# Patient Record
Sex: MALE | Race: BLACK OR AFRICAN AMERICAN | NOT HISPANIC OR LATINO | URBAN - METROPOLITAN AREA
[De-identification: names, ages, dates, MRNs, and addresses within clinical notes are randomized per-mention and may not be internally consistent; named-entity substitution may affect disease eponyms.]

---

## 2019-06-20 ENCOUNTER — INPATIENT (INPATIENT)
Facility: HOSPITAL | Age: 69
LOS: 5 days | Discharge: HOME | DRG: 687 | End: 2019-06-26
Attending: FAMILY MEDICINE | Admitting: HOSPITALIST
Payer: MEDICARE

## 2019-06-20 VITALS
TEMPERATURE: 98 F | DIASTOLIC BLOOD PRESSURE: 76 MMHG | HEART RATE: 92 BPM | OXYGEN SATURATION: 100 % | RESPIRATION RATE: 17 BRPM | SYSTOLIC BLOOD PRESSURE: 116 MMHG

## 2019-06-20 DIAGNOSIS — N12 TUBULO-INTERSTITIAL NEPHRITIS, NOT SPECIFIED AS ACUTE OR CHRONIC: ICD-10-CM

## 2019-06-20 DIAGNOSIS — N39.0 URINARY TRACT INFECTION, SITE NOT SPECIFIED: ICD-10-CM

## 2019-06-20 DIAGNOSIS — S86.019A STRAIN OF UNSPECIFIED ACHILLES TENDON, INITIAL ENCOUNTER: Chronic | ICD-10-CM

## 2019-06-20 DIAGNOSIS — E88.09 OTHER DISORDERS OF PLASMA-PROTEIN METABOLISM, NOT ELSEWHERE CLASSIFIED: ICD-10-CM

## 2019-06-20 DIAGNOSIS — Z29.9 ENCOUNTER FOR PROPHYLACTIC MEASURES, UNSPECIFIED: ICD-10-CM

## 2019-06-20 DIAGNOSIS — I10 ESSENTIAL (PRIMARY) HYPERTENSION: ICD-10-CM

## 2019-06-20 DIAGNOSIS — Z98.890 OTHER SPECIFIED POSTPROCEDURAL STATES: Chronic | ICD-10-CM

## 2019-06-20 DIAGNOSIS — I82.90 ACUTE EMBOLISM AND THROMBOSIS OF UNSPECIFIED VEIN: ICD-10-CM

## 2019-06-20 DIAGNOSIS — N28.89 OTHER SPECIFIED DISORDERS OF KIDNEY AND URETER: ICD-10-CM

## 2019-06-20 DIAGNOSIS — E87.1 HYPO-OSMOLALITY AND HYPONATREMIA: ICD-10-CM

## 2019-06-20 LAB
ALBUMIN SERPL ELPH-MCNC: 3 G/DL — LOW (ref 3.3–5)
ALP SERPL-CCNC: 84 U/L — SIGNIFICANT CHANGE UP (ref 40–120)
ALT FLD-CCNC: 16 U/L — SIGNIFICANT CHANGE UP (ref 12–78)
ANION GAP SERPL CALC-SCNC: 8 MMOL/L — SIGNIFICANT CHANGE UP (ref 5–17)
ANISOCYTOSIS BLD QL: SLIGHT — SIGNIFICANT CHANGE UP
APPEARANCE UR: ABNORMAL
APTT BLD: 32.6 SEC — SIGNIFICANT CHANGE UP (ref 28.5–37)
AST SERPL-CCNC: 13 U/L — LOW (ref 15–37)
BACTERIA # UR AUTO: ABNORMAL
BASOPHILS # BLD AUTO: 0.36 K/UL — HIGH (ref 0–0.2)
BASOPHILS NFR BLD AUTO: 1 % — SIGNIFICANT CHANGE UP (ref 0–2)
BILIRUB SERPL-MCNC: 0.7 MG/DL — SIGNIFICANT CHANGE UP (ref 0.2–1.2)
BILIRUB UR-MCNC: NEGATIVE — SIGNIFICANT CHANGE UP
BUN SERPL-MCNC: 12 MG/DL — SIGNIFICANT CHANGE UP (ref 7–23)
CALCIUM SERPL-MCNC: 9 MG/DL — SIGNIFICANT CHANGE UP (ref 8.5–10.1)
CHLORIDE SERPL-SCNC: 99 MMOL/L — SIGNIFICANT CHANGE UP (ref 96–108)
CO2 SERPL-SCNC: 26 MMOL/L — SIGNIFICANT CHANGE UP (ref 22–31)
COLOR SPEC: YELLOW — SIGNIFICANT CHANGE UP
COMMENT - URINE: SIGNIFICANT CHANGE UP
CREAT SERPL-MCNC: 0.84 MG/DL — SIGNIFICANT CHANGE UP (ref 0.5–1.3)
DIFF PNL FLD: ABNORMAL
EOSINOPHIL # BLD AUTO: 0 K/UL — SIGNIFICANT CHANGE UP (ref 0–0.5)
EOSINOPHIL NFR BLD AUTO: 0 % — SIGNIFICANT CHANGE UP (ref 0–6)
EPI CELLS # UR: SIGNIFICANT CHANGE UP
GLUCOSE SERPL-MCNC: 123 MG/DL — HIGH (ref 70–99)
GLUCOSE UR QL: NEGATIVE — SIGNIFICANT CHANGE UP
HCT VFR BLD CALC: 36.5 % — LOW (ref 39–50)
HGB BLD-MCNC: 11.8 G/DL — LOW (ref 13–17)
INR BLD: 1.31 RATIO — HIGH (ref 0.88–1.16)
KETONES UR-MCNC: ABNORMAL
LACTATE SERPL-SCNC: 1.6 MMOL/L — SIGNIFICANT CHANGE UP (ref 0.7–2)
LEUKOCYTE ESTERASE UR-ACNC: ABNORMAL
LIDOCAIN IGE QN: 43 U/L — LOW (ref 73–393)
LYMPHOCYTES # BLD AUTO: 1.09 K/UL — SIGNIFICANT CHANGE UP (ref 1–3.3)
LYMPHOCYTES # BLD AUTO: 3 % — LOW (ref 13–44)
MANUAL SMEAR VERIFICATION: SIGNIFICANT CHANGE UP
MCHC RBC-ENTMCNC: 23.6 PG — LOW (ref 27–34)
MCHC RBC-ENTMCNC: 32.3 GM/DL — SIGNIFICANT CHANGE UP (ref 32–36)
MCV RBC AUTO: 73.1 FL — LOW (ref 80–100)
MONOCYTES # BLD AUTO: 1.45 K/UL — HIGH (ref 0–0.9)
MONOCYTES NFR BLD AUTO: 4 % — SIGNIFICANT CHANGE UP (ref 2–14)
NEUTROPHILS # BLD AUTO: 32.98 K/UL — HIGH (ref 1.8–7.4)
NEUTROPHILS NFR BLD AUTO: 85 % — HIGH (ref 43–77)
NEUTS BAND # BLD: 6 % — SIGNIFICANT CHANGE UP (ref 0–8)
NITRITE UR-MCNC: NEGATIVE — SIGNIFICANT CHANGE UP
NRBC # BLD: 0 — SIGNIFICANT CHANGE UP
NRBC # BLD: SIGNIFICANT CHANGE UP /100 WBCS (ref 0–0)
PH UR: 5 — SIGNIFICANT CHANGE UP (ref 5–8)
PLAT MORPH BLD: NORMAL — SIGNIFICANT CHANGE UP
PLATELET # BLD AUTO: 423 K/UL — HIGH (ref 150–400)
POIKILOCYTOSIS BLD QL AUTO: SLIGHT — SIGNIFICANT CHANGE UP
POTASSIUM SERPL-MCNC: 3.6 MMOL/L — SIGNIFICANT CHANGE UP (ref 3.5–5.3)
POTASSIUM SERPL-SCNC: 3.6 MMOL/L — SIGNIFICANT CHANGE UP (ref 3.5–5.3)
PROT SERPL-MCNC: 6.8 G/DL — SIGNIFICANT CHANGE UP (ref 6–8.3)
PROT UR-MCNC: 75 MG/DL
PROTHROM AB SERPL-ACNC: 15 SEC — HIGH (ref 10–12.9)
RBC # BLD: 4.99 M/UL — SIGNIFICANT CHANGE UP (ref 4.2–5.8)
RBC # FLD: 17.9 % — HIGH (ref 10.3–14.5)
RBC BLD AUTO: SIGNIFICANT CHANGE UP
RBC CASTS # UR COMP ASSIST: ABNORMAL /HPF (ref 0–4)
SODIUM SERPL-SCNC: 133 MMOL/L — LOW (ref 135–145)
SP GR SPEC: 1.02 — SIGNIFICANT CHANGE UP (ref 1.01–1.02)
UROBILINOGEN FLD QL: NEGATIVE — SIGNIFICANT CHANGE UP
VARIANT LYMPHS # BLD: 1 % — SIGNIFICANT CHANGE UP (ref 0–6)
WBC # BLD: 36.24 K/UL — HIGH (ref 3.8–10.5)
WBC # FLD AUTO: 36.24 K/UL — HIGH (ref 3.8–10.5)
WBC UR QL: ABNORMAL

## 2019-06-20 PROCEDURE — 93010 ELECTROCARDIOGRAM REPORT: CPT

## 2019-06-20 PROCEDURE — 71260 CT THORAX DX C+: CPT | Mod: 26

## 2019-06-20 PROCEDURE — 74177 CT ABD & PELVIS W/CONTRAST: CPT | Mod: 26

## 2019-06-20 PROCEDURE — 99285 EMERGENCY DEPT VISIT HI MDM: CPT

## 2019-06-20 PROCEDURE — 99223 1ST HOSP IP/OBS HIGH 75: CPT | Mod: GC,AI

## 2019-06-20 RX ORDER — MORPHINE SULFATE 50 MG/1
2 CAPSULE, EXTENDED RELEASE ORAL ONCE
Refills: 0 | Status: DISCONTINUED | OUTPATIENT
Start: 2019-06-20 | End: 2019-06-20

## 2019-06-20 RX ORDER — HEPARIN SODIUM 5000 [USP'U]/ML
5500 INJECTION INTRAVENOUS; SUBCUTANEOUS EVERY 6 HOURS
Refills: 0 | Status: DISCONTINUED | OUTPATIENT
Start: 2019-06-20 | End: 2019-06-24

## 2019-06-20 RX ORDER — HEPARIN SODIUM 5000 [USP'U]/ML
5500 INJECTION INTRAVENOUS; SUBCUTANEOUS ONCE
Refills: 0 | Status: COMPLETED | OUTPATIENT
Start: 2019-06-20 | End: 2019-06-20

## 2019-06-20 RX ORDER — ASCORBIC ACID 60 MG
0 TABLET,CHEWABLE ORAL
Qty: 0 | Refills: 0 | DISCHARGE

## 2019-06-20 RX ORDER — CEFPODOXIME PROXETIL 100 MG
0 TABLET ORAL
Qty: 0 | Refills: 0 | DISCHARGE

## 2019-06-20 RX ORDER — ONDANSETRON 8 MG/1
4 TABLET, FILM COATED ORAL ONCE
Refills: 0 | Status: DISCONTINUED | OUTPATIENT
Start: 2019-06-20 | End: 2019-06-26

## 2019-06-20 RX ORDER — HEPARIN SODIUM 5000 [USP'U]/ML
2500 INJECTION INTRAVENOUS; SUBCUTANEOUS EVERY 6 HOURS
Refills: 0 | Status: DISCONTINUED | OUTPATIENT
Start: 2019-06-20 | End: 2019-06-24

## 2019-06-20 RX ORDER — PIPERACILLIN AND TAZOBACTAM 4; .5 G/20ML; G/20ML
3.38 INJECTION, POWDER, LYOPHILIZED, FOR SOLUTION INTRAVENOUS ONCE
Refills: 0 | Status: COMPLETED | OUTPATIENT
Start: 2019-06-20 | End: 2019-06-20

## 2019-06-20 RX ORDER — LISINOPRIL 2.5 MG/1
10 TABLET ORAL DAILY
Refills: 0 | Status: DISCONTINUED | OUTPATIENT
Start: 2019-06-20 | End: 2019-06-26

## 2019-06-20 RX ORDER — SODIUM CHLORIDE 9 MG/ML
1000 INJECTION INTRAMUSCULAR; INTRAVENOUS; SUBCUTANEOUS ONCE
Refills: 0 | Status: COMPLETED | OUTPATIENT
Start: 2019-06-20 | End: 2019-06-20

## 2019-06-20 RX ORDER — AMLODIPINE BESYLATE 2.5 MG/1
5 TABLET ORAL DAILY
Refills: 0 | Status: DISCONTINUED | OUTPATIENT
Start: 2019-06-20 | End: 2019-06-20

## 2019-06-20 RX ORDER — HEPARIN SODIUM 5000 [USP'U]/ML
INJECTION INTRAVENOUS; SUBCUTANEOUS
Qty: 25000 | Refills: 0 | Status: DISCONTINUED | OUTPATIENT
Start: 2019-06-20 | End: 2019-06-24

## 2019-06-20 RX ORDER — LISINOPRIL 2.5 MG/1
0 TABLET ORAL
Qty: 0 | Refills: 0 | DISCHARGE

## 2019-06-20 RX ADMIN — HEPARIN SODIUM 1200 UNIT(S)/HR: 5000 INJECTION INTRAVENOUS; SUBCUTANEOUS at 20:40

## 2019-06-20 RX ADMIN — PIPERACILLIN AND TAZOBACTAM 3.38 GRAM(S): 4; .5 INJECTION, POWDER, LYOPHILIZED, FOR SOLUTION INTRAVENOUS at 18:50

## 2019-06-20 RX ADMIN — SODIUM CHLORIDE 1000 MILLILITER(S): 9 INJECTION INTRAMUSCULAR; INTRAVENOUS; SUBCUTANEOUS at 19:20

## 2019-06-20 RX ADMIN — SODIUM CHLORIDE 1000 MILLILITER(S): 9 INJECTION INTRAMUSCULAR; INTRAVENOUS; SUBCUTANEOUS at 18:21

## 2019-06-20 RX ADMIN — PIPERACILLIN AND TAZOBACTAM 200 GRAM(S): 4; .5 INJECTION, POWDER, LYOPHILIZED, FOR SOLUTION INTRAVENOUS at 18:20

## 2019-06-20 RX ADMIN — SODIUM CHLORIDE 1000 MILLILITER(S): 9 INJECTION INTRAMUSCULAR; INTRAVENOUS; SUBCUTANEOUS at 16:59

## 2019-06-20 RX ADMIN — HEPARIN SODIUM 5500 UNIT(S): 5000 INJECTION INTRAVENOUS; SUBCUTANEOUS at 20:40

## 2019-06-20 NOTE — ED PROVIDER NOTE - OBJECTIVE STATEMENT
pt c/o approx 1 week of upper abd pain. pt seen in [facility], dx with uti, also found to have liver mass, had biopsy, d/c 6/16. + diarrhea. no fevers, chills, d/n/v, cp, sob, cough, dysuria.  pmd - fishbein

## 2019-06-20 NOTE — H&P ADULT - PROBLEM SELECTOR PLAN 3
Continue BP med -L renal vein with partial thrombosis  -Heparin Gtt per Lele Par Thomas, Vascular -L renal vein with partial thrombosis  -Heparin Gtt per Cony Gottlieb,  Vascular -L renal vein with partial thrombosis  -Heparin Gtt per Dr Estrada,  Vascular surg.

## 2019-06-20 NOTE — H&P ADULT - PROBLEM SELECTOR PLAN 4
-Patient on Lisinopril, will give CCB as patient  -Patient on Lisinopril, will give amlodipine as patient African American Cont Lisinopril   consider add/transition to CCB for better response in AA population

## 2019-06-20 NOTE — H&P ADULT - PROBLEM SELECTOR PLAN 1
-Patient presenting with abdominal pain possibly related to   -UA positive, s/p Zosyn  -Patient bladder scanned in ED with full bladder, barber administered with yellow fluid  -Due to associated episodes of abdominal pain with food, will ultrasound GB -Patient presenting with abdominal pain possibly related to   -UA positive, s/p Zosyn. Continue antibiotics  -F/U Urine Cx, F/U Blood Cx  -Patient bladder scanned in ED with full bladder, barber administered with yellow fluid  -trial of void tomorrow  -Consider ID and Urology, must obtain culture results from [facility] -Patient presenting with abdominal pain possibly related to   -UA positive, s/p Zosyn. Continue antibiotics  -F/U Urine Cx, F/U Blood Cx  -Patient bladder scanned in ED with full bladder, barber administered with yellow fluid  -trial of void tomorrow  -100 cc/hr NS for 12 hours  -Consider ID and Urology, must obtain culture results from [facility]

## 2019-06-20 NOTE — H&P ADULT - NSHPSOCIALHISTORY_GEN_ALL_CORE
but lives with current ex-wife in relationship. No tobacco use, no sig etoh use. Rare marijuana use.

## 2019-06-20 NOTE — H&P ADULT - NSHPPHYSICALEXAM_GEN_ALL_CORE
Physical Exam:  General: Well developed, well nourished, NAD  HEENT: NCAT, PERRLA, EOMI bl, moist mucous membranes, poor oral hygiene  Neck: Supple, nontender, no mass  Neurology: A&Ox3, nonfocal,  sensation intact, moving all extremities  Respiratory: CTA B/L, No W/R/R  CV: RRR, +S1/S2, Grade III/VI holosystolic murmur heard best in R 2nd ICS  Abdominal: Soft, NT, negative murphys, negative CVA tenderness b/l, , 2x2 cm circular erythematous rash on abdomen  Extremities: No peripheral edema in LE b/l, 2+ dorsalis pedal pulses b/l  Skin: warm, dry post procedure first degree heart block

## 2019-06-20 NOTE — ED PROVIDER NOTE - CARE PLAN
Principal Discharge DX:	UTI (urinary tract infection)  Secondary Diagnosis:	Leukocytosis  Secondary Diagnosis:	Venous thrombosis

## 2019-06-20 NOTE — H&P ADULT - PROBLEM SELECTOR PLAN 5
IMPROVE VTE Individual Risk Assessment    RISK                                                                Points    [  ] Previous VTE                                                  3    [  ] Thrombophilia                                               2    [  ] Lower limb paralysis                                      2        (unable to hold up >15 seconds)    [  ] Current Cancer                                              2         (within 6 months)  [  ] Immobilization > 24 hrs                                1    [  ] ICU/CCU stay > 24 hours                              1  [  ] Age > 60                                                      1    IMPROVE VTE Score _________    On heparin due to L renal thrombosis mild level with mild to no symptoms  trend after IV FLuids

## 2019-06-20 NOTE — ED ADULT NURSE NOTE - CHPI ED NUR SYMPTOMS NEG
no fever/no abdominal distension/no vomiting/no blood in stool/no burning urination/no diarrhea/no chills/no dysuria/no hematuria/no nausea

## 2019-06-20 NOTE — ED ADULT NURSE NOTE - OBJECTIVE STATEMENT
received pt in bed #10b Pt A&O c/o abd pain & no appetite x 1 week since Thursday was seen @ [facility] had liver bx done states still has pain & also had diarrhea x 2 days that has now subsided. pt currently on antibxs Pt denies n/v. Pt seen by Dr Wooten Will monitor

## 2019-06-20 NOTE — H&P ADULT - HISTORY OF PRESENT ILLNESS
69 M with a PMH of HTN presents to ED c/o upper abdominal pain for 1 week.     In the ED;  VS stable  WBC 36.2 h/h: 11.8/36.5  Platelet 423, INR 1.31 BMP wnl except Na 133, AST/ALT wnl, lipase 43, lactate 1.6  UA Positive  CT abdomen/Pelvis: Possible L sided kidney neoplasm vs pyelonephritis.  A left renal artery is surrounded by lymphadenopathy and appears narrowed. There is suggestion   of partially occlusive thrombus in the left renal vein  Hypodense liver lesion in the anterior aspect of the lateral segment of the left lobe measuring 3.2 x 2.2 cm  CT chest: Negative PE or PNA. s. There are several tiny left   bilateral upper lobe pulmonary nodules 69 M with a PMH of HTN presents to ED c/o upper abdominal pain. Patient states pain began last Thursday in his lower abdomen with radiation to his L flank with associated decrease in appetite. He went to [facility] at that time where he was admitted for 2 days and diagnosed with a UTI, had a liver biopsy based on CT findings and discharged on PO antibiotics. Patient now returning for similar abdominal pain, but this time he notes it's higher in the midepigastric region and is non radiating. He says it was at its worst (an 8/10) after eating a turkey sandwich yesterday and after having some broth earlier today. He admits to an ongoing decrease in appetite and a 7 lb weight loss since last week. He denies fevers, chills, night sweats, N/V but admits to diarrhea for the past 2 days but his last stool last night was formed. Denies F/U/D, but admits to urinary retention while here in ED requiring catheterization, denying prior episodes of urinary retention.     In the ED;  VS stable  WBC 36.2 h/h: 11.8/36.5  Platelet 423, INR 1.31 BMP wnl except Na 133, AST/ALT wnl, lipase 43, lactate 1.6  UA Positive  CT abdomen/Pelvis: Possible L sided kidney neoplasm vs pyelonephritis.  A left renal artery is surrounded by lymphadenopathy and appears narrowed. There is suggestion   of partially occlusive thrombus in the left renal vein  Hypodense liver lesion in the anterior aspect of the lateral segment of the left lobe measuring 3.2 x 2.2 cm  CT chest: Negative PE or PNA. s. There are several tiny left   bilateral upper lobe pulmonary nodules

## 2019-06-20 NOTE — H&P ADULT - NSHPREVIEWOFSYSTEMS_GEN_ALL_CORE
Constitutional: denies fever, chills, diaphoresis admits decreased appetite  HEENT: denies blurry vision, double vision, eye pain, difficulty hearing  Respiratory: denies SOB, cough, sputum production  Cardiovascular: denies CP, palpitations, edema  Gastrointestinal: admits abdominal pain, admits diarrhea, constipation denies N/V  Genitourinary: denies dysuria, frequency, urgency, hematuria admits urinary retention  Skin/Breast: denies rash, itching  Neurologic: denies headache, weakness, dizziness, paresthesias, numbness/tingling  Psychiatric: denies anxiety, depression, suicidal, homicidal thoughts  ROS negative except as noted above

## 2019-06-20 NOTE — H&P ADULT - PROBLEM SELECTOR PLAN 7
IMPROVE VTE Individual Risk Assessment    RISK                                                                Points    [  ] Previous VTE                                                  3    [  ] Thrombophilia                                               2    [  ] Lower limb paralysis                                      2        (unable to hold up >15 seconds)    [  ] Current Cancer                                              2         (within 6 months)  [  ] Immobilization > 24 hrs                                1    [  ] ICU/CCU stay > 24 hours                              1  [  ] Age > 60                                                      1    IMPROVE VTE Score _________    On heparin due to L renal thrombosis

## 2019-06-20 NOTE — H&P ADULT - PROBLEM SELECTOR PLAN 2
-L renal vein with partial thrombosis  -Heparin Gtt per Lele Par Thomas, Vascular -CT abdomen and pelvis demonstrates L renal mass of possible neoplastic origin with necrotic lymph-nodes para-aortically with small pulmonary nodules, ? mets  -F/U Heme Onc consult -CT abdomen and pelvis demonstrates L renal mass of possible neoplastic origin with necrotic lymph-nodes para-aortically with small pulmonary nodules, ? mets  -F/U Heme Onc consult (Dr Sun)

## 2019-06-20 NOTE — ED ADULT NURSE REASSESSMENT NOTE - NS ED NURSE REASSESS COMMENT FT1
pt verbalized trouble urinating States he feels like he has the urge but only can urinate 20cc Bladder scan done shows >395 Dr Wooten made aware Stated to placed Locke

## 2019-06-21 DIAGNOSIS — I82.3 EMBOLISM AND THROMBOSIS OF RENAL VEIN: ICD-10-CM

## 2019-06-21 DIAGNOSIS — D63.0 ANEMIA IN NEOPLASTIC DISEASE: ICD-10-CM

## 2019-06-21 DIAGNOSIS — D63.8 ANEMIA IN OTHER CHRONIC DISEASES CLASSIFIED ELSEWHERE: ICD-10-CM

## 2019-06-21 DIAGNOSIS — C77.2 SECONDARY AND UNSPECIFIED MALIGNANT NEOPLASM OF INTRA-ABDOMINAL LYMPH NODES: ICD-10-CM

## 2019-06-21 DIAGNOSIS — D50.0 IRON DEFICIENCY ANEMIA SECONDARY TO BLOOD LOSS (CHRONIC): ICD-10-CM

## 2019-06-21 DIAGNOSIS — C78.00 SECONDARY MALIGNANT NEOPLASM OF UNSPECIFIED LUNG: ICD-10-CM

## 2019-06-21 DIAGNOSIS — K92.2 GASTROINTESTINAL HEMORRHAGE, UNSPECIFIED: ICD-10-CM

## 2019-06-21 LAB
APTT BLD: 66.2 SEC — HIGH (ref 28.5–37)
APTT BLD: 70.1 SEC — HIGH (ref 27.5–36.3)
CULTURE RESULTS: NO GROWTH — SIGNIFICANT CHANGE UP
ERYTHROCYTE [SEDIMENTATION RATE] IN BLOOD: 27 MM/HR — HIGH (ref 0–20)
HCT VFR BLD CALC: 31.2 % — LOW (ref 39–50)
HCT VFR BLD CALC: 32.3 % — LOW (ref 39–50)
HCV AB S/CO SERPL IA: 0.1 S/CO — SIGNIFICANT CHANGE UP (ref 0–0.99)
HCV AB SERPL-IMP: SIGNIFICANT CHANGE UP
HGB BLD-MCNC: 10.3 G/DL — LOW (ref 13–17)
HGB BLD-MCNC: 10.6 G/DL — LOW (ref 13–17)
MCHC RBC-ENTMCNC: 23.9 PG — LOW (ref 27–34)
MCHC RBC-ENTMCNC: 23.9 PG — LOW (ref 27–34)
MCHC RBC-ENTMCNC: 32.8 GM/DL — SIGNIFICANT CHANGE UP (ref 32–36)
MCHC RBC-ENTMCNC: 33 GM/DL — SIGNIFICANT CHANGE UP (ref 32–36)
MCV RBC AUTO: 72.4 FL — LOW (ref 80–100)
MCV RBC AUTO: 72.7 FL — LOW (ref 80–100)
NRBC # BLD: 0 /100 WBCS — SIGNIFICANT CHANGE UP (ref 0–0)
NRBC # BLD: 0 /100 WBCS — SIGNIFICANT CHANGE UP (ref 0–0)
PLATELET # BLD AUTO: 340 K/UL — SIGNIFICANT CHANGE UP (ref 150–400)
PLATELET # BLD AUTO: 387 K/UL — SIGNIFICANT CHANGE UP (ref 150–400)
RBC # BLD: 4.31 M/UL — SIGNIFICANT CHANGE UP (ref 4.2–5.8)
RBC # BLD: 4.44 M/UL — SIGNIFICANT CHANGE UP (ref 4.2–5.8)
RBC # BLD: 4.44 M/UL — SIGNIFICANT CHANGE UP (ref 4.2–5.8)
RBC # FLD: 17.7 % — HIGH (ref 10.3–14.5)
RBC # FLD: 17.9 % — HIGH (ref 10.3–14.5)
RETICS #: 43.5 K/UL — SIGNIFICANT CHANGE UP (ref 25–125)
RETICS/RBC NFR: 1 % — SIGNIFICANT CHANGE UP (ref 0.5–2.5)
SPECIMEN SOURCE: SIGNIFICANT CHANGE UP
WBC # BLD: 28.17 K/UL — HIGH (ref 3.8–10.5)
WBC # BLD: 31.92 K/UL — HIGH (ref 3.8–10.5)
WBC # FLD AUTO: 28.17 K/UL — HIGH (ref 3.8–10.5)
WBC # FLD AUTO: 31.92 K/UL — HIGH (ref 3.8–10.5)

## 2019-06-21 PROCEDURE — 99233 SBSQ HOSP IP/OBS HIGH 50: CPT

## 2019-06-21 RX ORDER — PIPERACILLIN AND TAZOBACTAM 4; .5 G/20ML; G/20ML
3.38 INJECTION, POWDER, LYOPHILIZED, FOR SOLUTION INTRAVENOUS EVERY 8 HOURS
Refills: 0 | Status: DISCONTINUED | OUTPATIENT
Start: 2019-06-21 | End: 2019-06-22

## 2019-06-21 RX ORDER — PHENAZOPYRIDINE HCL 100 MG
100 TABLET ORAL EVERY 8 HOURS
Refills: 0 | Status: COMPLETED | OUTPATIENT
Start: 2019-06-21 | End: 2019-06-22

## 2019-06-21 RX ORDER — TAMSULOSIN HYDROCHLORIDE 0.4 MG/1
0.4 CAPSULE ORAL AT BEDTIME
Refills: 0 | Status: DISCONTINUED | OUTPATIENT
Start: 2019-06-21 | End: 2019-06-26

## 2019-06-21 RX ORDER — SODIUM CHLORIDE 9 MG/ML
1000 INJECTION INTRAMUSCULAR; INTRAVENOUS; SUBCUTANEOUS
Refills: 0 | Status: DISCONTINUED | OUTPATIENT
Start: 2019-06-21 | End: 2019-06-21

## 2019-06-21 RX ORDER — PIPERACILLIN AND TAZOBACTAM 4; .5 G/20ML; G/20ML
3.38 INJECTION, POWDER, LYOPHILIZED, FOR SOLUTION INTRAVENOUS ONCE
Refills: 0 | Status: COMPLETED | OUTPATIENT
Start: 2019-06-21 | End: 2019-06-21

## 2019-06-21 RX ADMIN — TAMSULOSIN HYDROCHLORIDE 0.4 MILLIGRAM(S): 0.4 CAPSULE ORAL at 22:32

## 2019-06-21 RX ADMIN — PIPERACILLIN AND TAZOBACTAM 25 GRAM(S): 4; .5 INJECTION, POWDER, LYOPHILIZED, FOR SOLUTION INTRAVENOUS at 22:32

## 2019-06-21 RX ADMIN — HEPARIN SODIUM 1200 UNIT(S)/HR: 5000 INJECTION INTRAVENOUS; SUBCUTANEOUS at 04:13

## 2019-06-21 RX ADMIN — LISINOPRIL 10 MILLIGRAM(S): 2.5 TABLET ORAL at 05:48

## 2019-06-21 RX ADMIN — HEPARIN SODIUM 1200 UNIT(S)/HR: 5000 INJECTION INTRAVENOUS; SUBCUTANEOUS at 10:33

## 2019-06-21 RX ADMIN — PIPERACILLIN AND TAZOBACTAM 200 GRAM(S): 4; .5 INJECTION, POWDER, LYOPHILIZED, FOR SOLUTION INTRAVENOUS at 14:16

## 2019-06-21 RX ADMIN — SODIUM CHLORIDE 100 MILLILITER(S): 9 INJECTION INTRAMUSCULAR; INTRAVENOUS; SUBCUTANEOUS at 05:21

## 2019-06-21 NOTE — CONSULT NOTE ADULT - SUBJECTIVE AND OBJECTIVE BOX
CHIEF COMPLAINT: Abdominal pain    HISTORY OF PRESENT ILLNESS:    69 M with a PMH of HTN presents to ED c/o upper abdominal pain. Patient states pain began last Thursday in his lower abdomen with radiation to his L flank with associated decrease in appetite. He went to [facility] at that time where he was admitted for 2 days and diagnosed with a UTI, had a liver biopsy based on CT findings and discharged on PO antibiotics. Patient now returning for similar abdominal pain, but this time he notes it's higher in the midepigastric region and is non radiating. He says it was at its worst (an 8/10) after eating a turkey sandwich yesterday and after having some broth earlier today. He admits to an ongoing decrease in appetite and a 7 lb weight loss since last week. He denies fevers, chills, night sweats, N/V but admits to diarrhea for the past 2 days but his last stool last night was formed. Denies F/U/D, but admits to urinary retention while here in ED requiring catheterization, denying prior episodes of urinary retention.     CTT - hepatic mass, left renal mass, necrotic  para - aortic,  lymph nodes, left renal vein thrombus, Grossly enlarged prostate projecting into  bladder lumen, suggestive of possible neoplastic process due to size and irregularity, RIH with bowel      PAST MEDICAL & SURGICAL HISTORY:  Hypertension  Achilles rupture  History of liver biopsy      REVIEW OF SYSTEMS:    CONSTITUTIONAL: No weakness, fevers or chills  EYES/ENT: No visual changes;  No vertigo or throat pain   NECK: No pain or stiffness  RESPIRATORY: No cough, wheezing, hemoptysis; No shortness of breath  CARDIOVASCULAR: No chest pain or palpitations  GASTROINTESTINAL: abdominal pain, noN/V/F/C  GENITOURINARY: urinary retention no hematuria/dysuria  NEUROLOGICAL: No numbness or weakness  SKIN: No itching, burning, rashes, or lesions   All other review of systems is negative unless indicated above.    MEDICATIONS  (STANDING):  heparin  Infusion.  Unit(s)/Hr (12 mL/Hr) IV Continuous <Continuous>  lisinopril 10 milliGRAM(s) Oral daily  ondansetron Injectable 4 milliGRAM(s) IV Push once  piperacillin/tazobactam IVPB.. 3.375 Gram(s) IV Intermittent every 8 hours  sodium chloride 0.9%. 1000 milliLiter(s) (100 mL/Hr) IV Continuous <Continuous>  tamsulosin 0.4 milliGRAM(s) Oral at bedtime    MEDICATIONS  (PRN):  heparin  Injectable 5500 Unit(s) IV Push every 6 hours PRN For aPTT less than 40  heparin  Injectable 2500 Unit(s) IV Push every 6 hours PRN For aPTT between 40 - 57      Allergies    No Known Allergies    Intolerances        SOCIAL HISTORY:    FAMILY HISTORY:  FHx: cancer      Vital Signs Last 24 Hrs  T(C): 36.7 (2019 16:23), Max: 37.6 (2019 05:50)  T(F): 98 (2019 16:23), Max: 99.7 (2019 05:50)  HR: 86 (2019 16:23) (74 - 88)  BP: 126/88 (2019 16:23) (126/88 - 145/82)  BP(mean): --  RR: 15 (2019 16:23) (15 - 16)  SpO2: 100% (2019 16:23) (97% - 100%)    PHYSICAL EXAM:    Constitutional: NAD, well-developed  HEENT: RISHI, EOMI, Normal Hearing, MMM  Neck: No LAD, No JVD  Back: Normal spine flexure, No CVA tenderness  Respiratory: CTAB   Cardiovascular: S1 and S2, RRR, gr 2/6 SM  Abd: BS+, soft, NT/ND, No CVAT  : Normal circumcised  phallus, patent  meatus, bilateral descended testes, no masses, cath indwelling  AL: Normal sphincter tone, did not fully digitalize prostate, patient too uncomfortable  Extremities: No peripheral edema  Vascular: 2+ peripheral pulses  Neurological: A/O x 3, no focal deficits  Psychiatric: Normal mood, normal affect      LABS:                        10.3   28.17 )-----------( 340      ( 2019 03:20 )             31.2     06-20    133<L>  |  99  |  12  ----------------------------<  123<H>  3.6   |  26  |  0.84    Ca    9.0      2019 18:28    TPro  6.8  /  Alb  3.0<L>  /  TBili  0.7  /  DBili  x   /  AST  13<L>  /  ALT  16  /  AlkPhos  84  06-20    PT/INR - ( 2019 18:28 )   PT: 15.0 sec;   INR: 1.31 ratio         PTT - ( 2019 09:49 )  PTT:70.1 sec  Urinalysis Basic - ( 2019 17:06 )    Color: Yellow / Appearance: Slightly Turbid / S.020 / pH: x  Gluc: x / Ketone: Large  / Bili: Negative / Urobili: Negative   Blood: x / Protein: 75 mg/dL / Nitrite: Negative   Leuk Esterase: Moderate / RBC: 6-10 /HPF / WBC 11-25   Sq Epi: x / Non Sq Epi: Occasional / Bacteria: Occasional      Urine Culture:  @ 21:12  Urine Culure Resuls   No growth  Organism --    Hemoglobin: 10.3 g/dL ( @ 03:20)  Hematocrit: 31.2 % ( @ 03:20)  Hemoglobin: 11.8 g/dL ( @ 17:06)  Hematocrit: 36.5 % ( @ 17:06)      RADIOLOGY & ADDITIONAL STUDIES:  < from: CT Abdomen and Pelvis w/ IV Cont (19 @ 19:29) >  EXAM:  CT ABDOMEN AND PELVIS IC                          EXAM:  CT CHEST IC                            PROCEDURE DATE:  2019          INTERPRETATION:  CT CHEST/ABDOMEN/PELVIS:     CLINICAL INFORMATION: Upper abdominal pain and recent liver biopsy    COMPARISON: None available.    PROCEDURE:  Using multislice helical CT, 2.5 mm sections were obtained   from the thoracic inlet through the ischial tuberosities with the   administration of intravenous contrast. Oral contrast was not   administered. 95 cc of Omnipaque 350 were administered.    Coronal and sagittal reformations were performed by  CT technologist and   made available for review.    FINDINGS:  The visualized structures of the lower neck are unremarkable.   The visualized aorta is of normal course and caliber. The heart is not   enlarged. There is no evidence of pericardial effusion.    There is no evidence of axillary, hilar, or mediastinal lymphadenopathy.    No focal lung consolidations identified. No evidence of pleural effusion   or pneumothorax. No pulmonary nodules identified. There are areas of   subpleural scarring in the upper lobes. There are several tiny left   bilateral upper lobe pulmonary nodules as well. Additionally there is are   larger subpleural nodules in the lower lobes with the largest seen in the   lateral anterior left lower lobe measuring 2 mm.      The liver is of normal contour. there is a well-circumscribed hypodense   lesion in the anterior aspect of the lateral segment of the left lobe   measuring 3.2 x 2.2 cm. Please correlate clinically as to whether this   was the area of biopsy. There is an additional tiny hypodensities in the   liver at junction of the right and left lobes on axial image 88 of series   2. The gallbladder is present. No evidence of intra or extrahepatic   biliary dilatation.     The pancreas, spleen, adrenal glands, and right kidney are grossly   unremarkable. The left kidney demonstrates abnormal appearance in the   upper to mid pole with heterogeneous areas of low attenuation. It is   suspicious for renal neoplasm. There is surrounding perinephric   stranding. Differential diagnosis includes pyelonephritis. No evidence of    nephrolithiasis.The bladder is unremarkable.    There are enlarged necrotic appearing upper abdominal lymph nodes at the   level of the left kidney which may be related to metastatic disease if   this kidney process is neoplastic in nature. A left renal artery is   surrounded by lymphadenopathy and appears narrowed. There is suggestion   of partially occlusive thrombus in the left renal vein The aorta is not   aneurysmal.    There is no evidence of bowel obstruction. There is no evidence of   pneumoperitoneum . There is a right inguinal hernia obtaining loops of   colon without obstruction The prostate gland is markedly enlarged   indenting the bladder base. There is a small amount of free pelvic fluid    The visualized osseous structures demonstrate degenerative changes.   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    IMPRESSION: Significant abnormal appearance of the left kidney with   multiple heterogeneous areas of low attenuation involving the upper and   midpole which may represent malignancy. There are necrotic lymph nodes in   the left para-aortic location at the level of the kidney and below the   level of the kidney. Adenopathy surrounds the left renal artery. The left   renal vein demonstrates question of partially occlusive thrombus.   Nonspecific scattered tiny pulmonary nodules. Lower lobe subpleural   nodules measure up to 6 mm  Nonobstructing right inguinal hernia containing ascending colon loop  Markedly enlarged prostate gland  Results were discussed with Dr. Wooten at 7:50 PM on 2019.                              JONNIE CONNOLLY M.D.,ATTENDING RADIOLOGIST  This document has been electronically signed. 2019  7:56PM    < end of copied text >

## 2019-06-21 NOTE — CONSULT NOTE ADULT - ASSESSMENT
Urinary Retention with grossly abnormal prostate, cath indwelling, on Flomax, TOV later    Left renal mass suggestive Renal Tumor with thrombus, an findings c/w mets to lymph nodes and possibly liver (3.2 cm mass), Bone scan  ordered. I contacted Dr. Jenkins,, Chief of Urology [facility], who is expert in surgical management of renal tumors and would coordinate urologic an ancillary service evaluation and care of this patient, patient is agreeable Dr. Martinez will make arrangements to see patient next week or soon thereafter pending patient  condition would be able to surgically care fro patient and coordinate ancillary services    Anemia    U/A suggestive UTI - C&S negative    right inguinal hernia with bowel - asx

## 2019-06-21 NOTE — CONSULT NOTE ADULT - ASSESSMENT
[ASSESSMENT and  PLAN]  Suspect metastatic renal carcinoma, with L kidney lesions.  L paraaortic ERIC.   Scattered subcentimeter pulm nodules, suspicous for metastasisi.     L renal vein thrombosis.   CT negative for PE.     Elevated WBC likely, reactive due to renal vein thrombosis and necrotic tumor.   No fever, but low grade temp.     Anemia, with microcytosis.   Recent dark stools in last week, suspicious for intermittent GIB  Suspect Fe def.           RECOMMENDATIONS  Transfuse PRBC as clinically indicated.   Transfuse PRBC if Hgb <7.0 or if symptomatic.   Follow CBC    Check Anemia studies.     DVT Prophyalxis    Thank you for consulting us.     I have discussed the above plan of care with patient/family in detail. They expressed understanding of the treatment plan . Risks, benefits and alternatives discussed in detail. I have asked if they have any questions or concerns and appropriately addressed them; all questions answered to their satisfactions and in lay terms.     Discussed with  xxxxxxx.    > xxxxxx minutes spent in direct patient care, examining and counseling patient,  reviewing  the notes, lab data/ imaging , discussion with multidisciplinary team. [ASSESSMENT and  PLAN]  Suspect metastatic renal carcinoma, with L kidney lesions.  L paraaortic ERIC.   Scattered subcentimeter pulm nodules, suspicous for metastasisi.     L renal vein thrombosis. Renal function preserved.   CT negative for PE.   Started on IV heparin.     Elevated WBC likely, reactive due to renal vein thrombosis and necrotic tumor.   No fever, but low grade temp.     Anemia, with microcytosis. Multifactorial.   Recent dark stools in last week, suspicious for intermittent GIB  Suspect Fe def.   Anemia in neoplastic disease.   Anemia in chronic disease.         RECOMMENDATIONS:  RENAL MASS and ERIC, LIVER MASS.   Needs tissue diagnosis.   Does not appear to be oligometastatic disease; likely un-resectable.   Recommend repeat liver bx, or if again non-diagnostic, then renal bx, in consultation with urology and interventional radiology.     ANEMIA  Check anemia studies  Check FOBT.   Follow CBC, monitor for decline  Transfuse PRBC as clinically indicated.   Transfuse PRBC if Hgb <7.0 or if symptomatic.     L RVT  Continue anticoagulation.   Given suspect recent bleeding, anemia, and need for bx, recommend reversible anticoagulant such as heparin.   Long term would transition to other anticoagulant.   DVT Prophyalxis    Thank you for consulting us.     I have discussed the above plan of care with patient in detail. They expressed understanding of the treatment plan . Risks, benefits and alternatives discussed in detail. I have asked if they have any questions or concerns and appropriately addressed them; all questions answered to their satisfactions and in lay terms.     Discussed with Dr Napoles    > 50minutes spent in direct patient care, examining and counseling patient,  reviewing  the notes, lab data/ imaging , discussion with multidisciplinary team. [ASSESSMENT and  PLAN]  Suspect metastatic renal carcinoma, with L kidney lesions.  L paraaortic ERIC. L hepatic liver lesion. AFP not impressive for hepatocellular carcinoma.   Scattered subcentimeter pulm nodules, suspicious for metastasis.   Ddx NHL, and unrelated lung nodules.     MGUS with faint IgG and K band in serum.     L renal vein thrombosis. Renal function preserved.   CT negative for PE.   Started on IV heparin.     Elevated WBC likely, reactive due to renal vein thrombosis and necrotic tumor.   No fever, but low grade temp.     Anemia, with microcytosis. Multifactorial.   Recent dark stools in last week, suspicious for intermittent GIB  Suspect Fe def.   Anemia in neoplastic disease.   Anemia in chronic disease.     BPH, urinary retention. Mild elevated PSA, not impressive for prostate cancer.       RECOMMENDATIONS:  RENAL MASS and ERIC, LIVER MASS.   Needs tissue diagnosis.   Does not appear to be oligometastasic disease; likely un-resectable.   Recommend repeat liver bx,      If again non-diagnostic, then renal bx, in consultation with urology and interventional radiology.     ANEMIA  Check anemia studies  Check FOBT.   Follow CBC, monitor for decline  Transfuse PRBC as clinically indicated.   Transfuse PRBC if Hgb <7.0 or if symptomatic.     L RVT  Continue anticoagulation.   Given suspect recent bleeding, anemia, and need for bx, recommend reversible anticoagulant such as heparin.   Long term would transition to other anticoagulant.   DVT Prophyalxis    Abx per infectious diseases and or medicine.   Locke to gravity, mgmt as per urology.     Thank you for consulting us.     I have discussed the above plan of care with patient in detail. They expressed understanding of the treatment plan . Risks, benefits and alternatives discussed in detail. I have asked if they have any questions or concerns and appropriately addressed them; all questions answered to their satisfactions and in lay terms.     Discussed with Dr Napoles    > 50minutes spent in direct patient care, examining and counseling patient,  reviewing  the notes, lab data/ imaging , discussion with multidisciplinary team.

## 2019-06-21 NOTE — CONSULT NOTE ADULT - SUBJECTIVE AND OBJECTIVE BOX
INCOMPLETE NOTE.  Documentation in Progress  PT SEEN AND EVALUATED. FULL NOTE TO FOLLOW   ***************************************************************  *************************************************************** Patient is a 69y old  Male who presents with a chief complaint of Abdominal pain (2019 17:09)      HPI:  69 M with a PMH of HTN presents to ED c/o upper abdominal pain. Patient states pain began last Thursday in his lower abdomen with radiation to his L flank with associated decrease in appetite. He went to [facility] at that time where he was admitted for 2 days and diagnosed with a UTI, had a liver biopsy based on CT findings and discharged on PO antibiotics. Patient now returning for similar abdominal pain, but this time he notes it's higher in the midepigastric region and is non radiating. He says it was at its worst (an 8/10) after eating a turkey sandwich yesterday and after having some broth earlier today. He admits to an ongoing decrease in appetite and a 7 lb weight loss since last week. He denies fevers, chills, night sweats, N/V but admits to diarrhea for the past 2 days but his last stool last night was formed. Denies F/U/D, but admits to urinary retention while here in ED requiring catheterization, denying prior episodes of urinary retention.     In the ED;  VS stable  WBC 36.2 h/h: 11.8/36.5  Platelet 423, INR 1.31 BMP wnl except Na 133, AST/ALT wnl, lipase 43, lactate 1.6  UA Positive  CT abdomen/Pelvis: Possible L sided kidney neoplasm vs pyelonephritis.  A left renal artery is surrounded by lymphadenopathy and appears narrowed. There is suggestion   of partially occlusive thrombus in the left renal vein  Hypodense liver lesion in the anterior aspect of the lateral segment of the left lobe measuring 3.2 x 2.2 cm  CT chest: Negative PE or PNA. s. There are several tiny left   bilateral upper lobe pulmonary nodules (2019 20:19)      Heme-Onc asked to see pt for renal mass, ERIC, anemia and leukocytosis.   Recently admitted to [facility].   Seen by urology and oncology there, after found to have abd / renal mass. Enlarged prostate.   Had liver bx done, last week 19.     Pt now admitted with abd pain.   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   Started on IV heparin.   Post CT scan with urinary retention, difficulty urinating.   Urology to consult for retention and CT scan abn.       PAST MEDICAL & SURGICAL HISTORY:  Hypertension  Achilles rupture  History of liver biopsy      HEALTH ISSUES - PROBLEM Dx:  Hypoalbuminemia: Hypoalbuminemia  Hyponatremia: Hyponatremia  Need for prophylactic measure: Need for prophylactic measure  Renal mass, left: Renal mass, left  Pyelonephritis: Pyelonephritis  Hypertension: Hypertension  Venous thrombosis: Venous thrombosis  UTI (urinary tract infection): UTI (urinary tract infection)          Urinary tract infection (N39.0)  Hypertension (I10)  No pertinent past medical history (027793775)  Achilles rupture (S86.019A)  History of liver biopsy (Z98.890)  Venous thrombosis (I82.90)  Leukocytosis (D72.829)      FAMILY HISTORY:  FHx: cancer        [SOCIAL HISTORY: ]     smoking:  ex-smoker     EtOH:  denies     illicit drugs:  denies     occupation:  retired      marital status:  , no children     Other:       [ALLERGIES/INTOLERANCES:]  Allergies     No Known Allergies  Intolerances          [MEDICATIONS]  MEDICATIONS  (STANDING):  heparin  Infusion.  Unit(s)/Hr (12 mL/Hr) IV Continuous <Continuous>  lisinopril 10 milliGRAM(s) Oral daily  ondansetron Injectable 4 milliGRAM(s) IV Push once  piperacillin/tazobactam IVPB.. 3.375 Gram(s) IV Intermittent every 8 hours  tamsulosin 0.4 milliGRAM(s) Oral at bedtime    MEDICATIONS  (PRN):  heparin  Injectable 5500 Unit(s) IV Push every 6 hours PRN For aPTT less than 40  heparin  Injectable 2500 Unit(s) IV Push every 6 hours PRN For aPTT between 40 - 57        [REVIEW OF SYSTEMS: ]  CONSTITUTIONAL: normal, no fever, no shakes, no chills   EYES: No eye pain, no visual disturbances, no discharge  ENMT:  no discharge  NECK: No pain, no stiffness  BREASTS: No pain, no masses, no nipple discharge  RESPIRATORY: No cough, no wheezing, no chills, no hemoptysis; No shortness of breath  CARDIOVASCULAR: No chest pain, no palpitations, no dizziness, no leg swelling  GASTROINTESTINAL: +abdominal pain, no epigastric pain. No nausea, no vomiting, no hematemesis; No diarrhea , no constipation. ?melena +dark stools, no hematochezia.  GENITOURINARY: No dysuria, no frequency, no hematuria, no incontinence  NEUROLOGICAL: No headaches, no memory loss, no loss of strength, no numbness, no tremors  SKIN: No itching, no burning, no rashes, no lesions   LYMPH NODES: No enlarged glands  ENDOCRINE: No heat or cold intolerance; No hair loss  MUSCULOSKELETAL: No joint pain or swelling; No muscle, no back, no extremity pain  PSYCHIATRIC: No depression, no anxiety, no mood swings, no difficulty sleeping  HEME/LYMPH: No easy bruising, no bleeding gums      [VITALS SIGNS 24hrs]  Vital Signs Last 24 Hrs  T(C): 36.7 (2019 16:23), Max: 37.6 (2019 05:50)  T(F): 98 (2019 16:23), Max: 99.7 (2019 05:50)  HR: 86 (2019 16:23) (74 - 88)  BP: 126/88 (2019 16:23) (126/88 - 145/82)  BP(mean): --  RR: 15 (2019 16:23) (15 - 16)  SpO2: 100% (2019 16:23) (97% - 100%)    [PHYSICAL EXAM]  General: adult in NAD,  WN,  WD.  HEENT: clear oropharynx, anicteric sclera, pink conjunctivae.  Neck: supple, no masses.  CV: normal S1S2, no murmur, no rubs, no gallops.  Lungs: clear to auscultation, no wheezes, no rales, no rhonchi.  Abdomen: soft, non-tender, non-distended, no hepatosplenomegaly, normal BS, no guarding.  Ext: no clubbing, no cyanosis, no edema.  Skin: no rashes,  no petechiae, no venous stasis changes.  Neuro: alert and oriented X3, no focal motor deficits.  LN: no SC ERIC.      [LABS:]                        10.3   28.17 )-----------( 340      ( 2019 03:20 )             31.2     CBC Full  -  ( 2019 03:20 )  WBC Count : 28.17 K/uL  RBC Count : 4.31 M/uL  Hemoglobin : 10.3 g/dL  Hematocrit : 31.2 %  Platelet Count - Automated : 340 K/uL  Mean Cell Volume : 72.4 fl  Mean Cell Hemoglobin : 23.9 pg  Mean Cell Hemoglobin Concentration : 33.0 gm/dL  Auto Neutrophil # : x  Auto Lymphocyte # : x  Auto Monocyte # : x  Auto Eosinophil # : x  Auto Basophil # : x  Auto Neutrophil % : x  Auto Lymphocyte % : x  Auto Monocyte % : x  Auto Eosinophil % : x  Auto Basophil % : x        133<L>  |  99  |  12  ----------------------------<  123<H>  3.6   |  26  |  0.84    Ca    9.0      2019 18:28    TPro  6.8  /  Alb  3.0<L>  /  TBili  0.7  /  DBili  x   /  AST  13<L>  /  ALT  16  /  AlkPhos  84  -20    PT/INR - ( 2019 18:28 )   PT: 15.0 sec;   INR: 1.31 ratio         PTT - ( 2019 09:49 )  PTT:70.1 sec  LIVER FUNCTIONS - ( 2019 18:28 )  Alb: 3.0 g/dL / Pro: 6.8 g/dL / ALK PHOS: 84 U/L / ALT: 16 U/L / AST: 13 U/L / GGT: x               Urinalysis Basic - ( 2019 17:06 )    Color: Yellow / Appearance: Slightly Turbid / S.020 / pH: x  Gluc: x / Ketone: Large  / Bili: Negative / Urobili: Negative   Blood: x / Protein: 75 mg/dL / Nitrite: Negative   Leuk Esterase: Moderate / RBC: 6-10 /HPF / WBC 11-25   Sq Epi: x / Non Sq Epi: Occasional / Bacteria: Occasional        WBC  TREND (5 Days)  WBC Count: 28.17 K/uL ( @ 03:20)  WBC Count: 36.24 K/uL ( @ 17:06)    HGB  TREND (5 Days)  Hemoglobin: 10.3 g/dL ( @ 03:20)  Hemoglobin: 11.8 g/dL ( @ 17:06)    HCT  TREND (5 Days)  Hematocrit: 31.2 % ( @ 03:20)  Hematocrit: 36.5 % ( @ 17:06)    PLT  TREND (5 Days)  Platelet Count - Automated: 340 K/uL ( @ 03:20)  Platelet Count - Automated: 423 K/uL (06-20 @ 17:06)      Anemia Studies                Microbiology  Culture - Urine (collected 19 @ 21:12)  Source: .Urine Clean Catch (Midstream)  Final Report (19 @ 16:32):    No growth      [PATHOLOGY]               [RADIOLOGY & ADDITIONAL STUDIES:]    < from: CT Chest w/ IV Cont (19 @ 19:29) >  EXAM:  CT ABDOMEN AND PELVIS IC                        EXAM:  CT CHEST IC                        PROCEDURE DATE:  2019    INTERPRETATION:  CT CHEST/ABDOMEN/PELVIS:     CLINICAL INFORMATION: Upper abdominal pain and recent liver biopsy    COMPARISON: None available.    PROCEDURE:  Using multislice helical CT, 2.5 mm sections were obtained   from the thoracic inlet through the ischial tuberosities with the   administration of intravenous contrast. Oral contrast was not   administered. 95 cc of Omnipaque 350 were administered.    Coronal and sagittal reformations were performed by  CT technologist and   made available for review.    FINDINGS:  The visualized structures of the lower neck are unremarkable.   The visualized aorta is of normal course and caliber. The heart is not   enlarged. There is no evidence of pericardial effusion.    There is no evidence of axillary, hilar, or mediastinal lymphadenopathy.    No focal lung consolidations identified. No evidence of pleural effusion   or pneumothorax. No pulmonary nodules identified. There are areas of   subpleural scarring in the upper lobes. There are several tiny left   bilateral upper lobe pulmonary nodules as well. Additionally there is are   larger subpleural nodules in the lower lobes with the largest seen in the   lateral anterior left lower lobe measuring 2 mm.      The liver is of normal contour. there is a well-circumscribed hypodense   lesion in the anterior aspect of the lateral segment of the left lobe   measuring 3.2 x 2.2 cm. Please correlate clinically as to whether this   was the area of biopsy. There is an additional tiny hypodensities in the   liver at junction of the right and left lobes on axial image 88 of series   2. The gallbladder is present. No evidence of intra or extrahepatic   biliary dilatation.     The pancreas, spleen, adrenal glands, and right kidney are grossly   unremarkable. The left kidney demonstrates abnormal appearance in the   upper to mid pole with heterogeneous areas of low attenuation. It is   suspicious for renal neoplasm. There is surrounding perinephric   stranding. Differential diagnosis includes pyelonephritis. No evidence of    nephrolithiasis.The bladder is unremarkable.    There are enlarged necrotic appearing upper abdominal lymph nodes at the   level of the left kidney which may be related to metastatic disease if   this kidney process is neoplastic in nature. A left renal artery is   surrounded by lymphadenopathy and appears narrowed. There is suggestion   of partially occlusive thrombus in the left renal vein The aorta is not   aneurysmal.    There is no evidence of bowel obstruction. There is no evidence of   pneumoperitoneum . There is a right inguinal hernia obtaining loops of   colon without obstruction The prostate gland is markedly enlarged   indenting the bladder base. There is a small amount of free pelvic fluid    The visualized osseous structures demonstrate degenerative changes.   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    IMPRESSION: Significant abnormal appearance of the left kidney with   multiple heterogeneous areas of low attenuation involving the upper and   midpole which may represent malignancy. There are necrotic lymph nodes in   the left para-aortic location at the level of the kidney and below the   level of the kidney. Adenopathy surrounds the left renal artery. The left   renal vein demonstrates question of partially occlusive thrombus.    Nonspecific scattered tiny pulmonary nodules. Lower lobe subpleural nodules measure up to 6 mm   Nonobstructing right inguinal hernia containing ascending colon loop   Markedly enlarged prostate gland  Results were discussed with Dr. Wooten at 7:50 PM on 2019.    < end of copied text > Patient is a 69y old  Male who presents with a chief complaint of Abdominal pain (2019 17:09)      HPI:  69 M with a PMH of HTN presents to ED c/o upper abdominal pain. Patient states pain began last Thursday in his lower abdomen with radiation to his L flank with associated decrease in appetite. He went to [facility] at that time where he was admitted for 2 days and diagnosed with a UTI, had a liver biopsy based on CT findings and discharged on PO antibiotics. Patient now returning for similar abdominal pain, but this time he notes it's higher in the midepigastric region and is non radiating. He says it was at its worst (an 8/10) after eating a turkey sandwich yesterday and after having some broth earlier today. He admits to an ongoing decrease in appetite and a 7 lb weight loss since last week. He denies fevers, chills, night sweats, N/V but admits to diarrhea for the past 2 days but his last stool last night was formed. Denies F/U/D, but admits to urinary retention while here in ED requiring catheterization, denying prior episodes of urinary retention.     In the ED;  VS stable  WBC 36.2 h/h: 11.8/36.5  Platelet 423, INR 1.31 BMP wnl except Na 133, AST/ALT wnl, lipase 43, lactate 1.6  UA Positive  CT abdomen/Pelvis: Possible L sided kidney neoplasm vs pyelonephritis.  A left renal artery is surrounded by lymphadenopathy and appears narrowed. There is suggestion   of partially occlusive thrombus in the left renal vein  Hypodense liver lesion in the anterior aspect of the lateral segment of the left lobe measuring 3.2 x 2.2 cm  CT chest: Negative PE or PNA. s. There are several tiny left   bilateral upper lobe pulmonary nodules (2019 20:19)      Heme-Onc asked to see pt for renal mass, ERIC, anemia and leukocytosis.   Recently admitted to [facility].   Seen by urology and oncology there, after found to have abd / renal mass. Enlarged prostate.   Had liver bx done, last week 19.   19 Serum immunofixation: faint band in IgG and K  19 Ferritin 87  19 ACE 23  19 CT brain non-contrast: chronic ischemic and involutional changes, no masses, no hemorrhage.   19 CT Chest: non-sp[ecific 15mm irreg density in R apex. Hypodense lesion L kidney, L hepatic lobe.   19 AFP 4.3, PSA 6.4      Pt now admitted with abd pain.   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   Started on IV heparin.   Post CT scan with urinary retention, difficulty urinating.   Urology to consult for retention and CT scan abn.       PAST MEDICAL & SURGICAL HISTORY:  Hypertension  Achilles rupture  History of liver biopsy      HEALTH ISSUES - PROBLEM Dx:  Hypoalbuminemia: Hypoalbuminemia  Hyponatremia: Hyponatremia  Need for prophylactic measure: Need for prophylactic measure  Renal mass, left: Renal mass, left  Pyelonephritis: Pyelonephritis  Hypertension: Hypertension  Venous thrombosis: Venous thrombosis  UTI (urinary tract infection): UTI (urinary tract infection)          Urinary tract infection (N39.0)  Hypertension (I10)  No pertinent past medical history ([id number])  Achilles rupture (S86.019A)  History of liver biopsy (Z98.890)  Venous thrombosis (I82.90)  Leukocytosis (D72.829)      FAMILY HISTORY:  FHx: cancer        [SOCIAL HISTORY: ]     smoking:  ex-smoker     EtOH:  denies     illicit drugs:  denies     occupation:  retired      marital status:  , no children     Other:       [ALLERGIES/INTOLERANCES:]  Allergies     No Known Allergies  Intolerances          [MEDICATIONS]  MEDICATIONS  (STANDING):  heparin  Infusion.  Unit(s)/Hr (12 mL/Hr) IV Continuous <Continuous>  lisinopril 10 milliGRAM(s) Oral daily  ondansetron Injectable 4 milliGRAM(s) IV Push once  piperacillin/tazobactam IVPB.. 3.375 Gram(s) IV Intermittent every 8 hours  tamsulosin 0.4 milliGRAM(s) Oral at bedtime    MEDICATIONS  (PRN):  heparin  Injectable 5500 Unit(s) IV Push every 6 hours PRN For aPTT less than 40  heparin  Injectable 2500 Unit(s) IV Push every 6 hours PRN For aPTT between 40 - 57        [REVIEW OF SYSTEMS: ]  CONSTITUTIONAL: normal, no fever, no shakes, no chills   EYES: No eye pain, no visual disturbances, no discharge  ENMT:  no discharge  NECK: No pain, no stiffness  BREASTS: No pain, no masses, no nipple discharge  RESPIRATORY: No cough, no wheezing, no chills, no hemoptysis; No shortness of breath  CARDIOVASCULAR: No chest pain, no palpitations, no dizziness, no leg swelling  GASTROINTESTINAL: +abdominal pain, no epigastric pain. No nausea, no vomiting, no hematemesis; No diarrhea , no constipation. ?melena +dark stools, no hematochezia.  GENITOURINARY: No dysuria, no frequency, no hematuria, no incontinence  NEUROLOGICAL: No headaches, no memory loss, no loss of strength, no numbness, no tremors  SKIN: No itching, no burning, no rashes, no lesions   LYMPH NODES: No enlarged glands  ENDOCRINE: No heat or cold intolerance; No hair loss  MUSCULOSKELETAL: No joint pain or swelling; No muscle, no back, no extremity pain  PSYCHIATRIC: No depression, no anxiety, no mood swings, no difficulty sleeping  HEME/LYMPH: No easy bruising, no bleeding gums      [VITALS SIGNS 24hrs]  Vital Signs Last 24 Hrs  T(C): 36.7 (2019 16:23), Max: 37.6 (2019 05:50)  T(F): 98 (2019 16:23), Max: 99.7 (2019 05:50)  HR: 86 (2019 16:23) (74 - 88)  BP: 126/88 (2019 16:23) (126/88 - 145/82)  BP(mean): --  RR: 15 (2019 16:23) (15 - 16)  SpO2: 100% (2019 16:23) (97% - 100%)    [PHYSICAL EXAM]  General: adult in NAD,  WN,  WD.  HEENT: clear oropharynx, anicteric sclera, pink conjunctivae.  Neck: supple, no masses.  CV: normal S1S2, no murmur, no rubs, no gallops.  Lungs: clear to auscultation, no wheezes, no rales, no rhonchi.  Abdomen: soft, non-tender, non-distended, no hepatosplenomegaly, normal BS, no guarding.  Ext: no clubbing, no cyanosis, no edema.  Skin: no rashes,  no petechiae, no venous stasis changes.  Neuro: alert and oriented X3, no focal motor deficits.  LN: no SC ERIC.      [LABS:]                        10.3   28.17 )-----------( 340      ( 2019 03:20 )             31.2     CBC Full  -  ( 2019 03:20 )  WBC Count : 28.17 K/uL  RBC Count : 4.31 M/uL  Hemoglobin : 10.3 g/dL  Hematocrit : 31.2 %  Platelet Count - Automated : 340 K/uL  Mean Cell Volume : 72.4 fl  Mean Cell Hemoglobin : 23.9 pg  Mean Cell Hemoglobin Concentration : 33.0 gm/dL  Auto Neutrophil # : x  Auto Lymphocyte # : x  Auto Monocyte # : x  Auto Eosinophil # : x  Auto Basophil # : x  Auto Neutrophil % : x  Auto Lymphocyte % : x  Auto Monocyte % : x  Auto Eosinophil % : x  Auto Basophil % : x        133<L>  |  99  |  12  ----------------------------<  123<H>  3.6   |  26  |  0.84    Ca    9.0      2019 18:28    TPro  6.8  /  Alb  3.0<L>  /  TBili  0.7  /  DBili  x   /  AST  13<L>  /  ALT  16  /  AlkPhos  84  -20    PT/INR - ( 2019 18:28 )   PT: 15.0 sec;   INR: 1.31 ratio         PTT - ( 2019 09:49 )  PTT:70.1 sec  LIVER FUNCTIONS - ( 2019 18:28 )  Alb: 3.0 g/dL / Pro: 6.8 g/dL / ALK PHOS: 84 U/L / ALT: 16 U/L / AST: 13 U/L / GGT: x               Urinalysis Basic - ( 2019 17:06 )    Color: Yellow / Appearance: Slightly Turbid / S.020 / pH: x  Gluc: x / Ketone: Large  / Bili: Negative / Urobili: Negative   Blood: x / Protein: 75 mg/dL / Nitrite: Negative   Leuk Esterase: Moderate / RBC: 6-10 /HPF / WBC 11-25   Sq Epi: x / Non Sq Epi: Occasional / Bacteria: Occasional        WBC  TREND (5 Days)  WBC Count: 28.17 K/uL ( @ 03:20)  WBC Count: 36.24 K/uL ( @ 17:06)    HGB  TREND (5 Days)  Hemoglobin: 10.3 g/dL ( 03:20)  Hemoglobin: 11.8 g/dL ( @ 17:06)    HCT  TREND (5 Days)  Hematocrit: 31.2 % ( 03:20)  Hematocrit: 36.5 % ( @ 17:06)    PLT  TREND (5 Days)  Platelet Count - Automated: 340 K/uL ( @ 03:20)  Platelet Count - Automated: 423 K/uL ( @ 17:06)      Anemia Studies         Hepatitis C Antibody Test (19 @ 10:39)    Hepatitis C Virus S/CO Ratio: 0.10 [negative]         Microbiology  Culture - Urine (collected 19 @ 21:12)  Source: .Urine Clean Catch (Midstream)  Final Report (19 @ 16:32):    No growth      [PATHOLOGY]               [RADIOLOGY & ADDITIONAL STUDIES:]    < from: CT Chest w/ IV Cont (19 @ 19:29) >  EXAM:  CT ABDOMEN AND PELVIS IC                        EXAM:  CT CHEST IC                        PROCEDURE DATE:  2019    INTERPRETATION:  CT CHEST/ABDOMEN/PELVIS:     CLINICAL INFORMATION: Upper abdominal pain and recent liver biopsy    COMPARISON: None available.    PROCEDURE:  Using multislice helical CT, 2.5 mm sections were obtained   from the thoracic inlet through the ischial tuberosities with the   administration of intravenous contrast. Oral contrast was not   administered. 95 cc of Omnipaque 350 were administered.    Coronal and sagittal reformations were performed by  CT technologist and   made available for review.    FINDINGS:  The visualized structures of the lower neck are unremarkable.   The visualized aorta is of normal course and caliber. The heart is not   enlarged. There is no evidence of pericardial effusion.    There is no evidence of axillary, hilar, or mediastinal lymphadenopathy.    No focal lung consolidations identified. No evidence of pleural effusion   or pneumothorax. No pulmonary nodules identified. There are areas of   subpleural scarring in the upper lobes. There are several tiny left   bilateral upper lobe pulmonary nodules as well. Additionally there is are   larger subpleural nodules in the lower lobes with the largest seen in the   lateral anterior left lower lobe measuring 2 mm.      The liver is of normal contour. there is a well-circumscribed hypodense   lesion in the anterior aspect of the lateral segment of the left lobe   measuring 3.2 x 2.2 cm. Please correlate clinically as to whether this   was the area of biopsy. There is an additional tiny hypodensities in the   liver at junction of the right and left lobes on axial image 88 of series   2. The gallbladder is present. No evidence of intra or extrahepatic   biliary dilatation.     The pancreas, spleen, adrenal glands, and right kidney are grossly   unremarkable. The left kidney demonstrates abnormal appearance in the   upper to mid pole with heterogeneous areas of low attenuation. It is   suspicious for renal neoplasm. There is surrounding perinephric   stranding. Differential diagnosis includes pyelonephritis. No evidence of    nephrolithiasis.The bladder is unremarkable.    There are enlarged necrotic appearing upper abdominal lymph nodes at the   level of the left kidney which may be related to metastatic disease if   this kidney process is neoplastic in nature. A left renal artery is   surrounded by lymphadenopathy and appears narrowed. There is suggestion   of partially occlusive thrombus in the left renal vein The aorta is not   aneurysmal.    There is no evidence of bowel obstruction. There is no evidence of   pneumoperitoneum . There is a right inguinal hernia obtaining loops of   colon without obstruction The prostate gland is markedly enlarged   indenting the bladder base. There is a small amount of free pelvic fluid    The visualized osseous structures demonstrate degenerative changes.   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    IMPRESSION: Significant abnormal appearance of the left kidney with   multiple heterogeneous areas of low attenuation involving the upper and   midpole which may represent malignancy. There are necrotic lymph nodes in   the left para-aortic location at the level of the kidney and below the   level of the kidney. Adenopathy surrounds the left renal artery. The left   renal vein demonstrates question of partially occlusive thrombus.    Nonspecific scattered tiny pulmonary nodules. Lower lobe subpleural nodules measure up to 6 mm   Nonobstructing right inguinal hernia containing ascending colon loop   Markedly enlarged prostate gland  Results were discussed with Dr. Wooten at 7:50 PM on 2019.    < end of copied text > Patient is a 69y old  Male who presents with a chief complaint of Abdominal pain (2019 17:09)      HPI:  69 M with a PMH of HTN presents to ED c/o upper abdominal pain. Patient states pain began last Thursday in his lower abdomen with radiation to his L flank with associated decrease in appetite. He went to [facility] at that time where he was admitted for 2 days and diagnosed with a UTI, had a liver biopsy based on CT findings and discharged on PO antibiotics. Patient now returning for similar abdominal pain, but this time he notes it's higher in the midepigastric region and is non radiating. He says it was at its worst (an 8/10) after eating a turkey sandwich yesterday and after having some broth earlier today. He admits to an ongoing decrease in appetite and a 7 lb weight loss since last week. He denies fevers, chills, night sweats, N/V but admits to diarrhea for the past 2 days but his last stool last night was formed. Denies F/U/D, but admits to urinary retention while here in ED requiring catheterization, denying prior episodes of urinary retention.     In the ED;  VS stable  WBC 36.2 h/h: 11.8/36.5  Platelet 423, INR 1.31 BMP wnl except Na 133, AST/ALT wnl, lipase 43, lactate 1.6  UA Positive  CT abdomen/Pelvis: Possible L sided kidney neoplasm vs pyelonephritis.  A left renal artery is surrounded by lymphadenopathy and appears narrowed. There is suggestion   of partially occlusive thrombus in the left renal vein  Hypodense liver lesion in the anterior aspect of the lateral segment of the left lobe measuring 3.2 x 2.2 cm  CT chest: Negative PE or PNA. s. There are several tiny left   bilateral upper lobe pulmonary nodules (2019 20:19)      [facility]-Onc asked to see pt for renal mass, ERIC, anemia and leukocytosis.   Recently admitted to [facility]. PCP Dr Wong.  Seen by urology and oncology there, after found to have abd / renal mass. Enlarged prostate.   Had liver bx done, last week 19.   19 Serum immunofixation: faint band in IgG and K  19 Ferritin 87  19 ACE 23  19 CT brain non-contrast: chronic ischemic and involutional changes, no masses, no hemorrhage.   19 CT Chest: non-sp[ecific 15mm irreg density in R apex. Hypodense lesion L kidney, L hepatic lobe.   19 AFP 4.3, PSA 6.4      Pt now admitted with abd pain.   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   Started on IV heparin.   Post CT scan with urinary retention, difficulty urinating.   Urology to consult for retention and CT scan abn.     3-4d ago c/o diarrhea with dark stools. Never had colonoscopy, EGD.       PAST MEDICAL & SURGICAL HISTORY:  Hypertension  Achilles rupture  History of liver biopsy      HEALTH ISSUES - PROBLEM Dx:  Hypoalbuminemia: Hypoalbuminemia  Hyponatremia: Hyponatremia  Need for prophylactic measure: Need for prophylactic measure  Renal mass, left: Renal mass, left  Pyelonephritis: Pyelonephritis  Hypertension: Hypertension  Venous thrombosis: Venous thrombosis  UTI (urinary tract infection): UTI (urinary tract infection)          Urinary tract infection (N39.0)  Hypertension (I10)  No pertinent past medical history (351234608)  Achilles rupture (S86.019A)  History of liver biopsy (Z98.890)  Venous thrombosis (I82.90)  Leukocytosis (D72.829)      FAMILY HISTORY:  FHx: cancer        [SOCIAL HISTORY: ]     smoking:  ex-smoker, 1/4 PPD x7yr till ~ 33yo.      EtOH:  denies     illicit drugs:  denies     occupation:  retired      marital status:  , no children     Other:       [ALLERGIES/INTOLERANCES:]  Allergies     No Known Allergies  Intolerances          [MEDICATIONS]  MEDICATIONS  (STANDING):  heparin  Infusion.  Unit(s)/Hr (12 mL/Hr) IV Continuous <Continuous>  lisinopril 10 milliGRAM(s) Oral daily  ondansetron Injectable 4 milliGRAM(s) IV Push once  piperacillin/tazobactam IVPB.. 3.375 Gram(s) IV Intermittent every 8 hours  tamsulosin 0.4 milliGRAM(s) Oral at bedtime    MEDICATIONS  (PRN):  heparin  Injectable 5500 Unit(s) IV Push every 6 hours PRN For aPTT less than 40  heparin  Injectable 2500 Unit(s) IV Push every 6 hours PRN For aPTT between 40 - 57        [REVIEW OF SYSTEMS: ]  CONSTITUTIONAL: normal, no fever, no shakes, no chills   EYES: No eye pain, no visual disturbances, no discharge  ENMT:  no discharge  NECK: No pain, no stiffness  BREASTS: No pain, no masses, no nipple discharge  RESPIRATORY: No cough, no wheezing, no chills, no hemoptysis; No shortness of breath  CARDIOVASCULAR: No chest pain, no palpitations, no dizziness, no leg swelling  GASTROINTESTINAL: +abdominal pain, no epigastric pain. No nausea, no vomiting, no hematemesis; No diarrhea , no constipation. ?melena +dark stools, no hematochezia.  GENITOURINARY: No dysuria, no frequency, no hematuria, no incontinence  NEUROLOGICAL: No headaches, no memory loss, no loss of strength, no numbness, no tremors  SKIN: No itching, no burning, no rashes, no lesions   LYMPH NODES: No enlarged glands  ENDOCRINE: No heat or cold intolerance; No hair loss  MUSCULOSKELETAL: No joint pain or swelling; No muscle, no back, no extremity pain  PSYCHIATRIC: No depression, no anxiety, no mood swings, no difficulty sleeping  HEME/LYMPH: No easy bruising, no bleeding gums      [VITALS SIGNS 24hrs]  Vital Signs Last 24 Hrs  T(C): 36.7 (2019 16:23), Max: 37.6 (2019 05:50)  T(F): 98 (2019 16:23), Max: 99.7 (2019 05:50)  HR: 86 (2019 16:23) (74 - 88)  BP: 126/88 (2019 16:23) (126/88 - 145/82)  BP(mean): --  RR: 15 (2019 16:23) (15 - 16)  SpO2: 100% (2019 16:23) (97% - 100%)    [PHYSICAL EXAM]  General: adult in NAD,  WN,  WD.  HEENT: clear oropharynx, anicteric sclera, pink conjunctivae.  Neck: supple, no masses.  CV: normal S1S2, no murmur, no rubs, no gallops.  Lungs: clear to auscultation, no wheezes, no rales, no rhonchi.  Abdomen: soft, non-tender, non-distended, no hepatosplenomegaly, normal BS, no guarding.  Ext: no clubbing, no cyanosis, no edema.  Skin: no rashes,  no petechiae, no venous stasis changes.  Neuro: alert and oriented X3, no focal motor deficits.  LN: no SC ERIC.      [LABS:]                        10.3   28.17 )-----------( 340      ( 2019 03:20 )             31.2     CBC Full  -  ( 2019 03:20 )  WBC Count : 28.17 K/uL  RBC Count : 4.31 M/uL  Hemoglobin : 10.3 g/dL  Hematocrit : 31.2 %  Platelet Count - Automated : 340 K/uL  Mean Cell Volume : 72.4 fl  Mean Cell Hemoglobin : 23.9 pg  Mean Cell Hemoglobin Concentration : 33.0 gm/dL  Auto Neutrophil # : x  Auto Lymphocyte # : x  Auto Monocyte # : x  Auto Eosinophil # : x  Auto Basophil # : x  Auto Neutrophil % : x  Auto Lymphocyte % : x  Auto Monocyte % : x  Auto Eosinophil % : x  Auto Basophil % : x        133<L>  |  99  |  12  ----------------------------<  123<H>  3.6   |  26  |  0.84    Ca    9.0      2019 18:28    TPro  6.8  /  Alb  3.0<L>  /  TBili  0.7  /  DBili  x   /  AST  13<L>  /  ALT  16  /  AlkPhos  84  -20    PT/INR - ( 2019 18:28 )   PT: 15.0 sec;   INR: 1.31 ratio         PTT - ( 2019 09:49 )  PTT:70.1 sec  LIVER FUNCTIONS - ( 2019 18:28 )  Alb: 3.0 g/dL / Pro: 6.8 g/dL / ALK PHOS: 84 U/L / ALT: 16 U/L / AST: 13 U/L / GGT: x               Urinalysis Basic - ( 2019 17:06 )    Color: Yellow / Appearance: Slightly Turbid / S.020 / pH: x  Gluc: x / Ketone: Large  / Bili: Negative / Urobili: Negative   Blood: x / Protein: 75 mg/dL / Nitrite: Negative   Leuk Esterase: Moderate / RBC: 6-10 /HPF / WBC 11-25   Sq Epi: x / Non Sq Epi: Occasional / Bacteria: Occasional        WBC  TREND (5 Days)  WBC Count: 28.17 K/uL ( @ 03:20)  WBC Count: 36.24 K/uL ( @ 17:06)    HGB  TREND (5 Days)  Hemoglobin: 10.3 g/dL ( 03:20)  Hemoglobin: 11.8 g/dL ( @ 17:06)    HCT  TREND (5 Days)  Hematocrit: 31.2 % ( @ 03:20)  Hematocrit: 36.5 % ( @ 17:06)    PLT  TREND (5 Days)  Platelet Count - Automated: 340 K/uL ( @ 03:20)  Platelet Count - Automated: 423 K/uL ( @ 17:06)      Anemia Studies         Hepatitis C Antibody Test (19 @ 10:39)    Hepatitis C Virus S/CO Ratio: 0.10 [negative]         Microbiology  Culture - Urine (collected 19 @ 21:12)  Source: .Urine Clean Catch (Midstream)  Final Report (19 @ 16:32):    No growth      [PATHOLOGY]               [RADIOLOGY & ADDITIONAL STUDIES:]    < from: CT Chest w/ IV Cont (19 @ 19:29) >  EXAM:  CT ABDOMEN AND PELVIS IC                        EXAM:  CT CHEST IC                        PROCEDURE DATE:  2019    INTERPRETATION:  CT CHEST/ABDOMEN/PELVIS:     CLINICAL INFORMATION: Upper abdominal pain and recent liver biopsy    COMPARISON: None available.    PROCEDURE:  Using multislice helical CT, 2.5 mm sections were obtained   from the thoracic inlet through the ischial tuberosities with the   administration of intravenous contrast. Oral contrast was not   administered. 95 cc of Omnipaque 350 were administered.    Coronal and sagittal reformations were performed by  CT technologist and   made available for review.    FINDINGS:  The visualized structures of the lower neck are unremarkable.   The visualized aorta is of normal course and caliber. The heart is not   enlarged. There is no evidence of pericardial effusion.    There is no evidence of axillary, hilar, or mediastinal lymphadenopathy.    No focal lung consolidations identified. No evidence of pleural effusion   or pneumothorax. No pulmonary nodules identified. There are areas of   subpleural scarring in the upper lobes. There are several tiny left   bilateral upper lobe pulmonary nodules as well. Additionally there is are   larger subpleural nodules in the lower lobes with the largest seen in the   lateral anterior left lower lobe measuring 2 mm.      The liver is of normal contour. there is a well-circumscribed hypodense   lesion in the anterior aspect of the lateral segment of the left lobe   measuring 3.2 x 2.2 cm. Please correlate clinically as to whether this   was the area of biopsy. There is an additional tiny hypodensities in the   liver at junction of the right and left lobes on axial image 88 of series   2. The gallbladder is present. No evidence of intra or extrahepatic   biliary dilatation.     The pancreas, spleen, adrenal glands, and right kidney are grossly   unremarkable. The left kidney demonstrates abnormal appearance in the   upper to mid pole with heterogeneous areas of low attenuation. It is   suspicious for renal neoplasm. There is surrounding perinephric   stranding. Differential diagnosis includes pyelonephritis. No evidence of    nephrolithiasis.The bladder is unremarkable.    There are enlarged necrotic appearing upper abdominal lymph nodes at the   level of the left kidney which may be related to metastatic disease if   this kidney process is neoplastic in nature. A left renal artery is   surrounded by lymphadenopathy and appears narrowed. There is suggestion   of partially occlusive thrombus in the left renal vein The aorta is not   aneurysmal.    There is no evidence of bowel obstruction. There is no evidence of   pneumoperitoneum . There is a right inguinal hernia obtaining loops of   colon without obstruction The prostate gland is markedly enlarged   indenting the bladder base. There is a small amount of free pelvic fluid    The visualized osseous structures demonstrate degenerative changes.   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    IMPRESSION: Significant abnormal appearance of the left kidney with   multiple heterogeneous areas of low attenuation involving the upper and   midpole which may represent malignancy. There are necrotic lymph nodes in   the left para-aortic location at the level of the kidney and below the   level of the kidney. Adenopathy surrounds the left renal artery. The left   renal vein demonstrates question of partially occlusive thrombus.    Nonspecific scattered tiny pulmonary nodules. Lower lobe subpleural nodules measure up to 6 mm   Nonobstructing right inguinal hernia containing ascending colon loop   Markedly enlarged prostate gland  Results were discussed with Dr. Wooten at 7:50 PM on 2019.    < end of copied text >

## 2019-06-21 NOTE — CHART NOTE - NSCHARTNOTEFT_GEN_A_CORE
Called by RN for pt c/o urethral burning at tip of penis, and leakage of fluid outside of Locke line. Pt seen and examined at bedside. Pt denies suprapubic pressure, n/v, fevers. Urine visualized to be leaking outside of Locke tube. Scrotal hernia present at R testes. Will replace Locke at this time, and give 1x dose pyridium. RN to call with any changes, will continue to monitor.    Vital Signs Last 24 Hrs  T(C): 36.7 (21 Jun 2019 16:23), Max: 37.6 (21 Jun 2019 05:50)  T(F): 98 (21 Jun 2019 16:23), Max: 99.7 (21 Jun 2019 05:50)  HR: 86 (21 Jun 2019 16:23) (74 - 88)  BP: 126/88 (21 Jun 2019 16:23) (126/88 - 145/82)  BP(mean): --  RR: 15 (21 Jun 2019 16:23) (15 - 16)  SpO2: 100% (21 Jun 2019 16:23) (97% - 100%) Called by RN for pt c/o urethral burning at tip of penis, and leakage of fluid outside of Locke line. Pt seen and examined at bedside. Pt denies suprapubic pressure, n/v, fevers. Urine visualized to be leaking outside of Locke tube. Scrotal hernia present at R testes. Will replace Locke at this time, and give 1x dose pyridium. RN to call with any changes, will continue to monitor.    Vital Signs Last 24 Hrs  T(C): 36.7 (21 Jun 2019 16:23), Max: 37.6 (21 Jun 2019 05:50)  T(F): 98 (21 Jun 2019 16:23), Max: 99.7 (21 Jun 2019 05:50)  HR: 86 (21 Jun 2019 16:23) (74 - 88)  BP: 126/88 (21 Jun 2019 16:23) (126/88 - 145/82)  BP(mean): --  RR: 15 (21 Jun 2019 16:23) (15 - 16)  SpO2: 100% (21 Jun 2019 16:23) (97% - 100%)    *Addendum: Called by RN at 0038 for pt with continued burning at urethral meatus, with refusal to accept Locke. Will give 1x dose toradol 15mg IVP and apply lidocaine jelly to Locke tube.

## 2019-06-21 NOTE — PROGRESS NOTE ADULT - SUBJECTIVE AND OBJECTIVE BOX
CHIEF COMPLAINT/INTERVAL HISTORY:  Pt. seen and evaluated for pyelonephritis and possible L. renal mass.  Pt. is in no distress.  Denies having any pain at this time.  No gross bleeding noted by patient.     REVIEW OF SYSTEMS:  No fever, CP, SOB, or abdominal pain.     Vital Signs Last 24 Hrs  T(C): 36.9 (2019 08:11), Max: 37.6 (2019 05:50)  T(F): 98.4 (2019 08:11), Max: 99.7 (2019 05:50)  HR: 75 (2019 08:11) (74 - 92)  BP: 130/75 (2019 08:11) (116/76 - 145/82)  BP(mean): --  RR: 16 (2019 08:11) (16 - 17)  SpO2: 100% (2019 08:11) (97% - 100%)    PHYSICAL EXAM:  GENERAL: NAD  HEENT: EOMI, hearing normal, conjunctiva and sclera clear  Chest: CTA bilaterally, no wheezing  CV: S1S2, RRR,   GI: soft, +BS, NT/ND  Musculoskeletal: no edema  Psychiatric: affect nL, mood nL  Skin: warm and dry    LABS:                        10.3   28.17 )-----------( 340      ( 2019 03:20 )             31.2     06-20    133<L>  |  99  |  12  ----------------------------<  123<H>  3.6   |  26  |  0.84    Ca    9.0      2019 18:28    TPro  6.8  /  Alb  3.0<L>  /  TBili  0.7  /  DBili  x   /  AST  13<L>  /  ALT  16  /  AlkPhos  84  06-20    PT/INR - ( 2019 18:28 )   PT: 15.0 sec;   INR: 1.31 ratio         PTT - ( 2019 09:49 )  PTT:70.1 sec  Urinalysis Basic - ( 2019 17:06 )    Color: Yellow / Appearance: Slightly Turbid / S.020 / pH: x  Gluc: x / Ketone: Large  / Bili: Negative / Urobili: Negative   Blood: x / Protein: 75 mg/dL / Nitrite: Negative   Leuk Esterase: Moderate / RBC: 6-10 /HPF / WBC 11-25   Sq Epi: x / Non Sq Epi: Occasional / Bacteria: Occasional        Assessment and Plan:  -Pyelonephritis:  continue Zosyn.  Check blood and urine culture.    -Possible L. kidney malignancy and urinary retention:  Maintain barber catheter.  Urology and Heme/Onc consult.   -Partial L. renal vein thrombosis:  Continue heparin gtt.  Vascular surgery consult  -Liver lesion:  s/p biopsy at [facility] 1 week ago.  Will attempt to obtain pathology results.    -HTN:  continue Lisinopril 10mg PO daily  -Hyponatremia:  Will continue to monitor  -VTE ppx:  On heparin gtt

## 2019-06-22 ENCOUNTER — TRANSCRIPTION ENCOUNTER (OUTPATIENT)
Age: 69
End: 2019-06-22

## 2019-06-22 DIAGNOSIS — N40.1 BENIGN PROSTATIC HYPERPLASIA WITH LOWER URINARY TRACT SYMPTOMS: ICD-10-CM

## 2019-06-22 LAB
ANION GAP SERPL CALC-SCNC: 9 MMOL/L — SIGNIFICANT CHANGE UP (ref 5–17)
APTT BLD: 57.6 SEC — HIGH (ref 28.5–37)
BUN SERPL-MCNC: 14 MG/DL — SIGNIFICANT CHANGE UP (ref 7–23)
CALCIUM SERPL-MCNC: 9 MG/DL — SIGNIFICANT CHANGE UP (ref 8.5–10.1)
CHLORIDE SERPL-SCNC: 98 MMOL/L — SIGNIFICANT CHANGE UP (ref 96–108)
CO2 SERPL-SCNC: 27 MMOL/L — SIGNIFICANT CHANGE UP (ref 22–31)
CREAT SERPL-MCNC: 1 MG/DL — SIGNIFICANT CHANGE UP (ref 0.5–1.3)
CRP SERPL-MCNC: 8.47 MG/DL — HIGH (ref 0–0.4)
FERRITIN SERPL-MCNC: 257 NG/ML — SIGNIFICANT CHANGE UP (ref 30–400)
FOLATE SERPL-MCNC: 8.6 NG/ML — SIGNIFICANT CHANGE UP
GLUCOSE SERPL-MCNC: 114 MG/DL — HIGH (ref 70–99)
HCT VFR BLD CALC: 30.1 % — LOW (ref 39–50)
HGB BLD-MCNC: 9.9 G/DL — LOW (ref 13–17)
IRON SATN MFR SERPL: 20 UG/DL — LOW (ref 45–165)
IRON SATN MFR SERPL: 8 % — LOW (ref 16–55)
MAGNESIUM SERPL-MCNC: 1.9 MG/DL — SIGNIFICANT CHANGE UP (ref 1.6–2.6)
MCHC RBC-ENTMCNC: 23.9 PG — LOW (ref 27–34)
MCHC RBC-ENTMCNC: 32.9 GM/DL — SIGNIFICANT CHANGE UP (ref 32–36)
MCV RBC AUTO: 72.7 FL — LOW (ref 80–100)
NRBC # BLD: 0 /100 WBCS — SIGNIFICANT CHANGE UP (ref 0–0)
PLATELET # BLD AUTO: 340 K/UL — SIGNIFICANT CHANGE UP (ref 150–400)
POTASSIUM SERPL-MCNC: 4.1 MMOL/L — SIGNIFICANT CHANGE UP (ref 3.5–5.3)
POTASSIUM SERPL-SCNC: 4.1 MMOL/L — SIGNIFICANT CHANGE UP (ref 3.5–5.3)
RBC # BLD: 4.14 M/UL — LOW (ref 4.2–5.8)
RBC # FLD: 17.8 % — HIGH (ref 10.3–14.5)
SODIUM SERPL-SCNC: 134 MMOL/L — LOW (ref 135–145)
TIBC SERPL-MCNC: 247 UG/DL — SIGNIFICANT CHANGE UP (ref 220–430)
UIBC SERPL-MCNC: 227 UG/DL — SIGNIFICANT CHANGE UP (ref 110–370)
VIT B12 SERPL-MCNC: 856 PG/ML — SIGNIFICANT CHANGE UP (ref 232–1245)
WBC # BLD: 23.3 K/UL — HIGH (ref 3.8–10.5)
WBC # FLD AUTO: 23.3 K/UL — HIGH (ref 3.8–10.5)

## 2019-06-22 PROCEDURE — 99232 SBSQ HOSP IP/OBS MODERATE 35: CPT

## 2019-06-22 RX ORDER — KETOROLAC TROMETHAMINE 30 MG/ML
15 SYRINGE (ML) INJECTION ONCE
Refills: 0 | Status: DISCONTINUED | OUTPATIENT
Start: 2019-06-22 | End: 2019-06-22

## 2019-06-22 RX ORDER — ACETAMINOPHEN 500 MG
1000 TABLET ORAL ONCE
Refills: 0 | Status: COMPLETED | OUTPATIENT
Start: 2019-06-22 | End: 2019-06-22

## 2019-06-22 RX ORDER — LIDOCAINE HCL 20 MG/ML
5 VIAL (ML) INJECTION ONCE
Refills: 0 | Status: COMPLETED | OUTPATIENT
Start: 2019-06-22 | End: 2019-06-22

## 2019-06-22 RX ADMIN — LISINOPRIL 10 MILLIGRAM(S): 2.5 TABLET ORAL at 05:52

## 2019-06-22 RX ADMIN — Medication 100 MILLIGRAM(S): at 00:33

## 2019-06-22 RX ADMIN — PIPERACILLIN AND TAZOBACTAM 25 GRAM(S): 4; .5 INJECTION, POWDER, LYOPHILIZED, FOR SOLUTION INTRAVENOUS at 05:52

## 2019-06-22 RX ADMIN — Medication 15 MILLIGRAM(S): at 02:00

## 2019-06-22 RX ADMIN — Medication 1000 MILLIGRAM(S): at 23:30

## 2019-06-22 RX ADMIN — Medication 5 MILLILITER(S): at 01:55

## 2019-06-22 RX ADMIN — Medication 400 MILLIGRAM(S): at 22:49

## 2019-06-22 RX ADMIN — HEPARIN SODIUM 1200 UNIT(S)/HR: 5000 INJECTION INTRAVENOUS; SUBCUTANEOUS at 07:20

## 2019-06-22 RX ADMIN — Medication 15 MILLIGRAM(S): at 02:30

## 2019-06-22 RX ADMIN — Medication 100 MILLIGRAM(S): at 05:52

## 2019-06-22 RX ADMIN — TAMSULOSIN HYDROCHLORIDE 0.4 MILLIGRAM(S): 0.4 CAPSULE ORAL at 21:45

## 2019-06-22 NOTE — DISCHARGE NOTE PROVIDER - NSDCFUADDAPPT_GEN_ALL_CORE_FT
Urology to see Dr. Martinez Chief of Urology/ [facility] . dr colon hematologist for final liver biopsy result. Urology to see Dr. Martinez Chief of Urology/ [facility] . dr colon hematologist for final liver biopsy result  in 1wk . repeat cbc and electrolyte with in 1wk with your pmd office.

## 2019-06-22 NOTE — DISCHARGE NOTE PROVIDER - HOSPITAL COURSE
69 M with a PMH of HTN presents to ED c/o upper abdominal pain. Patient states pain began last Thursday in his lower abdomen with radiation to his L flank with associated decrease in appetite. He went to [facility] at that time where he was admitted for 2 days and diagnosed with a UTI, had a liver biopsy based on CT findings and discharged on PO antibiotics. Patient now returning for similar abdominal pain, but this time he notes it's higher in the midepigastric region and is non radiating. He says it was at its worst (an 8/10) after eating a turkey sandwich yesterday and after having some broth earlier today. He admits to an ongoing decrease in appetite and a 7 lb weight loss since last week. He denies fevers, chills, night sweats, N/V but admits to diarrhea for the past 2 days but his last stool last night was formed. Denies F/U/D, but admits to urinary retention while here in ED requiring catheterization, denying prior episodes of urinary retention.         In the ED;    VS stable    WBC 36.2 h/h: 11.8/36.5    Platelet 423, INR 1.31 BMP wnl except Na 133, AST/ALT wnl, lipase 43, lactate 1.6    UA Positive    CT abdomen/Pelvis: Possible L sided kidney neoplasm vs pyelonephritis.  A left renal artery is surrounded by lymphadenopathy and appears narrowed. There is suggestion     of partially occlusive thrombus in the left renal vein    Hypodense liver lesion in the anterior aspect of the lateral segment of the left lobe measuring 3.2 x 2.2 cm    CT chest: Negative PE or PNA. s. There are several tiny left     bilateral upper lobe pulmonary nodules        Pt. was admitted with urology and oncology consultation.  He was give IV antibiotics for possible pyelonephritis.  Urine culture and blood cultures showed no growth and IV antibiotics were then discontinued.  He was also initiated on heparin drip for partial thrombus of the left renal vein.  Pathology results from his liver biopsy were nondiagnostic.  Locke catheter has since been removed and patient able to void on his own. 69 M with a PMH of HTN presents to ED c/o upper abdominal pain. Patient states pain began last Thursday in his lower abdomen with radiation to his L flank with associated decrease in appetite. He went to [facility] at that time where he was admitted for 2 days and diagnosed with a UTI, had a liver biopsy based on CT findings and discharged on PO antibiotics. Patient now returning for similar abdominal pain, but this time he notes it's higher in the midepigastric region and is non radiating. He says it was at its worst (an 8/10) after eating a turkey sandwich yesterday and after having some broth earlier today. He admits to an ongoing decrease in appetite and a 7 lb weight loss since last week. He denies fevers, chills, night sweats, N/V but admits to diarrhea for the past 2 days but his last stool last night was formed. Denies F/U/D, but admits to urinary retention while here in ED requiring catheterization, denying prior episodes of urinary retention.         In the ED;    VS stable    WBC 36.2 h/h: 11.8/36.5    Platelet 423, INR 1.31 BMP wnl except Na 133, AST/ALT wnl, lipase 43, lactate 1.6    UA Positive    CT abdomen/Pelvis: Possible L sided kidney neoplasm vs pyelonephritis.  A left renal artery is surrounded by lymphadenopathy and appears narrowed. There is suggestion     of partially occlusive thrombus in the left renal vein    Hypodense liver lesion in the anterior aspect of the lateral segment of the left lobe measuring 3.2 x 2.2 cm    CT chest: Negative PE or PNA. s. There are several tiny left     bilateral upper lobe pulmonary nodules        Pt. was admitted with urology and oncology consultation.  He was give IV antibiotics for possible pyelonephritis.  Urine culture and blood cultures showed no growth and IV antibiotics were then discontinued.  He was also initiated on heparin drip for partial thrombus of the left renal vein.  Pathology results from his liver biopsy at [facility] were nondiagnostic.  Locke catheter has since been removed and patient able to void on his own.  Pt. had repeat CT guided biopsy on 6/24.  He has been referred by Urology to see Dr. Martinez Chief of Urology. 69 M with a PMH of HTN presents to ED c/o upper abdominal pain. Patient states pain began last Thursday in his lower abdomen with radiation to his L flank with associated decrease in appetite. He went to [facility] at that time where he was admitted for 2 days and diagnosed with a UTI, had a liver biopsy based on CT findings and discharged on PO antibiotics. Patient now returning for similar abdominal pain, but this time he notes it's higher in the midepigastric region and is non radiating. He says it was at its worst (an 8/10) after eating a turkey sandwich yesterday and after having some broth earlier today. He admits to an ongoing decrease in appetite and a 7 lb weight loss since last week. He denies fevers, chills, night sweats, N/V but admits to diarrhea for the past 2 days but his last stool last night was formed. Denies F/U/D, but admits to urinary retention while here in ED requiring catheterization, denying prior episodes of urinary retention.         In the ED;    VS stable    WBC 36.2 h/h: 11.8/36.5    Platelet 423, INR 1.31 BMP wnl except Na 133, AST/ALT wnl, lipase 43, lactate 1.6    UA Positive    CT abdomen/Pelvis: Possible L sided kidney neoplasm vs pyelonephritis.  A left renal artery is surrounded by lymphadenopathy and appears narrowed. There is suggestion     of partially occlusive thrombus in the left renal vein    Hypodense liver lesion in the anterior aspect of the lateral segment of the left lobe measuring 3.2 x 2.2 cm    CT chest: Negative PE or PNA. s. There are several tiny left     bilateral upper lobe pulmonary nodules        Pt. was admitted with urology and oncology consultation.  He was give IV antibiotics for possible pyelonephritis.  Urine culture and blood cultures showed no growth and IV antibiotics were then discontinued.  He was also initiated on heparin drip for partial thrombus of the left renal vein.  Pathology results from his liver biopsy at [facility] were nondiagnostic.  Locke catheter has since been removed and patient able to void on his own.  Pt. had repeat CT guided biopsy on 6/24.  He has been referred by Urology to see Dr. Martinez Chief of Urology.  Pt. went for nuclear bone scan... 69 M with a PMH of HTN presents to ED c/o upper abdominal pain. Patient states pain began last Thursday in his lower abdomen with radiation to his L flank with associated decrease in appetite. He went to [facility] at that time where he was admitted for 2 days and diagnosed with a UTI, had a liver biopsy based on CT findings and discharged on PO antibiotics. Patient now returning for similar abdominal pain, but this time he notes it's higher in the midepigastric region and is non radiating. He says it was at its worst (an 8/10) after eating a turkey sandwich yesterday and after having some broth earlier today. He admits to an ongoing decrease in appetite and a 7 lb weight loss since last week. He denies fevers, chills, night sweats, N/V but admits to diarrhea for the past 2 days but his last stool last night was formed. Denies F/U/D, but admits to urinary retention while here in ED requiring catheterization, denying prior episodes of urinary retention.         In the ED;    VS stable    WBC 36.2 h/h: 11.8/36.5    Platelet 423, INR 1.31 BMP wnl except Na 133, AST/ALT wnl, lipase 43, lactate 1.6    UA Positive    CT abdomen/Pelvis: Possible L sided kidney neoplasm vs pyelonephritis.  A left renal artery is surrounded by lymphadenopathy and appears narrowed. There is suggestion     of partially occlusive thrombus in the left renal vein    Hypodense liver lesion in the anterior aspect of the lateral segment of the left lobe measuring 3.2 x 2.2 cm    CT chest: Negative PE or PNA. s. There are several tiny left     bilateral upper lobe pulmonary nodules        Pt. was admitted with urology and oncology consultation.      He was give IV antibiotics for possible pyelonephritis.      Urine culture and blood cultures showed no growth and IV antibiotics were then discontinued.  He was also initiated on heparin drip for partial thrombus of the left renal vein.  Pathology results from his liver biopsy at [facility] were nondiagnostic.  Locke catheter has since been removed and patient able to void on his own.  Pt. had repeat CT guided biopsy on 6/24.  He has been referred by Urology to see Dr. Martinez Chief of Urology.  Pt. went for nuclear bone scan.    Pyelonephritis:  Blood and urine culture negative.  Observe off antibiotic.     -Possible L. kidney malignancy and urinary retention:  Pt. voiding on his own.  Continue Flomax.  Check bone scan.  Urology and Heme/Onc f/u     -Partial L. renal vein thrombosis:  Continue heparin gtt.  Will switch to Eliquis upon discharge.    -Liver lesion:  s/p biopsy at [facility] 1 week ago.  Pathology results were nondiagnostic.  s/p CT guided biopsy by IR.  Check pathology.      -HTN:  continue Lisinopril 10mg PO daily    -Hyponatremia:  Stable.  Will continue to monitor    -VTE ppx:  heparin gtt. 69 M with a PMH of HTN presents to ED c/o upper abdominal pain. Patient states pain began last Thursday in his lower abdomen with radiation to his L flank with associated decrease in appetite. He went to [facility] at that time where he was admitted for 2 days and diagnosed with a UTI, had a liver biopsy based on CT findings and discharged on PO antibiotics. Patient now returning for similar abdominal pain, but this time he notes it's higher in the midepigastric region and is non radiating. He says it was at its worst (an 8/10) after eating a turkey sandwich yesterday and after having some broth earlier today. He admits to an ongoing decrease in appetite and a 7 lb weight loss since last week. He denies fevers, chills, night sweats, N/V but admits to diarrhea for the past 2 days but his last stool last night was formed. Denies F/U/D, but admits to urinary retention while here in ED requiring catheterization, denying prior episodes of urinary retention.         In the ED;    VS stable    WBC 36.2 h/h: 11.8/36.5    Platelet 423, INR 1.31 BMP wnl except Na 133, AST/ALT wnl, lipase 43, lactate 1.6    UA Positive    CT abdomen/Pelvis: Possible L sided kidney neoplasm  r/o  pyelonephritis.  A left renal artery is surrounded by lymphadenopathy and appears narrowed. There is suggestion     of partially occlusive thrombus in the left renal vein    Hypodense liver lesion in the anterior aspect of the lateral segment of the left lobe measuring 3.2 x 2.2 cm    CT chest: Negative PE or PNA. s. There are several tiny left     bilateral upper lobe pulmonary nodules        Pt. was admitted with urology and oncology consultation  for     possible pyelonephritis  .      Urine culture and blood cultures showed no growth and IV antibiotics were then discontinued   ruled out  pyelonephritis this time .      He was also initiated on heparin drip for partial thrombus of the left renal vein.     Pt. had repeat CT guided biopsy on 6/24. pt had liver biopsy done  Pathology results from his liver biopsy at [facility] were nondiagnostic  but   this time showing "Liver mass," biopsy:-    Poorly differentiated carcinoma   possible sec to  lt renal  ca but need to wait for final biopsy report for further determination  . Pt. went for nuclear bone scan.    Focus of mildly increased uptake in the right superior pubic ramus,     nonspecific, but worrisome for osseous metastasis in the proper clinical     setting.  He has been referred by Urology to see Dr. Martinez Chief of Urology. .      and Possible L. kidney malignancy and urinary retention,  Pt. voiding on his own,  Continue Flomax.     Locke catheter has since been removed and patient able to void on his own.      -Partial L. renal vein thrombosis:  Continue heparin gtt.  Will switch to Eliquis upon discharge / po bid  as per hem/onc dr colon gp     hx HTN:  continue Lisinopril 10mg PO daily    - mild Hyponatremia   asymptomatic   sec to [facility]AD[facility]  possible malignancy related . pt medically stable to be dc clear by uro dr Salguero and hem/onc dr colon  gp fu final biopsy report out pt.     physical exam  6-26-19 day of dc    Vital Signs Last 24 Hrs    T(C): 36.6 (26 Jun 2019 08:03), Max: 37.2 (25 Jun 2019 16:20)    T(F): 97.8 (26 Jun 2019 08:03), Max: 99 (25 Jun 2019 16:20)    HR: 90 (26 Jun 2019 08:03) (85 - 97)    BP: 126/79 (26 Jun 2019 08:03) (119/68 - 126/79)    BP(mean): --    RR: 17 (26 Jun 2019 08:03) (16 - 17)    SpO2: 100% (26 Jun 2019 08:03) (97% - 100%) GEN no distress , HEENT nt/nc , perrla , CVS s1s2 no tachy , CHEST bl air entery  present  , no wheezing   , CNS aao/3 , GI soft - bs present   biopsy site clean dry  , EXT no edema, pedal pulse present , SKIN no rash.

## 2019-06-22 NOTE — DISCHARGE NOTE PROVIDER - CARE PROVIDERS DIRECT ADDRESSES
,DirectAddress_Unknown,[email or web address]

## 2019-06-22 NOTE — DISCHARGE NOTE PROVIDER - CARE PROVIDER_API CALL
Hon MAGDALENO Crabtree (MD)  Hematology; Internal Medicine; Medical Oncology  [address], [address]  [place] [address]  Phone: [phone]  Fax: [phone]  Follow Up Time:     Kemar Salguero)  Urology  [address], [address]  [place] [address]  Phone: [phone]  Fax: [phone]  Follow Up Time:

## 2019-06-22 NOTE — PROGRESS NOTE ADULT - SUBJECTIVE AND OBJECTIVE BOX
All interim records and events noted.    feeling much improved, now able to void, appetite better      MEDICATIONS  (STANDING):  heparin  Infusion.  Unit(s)/Hr (12 mL/Hr) IV Continuous <Continuous>  lisinopril 10 milliGRAM(s) Oral daily  ondansetron Injectable 4 milliGRAM(s) IV Push once  tamsulosin 0.4 milliGRAM(s) Oral at bedtime    MEDICATIONS  (PRN):  heparin  Injectable 5500 Unit(s) IV Push every 6 hours PRN For aPTT less than 40  heparin  Injectable 2500 Unit(s) IV Push every 6 hours PRN For aPTT between 40 - 57      Vital Signs Last 24 Hrs  T(C): 36.9 (22 Jun 2019 08:06), Max: 36.9 (22 Jun 2019 08:06)  T(F): 98.4 (22 Jun 2019 08:06), Max: 98.4 (22 Jun 2019 08:06)  HR: 78 (22 Jun 2019 08:06) (78 - 87)  BP: 113/69 (22 Jun 2019 08:06) (113/69 - 126/88)  BP(mean): --  RR: 16 (22 Jun 2019 08:06) (15 - 16)  SpO2: 100% (22 Jun 2019 08:06) (100% - 100%)    PHYSICAL EXAM  General: well developed  well nourished,thin,  in no acute distress  Head: atraumatic, normocephalic  ENT: sclera anicteric, buccal mucosa moist  Neck: supple, trachea midline, no palpable masses  CV: S1 S2, regular rate and rhythm  Lungs: clear to auscultation, no wheezes/rhonchi  Abdomen: soft, nontender, bowel sounds present, no palpable masses  Extrem: no clubbing/cyanosis/edema  Skin: no significant increased ecchymosis/petechiae  Neuro: alert and oriented X3,  no focal deficits      LABS:             9.9    23.30 )-----------( 340      ( 06-22 @ 06:34 )             30.1                10.6   31.92 )-----------( 387      ( 06-21 @ 22:25 )             32.3                10.3   28.17 )-----------( 340      ( 06-21 @ 03:20 )             31.2                11.8   36.24 )-----------( 423      ( 06-20 @ 17:06 )             36.5       06-22    134<L>  |  98  |  14  ----------------------------<  114<H>  4.1   |  27  |  1.00    Ca    9.0      22 Jun 2019 06:34  Mg     1.9     06-22    TPro  6.8  /  Alb  3.0<L>  /  TBili  0.7  /  DBili  x   /  AST  13<L>  /  ALT  16  /  AlkPhos  84  06-20 06-22 @ 06:34  PT-- INR--  PTT57.6  06-21 @ 09:49  PT-- INR--  PTT70.1  06-21 @ 03:20  PT-- INR--  PTT66.2  06-20 @ 18:28  PT15.0 INR1.31  PTT32.6    Iron with Total Binding Capacity in AM (06.22.19 @ 11:09)    Iron - Total Binding Capacity.: 247 ug/dL    % Saturation, Iron: 8 %    Iron Total, Serum: 20 ug/dL    Unsaturated Iron Binding Capacity: 227 ug/dL    Ferritin, Serum: 257 ng/mL (06.22.19 @ 11:12)  Vitamin B12, Serum: 856 pg/mL (06.22.19 @ 11:12)    Folate, Serum: 8.6 ng/mL (06.22.19 @ 11:12)          RADIOLOGY & ADDITIONAL STUDIES:    IMPRESSION/RECOMMENDATIONS:

## 2019-06-22 NOTE — CHART NOTE - NSCHARTNOTEFT_GEN_A_CORE
Called by RN for lower back pain. Pain started spontaneously tonight with no precipitating events or triggers. Pain is on right, non-radiating, 8/10 and is described as sharp, stabbing. Had similar episode of pain with improvement yesterday following Toradol. Pt denies any other sx. Denies fever, chills, headaches, dizziness, blurred vision, difficulty swallowing, chest pain, palpitations, numbness/tingling.          T(C): 36.7 (06-22-19 @ 15:35), Max: 36.9 (06-22-19 @ 08:06)  HR: 86 (06-22-19 @ 15:35) (78 - 87)  BP: 127/77 (06-22-19 @ 15:35) (113/69 - 127/77)  RR: 18 (06-22-19 @ 15:35) (16 - 18)  SpO2: 100% (06-22-19 @ 15:35) (100% - 100%)  Wt(kg): --    PHYSICAL EXAM:  GENERAL: NAD  HEENT: EOMI, conjunctiva and sclera clear  Chest: CTA bilaterally, no wheezing  CV: S1S2, RRR,   GI: soft, +BS, mildly tender to deep palpation of upper quadrants, no rebound or guarding  Musculoskeletal: lower back ttp on right level of L2-L3, no erythema, masses or lesions   Skin: warm and dry      LABS:                        9.9    23.30 )-----------( 340      ( 22 Jun 2019 06:34 )             30.1     06-22    134<L>  |  98  |  14  ----------------------------<  114<H>  4.1   |  27  |  1.00    Ca    9.0      22 Jun 2019 06:34  Mg     1.9     06-22      PTT - ( 22 Jun 2019 06:34 )  PTT:57.6 sec            Assessment/Plan  69 M with a PMH of HTN presents to ED c/o upper abdominal pain. Admitted for pyelonephritis    Will give Ofirmev x1 for pain  Continue to monitor vitals  RN to call with any changes Called by RN for lower back pain. Pain started spontaneously tonight with no precipitating events or triggers. Pain is on right, non-radiating, 8/10 and is described as sharp, stabbing. Had similar episode of pain with improvement yesterday following Toradol. Pt denies any other sx. Denies fever, chills, headaches, dizziness, blurred vision, difficulty swallowing, chest pain, palpitations, numbness/tingling.          T(C): 36.7 (06-22-19 @ 15:35), Max: 36.9 (06-22-19 @ 08:06)  HR: 86 (06-22-19 @ 15:35) (78 - 87)  BP: 127/77 (06-22-19 @ 15:35) (113/69 - 127/77)  RR: 18 (06-22-19 @ 15:35) (16 - 18)  SpO2: 100% (06-22-19 @ 15:35) (100% - 100%)  Wt(kg): --    PHYSICAL EXAM:  GENERAL: NAD  HEENT: EOMI, conjunctiva and sclera clear  Chest: CTA bilaterally, no wheezing  CV: S1S2, RRR,   GI: soft, +BS, mildly tender to deep palpation of upper quadrants, no rebound or guarding  Musculoskeletal: lower back mildly ttp on right level of L2-L3, no erythema, masses or lesions   Skin: warm and dry      LABS:                        9.9    23.30 )-----------( 340      ( 22 Jun 2019 06:34 )             30.1     06-22    134<L>  |  98  |  14  ----------------------------<  114<H>  4.1   |  27  |  1.00    Ca    9.0      22 Jun 2019 06:34  Mg     1.9     06-22      PTT - ( 22 Jun 2019 06:34 )  PTT:57.6 sec            Assessment/Plan  69 M with a PMH of HTN presents to ED c/o upper abdominal pain.     Pt admitted for pyelonephritis  Will give Ofirmev x1 for pain  Continue to monitor vitals  RN to call with any changes

## 2019-06-22 NOTE — DISCHARGE NOTE PROVIDER - NSDCCPCAREPLAN_GEN_ALL_CORE_FT
PRINCIPAL DISCHARGE DIAGNOSIS  Diagnosis: UTI (urinary tract infection)  Assessment and Plan of Treatment: ruled out      SECONDARY DISCHARGE DIAGNOSES  Diagnosis: Venous thrombosis  Assessment and Plan of Treatment: new full  anticoagulation - eliquis 10 mg  po  bid 7 days  than 5 bid  continue  and see your hematologist/oncologist dr colon in 1wk  .    Diagnosis: Leukocytosis  Assessment and Plan of Treatment: reactive fu repeat  cbc  with in 1 wk with your pmd office.

## 2019-06-22 NOTE — PROGRESS NOTE ADULT - SUBJECTIVE AND OBJECTIVE BOX
INTERVAL HPI/OVERNIGHT EVENTS: feels well, barber was not functioning well last night and was removed    MEDICATIONS  (STANDING):  heparin  Infusion.  Unit(s)/Hr (12 mL/Hr) IV Continuous <Continuous>  lisinopril 10 milliGRAM(s) Oral daily  ondansetron Injectable 4 milliGRAM(s) IV Push once  piperacillin/tazobactam IVPB.. 3.375 Gram(s) IV Intermittent every 8 hours  tamsulosin 0.4 milliGRAM(s) Oral at bedtime    MEDICATIONS  (PRN):  heparin  Injectable 5500 Unit(s) IV Push every 6 hours PRN For aPTT less than 40  heparin  Injectable 2500 Unit(s) IV Push every 6 hours PRN For aPTT between 40 - 57        Vital Signs Last 24 Hrs  T(C): 36.9 (2019 08:06), Max: 36.9 (2019 08:06)  T(F): 98.4 (2019 08:06), Max: 98.4 (2019 08:06)  HR: 78 (2019 08:06) (78 - 87)  BP: 113/69 (2019 08:06) (113/69 - 126/88)  BP(mean): --  RR: 16 (2019 08:06) (15 - 16)  SpO2: 100% (2019 08:06) (100% - 100%)    PHYSICAL EXAM:    ABDOMEN:  GENITALIA:    LABS:                        9.9    23.30 )-----------( 340      ( 2019 06:34 )             30.1     06-22    134<L>  |  98  |  14  ----------------------------<  114<H>  4.1   |  27  |  1.00    Ca    9.0      2019 06:34  Mg     1.9     06-22    TPro  6.8  /  Alb  3.0<L>  /  TBili  0.7  /  DBili  x   /  AST  13<L>  /  ALT  16  /  AlkPhos  84  06-20    PT/INR - ( 2019 18:28 )   PT: 15.0 sec;   INR: 1.31 ratio         PTT - ( 2019 06:34 )  PTT:57.6 sec  Urinalysis Basic - ( 2019 17:06 )    Color: Yellow / Appearance: Slightly Turbid / S.020 / pH: x  Gluc: x / Ketone: Large  / Bili: Negative / Urobili: Negative   Blood: x / Protein: 75 mg/dL / Nitrite: Negative   Leuk Esterase: Moderate / RBC: 6-10 /HPF / WBC 11-25   Sq Epi: x / Non Sq Epi: Occasional / Bacteria: Occasional      Urine culture:   @ 23:50 --   No growth to date.  Urine culture:   @ 21:12 --   No growth      RADIOLOGY & ADDITIONAL TESTS:

## 2019-06-22 NOTE — PROGRESS NOTE ADULT - SUBJECTIVE AND OBJECTIVE BOX
CHIEF COMPLAINT/INTERVAL HISTORY:  Pt. seen and evaluated for L. renal lesion.  Pt. reports that this is the best he has felt in the last 2 weeks.  Events from over the night noted.  Pt. currently voiding on his own.     REVIEW OF SYSTEMS:  No fever, CP, SOB, or abdominal pain.     Vital Signs Last 24 Hrs  T(C): 36.9 (2019 08:06), Max: 36.9 (2019 08:06)  T(F): 98.4 (2019 08:06), Max: 98.4 (2019 08:06)  HR: 78 (2019 08:06) (78 - 87)  BP: 113/69 (2019 08:06) (113/69 - 126/88)  BP(mean): --  RR: 16 (2019 08:06) (15 - 16)  SpO2: 100% (2019 08:06) (100% - 100%)    PHYSICAL EXAM:  GENERAL: NAD  HEENT: EOMI, hearing normal, conjunctiva and sclera clear  Chest: CTA bilaterally, no wheezing  CV: S1S2, RRR,   GI: soft, +BS, NT/ND  Musculoskeletal: no edema  Psychiatric: affect nL, mood nL  Skin: warm and dry    LABS:                        9.9    23.30 )-----------( 340      ( 2019 06:34 )             30.1     06-22    134<L>  |  98  |  14  ----------------------------<  114<H>  4.1   |  27  |  1.00    Ca    9.0      2019 06:34  Mg     1.9     06-22    TPro  6.8  /  Alb  3.0<L>  /  TBili  0.7  /  DBili  x   /  AST  13<L>  /  ALT  16  /  AlkPhos  84  06-20    PT/INR - ( 2019 18:28 )   PT: 15.0 sec;   INR: 1.31 ratio         PTT - ( 2019 06:34 )  PTT:57.6 sec  Urinalysis Basic - ( 2019 17:06 )    Color: Yellow / Appearance: Slightly Turbid / S.020 / pH: x  Gluc: x / Ketone: Large  / Bili: Negative / Urobili: Negative   Blood: x / Protein: 75 mg/dL / Nitrite: Negative   Leuk Esterase: Moderate / RBC: 6-10 /HPF / WBC 11-25   Sq Epi: x / Non Sq Epi: Occasional / Bacteria: Occasional        Assessment and Plan:  -Pyelonephritis:  Blood and urine culture negative.  Will discontinue antibiotics and observe.    -Possible L. kidney malignancy and urinary retention:  Pt. voiding on his own. Urology and Heme/Onc f/u   -Partial L. renal vein thrombosis:  Continue heparin gtt.    -Liver lesion:  s/p biopsy at [facility] 1 week ago.  Pathology results were nondiagnostic.   -HTN:  continue Lisinopril 10mg PO daily  -Hyponatremia:  stable  -VTE ppx:  On heparin gtt

## 2019-06-23 LAB
ALBUMIN SERPL ELPH-MCNC: 2.9 G/DL — LOW (ref 3.3–5)
ALP SERPL-CCNC: 86 U/L — SIGNIFICANT CHANGE UP (ref 40–120)
ALT FLD-CCNC: 16 U/L — SIGNIFICANT CHANGE UP (ref 12–78)
ANION GAP SERPL CALC-SCNC: 9 MMOL/L — SIGNIFICANT CHANGE UP (ref 5–17)
APTT BLD: 42.4 SEC — HIGH (ref 27.5–36.3)
APTT BLD: 54.5 SEC — HIGH (ref 27.5–36.3)
APTT BLD: 74.2 SEC — HIGH (ref 27.5–36.3)
AST SERPL-CCNC: 13 U/L — LOW (ref 15–37)
BILIRUB SERPL-MCNC: 0.5 MG/DL — SIGNIFICANT CHANGE UP (ref 0.2–1.2)
BUN SERPL-MCNC: 11 MG/DL — SIGNIFICANT CHANGE UP (ref 7–23)
CALCIUM SERPL-MCNC: 9.4 MG/DL — SIGNIFICANT CHANGE UP (ref 8.5–10.1)
CHLORIDE SERPL-SCNC: 98 MMOL/L — SIGNIFICANT CHANGE UP (ref 96–108)
CO2 SERPL-SCNC: 27 MMOL/L — SIGNIFICANT CHANGE UP (ref 22–31)
CREAT SERPL-MCNC: 0.98 MG/DL — SIGNIFICANT CHANGE UP (ref 0.5–1.3)
GLUCOSE SERPL-MCNC: 115 MG/DL — HIGH (ref 70–99)
HCT VFR BLD CALC: 30.7 % — LOW (ref 39–50)
HGB BLD-MCNC: 10 G/DL — LOW (ref 13–17)
MAGNESIUM SERPL-MCNC: 2 MG/DL — SIGNIFICANT CHANGE UP (ref 1.6–2.6)
MCHC RBC-ENTMCNC: 23.8 PG — LOW (ref 27–34)
MCHC RBC-ENTMCNC: 32.6 GM/DL — SIGNIFICANT CHANGE UP (ref 32–36)
MCV RBC AUTO: 72.9 FL — LOW (ref 80–100)
NRBC # BLD: 0 /100 WBCS — SIGNIFICANT CHANGE UP (ref 0–0)
PLATELET # BLD AUTO: 384 K/UL — SIGNIFICANT CHANGE UP (ref 150–400)
POTASSIUM SERPL-MCNC: 3.7 MMOL/L — SIGNIFICANT CHANGE UP (ref 3.5–5.3)
POTASSIUM SERPL-SCNC: 3.7 MMOL/L — SIGNIFICANT CHANGE UP (ref 3.5–5.3)
PROT SERPL-MCNC: 6.8 G/DL — SIGNIFICANT CHANGE UP (ref 6–8.3)
RBC # BLD: 4.21 M/UL — SIGNIFICANT CHANGE UP (ref 4.2–5.8)
RBC # FLD: 17.7 % — HIGH (ref 10.3–14.5)
SODIUM SERPL-SCNC: 134 MMOL/L — LOW (ref 135–145)
WBC # BLD: 22.32 K/UL — HIGH (ref 3.8–10.5)
WBC # FLD AUTO: 22.32 K/UL — HIGH (ref 3.8–10.5)

## 2019-06-23 PROCEDURE — 99232 SBSQ HOSP IP/OBS MODERATE 35: CPT

## 2019-06-23 RX ORDER — OXYCODONE AND ACETAMINOPHEN 5; 325 MG/1; MG/1
2 TABLET ORAL EVERY 6 HOURS
Refills: 0 | Status: DISCONTINUED | OUTPATIENT
Start: 2019-06-23 | End: 2019-06-26

## 2019-06-23 RX ORDER — ACETAMINOPHEN 500 MG
650 TABLET ORAL EVERY 6 HOURS
Refills: 0 | Status: DISCONTINUED | OUTPATIENT
Start: 2019-06-23 | End: 2019-06-26

## 2019-06-23 RX ORDER — OXYCODONE AND ACETAMINOPHEN 5; 325 MG/1; MG/1
1 TABLET ORAL EVERY 6 HOURS
Refills: 0 | Status: DISCONTINUED | OUTPATIENT
Start: 2019-06-23 | End: 2019-06-26

## 2019-06-23 RX ADMIN — Medication 650 MILLIGRAM(S): at 09:50

## 2019-06-23 RX ADMIN — OXYCODONE AND ACETAMINOPHEN 2 TABLET(S): 5; 325 TABLET ORAL at 22:15

## 2019-06-23 RX ADMIN — LISINOPRIL 10 MILLIGRAM(S): 2.5 TABLET ORAL at 05:52

## 2019-06-23 RX ADMIN — Medication 650 MILLIGRAM(S): at 10:30

## 2019-06-23 RX ADMIN — HEPARIN SODIUM 1300 UNIT(S)/HR: 5000 INJECTION INTRAVENOUS; SUBCUTANEOUS at 14:11

## 2019-06-23 RX ADMIN — TAMSULOSIN HYDROCHLORIDE 0.4 MILLIGRAM(S): 0.4 CAPSULE ORAL at 21:11

## 2019-06-23 RX ADMIN — OXYCODONE AND ACETAMINOPHEN 2 TABLET(S): 5; 325 TABLET ORAL at 21:11

## 2019-06-23 RX ADMIN — OXYCODONE AND ACETAMINOPHEN 1 TABLET(S): 5; 325 TABLET ORAL at 15:30

## 2019-06-23 RX ADMIN — OXYCODONE AND ACETAMINOPHEN 1 TABLET(S): 5; 325 TABLET ORAL at 15:03

## 2019-06-23 RX ADMIN — HEPARIN SODIUM 1300 UNIT(S)/HR: 5000 INJECTION INTRAVENOUS; SUBCUTANEOUS at 06:22

## 2019-06-23 RX ADMIN — HEPARIN SODIUM 2500 UNIT(S): 5000 INJECTION INTRAVENOUS; SUBCUTANEOUS at 06:33

## 2019-06-23 NOTE — PROVIDER CONTACT NOTE (OTHER) - ASSESSMENT
Afebrile  VSS
patient has sudden onset severe pain, noted  uncomfortable,lower back pain not radiating,no numbness,no tingling

## 2019-06-23 NOTE — PROGRESS NOTE ADULT - SUBJECTIVE AND OBJECTIVE BOX
All interim records and events noted.    sl pain right upper post back, Tylenol w benefit, voiding without problems      MEDICATIONS  (STANDING):  heparin  Infusion.  Unit(s)/Hr (12 mL/Hr) IV Continuous <Continuous>  lisinopril 10 milliGRAM(s) Oral daily  ondansetron Injectable 4 milliGRAM(s) IV Push once  tamsulosin 0.4 milliGRAM(s) Oral at bedtime    MEDICATIONS  (PRN):  acetaminophen   Tablet .. 650 milliGRAM(s) Oral every 6 hours PRN Temp greater or equal to 38C (100.4F), Mild Pain (1 - 3)  heparin  Injectable 5500 Unit(s) IV Push every 6 hours PRN For aPTT less than 40  heparin  Injectable 2500 Unit(s) IV Push every 6 hours PRN For aPTT between 40 - 57      Vital Signs Last 24 Hrs  T(C): 36.8 (23 Jun 2019 07:45), Max: 36.8 (23 Jun 2019 07:45)  T(F): 98.3 (23 Jun 2019 07:45), Max: 98.3 (23 Jun 2019 07:45)  HR: 77 (23 Jun 2019 07:45) (77 - 86)  BP: 104/70 (23 Jun 2019 07:45) (104/70 - 127/77)  BP(mean): --  RR: 16 (23 Jun 2019 07:45) (16 - 18)  SpO2: 99% (23 Jun 2019 07:45) (99% - 100%)    PHYSICAL EXAM  General: well developed  well nourished, thin, in no acute distress  Head: atraumatic, normocephalic  ENT: sclera anicteric, buccal mucosa moist  Neck: supple, trachea midline, no palpable masses  CV: S1 S2, regular rate and rhythm  Lungs: clear to auscultation, no wheezes/rhonchi  Abdomen: soft, nontender, bowel sounds present, no palpable hepatosplenomegaly  Extrem: no clubbing/cyanosis/edema  Skin: no significant increased ecchymosis/petechiae  Neuro: alert and oriented X3,  no focal deficits      LABS:             10.0   22.32 )-----------( 384      ( 06-23 @ 05:44 )             30.7                9.9    23.30 )-----------( 340      ( 06-22 @ 06:34 )             30.1                10.6   31.92 )-----------( 387      ( 06-21 @ 22:25 )             32.3                10.3   28.17 )-----------( 340      ( 06-21 @ 03:20 )             31.2                11.8   36.24 )-----------( 423      ( 06-20 @ 17:06 )             36.5       06-23    134<L>  |  98  |  11  ----------------------------<  115<H>  3.7   |  27  |  0.98    Ca    9.4      23 Jun 2019 05:44  Mg     2.0     06-23    TPro  6.8  /  Alb  2.9<L>  /  TBili  0.5  /  DBili  x   /  AST  13<L>  /  ALT  16  /  AlkPhos  86  06-23 06-23 @ 05:44  PT-- INR--  PTT54.5  06-22 @ 06:34  PT-- INR--  PTT57.6  06-21 @ 09:49  PT-- INR--  PTT70.1  06-21 @ 03:20  PT-- INR--  PTT66.2  06-20 @ 18:28  PT15.0 INR1.31  PTT32.6      RADIOLOGY & ADDITIONAL STUDIES:    IMPRESSION/RECOMMENDATIONS:

## 2019-06-23 NOTE — PROGRESS NOTE ADULT - ASSESSMENT
70 y/o man w CT c/a/p findings of  left renal upper/mid pole abnormality suspicious for neoplasm, 3+cm left liver mass w additional small lesions, necrotic left paraaortic LN, ale lungs small lesions, post recent Good Teddy liver bx but nondiagnostic.  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 include malignancy/chronic ds/renal insuff/blood loss.  B12/folate adequate    -CBC stable  -will discuss w Interventional Radiology tomorrow wrt bx, liver vs renal.  -pt agreeable to plan

## 2019-06-23 NOTE — PROGRESS NOTE ADULT - SUBJECTIVE AND OBJECTIVE BOX
INTERVAL HPI/OVERNIGHT EVENTS: voiding to completion    MEDICATIONS  (STANDING):  heparin  Infusion.  Unit(s)/Hr (12 mL/Hr) IV Continuous <Continuous>  lisinopril 10 milliGRAM(s) Oral daily  ondansetron Injectable 4 milliGRAM(s) IV Push once  tamsulosin 0.4 milliGRAM(s) Oral at bedtime    MEDICATIONS  (PRN):  acetaminophen   Tablet .. 650 milliGRAM(s) Oral every 6 hours PRN Temp greater or equal to 38C (100.4F), Mild Pain (1 - 3)  heparin  Injectable 5500 Unit(s) IV Push every 6 hours PRN For aPTT less than 40  heparin  Injectable 2500 Unit(s) IV Push every 6 hours PRN For aPTT between 40 - 57        Vital Signs Last 24 Hrs  T(C): 36.8 (23 Jun 2019 07:45), Max: 36.8 (23 Jun 2019 07:45)  T(F): 98.3 (23 Jun 2019 07:45), Max: 98.3 (23 Jun 2019 07:45)  HR: 77 (23 Jun 2019 07:45) (77 - 86)  BP: 104/70 (23 Jun 2019 07:45) (104/70 - 127/77)  BP(mean): --  RR: 16 (23 Jun 2019 07:45) (16 - 18)  SpO2: 99% (23 Jun 2019 07:45) (99% - 100%)      LABS:                        10.0   22.32 )-----------( 384      ( 23 Jun 2019 05:44 )             30.7     06-23    134<L>  |  98  |  11  ----------------------------<  115<H>  3.7   |  27  |  0.98    Ca    9.4      23 Jun 2019 05:44  Mg     2.0     06-23    TPro  6.8  /  Alb  2.9<L>  /  TBili  0.5  /  DBili  x   /  AST  13<L>  /  ALT  16  /  AlkPhos  86  06-23    PTT - ( 23 Jun 2019 05:44 )  PTT:54.5 sec    Urine culture:  06-20 @ 23:50 --   No growth to date.  Urine culture:  06-20 @ 21:12 --   No growth      RADIOLOGY & ADDITIONAL TESTS:

## 2019-06-23 NOTE — PROVIDER CONTACT NOTE (OTHER) - SITUATION
Pt complaining that barber is leaking. Urine visualized leaking down pt's leg outside of barber cathter. Pt admits to penile pain as well as urethral burning + pressure.
Patient c/o UTI,

## 2019-06-23 NOTE — PROGRESS NOTE ADULT - SUBJECTIVE AND OBJECTIVE BOX
CHIEF COMPLAINT/INTERVAL HISTORY:  Pt. seen and evaluated for L. renal lesion and L. renal vein thrombosis.  Pt. is in no distress.  Does report some R. lower back pain.  Tolerating heparin gtt with no gross bleeding and stable hbg.     REVIEW OF SYSTEMS:  No fever, CP, SOB, or abdominal pain.     Vital Signs Last 24 Hrs  T(C): 36.8 (23 Jun 2019 07:45), Max: 36.8 (23 Jun 2019 07:45)  T(F): 98.3 (23 Jun 2019 07:45), Max: 98.3 (23 Jun 2019 07:45)  HR: 77 (23 Jun 2019 07:45) (77 - 86)  BP: 104/70 (23 Jun 2019 07:45) (104/70 - 127/77)  BP(mean): --  RR: 16 (23 Jun 2019 07:45) (16 - 18)  SpO2: 99% (23 Jun 2019 07:45) (99% - 100%)    PHYSICAL EXAM:  GENERAL: NAD  HEENT: EOMI, hearing normal, conjunctiva and sclera clear  Chest: CTA bilaterally, no wheezing  CV: S1S2, RRR,   GI: soft, +BS, NT/ND  Musculoskeletal: no edema  Psychiatric: affect nL, mood nL  Skin: warm and dry    LABS:                        10.0   22.32 )-----------( 384      ( 23 Jun 2019 05:44 )             30.7     06-23    134<L>  |  98  |  11  ----------------------------<  115<H>  3.7   |  27  |  0.98    Ca    9.4      23 Jun 2019 05:44  Mg     2.0     06-23    TPro  6.8  /  Alb  2.9<L>  /  TBili  0.5  /  DBili  x   /  AST  13<L>  /  ALT  16  /  AlkPhos  86  06-23    PTT - ( 23 Jun 2019 05:44 )  PTT:54.5 sec      Assessment and Plan:  -Pyelonephritis:  Blood and urine culture negative.  Observe off antibiotic.   -Possible L. kidney malignancy and urinary retention:  Pt. voiding on his own. Urology and Heme/Onc f/u   -Partial L. renal vein thrombosis:  Continue heparin gtt.    -Liver lesion:  s/p biopsy at [facility] 1 week ago.  Pathology results were nondiagnostic.   -HTN:  continue Lisinopril 10mg PO daily  -Hyponatremia:  stable  -VTE ppx:  On heparin gtt CHIEF COMPLAINT/INTERVAL HISTORY:  Pt. seen and evaluated for L. renal lesion and L. renal vein thrombosis.  Pt. is in no distress.  Does report some R. lower back pain.  Tolerating heparin gtt with no gross bleeding and stable hbg.     REVIEW OF SYSTEMS:  No fever, CP, SOB, or abdominal pain.     Vital Signs Last 24 Hrs  T(C): 36.8 (23 Jun 2019 07:45), Max: 36.8 (23 Jun 2019 07:45)  T(F): 98.3 (23 Jun 2019 07:45), Max: 98.3 (23 Jun 2019 07:45)  HR: 77 (23 Jun 2019 07:45) (77 - 86)  BP: 104/70 (23 Jun 2019 07:45) (104/70 - 127/77)  BP(mean): --  RR: 16 (23 Jun 2019 07:45) (16 - 18)  SpO2: 99% (23 Jun 2019 07:45) (99% - 100%)    PHYSICAL EXAM:  GENERAL: NAD  HEENT: EOMI, hearing normal, conjunctiva and sclera clear  Chest: CTA bilaterally, no wheezing  CV: S1S2, RRR,   GI: soft, +BS, NT/ND  Musculoskeletal: no edema  Psychiatric: affect nL, mood nL  Skin: warm and dry    LABS:                        10.0   22.32 )-----------( 384      ( 23 Jun 2019 05:44 )             30.7     06-23    134<L>  |  98  |  11  ----------------------------<  115<H>  3.7   |  27  |  0.98    Ca    9.4      23 Jun 2019 05:44  Mg     2.0     06-23    TPro  6.8  /  Alb  2.9<L>  /  TBili  0.5  /  DBili  x   /  AST  13<L>  /  ALT  16  /  AlkPhos  86  06-23    PTT - ( 23 Jun 2019 05:44 )  PTT:54.5 sec      Assessment and Plan:  -Pyelonephritis:  Blood and urine culture negative.  Observe off antibiotic.   -Possible L. kidney malignancy and urinary retention:  Pt. voiding on his own.  Continue Flomax.  Urology and Heme/Onc f/u   -Partial L. renal vein thrombosis:  Continue heparin gtt.    -Liver lesion:  s/p biopsy at [facility] 1 week ago.  Pathology results were nondiagnostic.   -HTN:  continue Lisinopril 10mg PO daily  -Hyponatremia:  stable  -VTE ppx:  On heparin gtt CHIEF COMPLAINT/INTERVAL HISTORY:  Pt. seen and evaluated for L. renal lesion and L. renal vein thrombosis.  Pt. is in no distress.  Does report some R. lower back pain.  Tolerating heparin gtt with no gross bleeding and stable hbg.     REVIEW OF SYSTEMS:  No fever, CP, SOB, or abdominal pain.     Vital Signs Last 24 Hrs  T(C): 36.8 (23 Jun 2019 07:45), Max: 36.8 (23 Jun 2019 07:45)  T(F): 98.3 (23 Jun 2019 07:45), Max: 98.3 (23 Jun 2019 07:45)  HR: 77 (23 Jun 2019 07:45) (77 - 86)  BP: 104/70 (23 Jun 2019 07:45) (104/70 - 127/77)  BP(mean): --  RR: 16 (23 Jun 2019 07:45) (16 - 18)  SpO2: 99% (23 Jun 2019 07:45) (99% - 100%)    PHYSICAL EXAM:  GENERAL: NAD  HEENT: EOMI, hearing normal, conjunctiva and sclera clear  Chest: CTA bilaterally, no wheezing  CV: S1S2, RRR,   GI: soft, +BS, NT/ND  Musculoskeletal: no edema  Psychiatric: affect nL, mood nL  Skin: warm and dry    LABS:                        10.0   22.32 )-----------( 384      ( 23 Jun 2019 05:44 )             30.7     06-23    134<L>  |  98  |  11  ----------------------------<  115<H>  3.7   |  27  |  0.98    Ca    9.4      23 Jun 2019 05:44  Mg     2.0     06-23    TPro  6.8  /  Alb  2.9<L>  /  TBili  0.5  /  DBili  x   /  AST  13<L>  /  ALT  16  /  AlkPhos  86  06-23    PTT - ( 23 Jun 2019 05:44 )  PTT:54.5 sec      Assessment and Plan:  -Pyelonephritis:  Blood and urine culture negative.  Observe off antibiotic.   -Possible L. kidney malignancy and urinary retention:  Pt. voiding on his own.  Continue Flomax.  Check bone scan.  Urology and Heme/Onc f/u   -Partial L. renal vein thrombosis:  Continue heparin gtt.    -Liver lesion:  s/p biopsy at [facility] 1 week ago.  Pathology results were nondiagnostic.  IR for possible biopsy tomorrow.  -HTN:  continue Lisinopril 10mg PO daily  -Hyponatremia:  stable  -VTE ppx:  On heparin gtt

## 2019-06-24 ENCOUNTER — RESULT REVIEW (OUTPATIENT)
Age: 69
End: 2019-06-24

## 2019-06-24 LAB
ANION GAP SERPL CALC-SCNC: 7 MMOL/L — SIGNIFICANT CHANGE UP (ref 5–17)
APTT BLD: 38.2 SEC — HIGH (ref 28.5–37)
APTT BLD: 60.5 SEC — HIGH (ref 28.5–37)
BUN SERPL-MCNC: 8 MG/DL — SIGNIFICANT CHANGE UP (ref 7–23)
CALCIUM SERPL-MCNC: 9.8 MG/DL — SIGNIFICANT CHANGE UP (ref 8.5–10.1)
CHLORIDE SERPL-SCNC: 97 MMOL/L — SIGNIFICANT CHANGE UP (ref 96–108)
CO2 SERPL-SCNC: 28 MMOL/L — SIGNIFICANT CHANGE UP (ref 22–31)
CREAT SERPL-MCNC: 0.86 MG/DL — SIGNIFICANT CHANGE UP (ref 0.5–1.3)
GLUCOSE SERPL-MCNC: 111 MG/DL — HIGH (ref 70–99)
HCT VFR BLD CALC: 32.6 % — LOW (ref 39–50)
HGB BLD-MCNC: 10.5 G/DL — LOW (ref 13–17)
IGA FLD-MCNC: 175 MG/DL — SIGNIFICANT CHANGE UP (ref 84–499)
IGG FLD-MCNC: 1212 MG/DL — SIGNIFICANT CHANGE UP (ref 610–1660)
IGM SERPL-MCNC: 115 MG/DL — SIGNIFICANT CHANGE UP (ref 35–242)
KAPPA LC SER QL IFE: 4.25 MG/DL — HIGH (ref 0.33–1.94)
KAPPA/LAMBDA FREE LIGHT CHAIN RATIO, SERUM: 1.79 RATIO — HIGH (ref 0.26–1.65)
LAMBDA LC SER QL IFE: 2.38 MG/DL — SIGNIFICANT CHANGE UP (ref 0.57–2.63)
MAGNESIUM SERPL-MCNC: 2.2 MG/DL — SIGNIFICANT CHANGE UP (ref 1.6–2.6)
MCHC RBC-ENTMCNC: 23.6 PG — LOW (ref 27–34)
MCHC RBC-ENTMCNC: 32.2 GM/DL — SIGNIFICANT CHANGE UP (ref 32–36)
MCV RBC AUTO: 73.4 FL — LOW (ref 80–100)
NRBC # BLD: 0 /100 WBCS — SIGNIFICANT CHANGE UP (ref 0–0)
PLATELET # BLD AUTO: 414 K/UL — HIGH (ref 150–400)
POTASSIUM SERPL-MCNC: 4 MMOL/L — SIGNIFICANT CHANGE UP (ref 3.5–5.3)
POTASSIUM SERPL-SCNC: 4 MMOL/L — SIGNIFICANT CHANGE UP (ref 3.5–5.3)
PROT SERPL-MCNC: 7.2 G/DL — SIGNIFICANT CHANGE UP (ref 6–8.3)
PROT SERPL-MCNC: 7.2 G/DL — SIGNIFICANT CHANGE UP (ref 6–8.3)
RBC # BLD: 4.44 M/UL — SIGNIFICANT CHANGE UP (ref 4.2–5.8)
RBC # FLD: 17.7 % — HIGH (ref 10.3–14.5)
SODIUM SERPL-SCNC: 132 MMOL/L — LOW (ref 135–145)
WBC # BLD: 25.03 K/UL — HIGH (ref 3.8–10.5)
WBC # FLD AUTO: 25.03 K/UL — HIGH (ref 3.8–10.5)

## 2019-06-24 PROCEDURE — 88342 IMHCHEM/IMCYTCHM 1ST ANTB: CPT | Mod: 26

## 2019-06-24 PROCEDURE — 77012 CT SCAN FOR NEEDLE BIOPSY: CPT | Mod: 26

## 2019-06-24 PROCEDURE — 47000 NEEDLE BIOPSY OF LIVER PERQ: CPT

## 2019-06-24 PROCEDURE — 88341 IMHCHEM/IMCYTCHM EA ADD ANTB: CPT | Mod: 26

## 2019-06-24 PROCEDURE — 99232 SBSQ HOSP IP/OBS MODERATE 35: CPT

## 2019-06-24 PROCEDURE — 88307 TISSUE EXAM BY PATHOLOGIST: CPT | Mod: 26

## 2019-06-24 RX ORDER — HEPARIN SODIUM 5000 [USP'U]/ML
5500 INJECTION INTRAVENOUS; SUBCUTANEOUS EVERY 6 HOURS
Refills: 0 | Status: DISCONTINUED | OUTPATIENT
Start: 2019-06-24 | End: 2019-06-26

## 2019-06-24 RX ORDER — ALPRAZOLAM 0.25 MG
0.25 TABLET ORAL ONCE
Refills: 0 | Status: DISCONTINUED | OUTPATIENT
Start: 2019-06-24 | End: 2019-06-24

## 2019-06-24 RX ORDER — HEPARIN SODIUM 5000 [USP'U]/ML
2500 INJECTION INTRAVENOUS; SUBCUTANEOUS EVERY 6 HOURS
Refills: 0 | Status: DISCONTINUED | OUTPATIENT
Start: 2019-06-24 | End: 2019-06-26

## 2019-06-24 RX ORDER — HEPARIN SODIUM 5000 [USP'U]/ML
INJECTION INTRAVENOUS; SUBCUTANEOUS
Qty: 25000 | Refills: 0 | Status: DISCONTINUED | OUTPATIENT
Start: 2019-06-24 | End: 2019-06-26

## 2019-06-24 RX ADMIN — Medication 0.25 MILLIGRAM(S): at 12:36

## 2019-06-24 RX ADMIN — HEPARIN SODIUM 2500 UNIT(S): 5000 INJECTION INTRAVENOUS; SUBCUTANEOUS at 01:55

## 2019-06-24 RX ADMIN — OXYCODONE AND ACETAMINOPHEN 2 TABLET(S): 5; 325 TABLET ORAL at 07:50

## 2019-06-24 RX ADMIN — OXYCODONE AND ACETAMINOPHEN 2 TABLET(S): 5; 325 TABLET ORAL at 08:20

## 2019-06-24 RX ADMIN — HEPARIN SODIUM 1400 UNIT(S)/HR: 5000 INJECTION INTRAVENOUS; SUBCUTANEOUS at 01:47

## 2019-06-24 RX ADMIN — HEPARIN SODIUM 1400 UNIT(S)/HR: 5000 INJECTION INTRAVENOUS; SUBCUTANEOUS at 07:30

## 2019-06-24 RX ADMIN — TAMSULOSIN HYDROCHLORIDE 0.4 MILLIGRAM(S): 0.4 CAPSULE ORAL at 23:24

## 2019-06-24 RX ADMIN — OXYCODONE AND ACETAMINOPHEN 2 TABLET(S): 5; 325 TABLET ORAL at 17:41

## 2019-06-24 RX ADMIN — LISINOPRIL 10 MILLIGRAM(S): 2.5 TABLET ORAL at 05:49

## 2019-06-24 RX ADMIN — HEPARIN SODIUM 1200 UNIT(S)/HR: 5000 INJECTION INTRAVENOUS; SUBCUTANEOUS at 16:21

## 2019-06-24 NOTE — PROGRESS NOTE ADULT - SUBJECTIVE AND OBJECTIVE BOX
CHIEF COMPLAINT/INTERVAL HISTORY:  Pt. seen and evaluated for L. renal lesion and liver mass.  Pt. is in no distress.  Reports abdominal pain is adequately controlled with Percocet. Awaiting IR biopsy of liver mass.     REVIEW OF SYSTEMS:  No fever, CP, or SOB.     Vital Signs Last 24 Hrs  T(C): 36.9 (24 Jun 2019 07:37), Max: 36.9 (23 Jun 2019 16:03)  T(F): 98.4 (24 Jun 2019 07:37), Max: 98.5 (23 Jun 2019 16:03)  HR: 81 (24 Jun 2019 07:37) (79 - 81)  BP: 157/79 (24 Jun 2019 07:37) (110/63 - 157/79)  BP(mean): --  RR: 16 (24 Jun 2019 07:37) (16 - 16)  SpO2: 100% (24 Jun 2019 07:37) (99% - 100%)    PHYSICAL EXAM:  GENERAL: NAD  HEENT: EOMI, hearing normal, conjunctiva and sclera clear  Chest: CTA bilaterally, no wheezing  CV: S1S2, RRR,   GI: soft, +BS, NT/ND  Musculoskeletal: no edema  Psychiatric: affect nL, mood nL  Skin: warm and dry    LABS:                        10.5   25.03 )-----------( 414      ( 24 Jun 2019 06:15 )             32.6     06-24    132<L>  |  97  |  8   ----------------------------<  111<H>  4.0   |  28  |  0.86    Ca    9.8      24 Jun 2019 06:15  Mg     2.2     06-24    TPro  6.8  /  Alb  2.9<L>  /  TBili  0.5  /  DBili  x   /  AST  13<L>  /  ALT  16  /  AlkPhos  86  06-23    PTT - ( 24 Jun 2019 06:15 )  PTT:60.5 sec      Assessment and Plan:  -Pyelonephritis:  Blood and urine culture negative.  Observe off antibiotic.   -Possible L. kidney malignancy and urinary retention:  Pt. voiding on his own.  Continue Flomax.  Check bone scan.  Urology and Heme/Onc f/u   -Partial L. renal vein thrombosis:  heparin gtt discontinued for liver biospy.    -Liver lesion:  s/p biopsy at [facility] 1 week ago.  Pathology results were nondiagnostic.  IR biopsy today.  -HTN:  continue Lisinopril 10mg PO daily  -Hyponatremia:  Will continue to monitor  -VTE ppx:  SCD

## 2019-06-24 NOTE — PROGRESS NOTE ADULT - ASSESSMENT
[ASSESSMENT and  PLAN]  Suspect metastatic renal carcinoma, with L kidney upper-mid pole lesions.  L paraaortic ERIC with necrotic appearance.   >3cm L hepatic liver lesion, with additional smaller lesions. AFP not impressive for hepatocellular carcinoma.   Scattered subcentimeter pulm nodules, suspicious for metastasis.   Ddx NHL, and unrelated lung nodules. However no B-sx.     Status post recent non-diagnostic liver biopsy at [facility] 06/14/19.     MGUS/monoclonal  gammapathy  Faint IgG and K monoclonal band in serum.   Hgb >10  Cr 0.86  Calcium normal.   No bone sx.     Partial L renal vein thrombosis   L renal vein thrombosis. CT negative for PE.   Renal function preserved.   Started on IV heparin.   on IV Heparin, due to recent dark stools concerning for possible bleed, anemia and need for rebiopsy.     Leukoctyosis  Elevated WBC most likely reactive due to renal vein thrombosis, necrotic tumor, liver mets      Anemia, with microcytosis. Multifactorial.   Microcytic anemia   Recent dark stools in last week, suspicious for intermittent GIB. Possible Fe def.   Non-diagnostic iron studies.   Ferritin 257 ug/L would generally  suggest adequat stores.   However with elevated ESR and CRP 8 [normal <0.4], and in setting of necrotic LN, likely malignancy. Interpretation of ferritin and iron stores difficult.   DDx: thal trait. Folate 8.6. Will start supplementation   Anemia likely multifactorial:      anemia due to cancer     anemia due to chronic disease     anemia due to renal insufficiency     anemia due to blood loss.     Leukocytosis  Elevated WBC likely, reactive due to renal vein thrombosis and necrotic tumor.   No fever, but low grade temp.     BPH, urinary retention.   Mild elevated PSA, not impressive for prostate cancer.       RECOMMENDATIONS:  RENAL MASS and ERIC, LIVER MASS.   Needs tissue diagnosis.   Does not appear to be oligometastasic disease; likely un-resectable.   Recommend repeat liver bx, planned today.      If again non-diagnostic, then renal bx, in consultation with urology and interventional radiology.      Bone scan planned tomorrow.  -pt agreeable to plan    ANEMIA  Check FOBT.   Follow CBC, monitor for decline  Transfuse PRBC as clinically indicated.   Transfuse PRBC if Hgb <7.0 or if symptomatic.     L RVT  Continue anticoagulation, with IV heparin.   Given suspect recent bleeding, anemia, and need for bx, recommend reversible anticoagulant such as heparin.   Long term would transition to other anticoagulant, DOAC if no bleeding. .     Abx per infectious diseases and or medicine.   Locke to gravity, mgmt as per urology.     Thank you for consulting us.

## 2019-06-24 NOTE — PROGRESS NOTE ADULT - PROBLEM SELECTOR PROBLEM 2
Malignant neoplasm metastatic to lung, unspecified laterality
Renal mass, left
BPH with urinary obstruction

## 2019-06-24 NOTE — PROGRESS NOTE ADULT - SUBJECTIVE AND OBJECTIVE BOX
Patient is a 69y Male with a known history of :  Urinary tract infection (N39.0) [Active]  Hypertension (I10) [Active]  No pertinent past medical history (468326549) [Inactive]  Achilles rupture (S86.019A) [Active]  History of liver biopsy (Z98.890) [Active]    HPI:  69 M with a PMH of HTN presents to ED c/o upper abdominal pain. Patient states pain began last Thursday in his lower abdomen with radiation to his L flank with associated decrease in appetite. He went to [facility] at that time where he was admitted for 2 days and diagnosed with a UTI, had a liver biopsy based on CT findings and discharged on PO antibiotics. Patient now returning for similar abdominal pain, but this time he notes it's higher in the midepigastric region and is non radiating. He says it was at its worst (an 8/10) after eating a turkey sandwich yesterday and after having some broth earlier today. He admits to an ongoing decrease in appetite and a 7 lb weight loss since last week. He denies fevers, chills, night sweats, N/V but admits to diarrhea for the past 2 days but his last stool last night was formed. Denies F/U/D, but admits to urinary retention while here in ED requiring catheterization, denying prior episodes of urinary retention.     In the ED;  VS stable  WBC 36.2 h/h: 11.8/36.5  Platelet 423, INR 1.31 BMP wnl except Na 133, AST/ALT wnl, lipase 43, lactate 1.6  UA Positive  CT abdomen/Pelvis: Possible L sided kidney neoplasm vs pyelonephritis.  A left renal artery is surrounded by lymphadenopathy and appears narrowed. There is suggestion   of partially occlusive thrombus in the left renal vein  Hypodense liver lesion in the anterior aspect of the lateral segment of the left lobe measuring 3.2 x 2.2 cm  CT chest: Negative PE or PNA. s. There are several tiny left   bilateral upper lobe pulmonary nodules (20 Jun 2019 20:19)      [INTERVAL HX: ]  Patient seen and examined;  Chart reviewed and events noted;   Abd pain better. Off heparin drip this AM for procedure 1PM.   Was to get injection for bone scan, but rescheduled for tomorrow.   Discussed in detail CT scan findings, non-diagnostic bx prior.     MEDICATIONS  (STANDING):  lisinopril 10 milliGRAM(s) Oral daily  ondansetron Injectable 4 milliGRAM(s) IV Push once  tamsulosin 0.4 milliGRAM(s) Oral at bedtime    MEDICATIONS  (PRN):  acetaminophen   Tablet .. 650 milliGRAM(s) Oral every 6 hours PRN Temp greater or equal to 38C (100.4F), Mild Pain (1 - 3)  oxyCODONE    5 mG/acetaminophen 325 mG 1 Tablet(s) Oral every 6 hours PRN Moderate Pain (4 - 6)  oxyCODONE    5 mG/acetaminophen 325 mG 2 Tablet(s) Oral every 6 hours PRN Severe Pain (7 - 10)      Vital Signs Last 24 Hrs  T(C): 36.9 (24 Jun 2019 07:37), Max: 36.9 (23 Jun 2019 16:03)  T(F): 98.4 (24 Jun 2019 07:37), Max: 98.5 (23 Jun 2019 16:03)  HR: 81 (24 Jun 2019 07:37) (79 - 81)  BP: 157/79 (24 Jun 2019 07:37) (110/63 - 157/79)  BP(mean): --  RR: 16 (24 Jun 2019 07:37) (16 - 16)  SpO2: 100% (24 Jun 2019 07:37) (99% - 100%)      [PHYSICAL EXAM]  General: adult in NAD,  WN,  WD.  HEENT: clear oropharynx, anicteric sclera, pink conjunctivae.  Neck: supple, no masses.  CV: normal S1S2, no murmur, no rubs, no gallops.  Lungs: clear to auscultation, no wheezes, no rales, no rhonchi.  Abdomen: soft, non-tender, non-distended, no hepatosplenomegaly, normal BS, no guarding.  Ext: no clubbing, no cyanosis, no edema.  Skin: no rashes,  no petechiae, no venous stasis changes.  Neuro: alert and oriented X3, no focal motor deficits.  LN: no ERIC.      [LABS:]                        10.5   25.03 )-----------( 414      ( 24 Jun 2019 06:15 )             32.6     06-24    132<L>  |  97  |  8   ----------------------------<  111<H>  4.0   |  28  |  0.86    Ca    9.8      24 Jun 2019 06:15  Mg     2.2     06-24    TPro  6.8  /  Alb  2.9<L>  /  TBili  0.5  /  DBili  x   /  AST  13<L>  /  ALT  16  /  AlkPhos  86  06-23    PTT - ( 24 Jun 2019 06:15 )  PTT:60.5 sec      Ferritin, Serum in AM (06.22.19 @ 11:12)    Ferritin, Serum: 257 ng/mL    Iron with Total Binding Capacity in AM (06.22.19 @ 11:09)    Iron - Total Binding Capacity.: 247 ug/dL    % Saturation, Iron: 8 %    Iron Total, Serum: 20 ug/dL    Unsaturated Iron Binding Capacity: 227 ug/dL    Sedimentation Rate, Erythrocyte (06.21.19 @ 22:25)    Sedimentation Rate, Erythrocyte: 27 mm/hr    C-Reactive Protein, Serum (06.22.19 @ 11:09)    C-Reactive Protein, Serum: 8.47 mg/dL [normal <0.4]    Vitamin B12, Serum (06.22.19 @ 11:12)    Vitamin B12, Serum: 856 pg/mL    Folate, Serum (06.22.19 @ 11:12)    Folate, Serum: 8.6 ng/mL        [RADIOLOGY STUDIES:]

## 2019-06-25 LAB
% ALBUMIN: 47.8 % — SIGNIFICANT CHANGE UP
% ALPHA 1: 9.6 % — SIGNIFICANT CHANGE UP
% ALPHA 2: 12.6 % — SIGNIFICANT CHANGE UP
% BETA: 13 % — SIGNIFICANT CHANGE UP
% GAMMA: 17 % — SIGNIFICANT CHANGE UP
ALBUMIN SERPL ELPH-MCNC: 3.4 G/DL — LOW (ref 3.6–5.5)
ALBUMIN/GLOB SERPL ELPH: 0.9 RATIO — SIGNIFICANT CHANGE UP
ALPHA1 GLOB SERPL ELPH-MCNC: 0.7 G/DL — HIGH (ref 0.1–0.4)
ALPHA2 GLOB SERPL ELPH-MCNC: 0.9 G/DL — SIGNIFICANT CHANGE UP (ref 0.5–1)
ANION GAP SERPL CALC-SCNC: 10 MMOL/L — SIGNIFICANT CHANGE UP (ref 5–17)
APTT BLD: 64.1 SEC — HIGH (ref 27.5–36.3)
APTT BLD: 69.1 SEC — HIGH (ref 27.5–36.3)
B-GLOBULIN SERPL ELPH-MCNC: 0.9 G/DL — SIGNIFICANT CHANGE UP (ref 0.5–1)
BUN SERPL-MCNC: 10 MG/DL — SIGNIFICANT CHANGE UP (ref 7–23)
CALCIUM SERPL-MCNC: 9.9 MG/DL — SIGNIFICANT CHANGE UP (ref 8.5–10.1)
CHLORIDE SERPL-SCNC: 95 MMOL/L — LOW (ref 96–108)
CO2 SERPL-SCNC: 27 MMOL/L — SIGNIFICANT CHANGE UP (ref 22–31)
CREAT SERPL-MCNC: 0.96 MG/DL — SIGNIFICANT CHANGE UP (ref 0.5–1.3)
FOLATE SERPL-MCNC: 5.9 NG/ML — SIGNIFICANT CHANGE UP
GAMMA GLOBULIN: 1.2 G/DL — SIGNIFICANT CHANGE UP (ref 0.6–1.6)
GLUCOSE SERPL-MCNC: 100 MG/DL — HIGH (ref 70–99)
HCT VFR BLD CALC: 32 % — LOW (ref 39–50)
HGB BLD-MCNC: 10.3 G/DL — LOW (ref 13–17)
INR BLD: 1.25 RATIO — HIGH (ref 0.88–1.16)
INTERPRETATION SERPL IFE-IMP: SIGNIFICANT CHANGE UP
MAGNESIUM SERPL-MCNC: 1.9 MG/DL — SIGNIFICANT CHANGE UP (ref 1.6–2.6)
MCHC RBC-ENTMCNC: 23.6 PG — LOW (ref 27–34)
MCHC RBC-ENTMCNC: 32.2 GM/DL — SIGNIFICANT CHANGE UP (ref 32–36)
MCV RBC AUTO: 73.2 FL — LOW (ref 80–100)
NRBC # BLD: 0 /100 WBCS — SIGNIFICANT CHANGE UP (ref 0–0)
PLATELET # BLD AUTO: 406 K/UL — HIGH (ref 150–400)
POTASSIUM SERPL-MCNC: 4 MMOL/L — SIGNIFICANT CHANGE UP (ref 3.5–5.3)
POTASSIUM SERPL-SCNC: 4 MMOL/L — SIGNIFICANT CHANGE UP (ref 3.5–5.3)
PROT PATTERN SERPL ELPH-IMP: SIGNIFICANT CHANGE UP
PROTHROM AB SERPL-ACNC: 14.2 SEC — HIGH (ref 10–12.9)
RBC # BLD: 4.37 M/UL — SIGNIFICANT CHANGE UP (ref 4.2–5.8)
RBC # FLD: 17.8 % — HIGH (ref 10.3–14.5)
SODIUM SERPL-SCNC: 132 MMOL/L — LOW (ref 135–145)
SURGICAL PATHOLOGY STUDY: SIGNIFICANT CHANGE UP
WBC # BLD: 29.91 K/UL — HIGH (ref 3.8–10.5)
WBC # FLD AUTO: 29.91 K/UL — HIGH (ref 3.8–10.5)

## 2019-06-25 PROCEDURE — 99232 SBSQ HOSP IP/OBS MODERATE 35: CPT

## 2019-06-25 PROCEDURE — 78306 BONE IMAGING WHOLE BODY: CPT | Mod: 26

## 2019-06-25 PROCEDURE — 78320: CPT | Mod: 26

## 2019-06-25 RX ORDER — POLYETHYLENE GLYCOL 3350 17 G/17G
17 POWDER, FOR SOLUTION ORAL DAILY
Refills: 0 | Status: DISCONTINUED | OUTPATIENT
Start: 2019-06-25 | End: 2019-06-26

## 2019-06-25 RX ORDER — DOCUSATE SODIUM 100 MG
100 CAPSULE ORAL
Refills: 0 | Status: DISCONTINUED | OUTPATIENT
Start: 2019-06-25 | End: 2019-06-26

## 2019-06-25 RX ORDER — SENNA PLUS 8.6 MG/1
2 TABLET ORAL AT BEDTIME
Refills: 0 | Status: DISCONTINUED | OUTPATIENT
Start: 2019-06-25 | End: 2019-06-26

## 2019-06-25 RX ADMIN — OXYCODONE AND ACETAMINOPHEN 2 TABLET(S): 5; 325 TABLET ORAL at 02:49

## 2019-06-25 RX ADMIN — OXYCODONE AND ACETAMINOPHEN 2 TABLET(S): 5; 325 TABLET ORAL at 03:40

## 2019-06-25 RX ADMIN — Medication 100 MILLIGRAM(S): at 17:57

## 2019-06-25 RX ADMIN — OXYCODONE AND ACETAMINOPHEN 2 TABLET(S): 5; 325 TABLET ORAL at 10:33

## 2019-06-25 RX ADMIN — HEPARIN SODIUM 1200 UNIT(S)/HR: 5000 INJECTION INTRAVENOUS; SUBCUTANEOUS at 07:39

## 2019-06-25 RX ADMIN — SENNA PLUS 2 TABLET(S): 8.6 TABLET ORAL at 21:22

## 2019-06-25 RX ADMIN — OXYCODONE AND ACETAMINOPHEN 2 TABLET(S): 5; 325 TABLET ORAL at 18:56

## 2019-06-25 RX ADMIN — HEPARIN SODIUM 1200 UNIT(S)/HR: 5000 INJECTION INTRAVENOUS; SUBCUTANEOUS at 15:16

## 2019-06-25 RX ADMIN — TAMSULOSIN HYDROCHLORIDE 0.4 MILLIGRAM(S): 0.4 CAPSULE ORAL at 21:22

## 2019-06-25 RX ADMIN — LISINOPRIL 10 MILLIGRAM(S): 2.5 TABLET ORAL at 05:45

## 2019-06-25 RX ADMIN — OXYCODONE AND ACETAMINOPHEN 2 TABLET(S): 5; 325 TABLET ORAL at 23:58

## 2019-06-25 RX ADMIN — OXYCODONE AND ACETAMINOPHEN 2 TABLET(S): 5; 325 TABLET ORAL at 11:33

## 2019-06-25 RX ADMIN — OXYCODONE AND ACETAMINOPHEN 2 TABLET(S): 5; 325 TABLET ORAL at 17:56

## 2019-06-25 NOTE — PROGRESS NOTE ADULT - SUBJECTIVE AND OBJECTIVE BOX
CHIEF COMPLAINT/INTERVAL HISTORY:  Pt. seen and evaluated for L. renal lesion and L. renal vein thrombosis.  Pt. is s/p CT guided liver biopsy.  Tolerating heparin gtt.  No gross bleeding and hbg stable.      REVIEW OF SYSTEMS:  No fever, CP, SOB, or abdominal pain.     Vital Signs Last 24 Hrs  T(C): 36.7 (25 Jun 2019 08:01), Max: 36.7 (25 Jun 2019 05:38)  T(F): 98.1 (25 Jun 2019 08:01), Max: 98.1 (25 Jun 2019 05:38)  HR: 86 (25 Jun 2019 08:01) (79 - 102)  BP: 127/76 (25 Jun 2019 08:01) (117/77 - 143/78)  BP(mean): --  RR: 18 (25 Jun 2019 08:01) (14 - 18)  SpO2: 100% (25 Jun 2019 08:01) (98% - 100%)    PHYSICAL EXAM:  GENERAL: NAD  HEENT: EOMI, hearing normal, conjunctiva and sclera clear  Chest: CTA bilaterally, no wheezing  CV: S1S2, RRR,   GI: soft, +BS, NT/ND  Musculoskeletal: no edema  Psychiatric: affect nL, mood nL  Skin: warm and dry    LABS:                        10.3   29.91 )-----------( 406      ( 25 Jun 2019 06:28 )             32.0     06-25    132<L>  |  95<L>  |  10  ----------------------------<  100<H>  4.0   |  27  |  0.96    Ca    9.9      25 Jun 2019 06:28  Mg     1.9     06-25      PT/INR - ( 25 Jun 2019 06:28 )   PT: 14.2 sec;   INR: 1.25 ratio         PTT - ( 25 Jun 2019 06:28 )  PTT:64.1 sec      Assessment and Plan:  -Pyelonephritis:  Blood and urine culture negative.  Observe off antibiotic.   -Possible L. kidney malignancy and urinary retention:  Pt. voiding on his own.  Continue Flomax.  Check bone scan.  Urology and Heme/Onc f/u   -Partial L. renal vein thrombosis:  Continue heparin gtt.  Will switch to Eliquis upon discharge.  -Liver lesion:  s/p biopsy at [facility] 1 week ago.  Pathology results were nondiagnostic.  s/p CT guided biopsy by IR.  Check pathology.    -HTN:  continue Lisinopril 10mg PO daily  -Hyponatremia:  Stable.  Will continue to monitor  -VTE ppx:  heparin gtt.

## 2019-06-25 NOTE — PROGRESS NOTE ADULT - SUBJECTIVE AND OBJECTIVE BOX
All interim records and events noted.    did well w liver bx yesterdy  no pain  continue to void well      MEDICATIONS  (STANDING):  docusate sodium 100 milliGRAM(s) Oral two times a day  heparin  Infusion.  Unit(s)/Hr (12 mL/Hr) IV Continuous <Continuous>  lisinopril 10 milliGRAM(s) Oral daily  ondansetron Injectable 4 milliGRAM(s) IV Push once  senna 2 Tablet(s) Oral at bedtime  tamsulosin 0.4 milliGRAM(s) Oral at bedtime    MEDICATIONS  (PRN):  acetaminophen   Tablet .. 650 milliGRAM(s) Oral every 6 hours PRN Temp greater or equal to 38C (100.4F), Mild Pain (1 - 3)  heparin  Injectable 5500 Unit(s) IV Push every 6 hours PRN For aPTT less than 40  heparin  Injectable 2500 Unit(s) IV Push every 6 hours PRN For aPTT between 40 - 57  oxyCODONE    5 mG/acetaminophen 325 mG 1 Tablet(s) Oral every 6 hours PRN Moderate Pain (4 - 6)  oxyCODONE    5 mG/acetaminophen 325 mG 2 Tablet(s) Oral every 6 hours PRN Severe Pain (7 - 10)  polyethylene glycol 3350 17 Gram(s) Oral daily PRN Constipation      Vital Signs Last 24 Hrs  T(C): 36.7 (25 Jun 2019 08:01), Max: 36.7 (25 Jun 2019 05:38)  T(F): 98.1 (25 Jun 2019 08:01), Max: 98.1 (25 Jun 2019 05:38)  HR: 86 (25 Jun 2019 08:01) (80 - 102)  BP: 127/76 (25 Jun 2019 08:01) (117/77 - 143/78)  BP(mean): --  RR: 18 (25 Jun 2019 08:01) (14 - 18)  SpO2: 100% (25 Jun 2019 08:01) (98% - 100%)    PHYSICAL EXAM  General: well developed  well nourished, in no acute distress  Head: atraumatic, normocephalic  ENT: sclera anicteric, buccal mucosa moist  Neck: supple, trachea midline, no palpable masses  CV: S1 S2, regular rate and rhythm  Lungs: clear to auscultation, no wheezes/rhonchi  Abdomen: soft, nontender, bowel sounds present, no palpable hepatosplenomegaly  Extrem: no clubbing/cyanosis/edema  Skin: no significant increased ecchymosis/petechiae  Neuro: alert and oriented X3,  no focal deficits      LABS:             10.3   29.91 )-----------( 406      ( 06-25 @ 06:28 )             32.0                10.5   25.03 )-----------( 414      ( 06-24 @ 06:15 )             32.6                10.0   22.32 )-----------( 384      ( 06-23 @ 05:44 )             30.7       06-25    132<L>  |  95<L>  |  10  ----------------------------<  100<H>  4.0   |  27  |  0.96    Ca    9.9      25 Jun 2019 06:28  Mg     1.9     06-25 06-25 @ 06:28  PT14.2 INR1.25  PTT64.1  06-24 @ 16:16  PT-- INR--  PTT38.2  06-24 @ 06:15  PT-- INR--  PTT60.5  06-23 @ 20:05  PT-- INR--  PTT42.4  06-23 @ 12:55  PT-- INR--  PTT74.2  06-23 @ 05:44  PT-- INR--  PTT54.5  06-22 @ 06:34  PT-- INR--  PTT57.6  06-21 @ 09:49  PT-- INR--  PTT70.1  06-21 @ 03:20  PT-- INR--  PTT66.2  06-20 @ 18:28  PT15.0 INR1.31  PTT32.6      RADIOLOGY & ADDITIONAL STUDIES:    IMPRESSION/RECOMMENDATIONS:

## 2019-06-25 NOTE — PROGRESS NOTE ADULT - ASSESSMENT
68 y/o man w CT c/a/p findings of  left renal upper/mid pole abnormality suspicious for neoplasm, 3+cm left liver mass w additional small lesions, necrotic left paraaortic LN, ale lungs small lesions, post recent [facility] liver bx but nondiagnostic.  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 include malignancy/chronic ds/renal insuff/blood loss.  B12/folate adequate    -post IR liver bx, tolerated well, will f/u Pathology  -CBC stable  -continue Heparin for the partial renal v thrombosis, can change to DOAC when ready for discharge

## 2019-06-26 ENCOUNTER — TRANSCRIPTION ENCOUNTER (OUTPATIENT)
Age: 69
End: 2019-06-26

## 2019-06-26 VITALS
SYSTOLIC BLOOD PRESSURE: 126 MMHG | TEMPERATURE: 98 F | HEART RATE: 90 BPM | RESPIRATION RATE: 17 BRPM | DIASTOLIC BLOOD PRESSURE: 79 MMHG | OXYGEN SATURATION: 100 %

## 2019-06-26 LAB
ANION GAP SERPL CALC-SCNC: 6 MMOL/L — SIGNIFICANT CHANGE UP (ref 5–17)
APTT BLD: 65.7 SEC — HIGH (ref 28.5–37)
BUN SERPL-MCNC: 12 MG/DL — SIGNIFICANT CHANGE UP (ref 7–23)
CALCIUM SERPL-MCNC: 9.9 MG/DL — SIGNIFICANT CHANGE UP (ref 8.5–10.1)
CHLORIDE SERPL-SCNC: 95 MMOL/L — LOW (ref 96–108)
CO2 SERPL-SCNC: 31 MMOL/L — SIGNIFICANT CHANGE UP (ref 22–31)
CREAT SERPL-MCNC: 0.99 MG/DL — SIGNIFICANT CHANGE UP (ref 0.5–1.3)
CULTURE RESULTS: SIGNIFICANT CHANGE UP
CULTURE RESULTS: SIGNIFICANT CHANGE UP
GLUCOSE SERPL-MCNC: 123 MG/DL — HIGH (ref 70–99)
HCT VFR BLD CALC: 31.1 % — LOW (ref 39–50)
HGB BLD-MCNC: 10 G/DL — LOW (ref 13–17)
INR BLD: 1.24 RATIO — HIGH (ref 0.88–1.16)
MAGNESIUM SERPL-MCNC: 1.9 MG/DL — SIGNIFICANT CHANGE UP (ref 1.6–2.6)
MCHC RBC-ENTMCNC: 23.7 PG — LOW (ref 27–34)
MCHC RBC-ENTMCNC: 32.2 GM/DL — SIGNIFICANT CHANGE UP (ref 32–36)
MCV RBC AUTO: 73.7 FL — LOW (ref 80–100)
NRBC # BLD: 0 /100 WBCS — SIGNIFICANT CHANGE UP (ref 0–0)
PLATELET # BLD AUTO: 372 K/UL — SIGNIFICANT CHANGE UP (ref 150–400)
POTASSIUM SERPL-MCNC: 4.2 MMOL/L — SIGNIFICANT CHANGE UP (ref 3.5–5.3)
POTASSIUM SERPL-SCNC: 4.2 MMOL/L — SIGNIFICANT CHANGE UP (ref 3.5–5.3)
PROTHROM AB SERPL-ACNC: 14.2 SEC — HIGH (ref 10–12.9)
RBC # BLD: 4.22 M/UL — SIGNIFICANT CHANGE UP (ref 4.2–5.8)
RBC # FLD: 17.6 % — HIGH (ref 10.3–14.5)
SODIUM SERPL-SCNC: 132 MMOL/L — LOW (ref 135–145)
SPECIMEN SOURCE: SIGNIFICANT CHANGE UP
SPECIMEN SOURCE: SIGNIFICANT CHANGE UP
WBC # BLD: 27.8 K/UL — HIGH (ref 3.8–10.5)
WBC # FLD AUTO: 27.8 K/UL — HIGH (ref 3.8–10.5)

## 2019-06-26 PROCEDURE — 83550 IRON BINDING TEST: CPT

## 2019-06-26 PROCEDURE — 82607 VITAMIN B-12: CPT

## 2019-06-26 PROCEDURE — 86140 C-REACTIVE PROTEIN: CPT

## 2019-06-26 PROCEDURE — 85045 AUTOMATED RETICULOCYTE COUNT: CPT

## 2019-06-26 PROCEDURE — 36415 COLL VENOUS BLD VENIPUNCTURE: CPT

## 2019-06-26 PROCEDURE — 99284 EMERGENCY DEPT VISIT MOD MDM: CPT | Mod: 25

## 2019-06-26 PROCEDURE — 88342 IMHCHEM/IMCYTCHM 1ST ANTB: CPT

## 2019-06-26 PROCEDURE — 99239 HOSP IP/OBS DSCHRG MGMT >30: CPT

## 2019-06-26 PROCEDURE — 83690 ASSAY OF LIPASE: CPT

## 2019-06-26 PROCEDURE — 80053 COMPREHEN METABOLIC PANEL: CPT

## 2019-06-26 PROCEDURE — 82728 ASSAY OF FERRITIN: CPT

## 2019-06-26 PROCEDURE — 96365 THER/PROPH/DIAG IV INF INIT: CPT | Mod: XU

## 2019-06-26 PROCEDURE — 83605 ASSAY OF LACTIC ACID: CPT

## 2019-06-26 PROCEDURE — 74177 CT ABD & PELVIS W/CONTRAST: CPT

## 2019-06-26 PROCEDURE — 82746 ASSAY OF FOLIC ACID SERUM: CPT

## 2019-06-26 PROCEDURE — 85610 PROTHROMBIN TIME: CPT

## 2019-06-26 PROCEDURE — 85027 COMPLETE CBC AUTOMATED: CPT

## 2019-06-26 PROCEDURE — 71260 CT THORAX DX C+: CPT

## 2019-06-26 PROCEDURE — 77012 CT SCAN FOR NEEDLE BIOPSY: CPT

## 2019-06-26 PROCEDURE — 86803 HEPATITIS C AB TEST: CPT

## 2019-06-26 PROCEDURE — 84165 PROTEIN E-PHORESIS SERUM: CPT

## 2019-06-26 PROCEDURE — 80048 BASIC METABOLIC PNL TOTAL CA: CPT

## 2019-06-26 PROCEDURE — 82784 ASSAY IGA/IGD/IGG/IGM EACH: CPT

## 2019-06-26 PROCEDURE — 78803 RP LOCLZJ TUM SPECT 1 AREA: CPT

## 2019-06-26 PROCEDURE — 85652 RBC SED RATE AUTOMATED: CPT

## 2019-06-26 PROCEDURE — 87086 URINE CULTURE/COLONY COUNT: CPT

## 2019-06-26 PROCEDURE — 85730 THROMBOPLASTIN TIME PARTIAL: CPT

## 2019-06-26 PROCEDURE — 84155 ASSAY OF PROTEIN SERUM: CPT

## 2019-06-26 PROCEDURE — 47000 NEEDLE BIOPSY OF LIVER PERQ: CPT

## 2019-06-26 PROCEDURE — 86334 IMMUNOFIX E-PHORESIS SERUM: CPT

## 2019-06-26 PROCEDURE — A9561: CPT

## 2019-06-26 PROCEDURE — 93005 ELECTROCARDIOGRAM TRACING: CPT

## 2019-06-26 PROCEDURE — 78306 BONE IMAGING WHOLE BODY: CPT

## 2019-06-26 PROCEDURE — 83735 ASSAY OF MAGNESIUM: CPT

## 2019-06-26 PROCEDURE — 88341 IMHCHEM/IMCYTCHM EA ADD ANTB: CPT

## 2019-06-26 PROCEDURE — 83540 ASSAY OF IRON: CPT

## 2019-06-26 PROCEDURE — 88307 TISSUE EXAM BY PATHOLOGIST: CPT

## 2019-06-26 PROCEDURE — 81001 URINALYSIS AUTO W/SCOPE: CPT

## 2019-06-26 PROCEDURE — 87040 BLOOD CULTURE FOR BACTERIA: CPT

## 2019-06-26 RX ORDER — ASCORBIC ACID 60 MG
1 TABLET,CHEWABLE ORAL
Qty: 0 | Refills: 0 | DISCHARGE

## 2019-06-26 RX ORDER — FERROUS SULFATE 325(65) MG
1 TABLET ORAL
Qty: 0 | Refills: 0 | DISCHARGE

## 2019-06-26 RX ORDER — DOCUSATE SODIUM 100 MG
1 CAPSULE ORAL
Qty: 0 | Refills: 0 | DISCHARGE
Start: 2019-06-26

## 2019-06-26 RX ORDER — LISINOPRIL 2.5 MG/1
1 TABLET ORAL
Qty: 0 | Refills: 0 | DISCHARGE

## 2019-06-26 RX ORDER — TAMSULOSIN HYDROCHLORIDE 0.4 MG/1
1 CAPSULE ORAL
Qty: 10 | Refills: 0
Start: 2019-06-26 | End: 2019-07-05

## 2019-06-26 RX ORDER — CEFPODOXIME PROXETIL 100 MG
1 TABLET ORAL
Qty: 0 | Refills: 0 | DISCHARGE

## 2019-06-26 RX ORDER — APIXABAN 2.5 MG/1
2 TABLET, FILM COATED ORAL
Qty: 120 | Refills: 0
Start: 2019-06-26 | End: 2019-07-25

## 2019-06-26 RX ORDER — LISINOPRIL 2.5 MG/1
1 TABLET ORAL
Qty: 0 | Refills: 0 | DISCHARGE
Start: 2019-06-26

## 2019-06-26 RX ORDER — APIXABAN 2.5 MG/1
10 TABLET, FILM COATED ORAL EVERY 12 HOURS
Refills: 0 | Status: DISCONTINUED | OUTPATIENT
Start: 2019-06-26 | End: 2019-06-26

## 2019-06-26 RX ADMIN — OXYCODONE AND ACETAMINOPHEN 2 TABLET(S): 5; 325 TABLET ORAL at 08:14

## 2019-06-26 RX ADMIN — HEPARIN SODIUM 1200 UNIT(S)/HR: 5000 INJECTION INTRAVENOUS; SUBCUTANEOUS at 07:39

## 2019-06-26 RX ADMIN — LISINOPRIL 10 MILLIGRAM(S): 2.5 TABLET ORAL at 05:36

## 2019-06-26 RX ADMIN — OXYCODONE AND ACETAMINOPHEN 2 TABLET(S): 5; 325 TABLET ORAL at 08:45

## 2019-06-26 RX ADMIN — OXYCODONE AND ACETAMINOPHEN 2 TABLET(S): 5; 325 TABLET ORAL at 00:58

## 2019-06-26 RX ADMIN — Medication 100 MILLIGRAM(S): at 05:36

## 2019-06-26 NOTE — PROGRESS NOTE ADULT - ASSESSMENT
Met Ca, final path liver biopsy pending, however with renal; lesion left most likely Renal in orign, will need multimodal management, patient aware, Has urologist (Juan)

## 2019-06-26 NOTE — DISCHARGE NOTE NURSING/CASE MANAGEMENT/SOCIAL WORK - NSDCFUADDAPPT_GEN_ALL_CORE_FT
Urology to see Dr. Martinez Chief of Urology/ [facility] . dr colon hematologist for final liver biopsy result  in 1wk . repeat cbc and electrolyte with in 1wk with your pmd office.

## 2019-06-26 NOTE — PROGRESS NOTE ADULT - PROVIDER SPECIALTY LIST ADULT
Heme/Onc
Hospitalist
Urology
Urology
Heme/Onc
Urology
Heme/Onc

## 2019-06-26 NOTE — PROGRESS NOTE ADULT - SUBJECTIVE AND OBJECTIVE BOX
Interval History:    Chart reviewed and events noted;   no pain     MEDICATIONS  (STANDING):  docusate sodium 100 milliGRAM(s) Oral two times a day  heparin  Infusion.  Unit(s)/Hr (12 mL/Hr) IV Continuous <Continuous>  lisinopril 10 milliGRAM(s) Oral daily  ondansetron Injectable 4 milliGRAM(s) IV Push once  senna 2 Tablet(s) Oral at bedtime  tamsulosin 0.4 milliGRAM(s) Oral at bedtime    MEDICATIONS  (PRN):  acetaminophen   Tablet .. 650 milliGRAM(s) Oral every 6 hours PRN Temp greater or equal to 38C (100.4F), Mild Pain (1 - 3)  heparin  Injectable 5500 Unit(s) IV Push every 6 hours PRN For aPTT less than 40  heparin  Injectable 2500 Unit(s) IV Push every 6 hours PRN For aPTT between 40 - 57  oxyCODONE    5 mG/acetaminophen 325 mG 1 Tablet(s) Oral every 6 hours PRN Moderate Pain (4 - 6)  oxyCODONE    5 mG/acetaminophen 325 mG 2 Tablet(s) Oral every 6 hours PRN Severe Pain (7 - 10)  polyethylene glycol 3350 17 Gram(s) Oral daily PRN Constipation      Vital Signs Last 24 Hrs  T(C): 36.6 (26 Jun 2019 08:03), Max: 37.2 (25 Jun 2019 16:20)  T(F): 97.8 (26 Jun 2019 08:03), Max: 99 (25 Jun 2019 16:20)  HR: 90 (26 Jun 2019 08:03) (85 - 97)  BP: 126/79 (26 Jun 2019 08:03) (119/68 - 126/79)  BP(mean): --  RR: 17 (26 Jun 2019 08:03) (16 - 17)  SpO2: 100% (26 Jun 2019 08:03) (97% - 100%)    PHYSICAL EXAM  General: adult in NAD  HEENT: clear oropharynx, anicteric sclera, pink conjunctivae  Neck: supple  CV: normal S1S2 with no murmur rubs or gallops  Lungs: clear to auscultation, no wheezes, no rales  Abdomen: soft non-tender non-distended, no hepato/splenomegaly  Ext: no clubbing cyanosis or edema  Skin: no rashes and no petichiae  Neuro: alert and oriented X3 no focal deficits      LABS:                        10.0   27.80 )-----------( 372      ( 26 Jun 2019 06:12 )             31.1     06-26    132<L>  |  95<L>  |  12  ----------------------------<  123<H>  4.2   |  31  |  0.99    Ca    9.9      26 Jun 2019 06:12  Mg     1.9     06-26      PT/INR - ( 26 Jun 2019 06:12 )   PT: 14.2 sec;   INR: 1.24 ratio         PTT - ( 26 Jun 2019 06:12 )  PTT:65.7 sec      RADIOLOGY & ADDITIONAL STUDIES:< from: NM Bone Imaging Total (06.25.19 @ 14:59) >  EXAM:  NM BONE SPECT                          EXAM:  NM BONE IMG WHOLE BODY                            PROCEDURE DATE:  06/25/2019          INTERPRETATION:  CLINICAL INFORMATION: 69 year-old male with left renal   mass, hepatic lesion and abdominalpain, referred for further evaluation    RADIOPHARMACEUTICAL: 20 mCi Tc-99m HDP, I.V.     TECHNIQUE:  Whole-body planar images were obtained in the anterior and   posterior projections approximately 2-3 hours after tracer injection.    SPECT of the pelvis was also obtained.    COMPARISON: No previous bone scan for comparison.    FINDINGS: There is a focus of mildly increased uptake in the right   superior pubic ramus, best seen on SPECT. There are degenerative changes   in the major joints. Thereis physiologic tracer distribution in the   remainder of the visualized skeleton.    The right kidney is visualized. The left kidney is not well defined.    IMPRESSION: Bone scan demonstrates:    Focus of mildly increased uptake in the right superiorpubic ramus,   nonspecific, but worrisome for osseous metastasis in the proper clinical   setting.    Degenerative disease in the major joints.      < end of copied text >  < from: CT Chest w/ IV Cont (06.20.19 @ 19:29) >  EXAM:  CT ABDOMEN AND PELVIS IC                          EXAM:  CT CHEST IC                            PROCEDURE DATE:  06/20/2019          INTERPRETATION:  CT CHEST/ABDOMEN/PELVIS:     CLINICAL INFORMATION: Upper abdominal pain and recent liver biopsy    COMPARISON: None available.    PROCEDURE:  Using multislice helical CT, 2.5 mm sections were obtained   from the thoracic inlet through the ischial tuberosities with the   administration of intravenous contrast. Oral contrast was not   administered. 95 cc of Omnipaque 350 were administered.    Coronal and sagittal reformations were performed by  CT technologist and   made available for review.    FINDINGS:  The visualized structures of the lower neck are unremarkable.   The visualized aorta is of normal course and caliber. The heart is not   enlarged. There is no evidence of pericardial effusion.    There is no evidence of axillary, hilar, or mediastinal lymphadenopathy.    No focal lung consolidations identified. No evidence of pleural effusion   or pneumothorax. No pulmonary nodules identified. There are areas of   subpleural scarring in the upper lobes. There are several tiny left   bilateral upper lobe pulmonary nodules as well. Additionally there is are   larger subpleural nodules in the lower lobes with the largest seen in the   lateral anterior left lower lobe measuring 2 mm.      The liver is of normal contour. there is a well-circumscribed hypodense   lesion in the anterior aspect of the lateral segment of the left lobe   measuring 3.2 x 2.2 cm. Please correlate clinically as to whether this   was the area of biopsy. There is an additional tiny hypodensities in the   liver at junction of the right and left lobes on axial image 88 of series   2. The gallbladder is present. No evidence of intra or extrahepatic   biliary dilatation.     The pancreas, spleen, adrenal glands, and right kidney are grossly   unremarkable. The left kidney demonstrates abnormal appearance in the   upper to mid pole with heterogeneous areas of low attenuation. It is   suspicious for renal neoplasm. There is surrounding perinephric   stranding. Differential diagnosis includes pyelonephritis. No evidence of    nephrolithiasis.The bladder is unremarkable.    There are enlarged necrotic appearing upper abdominal lymph nodes at the   level of the left kidney which may be related to metastatic disease if   this kidney process is neoplastic in nature. A left renal artery is   surrounded by lymphadenopathy and appears narrowed. There is suggestion   of partially occlusive thrombus in the left renal vein The aorta is not   aneurysmal.    There is no evidence of bowel obstruction. There is no evidence of   pneumoperitoneum . There is a right inguinal hernia obtaining loops of   colon without obstruction The prostate gland is markedly enlarged   indenting the bladder base. There is a small amount of free pelvic fluid    The visualized osseous structures demonstrate degenerative changes.   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    IMPRESSION: Significant abnormal appearance of the left kidney with   multiple heterogeneous areas of low attenuation involving the upper and   midpole which may represent malignancy. There are necrotic lymph nodes in   the left para-aortic location at the level of the kidney and below the   level of the kidney. Adenopathy surrounds the left renal artery. The left   renal vein demonstrates question of partially occlusive thrombus.   Nonspecific scattered tiny pulmonary nodules. Lower lobe subpleural   nodules measure up to 6 mm  Nonobstructing right inguinal hernia containing ascending colon loop  Markedly enlarged prostate gland  Results were discussed with Dr. Wooten at 7:50 PM on 6/20/2019.    < end of copied text >      IMPRESSION/RECOMMENDATIONS: Interval History:    Chart reviewed and events noted;   no pain   tolerating PO    MEDICATIONS  (STANDING):  docusate sodium 100 milliGRAM(s) Oral two times a day  heparin  Infusion.  Unit(s)/Hr (12 mL/Hr) IV Continuous <Continuous>  lisinopril 10 milliGRAM(s) Oral daily  ondansetron Injectable 4 milliGRAM(s) IV Push once  senna 2 Tablet(s) Oral at bedtime  tamsulosin 0.4 milliGRAM(s) Oral at bedtime    MEDICATIONS  (PRN):  acetaminophen   Tablet .. 650 milliGRAM(s) Oral every 6 hours PRN Temp greater or equal to 38C (100.4F), Mild Pain (1 - 3)  heparin  Injectable 5500 Unit(s) IV Push every 6 hours PRN For aPTT less than 40  heparin  Injectable 2500 Unit(s) IV Push every 6 hours PRN For aPTT between 40 - 57  oxyCODONE    5 mG/acetaminophen 325 mG 1 Tablet(s) Oral every 6 hours PRN Moderate Pain (4 - 6)  oxyCODONE    5 mG/acetaminophen 325 mG 2 Tablet(s) Oral every 6 hours PRN Severe Pain (7 - 10)  polyethylene glycol 3350 17 Gram(s) Oral daily PRN Constipation      Vital Signs Last 24 Hrs  T(C): 36.6 (26 Jun 2019 08:03), Max: 37.2 (25 Jun 2019 16:20)  T(F): 97.8 (26 Jun 2019 08:03), Max: 99 (25 Jun 2019 16:20)  HR: 90 (26 Jun 2019 08:03) (85 - 97)  BP: 126/79 (26 Jun 2019 08:03) (119/68 - 126/79)  BP(mean): --  RR: 17 (26 Jun 2019 08:03) (16 - 17)  SpO2: 100% (26 Jun 2019 08:03) (97% - 100%)    PHYSICAL EXAM  General: adult in NAD  HEENT: clear oropharynx, anicteric sclera, pink conjunctivae  Neck: supple  CV: normal S1S2 with no murmur rubs or gallops  Lungs: clear to auscultation, no wheezes, no rales  Abdomen: soft non-tender non-distended, no hepato/splenomegaly  Ext: no clubbing cyanosis or edema  Skin: no rashes and no petichiae  Neuro: alert and oriented X3 no focal deficits      LABS:                        10.0   27.80 )-----------( 372      ( 26 Jun 2019 06:12 )             31.1     06-26    132<L>  |  95<L>  |  12  ----------------------------<  123<H>  4.2   |  31  |  0.99    Ca    9.9      26 Jun 2019 06:12  Mg     1.9     06-26      PT/INR - ( 26 Jun 2019 06:12 )   PT: 14.2 sec;   INR: 1.24 ratio         PTT - ( 26 Jun 2019 06:12 )  PTT:65.7 sec      RADIOLOGY & ADDITIONAL STUDIES:< from: NM Bone Imaging Total (06.25.19 @ 14:59) >  EXAM:  NM BONE SPECT                          EXAM:  NM BONE IMG WHOLE BODY                            PROCEDURE DATE:  06/25/2019          INTERPRETATION:  CLINICAL INFORMATION: 69 year-old male with left renal   mass, hepatic lesion and abdominalpain, referred for further evaluation    RADIOPHARMACEUTICAL: 20 mCi Tc-99m HDP, I.V.     TECHNIQUE:  Whole-body planar images were obtained in the anterior and   posterior projections approximately 2-3 hours after tracer injection.    SPECT of the pelvis was also obtained.    COMPARISON: No previous bone scan for comparison.    FINDINGS: There is a focus of mildly increased uptake in the right   superior pubic ramus, best seen on SPECT. There are degenerative changes   in the major joints. Thereis physiologic tracer distribution in the   remainder of the visualized skeleton.    The right kidney is visualized. The left kidney is not well defined.    IMPRESSION: Bone scan demonstrates:    Focus of mildly increased uptake in the right superiorpubic ramus,   nonspecific, but worrisome for osseous metastasis in the proper clinical   setting.    Degenerative disease in the major joints.      < end of copied text >  < from: CT Chest w/ IV Cont (06.20.19 @ 19:29) >  EXAM:  CT ABDOMEN AND PELVIS IC                          EXAM:  CT CHEST IC                            PROCEDURE DATE:  06/20/2019          INTERPRETATION:  CT CHEST/ABDOMEN/PELVIS:     CLINICAL INFORMATION: Upper abdominal pain and recent liver biopsy    COMPARISON: None available.    PROCEDURE:  Using multislice helical CT, 2.5 mm sections were obtained   from the thoracic inlet through the ischial tuberosities with the   administration of intravenous contrast. Oral contrast was not   administered. 95 cc of Omnipaque 350 were administered.    Coronal and sagittal reformations were performed by  CT technologist and   made available for review.    FINDINGS:  The visualized structures of the lower neck are unremarkable.   The visualized aorta is of normal course and caliber. The heart is not   enlarged. There is no evidence of pericardial effusion.    There is no evidence of axillary, hilar, or mediastinal lymphadenopathy.    No focal lung consolidations identified. No evidence of pleural effusion   or pneumothorax. No pulmonary nodules identified. There are areas of   subpleural scarring in the upper lobes. There are several tiny left   bilateral upper lobe pulmonary nodules as well. Additionally there is are   larger subpleural nodules in the lower lobes with the largest seen in the   lateral anterior left lower lobe measuring 2 mm.      The liver is of normal contour. there is a well-circumscribed hypodense   lesion in the anterior aspect of the lateral segment of the left lobe   measuring 3.2 x 2.2 cm. Please correlate clinically as to whether this   was the area of biopsy. There is an additional tiny hypodensities in the   liver at junction of the right and left lobes on axial image 88 of series   2. The gallbladder is present. No evidence of intra or extrahepatic   biliary dilatation.     The pancreas, spleen, adrenal glands, and right kidney are grossly   unremarkable. The left kidney demonstrates abnormal appearance in the   upper to mid pole with heterogeneous areas of low attenuation. It is   suspicious for renal neoplasm. There is surrounding perinephric   stranding. Differential diagnosis includes pyelonephritis. No evidence of    nephrolithiasis.The bladder is unremarkable.    There are enlarged necrotic appearing upper abdominal lymph nodes at the   level of the left kidney which may be related to metastatic disease if   this kidney process is neoplastic in nature. A left renal artery is   surrounded by lymphadenopathy and appears narrowed. There is suggestion   of partially occlusive thrombus in the left renal vein The aorta is not   aneurysmal.    There is no evidence of bowel obstruction. There is no evidence of   pneumoperitoneum . There is a right inguinal hernia obtaining loops of   colon without obstruction The prostate gland is markedly enlarged   indenting the bladder base. There is a small amount of free pelvic fluid    The visualized osseous structures demonstrate degenerative changes.   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    IMPRESSION: Significant abnormal appearance of the left kidney with   multiple heterogeneous areas of low attenuation involving the upper and   midpole which may represent malignancy. There are necrotic lymph nodes in   the left para-aortic location at the level of the kidney and below the   level of the kidney. Adenopathy surrounds the left renal artery. The left   renal vein demonstrates question of partially occlusive thrombus.   Nonspecific scattered tiny pulmonary nodules. Lower lobe subpleural   nodules measure up to 6 mm  Nonobstructing right inguinal hernia containing ascending colon loop  Markedly enlarged prostate gland  Results were discussed with Dr. Wooten at 7:50 PM on 6/20/2019.    < end of copied text >      IMPRESSION/RECOMMENDATIONS:

## 2019-06-26 NOTE — PROGRESS NOTE ADULT - SUBJECTIVE AND OBJECTIVE BOX
Gu Progress Note:  Met Ca, most likely Renal given abnormality left kidney and renal vein thrombus/necrotic c lymph nodes  PAST MEDICAL & SURGICAL HISTORY:  Hypertension  Achilles rupture  History of liver biopsy        MEDICATIONS  (STANDING):  docusate sodium 100 milliGRAM(s) Oral two times a day  heparin  Infusion.  Unit(s)/Hr (12 mL/Hr) IV Continuous <Continuous>  lisinopril 10 milliGRAM(s) Oral daily  ondansetron Injectable 4 milliGRAM(s) IV Push once  senna 2 Tablet(s) Oral at bedtime  tamsulosin 0.4 milliGRAM(s) Oral at bedtime    MEDICATIONS  (PRN):  acetaminophen   Tablet .. 650 milliGRAM(s) Oral every 6 hours PRN Temp greater or equal to 38C (100.4F), Mild Pain (1 - 3)  heparin  Injectable 5500 Unit(s) IV Push every 6 hours PRN For aPTT less than 40  heparin  Injectable 2500 Unit(s) IV Push every 6 hours PRN For aPTT between 40 - 57  oxyCODONE    5 mG/acetaminophen 325 mG 1 Tablet(s) Oral every 6 hours PRN Moderate Pain (4 - 6)  oxyCODONE    5 mG/acetaminophen 325 mG 2 Tablet(s) Oral every 6 hours PRN Severe Pain (7 - 10)  polyethylene glycol 3350 17 Gram(s) Oral daily PRN Constipation      Allergies    No Known Allergies    Intolerances            FAMILY HISTORY:  FHx: cancer      Vital Signs Last 24 Hrs  T(C): 36.6 (26 Jun 2019 08:03), Max: 37.2 (25 Jun 2019 16:20)  T(F): 97.8 (26 Jun 2019 08:03), Max: 99 (25 Jun 2019 16:20)  HR: 90 (26 Jun 2019 08:03) (85 - 97)  BP: 126/79 (26 Jun 2019 08:03) (119/68 - 126/79)  BP(mean): --  RR: 17 (26 Jun 2019 08:03) (16 - 17)  SpO2: 100% (26 Jun 2019 08:03) (97% - 100%)    PHYSICAL EXAM:    Constitutional: NAD, well-developed    Asymptomatic, AO  Abd: BS+, soft, mild tenderness No CVAT  : Normal  circumcised phallus, patent  meatus, bilateral descended testes,Extremities: No peripheral edema    LABS:                        10.0   27.80 )-----------( 372      ( 26 Jun 2019 06:12 )             31.1     06-26    132<L>  |  95<L>  |  12  ----------------------------<  123<H>  4.2   |  31  |  0.99    Ca    9.9      26 Jun 2019 06:12  Mg     1.9     06-26      PT/INR - ( 26 Jun 2019 06:12 )   PT: 14.2 sec;   INR: 1.24 ratio         PTT - ( 26 Jun 2019 06:12 )  PTT:65.7 sec    Urine Culture:   Hemoglobin: 10.0 g/dL (06-26 @ 06:12)  Hematocrit: 31.1 % (06-26 @ 06:12)  Hemoglobin: 10.3 g/dL (06-25 @ 06:28)  Hematocrit: 32.0 % (06-25 @ 06:28)  Surgical Pathology Report (06.24.19 @ 13:45)    Surgical Pathology Report:   ACCESSION No:  [id number]    ZULEMA VERA                       1        Surgical Final Report          Final Diagnosis    "Liver mass," biopsy:  Poorly differentiated carcinoma.  Immunohistochemical stains for further evaluation as to  primary site of origin of  this tumor are pending; results will be reported separately  in an addendum to  follow.    Verified by: Zhao Ramirez M.D.  (Electronic Signature)  Reported on: 06/25/19 11:54 EDT, [address] [place] [address]  _________________________________________________________________    Comment  Key portions of this case were reviewed in intradepartmental  consultation with Dr. TREVOR Zarate, with concurrence of diagnosis.    Clinical History  Liver mass, left kidney mass  Procedure: CT guided liver biopsy    Specimen(s) Submitted  Liver mass    Gross Description  The specimen is received in formalin and the specimen container  is labeled: Liver mass.  It consists of a 2.5 cm in length, 0.1  cm diameter pink-tan soft tissue core.  Entirely submitted.  One  cassette.    In addition to other data that may appear on the specimen  container, the label has been inspected to confirm the presence  of the patient's name and date of birth.  Sarah Mendoza, Mon  June 24, 2019 02:12 PM    Perioperative Diagnosis  Liver mass    Postoperative Diagnosis  Same        RADIOLOGY & ADDITIONAL STUDIES:  < from: CT Chest w/ IV Cont (06.20.19 @ 19:29) >  EXAM:  CT ABDOMEN AND PELVIS IC                          EXAM:  CT CHEST IC                            PROCEDURE DATE:  06/20/2019          INTERPRETATION:  CT CHEST/ABDOMEN/PELVIS:     CLINICAL INFORMATION: Upper abdominal pain and recent liver biopsy    COMPARISON: None available.    PROCEDURE:  Using multislice helical CT, 2.5 mm sections were obtained   from the thoracic inlet through the ischial tuberosities with the   administration of intravenous contrast. Oral contrast was not   administered. 95 cc of Omnipaque 350 were administered.    Coronal and sagittal reformations were performed by  CT technologist and   made available for review.    FINDINGS:  The visualized structures of the lower neck are unremarkable.   The visualized aorta is of normal course and caliber. The heart is not   enlarged. There is no evidence of pericardial effusion.    There is no evidence of axillary, hilar, or mediastinal lymphadenopathy.    No focal lung consolidations identified. No evidence of pleural effusion   or pneumothorax. No pulmonary nodules identified. There are areas of   subpleural scarring in the upper lobes. There are several tiny left   bilateral upper lobe pulmonary nodules as well. Additionally there is are   larger subpleural nodules in the lower lobes with the largest seen in the   lateral anterior left lower lobe measuring 2 mm.      The liver is of normal contour. there is a well-circumscribed hypodense   lesion in the anterior aspect of the lateral segment of the left lobe   measuring 3.2 x 2.2 cm. Please correlate clinically as to whether this   was the area of biopsy. There is an additional tiny hypodensities in the   liver at junction of the right and left lobes on axial image 88 of series   2. The gallbladder is present. No evidence of intra or extrahepatic   biliary dilatation.     The pancreas, spleen, adrenal glands, and right kidney are grossly   unremarkable. The left kidney demonstrates abnormal appearance in the   upper to mid pole with heterogeneous areas of low attenuation. It is   suspicious for renal neoplasm. There is surrounding perinephric   stranding. Differential diagnosis includes pyelonephritis. No evidence of    nephrolithiasis.The bladder is unremarkable.    There are enlarged necrotic appearing upper abdominal lymph nodes at the   level of the left kidney which may be related to metastatic disease if   this kidney process is neoplastic in nature. A left renal artery is   surrounded by lymphadenopathy and appears narrowed. There is suggestion   of partially occlusive thrombus in the left renal vein The aorta is not   aneurysmal.    There is no evidence of bowel obstruction. There is no evidence of   pneumoperitoneum . There is a right inguinal hernia obtaining loops of   colon without obstruction The prostate gland is markedly enlarged   indenting the bladder base. There is a small amount of free pelvic fluid    The visualized osseous structures demonstrate degenerative changes.   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    IMPRESSION: Significant abnormal appearance of the left kidney with   multiple heterogeneous areas of low attenuation involving the upper and   midpole which may represent malignancy. There are necrotic lymph nodes in   the left para-aortic location at the level of the kidney and below the   level of the kidney. Adenopathy surrounds the left renal artery. The left   renal vein demonstrates question of partially occlusive thrombus.   Nonspecific scattered tiny pulmonary nodules. Lower lobe subpleural   nodules measure up to 6 mm  Nonobstructing right inguinal hernia containing ascending colon loop  Markedly enlarged prostate gland  Results were discussed with Dr. Wooten at 7:50 PM on 6/20/2019.                              JONNIE CONNOLLY M.D.,ATTENDING RADIOLOGIST  This document has been electronically signed. Jun 20 2019  7:56PM              < end of copied text >  < from: CT Chest w/ IV Cont (06.20.19 @ 19:29) >  EXAM:  CT ABDOMEN AND PELVIS IC                          EXAM:  CT CHEST IC                            PROCEDURE DATE:  06/20/2019          INTERPRETATION:  CT CHEST/ABDOMEN/PELVIS:     CLINICAL INFORMATION: Upper abdominal pain and recent liver biopsy    COMPARISON: None available.    PROCEDURE:  Using multislice helical CT, 2.5 mm sections were obtained   from the thoracic inlet through the ischial tuberosities with the   administration of intravenous contrast. Oral contrast was not   administered. 95 cc of Omnipaque 350 were administered.    Coronal and sagittal reformations were performed by  CT technologist and   made available for review.    FINDINGS:  The visualized structures of the lower neck are unremarkable.   The visualized aorta is of normal course and caliber. The heart is not   enlarged. There is no evidence of pericardial effusion.    There is no evidence of axillary, hilar, or mediastinal lymphadenopathy.    No focal lung consolidations identified. No evidence of pleural effusion   or pneumothorax. No pulmonary nodules identified. There are areas of   subpleural scarring in the upper lobes. There are several tiny left   bilateral upper lobe pulmonary nodules as well. Additionally there is are   larger subpleural nodules in the lower lobes with the largest seen in the   lateral anterior left lower lobe measuring 2 mm.      The liver is of normal contour. there is a well-circumscribed hypodense   lesion in the anterior aspect of the lateral segment of the left lobe   measuring 3.2 x 2.2 cm. Please correlate clinically as to whether this   was the area of biopsy. There is an additional tiny hypodensities in the   liver at junction of the right and left lobes on axial image 88 of series   2. The gallbladder is present. No evidence of intra or extrahepatic   biliary dilatation.     The pancreas, spleen, adrenal glands, and right kidney are grossly   unremarkable. The left kidney demonstrates abnormal appearance in the   upper to mid pole with heterogeneous areas of low attenuation. It is   suspicious for renal neoplasm. There is surrounding perinephric   stranding. Differential diagnosis includes pyelonephritis. No evidence of    nephrolithiasis.The bladder is unremarkable.    There are enlarged necrotic appearing upper abdominal lymph nodes at the   level of the left kidney which may be related to metastatic disease if   this kidney process is neoplastic in nature. A left renal artery is   surrounded by lymphadenopathy and appears narrowed. There is suggestion   of partially occlusive thrombus in the left renal vein The aorta is not   aneurysmal.    There is no evidence of bowel obstruction. There is no evidence of   pneumoperitoneum . There is a right inguinal hernia obtaining loops of   colon without obstruction The prostate gland is markedly enlarged   indenting the bladder base. There is a small amount of free pelvic fluid    The visualized osseous structures demonstrate degenerative changes.   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    IMPRESSION: Significant abnormal appearance of the left kidney with   multiple heterogeneous areas of low attenuation involving the upper and   midpole which may represent malignancy. There are necrotic lymph nodes in   the left para-aortic location at the level of the kidney and below the   level of the kidney. Adenopathy surrounds the left renal artery. The left   renal vein demonstrates question of partially occlusive thrombus.   Nonspecific scattered tiny pulmonary nodules. Lower lobe subpleural   nodules measure up to 6 mm  Nonobstructing right inguinal hernia containing ascending colon loop  Markedly enlarged prostate gland  Results were discussed with Dr. Wooten at 7:50 PM on 6/20/2019.                              JONNIE CONNOLLY M.D.,ATTENDING RADIOLOGIST  This document has been electronically signed. Jun 20 2019  7:56PM              < end of copied text >

## 2019-06-26 NOTE — DISCHARGE NOTE NURSING/CASE MANAGEMENT/SOCIAL WORK - NSDCDPATPORTLINK_GEN_ALL_CORE
You can access the [facility] Patient Portal, offered by [facility], by registering with the following website: [email or web address]

## 2019-06-26 NOTE — PROGRESS NOTE ADULT - ASSESSMENT
70 y/o man w CT c/a/p findings of  left renal upper/mid pole abnormality suspicious for neoplasm, 3+cm left liver mass w additional small lesions, necrotic left paraaortic LN, ale lungs small lesions, post recent Good Teddy liver bx but nondiagnostic.  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 include malignancy/chronic ds/renal insuff/blood loss.  B12/folate adequate    -post IR liver bx 6/25/19    Poorly differentiated carcinoma.Immunohistochemical stains for further evaluation as to  primary site of origin ofthis tumor are pending         will f/u final  Pathology  -CBC stable  -continue Heparin/ Lovenox  for the partial renal v thrombosis, can change to DOAC when ready for discharge 70 y/o man w CT c/a/p findings of  left renal upper/mid pole abnormality suspicious for neoplasm, 3+cm left liver mass w additional small lesions, necrotic left paraaortic LN, ale lungs small lesions, post recent Good Teddy liver bx but nondiagnostic.  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 include malignancy/chronic ds/renal insuff/blood loss.  B12/folate adequate    -post IR liver bx 6/25/19    Poorly differentiated carcinoma.Immunohistochemical stains for further evaluation as to  primary site of origin ofthis tumor are pending  diagnosis was d/w pt and his wife via Phone   plan of care discussed in length        will f/u final  Pathology oupt for further plan and Tx( in 1 week)    change to DOAC on  discharge( Xarelto , Eliquis)    stable for d/c from hem standpoint

## 2019-06-27 ENCOUNTER — APPOINTMENT (OUTPATIENT)
Dept: UROLOGY | Facility: CLINIC | Age: 69
End: 2019-06-27
Payer: MEDICARE

## 2019-06-27 ENCOUNTER — LABORATORY RESULT (OUTPATIENT)
Age: 69
End: 2019-06-27

## 2019-06-27 VITALS
SYSTOLIC BLOOD PRESSURE: 120 MMHG | RESPIRATION RATE: 16 BRPM | HEIGHT: 69 IN | DIASTOLIC BLOOD PRESSURE: 79 MMHG | TEMPERATURE: 97.2 F | HEART RATE: 115 BPM | WEIGHT: 140 LBS | BODY MASS INDEX: 20.73 KG/M2

## 2019-06-27 DIAGNOSIS — Z78.9 OTHER SPECIFIED HEALTH STATUS: ICD-10-CM

## 2019-06-27 DIAGNOSIS — Z00.00 ENCOUNTER FOR GENERAL ADULT MEDICAL EXAMINATION W/OUT ABNORMAL FINDINGS: ICD-10-CM

## 2019-06-27 DIAGNOSIS — Z80.0 FAMILY HISTORY OF MALIGNANT NEOPLASM OF DIGESTIVE ORGANS: ICD-10-CM

## 2019-06-27 DIAGNOSIS — Z80.3 FAMILY HISTORY OF MALIGNANT NEOPLASM OF BREAST: ICD-10-CM

## 2019-06-27 PROBLEM — I10 ESSENTIAL (PRIMARY) HYPERTENSION: Chronic | Status: ACTIVE | Noted: 2019-06-20

## 2019-06-27 PROCEDURE — 99204 OFFICE O/P NEW MOD 45 MIN: CPT

## 2019-06-27 NOTE — ADDENDUM
[FreeTextEntry1] : Entered by Cynthia Carmona, acting as scribe for Dr. Joel Crabtree.\par \par The documentation recorded by the scribe accurately reflects the service I personally performed and the decisions made by me.

## 2019-06-27 NOTE — LETTER BODY
[FreeTextEntry1] : Reason for visit: Abnormal imaging. Kidney lesion.\par \par This is a 69 year-old retired gentleman with abnormal abdominal imaging. He was accompanied by his family today. Patient underwent CT scan at [facility], which demonstrated possible liver cancer and kidney cancer. His liver biopsy demonstrated poorly differentiated carcinoma. He previously had prostate MRI demonstrating cyst and enlarged prostate. His bone scan was abnormal. Patient reports of previous abdominal pain radiating to bilateral lumbar. The patient denies any interference of function. The patient is entirely asymptomatic. All other review of systems are negative. He has family history of stomach cancer. He has no previous surgical history. Past medical history, family history and social history were inquired and were noncontributory to current condition. The patient does not use tobacco or drink alcohol. Medications and allergies were reviewed. He has no known allergies to medication. \par \par On examination, the patient is a elderly-appearing gentleman in no acute distress. He is alert and oriented follows commands. He has normal mood and affect. He is normocephalic. Neck is supple. Respirations are unlabored. His abdomen is soft and nontender. Bladder is nonpalpable. No CVA tenderness. Neurologically he is grossly intact. No peripheral edema. Skin without gross abnormality. He has normal male external genitalia. Normal meatus. Bilateral testes are descended intrascrotally and normal to palpation. On rectal examination, there is normal sphincter tone. The prostate is clinically benign without focal induration or nodularity. Patient has large right inguinal hernia.\par \par His CT scan demonstrated left kidney mass and liver mass.\par \par Assessment: Abnormal urinary imaging. Left kidney mass.\par \par I counseled the patient. I recommend he obtained MRI to evaluate for kidney mass. I counseled the patient regarding the procedure. I encourage the patient to consult with medical oncologist regarding further treatment. The risks and benefits were discussed. Alternatives were given. I answered the patient questions. The patient will obtain CMP and blood test in preparations for the procedure. The patient will follow-up as directed and will contact me with any questions or concerns. Thank you for the opportunity to participate in the care of Mr. VERA. I will keep you updated on his progress. \par \par Plan: CMP. blood test. MRI w/o cont. Consult with oncologist. Follow up as directed.

## 2019-06-27 NOTE — PHYSICAL EXAM
[General Appearance - Well Nourished] : well nourished [Normal Appearance] : normal appearance [General Appearance - Well Developed] : well developed [Well Groomed] : well groomed [General Appearance - In No Acute Distress] : no acute distress [Edema] : no peripheral edema [Respiration, Rhythm And Depth] : normal respiratory rhythm and effort [Abdomen Tenderness] : non-tender [Abdomen Soft] : soft [Exaggerated Use Of Accessory Muscles For Inspiration] : no accessory muscle use [Urethral Meatus] : meatus normal [Costovertebral Angle Tenderness] : no ~M costovertebral angle tenderness [Testes Mass (___cm)] : there were no testicular masses [Urinary Bladder Findings] : the bladder was normal on palpation [Scrotum] : the scrotum was normal [No Prostate Nodules] : no prostate nodules [] : no rash [Normal Station and Gait] : the gait and station were normal for the patient's age [No Focal Deficits] : no focal deficits [Affect] : the affect was normal [Oriented To Time, Place, And Person] : oriented to person, place, and time [Mood] : the mood was normal [No Palpable Adenopathy] : no palpable adenopathy [Not Anxious] : not anxious

## 2019-06-28 ENCOUNTER — OUTPATIENT (OUTPATIENT)
Dept: OUTPATIENT SERVICES | Facility: HOSPITAL | Age: 69
LOS: 1 days | Discharge: HOME | End: 2019-06-28

## 2019-06-28 DIAGNOSIS — S86.019A STRAIN OF UNSPECIFIED ACHILLES TENDON, INITIAL ENCOUNTER: Chronic | ICD-10-CM

## 2019-06-28 DIAGNOSIS — C22.4 OTHER SARCOMAS OF LIVER: ICD-10-CM

## 2019-06-28 DIAGNOSIS — Z98.890 OTHER SPECIFIED POSTPROCEDURAL STATES: Chronic | ICD-10-CM

## 2019-06-29 ENCOUNTER — FORM ENCOUNTER (OUTPATIENT)
Age: 69
End: 2019-06-29

## 2019-06-30 ENCOUNTER — APPOINTMENT (OUTPATIENT)
Dept: MRI IMAGING | Facility: CLINIC | Age: 69
End: 2019-06-30
Payer: MEDICARE

## 2019-06-30 ENCOUNTER — OUTPATIENT (OUTPATIENT)
Dept: OUTPATIENT SERVICES | Facility: HOSPITAL | Age: 69
LOS: 1 days | End: 2019-06-30
Payer: MEDICARE

## 2019-06-30 DIAGNOSIS — Z98.890 OTHER SPECIFIED POSTPROCEDURAL STATES: Chronic | ICD-10-CM

## 2019-06-30 DIAGNOSIS — N28.89 OTHER SPECIFIED DISORDERS OF KIDNEY AND URETER: ICD-10-CM

## 2019-06-30 DIAGNOSIS — R16.0 HEPATOMEGALY, NOT ELSEWHERE CLASSIFIED: ICD-10-CM

## 2019-06-30 DIAGNOSIS — S86.019A STRAIN OF UNSPECIFIED ACHILLES TENDON, INITIAL ENCOUNTER: Chronic | ICD-10-CM

## 2019-06-30 LAB
ALBUMIN SERPL ELPH-MCNC: 3.9 G/DL
ALP BLD-CCNC: 103 U/L
ALT SERPL-CCNC: 37 U/L
ANION GAP SERPL CALC-SCNC: 16 MMOL/L
AST SERPL-CCNC: 30 U/L
BASOPHILS # BLD AUTO: 0.1 K/UL
BASOPHILS NFR BLD AUTO: 0.3 %
BILIRUB SERPL-MCNC: 0.7 MG/DL
BUN SERPL-MCNC: 18 MG/DL
CALCIUM SERPL-MCNC: 11.2 MG/DL
CHLORIDE SERPL-SCNC: 89 MMOL/L
CO2 SERPL-SCNC: 26 MMOL/L
CREAT SERPL-MCNC: 1.27 MG/DL
EOSINOPHIL # BLD AUTO: 0.07 K/UL
EOSINOPHIL NFR BLD AUTO: 0.2 %
GLUCOSE SERPL-MCNC: 121 MG/DL
HCT VFR BLD CALC: 34.1 %
HGB BLD-MCNC: 10 G/DL
IMM GRANULOCYTES NFR BLD AUTO: 0.8 %
LYMPHOCYTES # BLD AUTO: 1.11 K/UL
LYMPHOCYTES NFR BLD AUTO: 2.9 %
MAN DIFF?: NORMAL
MCHC RBC-ENTMCNC: 24 PG
MCHC RBC-ENTMCNC: 29.3 GM/DL
MCV RBC AUTO: 82 FL
MONOCYTES # BLD AUTO: 2.39 K/UL
MONOCYTES NFR BLD AUTO: 6.2 %
NEUTROPHILS # BLD AUTO: 34.64 K/UL
NEUTROPHILS NFR BLD AUTO: 89.6 %
PLATELET # BLD AUTO: 480 K/UL
POTASSIUM SERPL-SCNC: 5 MMOL/L
PROT SERPL-MCNC: 7.1 G/DL
RBC # BLD: 4.16 M/UL
RBC # FLD: 19.2 %
SODIUM SERPL-SCNC: 131 MMOL/L
WBC # FLD AUTO: 38.61 K/UL

## 2019-06-30 PROCEDURE — 74183 MRI ABD W/O CNTR FLWD CNTR: CPT | Mod: 26

## 2019-06-30 PROCEDURE — A9585: CPT

## 2019-06-30 PROCEDURE — 74183 MRI ABD W/O CNTR FLWD CNTR: CPT

## 2019-07-01 ENCOUNTER — CLINICAL ADVICE (OUTPATIENT)
Age: 69
End: 2019-07-01

## 2019-07-03 ENCOUNTER — APPOINTMENT (OUTPATIENT)
Dept: HEMATOLOGY ONCOLOGY | Facility: CLINIC | Age: 69
End: 2019-07-03
Payer: MEDICARE

## 2019-07-03 ENCOUNTER — RESULT REVIEW (OUTPATIENT)
Age: 69
End: 2019-07-03

## 2019-07-03 VITALS
WEIGHT: 123.46 LBS | HEIGHT: 68.98 IN | SYSTOLIC BLOOD PRESSURE: 106 MMHG | RESPIRATION RATE: 16 BRPM | BODY MASS INDEX: 18.29 KG/M2 | DIASTOLIC BLOOD PRESSURE: 73 MMHG | HEART RATE: 95 BPM | TEMPERATURE: 98.1 F | OXYGEN SATURATION: 100 %

## 2019-07-03 DIAGNOSIS — Z86.79 PERSONAL HISTORY OF OTHER DISEASES OF THE CIRCULATORY SYSTEM: ICD-10-CM

## 2019-07-03 DIAGNOSIS — Z78.9 OTHER SPECIFIED HEALTH STATUS: ICD-10-CM

## 2019-07-03 DIAGNOSIS — N40.0 BENIGN PROSTATIC HYPERPLASIA WITHOUT LOWER URINARY TRACT SYMPMS: ICD-10-CM

## 2019-07-03 LAB
BASOPHILS # BLD AUTO: 0.1 K/UL — SIGNIFICANT CHANGE UP (ref 0–0.2)
BASOPHILS NFR BLD AUTO: 0.2 % — SIGNIFICANT CHANGE UP (ref 0–2)
EOSINOPHIL # BLD AUTO: 0.2 K/UL — SIGNIFICANT CHANGE UP (ref 0–0.5)
EOSINOPHIL NFR BLD AUTO: 0.6 % — SIGNIFICANT CHANGE UP (ref 0–6)
HCT VFR BLD CALC: 32.6 % — LOW (ref 39–50)
HGB BLD-MCNC: 10.1 G/DL — LOW (ref 13–17)
LYMPHOCYTES # BLD AUTO: 1.4 K/UL — SIGNIFICANT CHANGE UP (ref 1–3.3)
LYMPHOCYTES # BLD AUTO: 4.1 % — LOW (ref 13–44)
MCHC RBC-ENTMCNC: 24.2 PG — LOW (ref 27–34)
MCHC RBC-ENTMCNC: 30.8 G/DL — LOW (ref 32–36)
MCV RBC AUTO: 78.6 FL — LOW (ref 80–100)
MONOCYTES # BLD AUTO: 1.5 K/UL — HIGH (ref 0–0.9)
MONOCYTES NFR BLD AUTO: 4.4 % — SIGNIFICANT CHANGE UP (ref 2–14)
NEUTROPHILS # BLD AUTO: 30.6 K/UL — HIGH (ref 1.8–7.4)
NEUTROPHILS NFR BLD AUTO: 90.7 % — HIGH (ref 43–77)
PLATELET # BLD AUTO: 581 K/UL — HIGH (ref 150–400)
RBC # BLD: 4.15 M/UL — LOW (ref 4.2–5.8)
RBC # FLD: 16.1 % — HIGH (ref 10.3–14.5)
WBC # BLD: 33.7 K/UL — HIGH (ref 3.8–10.5)
WBC # FLD AUTO: 33.7 K/UL — HIGH (ref 3.8–10.5)

## 2019-07-03 PROCEDURE — 99205 OFFICE O/P NEW HI 60 MIN: CPT

## 2019-07-04 NOTE — REASON FOR VISIT
[Initial Consultation] : an initial consultation [Spouse] : spouse [FreeTextEntry2] : metastatic renal cell carcinoma

## 2019-07-04 NOTE — ASSESSMENT
[FreeTextEntry1] : Nikolay Gallagher is a 59 years old male who presented with intermittent midback pain, 10/10 radiating to stomach from both sides. CT c/a/p and MRI abdomen showed multiple lung nodules, one liver lesion, retroperitoneal nodes. Bone scan showed one suspicious met in right superior pubic ramus. Liver lesion biopsy is consistent with renal cell carcinoma. \par \par I had a lengthy discussion with the patient and his wife regarding his RCC status and further management. He has metastatic RCC. It is not certain that the histology of RCC is clear cell vs non-clear cell. IMDC risk of this patient he has 5 out of 6 risks including leukocytosis, anemia, thrombocytosis, time from dx to initiate systemic treatment less than one year and high calcemia. He has poor risk of mRCC. Based on mccRCC the standard of care is combined immunotherapy nivo and ipi vs sutent, checkmate 214 in the intermediate and poor risk of mccRCC patients with significant overall survival benefit, keynote 426 keytruda plus axitinib vs sutent in good, intermediate and poor risk of mccRCC patients showed OS, PFS and MICHEL benefit, [place] in the intermediate and poor risk of mccRCC patients showed cabozantinib demonstrated a significant clinical benefit in PFS and MICHEL over standard-of-care sunitinib as first-line therapy in patients with intermediate- or poor-risk mRCC. Consider the longer duration of response and less side effects I recommended nivo and ipi at this time. I also discussed with him regarding the potential AEs including but not limited to skin rashes, arthritis, colitis, thyroiditis, pneumonitis, hepatitis and nephritis, rarely hypophysitis. His many questions were answered in full to his satisfaction. He agreed to sign the consent.

## 2019-07-04 NOTE — REVIEW OF SYSTEMS
[Fatigue] : fatigue [Recent Change In Weight] : ~T recent weight change [Abdominal Pain] : abdominal pain [Negative] : Heme/Lymph [Chills] : no chills [Fever] : no fever [Diarrhea] : no diarrhea [Night Sweats] : no night sweats [Constipation] : no constipation

## 2019-07-04 NOTE — CONSULT LETTER
[Dear  ___] : Dear  [unfilled], [Please see my note below.] : Please see my note below. [Consult Letter:] : I had the pleasure of evaluating your patient, [unfilled]. [Consult Closing:] : Thank you very much for allowing me to participate in the care of this patient.  If you have any questions, please do not hesitate to contact me. [Sincerely,] : Sincerely, [FreeTextEntry3] : Kip Ravi MD

## 2019-07-04 NOTE — HISTORY OF PRESENT ILLNESS
[T: ___] : T[unfilled] [N: ___] : N[unfilled] [M: ___] : M[unfilled] [AJCC Stage: ____] : AJCC Stage: [unfilled] [de-identified] : Finesse Zuniga is a 69 years old male who initially presented with intermittent midback pain, 10/10 radiating to stomach from both sides on June 1, 2019. He has poor appetite.  On june 12 he was diagnosed UTI and on cipro for 4 days, on June 16, was started cefpodoxime 100mg BID for 7 days.  CT chest/abdomen/pelvis revealed significant abnormal appearance of the left kidney with multiple heterogeneous areas of low attenuation involving the upper and midpole which may represent malignancy. There are necrotic lymph nodes in the left para-aortic location at the level of the kidney and below the level of the kidney. Adenopathy surrounds the left renal artery. The left renal vein demonstrates question of partially occlusive thrombus. Nonspecific scattered tiny pulmonary nodules. Lower lobe subpleural nodules measure up to 6 mm. There is a well-circumscribed hypodense lesion in the anterior aspect of the lateral segment of the left lobe measuring 3.2 x 2.2 cm. He underwent CT guided liver lesion biopsy which showed consistent with primary renal cell carcinoma, IHC CK7/CK19/PAX8 positive. MRI abdomen waw con showed multiple bilateral pulmonary nodules increased in size and number compared with recent CT. The largest is pleural-based in the left lower lobe and measures 1.5 x 1.1 cm. LIVER: Rim-enhancing subcapsular mass in segment 3 and measures 4.4 x 2.6 cm. A 4.2cm mass in mid to lower pole of the lft kidney suspicious for urothelial malignancy arising within the left renal collecting system. Regional lymphadenopathy and hepatic and pulmonary metastases. Nonocclusive thrombus within the left renal vein. He was started on eliquis. Bone scan showed  a focus of mildly increased uptake in the right superior \par pubic ramus suspicious of bone mets.\par \par He has lost 17lbs in one month. He denies nausea, vomiting and constipation and diarrhea.  [de-identified] : metastatic renal cell carcinoma

## 2019-07-10 ENCOUNTER — RESULT REVIEW (OUTPATIENT)
Age: 69
End: 2019-07-10

## 2019-07-10 ENCOUNTER — LABORATORY RESULT (OUTPATIENT)
Age: 69
End: 2019-07-10

## 2019-07-10 ENCOUNTER — APPOINTMENT (OUTPATIENT)
Dept: INFUSION THERAPY | Facility: HOSPITAL | Age: 69
End: 2019-07-10

## 2019-07-10 LAB
BASOPHILS # BLD AUTO: 0 K/UL — SIGNIFICANT CHANGE UP (ref 0–0.2)
EOSINOPHIL # BLD AUTO: 0.3 K/UL — SIGNIFICANT CHANGE UP (ref 0–0.5)
HCT VFR BLD CALC: 28.7 % — LOW (ref 39–50)
HGB BLD-MCNC: 9.2 G/DL — LOW (ref 13–17)
LYMPHOCYTES # BLD AUTO: 1.6 K/UL — SIGNIFICANT CHANGE UP (ref 1–3.3)
LYMPHOCYTES # BLD AUTO: 4 % — LOW (ref 13–44)
MCHC RBC-ENTMCNC: 24.9 PG — LOW (ref 27–34)
MCHC RBC-ENTMCNC: 32 G/DL — SIGNIFICANT CHANGE UP (ref 32–36)
MCV RBC AUTO: 77.7 FL — LOW (ref 80–100)
MONOCYTES # BLD AUTO: 2.1 K/UL — HIGH (ref 0–0.9)
MONOCYTES NFR BLD AUTO: 9 % — SIGNIFICANT CHANGE UP (ref 2–14)
MYELOCYTES NFR BLD: 1 % — HIGH (ref 0–0)
NEUTROPHILS # BLD AUTO: 34.5 K/UL — HIGH (ref 1.8–7.4)
NEUTROPHILS NFR BLD AUTO: 81 % — HIGH (ref 43–77)
NEUTS BAND # BLD: 5 % — SIGNIFICANT CHANGE UP (ref 0–8)
PLAT MORPH BLD: NORMAL — SIGNIFICANT CHANGE UP
PLATELET # BLD AUTO: 545 K/UL — HIGH (ref 150–400)
RBC # BLD: 3.69 M/UL — LOW (ref 4.2–5.8)
RBC # FLD: 15.2 % — HIGH (ref 10.3–14.5)
RBC BLD AUTO: SIGNIFICANT CHANGE UP
WBC # BLD: 38.5 K/UL — HIGH (ref 3.8–10.5)
WBC # FLD AUTO: 38.5 K/UL — HIGH (ref 3.8–10.5)

## 2019-07-11 DIAGNOSIS — R11.2 NAUSEA WITH VOMITING, UNSPECIFIED: ICD-10-CM

## 2019-07-11 DIAGNOSIS — Z51.11 ENCOUNTER FOR ANTINEOPLASTIC CHEMOTHERAPY: ICD-10-CM

## 2019-07-17 DIAGNOSIS — C64.9 MALIGNANT NEOPLASM OF UNSPECIFIED KIDNEY, EXCEPT RENAL PELVIS: ICD-10-CM

## 2019-07-18 NOTE — PHYSICAL EXAM
When Your Child Has Swimmers Ear   If your child spends a lot of time in the water and is having ear pain, he or she may have developed swimmer's ear (otitis externa). It is a skin infection that happens in the ear canal, between the opening of the ear and the eardrum. When the ear canal becomes too moist, bacteria can grow. This causes pain, swelling, and redness in the ear canal.  Who is at risk for swimmers ear?  Children are more likely to get swimmers ear if they:  · Swim or lie down in a bathtub or hot tub  · Clean their ear canals roughly. This causes tiny cuts or scratches that easily get infected.  · Have ear canals that are naturally narrow  · Have excess earwax that traps fluid in the ear canal  What are the symptoms of swimmers ear?   The most common symptoms of swimmers ear are:  · Ear pain, especially when pulling on the earlobe or when chewing  · Redness or swelling in the ear canal or near the ear  · Itching in the ear  · Drainage from the ear  · Feeling like water is in the ear  · Fever  · Problems hearing  How is swimmers ear diagnosed?  The healthcare provider will examine your child. He or she will also ask questions to help rule out other causes of ear pain. The healthcare provider will look for:  · Redness and swelling in the ear canal  · Drainage from the ear canal  · Pain when moving the earlobe  How is swimmers ear treated?  To treat your childs ear, the healthcare provider may recommend:  · Medicines such as antibiotic ear drops or a pain reliever that is put in the ear. Antibiotic medicine taken by mouth (orally) is not recommended.  · Over-the-counter pain relievers such as acetaminophen and ibuprofen. Don't give ibuprofen to infants younger than 6 months of age or to children who are dehydrated or constantly vomiting. Dont give your child aspirin to relieve a fever. Using aspirin to treat a fever in children could cause a serious condition called Reye syndrome.  How can you  prevent swimmers ear?  Ask your child's healthcare provider about using the following to help prevent swimmers ear:  · After your child has been in the water, have your child tilt his or her head to each side to help any water drain out. You can also dry his or her ear canal using a blow dryer. Use a low air and cool setting. Hold the dryer at least 12 inches from your childs head. Wave the dryer slowly back and forth--dont hold it still. You may also gently pull the earlobe down and slightly backward to allow the air to reach the ear canal.  · Use a tissue to gently draw water out of the ear. Your childs healthcare provider can show you how.  · Use over-the-counter ear drops if the healthcare provider suggests this. These help dry out the inside of your childs ear. Smaller children may need to lie down on a couch or bed for a short time to keep the drops inside the ear canal.  · Gently clean your childs ear canal. Don't use cotton swabs.  When to call your childs healthcare provider  Call your child's healthcare provider if your child has any of the following:  · Increased pain redness, or swelling of the outer ear  · Ear pain, redness, or swelling that does not go away with treatment  · Fever (see Fever and children, below)     Fever and children  Always use a digital thermometer to check your childs temperature. Never use a mercury thermometer.  For infants and toddlers, be sure to use a rectal thermometer correctly. A rectal thermometer may accidentally poke a hole in (perforate) the rectum. It may also pass on germs from the stool. Always follow the product makers directions for proper use. If you dont feel comfortable taking a rectal temperature, use another method. When you talk to your childs healthcare provider, tell him or her which method you used to take your childs temperature.  Here are guidelines for fever temperature. Ear temperatures arent accurate before 6 months of age. Dont take an  oral temperature until your child is at least 4 years old.  Infant under 3 months old:  · Ask your childs healthcare provider how you should take the temperature.  · Rectal or forehead (temporal artery) temperature of 100.4°F (38°C) or higher, or as directed by the provider  · Armpit temperature of 99°F (37.2°C) or higher, or as directed by the provider  Child age 3 to 36 months:  · Rectal, forehead (temporal artery), or ear temperature of 102°F (38.9°C) or higher, or as directed by the provider  · Armpit temperature of 101°F (38.3°C) or higher, or as directed by the provider  Child of any age:  · Repeated temperature of 104°F (40°C) or higher, or as directed by the provider  · Fever that lasts more than 24 hours in a child under 2 years old. Or a fever that lasts for 3 days in a child 2 years or older.   Date Last Reviewed: 11/1/2016  © 2213-6145 [employer]. [address] [place] [id number]. All rights reserved. This information is not intended as a substitute for professional medical care. Always follow your healthcare professional's instructions.         [Fully active, able to carry on all pre-disease performance without restriction] : Status 0 - Fully active, able to carry on all pre-disease performance without restriction [Thin] : thin [Normal] : affect appropriate

## 2019-07-19 ENCOUNTER — RESULT REVIEW (OUTPATIENT)
Age: 69
End: 2019-07-19

## 2019-07-19 ENCOUNTER — EMERGENCY (EMERGENCY)
Facility: HOSPITAL | Age: 69
LOS: 1 days | Discharge: HOME | End: 2019-07-19
Attending: EMERGENCY MEDICINE | Admitting: EMERGENCY MEDICINE
Payer: MEDICARE

## 2019-07-19 ENCOUNTER — APPOINTMENT (OUTPATIENT)
Dept: HEMATOLOGY ONCOLOGY | Facility: CLINIC | Age: 69
End: 2019-07-19
Payer: MEDICARE

## 2019-07-19 VITALS
DIASTOLIC BLOOD PRESSURE: 73 MMHG | OXYGEN SATURATION: 99 % | HEART RATE: 87 BPM | RESPIRATION RATE: 15 BRPM | SYSTOLIC BLOOD PRESSURE: 110 MMHG

## 2019-07-19 VITALS
TEMPERATURE: 98 F | OXYGEN SATURATION: 100 % | SYSTOLIC BLOOD PRESSURE: 96 MMHG | HEIGHT: 69 IN | HEART RATE: 101 BPM | DIASTOLIC BLOOD PRESSURE: 62 MMHG | RESPIRATION RATE: 16 BRPM | WEIGHT: 121.03 LBS

## 2019-07-19 VITALS
HEART RATE: 98 BPM | BODY MASS INDEX: 17.92 KG/M2 | SYSTOLIC BLOOD PRESSURE: 108 MMHG | OXYGEN SATURATION: 97 % | RESPIRATION RATE: 14 BRPM | DIASTOLIC BLOOD PRESSURE: 67 MMHG | TEMPERATURE: 98.1 F | WEIGHT: 121.23 LBS

## 2019-07-19 DIAGNOSIS — S86.019A STRAIN OF UNSPECIFIED ACHILLES TENDON, INITIAL ENCOUNTER: Chronic | ICD-10-CM

## 2019-07-19 DIAGNOSIS — Z98.890 OTHER SPECIFIED POSTPROCEDURAL STATES: Chronic | ICD-10-CM

## 2019-07-19 LAB
ALBUMIN SERPL ELPH-MCNC: 2.7 G/DL — LOW (ref 3.3–5)
ALP SERPL-CCNC: 121 U/L — HIGH (ref 40–120)
ALT FLD-CCNC: 30 U/L — SIGNIFICANT CHANGE UP (ref 12–78)
ANION GAP SERPL CALC-SCNC: 11 MMOL/L — SIGNIFICANT CHANGE UP (ref 5–17)
AST SERPL-CCNC: 27 U/L — SIGNIFICANT CHANGE UP (ref 15–37)
BASOPHILS # BLD AUTO: 0 K/UL — SIGNIFICANT CHANGE UP (ref 0–0.2)
BASOPHILS # BLD AUTO: 0 K/UL — SIGNIFICANT CHANGE UP (ref 0–0.2)
BASOPHILS NFR BLD AUTO: 0 % — SIGNIFICANT CHANGE UP (ref 0–2)
BASOPHILS NFR BLD AUTO: 0.2 % — SIGNIFICANT CHANGE UP (ref 0–2)
BILIRUB SERPL-MCNC: 0.4 MG/DL — SIGNIFICANT CHANGE UP (ref 0.2–1.2)
BUN SERPL-MCNC: 29 MG/DL — HIGH (ref 7–23)
BUN SERPL-MCNC: 32 MG/DL — HIGH (ref 7–23)
CA-I BLDA-SCNC: 1.24 MMOL/L — SIGNIFICANT CHANGE UP (ref 1.12–1.3)
CALCIUM SERPL-MCNC: 9.6 MG/DL — SIGNIFICANT CHANGE UP (ref 8.5–10.1)
CHLORIDE SERPL-SCNC: 94 MMOL/L — LOW (ref 96–108)
CHLORIDE SERPL-SCNC: 94 MMOL/L — LOW (ref 96–108)
CO2 SERPL-SCNC: 23 MMOL/L — SIGNIFICANT CHANGE UP (ref 22–31)
CO2 SERPL-SCNC: 24 MMOL/L — SIGNIFICANT CHANGE UP (ref 22–31)
CREAT SERPL-MCNC: 1.3 MG/DL — SIGNIFICANT CHANGE UP (ref 0.5–1.3)
CREAT SERPL-MCNC: 1.3 MG/DL — SIGNIFICANT CHANGE UP (ref 0.5–1.3)
EOSINOPHIL # BLD AUTO: 0.22 K/UL — SIGNIFICANT CHANGE UP (ref 0–0.5)
EOSINOPHIL # BLD AUTO: 0.5 K/UL — SIGNIFICANT CHANGE UP (ref 0–0.5)
EOSINOPHIL NFR BLD AUTO: 1 % — SIGNIFICANT CHANGE UP (ref 0–6)
EOSINOPHIL NFR BLD AUTO: 2.2 % — SIGNIFICANT CHANGE UP (ref 0–6)
GLUCOSE SERPL-MCNC: 124 MG/DL — HIGH (ref 70–99)
GLUCOSE SERPL-MCNC: 138 MG/DL — HIGH (ref 70–99)
HCT VFR BLD CALC: 24.6 % — LOW (ref 39–50)
HCT VFR BLD CALC: 27.9 % — LOW (ref 39–50)
HGB BLD-MCNC: 8.2 G/DL — LOW (ref 13–17)
HGB BLD-MCNC: 8.7 G/DL — LOW (ref 13–17)
LYMPHOCYTES # BLD AUTO: 1.1 K/UL — SIGNIFICANT CHANGE UP (ref 1–3.3)
LYMPHOCYTES # BLD AUTO: 1.57 K/UL — SIGNIFICANT CHANGE UP (ref 1–3.3)
LYMPHOCYTES # BLD AUTO: 5.2 % — LOW (ref 13–44)
LYMPHOCYTES # BLD AUTO: 7 % — LOW (ref 13–44)
MCHC RBC-ENTMCNC: 23.8 PG — LOW (ref 27–34)
MCHC RBC-ENTMCNC: 24.3 PG — LOW (ref 27–34)
MCHC RBC-ENTMCNC: 31.3 G/DL — LOW (ref 32–36)
MCHC RBC-ENTMCNC: 33.3 GM/DL — SIGNIFICANT CHANGE UP (ref 32–36)
MCV RBC AUTO: 72.8 FL — LOW (ref 80–100)
MCV RBC AUTO: 75.9 FL — LOW (ref 80–100)
MONOCYTES # BLD AUTO: 1.3 K/UL — HIGH (ref 0–0.9)
MONOCYTES # BLD AUTO: 1.57 K/UL — HIGH (ref 0–0.9)
MONOCYTES NFR BLD AUTO: 6.1 % — SIGNIFICANT CHANGE UP (ref 2–14)
MONOCYTES NFR BLD AUTO: 7 % — SIGNIFICANT CHANGE UP (ref 2–14)
NEUTROPHILS # BLD AUTO: 17.8 K/UL — HIGH (ref 1.8–7.4)
NEUTROPHILS # BLD AUTO: 19.06 K/UL — HIGH (ref 1.8–7.4)
NEUTROPHILS NFR BLD AUTO: 85 % — HIGH (ref 43–77)
NEUTROPHILS NFR BLD AUTO: 86.3 % — HIGH (ref 43–77)
NRBC # BLD: SIGNIFICANT CHANGE UP /100 WBCS (ref 0–0)
PLATELET # BLD AUTO: 525 K/UL — HIGH (ref 150–400)
PLATELET # BLD AUTO: 601 K/UL — HIGH (ref 150–400)
POTASSIUM SERPL-MCNC: 4.5 MMOL/L — SIGNIFICANT CHANGE UP (ref 3.5–5.3)
POTASSIUM SERPL-MCNC: 4.9 MMOL/L — SIGNIFICANT CHANGE UP (ref 3.5–5.3)
POTASSIUM SERPL-SCNC: 4.5 MMOL/L — SIGNIFICANT CHANGE UP (ref 3.5–5.3)
POTASSIUM SERPL-SCNC: 4.9 MMOL/L — SIGNIFICANT CHANGE UP (ref 3.5–5.3)
PROT SERPL-MCNC: 8 G/DL — SIGNIFICANT CHANGE UP (ref 6–8.3)
RBC # BLD: 3.38 M/UL — LOW (ref 4.2–5.8)
RBC # BLD: 3.68 M/UL — LOW (ref 4.2–5.8)
RBC # FLD: 15 % — HIGH (ref 10.3–14.5)
RBC # FLD: 15.9 % — HIGH (ref 10.3–14.5)
SODIUM SERPL-SCNC: 127 MMOL/L — LOW (ref 135–145)
SODIUM SERPL-SCNC: 128 MMOL/L — LOW (ref 135–145)
WBC # BLD: 20.6 K/UL — HIGH (ref 3.8–10.5)
WBC # BLD: 22.42 K/UL — HIGH (ref 3.8–10.5)
WBC # FLD AUTO: 20.6 K/UL — HIGH (ref 3.8–10.5)
WBC # FLD AUTO: 22.42 K/UL — HIGH (ref 3.8–10.5)

## 2019-07-19 PROCEDURE — 99215 OFFICE O/P EST HI 40 MIN: CPT

## 2019-07-19 PROCEDURE — 36415 COLL VENOUS BLD VENIPUNCTURE: CPT

## 2019-07-19 PROCEDURE — 85027 COMPLETE CBC AUTOMATED: CPT

## 2019-07-19 PROCEDURE — 80053 COMPREHEN METABOLIC PANEL: CPT

## 2019-07-19 PROCEDURE — 99283 EMERGENCY DEPT VISIT LOW MDM: CPT

## 2019-07-19 RX ORDER — SODIUM CHLORIDE 9 MG/ML
1000 INJECTION INTRAMUSCULAR; INTRAVENOUS; SUBCUTANEOUS ONCE
Refills: 0 | Status: COMPLETED | OUTPATIENT
Start: 2019-07-19 | End: 2019-07-19

## 2019-07-19 RX ADMIN — SODIUM CHLORIDE 1000 MILLILITER(S): 9 INJECTION INTRAMUSCULAR; INTRAVENOUS; SUBCUTANEOUS at 16:56

## 2019-07-19 NOTE — REASON FOR VISIT
[Spouse] : spouse [Follow-Up Visit] : a follow-up [FreeTextEntry2] : metastatic renal cell carcinoma

## 2019-07-19 NOTE — REVIEW OF SYSTEMS
[Fatigue] : fatigue [Recent Change In Weight] : ~T recent weight change [Negative] : Allergic/Immunologic [Fever] : no fever [Chills] : no chills [Night Sweats] : no night sweats [Abdominal Pain] : no abdominal pain [Constipation] : no constipation [Diarrhea] : no diarrhea

## 2019-07-19 NOTE — ED PROVIDER NOTE - CARE PROVIDER_API CALL
Kip Ravi (MD; PhD)  Hematology; Internal Medicine; Medical Oncology  [address] [place] [address]  Phone: [phone]  Fax: [phone]  Follow Up Time:

## 2019-07-19 NOTE — ED PROVIDER NOTE - ATTENDING CONTRIBUTION TO CARE
pt referred by his onc for hyponatremia on outpt labs done by his onc at [facility] today during regular f/u. c/o generalized weakness. no fever, ha, cp, sob, abd pain, vomiting, diarrhea.  nad, s1s2 rrr, lungs cta, abd soft, nt, nd, ext nt, full rom

## 2019-07-19 NOTE — ED PROVIDER NOTE - CHPI ED SYMPTOMS NEG
no fever/no nausea/no numbness/no pain/no dizziness/no vomiting/no tingling/no chills/no decreased eating/drinking/no weakness

## 2019-07-19 NOTE — HISTORY OF PRESENT ILLNESS
[T: ___] : T[unfilled] [N: ___] : N[unfilled] [M: ___] : M[unfilled] [AJCC Stage: ____] : AJCC Stage: [unfilled] [Date: ____________] : Patient's last distress assessment performed on [unfilled]. [0 - No Distress] : Distress Level: 0 [de-identified] : Finesse Zuniga is a 69 years old male who initially presented with intermittent midback pain, 10/10 radiating to stomach from both sides on June 1, 2019. He has poor appetite.  On june 12 he was diagnosed UTI and on cipro for 4 days, on June 16, was started cefpodoxime 100mg BID for 7 days.  CT chest/abdomen/pelvis revealed significant abnormal appearance of the left kidney with multiple heterogeneous areas of low attenuation involving the upper and midpole which may represent malignancy. There are necrotic lymph nodes in the left para-aortic location at the level of the kidney and below the level of the kidney. Adenopathy surrounds the left renal artery. The left renal vein demonstrates question of partially occlusive thrombus. Nonspecific scattered tiny pulmonary nodules. Lower lobe subpleural nodules measure up to 6 mm. There is a well-circumscribed hypodense lesion in the anterior aspect of the lateral segment of the left lobe measuring 3.2 x 2.2 cm. He underwent CT guided liver lesion biopsy which showed consistent with primary renal cell carcinoma, IHC CK7/CK19/PAX8 positive. MRI abdomen waw con showed multiple bilateral pulmonary nodules increased in size and number compared with recent CT. The largest is pleural-based in the left lower lobe and measures 1.5 x 1.1 cm. LIVER: Rim-enhancing subcapsular mass in segment 3 and measures 4.4 x 2.6 cm. A 4.2cm mass in mid to lower pole of the lft kidney suspicious for urothelial malignancy arising within the left renal collecting system. Regional lymphadenopathy and hepatic and pulmonary metastases. Nonocclusive thrombus within the left renal vein. He was started on eliquis. Bone scan showed  a focus of mildly increased uptake in the right superior \par pubic ramus suspicious of bone mets.\par \par He has lost 17lbs in one month. He denies nausea, vomiting and constipation and diarrhea.  [de-identified] : metastatic renal cell carcinoma [de-identified] : 7/19/19 s/p cycle 1 nivo and ipi, he reports that his intermittent midback pain, 10/10 radiating to stomach from both sides on June 1, 2019 has been resolved. His appetite is improving. He denies nausea, vomiting and diarrhea, skin rashes, joint pain.

## 2019-07-19 NOTE — ASSESSMENT
[FreeTextEntry1] : Nikolay Gallagher is a 59 years old male who presented with intermittent midback pain, 10/10 radiating to stomach from both sides. CT c/a/p and MRI abdomen showed multiple lung nodules, one liver lesion, retroperitoneal nodes. Bone scan showed one suspicious met in right superior pubic ramus. Liver lesion biopsy is consistent with renal cell carcinoma. It is not certain that the histology of RCC is clear cell vs non-clear cell. IMDC risk of this patient he has 5 out of 6 risks including leukocytosis, anemia, thrombocytosis, time from dx to initiate systemic treatment less than one year and high calcemia. He has poor risk of mRCC. Based on mccRCC the standard of care is combined immunotherapy nivo and ipi vs sutent, checkmate 214 in the intermediate and poor risk of mccRCC patients with significant overall survival benefit, keynote 426 keytruda plus axitinib vs sutent in good, intermediate and poor risk of mccRCC patients showed OS, PFS and MICHEL benefit, [place] in the intermediate and poor risk of mccRCC patients showed cabozantinib demonstrated a significant clinical benefit in PFS and MICHEL over standard-of-care sunitinib as first-line therapy in patients with intermediate- or poor-risk mRCC. Consider the longer duration of response and less side effects I recommended nivo and ipi at this time. I also discussed with him regarding the potential AEs including but not limited to skin rashes, arthritis, colitis, thyroiditis, pneumonitis, hepatitis and nephritis, rarely hypophysitis. His many questions were answered in full to his satisfaction. He agreed to sign the consent. S/P cycle 1 nivo and ipi. His back pain is resolved. His appetite is improving. \par \par Plan \par continue cycle 2 nivo and ipi\par check labs\par RTC in 3 weeks

## 2019-07-19 NOTE — ED PROVIDER NOTE - PROGRESS NOTE DETAILS
pt is currently comfortable in ED, in no distress, call was placed to hematologist/oncologist, awaiting callback. multiple call was placed to Dr. Ravi to DW pt's result with him but no callback, pt was given fluid in ER, is currently in no distress, labs were trend and are at baseline.  Will discharge home, advised to FU with his doctor in 24 hours.  Discussed with patient results of work up, strict return precautions, and need for follow up.  Patient expressed understanding and agrees with plan.  Patient informed to return to the ER immediately for any problems, concerns, or recurrent/worsening symptoms.

## 2019-07-19 NOTE — ED PROVIDER NOTE - OBJECTIVE STATEMENT
70 y/o male, comes in c/o abnormal labs.  Pt has hx of renal anay carcinoma and is on chemo, states he was seen in [place] today for follow up and had labs done, was told that his sodium is low and that he should come to the ER.  pt denies any pain.  denies any n/v/d/dizziness, chest pain, muscle pain 70 y/o male, comes in c/o abnormal labs.  Pt has hx of renal anay carcinoma and is on chemo, states he was seen in [place] today for follow up and had labs done, was told that his sodium is low and that he should come to the ER.  pt denies any pain.  denies any n/v/d/dizziness, chest pain, muscle pain or SOB.  denies any fevers or chills, denies any sick contacts or recent travel.

## 2019-07-19 NOTE — CONSULT LETTER
[Dear  ___] : Dear  [unfilled], [Consult Letter:] : I had the pleasure of evaluating your patient, [unfilled]. [Please see my note below.] : Please see my note below. [Consult Closing:] : Thank you very much for allowing me to participate in the care of this patient.  If you have any questions, please do not hesitate to contact me. [Sincerely,] : Sincerely, [FreeTextEntry3] : Kip Ravi MD

## 2019-07-22 ENCOUNTER — INBOUND DOCUMENT (OUTPATIENT)
Age: 69
End: 2019-07-22

## 2019-07-25 ENCOUNTER — OUTPATIENT (OUTPATIENT)
Dept: OUTPATIENT SERVICES | Facility: HOSPITAL | Age: 69
LOS: 1 days | Discharge: HOME | End: 2019-07-25

## 2019-07-25 DIAGNOSIS — Z98.890 OTHER SPECIFIED POSTPROCEDURAL STATES: Chronic | ICD-10-CM

## 2019-07-25 DIAGNOSIS — S86.019A STRAIN OF UNSPECIFIED ACHILLES TENDON, INITIAL ENCOUNTER: Chronic | ICD-10-CM

## 2019-07-25 DIAGNOSIS — C64.9 MALIGNANT NEOPLASM OF UNSPECIFIED KIDNEY, EXCEPT RENAL PELVIS: ICD-10-CM

## 2019-07-25 PROBLEM — R16.0 HEPATOMEGALY, NOT ELSEWHERE CLASSIFIED: Chronic | Status: ACTIVE | Noted: 2019-07-19

## 2019-07-30 PROBLEM — Z85.118 HISTORY OF MALIGNANT NEOPLASM METASTATIC TO LUNG: Status: RESOLVED | Noted: 2019-07-30 | Resolved: 2019-07-30

## 2019-07-31 ENCOUNTER — APPOINTMENT (OUTPATIENT)
Dept: INFUSION THERAPY | Facility: HOSPITAL | Age: 69
End: 2019-07-31

## 2019-07-31 ENCOUNTER — LABORATORY RESULT (OUTPATIENT)
Age: 69
End: 2019-07-31

## 2019-07-31 ENCOUNTER — RESULT REVIEW (OUTPATIENT)
Age: 69
End: 2019-07-31

## 2019-07-31 ENCOUNTER — APPOINTMENT (OUTPATIENT)
Dept: HEMATOLOGY ONCOLOGY | Facility: CLINIC | Age: 69
End: 2019-07-31
Payer: MEDICARE

## 2019-07-31 VITALS
RESPIRATION RATE: 16 BRPM | SYSTOLIC BLOOD PRESSURE: 111 MMHG | BODY MASS INDEX: 18.57 KG/M2 | TEMPERATURE: 99.5 F | HEART RATE: 78 BPM | OXYGEN SATURATION: 100 % | WEIGHT: 125.64 LBS | DIASTOLIC BLOOD PRESSURE: 72 MMHG

## 2019-07-31 DIAGNOSIS — Z85.118 PERSONAL HISTORY OF OTHER MALIGNANT NEOPLASM OF BRONCHUS AND LUNG: ICD-10-CM

## 2019-07-31 LAB
BASOPHILS # BLD AUTO: 0 K/UL — SIGNIFICANT CHANGE UP (ref 0–0.2)
BASOPHILS NFR BLD AUTO: 0.3 % — SIGNIFICANT CHANGE UP (ref 0–2)
BUN SERPL-MCNC: 15 MG/DL — SIGNIFICANT CHANGE UP (ref 7–23)
CA-I BLDA-SCNC: 1.24 MMOL/L — SIGNIFICANT CHANGE UP (ref 1.12–1.3)
CHLORIDE SERPL-SCNC: 102 MMOL/L — SIGNIFICANT CHANGE UP (ref 96–108)
CO2 SERPL-SCNC: 25 MMOL/L — SIGNIFICANT CHANGE UP (ref 22–31)
CREAT SERPL-MCNC: 1.1 MG/DL — SIGNIFICANT CHANGE UP (ref 0.5–1.3)
EOSINOPHIL # BLD AUTO: 0.4 K/UL — SIGNIFICANT CHANGE UP (ref 0–0.5)
EOSINOPHIL NFR BLD AUTO: 2.7 % — SIGNIFICANT CHANGE UP (ref 0–6)
GLUCOSE SERPL-MCNC: 110 MG/DL — HIGH (ref 70–99)
HCT VFR BLD CALC: 26.6 % — LOW (ref 39–50)
HGB BLD-MCNC: 8.5 G/DL — LOW (ref 13–17)
LYMPHOCYTES # BLD AUTO: 13.6 % — SIGNIFICANT CHANGE UP (ref 13–44)
LYMPHOCYTES # BLD AUTO: 2 K/UL — SIGNIFICANT CHANGE UP (ref 1–3.3)
MCHC RBC-ENTMCNC: 25.4 PG — LOW (ref 27–34)
MCHC RBC-ENTMCNC: 31.8 G/DL — LOW (ref 32–36)
MCV RBC AUTO: 80 FL — SIGNIFICANT CHANGE UP (ref 80–100)
MONOCYTES # BLD AUTO: 1 K/UL — HIGH (ref 0–0.9)
MONOCYTES NFR BLD AUTO: 7 % — SIGNIFICANT CHANGE UP (ref 2–14)
NEUTROPHILS # BLD AUTO: 11 K/UL — HIGH (ref 1.8–7.4)
NEUTROPHILS NFR BLD AUTO: 76.4 % — SIGNIFICANT CHANGE UP (ref 43–77)
PLATELET # BLD AUTO: 546 K/UL — HIGH (ref 150–400)
POTASSIUM SERPL-MCNC: 4.6 MMOL/L — SIGNIFICANT CHANGE UP (ref 3.5–5.3)
POTASSIUM SERPL-SCNC: 4.6 MMOL/L — SIGNIFICANT CHANGE UP (ref 3.5–5.3)
RBC # BLD: 3.33 M/UL — LOW (ref 4.2–5.8)
RBC # FLD: 18.1 % — HIGH (ref 10.3–14.5)
SODIUM SERPL-SCNC: 137 MMOL/L — SIGNIFICANT CHANGE UP (ref 135–145)
WBC # BLD: 14.4 K/UL — HIGH (ref 3.8–10.5)
WBC # FLD AUTO: 14.4 K/UL — HIGH (ref 3.8–10.5)

## 2019-07-31 PROCEDURE — 99214 OFFICE O/P EST MOD 30 MIN: CPT

## 2019-07-31 NOTE — HISTORY OF PRESENT ILLNESS
[T: ___] : T[unfilled] [N: ___] : N[unfilled] [M: ___] : M[unfilled] [AJCC Stage: ____] : AJCC Stage: [unfilled] [Date: ____________] : Patient's last distress assessment performed on [unfilled]. [0 - No Distress] : Distress Level: 0 [de-identified] : metastatic renal cell carcinoma [de-identified] : Finesse Zuniga is a 69 years old male who initially presented with intermittent midback pain, 10/10 radiating to stomach from both sides on June 1, 2019. He has poor appetite.  On june 12 he was diagnosed UTI and on cipro for 4 days, on June 16, was started cefpodoxime 100mg BID for 7 days.  CT chest/abdomen/pelvis revealed significant abnormal appearance of the left kidney with multiple heterogeneous areas of low attenuation involving the upper and midpole which may represent malignancy. There are necrotic lymph nodes in the left para-aortic location at the level of the kidney and below the level of the kidney. Adenopathy surrounds the left renal artery. The left renal vein demonstrates question of partially occlusive thrombus. Nonspecific scattered tiny pulmonary nodules. Lower lobe subpleural nodules measure up to 6 mm. There is a well-circumscribed hypodense lesion in the anterior aspect of the lateral segment of the left lobe measuring 3.2 x 2.2 cm. He underwent CT guided liver lesion biopsy which showed consistent with primary renal cell carcinoma, IHC CK7/CK19/PAX8 positive. MRI abdomen waw con showed multiple bilateral pulmonary nodules increased in size and number compared with recent CT. The largest is pleural-based in the left lower lobe and measures 1.5 x 1.1 cm. LIVER: Rim-enhancing subcapsular mass in segment 3 and measures 4.4 x 2.6 cm. A 4.2cm mass in mid to lower pole of the lft kidney suspicious for urothelial malignancy arising within the left renal collecting system. Regional lymphadenopathy and hepatic and pulmonary metastases. Nonocclusive thrombus within the left renal vein. He was started on eliquis. Bone scan showed  a focus of mildly increased uptake in the right superior \par pubic ramus suspicious of bone mets.\par \par He has lost 17lbs in one month. He denies nausea, vomiting and constipation and diarrhea.  [de-identified] : 7/19/19 s/p cycle 1 nivo and ipi, he reports that his intermittent midback pain, 10/10 radiating to stomach from both sides on June 1, 2019 has been resolved. His appetite is improving. He denies nausea, vomiting and diarrhea, skin rashes, joint pain. \par \par 7/31: Reports feeling better since last treatment, no back pain or stomach pain. Had gone to the ER on 7/19 for hyponatremia, was dc'ed after received fluid.  Sodium today improved. Reports some leg cramping.  Reports improved appetite, energy level has improved.  Denies coughing and SOB.  Reports has also gained weight.

## 2019-07-31 NOTE — REASON FOR VISIT
[Follow-Up Visit] : a follow-up [Spouse] : spouse [FreeTextEntry2] : metastatic renal cell carcinoma

## 2019-07-31 NOTE — DISCUSSION/SUMMARY
[FreeTextEntry1] : ISTAT results reveiwed with Dr. Ravi, Sodium 127, chloride 94. Per Dr. Ravi, patient to go to ER- patient completed 1 cycle of combination ipilumab and nivolumab- need to send for possible adrenal insufficieny. Voicemails were left on both phone numbers from the patients chart.

## 2019-07-31 NOTE — REVIEW OF SYSTEMS
[Fatigue] : fatigue [Recent Change In Weight] : ~T recent weight change [Negative] : Allergic/Immunologic [Fever] : no fever [Chills] : no chills [Night Sweats] : no night sweats [Chest Pain] : no chest pain [Cough] : no cough [Shortness Of Breath] : no shortness of breath [Abdominal Pain] : no abdominal pain [Diarrhea] : no diarrhea [Constipation] : no constipation [Skin Rash] : no skin rash [Easy Bruising] : no tendency for easy bruising [Easy Bleeding] : no tendency for easy bleeding

## 2019-07-31 NOTE — ASSESSMENT
[FreeTextEntry1] : Nikolay Gallagher is a 59 years old male who presented with intermittent midback pain, 10/10 radiating to stomach from both sides. CT c/a/p and MRI abdomen showed multiple lung nodules, one liver lesion, retroperitoneal nodes. Bone scan showed one suspicious met in right superior pubic ramus. Liver lesion biopsy is consistent with renal cell carcinoma. It is not certain that the histology of RCC is clear cell vs non-clear cell. IMDC risk of this patient he has 5 out of 6 risks including leukocytosis, anemia, thrombocytosis, time from dx to initiate systemic treatment less than one year and high calcemia. He has poor risk of mRCC. Based on mccRCC the standard of care is combined immunotherapy nivo and ipi vs sutent, checkmate 214 in the intermediate and poor risk of mccRCC patients with significant overall survival benefit, keynote 426 keytruda plus axitinib vs sutent in good, intermediate and poor risk of mccRCC patients showed OS, PFS and MICHEL benefit, [place] in the intermediate and poor risk of mccRCC patients showed cabozantinib demonstrated a significant clinical benefit in PFS and MICHEL over standard-of-care sunitinib as first-line therapy in patients with intermediate- or poor-risk mRCC. Consider the longer duration of response and less side effects I recommended nivo and ipi at this time. I also discussed with him regarding the potential AEs including but not limited to skin rashes, arthritis, colitis, thyroiditis, pneumonitis, hepatitis and nephritis, rarely hypophysitis. His many questions were answered in full to his satisfaction. He agreed to sign the consent. S/P cycle 2 nivo and ipi. His back pain is resolved. His appetite is improving. \par \par Plan \par continue cycle 3 nivo and ipi\par check labs\par RTC in 3 weeks

## 2019-08-02 DIAGNOSIS — R11.2 NAUSEA WITH VOMITING, UNSPECIFIED: ICD-10-CM

## 2019-08-02 DIAGNOSIS — Z51.11 ENCOUNTER FOR ANTINEOPLASTIC CHEMOTHERAPY: ICD-10-CM

## 2019-08-21 ENCOUNTER — APPOINTMENT (OUTPATIENT)
Dept: INFUSION THERAPY | Facility: HOSPITAL | Age: 69
End: 2019-08-21

## 2019-08-21 ENCOUNTER — RESULT REVIEW (OUTPATIENT)
Age: 69
End: 2019-08-21

## 2019-08-21 ENCOUNTER — LABORATORY RESULT (OUTPATIENT)
Age: 69
End: 2019-08-21

## 2019-08-21 ENCOUNTER — APPOINTMENT (OUTPATIENT)
Dept: HEMATOLOGY ONCOLOGY | Facility: CLINIC | Age: 69
End: 2019-08-21

## 2019-08-21 ENCOUNTER — APPOINTMENT (OUTPATIENT)
Dept: HEMATOLOGY ONCOLOGY | Facility: CLINIC | Age: 69
End: 2019-08-21
Payer: MEDICARE

## 2019-08-21 VITALS
SYSTOLIC BLOOD PRESSURE: 130 MMHG | DIASTOLIC BLOOD PRESSURE: 75 MMHG | OXYGEN SATURATION: 100 % | BODY MASS INDEX: 19.87 KG/M2 | RESPIRATION RATE: 16 BRPM | HEART RATE: 57 BPM | WEIGHT: 134.48 LBS | TEMPERATURE: 98.4 F

## 2019-08-21 LAB
BASOPHILS # BLD AUTO: 0 K/UL — SIGNIFICANT CHANGE UP (ref 0–0.2)
BASOPHILS NFR BLD AUTO: 0.3 % — SIGNIFICANT CHANGE UP (ref 0–2)
EOSINOPHIL # BLD AUTO: 0.4 K/UL — SIGNIFICANT CHANGE UP (ref 0–0.5)
EOSINOPHIL NFR BLD AUTO: 6.4 % — HIGH (ref 0–6)
HCT VFR BLD CALC: 31.8 % — LOW (ref 39–50)
HGB BLD-MCNC: 10 G/DL — LOW (ref 13–17)
LYMPHOCYTES # BLD AUTO: 1.5 K/UL — SIGNIFICANT CHANGE UP (ref 1–3.3)
LYMPHOCYTES # BLD AUTO: 22.2 % — SIGNIFICANT CHANGE UP (ref 13–44)
MCHC RBC-ENTMCNC: 26.5 PG — LOW (ref 27–34)
MCHC RBC-ENTMCNC: 31.6 G/DL — LOW (ref 32–36)
MCV RBC AUTO: 84 FL — SIGNIFICANT CHANGE UP (ref 80–100)
MONOCYTES # BLD AUTO: 0.5 K/UL — SIGNIFICANT CHANGE UP (ref 0–0.9)
MONOCYTES NFR BLD AUTO: 7.4 % — SIGNIFICANT CHANGE UP (ref 2–14)
NEUTROPHILS # BLD AUTO: 4.3 K/UL — SIGNIFICANT CHANGE UP (ref 1.8–7.4)
NEUTROPHILS NFR BLD AUTO: 63.7 % — SIGNIFICANT CHANGE UP (ref 43–77)
PLATELET # BLD AUTO: 261 K/UL — SIGNIFICANT CHANGE UP (ref 150–400)
RBC # BLD: 3.78 M/UL — LOW (ref 4.2–5.8)
RBC # FLD: 17.8 % — HIGH (ref 10.3–14.5)
WBC # BLD: 6.7 K/UL — SIGNIFICANT CHANGE UP (ref 3.8–10.5)
WBC # FLD AUTO: 6.7 K/UL — SIGNIFICANT CHANGE UP (ref 3.8–10.5)

## 2019-08-21 PROCEDURE — 99214 OFFICE O/P EST MOD 30 MIN: CPT

## 2019-08-21 RX ORDER — APIXABAN 5 MG/1
TABLET, FILM COATED ORAL
Refills: 0 | Status: DISCONTINUED | COMMUNITY
End: 2019-08-21

## 2019-08-21 NOTE — PHYSICAL EXAM
[Fully active, able to carry on all pre-disease performance without restriction] : Status 0 - Fully active, able to carry on all pre-disease performance without restriction [Thin] : thin [Normal] : normal appearance, no rash, nodules, vesicles, ulcers, erythema

## 2019-08-22 LAB
ALBUMIN SERPL ELPH-MCNC: 4 G/DL
ALP BLD-CCNC: 73 U/L
ALT SERPL-CCNC: 14 U/L
ANION GAP SERPL CALC-SCNC: 13 MMOL/L
AST SERPL-CCNC: 19 U/L
BILIRUB SERPL-MCNC: 0.7 MG/DL
BUN SERPL-MCNC: 12 MG/DL
CALCIUM SERPL-MCNC: 9.6 MG/DL
CHLORIDE SERPL-SCNC: 106 MMOL/L
CO2 SERPL-SCNC: 24 MMOL/L
CREAT SERPL-MCNC: 1.18 MG/DL
GLUCOSE SERPL-MCNC: 104 MG/DL
POTASSIUM SERPL-SCNC: 5.2 MMOL/L
PROT SERPL-MCNC: 7 G/DL
SODIUM SERPL-SCNC: 143 MMOL/L
T4 FREE SERPL-MCNC: 0.9 NG/DL
TSH SERPL-ACNC: 0.7 UIU/ML

## 2019-08-22 NOTE — CONSULT LETTER
[Consult Letter:] : I had the pleasure of evaluating your patient, [unfilled]. [Dear  ___] : Dear  [unfilled], [Please see my note below.] : Please see my note below. [Consult Closing:] : Thank you very much for allowing me to participate in the care of this patient.  If you have any questions, please do not hesitate to contact me. [Sincerely,] : Sincerely, [FreeTextEntry3] : Kip Ravi MD

## 2019-08-22 NOTE — REVIEW OF SYSTEMS
[Fatigue] : fatigue [Recent Change In Weight] : ~T recent weight change [Negative] : Allergic/Immunologic [Fever] : no fever [Chills] : no chills [Night Sweats] : no night sweats [Chest Pain] : no chest pain [Shortness Of Breath] : no shortness of breath [Cough] : no cough [Abdominal Pain] : no abdominal pain [Constipation] : no constipation [Diarrhea] : no diarrhea [Skin Rash] : no skin rash [Easy Bleeding] : no tendency for easy bleeding [Easy Bruising] : no tendency for easy bruising [FreeTextEntry2] : gained 4 kg

## 2019-08-22 NOTE — HISTORY OF PRESENT ILLNESS
[T: ___] : T[unfilled] [N: ___] : N[unfilled] [M: ___] : M[unfilled] [AJCC Stage: ____] : AJCC Stage: [unfilled] [Therapy: ___] : Therapy: [unfilled] [Date: ____________] : Patient's last distress assessment performed on [unfilled]. [0 - No Distress] : Distress Level: 0 [de-identified] : Finesse Zuniga is a 69 years old male who initially presented with intermittent midback pain, 10/10 radiating to stomach from both sides on June 1, 2019. He has poor appetite.  On june 12 he was diagnosed UTI and on cipro for 4 days, on June 16, was started cefpodoxime 100mg BID for 7 days.  CT chest/abdomen/pelvis revealed significant abnormal appearance of the left kidney with multiple heterogeneous areas of low attenuation involving the upper and midpole which may represent malignancy. There are necrotic lymph nodes in the left para-aortic location at the level of the kidney and below the level of the kidney. Adenopathy surrounds the left renal artery. The left renal vein demonstrates question of partially occlusive thrombus. Nonspecific scattered tiny pulmonary nodules. Lower lobe subpleural nodules measure up to 6 mm. There is a well-circumscribed hypodense lesion in the anterior aspect of the lateral segment of the left lobe measuring 3.2 x 2.2 cm. He underwent CT guided liver lesion biopsy which showed consistent with primary renal cell carcinoma, IHC CK7/CK19/PAX8 positive. MRI abdomen waw con showed multiple bilateral pulmonary nodules increased in size and number compared with recent CT. The largest is pleural-based in the left lower lobe and measures 1.5 x 1.1 cm. LIVER: Rim-enhancing subcapsular mass in segment 3 and measures 4.4 x 2.6 cm. A 4.2cm mass in mid to lower pole of the lft kidney suspicious for urothelial malignancy arising within the left renal collecting system. Regional lymphadenopathy and hepatic and pulmonary metastases. Nonocclusive thrombus within the left renal vein. He was started on eliquis. Bone scan showed  a focus of mildly increased uptake in the right superior \par pubic ramus suspicious of bone mets.\par \par He has lost 17lbs in one month. He denies nausea, vomiting and constipation and diarrhea.  [de-identified] : metastatic renal cell carcinoma [de-identified] : 7/19/19 s/p cycle 1 nivo and ipi, he reports that his intermittent midback pain, 10/10 radiating to stomach from both sides on June 1, 2019 has been resolved. His appetite is improving. He denies nausea, vomiting and diarrhea, skin rashes, joint pain. \par \par 7/31: Reports feeling better since last treatment, no back pain or stomach pain. Had gone to the ER on 7/19 for hyponatremia, was dc'ed after received fluid.  Sodium today improved. Reports some leg cramping.  Reports improved appetite, energy level has improved.  Denies coughing and SOB.  Reports has also gained weight.\par \par 8/21/19 : Week 7 ipi + nivo. Doing well without any AEs. He is able to eat and feels well and

## 2019-08-22 NOTE — ASSESSMENT
[FreeTextEntry1] : Nikolay Gallagher is a 59 years old male who presented with intermittent midback pain, 10/10 radiating to stomach from both sides. CT c/a/p and MRI abdomen showed multiple lung nodules, one liver lesion, retroperitoneal nodes. Bone scan showed one suspicious met in right superior pubic ramus. Liver lesion biopsy is consistent with renal cell carcinoma. It is not certain that the histology of RCC is clear cell vs non-clear cell. IMDC risk of this patient he has 5 out of 6 risks including leukocytosis, anemia, thrombocytosis, time from dx to initiate systemic treatment less than one year and high calcemia. He has poor risk of mRCC. Based on [facility] the standard of care is combined immunotherapy nivo and ipi vs sutent, checkmate 214 in the intermediate and poor risk of [facility] patients with significant overall survival benefit, keynote 426 keytruda plus axitinib vs sutent in good, intermediate and poor risk of [facility] patients showed OS, PFS and MICHEL benefit, [place] in the intermediate and poor risk of [facility] patients showed cabozantinib demonstrated a significant clinical benefit in PFS and MICHEL over standard-of-care sunitinib as first-line therapy in patients with intermediate- or poor-risk mRCC. Consider the longer duration of response and less side effects I recommended nivo and ipi at this time. I also discussed with him regarding the potential AEs including but not limited to skin rashes, arthritis, colitis, thyroiditis, pneumonitis, hepatitis and nephritis, rarely hypophysitis. His many questions were answered in full to his satisfaction. He agreed to sign the consent. S/P cycle 2 nivo and ipi. His back pain is resolved. His appetite is improving. \par \par Plan \par continue cycle 3 nivo and ipi \par CT post week 10\par labs WNL today to treat. \par RTC in 3 weeks for rx and to see Dr. Ravi.\par \par \par Reid Gonzales, ANP-BC

## 2019-08-22 NOTE — REVIEW OF SYSTEMS
[Fatigue] : fatigue [Recent Change In Weight] : ~T recent weight change [Negative] : Heme/Lymph [Fever] : no fever [Night Sweats] : no night sweats [Chills] : no chills [Shortness Of Breath] : no shortness of breath [Chest Pain] : no chest pain [Cough] : no cough [Abdominal Pain] : no abdominal pain [Constipation] : no constipation [Diarrhea] : no diarrhea [Skin Rash] : no skin rash [Easy Bleeding] : no tendency for easy bleeding [Easy Bruising] : no tendency for easy bruising

## 2019-08-22 NOTE — HISTORY OF PRESENT ILLNESS
[T: ___] : T[unfilled] [N: ___] : N[unfilled] [AJCC Stage: ____] : AJCC Stage: [unfilled] [M: ___] : M[unfilled] [Date: ____________] : Patient's last distress assessment performed on [unfilled]. [0 - No Distress] : Distress Level: 0 [de-identified] : Finesse Zuniga is a 69 years old male who initially presented with intermittent midback pain, 10/10 radiating to stomach from both sides on June 1, 2019. He has poor appetite.  On june 12 he was diagnosed UTI and on cipro for 4 days, on June 16, was started cefpodoxime 100mg BID for 7 days.  CT chest/abdomen/pelvis revealed significant abnormal appearance of the left kidney with multiple heterogeneous areas of low attenuation involving the upper and midpole which may represent malignancy. There are necrotic lymph nodes in the left para-aortic location at the level of the kidney and below the level of the kidney. Adenopathy surrounds the left renal artery. The left renal vein demonstrates question of partially occlusive thrombus. Nonspecific scattered tiny pulmonary nodules. Lower lobe subpleural nodules measure up to 6 mm. There is a well-circumscribed hypodense lesion in the anterior aspect of the lateral segment of the left lobe measuring 3.2 x 2.2 cm. He underwent CT guided liver lesion biopsy which showed consistent with primary renal cell carcinoma, IHC CK7/CK19/PAX8 positive. MRI abdomen waw con showed multiple bilateral pulmonary nodules increased in size and number compared with recent CT. The largest is pleural-based in the left lower lobe and measures 1.5 x 1.1 cm. LIVER: Rim-enhancing subcapsular mass in segment 3 and measures 4.4 x 2.6 cm. A 4.2cm mass in mid to lower pole of the lft kidney suspicious for urothelial malignancy arising within the left renal collecting system. Regional lymphadenopathy and hepatic and pulmonary metastases. Nonocclusive thrombus within the left renal vein. He was started on eliquis. Bone scan showed  a focus of mildly increased uptake in the right superior \par pubic ramus suspicious of bone mets.\par \par He has lost 17lbs in one month. He denies nausea, vomiting and constipation and diarrhea.  [de-identified] : metastatic renal cell carcinoma [de-identified] : 7/19/19 s/p cycle 1 nivo and ipi, he reports that his intermittent midback pain, 10/10 radiating to stomach from both sides on June 1, 2019 has been resolved. His appetite is improving. He denies nausea, vomiting and diarrhea, skin rashes, joint pain. \par \par 7/31: Reports feeling better since last treatment, no back pain or stomach pain. Had gone to the ER on 7/19 for hyponatremia, was dc'ed after received fluid.  Sodium today improved. Reports some leg cramping.  Reports improved appetite, energy level has improved.  Denies coughing and SOB.  Reports has also gained weight.\par \par 8/21/19 : Here for week 7 of ipilimiumab + nivolumab. Has been feeling much improved, gained 4 kg since his last visit. His energy is good and is able to perform ADLs and do chores at home now. No ha, dizziness, sob, cough, chest pain, nausea, vomiting, diarrhea. \par There is no change in past medical hx, family hx, and social history since our last visit.\par \par \par

## 2019-09-09 ENCOUNTER — OUTPATIENT (OUTPATIENT)
Dept: OUTPATIENT SERVICES | Facility: HOSPITAL | Age: 69
LOS: 1 days | Discharge: HOME | End: 2019-09-09

## 2019-09-09 DIAGNOSIS — C64.9 MALIGNANT NEOPLASM OF UNSPECIFIED KIDNEY, EXCEPT RENAL PELVIS: ICD-10-CM

## 2019-09-09 DIAGNOSIS — S86.019A STRAIN OF UNSPECIFIED ACHILLES TENDON, INITIAL ENCOUNTER: Chronic | ICD-10-CM

## 2019-09-09 DIAGNOSIS — Z98.890 OTHER SPECIFIED POSTPROCEDURAL STATES: Chronic | ICD-10-CM

## 2019-09-11 ENCOUNTER — RESULT REVIEW (OUTPATIENT)
Age: 69
End: 2019-09-11

## 2019-09-11 ENCOUNTER — LABORATORY RESULT (OUTPATIENT)
Age: 69
End: 2019-09-11

## 2019-09-11 ENCOUNTER — APPOINTMENT (OUTPATIENT)
Dept: HEMATOLOGY ONCOLOGY | Facility: CLINIC | Age: 69
End: 2019-09-11
Payer: MEDICARE

## 2019-09-11 ENCOUNTER — APPOINTMENT (OUTPATIENT)
Dept: INFUSION THERAPY | Facility: HOSPITAL | Age: 69
End: 2019-09-11

## 2019-09-11 VITALS
RESPIRATION RATE: 15 BRPM | OXYGEN SATURATION: 100 % | TEMPERATURE: 99 F | DIASTOLIC BLOOD PRESSURE: 78 MMHG | BODY MASS INDEX: 20.85 KG/M2 | WEIGHT: 141.07 LBS | SYSTOLIC BLOOD PRESSURE: 130 MMHG | HEART RATE: 65 BPM

## 2019-09-11 LAB
BASOPHILS # BLD AUTO: 0 K/UL — SIGNIFICANT CHANGE UP (ref 0–0.2)
BASOPHILS NFR BLD AUTO: 0.2 % — SIGNIFICANT CHANGE UP (ref 0–2)
EOSINOPHIL # BLD AUTO: 0.3 K/UL — SIGNIFICANT CHANGE UP (ref 0–0.5)
EOSINOPHIL NFR BLD AUTO: 2.2 % — SIGNIFICANT CHANGE UP (ref 0–6)
HCT VFR BLD CALC: 34.6 % — LOW (ref 39–50)
HGB BLD-MCNC: 11.1 G/DL — LOW (ref 13–17)
LYMPHOCYTES # BLD AUTO: 1.4 K/UL — SIGNIFICANT CHANGE UP (ref 1–3.3)
LYMPHOCYTES # BLD AUTO: 11.4 % — LOW (ref 13–44)
MCHC RBC-ENTMCNC: 26.9 PG — LOW (ref 27–34)
MCHC RBC-ENTMCNC: 32 G/DL — SIGNIFICANT CHANGE UP (ref 32–36)
MCV RBC AUTO: 84.1 FL — SIGNIFICANT CHANGE UP (ref 80–100)
MONOCYTES # BLD AUTO: 0.6 K/UL — SIGNIFICANT CHANGE UP (ref 0–0.9)
MONOCYTES NFR BLD AUTO: 4.7 % — SIGNIFICANT CHANGE UP (ref 2–14)
NEUTROPHILS # BLD AUTO: 10.3 K/UL — HIGH (ref 1.8–7.4)
NEUTROPHILS NFR BLD AUTO: 81.5 % — HIGH (ref 43–77)
PLATELET # BLD AUTO: 264 K/UL — SIGNIFICANT CHANGE UP (ref 150–400)
RBC # BLD: 4.12 M/UL — LOW (ref 4.2–5.8)
RBC # FLD: 16.5 % — HIGH (ref 10.3–14.5)
WBC # BLD: 12.6 K/UL — HIGH (ref 3.8–10.5)
WBC # FLD AUTO: 12.6 K/UL — HIGH (ref 3.8–10.5)

## 2019-09-11 PROCEDURE — 99214 OFFICE O/P EST MOD 30 MIN: CPT

## 2019-09-11 NOTE — HISTORY OF PRESENT ILLNESS
[T: ___] : T[unfilled] [N: ___] : N[unfilled] [M: ___] : M[unfilled] [AJCC Stage: ____] : AJCC Stage: [unfilled] [Date: ____________] : Patient's last distress assessment performed on [unfilled]. [0 - No Distress] : Distress Level: 0 [de-identified] : Finesse Zuniga is a 69 years old male who initially presented with intermittent midback pain, 10/10 radiating to stomach from both sides on June 1, 2019. He has poor appetite.  On june 12 he was diagnosed UTI and on cipro for 4 days, on June 16, was started cefpodoxime 100mg BID for 7 days.  CT chest/abdomen/pelvis revealed significant abnormal appearance of the left kidney with multiple heterogeneous areas of low attenuation involving the upper and midpole which may represent malignancy. There are necrotic lymph nodes in the left para-aortic location at the level of the kidney and below the level of the kidney. Adenopathy surrounds the left renal artery. The left renal vein demonstrates question of partially occlusive thrombus. Nonspecific scattered tiny pulmonary nodules. Lower lobe subpleural nodules measure up to 6 mm. There is a well-circumscribed hypodense lesion in the anterior aspect of the lateral segment of the left lobe measuring 3.2 x 2.2 cm. He underwent CT guided liver lesion biopsy which showed consistent with primary renal cell carcinoma, IHC CK7/CK19/PAX8 positive. MRI abdomen waw con showed multiple bilateral pulmonary nodules increased in size and number compared with recent CT. The largest is pleural-based in the left lower lobe and measures 1.5 x 1.1 cm. LIVER: Rim-enhancing subcapsular mass in segment 3 and measures 4.4 x 2.6 cm. A 4.2cm mass in mid to lower pole of the lft kidney suspicious for urothelial malignancy arising within the left renal collecting system. Regional lymphadenopathy and hepatic and pulmonary metastases. Nonocclusive thrombus within the left renal vein. He was started on eliquis. Bone scan showed  a focus of mildly increased uptake in the right superior \par pubic ramus suspicious of bone mets.\par \par He has lost 17lbs in one month. He denies nausea, vomiting and constipation and diarrhea.  [de-identified] : metastatic renal cell carcinoma [de-identified] : 7/19/19 s/p cycle 1 nivo and ipi, he reports that his intermittent midback pain, 10/10 radiating to stomach from both sides on June 1, 2019 has been resolved. His appetite is improving. He denies nausea, vomiting and diarrhea, skin rashes, joint pain. \par \par 7/31: Reports feeling better since last treatment, no back pain or stomach pain. Had gone to the ER on 7/19 for hyponatremia, was dc'ed after received fluid.  Sodium today improved. Reports some leg cramping.  Reports improved appetite, energy level has improved.  Denies coughing and SOB.  Reports has also gained weight.\par \par 8/21/19 : Here for week 7 of cycle 2 ipilimiumab + nivolumab. Has been feeling much improved, gained 4 kg since his last visit. His energy is good and is able to perform ADLs and do chores at home now. No ha, dizziness, sob, cough, chest pain, nausea, vomiting, diarrhea. There is no change in past medical hx, family hx, and social history since our last visit.\par \par 9/11/2019: s/p cycle 3, he feels completely fine, his energy level is back to normal. He has done normal ADLs. He denies skin rashes, joint pain, diarrhea.

## 2019-09-11 NOTE — REVIEW OF SYSTEMS
[Recent Change In Weight] : ~T recent weight change [Negative] : Heme/Lymph [Fever] : no fever [Chills] : no chills [Night Sweats] : no night sweats [Fatigue] : no fatigue [Shortness Of Breath] : no shortness of breath [Chest Pain] : no chest pain [Cough] : no cough [Abdominal Pain] : no abdominal pain [Constipation] : no constipation [Diarrhea] : no diarrhea [Skin Rash] : no skin rash [Easy Bleeding] : no tendency for easy bleeding [FreeTextEntry2] : gained 4 kg [Easy Bruising] : no tendency for easy bruising

## 2019-09-11 NOTE — ASSESSMENT
[FreeTextEntry1] : Nikolay Gallagher is a 59 years old male who presented with intermittent midback pain, 10/10 radiating to stomach from both sides. CT c/a/p and MRI abdomen showed multiple lung nodules, one liver lesion, retroperitoneal nodes. Bone scan showed one suspicious met in right superior pubic ramus. Liver lesion biopsy is consistent with renal cell carcinoma. It is not certain that the histology of RCC is clear cell vs non-clear cell. IMDC risk of this patient he has 5 out of 6 risks including leukocytosis, anemia, thrombocytosis, time from dx to initiate systemic treatment less than one year and high calcemia. He has poor risk of mRCC. Based on [facility] the standard of care is combined immunotherapy nivo and ipi vs sutent, checkmate 214 in the intermediate and poor risk of [facility] patients with significant overall survival benefit, keynote 426 keytruda plus axitinib vs sutent in good, intermediate and poor risk of [facility] patients showed OS, PFS and MICHEL benefit, [place] in the intermediate and poor risk of [facility] patients showed cabozantinib demonstrated a significant clinical benefit in PFS and MICHEL over standard-of-care sunitinib as first-line therapy in patients with intermediate- or poor-risk mRCC. Consider the longer duration of response and less side effects I recommended nivo and ipi at this time. I also discussed with him regarding the potential AEs including but not limited to skin rashes, arthritis, colitis, thyroiditis, pneumonitis, hepatitis and nephritis, rarely hypophysitis. His many questions were answered in full to his satisfaction. He agreed to sign the consent. S/P cycle 3 nivo and ipi. His back pain is resolved. His appetite is normal. \par \par Plan \par continue cycle 4 nivo and ipi \par will have interval scans next visit\par check labs \par RTC in 3 weeks

## 2019-09-12 DIAGNOSIS — R11.2 NAUSEA WITH VOMITING, UNSPECIFIED: ICD-10-CM

## 2019-09-12 DIAGNOSIS — Z51.11 ENCOUNTER FOR ANTINEOPLASTIC CHEMOTHERAPY: ICD-10-CM

## 2019-10-02 ENCOUNTER — RESULT REVIEW (OUTPATIENT)
Age: 69
End: 2019-10-02

## 2019-10-02 ENCOUNTER — LABORATORY RESULT (OUTPATIENT)
Age: 69
End: 2019-10-02

## 2019-10-02 ENCOUNTER — APPOINTMENT (OUTPATIENT)
Dept: INFUSION THERAPY | Facility: HOSPITAL | Age: 69
End: 2019-10-02

## 2019-10-02 LAB
BASOPHILS # BLD AUTO: 0 K/UL — SIGNIFICANT CHANGE UP (ref 0–0.2)
BASOPHILS NFR BLD AUTO: 0.4 % — SIGNIFICANT CHANGE UP (ref 0–2)
EOSINOPHIL # BLD AUTO: 0.1 K/UL — SIGNIFICANT CHANGE UP (ref 0–0.5)
EOSINOPHIL NFR BLD AUTO: 2 % — SIGNIFICANT CHANGE UP (ref 0–6)
HCT VFR BLD CALC: 35.2 % — LOW (ref 39–50)
HGB BLD-MCNC: 11.5 G/DL — LOW (ref 13–17)
LYMPHOCYTES # BLD AUTO: 1.8 K/UL — SIGNIFICANT CHANGE UP (ref 1–3.3)
LYMPHOCYTES # BLD AUTO: 24.7 % — SIGNIFICANT CHANGE UP (ref 13–44)
MCHC RBC-ENTMCNC: 27.3 PG — SIGNIFICANT CHANGE UP (ref 27–34)
MCHC RBC-ENTMCNC: 32.7 G/DL — SIGNIFICANT CHANGE UP (ref 32–36)
MCV RBC AUTO: 83.5 FL — SIGNIFICANT CHANGE UP (ref 80–100)
MONOCYTES # BLD AUTO: 0.7 K/UL — SIGNIFICANT CHANGE UP (ref 0–0.9)
MONOCYTES NFR BLD AUTO: 9.8 % — SIGNIFICANT CHANGE UP (ref 2–14)
NEUTROPHILS # BLD AUTO: 4.6 K/UL — SIGNIFICANT CHANGE UP (ref 1.8–7.4)
NEUTROPHILS NFR BLD AUTO: 63.1 % — SIGNIFICANT CHANGE UP (ref 43–77)
PLATELET # BLD AUTO: 237 K/UL — SIGNIFICANT CHANGE UP (ref 150–400)
RBC # BLD: 4.21 M/UL — SIGNIFICANT CHANGE UP (ref 4.2–5.8)
RBC # FLD: 15.5 % — HIGH (ref 10.3–14.5)
WBC # BLD: 7.2 K/UL — SIGNIFICANT CHANGE UP (ref 3.8–10.5)
WBC # FLD AUTO: 7.2 K/UL — SIGNIFICANT CHANGE UP (ref 3.8–10.5)

## 2019-10-14 ENCOUNTER — OUTPATIENT (OUTPATIENT)
Dept: OUTPATIENT SERVICES | Facility: HOSPITAL | Age: 69
LOS: 1 days | Discharge: HOME | End: 2019-10-14

## 2019-10-14 DIAGNOSIS — C64.9 MALIGNANT NEOPLASM OF UNSPECIFIED KIDNEY, EXCEPT RENAL PELVIS: ICD-10-CM

## 2019-10-14 DIAGNOSIS — Z98.890 OTHER SPECIFIED POSTPROCEDURAL STATES: Chronic | ICD-10-CM

## 2019-10-14 DIAGNOSIS — S86.019A STRAIN OF UNSPECIFIED ACHILLES TENDON, INITIAL ENCOUNTER: Chronic | ICD-10-CM

## 2019-10-16 ENCOUNTER — APPOINTMENT (OUTPATIENT)
Dept: HEMATOLOGY ONCOLOGY | Facility: CLINIC | Age: 69
End: 2019-10-16
Payer: MEDICARE

## 2019-10-16 VITALS
BODY MASS INDEX: 21.89 KG/M2 | SYSTOLIC BLOOD PRESSURE: 136 MMHG | OXYGEN SATURATION: 100 % | DIASTOLIC BLOOD PRESSURE: 80 MMHG | RESPIRATION RATE: 16 BRPM | HEART RATE: 65 BPM | WEIGHT: 148.15 LBS | TEMPERATURE: 97.7 F

## 2019-10-16 PROCEDURE — 99214 OFFICE O/P EST MOD 30 MIN: CPT

## 2019-10-16 NOTE — HISTORY OF PRESENT ILLNESS
[T: ___] : T[unfilled] [N: ___] : N[unfilled] [AJCC Stage: ____] : AJCC Stage: [unfilled] [M: ___] : M[unfilled] [Date: ____________] : Patient's last distress assessment performed on [unfilled]. [0 - No Distress] : Distress Level: 0 [de-identified] : Finesse Zuniga is a 69 years old male who initially presented with intermittent midback pain, 10/10 radiating to stomach from both sides on June 1, 2019. He has poor appetite.  On june 12 he was diagnosed UTI and on cipro for 4 days, on June 16, was started cefpodoxime 100mg BID for 7 days.  CT chest/abdomen/pelvis revealed significant abnormal appearance of the left kidney with multiple heterogeneous areas of low attenuation involving the upper and midpole which may represent malignancy. There are necrotic lymph nodes in the left para-aortic location at the level of the kidney and below the level of the kidney. Adenopathy surrounds the left renal artery. The left renal vein demonstrates question of partially occlusive thrombus. Nonspecific scattered tiny pulmonary nodules. Lower lobe subpleural nodules measure up to 6 mm. There is a well-circumscribed hypodense lesion in the anterior aspect of the lateral segment of the left lobe measuring 3.2 x 2.2 cm. He underwent CT guided liver lesion biopsy which showed consistent with primary renal cell carcinoma, IHC CK7/CK19/PAX8 positive. MRI abdomen waw con showed multiple bilateral pulmonary nodules increased in size and number compared with recent CT. The largest is pleural-based in the left lower lobe and measures 1.5 x 1.1 cm. LIVER: Rim-enhancing subcapsular mass in segment 3 and measures 4.4 x 2.6 cm. A 4.2cm mass in mid to lower pole of the lft kidney suspicious for urothelial malignancy arising within the left renal collecting system. Regional lymphadenopathy and hepatic and pulmonary metastases. Nonocclusive thrombus within the left renal vein. He was started on eliquis. Bone scan showed  a focus of mildly increased uptake in the right superior \par pubic ramus suspicious of bone mets.\par \par He has lost 17lbs in one month. He denies nausea, vomiting and constipation and diarrhea.  [de-identified] : metastatic renal cell carcinoma [de-identified] : 7/19/19 s/p cycle 1 nivo and ipi, he reports that his intermittent midback pain, 10/10 radiating to stomach from both sides on June 1, 2019 has been resolved. His appetite is improving. He denies nausea, vomiting and diarrhea, skin rashes, joint pain. \par \par 7/31: Reports feeling better since last treatment, no back pain or stomach pain. Had gone to the ER on 7/19 for hyponatremia, was dc'ed after received fluid.  Sodium today improved. Reports some leg cramping.  Reports improved appetite, energy level has improved.  Denies coughing and SOB.  Reports has also gained weight.\par \par 8/21/19 : Here for week 7 of cycle 2 ipilimiumab + nivolumab. Has been feeling much improved, gained 4 kg since his last visit. His energy is good and is able to perform ADLs and do chores at home now. No ha, dizziness, sob, cough, chest pain, nausea, vomiting, diarrhea. There is no change in past medical hx, family hx, and social history since our last visit.\par \par 9/11/2019: s/p cycle 3, he feels completely fine, his energy level is back to normal. He has done normal ADLs. He denies skin rashes, joint pain, diarrhea. \par \par 10/16/2019 s/p cycle 4, he feels completely fine, his energy level is back to normal. He has done normal ADLs. He denies skin rashes, joint pain, diarrhea.

## 2019-10-16 NOTE — REVIEW OF SYSTEMS
[Recent Change In Weight] : ~T recent weight change [Negative] : Allergic/Immunologic [Fever] : no fever [Chills] : no chills [Night Sweats] : no night sweats [Fatigue] : no fatigue [Chest Pain] : no chest pain [Shortness Of Breath] : no shortness of breath [Cough] : no cough [Abdominal Pain] : no abdominal pain [Constipation] : no constipation [Diarrhea] : no diarrhea [Skin Rash] : no skin rash [Easy Bleeding] : no tendency for easy bleeding [Easy Bruising] : no tendency for easy bruising [FreeTextEntry2] : gained 12 kg

## 2019-10-16 NOTE — ASSESSMENT
[FreeTextEntry1] : Nikolay Gallagher is a 59 years old male who presented with intermittent midback pain, 10/10 radiating to stomach from both sides. CT c/a/p and MRI abdomen showed multiple lung nodules, one liver lesion, retroperitoneal nodes. Bone scan showed one suspicious met in right superior pubic ramus. Liver lesion biopsy is consistent with renal cell carcinoma. It is not certain that the histology of RCC is clear cell vs non-clear cell. IMDC risk of this patient he has 5 out of 6 risks including leukocytosis, anemia, thrombocytosis, time from dx to initiate systemic treatment less than one year and high calcemia. He has poor risk of mRCC. Based on [facility] the standard of care is combined immunotherapy nivo and ipi vs sutent, checkmate 214 in the intermediate and poor risk of [facility] patients with significant overall survival benefit, keynote 426 keytruda plus axitinib vs sutent in good, intermediate and poor risk of [facility] patients showed OS, PFS and MICHEL benefit, [place] in the intermediate and poor risk of [facility] patients showed cabozantinib demonstrated a significant clinical benefit in PFS and MICHEL over standard-of-care sunitinib as first-line therapy in patients with intermediate- or poor-risk mRCC. Consider the longer duration of response and less side effects I recommended nivo and ipi at this time. I also discussed with him regarding the potential AEs including but not limited to skin rashes, arthritis, colitis, thyroiditis, pneumonitis, hepatitis and nephritis, rarely hypophysitis. His many questions were answered in full to his satisfaction. He agreed to sign the consent. S/P cycle 4 nivo and ipi. His back pain was resolved. His appetite is normal. \par \par Plan \par continue maintenance nivolumab 480mg IV monthly\par will have interval scans now\par check labs \par RTC in 4 weeks

## 2019-10-30 ENCOUNTER — LABORATORY RESULT (OUTPATIENT)
Age: 69
End: 2019-10-30

## 2019-10-30 ENCOUNTER — RESULT REVIEW (OUTPATIENT)
Age: 69
End: 2019-10-30

## 2019-10-30 ENCOUNTER — APPOINTMENT (OUTPATIENT)
Dept: INFUSION THERAPY | Facility: HOSPITAL | Age: 69
End: 2019-10-30

## 2019-10-30 LAB
BASOPHILS # BLD AUTO: 0 K/UL — SIGNIFICANT CHANGE UP (ref 0–0.2)
BASOPHILS NFR BLD AUTO: 0.6 % — SIGNIFICANT CHANGE UP (ref 0–2)
EOSINOPHIL # BLD AUTO: 0.2 K/UL — SIGNIFICANT CHANGE UP (ref 0–0.5)
EOSINOPHIL NFR BLD AUTO: 3 % — SIGNIFICANT CHANGE UP (ref 0–6)
HCT VFR BLD CALC: 36 % — LOW (ref 39–50)
HGB BLD-MCNC: 12.3 G/DL — LOW (ref 13–17)
LYMPHOCYTES # BLD AUTO: 1.6 K/UL — SIGNIFICANT CHANGE UP (ref 1–3.3)
LYMPHOCYTES # BLD AUTO: 19.5 % — SIGNIFICANT CHANGE UP (ref 13–44)
MCHC RBC-ENTMCNC: 28.1 PG — SIGNIFICANT CHANGE UP (ref 27–34)
MCHC RBC-ENTMCNC: 34.3 G/DL — SIGNIFICANT CHANGE UP (ref 32–36)
MCV RBC AUTO: 81.9 FL — SIGNIFICANT CHANGE UP (ref 80–100)
MONOCYTES # BLD AUTO: 0.7 K/UL — SIGNIFICANT CHANGE UP (ref 0–0.9)
MONOCYTES NFR BLD AUTO: 8.7 % — SIGNIFICANT CHANGE UP (ref 2–14)
NEUTROPHILS # BLD AUTO: 5.5 K/UL — SIGNIFICANT CHANGE UP (ref 1.8–7.4)
NEUTROPHILS NFR BLD AUTO: 68.3 % — SIGNIFICANT CHANGE UP (ref 43–77)
PLATELET # BLD AUTO: 207 K/UL — SIGNIFICANT CHANGE UP (ref 150–400)
RBC # BLD: 4.39 M/UL — SIGNIFICANT CHANGE UP (ref 4.2–5.8)
RBC # FLD: 14.3 % — SIGNIFICANT CHANGE UP (ref 10.3–14.5)
WBC # BLD: 8.1 K/UL — SIGNIFICANT CHANGE UP (ref 3.8–10.5)
WBC # FLD AUTO: 8.1 K/UL — SIGNIFICANT CHANGE UP (ref 3.8–10.5)

## 2019-10-31 DIAGNOSIS — Z51.11 ENCOUNTER FOR ANTINEOPLASTIC CHEMOTHERAPY: ICD-10-CM

## 2019-11-13 ENCOUNTER — FORM ENCOUNTER (OUTPATIENT)
Age: 69
End: 2019-11-13

## 2019-11-13 ENCOUNTER — APPOINTMENT (OUTPATIENT)
Dept: HEMATOLOGY ONCOLOGY | Facility: CLINIC | Age: 69
End: 2019-11-13
Payer: MEDICARE

## 2019-11-13 VITALS
BODY MASS INDEX: 22.38 KG/M2 | TEMPERATURE: 98.2 F | RESPIRATION RATE: 15 BRPM | OXYGEN SATURATION: 100 % | WEIGHT: 151.46 LBS | HEART RATE: 69 BPM | DIASTOLIC BLOOD PRESSURE: 82 MMHG | SYSTOLIC BLOOD PRESSURE: 136 MMHG

## 2019-11-13 PROCEDURE — 99214 OFFICE O/P EST MOD 30 MIN: CPT

## 2019-11-13 NOTE — ASSESSMENT
[FreeTextEntry1] : Nikolay Gallagher is a 59 years old male who presented with intermittent midback pain, 10/10 radiating to stomach from both sides. CT c/a/p and MRI abdomen showed multiple lung nodules, one liver lesion, retroperitoneal nodes. Bone scan showed one suspicious met in right superior pubic ramus. Liver lesion biopsy is consistent with renal cell carcinoma. It is not certain that the histology of RCC is clear cell vs non-clear cell. IMDC risk of this patient he has 5 out of 6 risks including leukocytosis, anemia, thrombocytosis, time from dx to initiate systemic treatment less than one year and high calcemia. He has poor risk of mRCC. Based on [facility] the standard of care is combined immunotherapy nivo and ipi vs sutent, checkmate 214 in the intermediate and poor risk of [facility] patients with significant overall survival benefit, keynote 426 keytruda plus axitinib vs sutent in good, intermediate and poor risk of [facility] patients showed OS, PFS and MICHEL benefit, [place] in the intermediate and poor risk of [facility] patients showed cabozantinib demonstrated a significant clinical benefit in PFS and MICHEL over standard-of-care sunitinib as first-line therapy in patients with intermediate- or poor-risk mRCC. Consider the longer duration of response and less side effects I recommended nivo and ipi at this time. I also discussed with him regarding the potential AEs including but not limited to skin rashes, arthritis, colitis, thyroiditis, pneumonitis, hepatitis and nephritis, rarely hypophysitis. His many questions were answered in full to his satisfaction. He agreed to sign the consent. S/P cycle 4 nivo and ipi. His back pain was resolved. His appetite is normal. He is on nivolumab maintenance treatment. \par \par Plan \par continue maintenance nivolumab 480mg IV monthly\par will have interval scans now\par check labs \par RTC in 4 weeks

## 2019-11-13 NOTE — HISTORY OF PRESENT ILLNESS
[T: ___] : T[unfilled] [N: ___] : N[unfilled] [M: ___] : M[unfilled] [AJCC Stage: ____] : AJCC Stage: [unfilled] [Date: ____________] : Patient's last distress assessment performed on [unfilled]. [0 - No Distress] : Distress Level: 0 [de-identified] : metastatic renal cell carcinoma [de-identified] : Finesse Zuniga is a 69 years old male who initially presented with intermittent midback pain, 10/10 radiating to stomach from both sides on June 1, 2019. He has poor appetite.  On june 12 he was diagnosed UTI and on cipro for 4 days, on June 16, was started cefpodoxime 100mg BID for 7 days.  CT chest/abdomen/pelvis revealed significant abnormal appearance of the left kidney with multiple heterogeneous areas of low attenuation involving the upper and midpole which may represent malignancy. There are necrotic lymph nodes in the left para-aortic location at the level of the kidney and below the level of the kidney. Adenopathy surrounds the left renal artery. The left renal vein demonstrates question of partially occlusive thrombus. Nonspecific scattered tiny pulmonary nodules. Lower lobe subpleural nodules measure up to 6 mm. There is a well-circumscribed hypodense lesion in the anterior aspect of the lateral segment of the left lobe measuring 3.2 x 2.2 cm. He underwent CT guided liver lesion biopsy which showed consistent with primary renal cell carcinoma, IHC CK7/CK19/PAX8 positive. MRI abdomen waw con showed multiple bilateral pulmonary nodules increased in size and number compared with recent CT. The largest is pleural-based in the left lower lobe and measures 1.5 x 1.1 cm. LIVER: Rim-enhancing subcapsular mass in segment 3 and measures 4.4 x 2.6 cm. A 4.2cm mass in mid to lower pole of the lft kidney suspicious for urothelial malignancy arising within the left renal collecting system. Regional lymphadenopathy and hepatic and pulmonary metastases. Nonocclusive thrombus within the left renal vein. He was started on eliquis. Bone scan showed  a focus of mildly increased uptake in the right superior \par pubic ramus suspicious of bone mets.\par \par He has lost 17lbs in one month. He denies nausea, vomiting and constipation and diarrhea.  [de-identified] : 7/19/19 s/p cycle 1 nivo and ipi, he reports that his intermittent midback pain, 10/10 radiating to stomach from both sides on June 1, 2019 has been resolved. His appetite is improving. He denies nausea, vomiting and diarrhea, skin rashes, joint pain. \par \par 7/31: Reports feeling better since last treatment, no back pain or stomach pain. Had gone to the ER on 7/19 for hyponatremia, was dc'ed after received fluid.  Sodium today improved. Reports some leg cramping.  Reports improved appetite, energy level has improved.  Denies coughing and SOB.  Reports has also gained weight.\par \par 8/21/19 : Here for week 7 of cycle 2 ipilimiumab + nivolumab. Has been feeling much improved, gained 4 kg since his last visit. His energy is good and is able to perform ADLs and do chores at home now. No ha, dizziness, sob, cough, chest pain, nausea, vomiting, diarrhea. There is no change in past medical hx, family hx, and social history since our last visit.\par \par 9/11/2019: s/p cycle 3, he feels completely fine, his energy level is back to normal. He has done normal ADLs. He denies skin rashes, joint pain, diarrhea. \par \par 10/16/2019 s/p cycle 4, he feels completely fine, his energy level is back to normal. He has done normal ADLs. He denies skin rashes, joint pain, diarrhea. \par \par 11/13/2019 now on Nivolumab maintenance treatment. He feels fine, has normal ADLs. He denies skin rashes, joint pain, diarrhea.

## 2019-11-13 NOTE — REVIEW OF SYSTEMS
[Recent Change In Weight] : ~T recent weight change [Negative] : Allergic/Immunologic [Fever] : no fever [Chills] : no chills [Night Sweats] : no night sweats [Chest Pain] : no chest pain [Fatigue] : no fatigue [Shortness Of Breath] : no shortness of breath [Abdominal Pain] : no abdominal pain [Cough] : no cough [Constipation] : no constipation [Skin Rash] : no skin rash [Diarrhea] : no diarrhea [Easy Bleeding] : no tendency for easy bleeding [Easy Bruising] : no tendency for easy bruising [FreeTextEntry2] : gained 12 kg

## 2019-11-14 ENCOUNTER — APPOINTMENT (OUTPATIENT)
Dept: CT IMAGING | Facility: CLINIC | Age: 69
End: 2019-11-14
Payer: MEDICARE

## 2019-11-14 ENCOUNTER — OUTPATIENT (OUTPATIENT)
Dept: OUTPATIENT SERVICES | Facility: HOSPITAL | Age: 69
LOS: 1 days | End: 2019-11-14
Payer: MEDICARE

## 2019-11-14 DIAGNOSIS — Z98.890 OTHER SPECIFIED POSTPROCEDURAL STATES: Chronic | ICD-10-CM

## 2019-11-14 DIAGNOSIS — C64.2 MALIGNANT NEOPLASM OF LEFT KIDNEY, EXCEPT RENAL PELVIS: ICD-10-CM

## 2019-11-14 DIAGNOSIS — Z00.00 ENCOUNTER FOR GENERAL ADULT MEDICAL EXAMINATION WITHOUT ABNORMAL FINDINGS: ICD-10-CM

## 2019-11-14 DIAGNOSIS — S86.019A STRAIN OF UNSPECIFIED ACHILLES TENDON, INITIAL ENCOUNTER: Chronic | ICD-10-CM

## 2019-11-14 PROCEDURE — 71260 CT THORAX DX C+: CPT

## 2019-11-14 PROCEDURE — 71260 CT THORAX DX C+: CPT | Mod: 26

## 2019-11-14 PROCEDURE — 82565 ASSAY OF CREATININE: CPT

## 2019-11-14 PROCEDURE — 74177 CT ABD & PELVIS W/CONTRAST: CPT | Mod: 26

## 2019-11-14 PROCEDURE — 74177 CT ABD & PELVIS W/CONTRAST: CPT

## 2019-11-18 LAB
ALBUMIN SERPL ELPH-MCNC: 3.4 G/DL
ALBUMIN SERPL ELPH-MCNC: 3.8 G/DL
ALP BLD-CCNC: 103 U/L
ALP BLD-CCNC: 89 U/L
ALT SERPL-CCNC: 20 U/L
ALT SERPL-CCNC: 25 U/L
ANION GAP SERPL CALC-SCNC: 11 MMOL/L
ANION GAP SERPL CALC-SCNC: 16 MMOL/L
AST SERPL-CCNC: 14 U/L
AST SERPL-CCNC: 17 U/L
BILIRUB SERPL-MCNC: 0.4 MG/DL
BILIRUB SERPL-MCNC: 0.5 MG/DL
BUN SERPL-MCNC: 15 MG/DL
BUN SERPL-MCNC: 22 MG/DL
CALCIUM SERPL-MCNC: 11.3 MG/DL
CALCIUM SERPL-MCNC: 9.2 MG/DL
CHLORIDE SERPL-SCNC: 103 MMOL/L
CHLORIDE SERPL-SCNC: 91 MMOL/L
CO2 SERPL-SCNC: 25 MMOL/L
CO2 SERPL-SCNC: 26 MMOL/L
CREAT SERPL-MCNC: 1.08 MG/DL
CREAT SERPL-MCNC: 1.24 MG/DL
GLUCOSE SERPL-MCNC: 111 MG/DL
GLUCOSE SERPL-MCNC: 155 MG/DL
HBV CORE IGG+IGM SER QL: NONREACTIVE
HBV CORE IGM SER QL: NONREACTIVE
HBV E AB SER QL: NEGATIVE
HBV E AG SER QL: NEGATIVE
HBV SURFACE AB SER QL: NONREACTIVE
HBV SURFACE AG SER QL: NONREACTIVE
HCV AB SER QL: NONREACTIVE
HCV S/CO RATIO: 0.11 S/CO
LDH SERPL-CCNC: 233 U/L
POTASSIUM SERPL-SCNC: 4.9 MMOL/L
POTASSIUM SERPL-SCNC: 6 MMOL/L
PROT SERPL-MCNC: 6.8 G/DL
PROT SERPL-MCNC: 7.6 G/DL
SODIUM SERPL-SCNC: 133 MMOL/L
SODIUM SERPL-SCNC: 139 MMOL/L

## 2019-11-21 ENCOUNTER — OUTPATIENT (OUTPATIENT)
Dept: OUTPATIENT SERVICES | Facility: HOSPITAL | Age: 69
LOS: 1 days | Discharge: HOME | End: 2019-11-21

## 2019-11-21 DIAGNOSIS — S86.019A STRAIN OF UNSPECIFIED ACHILLES TENDON, INITIAL ENCOUNTER: Chronic | ICD-10-CM

## 2019-11-21 DIAGNOSIS — C64.9 MALIGNANT NEOPLASM OF UNSPECIFIED KIDNEY, EXCEPT RENAL PELVIS: ICD-10-CM

## 2019-11-21 DIAGNOSIS — Z98.890 OTHER SPECIFIED POSTPROCEDURAL STATES: Chronic | ICD-10-CM

## 2019-11-27 ENCOUNTER — LABORATORY RESULT (OUTPATIENT)
Age: 69
End: 2019-11-27

## 2019-11-27 ENCOUNTER — RESULT REVIEW (OUTPATIENT)
Age: 69
End: 2019-11-27

## 2019-11-27 ENCOUNTER — APPOINTMENT (OUTPATIENT)
Dept: INFUSION THERAPY | Facility: HOSPITAL | Age: 69
End: 2019-11-27

## 2019-11-27 LAB
BASOPHILS # BLD AUTO: 0.1 K/UL — SIGNIFICANT CHANGE UP (ref 0–0.2)
BASOPHILS NFR BLD AUTO: 0.7 % — SIGNIFICANT CHANGE UP (ref 0–2)
EOSINOPHIL # BLD AUTO: 0.2 K/UL — SIGNIFICANT CHANGE UP (ref 0–0.5)
EOSINOPHIL NFR BLD AUTO: 2.1 % — SIGNIFICANT CHANGE UP (ref 0–6)
HCT VFR BLD CALC: 37.2 % — LOW (ref 39–50)
HGB BLD-MCNC: 12.4 G/DL — LOW (ref 13–17)
LYMPHOCYTES # BLD AUTO: 2 K/UL — SIGNIFICANT CHANGE UP (ref 1–3.3)
LYMPHOCYTES # BLD AUTO: 20.8 % — SIGNIFICANT CHANGE UP (ref 13–44)
MCHC RBC-ENTMCNC: 27.4 PG — SIGNIFICANT CHANGE UP (ref 27–34)
MCHC RBC-ENTMCNC: 33.4 G/DL — SIGNIFICANT CHANGE UP (ref 32–36)
MCV RBC AUTO: 81.9 FL — SIGNIFICANT CHANGE UP (ref 80–100)
MONOCYTES # BLD AUTO: 0.8 K/UL — SIGNIFICANT CHANGE UP (ref 0–0.9)
MONOCYTES NFR BLD AUTO: 8.8 % — SIGNIFICANT CHANGE UP (ref 2–14)
NEUTROPHILS # BLD AUTO: 6.4 K/UL — SIGNIFICANT CHANGE UP (ref 1.8–7.4)
NEUTROPHILS NFR BLD AUTO: 67.6 % — SIGNIFICANT CHANGE UP (ref 43–77)
PLATELET # BLD AUTO: 208 K/UL — SIGNIFICANT CHANGE UP (ref 150–400)
RBC # BLD: 4.54 M/UL — SIGNIFICANT CHANGE UP (ref 4.2–5.8)
RBC # FLD: 15.3 % — HIGH (ref 10.3–14.5)
WBC # BLD: 9.5 K/UL — SIGNIFICANT CHANGE UP (ref 3.8–10.5)
WBC # FLD AUTO: 9.5 K/UL — SIGNIFICANT CHANGE UP (ref 3.8–10.5)

## 2019-11-29 DIAGNOSIS — Z51.11 ENCOUNTER FOR ANTINEOPLASTIC CHEMOTHERAPY: ICD-10-CM

## 2019-12-11 ENCOUNTER — APPOINTMENT (OUTPATIENT)
Dept: HEMATOLOGY ONCOLOGY | Facility: CLINIC | Age: 69
End: 2019-12-11
Payer: MEDICARE

## 2019-12-11 VITALS
DIASTOLIC BLOOD PRESSURE: 72 MMHG | TEMPERATURE: 98.3 F | RESPIRATION RATE: 14 BRPM | SYSTOLIC BLOOD PRESSURE: 130 MMHG | WEIGHT: 161.38 LBS | HEART RATE: 66 BPM | OXYGEN SATURATION: 99 % | BODY MASS INDEX: 23.84 KG/M2

## 2019-12-11 PROCEDURE — 99214 OFFICE O/P EST MOD 30 MIN: CPT

## 2019-12-11 RX ORDER — OXYCODONE AND ACETAMINOPHEN 5; 325 MG/1; MG/1
5-325 TABLET ORAL
Qty: 120 | Refills: 0 | Status: DISCONTINUED | COMMUNITY
Start: 2019-07-03 | End: 2019-12-11

## 2019-12-11 RX ORDER — LISINOPRIL 30 MG/1
TABLET ORAL
Refills: 0 | Status: DISCONTINUED | COMMUNITY
End: 2019-12-11

## 2019-12-12 NOTE — REVIEW OF SYSTEMS
[Negative] : Gastrointestinal [Fever] : no fever [Night Sweats] : no night sweats [Chills] : no chills [Fatigue] : no fatigue [Chest Pain] : no chest pain [Recent Change In Weight] : ~T no recent weight change [Shortness Of Breath] : no shortness of breath [Constipation] : no constipation [Abdominal Pain] : no abdominal pain [Cough] : no cough [Diarrhea] : no diarrhea [Skin Rash] : no skin rash [Easy Bleeding] : no tendency for easy bleeding [Easy Bruising] : no tendency for easy bruising

## 2019-12-12 NOTE — ASSESSMENT
[FreeTextEntry1] : Finesse Gallagher is a 59 years old male who presented with intermittent midback pain, 10/10 radiating to stomach from both sides. CT c/a/p and MRI abdomen showed multiple lung nodules, one liver lesion, retroperitoneal nodes. Bone scan showed one suspicious met in right superior pubic ramus. Liver lesion biopsy is consistent with renal cell carcinoma.\par It is not certain that the histology of RCC is clear cell vs non-clear cell. IMDC risk of this patient he has 5 out of 6 risks including leukocytosis, anemia, thrombocytosis, time from dx to initiate systemic treatment less than one year and high calcemia. He has poor risk of mRCC. Based on [facility] the standard of care is combined immunotherapy nivo and ipi vs sutent, checkmate 214 in the intermediate and poor risk of [facility] patients with significant overall survival benefit, keynote 426 keytruda plus axitinib vs sutent in good, intermediate and poor risk of [facility] patients showed OS, PFS and MICHEL benefit, [place] in the intermediate and poor risk of [facility] patients showed cabozantinib demonstrated a significant clinical benefit in PFS and MICHEL over standard-of-care sunitinib as first-line therapy in patients with intermediate- or poor-risk mRCC. Consider the longer duration of response and less side effects and recommended nivo and ipi. He agreed to sign the consent. S/P cycle 4 nivo and ipi. His back pain was resolved. His appetite is normal. He is on nivolumab maintenance treatment. \par \par Plan \par continue maintenance nivolumab 480 mg IV monthly (12/27/19) given positive treatment response on the recent CT.\par check labs \par RTC in 4 weeks for follow up. \par \par Luis Angel Gonzales, ANP-BC

## 2019-12-12 NOTE — HISTORY OF PRESENT ILLNESS
[T: ___] : T[unfilled] [N: ___] : N[unfilled] [AJCC Stage: ____] : AJCC Stage: [unfilled] [M: ___] : M[unfilled] [de-identified] : Finesse Zuniga is a 69 years old male who initially presented with intermittent midback pain, 10/10 radiating to stomach from both sides on June 1, 2019. He has poor appetite.  On june 12 he was diagnosed UTI and on cipro for 4 days, on June 16, was started cefpodoxime 100mg BID for 7 days.  CT chest/abdomen/pelvis revealed significant abnormal appearance of the left kidney with multiple heterogeneous areas of low attenuation involving the upper and midpole which may represent malignancy. There are necrotic lymph nodes in the left para-aortic location at the level of the kidney and below the level of the kidney. Adenopathy surrounds the left renal artery. The left renal vein demonstrates question of partially occlusive thrombus. Nonspecific scattered tiny pulmonary nodules. Lower lobe subpleural nodules measure up to 6 mm. There is a well-circumscribed hypodense lesion in the anterior aspect of the lateral segment of the left lobe measuring 3.2 x 2.2 cm. He underwent CT guided liver lesion biopsy which showed consistent with primary renal cell carcinoma, IHC CK7/CK19/PAX8 positive. MRI abdomen waw con showed multiple bilateral pulmonary nodules increased in size and number compared with recent CT. The largest is pleural-based in the left lower lobe and measures 1.5 x 1.1 cm. LIVER: Rim-enhancing subcapsular mass in segment 3 and measures 4.4 x 2.6 cm. A 4.2cm mass in mid to lower pole of the lft kidney suspicious for urothelial malignancy arising within the left renal collecting system. Regional lymphadenopathy and hepatic and pulmonary metastases. Nonocclusive thrombus within the left renal vein. He was started on eliquis. Bone scan showed  a focus of mildly increased uptake in the right superior \par pubic ramus suspicious of bone mets.\par He has lost 17lbs in one month. He denies nausea, vomiting and constipation and diarrhea. \par \par 7/19/19 s/p cycle 1 nivo and ipi, he reports that his intermittent midback pain, 10/10 radiating to stomach from both sides on June 1, 2019 has been resolved. His appetite is improving. He denies nausea, vomiting and diarrhea, skin rashes, joint pain. \par \par 7/31: Reports feeling better since last treatment, no back pain or stomach pain. Had gone to the ER on 7/19 for hyponatremia, was VA'ed after received fluid.  Sodium today improved. Reports some leg cramping.  Reports improved appetite, energy level has improved.  Denies coughing and SOB.  Reports has also gained weight.\par \par 8/21/19 : Here for week 7 of cycle 2 ipilimiumab + nivolumab. Has been feeling much improved, gained 4 kg since his last visit. His energy is good and is able to perform ADLs and do chores at home now. No ha, dizziness, sob, cough, chest pain, nausea, vomiting, diarrhea. There is no change in past medical hx, family hx, and social history since our last visit.\par \par 9/11/2019: s/p cycle 3, he feels completely fine, his energy level is back to normal. He has done normal ADLs. He denies skin rashes, joint pain, diarrhea. \par \par 10/16/2019 s/p cycle 4, he feels completely fine, his energy level is back to normal. He has done normal ADLs. He denies skin rashes, joint pain, diarrhea. \par \par 11/13/2019 now on Nivolumab maintenance treatment. He feels fine, has normal ADLs. He denies skin rashes, joint pain, diarrhea.  [de-identified] : metastatic renal cell carcinoma [de-identified] : 11/14/19 : Restaging scan: liver and RP nodes again with great response. There is a left lung lesion which is 7 mm, increased from 5 mm. \par \par 12/11/19 : met RCC on single agent nivolumab after completing combination treatment. He is tolerating therapy well without any issues thus far. He denies ha, sob,cp,nvd, rash, vision changes, night sweats. His energy is good and maintains normal lifestyle. \par

## 2019-12-12 NOTE — CONSULT LETTER
[Dear  ___] : Dear  [unfilled], [Consult Letter:] : I had the pleasure of evaluating your patient, [unfilled]. [Consult Closing:] : Thank you very much for allowing me to participate in the care of this patient.  If you have any questions, please do not hesitate to contact me. [Please see my note below.] : Please see my note below. [Sincerely,] : Sincerely, [FreeTextEntry3] : Kip Ravi MD

## 2019-12-19 ENCOUNTER — OUTPATIENT (OUTPATIENT)
Dept: OUTPATIENT SERVICES | Facility: HOSPITAL | Age: 69
LOS: 1 days | Discharge: HOME | End: 2019-12-19

## 2019-12-19 DIAGNOSIS — Z98.890 OTHER SPECIFIED POSTPROCEDURAL STATES: Chronic | ICD-10-CM

## 2019-12-19 DIAGNOSIS — C64.9 MALIGNANT NEOPLASM OF UNSPECIFIED KIDNEY, EXCEPT RENAL PELVIS: ICD-10-CM

## 2019-12-19 DIAGNOSIS — S86.019A STRAIN OF UNSPECIFIED ACHILLES TENDON, INITIAL ENCOUNTER: Chronic | ICD-10-CM

## 2019-12-27 ENCOUNTER — LABORATORY RESULT (OUTPATIENT)
Age: 69
End: 2019-12-27

## 2019-12-27 ENCOUNTER — RESULT REVIEW (OUTPATIENT)
Age: 69
End: 2019-12-27

## 2019-12-27 ENCOUNTER — APPOINTMENT (OUTPATIENT)
Dept: INFUSION THERAPY | Facility: HOSPITAL | Age: 69
End: 2019-12-27

## 2019-12-27 LAB
BASOPHILS # BLD AUTO: 0.1 K/UL — SIGNIFICANT CHANGE UP (ref 0–0.2)
BASOPHILS NFR BLD AUTO: 0.8 % — SIGNIFICANT CHANGE UP (ref 0–2)
EOSINOPHIL # BLD AUTO: 0.2 K/UL — SIGNIFICANT CHANGE UP (ref 0–0.5)
EOSINOPHIL NFR BLD AUTO: 1.7 % — SIGNIFICANT CHANGE UP (ref 0–6)
HCT VFR BLD CALC: 39.6 % — SIGNIFICANT CHANGE UP (ref 39–50)
HGB BLD-MCNC: 13.4 G/DL — SIGNIFICANT CHANGE UP (ref 13–17)
LYMPHOCYTES # BLD AUTO: 2.1 K/UL — SIGNIFICANT CHANGE UP (ref 1–3.3)
LYMPHOCYTES # BLD AUTO: 23.7 % — SIGNIFICANT CHANGE UP (ref 13–44)
MCHC RBC-ENTMCNC: 29 PG — SIGNIFICANT CHANGE UP (ref 27–34)
MCHC RBC-ENTMCNC: 33.8 G/DL — SIGNIFICANT CHANGE UP (ref 32–36)
MCV RBC AUTO: 85.6 FL — SIGNIFICANT CHANGE UP (ref 80–100)
MONOCYTES # BLD AUTO: 0.8 K/UL — SIGNIFICANT CHANGE UP (ref 0–0.9)
MONOCYTES NFR BLD AUTO: 9.6 % — SIGNIFICANT CHANGE UP (ref 2–14)
NEUTROPHILS # BLD AUTO: 5.6 K/UL — SIGNIFICANT CHANGE UP (ref 1.8–7.4)
NEUTROPHILS NFR BLD AUTO: 64.3 % — SIGNIFICANT CHANGE UP (ref 43–77)
PLATELET # BLD AUTO: 193 K/UL — SIGNIFICANT CHANGE UP (ref 150–400)
RBC # BLD: 4.63 M/UL — SIGNIFICANT CHANGE UP (ref 4.2–5.8)
RBC # FLD: 16.5 % — HIGH (ref 10.3–14.5)
WBC # BLD: 8.7 K/UL — SIGNIFICANT CHANGE UP (ref 3.8–10.5)
WBC # FLD AUTO: 8.7 K/UL — SIGNIFICANT CHANGE UP (ref 3.8–10.5)

## 2020-01-15 ENCOUNTER — APPOINTMENT (OUTPATIENT)
Dept: HEMATOLOGY ONCOLOGY | Facility: CLINIC | Age: 70
End: 2020-01-15
Payer: MEDICARE

## 2020-01-15 ENCOUNTER — OUTPATIENT (OUTPATIENT)
Dept: OUTPATIENT SERVICES | Facility: HOSPITAL | Age: 70
LOS: 1 days | Discharge: HOME | End: 2020-01-15

## 2020-01-15 VITALS
HEART RATE: 100 BPM | SYSTOLIC BLOOD PRESSURE: 152 MMHG | RESPIRATION RATE: 16 BRPM | TEMPERATURE: 98.2 F | WEIGHT: 166.45 LBS | OXYGEN SATURATION: 98 % | BODY MASS INDEX: 24.59 KG/M2 | DIASTOLIC BLOOD PRESSURE: 89 MMHG

## 2020-01-15 DIAGNOSIS — C64.9 MALIGNANT NEOPLASM OF UNSPECIFIED KIDNEY, EXCEPT RENAL PELVIS: ICD-10-CM

## 2020-01-15 DIAGNOSIS — S86.019A STRAIN OF UNSPECIFIED ACHILLES TENDON, INITIAL ENCOUNTER: Chronic | ICD-10-CM

## 2020-01-15 DIAGNOSIS — Z98.890 OTHER SPECIFIED POSTPROCEDURAL STATES: Chronic | ICD-10-CM

## 2020-01-15 PROCEDURE — 99215 OFFICE O/P EST HI 40 MIN: CPT

## 2020-01-16 NOTE — REVIEW OF SYSTEMS
[Negative] : Allergic/Immunologic [Fever] : no fever [Chills] : no chills [Night Sweats] : no night sweats [Recent Change In Weight] : ~T no recent weight change [Fatigue] : no fatigue [Shortness Of Breath] : no shortness of breath [Chest Pain] : no chest pain [Cough] : no cough [Abdominal Pain] : no abdominal pain [Constipation] : no constipation [Diarrhea] : no diarrhea [Easy Bleeding] : no tendency for easy bleeding [Skin Rash] : no skin rash [Easy Bruising] : no tendency for easy bruising

## 2020-01-16 NOTE — HISTORY OF PRESENT ILLNESS
[N: ___] : N[unfilled] [T: ___] : T[unfilled] [M: ___] : M[unfilled] [AJCC Stage: ____] : AJCC Stage: [unfilled] [de-identified] : Finesse Zuniga is a 70 years old male who initially presented with intermittent midback pain, 10/10 radiating to stomach from both sides on June 1, 2019. He has poor appetite.  On june 12 he was diagnosed UTI and on cipro for 4 days, on June 16, was started cefpodoxime 100mg BID for 7 days.  CT chest/abdomen/pelvis revealed significant abnormal appearance of the left kidney with multiple heterogeneous areas of low attenuation involving the upper and midpole which may represent malignancy. There are necrotic lymph nodes in the left para-aortic location at the level of the kidney and below the level of the kidney. Adenopathy surrounds the left renal artery. The left renal vein demonstrates question of partially occlusive thrombus. Nonspecific scattered tiny pulmonary nodules. Lower lobe subpleural nodules measure up to 6 mm. There is a well-circumscribed hypodense lesion in the anterior aspect of the lateral segment of the left lobe measuring 3.2 x 2.2 cm. He underwent CT guided liver lesion biopsy which showed consistent with primary renal cell carcinoma, IHC CK7/CK19/PAX8 positive. MRI abdomen waw con showed multiple bilateral pulmonary nodules increased in size and number compared with recent CT. The largest is pleural-based in the left lower lobe and measures 1.5 x 1.1 cm. LIVER: Rim-enhancing subcapsular mass in segment 3 and measures 4.4 x 2.6 cm. A 4.2cm mass in mid to lower pole of the lft kidney suspicious for urothelial malignancy arising within the left renal collecting system. Regional lymphadenopathy and hepatic and pulmonary metastases. Nonocclusive thrombus within the left renal vein. He was started on eliquis. Bone scan showed  a focus of mildly increased uptake in the right superior \par pubic ramus suspicious of bone mets.\par He has lost 17lbs in one month. He denies nausea, vomiting and constipation and diarrhea. \par \par 7/19/19 s/p cycle 1 nivo and ipi, he reports that his intermittent midback pain, 10/10 radiating to stomach from both sides on June 1, 2019 has been resolved. His appetite is improving. He denies nausea, vomiting and diarrhea, skin rashes, joint pain. \par \par 7/31: Reports feeling better since last treatment, no back pain or stomach pain. Had gone to the ER on 7/19 for hyponatremia, was UT'ed after received fluid.  Sodium today improved. Reports some leg cramping.  Reports improved appetite, energy level has improved.  Denies coughing and SOB.  Reports has also gained weight.\par \par 8/21/19 : Here for week 7 of cycle 2 ipilimiumab + nivolumab. Has been feeling much improved, gained 4 kg since his last visit. His energy is good and is able to perform ADLs and do chores at home now. No ha, dizziness, sob, cough, chest pain, nausea, vomiting, diarrhea. There is no change in past medical hx, family hx, and social history since our last visit.\par \par 9/11/2019: s/p cycle 3, he feels completely fine, his energy level is back to normal. He has done normal ADLs. He denies skin rashes, joint pain, diarrhea. \par \par 10/16/2019 s/p cycle 4, he feels completely fine, his energy level is back to normal. He has done normal ADLs. He denies skin rashes, joint pain, diarrhea. \par \par 11/13/2019 now on Nivolumab maintenance treatment. He feels fine, has normal ADLs. He denies skin rashes, joint pain, diarrhea. \par \par 11/14/19 : Restaging scan: liver and RP nodes again with great response. There is a left lung lesion which is 7 mm, increased from 5 mm. \par \par 12/11/19 : met RCC on single agent nivolumab after completing combination treatment. He is tolerating therapy well without any issues thus far. He denies ha, sob,cp,nvd, rash, vision changes, night sweats. His energy is good and maintains normal lifestyle.  [de-identified] : metastatic renal cell carcinoma [de-identified] : 1/15: Pt here for f/u. Overall feels well. Still gaining more weight, appetite is good. Denies any loss of energy, n/v/diarrhea, fevers, pain, or new skin rashes.

## 2020-01-16 NOTE — ASSESSMENT
[FreeTextEntry1] : Finesse Gallagher is a 70 years old male who presented with intermittent midback pain, 10/10 radiating to stomach from both sides. CT c/a/p and MRI abdomen showed multiple lung nodules, one liver lesion, retroperitoneal nodes. Bone scan showed one suspicious met in right superior pubic ramus. Liver lesion biopsy is consistent with renal cell carcinoma.\par It is not certain that the histology of RCC is clear cell vs non-clear cell. IMDC risk of this patient he has 5 out of 6 risks including leukocytosis, anemia, thrombocytosis, time from dx to initiate systemic treatment less than one year and high calcemia. He has poor risk of mRCC. Based on [facility] the standard of care is combined immunotherapy nivo and ipi vs sutent, checkmate 214 in the intermediate and poor risk of [facility] patients with significant overall survival benefit, keynote 426 keytruda plus axitinib vs sutent in good, intermediate and poor risk of [facility] patients showed OS, PFS and MICHEL benefit, [place] in the intermediate and poor risk of [facility] patients showed cabozantinib demonstrated a significant clinical benefit in PFS and MICHEL over standard-of-care sunitinib as first-line therapy in patients with intermediate- or poor-risk mRCC. Consider the longer duration of response and less side effects and recommended nivo and ipi. He agreed to sign the consent. S/P cycle 4 nivo and ipi. His back pain was resolved. His appetite is normal. He is on nivolumab maintenance treatment. \par \par Plan \par recent scans from 11/2019 reviewed, repeat scans q3-4 months\par continue maintenance nivolumab 480 mg IV monthly (1/22/20) given overall positive treatment response on the recent CT\par check labs \par RTC in 4 weeks for follow up. \par

## 2020-01-22 ENCOUNTER — RESULT REVIEW (OUTPATIENT)
Age: 70
End: 2020-01-22

## 2020-01-22 ENCOUNTER — LABORATORY RESULT (OUTPATIENT)
Age: 70
End: 2020-01-22

## 2020-01-22 ENCOUNTER — APPOINTMENT (OUTPATIENT)
Dept: INFUSION THERAPY | Facility: HOSPITAL | Age: 70
End: 2020-01-22

## 2020-01-22 LAB
BASOPHILS # BLD AUTO: 0.1 K/UL — SIGNIFICANT CHANGE UP (ref 0–0.2)
BASOPHILS NFR BLD AUTO: 0.7 % — SIGNIFICANT CHANGE UP (ref 0–2)
EOSINOPHIL # BLD AUTO: 0.1 K/UL — SIGNIFICANT CHANGE UP (ref 0–0.5)
EOSINOPHIL NFR BLD AUTO: 1.2 % — SIGNIFICANT CHANGE UP (ref 0–6)
HCT VFR BLD CALC: 40.5 % — SIGNIFICANT CHANGE UP (ref 39–50)
HGB BLD-MCNC: 13.7 G/DL — SIGNIFICANT CHANGE UP (ref 13–17)
LYMPHOCYTES # BLD AUTO: 1.8 K/UL — SIGNIFICANT CHANGE UP (ref 1–3.3)
LYMPHOCYTES # BLD AUTO: 19 % — SIGNIFICANT CHANGE UP (ref 13–44)
MCHC RBC-ENTMCNC: 29.9 PG — SIGNIFICANT CHANGE UP (ref 27–34)
MCHC RBC-ENTMCNC: 34 G/DL — SIGNIFICANT CHANGE UP (ref 32–36)
MCV RBC AUTO: 88 FL — SIGNIFICANT CHANGE UP (ref 80–100)
MONOCYTES # BLD AUTO: 0.7 K/UL — SIGNIFICANT CHANGE UP (ref 0–0.9)
MONOCYTES NFR BLD AUTO: 7.4 % — SIGNIFICANT CHANGE UP (ref 2–14)
NEUTROPHILS # BLD AUTO: 6.9 K/UL — SIGNIFICANT CHANGE UP (ref 1.8–7.4)
NEUTROPHILS NFR BLD AUTO: 71.7 % — SIGNIFICANT CHANGE UP (ref 43–77)
PLATELET # BLD AUTO: 211 K/UL — SIGNIFICANT CHANGE UP (ref 150–400)
RBC # BLD: 4.6 M/UL — SIGNIFICANT CHANGE UP (ref 4.2–5.8)
RBC # FLD: 15.3 % — HIGH (ref 10.3–14.5)
WBC # BLD: 9.6 K/UL — SIGNIFICANT CHANGE UP (ref 3.8–10.5)
WBC # FLD AUTO: 9.6 K/UL — SIGNIFICANT CHANGE UP (ref 3.8–10.5)

## 2020-01-23 DIAGNOSIS — Z51.11 ENCOUNTER FOR ANTINEOPLASTIC CHEMOTHERAPY: ICD-10-CM

## 2020-02-14 ENCOUNTER — OUTPATIENT (OUTPATIENT)
Dept: OUTPATIENT SERVICES | Facility: HOSPITAL | Age: 70
LOS: 1 days | Discharge: HOME | End: 2020-02-14

## 2020-02-14 DIAGNOSIS — C64.9 MALIGNANT NEOPLASM OF UNSPECIFIED KIDNEY, EXCEPT RENAL PELVIS: ICD-10-CM

## 2020-02-14 DIAGNOSIS — Z98.890 OTHER SPECIFIED POSTPROCEDURAL STATES: Chronic | ICD-10-CM

## 2020-02-14 DIAGNOSIS — S86.019A STRAIN OF UNSPECIFIED ACHILLES TENDON, INITIAL ENCOUNTER: Chronic | ICD-10-CM

## 2020-02-18 ENCOUNTER — FORM ENCOUNTER (OUTPATIENT)
Age: 70
End: 2020-02-18

## 2020-02-19 ENCOUNTER — LABORATORY RESULT (OUTPATIENT)
Age: 70
End: 2020-02-19

## 2020-02-19 ENCOUNTER — APPOINTMENT (OUTPATIENT)
Dept: INFUSION THERAPY | Facility: HOSPITAL | Age: 70
End: 2020-02-19

## 2020-02-19 ENCOUNTER — OUTPATIENT (OUTPATIENT)
Dept: OUTPATIENT SERVICES | Facility: HOSPITAL | Age: 70
LOS: 1 days | End: 2020-02-19
Payer: MEDICARE

## 2020-02-19 ENCOUNTER — APPOINTMENT (OUTPATIENT)
Dept: CT IMAGING | Facility: IMAGING CENTER | Age: 70
End: 2020-02-19
Payer: MEDICARE

## 2020-02-19 ENCOUNTER — APPOINTMENT (OUTPATIENT)
Dept: HEMATOLOGY ONCOLOGY | Facility: CLINIC | Age: 70
End: 2020-02-19

## 2020-02-19 ENCOUNTER — RESULT REVIEW (OUTPATIENT)
Age: 70
End: 2020-02-19

## 2020-02-19 ENCOUNTER — APPOINTMENT (OUTPATIENT)
Dept: HEMATOLOGY ONCOLOGY | Facility: CLINIC | Age: 70
End: 2020-02-19
Payer: MEDICARE

## 2020-02-19 VITALS
SYSTOLIC BLOOD PRESSURE: 159 MMHG | OXYGEN SATURATION: 100 % | HEART RATE: 64 BPM | DIASTOLIC BLOOD PRESSURE: 83 MMHG | RESPIRATION RATE: 16 BRPM | TEMPERATURE: 97.9 F | BODY MASS INDEX: 24.46 KG/M2 | WEIGHT: 165.55 LBS

## 2020-02-19 DIAGNOSIS — C78.00 SECONDARY MALIGNANT NEOPLASM OF UNSPECIFIED LUNG: ICD-10-CM

## 2020-02-19 DIAGNOSIS — Z98.890 OTHER SPECIFIED POSTPROCEDURAL STATES: Chronic | ICD-10-CM

## 2020-02-19 DIAGNOSIS — C64.2 MALIGNANT NEOPLASM OF LEFT KIDNEY, EXCEPT RENAL PELVIS: ICD-10-CM

## 2020-02-19 DIAGNOSIS — Z79.899 OTHER LONG TERM (CURRENT) DRUG THERAPY: ICD-10-CM

## 2020-02-19 DIAGNOSIS — S86.019A STRAIN OF UNSPECIFIED ACHILLES TENDON, INITIAL ENCOUNTER: Chronic | ICD-10-CM

## 2020-02-19 LAB
BASOPHILS # BLD AUTO: 0.1 K/UL — SIGNIFICANT CHANGE UP (ref 0–0.2)
BASOPHILS NFR BLD AUTO: 0.7 % — SIGNIFICANT CHANGE UP (ref 0–2)
EOSINOPHIL # BLD AUTO: 0.1 K/UL — SIGNIFICANT CHANGE UP (ref 0–0.5)
EOSINOPHIL NFR BLD AUTO: 1.2 % — SIGNIFICANT CHANGE UP (ref 0–6)
HCT VFR BLD CALC: 40.2 % — SIGNIFICANT CHANGE UP (ref 39–50)
HGB BLD-MCNC: 13.9 G/DL — SIGNIFICANT CHANGE UP (ref 13–17)
LYMPHOCYTES # BLD AUTO: 1.8 K/UL — SIGNIFICANT CHANGE UP (ref 1–3.3)
LYMPHOCYTES # BLD AUTO: 20.4 % — SIGNIFICANT CHANGE UP (ref 13–44)
MCHC RBC-ENTMCNC: 31 PG — SIGNIFICANT CHANGE UP (ref 27–34)
MCHC RBC-ENTMCNC: 34.6 G/DL — SIGNIFICANT CHANGE UP (ref 32–36)
MCV RBC AUTO: 89.4 FL — SIGNIFICANT CHANGE UP (ref 80–100)
MONOCYTES # BLD AUTO: 0.8 K/UL — SIGNIFICANT CHANGE UP (ref 0–0.9)
MONOCYTES NFR BLD AUTO: 9.2 % — SIGNIFICANT CHANGE UP (ref 2–14)
NEUTROPHILS # BLD AUTO: 6 K/UL — SIGNIFICANT CHANGE UP (ref 1.8–7.4)
NEUTROPHILS NFR BLD AUTO: 68.5 % — SIGNIFICANT CHANGE UP (ref 43–77)
PLATELET # BLD AUTO: 199 K/UL — SIGNIFICANT CHANGE UP (ref 150–400)
RBC # BLD: 4.49 M/UL — SIGNIFICANT CHANGE UP (ref 4.2–5.8)
RBC # FLD: 13.8 % — SIGNIFICANT CHANGE UP (ref 10.3–14.5)
WBC # BLD: 8.7 K/UL — SIGNIFICANT CHANGE UP (ref 3.8–10.5)
WBC # FLD AUTO: 8.7 K/UL — SIGNIFICANT CHANGE UP (ref 3.8–10.5)

## 2020-02-19 PROCEDURE — 71250 CT THORAX DX C-: CPT

## 2020-02-19 PROCEDURE — 71250 CT THORAX DX C-: CPT | Mod: 26

## 2020-02-19 PROCEDURE — 74176 CT ABD & PELVIS W/O CONTRAST: CPT

## 2020-02-19 PROCEDURE — 99214 OFFICE O/P EST MOD 30 MIN: CPT

## 2020-02-19 PROCEDURE — 74176 CT ABD & PELVIS W/O CONTRAST: CPT | Mod: 26

## 2020-02-20 NOTE — REVIEW OF SYSTEMS
[Negative] : Allergic/Immunologic [Fever] : no fever [Chills] : no chills [Recent Change In Weight] : ~T no recent weight change [Fatigue] : no fatigue [Night Sweats] : no night sweats [Chest Pain] : no chest pain [Cough] : no cough [Shortness Of Breath] : no shortness of breath [Abdominal Pain] : no abdominal pain [Constipation] : no constipation [Skin Rash] : no skin rash [Diarrhea] : no diarrhea [Easy Bleeding] : no tendency for easy bleeding [Easy Bruising] : no tendency for easy bruising

## 2020-02-20 NOTE — ASSESSMENT
[Palliative] : Goals of care discussed with patient: Palliative [Palliative Care Plan] : not applicable at this time [FreeTextEntry1] : Finesse Gallagher is a 70 years old male who presented with intermittent midback pain, 10/10 radiating to stomach from both sides. CT c/a/p and MRI abdomen showed multiple lung nodules, one liver lesion, retroperitoneal nodes. Bone scan showed one suspicious met in right superior pubic ramus. Liver lesion biopsy is consistent with renal cell carcinoma.\par It is not certain that the histology of RCC is clear cell vs non-clear cell. IMDC risk of this patient he has 5 out of 6 risks including leukocytosis, anemia, thrombocytosis, time from dx to initiate systemic treatment less than one year and high calcemia. He has poor risk of mRCC. Based on [facility] the standard of care is combined immunotherapy nivo and ipi vs sutent, checkmate 214 in the intermediate and poor risk of [facility] patients with significant overall survival benefit, keynote 426 keytruda plus axitinib vs sutent in good, intermediate and poor risk of [facility] patients showed OS, PFS and MICHEL benefit, [place] in the intermediate and poor risk of [facility] patients showed cabozantinib demonstrated a significant clinical benefit in PFS and MICHEL over standard-of-care sunitinib as first-line therapy in patients with intermediate- or poor-risk mRCC. Consider the longer duration of response and less side effects and recommended nivo and ipi. He agreed to sign the consent. S/P cycle 4 nivo and ipi. His back pain was resolved. His appetite is normal. He is on nivolumab maintenance treatment.\par \par \par Plan \par -Continue maintenance #6  nivolumab 480 mg IV monthly (1/22/20) given overall positive treatment response on the recent CT. We reviewed recent scans from 11/2019 on his last visit. \par -check labs \par -We will obtain CT torso prior to next visit. \par RTC in 4 weeks for follow up or sooner PRN. \par \par \par Reid Gonzales, MSN. ANP-BC

## 2020-02-20 NOTE — CONSULT LETTER
[Dear  ___] : Dear  [unfilled], [Please see my note below.] : Please see my note below. [Consult Closing:] : Thank you very much for allowing me to participate in the care of this patient.  If you have any questions, please do not hesitate to contact me. [Sincerely,] : Sincerely, [Courtesy Letter:] : I had the pleasure of seeing your patient, [unfilled], in my office today. [FreeTextEntry3] : Reid Gonzales, ANP-BC

## 2020-02-20 NOTE — HISTORY OF PRESENT ILLNESS
[T: ___] : T[unfilled] [N: ___] : N[unfilled] [M: ___] : M[unfilled] [AJCC Stage: ____] : AJCC Stage: [unfilled] [de-identified] : metastatic renal cell carcinoma [de-identified] : Finesse Zuniga is a 70 years old male who initially presented with intermittent midback pain, 10/10 radiating to stomach from both sides on June 1, 2019. He has poor appetite.  On june 12 he was diagnosed UTI and on cipro for 4 days, on June 16, was started cefpodoxime 100mg BID for 7 days.  CT chest/abdomen/pelvis revealed significant abnormal appearance of the left kidney with multiple heterogeneous areas of low attenuation involving the upper and midpole which may represent malignancy. There are necrotic lymph nodes in the left para-aortic location at the level of the kidney and below the level of the kidney. Adenopathy surrounds the left renal artery. The left renal vein demonstrates question of partially occlusive thrombus. Nonspecific scattered tiny pulmonary nodules. Lower lobe subpleural nodules measure up to 6 mm. There is a well-circumscribed hypodense lesion in the anterior aspect of the lateral segment of the left lobe measuring 3.2 x 2.2 cm. He underwent CT guided liver lesion biopsy which showed consistent with primary renal cell carcinoma, IHC CK7/CK19/PAX8 positive. MRI abdomen waw con showed multiple bilateral pulmonary nodules increased in size and number compared with recent CT. The largest is pleural-based in the left lower lobe and measures 1.5 x 1.1 cm. LIVER: Rim-enhancing subcapsular mass in segment 3 and measures 4.4 x 2.6 cm. A 4.2cm mass in mid to lower pole of the lft kidney suspicious for urothelial malignancy arising within the left renal collecting system. Regional lymphadenopathy and hepatic and pulmonary metastases. Nonocclusive thrombus within the left renal vein. He was started on eliquis. Bone scan showed  a focus of mildly increased uptake in the right superior \par pubic ramus suspicious of bone mets.\par He has lost 17lbs in one month. He denies nausea, vomiting and constipation and diarrhea. \par \par 7/19/19 s/p cycle 1 nivo and ipi, he reports that his intermittent midback pain, 10/10 radiating to stomach from both sides on June 1, 2019 has been resolved. His appetite is improving. He denies nausea, vomiting and diarrhea, skin rashes, joint pain. \par \par 7/31: Reports feeling better since last treatment, no back pain or stomach pain. Had gone to the ER on 7/19 for hyponatremia, was IL'ed after received fluid.  Sodium today improved. Reports some leg cramping.  Reports improved appetite, energy level has improved.  Denies coughing and SOB.  Reports has also gained weight.\par \par 8/21/19 : Here for week 7 of cycle 2 ipilimiumab + nivolumab. Has been feeling much improved, gained 4 kg since his last visit. His energy is good and is able to perform ADLs and do chores at home now. No ha, dizziness, sob, cough, chest pain, nausea, vomiting, diarrhea. There is no change in past medical hx, family hx, and social history since our last visit.\par \par 9/11/2019: s/p cycle 3, he feels completely fine, his energy level is back to normal. He has done normal ADLs. He denies skin rashes, joint pain, diarrhea. \par \par 10/16/2019 s/p cycle 4, he feels completely fine, his energy level is back to normal. He has done normal ADLs. He denies skin rashes, joint pain, diarrhea. \par \par 11/13/2019 now on Nivolumab maintenance treatment. He feels fine, has normal ADLs. He denies skin rashes, joint pain, diarrhea. \par \par 11/14/19 : Restaging scan: liver and RP nodes again with great response. There is a left lung lesion which is 7 mm, increased from 5 mm. \par \par 12/11/19 : met RCC on single agent nivolumab after completing combination treatment. He is tolerating therapy well without any issues thus far. He denies ha, sob,cp,nvd, rash, vision changes, night sweats. His energy is good and maintains normal lifestyle. \par \par 1/15/2020 : Cycle 5 nivolumab 480 mg monthly. Pt here for f/u. Overall feels well. Still gaining more weight, appetite is good. Denies any loss of energy, n/v/diarrhea, fevers, pain, or new skin rashes.  [de-identified] : 2/19/2020: Cycle 6 Nivolumab. Renal function Cr trending up and now 1.43. His Cr has been normal since starting therapy however started at 1.4. \par He received 5 doses of nivolumab and last scan was 11/2019. \par He feels well overall without significant change. Weigh stable and good appetite.

## 2020-02-21 DIAGNOSIS — Z51.11 ENCOUNTER FOR ANTINEOPLASTIC CHEMOTHERAPY: ICD-10-CM

## 2020-03-12 ENCOUNTER — OUTPATIENT (OUTPATIENT)
Dept: OUTPATIENT SERVICES | Facility: HOSPITAL | Age: 70
LOS: 1 days | Discharge: HOME | End: 2020-03-12

## 2020-03-12 DIAGNOSIS — Z98.890 OTHER SPECIFIED POSTPROCEDURAL STATES: Chronic | ICD-10-CM

## 2020-03-12 DIAGNOSIS — S86.019A STRAIN OF UNSPECIFIED ACHILLES TENDON, INITIAL ENCOUNTER: Chronic | ICD-10-CM

## 2020-03-12 DIAGNOSIS — C64.9 MALIGNANT NEOPLASM OF UNSPECIFIED KIDNEY, EXCEPT RENAL PELVIS: ICD-10-CM

## 2020-03-18 ENCOUNTER — LABORATORY RESULT (OUTPATIENT)
Age: 70
End: 2020-03-18

## 2020-03-18 ENCOUNTER — APPOINTMENT (OUTPATIENT)
Dept: HEMATOLOGY ONCOLOGY | Facility: CLINIC | Age: 70
End: 2020-03-18

## 2020-03-18 ENCOUNTER — APPOINTMENT (OUTPATIENT)
Dept: INFUSION THERAPY | Facility: HOSPITAL | Age: 70
End: 2020-03-18

## 2020-04-15 ENCOUNTER — RX RENEWAL (OUTPATIENT)
Age: 70
End: 2020-04-15

## 2020-04-16 ENCOUNTER — OUTPATIENT (OUTPATIENT)
Dept: OUTPATIENT SERVICES | Facility: HOSPITAL | Age: 70
LOS: 1 days | Discharge: HOME | End: 2020-04-16

## 2020-04-16 DIAGNOSIS — Z98.890 OTHER SPECIFIED POSTPROCEDURAL STATES: Chronic | ICD-10-CM

## 2020-04-16 DIAGNOSIS — C64.9 MALIGNANT NEOPLASM OF UNSPECIFIED KIDNEY, EXCEPT RENAL PELVIS: ICD-10-CM

## 2020-04-16 DIAGNOSIS — S86.019A STRAIN OF UNSPECIFIED ACHILLES TENDON, INITIAL ENCOUNTER: Chronic | ICD-10-CM

## 2020-04-22 ENCOUNTER — LABORATORY RESULT (OUTPATIENT)
Age: 70
End: 2020-04-22

## 2020-04-22 ENCOUNTER — RESULT REVIEW (OUTPATIENT)
Age: 70
End: 2020-04-22

## 2020-04-22 ENCOUNTER — APPOINTMENT (OUTPATIENT)
Dept: INFUSION THERAPY | Facility: HOSPITAL | Age: 70
End: 2020-04-22

## 2020-04-22 ENCOUNTER — APPOINTMENT (OUTPATIENT)
Dept: HEMATOLOGY ONCOLOGY | Facility: CLINIC | Age: 70
End: 2020-04-22
Payer: MEDICARE

## 2020-04-22 LAB
BASOPHILS # BLD AUTO: 0.04 K/UL — SIGNIFICANT CHANGE UP (ref 0–0.2)
BASOPHILS NFR BLD AUTO: 0.5 % — SIGNIFICANT CHANGE UP (ref 0–2)
EOSINOPHIL # BLD AUTO: 0.11 K/UL — SIGNIFICANT CHANGE UP (ref 0–0.5)
EOSINOPHIL NFR BLD AUTO: 1.3 % — SIGNIFICANT CHANGE UP (ref 0–6)
HCT VFR BLD CALC: 41.6 % — SIGNIFICANT CHANGE UP (ref 39–50)
HGB BLD-MCNC: 13.9 G/DL — SIGNIFICANT CHANGE UP (ref 13–17)
IMM GRANULOCYTES NFR BLD AUTO: 0.3 % — SIGNIFICANT CHANGE UP (ref 0–1.5)
LYMPHOCYTES # BLD AUTO: 1.67 K/UL — SIGNIFICANT CHANGE UP (ref 1–3.3)
LYMPHOCYTES # BLD AUTO: 19.2 % — SIGNIFICANT CHANGE UP (ref 13–44)
MCHC RBC-ENTMCNC: 30.3 PG — SIGNIFICANT CHANGE UP (ref 27–34)
MCHC RBC-ENTMCNC: 33.4 GM/DL — SIGNIFICANT CHANGE UP (ref 32–36)
MCV RBC AUTO: 90.8 FL — SIGNIFICANT CHANGE UP (ref 80–100)
MONOCYTES # BLD AUTO: 0.68 K/UL — SIGNIFICANT CHANGE UP (ref 0–0.9)
MONOCYTES NFR BLD AUTO: 7.8 % — SIGNIFICANT CHANGE UP (ref 2–14)
NEUTROPHILS # BLD AUTO: 6.17 K/UL — SIGNIFICANT CHANGE UP (ref 1.8–7.4)
NEUTROPHILS NFR BLD AUTO: 70.9 % — SIGNIFICANT CHANGE UP (ref 43–77)
NRBC # BLD: 0 /100 WBCS — SIGNIFICANT CHANGE UP (ref 0–0)
PLATELET # BLD AUTO: 203 K/UL — SIGNIFICANT CHANGE UP (ref 150–400)
RBC # BLD: 4.58 M/UL — SIGNIFICANT CHANGE UP (ref 4.2–5.8)
RBC # FLD: 13.2 % — SIGNIFICANT CHANGE UP (ref 10.3–14.5)
WBC # BLD: 8.7 K/UL — SIGNIFICANT CHANGE UP (ref 3.8–10.5)
WBC # FLD AUTO: 8.7 K/UL — SIGNIFICANT CHANGE UP (ref 3.8–10.5)

## 2020-04-22 PROCEDURE — 99214 OFFICE O/P EST MOD 30 MIN: CPT | Mod: 95

## 2020-04-22 NOTE — HISTORY OF PRESENT ILLNESS
[Home] : at home, [unfilled] , at the time of the visit. [Self] : self [Medical Office: ([facility])___] : at the medical office located in  [Patient] : the patient [T: ___] : T[unfilled] [N: ___] : N[unfilled] [M: ___] : M[unfilled] [AJCC Stage: ____] : AJCC Stage: [unfilled] [de-identified] : Finesse Zuniga is a 70 years old male who initially presented with intermittent midback pain, 10/10 radiating to stomach from both sides on June 1, 2019. He has poor appetite.  On june 12 he was diagnosed UTI and on cipro for 4 days, on June 16, was started cefpodoxime 100mg BID for 7 days.  CT chest/abdomen/pelvis revealed significant abnormal appearance of the left kidney with multiple heterogeneous areas of low attenuation involving the upper and midpole which may represent malignancy. There are necrotic lymph nodes in the left para-aortic location at the level of the kidney and below the level of the kidney. Adenopathy surrounds the left renal artery. The left renal vein demonstrates question of partially occlusive thrombus. Nonspecific scattered tiny pulmonary nodules. Lower lobe subpleural nodules measure up to 6 mm. There is a well-circumscribed hypodense lesion in the anterior aspect of the lateral segment of the left lobe measuring 3.2 x 2.2 cm. He underwent CT guided liver lesion biopsy which showed consistent with primary renal cell carcinoma, IHC CK7/CK19/PAX8 positive. MRI abdomen waw con showed multiple bilateral pulmonary nodules increased in size and number compared with recent CT. The largest is pleural-based in the left lower lobe and measures 1.5 x 1.1 cm. LIVER: Rim-enhancing subcapsular mass in segment 3 and measures 4.4 x 2.6 cm. A 4.2cm mass in mid to lower pole of the lft kidney suspicious for urothelial malignancy arising within the left renal collecting system. Regional lymphadenopathy and hepatic and pulmonary metastases. Nonocclusive thrombus within the left renal vein. He was started on eliquis. Bone scan showed  a focus of mildly increased uptake in the right superior \par pubic ramus suspicious of bone mets.\par He has lost 17lbs in one month. He denies nausea, vomiting and constipation and diarrhea. \par \par 7/19/19 s/p cycle 1 nivo and ipi, he reports that his intermittent midback pain, 10/10 radiating to stomach from both sides on June 1, 2019 has been resolved. His appetite is improving. He denies nausea, vomiting and diarrhea, skin rashes, joint pain. \par \par 7/31: Reports feeling better since last treatment, no back pain or stomach pain. Had gone to the ER on 7/19 for hyponatremia, was NE'ed after received fluid.  Sodium today improved. Reports some leg cramping.  Reports improved appetite, energy level has improved.  Denies coughing and SOB.  Reports has also gained weight.\par \par 8/21/19 : Here for week 7 of cycle 2 ipilimiumab + nivolumab. Has been feeling much improved, gained 4 kg since his last visit. His energy is good and is able to perform ADLs and do chores at home now. No ha, dizziness, sob, cough, chest pain, nausea, vomiting, diarrhea. There is no change in past medical hx, family hx, and social history since our last visit.\par \par 9/11/2019: s/p cycle 3, he feels completely fine, his energy level is back to normal. He has done normal ADLs. He denies skin rashes, joint pain, diarrhea. \par \par 10/16/2019 s/p cycle 4, he feels completely fine, his energy level is back to normal. He has done normal ADLs. He denies skin rashes, joint pain, diarrhea. \par \par 11/13/2019 now on Nivolumab maintenance treatment. He feels fine, has normal ADLs. He denies skin rashes, joint pain, diarrhea. \par \par 11/14/19 : Restaging scan: liver and RP nodes again with great response. There is a left lung lesion which is 7 mm, increased from 5 mm. \par \par 12/11/19 : met RCC on single agent nivolumab after completing combination treatment. He is tolerating therapy well without any issues thus far. He denies ha, sob,cp,nvd, rash, vision changes, night sweats. His energy is good and maintains normal lifestyle. \par \par 1/15/2020 : Cycle 5 nivolumab 480 mg monthly. Pt here for f/u. Overall feels well. Still gaining more weight, appetite is good. Denies any loss of energy, n/v/diarrhea, fevers, pain, or new skin rashes.  [de-identified] : metastatic renal cell carcinoma [de-identified] : 2/19/2020: Cycle 6 Nivolumab. Renal function Cr trending up and now 1.43. His Cr has been normal since starting therapy however started at 1.4. \par He received 5 doses of nivolumab and last scan was 11/2019. \par He feels well overall without significant change. Weigh stable and good appetite. \par \par 4/22/2020: cycle 8 nivolumab, he feels overall fine. weight is stable and no pain.

## 2020-04-22 NOTE — ASSESSMENT
[Palliative Care Plan] : not applicable at this time [Palliative] : Goals of care discussed with patient: Palliative [FreeTextEntry1] : Finesse Gallagher is a 70 years old male who presented with intermittent midback pain, 10/10 radiating to stomach from both sides. CT c/a/p and MRI abdomen showed multiple lung nodules, one liver lesion, retroperitoneal nodes. Bone scan showed one suspicious met in right superior pubic ramus. Liver lesion biopsy is consistent with renal cell carcinoma.\par It is not certain that the histology of RCC is clear cell vs non-clear cell. IMDC risk of this patient he has 5 out of 6 risks including leukocytosis, anemia, thrombocytosis, time from dx to initiate systemic treatment less than one year and high calcemia. He has poor risk of mRCC. Based on mccRCC the standard of care is combined immunotherapy nivo and ipi vs sutent, checkmate 214 in the intermediate and poor risk of mccRCC patients with significant overall survival benefit, keynote 426 keytruda plus axitinib vs sutent in good, intermediate and poor risk of mccRCC patients showed OS, PFS and MICHEL benefit, [place] in the intermediate and poor risk of mccRCC patients showed cabozantinib demonstrated a significant clinical benefit in PFS and MICHEL over standard-of-care sunitinib as first-line therapy in patients with intermediate- or poor-risk mRCC. Consider the longer duration of response and less side effects and recommended nivo and ipi. He agreed to sign the consent. S/P cycle 7 nivo and ipi. His back pain was resolved. His appetite is normal. He is on nivolumab maintenance treatment.\par \par \par Plan \par -Continue maintenance #8  nivolumab 480 mg IV monthly given overall positive treatment response on the recent CT. We reviewed recent scans from 11/2019 on his last visit. \par -check labs \par -We will obtain CT torso due at the end of May \par RTC in 4 weeks for follow up \par \par \par

## 2020-04-22 NOTE — CONSULT LETTER
[Dear  ___] : Dear  [unfilled], [Courtesy Letter:] : I had the pleasure of seeing your patient, [unfilled], in my office today. [Consult Closing:] : Thank you very much for allowing me to participate in the care of this patient.  If you have any questions, please do not hesitate to contact me. [Please see my note below.] : Please see my note below. [Sincerely,] : Sincerely, [FreeTextEntry3] : Reid Gonzales, ANP-BC

## 2020-04-22 NOTE — REVIEW OF SYSTEMS
[Negative] : Allergic/Immunologic [Fever] : no fever [Chills] : no chills [Night Sweats] : no night sweats [Fatigue] : no fatigue [Recent Change In Weight] : ~T no recent weight change [Chest Pain] : no chest pain [Shortness Of Breath] : no shortness of breath [Cough] : no cough [Abdominal Pain] : no abdominal pain [Constipation] : no constipation [Diarrhea] : no diarrhea [Skin Rash] : no skin rash [Easy Bleeding] : no tendency for easy bleeding [Easy Bruising] : no tendency for easy bruising

## 2020-04-23 DIAGNOSIS — Z51.11 ENCOUNTER FOR ANTINEOPLASTIC CHEMOTHERAPY: ICD-10-CM

## 2020-05-18 ENCOUNTER — OUTPATIENT (OUTPATIENT)
Dept: OUTPATIENT SERVICES | Facility: HOSPITAL | Age: 70
LOS: 1 days | Discharge: HOME | End: 2020-05-18

## 2020-05-18 DIAGNOSIS — Z98.890 OTHER SPECIFIED POSTPROCEDURAL STATES: Chronic | ICD-10-CM

## 2020-05-18 DIAGNOSIS — S86.019A STRAIN OF UNSPECIFIED ACHILLES TENDON, INITIAL ENCOUNTER: Chronic | ICD-10-CM

## 2020-05-18 DIAGNOSIS — C64.9 MALIGNANT NEOPLASM OF UNSPECIFIED KIDNEY, EXCEPT RENAL PELVIS: ICD-10-CM

## 2020-05-20 ENCOUNTER — RESULT REVIEW (OUTPATIENT)
Age: 70
End: 2020-05-20

## 2020-05-20 ENCOUNTER — LABORATORY RESULT (OUTPATIENT)
Age: 70
End: 2020-05-20

## 2020-05-20 ENCOUNTER — APPOINTMENT (OUTPATIENT)
Dept: INFUSION THERAPY | Facility: HOSPITAL | Age: 70
End: 2020-05-20

## 2020-05-20 LAB
BASOPHILS # BLD AUTO: 0.04 K/UL — SIGNIFICANT CHANGE UP (ref 0–0.2)
BASOPHILS NFR BLD AUTO: 0.4 % — SIGNIFICANT CHANGE UP (ref 0–2)
EOSINOPHIL # BLD AUTO: 0.15 K/UL — SIGNIFICANT CHANGE UP (ref 0–0.5)
EOSINOPHIL NFR BLD AUTO: 1.5 % — SIGNIFICANT CHANGE UP (ref 0–6)
HCT VFR BLD CALC: 41.6 % — SIGNIFICANT CHANGE UP (ref 39–50)
HGB BLD-MCNC: 14.3 G/DL — SIGNIFICANT CHANGE UP (ref 13–17)
IMM GRANULOCYTES NFR BLD AUTO: 0.4 % — SIGNIFICANT CHANGE UP (ref 0–1.5)
LYMPHOCYTES # BLD AUTO: 1.83 K/UL — SIGNIFICANT CHANGE UP (ref 1–3.3)
LYMPHOCYTES # BLD AUTO: 18.5 % — SIGNIFICANT CHANGE UP (ref 13–44)
MCHC RBC-ENTMCNC: 31.2 PG — SIGNIFICANT CHANGE UP (ref 27–34)
MCHC RBC-ENTMCNC: 34.4 GM/DL — SIGNIFICANT CHANGE UP (ref 32–36)
MCV RBC AUTO: 90.6 FL — SIGNIFICANT CHANGE UP (ref 80–100)
MONOCYTES # BLD AUTO: 0.91 K/UL — HIGH (ref 0–0.9)
MONOCYTES NFR BLD AUTO: 9.2 % — SIGNIFICANT CHANGE UP (ref 2–14)
NEUTROPHILS # BLD AUTO: 6.9 K/UL — SIGNIFICANT CHANGE UP (ref 1.8–7.4)
NEUTROPHILS NFR BLD AUTO: 70 % — SIGNIFICANT CHANGE UP (ref 43–77)
NRBC # BLD: 0 /100 WBCS — SIGNIFICANT CHANGE UP (ref 0–0)
PLATELET # BLD AUTO: 212 K/UL — SIGNIFICANT CHANGE UP (ref 150–400)
RBC # BLD: 4.59 M/UL — SIGNIFICANT CHANGE UP (ref 4.2–5.8)
RBC # FLD: 13.1 % — SIGNIFICANT CHANGE UP (ref 10.3–14.5)
WBC # BLD: 9.87 K/UL — SIGNIFICANT CHANGE UP (ref 3.8–10.5)
WBC # FLD AUTO: 9.87 K/UL — SIGNIFICANT CHANGE UP (ref 3.8–10.5)

## 2020-05-21 DIAGNOSIS — Z51.11 ENCOUNTER FOR ANTINEOPLASTIC CHEMOTHERAPY: ICD-10-CM

## 2020-06-04 ENCOUNTER — RX RENEWAL (OUTPATIENT)
Age: 70
End: 2020-06-04

## 2020-06-12 ENCOUNTER — APPOINTMENT (OUTPATIENT)
Dept: HEMATOLOGY ONCOLOGY | Facility: CLINIC | Age: 70
End: 2020-06-12
Payer: MEDICARE

## 2020-06-12 PROCEDURE — 99214 OFFICE O/P EST MOD 30 MIN: CPT | Mod: 95

## 2020-06-12 NOTE — ASSESSMENT
[Palliative] : Goals of care discussed with patient: Palliative [Palliative Care Plan] : not applicable at this time [FreeTextEntry1] : Finesse Gallagher is a 70 years old male who presented with intermittent midback pain, 10/10 radiating to stomach from both sides. CT c/a/p and MRI abdomen showed multiple lung nodules, one liver lesion, retroperitoneal nodes. Bone scan showed one suspicious met in right superior pubic ramus. Liver lesion biopsy is consistent with renal cell carcinoma.\par It is not certain that the histology of RCC is clear cell vs non-clear cell. IMDC risk of this patient he has 5 out of 6 risks including leukocytosis, anemia, thrombocytosis, time from dx to initiate systemic treatment less than one year and high calcemia. He has poor risk of mRCC. Based on [facility]RCC the standard of care is combined immunotherapy nivo and ipi vs sutent, checkmate 214 in the intermediate and poor risk of mccRCC patients with significant overall survival benefit, keynote 426 keytruda plus axitinib vs sutent in good, intermediate and poor risk of mccRCC patients showed OS, PFS and MICHEL benefit, [place] in the intermediate and poor risk of mccRCC patients showed cabozantinib demonstrated a significant clinical benefit in PFS and MICHEL over standard-of-care sunitinib as first-line therapy in patients with intermediate- or poor-risk mRCC. Consider the longer duration of response and less side effects and recommended nivo and ipi. He agreed to sign the consent. S/P cycle 7 nivo and ipi. His back pain was resolved. His appetite is normal. He is on nivolumab maintenance treatment.\par \par \par Plan \par -Continue maintenance #10  nivolumab 480 mg IV monthly given overall positive treatment response on the recent scans\par -check labs \par -We will obtain CT torso now\par RTC in 4 weeks for follow up \par \par \par

## 2020-06-12 NOTE — HISTORY OF PRESENT ILLNESS
[T: ___] : T[unfilled] [N: ___] : N[unfilled] [M: ___] : M[unfilled] [AJCC Stage: ____] : AJCC Stage: [unfilled] [Medical Office: ([facility])___] : at the medical office located in  [Home] : at home, [unfilled] , at the time of the visit. [Patient] : the patient [Self] : self [de-identified] : Finesse Zuniga is a 70 years old male who initially presented with intermittent midback pain, 10/10 radiating to stomach from both sides on June 1, 2019. He has poor appetite.  On june 12 he was diagnosed UTI and on cipro for 4 days, on June 16, was started cefpodoxime 100mg BID for 7 days.  CT chest/abdomen/pelvis revealed significant abnormal appearance of the left kidney with multiple heterogeneous areas of low attenuation involving the upper and midpole which may represent malignancy. There are necrotic lymph nodes in the left para-aortic location at the level of the kidney and below the level of the kidney. Adenopathy surrounds the left renal artery. The left renal vein demonstrates question of partially occlusive thrombus. Nonspecific scattered tiny pulmonary nodules. Lower lobe subpleural nodules measure up to 6 mm. There is a well-circumscribed hypodense lesion in the anterior aspect of the lateral segment of the left lobe measuring 3.2 x 2.2 cm. He underwent CT guided liver lesion biopsy which showed consistent with primary renal cell carcinoma, IHC CK7/CK19/PAX8 positive. MRI abdomen waw con showed multiple bilateral pulmonary nodules increased in size and number compared with recent CT. The largest is pleural-based in the left lower lobe and measures 1.5 x 1.1 cm. LIVER: Rim-enhancing subcapsular mass in segment 3 and measures 4.4 x 2.6 cm. A 4.2cm mass in mid to lower pole of the lft kidney suspicious for urothelial malignancy arising within the left renal collecting system. Regional lymphadenopathy and hepatic and pulmonary metastases. Nonocclusive thrombus within the left renal vein. He was started on eliquis. Bone scan showed  a focus of mildly increased uptake in the right superior \par pubic ramus suspicious of bone mets.\par He has lost 17lbs in one month. He denies nausea, vomiting and constipation and diarrhea. \par \par 7/19/19 s/p cycle 1 nivo and ipi, he reports that his intermittent midback pain, 10/10 radiating to stomach from both sides on June 1, 2019 has been resolved. His appetite is improving. He denies nausea, vomiting and diarrhea, skin rashes, joint pain. \par \par 7/31: Reports feeling better since last treatment, no back pain or stomach pain. Had gone to the ER on 7/19 for hyponatremia, was HI'ed after received fluid.  Sodium today improved. Reports some leg cramping.  Reports improved appetite, energy level has improved.  Denies coughing and SOB.  Reports has also gained weight.\par \par 8/21/19 : Here for week 7 of cycle 2 ipilimiumab + nivolumab. Has been feeling much improved, gained 4 kg since his last visit. His energy is good and is able to perform ADLs and do chores at home now. No ha, dizziness, sob, cough, chest pain, nausea, vomiting, diarrhea. There is no change in past medical hx, family hx, and social history since our last visit.\par \par 9/11/2019: s/p cycle 3, he feels completely fine, his energy level is back to normal. He has done normal ADLs. He denies skin rashes, joint pain, diarrhea. \par \par 10/16/2019 s/p cycle 4, he feels completely fine, his energy level is back to normal. He has done normal ADLs. He denies skin rashes, joint pain, diarrhea. \par \par 11/13/2019 now on Nivolumab maintenance treatment. He feels fine, has normal ADLs. He denies skin rashes, joint pain, diarrhea. \par \par 11/14/19 : Restaging scan: liver and RP nodes again with great response. There is a left lung lesion which is 7 mm, increased from 5 mm. \par \par 12/11/19 : met RCC on single agent nivolumab after completing combination treatment. He is tolerating therapy well without any issues thus far. He denies ha, sob,cp,nvd, rash, vision changes, night sweats. His energy is good and maintains normal lifestyle. \par \par 1/15/2020 : Cycle 5 nivolumab 480 mg monthly. Pt here for f/u. Overall feels well. Still gaining more weight, appetite is good. Denies any loss of energy, n/v/diarrhea, fevers, pain, or new skin rashes.  [de-identified] : 2/19/2020: Cycle 6 Nivolumab. Renal function Cr trending up and now 1.43. His Cr has been normal since starting therapy however started at 1.4. \par He received 5 doses of nivolumab and last scan was 11/2019. \par He feels well overall without significant change. Weigh stable and good appetite. \par \par 4/22/2020: cycle 8 nivolumab, he feels overall fine. weight is stable and no pain. \par \par 6/12/2020 cycle 10 nivolumab maintenance, feels completely fine except several bumps in the left lower leg skin, scabbed now.  [de-identified] : metastatic renal cell carcinoma

## 2020-06-12 NOTE — REVIEW OF SYSTEMS
[Negative] : Allergic/Immunologic [Skin Rash] : skin rash [Chills] : no chills [Fever] : no fever [Fatigue] : no fatigue [Night Sweats] : no night sweats [Recent Change In Weight] : ~T no recent weight change [Chest Pain] : no chest pain [Cough] : no cough [Shortness Of Breath] : no shortness of breath [Abdominal Pain] : no abdominal pain [Diarrhea] : no diarrhea [Constipation] : no constipation [Easy Bruising] : no tendency for easy bruising [Easy Bleeding] : no tendency for easy bleeding [de-identified] : scattered skin bumps in the lower leg skin

## 2020-06-12 NOTE — CONSULT LETTER
[Dear  ___] : Dear  [unfilled], [Courtesy Letter:] : I had the pleasure of seeing your patient, [unfilled], in my office today. [Please see my note below.] : Please see my note below. [Consult Closing:] : Thank you very much for allowing me to participate in the care of this patient.  If you have any questions, please do not hesitate to contact me. [Sincerely,] : Sincerely, [FreeTextEntry3] : Reid Gonzales, ANP-BC

## 2020-06-17 ENCOUNTER — LABORATORY RESULT (OUTPATIENT)
Age: 70
End: 2020-06-17

## 2020-06-17 ENCOUNTER — RESULT REVIEW (OUTPATIENT)
Age: 70
End: 2020-06-17

## 2020-06-17 ENCOUNTER — APPOINTMENT (OUTPATIENT)
Dept: INFUSION THERAPY | Facility: HOSPITAL | Age: 70
End: 2020-06-17

## 2020-06-17 LAB
BASOPHILS # BLD AUTO: 0.03 K/UL — SIGNIFICANT CHANGE UP (ref 0–0.2)
BASOPHILS NFR BLD AUTO: 0.4 % — SIGNIFICANT CHANGE UP (ref 0–2)
EOSINOPHIL # BLD AUTO: 0.16 K/UL — SIGNIFICANT CHANGE UP (ref 0–0.5)
EOSINOPHIL NFR BLD AUTO: 1.9 % — SIGNIFICANT CHANGE UP (ref 0–6)
HCT VFR BLD CALC: 39.5 % — SIGNIFICANT CHANGE UP (ref 39–50)
HGB BLD-MCNC: 13.8 G/DL — SIGNIFICANT CHANGE UP (ref 13–17)
IMM GRANULOCYTES NFR BLD AUTO: 0.5 % — SIGNIFICANT CHANGE UP (ref 0–1.5)
LYMPHOCYTES # BLD AUTO: 1.73 K/UL — SIGNIFICANT CHANGE UP (ref 1–3.3)
LYMPHOCYTES # BLD AUTO: 20.6 % — SIGNIFICANT CHANGE UP (ref 13–44)
MCHC RBC-ENTMCNC: 31.6 PG — SIGNIFICANT CHANGE UP (ref 27–34)
MCHC RBC-ENTMCNC: 34.9 GM/DL — SIGNIFICANT CHANGE UP (ref 32–36)
MCV RBC AUTO: 90.4 FL — SIGNIFICANT CHANGE UP (ref 80–100)
MONOCYTES # BLD AUTO: 0.74 K/UL — SIGNIFICANT CHANGE UP (ref 0–0.9)
MONOCYTES NFR BLD AUTO: 8.8 % — SIGNIFICANT CHANGE UP (ref 2–14)
NEUTROPHILS # BLD AUTO: 5.7 K/UL — SIGNIFICANT CHANGE UP (ref 1.8–7.4)
NEUTROPHILS NFR BLD AUTO: 67.8 % — SIGNIFICANT CHANGE UP (ref 43–77)
NRBC # BLD: 0 /100 WBCS — SIGNIFICANT CHANGE UP (ref 0–0)
PLATELET # BLD AUTO: 184 K/UL — SIGNIFICANT CHANGE UP (ref 150–400)
RBC # BLD: 4.37 M/UL — SIGNIFICANT CHANGE UP (ref 4.2–5.8)
RBC # FLD: 12.6 % — SIGNIFICANT CHANGE UP (ref 10.3–14.5)
WBC # BLD: 8.4 K/UL — SIGNIFICANT CHANGE UP (ref 3.8–10.5)
WBC # FLD AUTO: 8.4 K/UL — SIGNIFICANT CHANGE UP (ref 3.8–10.5)

## 2020-07-07 ENCOUNTER — OUTPATIENT (OUTPATIENT)
Dept: OUTPATIENT SERVICES | Facility: HOSPITAL | Age: 70
LOS: 1 days | Discharge: HOME | End: 2020-07-07

## 2020-07-07 DIAGNOSIS — C64.9 MALIGNANT NEOPLASM OF UNSPECIFIED KIDNEY, EXCEPT RENAL PELVIS: ICD-10-CM

## 2020-07-07 DIAGNOSIS — S86.019A STRAIN OF UNSPECIFIED ACHILLES TENDON, INITIAL ENCOUNTER: Chronic | ICD-10-CM

## 2020-07-07 DIAGNOSIS — Z98.890 OTHER SPECIFIED POSTPROCEDURAL STATES: Chronic | ICD-10-CM

## 2020-07-08 ENCOUNTER — OUTPATIENT (OUTPATIENT)
Dept: OUTPATIENT SERVICES | Facility: HOSPITAL | Age: 70
LOS: 1 days | End: 2020-07-08
Payer: MEDICARE

## 2020-07-08 ENCOUNTER — APPOINTMENT (OUTPATIENT)
Dept: CT IMAGING | Facility: IMAGING CENTER | Age: 70
End: 2020-07-08
Payer: MEDICARE

## 2020-07-08 DIAGNOSIS — Z98.890 OTHER SPECIFIED POSTPROCEDURAL STATES: Chronic | ICD-10-CM

## 2020-07-08 DIAGNOSIS — S86.019A STRAIN OF UNSPECIFIED ACHILLES TENDON, INITIAL ENCOUNTER: Chronic | ICD-10-CM

## 2020-07-08 DIAGNOSIS — C64.2 MALIGNANT NEOPLASM OF LEFT KIDNEY, EXCEPT RENAL PELVIS: ICD-10-CM

## 2020-07-08 PROCEDURE — 71260 CT THORAX DX C+: CPT

## 2020-07-08 PROCEDURE — 74177 CT ABD & PELVIS W/CONTRAST: CPT | Mod: 26

## 2020-07-08 PROCEDURE — 71260 CT THORAX DX C+: CPT | Mod: 26

## 2020-07-08 PROCEDURE — 74177 CT ABD & PELVIS W/CONTRAST: CPT

## 2020-07-10 ENCOUNTER — APPOINTMENT (OUTPATIENT)
Dept: HEMATOLOGY ONCOLOGY | Facility: CLINIC | Age: 70
End: 2020-07-10
Payer: MEDICARE

## 2020-07-10 PROCEDURE — 99214 OFFICE O/P EST MOD 30 MIN: CPT | Mod: 95

## 2020-07-10 NOTE — HISTORY OF PRESENT ILLNESS
[T: ___] : T[unfilled] [M: ___] : M[unfilled] [AJCC Stage: ____] : AJCC Stage: [unfilled] [N: ___] : N[unfilled] [Patient] : the patient [Medical Office: ([facility])___] : at the medical office located in  [Home] : at home, [unfilled] , at the time of the visit. [Self] : self [de-identified] : 2/19/2020: Cycle 6 Nivolumab. Renal function Cr trending up and now 1.43. His Cr has been normal since starting therapy however started at 1.4. \par He received 5 doses of nivolumab and last scan was 11/2019. \par He feels well overall without significant change. Weigh stable and good appetite. \par \par 4/22/2020: cycle 8 nivolumab, he feels overall fine. weight is stable and no pain. \par \par 6/12/2020 cycle 10 nivolumab maintenance, feels completely fine except several bumps in the left lower leg skin, scabbed now. \par \par 7/8/2020 CT c/a/p showed stable lung nodules, continuous decrease in size in the liver lesion and renal lesion. \par \par 7/10/2020 s/p cycle 11 nivolumab maintenance. He feels completely fine except several bumps in the left lower leg skin, scabbed. He will see Dermatologist.  [de-identified] : metastatic renal cell carcinoma [de-identified] : Finesse Zuniga is a 70 years old male who initially presented with intermittent midback pain, 10/10 radiating to stomach from both sides on June 1, 2019. He has poor appetite.  On june 12 he was diagnosed UTI and on cipro for 4 days, on June 16, was started cefpodoxime 100mg BID for 7 days.  CT chest/abdomen/pelvis revealed significant abnormal appearance of the left kidney with multiple heterogeneous areas of low attenuation involving the upper and midpole which may represent malignancy. There are necrotic lymph nodes in the left para-aortic location at the level of the kidney and below the level of the kidney. Adenopathy surrounds the left renal artery. The left renal vein demonstrates question of partially occlusive thrombus. Nonspecific scattered tiny pulmonary nodules. Lower lobe subpleural nodules measure up to 6 mm. There is a well-circumscribed hypodense lesion in the anterior aspect of the lateral segment of the left lobe measuring 3.2 x 2.2 cm. He underwent CT guided liver lesion biopsy which showed consistent with primary renal cell carcinoma, IHC CK7/CK19/PAX8 positive. MRI abdomen waw con showed multiple bilateral pulmonary nodules increased in size and number compared with recent CT. The largest is pleural-based in the left lower lobe and measures 1.5 x 1.1 cm. LIVER: Rim-enhancing subcapsular mass in segment 3 and measures 4.4 x 2.6 cm. A 4.2cm mass in mid to lower pole of the lft kidney suspicious for urothelial malignancy arising within the left renal collecting system. Regional lymphadenopathy and hepatic and pulmonary metastases. Nonocclusive thrombus within the left renal vein. He was started on eliquis. Bone scan showed  a focus of mildly increased uptake in the right superior \par pubic ramus suspicious of bone mets.\par He has lost 17lbs in one month. He denies nausea, vomiting and constipation and diarrhea. \par \par 7/19/19 s/p cycle 1 nivo and ipi, he reports that his intermittent midback pain, 10/10 radiating to stomach from both sides on June 1, 2019 has been resolved. His appetite is improving. He denies nausea, vomiting and diarrhea, skin rashes, joint pain. \par \par 7/31: Reports feeling better since last treatment, no back pain or stomach pain. Had gone to the ER on 7/19 for hyponatremia, was WI'ed after received fluid.  Sodium today improved. Reports some leg cramping.  Reports improved appetite, energy level has improved.  Denies coughing and SOB.  Reports has also gained weight.\par \par 8/21/19 : Here for week 7 of cycle 2 ipilimiumab + nivolumab. Has been feeling much improved, gained 4 kg since his last visit. His energy is good and is able to perform ADLs and do chores at home now. No ha, dizziness, sob, cough, chest pain, nausea, vomiting, diarrhea. There is no change in past medical hx, family hx, and social history since our last visit.\par \par 9/11/2019: s/p cycle 3, he feels completely fine, his energy level is back to normal. He has done normal ADLs. He denies skin rashes, joint pain, diarrhea. \par \par 10/16/2019 s/p cycle 4, he feels completely fine, his energy level is back to normal. He has done normal ADLs. He denies skin rashes, joint pain, diarrhea. \par \par 11/13/2019 now on Nivolumab maintenance treatment. He feels fine, has normal ADLs. He denies skin rashes, joint pain, diarrhea. \par \par 11/14/19 : Restaging scan: liver and RP nodes again with great response. There is a left lung lesion which is 7 mm, increased from 5 mm. \par \par 12/11/19 : met RCC on single agent nivolumab after completing combination treatment. He is tolerating therapy well without any issues thus far. He denies ha, sob,cp,nvd, rash, vision changes, night sweats. His energy is good and maintains normal lifestyle. \par \par 1/15/2020 : Cycle 5 nivolumab 480 mg monthly. Pt here for f/u. Overall feels well. Still gaining more weight, appetite is good. Denies any loss of energy, n/v/diarrhea, fevers, pain, or new skin rashes.

## 2020-07-10 NOTE — ASSESSMENT
[Palliative] : Goals of care discussed with patient: Palliative [Palliative Care Plan] : not applicable at this time [FreeTextEntry1] : Finesse Gallagher is a 70 years old male who presented with intermittent midback pain, 10/10 radiating to stomach from both sides. CT c/a/p and MRI abdomen showed multiple lung nodules, one liver lesion, retroperitoneal nodes. Bone scan showed one suspicious met in right superior pubic ramus. Liver lesion biopsy is consistent with renal cell carcinoma.\par It is not certain that the histology of RCC is clear cell vs non-clear cell. IMDC risk of this patient he has 5 out of 6 risks including leukocytosis, anemia, thrombocytosis, time from dx to initiate systemic treatment less than one year and high calcemia. He has poor risk of mRCC. Based on [facility]RCC the standard of care is combined immunotherapy nivo and ipi vs sutent, checkmate 214 in the intermediate and poor risk of mccRCC patients with significant overall survival benefit, keynote 426 keytruda plus axitinib vs sutent in good, intermediate and poor risk of mccRCC patients showed OS, PFS and MICHEL benefit, [place] in the intermediate and poor risk of mccRCC patients showed cabozantinib demonstrated a significant clinical benefit in PFS and MICHEL over standard-of-care sunitinib as first-line therapy in patients with intermediate- or poor-risk mRCC. Consider the longer duration of response and less side effects and recommended nivo and ipi. He agreed to sign the consent. S/P cycle 7 nivo and ipi. His back pain was resolved. His appetite is normal. He is on nivolumab maintenance treatment.\par \par \par Plan \par -Continue maintenance #11  nivolumab 480 mg IV monthly given overall positive treatment response on the recent scans\par -check labs \par -We will obtain CT torso now\par RTC in 4 weeks for follow up \par \par \par

## 2020-07-10 NOTE — CONSULT LETTER
[Dear  ___] : Dear  [unfilled], [Courtesy Letter:] : I had the pleasure of seeing your patient, [unfilled], in my office today. [Please see my note below.] : Please see my note below. [Sincerely,] : Sincerely, [Consult Closing:] : Thank you very much for allowing me to participate in the care of this patient.  If you have any questions, please do not hesitate to contact me. [FreeTextEntry3] : Reid Gonzales, ANP-BC

## 2020-07-13 ENCOUNTER — APPOINTMENT (OUTPATIENT)
Dept: INFUSION THERAPY | Facility: HOSPITAL | Age: 70
End: 2020-07-13

## 2020-07-13 LAB — SARS-COV-2 N GENE NPH QL NAA+PROBE: NOT DETECTED

## 2020-07-15 ENCOUNTER — LABORATORY RESULT (OUTPATIENT)
Age: 70
End: 2020-07-15

## 2020-07-15 ENCOUNTER — RESULT REVIEW (OUTPATIENT)
Age: 70
End: 2020-07-15

## 2020-07-15 ENCOUNTER — APPOINTMENT (OUTPATIENT)
Dept: INFUSION THERAPY | Facility: HOSPITAL | Age: 70
End: 2020-07-15

## 2020-07-15 LAB
BASOPHILS # BLD AUTO: 0.04 K/UL — SIGNIFICANT CHANGE UP (ref 0–0.2)
BASOPHILS NFR BLD AUTO: 0.5 % — SIGNIFICANT CHANGE UP (ref 0–2)
EOSINOPHIL # BLD AUTO: 0.08 K/UL — SIGNIFICANT CHANGE UP (ref 0–0.5)
EOSINOPHIL NFR BLD AUTO: 0.9 % — SIGNIFICANT CHANGE UP (ref 0–6)
HCT VFR BLD CALC: 38.9 % — LOW (ref 39–50)
HGB BLD-MCNC: 13.8 G/DL — SIGNIFICANT CHANGE UP (ref 13–17)
IMM GRANULOCYTES NFR BLD AUTO: 0.2 % — SIGNIFICANT CHANGE UP (ref 0–1.5)
LYMPHOCYTES # BLD AUTO: 1.89 K/UL — SIGNIFICANT CHANGE UP (ref 1–3.3)
LYMPHOCYTES # BLD AUTO: 21.3 % — SIGNIFICANT CHANGE UP (ref 13–44)
MCHC RBC-ENTMCNC: 31.8 PG — SIGNIFICANT CHANGE UP (ref 27–34)
MCHC RBC-ENTMCNC: 35.5 GM/DL — SIGNIFICANT CHANGE UP (ref 32–36)
MCV RBC AUTO: 89.6 FL — SIGNIFICANT CHANGE UP (ref 80–100)
MONOCYTES # BLD AUTO: 0.81 K/UL — SIGNIFICANT CHANGE UP (ref 0–0.9)
MONOCYTES NFR BLD AUTO: 9.1 % — SIGNIFICANT CHANGE UP (ref 2–14)
NEUTROPHILS # BLD AUTO: 6.04 K/UL — SIGNIFICANT CHANGE UP (ref 1.8–7.4)
NEUTROPHILS NFR BLD AUTO: 68 % — SIGNIFICANT CHANGE UP (ref 43–77)
NRBC # BLD: 0 /100 WBCS — SIGNIFICANT CHANGE UP (ref 0–0)
PLATELET # BLD AUTO: 210 K/UL — SIGNIFICANT CHANGE UP (ref 150–400)
RBC # BLD: 4.34 M/UL — SIGNIFICANT CHANGE UP (ref 4.2–5.8)
RBC # FLD: 12.4 % — SIGNIFICANT CHANGE UP (ref 10.3–14.5)
WBC # BLD: 8.88 K/UL — SIGNIFICANT CHANGE UP (ref 3.8–10.5)
WBC # FLD AUTO: 8.88 K/UL — SIGNIFICANT CHANGE UP (ref 3.8–10.5)

## 2020-07-16 DIAGNOSIS — Z51.11 ENCOUNTER FOR ANTINEOPLASTIC CHEMOTHERAPY: ICD-10-CM

## 2020-08-02 ENCOUNTER — OUTPATIENT (OUTPATIENT)
Dept: OUTPATIENT SERVICES | Facility: HOSPITAL | Age: 70
LOS: 1 days | Discharge: HOME | End: 2020-08-02

## 2020-08-02 DIAGNOSIS — C64.9 MALIGNANT NEOPLASM OF UNSPECIFIED KIDNEY, EXCEPT RENAL PELVIS: ICD-10-CM

## 2020-08-02 DIAGNOSIS — S86.019A STRAIN OF UNSPECIFIED ACHILLES TENDON, INITIAL ENCOUNTER: Chronic | ICD-10-CM

## 2020-08-02 DIAGNOSIS — Z98.890 OTHER SPECIFIED POSTPROCEDURAL STATES: Chronic | ICD-10-CM

## 2020-08-07 ENCOUNTER — APPOINTMENT (OUTPATIENT)
Dept: INFUSION THERAPY | Facility: HOSPITAL | Age: 70
End: 2020-08-07

## 2020-08-07 ENCOUNTER — LABORATORY RESULT (OUTPATIENT)
Age: 70
End: 2020-08-07

## 2020-08-10 ENCOUNTER — APPOINTMENT (OUTPATIENT)
Dept: HEMATOLOGY ONCOLOGY | Facility: CLINIC | Age: 70
End: 2020-08-10
Payer: MEDICARE

## 2020-08-10 PROCEDURE — 99213 OFFICE O/P EST LOW 20 MIN: CPT | Mod: 95

## 2020-08-10 NOTE — REVIEW OF SYSTEMS
[Skin Rash] : skin rash [Negative] : Allergic/Immunologic [Chills] : no chills [Fever] : no fever [Night Sweats] : no night sweats [Recent Change In Weight] : ~T no recent weight change [Fatigue] : no fatigue [Chest Pain] : no chest pain [Shortness Of Breath] : no shortness of breath [Cough] : no cough [Abdominal Pain] : no abdominal pain [Constipation] : no constipation [Diarrhea] : no diarrhea [Easy Bleeding] : no tendency for easy bleeding [Easy Bruising] : no tendency for easy bruising [de-identified] : scattered skin bumps in the lower leg skin

## 2020-08-10 NOTE — HISTORY OF PRESENT ILLNESS
[T: ___] : T[unfilled] [M: ___] : M[unfilled] [N: ___] : N[unfilled] [AJCC Stage: ____] : AJCC Stage: [unfilled] [Patient] : the patient [Medical Office: ([facility])___] : at the medical office located in  [Home] : at home, [unfilled] , at the time of the visit. [Self] : self [de-identified] : Finesse Zuniga is a 70 years old male who initially presented with intermittent midback pain, 10/10 radiating to stomach from both sides on June 1, 2019. He has poor appetite.  On june 12 he was diagnosed UTI and on cipro for 4 days, on June 16, was started cefpodoxime 100mg BID for 7 days.  CT chest/abdomen/pelvis revealed significant abnormal appearance of the left kidney with multiple heterogeneous areas of low attenuation involving the upper and midpole which may represent malignancy. There are necrotic lymph nodes in the left para-aortic location at the level of the kidney and below the level of the kidney. Adenopathy surrounds the left renal artery. The left renal vein demonstrates question of partially occlusive thrombus. Nonspecific scattered tiny pulmonary nodules. Lower lobe subpleural nodules measure up to 6 mm. There is a well-circumscribed hypodense lesion in the anterior aspect of the lateral segment of the left lobe measuring 3.2 x 2.2 cm. He underwent CT guided liver lesion biopsy which showed consistent with primary renal cell carcinoma, IHC CK7/CK19/PAX8 positive. MRI abdomen waw con showed multiple bilateral pulmonary nodules increased in size and number compared with recent CT. The largest is pleural-based in the left lower lobe and measures 1.5 x 1.1 cm. LIVER: Rim-enhancing subcapsular mass in segment 3 and measures 4.4 x 2.6 cm. A 4.2cm mass in mid to lower pole of the lft kidney suspicious for urothelial malignancy arising within the left renal collecting system. Regional lymphadenopathy and hepatic and pulmonary metastases. Nonocclusive thrombus within the left renal vein. He was started on eliquis. Bone scan showed  a focus of mildly increased uptake in the right superior \par pubic ramus suspicious of bone mets.\par He has lost 17lbs in one month. He denies nausea, vomiting and constipation and diarrhea. \par \par 7/19/19 s/p cycle 1 nivo and ipi, he reports that his intermittent midback pain, 10/10 radiating to stomach from both sides on June 1, 2019 has been resolved. His appetite is improving. He denies nausea, vomiting and diarrhea, skin rashes, joint pain. \par \par 7/31: Reports feeling better since last treatment, no back pain or stomach pain. Had gone to the ER on 7/19 for hyponatremia, was VA'ed after received fluid.  Sodium today improved. Reports some leg cramping.  Reports improved appetite, energy level has improved.  Denies coughing and SOB.  Reports has also gained weight.\par \par 8/21/19 : Here for week 7 of cycle 2 ipilimiumab + nivolumab. Has been feeling much improved, gained 4 kg since his last visit. His energy is good and is able to perform ADLs and do chores at home now. No ha, dizziness, sob, cough, chest pain, nausea, vomiting, diarrhea. There is no change in past medical hx, family hx, and social history since our last visit.\par \par 9/11/2019: s/p cycle 3, he feels completely fine, his energy level is back to normal. He has done normal ADLs. He denies skin rashes, joint pain, diarrhea. \par \par 10/16/2019 s/p cycle 4, he feels completely fine, his energy level is back to normal. He has done normal ADLs. He denies skin rashes, joint pain, diarrhea. \par \par 11/13/2019 now on Nivolumab maintenance treatment. He feels fine, has normal ADLs. He denies skin rashes, joint pain, diarrhea. \par \par 11/14/19 : Restaging scan: liver and RP nodes again with great response. There is a left lung lesion which is 7 mm, increased from 5 mm. \par \par 12/11/19 : met RCC on single agent nivolumab after completing combination treatment. He is tolerating therapy well without any issues thus far. He denies ha, sob,cp,nvd, rash, vision changes, night sweats. His energy is good and maintains normal lifestyle. \par \par 1/15/2020 : Cycle 5 nivolumab 480 mg monthly. Pt here for f/u. Overall feels well. Still gaining more weight, appetite is good. Denies any loss of energy, n/v/diarrhea, fevers, pain, or new skin rashes.  [de-identified] : metastatic renal cell carcinoma [de-identified] : 2/19/2020: Cycle 6 Nivolumab. Renal function Cr trending up and now 1.43. His Cr has been normal since starting therapy however started at 1.4. \par He received 5 doses of nivolumab and last scan was 11/2019. \par He feels well overall without significant change. Weigh stable and good appetite. \par \par 4/22/2020: cycle 8 nivolumab, he feels overall fine. weight is stable and no pain. \par \par 6/12/2020 cycle 10 nivolumab maintenance, feels completely fine except several bumps in the left lower leg skin, scabbed now. \par \par 7/8/2020 CT c/a/p showed stable lung nodules, continuous decrease in size in the liver lesion and renal lesion. \par \par 7/10/2020 s/p cycle 11 nivolumab maintenance. He feels completely fine except several bumps in the left lower leg skin, scabbed. He will see Dermatologist. \par \par 8/10/2020 s/p cycle 12 nivolumab maintenance. His appetite is ok. His skin bumps are getting better.

## 2020-08-10 NOTE — ASSESSMENT
[Palliative] : Goals of care discussed with patient: Palliative [Palliative Care Plan] : not applicable at this time [FreeTextEntry1] : Finesse Gallagher is a 70 years old male who presented with intermittent midback pain, 10/10 radiating to stomach from both sides. CT c/a/p and MRI abdomen showed multiple lung nodules, one liver lesion, retroperitoneal nodes. Bone scan showed one suspicious met in right superior pubic ramus. Liver lesion biopsy is consistent with renal cell carcinoma.\par It is not certain that the histology of RCC is clear cell vs non-clear cell. IMDC risk of this patient he has 5 out of 6 risks including leukocytosis, anemia, thrombocytosis, time from dx to initiate systemic treatment less than one year and high calcemia. He has poor risk of mRCC. Based on mccRCC the standard of care is combined immunotherapy nivo and ipi vs sutent, checkmate 214 in the intermediate and poor risk of mccRCC patients with significant overall survival benefit, keynote 426 keytruda plus axitinib vs sutent in good, intermediate and poor risk of mccRCC patients showed OS, PFS and MICHEL benefit, [place] in the intermediate and poor risk of mccRCC patients showed cabozantinib demonstrated a significant clinical benefit in PFS and MICHEL over standard-of-care sunitinib as first-line therapy in patients with intermediate- or poor-risk mRCC. Consider the longer duration of response and less side effects and recommended nivo and ipi. He agreed to sign the consent. S/P cycle 4 nivo and ipi. His back pain was resolved. His appetite is normal. S/o cycle 12 nivolumab maintenance treatment.\par \par \par Plan \par -Continue maintenance #13  nivolumab 480 mg IV monthly given overall positive treatment response on the recent scans\par -check labs \par RTC  TEB in 4 weeks for follow up \par \par \par

## 2020-08-12 ENCOUNTER — RESULT REVIEW (OUTPATIENT)
Age: 70
End: 2020-08-12

## 2020-08-12 ENCOUNTER — APPOINTMENT (OUTPATIENT)
Dept: INFUSION THERAPY | Facility: HOSPITAL | Age: 70
End: 2020-08-12

## 2020-08-12 ENCOUNTER — LABORATORY RESULT (OUTPATIENT)
Age: 70
End: 2020-08-12

## 2020-08-12 LAB
BASOPHILS # BLD AUTO: 0.03 K/UL — SIGNIFICANT CHANGE UP (ref 0–0.2)
BASOPHILS NFR BLD AUTO: 0.4 % — SIGNIFICANT CHANGE UP (ref 0–2)
EOSINOPHIL # BLD AUTO: 0.14 K/UL — SIGNIFICANT CHANGE UP (ref 0–0.5)
EOSINOPHIL NFR BLD AUTO: 1.9 % — SIGNIFICANT CHANGE UP (ref 0–6)
HCT VFR BLD CALC: 32.8 % — LOW (ref 39–50)
HGB BLD-MCNC: 11.4 G/DL — LOW (ref 13–17)
IMM GRANULOCYTES NFR BLD AUTO: 0.4 % — SIGNIFICANT CHANGE UP (ref 0–1.5)
LYMPHOCYTES # BLD AUTO: 1.38 K/UL — SIGNIFICANT CHANGE UP (ref 1–3.3)
LYMPHOCYTES # BLD AUTO: 19 % — SIGNIFICANT CHANGE UP (ref 13–44)
MCHC RBC-ENTMCNC: 32.1 PG — SIGNIFICANT CHANGE UP (ref 27–34)
MCHC RBC-ENTMCNC: 34.8 GM/DL — SIGNIFICANT CHANGE UP (ref 32–36)
MCV RBC AUTO: 92.4 FL — SIGNIFICANT CHANGE UP (ref 80–100)
MONOCYTES # BLD AUTO: 0.76 K/UL — SIGNIFICANT CHANGE UP (ref 0–0.9)
MONOCYTES NFR BLD AUTO: 10.5 % — SIGNIFICANT CHANGE UP (ref 2–14)
NEUTROPHILS # BLD AUTO: 4.92 K/UL — SIGNIFICANT CHANGE UP (ref 1.8–7.4)
NEUTROPHILS NFR BLD AUTO: 67.8 % — SIGNIFICANT CHANGE UP (ref 43–77)
NRBC # BLD: 0 /100 WBCS — SIGNIFICANT CHANGE UP (ref 0–0)
PLATELET # BLD AUTO: 153 K/UL — SIGNIFICANT CHANGE UP (ref 150–400)
RBC # BLD: 3.55 M/UL — LOW (ref 4.2–5.8)
RBC # FLD: 12.6 % — SIGNIFICANT CHANGE UP (ref 10.3–14.5)
WBC # BLD: 7.26 K/UL — SIGNIFICANT CHANGE UP (ref 3.8–10.5)
WBC # FLD AUTO: 7.26 K/UL — SIGNIFICANT CHANGE UP (ref 3.8–10.5)

## 2020-08-13 DIAGNOSIS — Z51.11 ENCOUNTER FOR ANTINEOPLASTIC CHEMOTHERAPY: ICD-10-CM

## 2020-09-01 ENCOUNTER — OUTPATIENT (OUTPATIENT)
Dept: OUTPATIENT SERVICES | Facility: HOSPITAL | Age: 70
LOS: 1 days | Discharge: HOME | End: 2020-09-01

## 2020-09-01 DIAGNOSIS — S86.019A STRAIN OF UNSPECIFIED ACHILLES TENDON, INITIAL ENCOUNTER: Chronic | ICD-10-CM

## 2020-09-01 DIAGNOSIS — Z98.890 OTHER SPECIFIED POSTPROCEDURAL STATES: Chronic | ICD-10-CM

## 2020-09-09 ENCOUNTER — APPOINTMENT (OUTPATIENT)
Dept: INFUSION THERAPY | Facility: HOSPITAL | Age: 70
End: 2020-09-09

## 2020-09-09 ENCOUNTER — LABORATORY RESULT (OUTPATIENT)
Age: 70
End: 2020-09-09

## 2020-09-09 ENCOUNTER — APPOINTMENT (OUTPATIENT)
Dept: HEMATOLOGY ONCOLOGY | Facility: CLINIC | Age: 70
End: 2020-09-09
Payer: MEDICARE

## 2020-09-09 DIAGNOSIS — C64.9 MALIGNANT NEOPLASM OF UNSPECIFIED KIDNEY, EXCEPT RENAL PELVIS: ICD-10-CM

## 2020-09-09 DIAGNOSIS — Z51.11 ENCOUNTER FOR ANTINEOPLASTIC CHEMOTHERAPY: ICD-10-CM

## 2020-09-09 PROCEDURE — 99442: CPT

## 2020-09-09 NOTE — REVIEW OF SYSTEMS
[Skin Rash] : skin rash [Negative] : Allergic/Immunologic [Fever] : no fever [Chills] : no chills [Night Sweats] : no night sweats [Recent Change In Weight] : ~T no recent weight change [Fatigue] : no fatigue [Chest Pain] : no chest pain [Shortness Of Breath] : no shortness of breath [Cough] : no cough [Abdominal Pain] : no abdominal pain [Constipation] : no constipation [Diarrhea] : no diarrhea [Easy Bruising] : no tendency for easy bruising [Easy Bleeding] : no tendency for easy bleeding [de-identified] : scattered skin bumps in the lower leg skin

## 2020-09-09 NOTE — HISTORY OF PRESENT ILLNESS
[T: ___] : T[unfilled] [N: ___] : N[unfilled] [M: ___] : M[unfilled] [AJCC Stage: ____] : AJCC Stage: [unfilled] [Home] : at home, [unfilled] , at the time of the visit. [Medical Office: ([facility])___] : at the medical office located in  [Patient] : the patient [Self] : self [de-identified] : Finesse Zuniga is a 70 years old male who initially presented with intermittent midback pain, 10/10 radiating to stomach from both sides on June 1, 2019. He has poor appetite.  On june 12 he was diagnosed UTI and on cipro for 4 days, on June 16, was started cefpodoxime 100mg BID for 7 days.  CT chest/abdomen/pelvis revealed significant abnormal appearance of the left kidney with multiple heterogeneous areas of low attenuation involving the upper and midpole which may represent malignancy. There are necrotic lymph nodes in the left para-aortic location at the level of the kidney and below the level of the kidney. Adenopathy surrounds the left renal artery. The left renal vein demonstrates question of partially occlusive thrombus. Nonspecific scattered tiny pulmonary nodules. Lower lobe subpleural nodules measure up to 6 mm. There is a well-circumscribed hypodense lesion in the anterior aspect of the lateral segment of the left lobe measuring 3.2 x 2.2 cm. He underwent CT guided liver lesion biopsy which showed consistent with primary renal cell carcinoma, IHC CK7/CK19/PAX8 positive. MRI abdomen waw con showed multiple bilateral pulmonary nodules increased in size and number compared with recent CT. The largest is pleural-based in the left lower lobe and measures 1.5 x 1.1 cm. LIVER: Rim-enhancing subcapsular mass in segment 3 and measures 4.4 x 2.6 cm. A 4.2cm mass in mid to lower pole of the lft kidney suspicious for urothelial malignancy arising within the left renal collecting system. Regional lymphadenopathy and hepatic and pulmonary metastases. Nonocclusive thrombus within the left renal vein. He was started on eliquis. Bone scan showed  a focus of mildly increased uptake in the right superior \par pubic ramus suspicious of bone mets.\par He has lost 17lbs in one month. He denies nausea, vomiting and constipation and diarrhea. \par \par 7/19/19 s/p cycle 1 nivo and ipi, he reports that his intermittent midback pain, 10/10 radiating to stomach from both sides on June 1, 2019 has been resolved. His appetite is improving. He denies nausea, vomiting and diarrhea, skin rashes, joint pain. \par \par 7/31: Reports feeling better since last treatment, no back pain or stomach pain. Had gone to the ER on 7/19 for hyponatremia, was SD'ed after received fluid.  Sodium today improved. Reports some leg cramping.  Reports improved appetite, energy level has improved.  Denies coughing and SOB.  Reports has also gained weight.\par \par 8/21/19 : Here for week 7 of cycle 2 ipilimiumab + nivolumab. Has been feeling much improved, gained 4 kg since his last visit. His energy is good and is able to perform ADLs and do chores at home now. No ha, dizziness, sob, cough, chest pain, nausea, vomiting, diarrhea. There is no change in past medical hx, family hx, and social history since our last visit.\par \par 9/11/2019: s/p cycle 3, he feels completely fine, his energy level is back to normal. He has done normal ADLs. He denies skin rashes, joint pain, diarrhea. \par \par 10/16/2019 s/p cycle 4, he feels completely fine, his energy level is back to normal. He has done normal ADLs. He denies skin rashes, joint pain, diarrhea. \par \par 11/13/2019 now on Nivolumab maintenance treatment. He feels fine, has normal ADLs. He denies skin rashes, joint pain, diarrhea. \par \par 11/14/19 : Restaging scan: liver and RP nodes again with great response. There is a left lung lesion which is 7 mm, increased from 5 mm. \par \par 12/11/19 : met RCC on single agent nivolumab after completing combination treatment. He is tolerating therapy well without any issues thus far. He denies ha, sob,cp,nvd, rash, vision changes, night sweats. His energy is good and maintains normal lifestyle. \par \par 1/15/2020 : Cycle 5 nivolumab 480 mg monthly. Pt here for f/u. Overall feels well. Still gaining more weight, appetite is good. Denies any loss of energy, n/v/diarrhea, fevers, pain, or new skin rashes.  [de-identified] : metastatic renal cell carcinoma [de-identified] : 2/19/2020: Cycle 6 Nivolumab. Renal function Cr trending up and now 1.43. His Cr has been normal since starting therapy however started at 1.4. \par He received 5 doses of nivolumab and last scan was 11/2019. \par He feels well overall without significant change. Weigh stable and good appetite. \par \par 4/22/2020: cycle 8 nivolumab, he feels overall fine. weight is stable and no pain. \par \par 6/12/2020 cycle 10 nivolumab maintenance, feels completely fine except several bumps in the left lower leg skin, scabbed now. \par \par 7/8/2020 CT c/a/p showed stable lung nodules, continuous decrease in size in the liver lesion and renal lesion. \par \par 7/10/2020 s/p cycle 11 nivolumab maintenance. He feels completely fine except several bumps in the left lower leg skin, scabbed. He will see Dermatologist. \par \par 8/10/2020 s/p cycle 12 nivolumab maintenance. His appetite is ok. His skin bumps are getting better. \par \par 9/9/2020 s/p cycle 13 nivolumab maintenance. He feels overall fine with great appetite. His skin bumps have scabs.

## 2020-09-09 NOTE — ASSESSMENT
[Palliative] : Goals of care discussed with patient: Palliative [Palliative Care Plan] : not applicable at this time [FreeTextEntry1] : Finesse Gallagher is a 70 years old male who presented with intermittent midback pain, 10/10 radiating to stomach from both sides. CT c/a/p and MRI abdomen showed multiple lung nodules, one liver lesion, retroperitoneal nodes. Bone scan showed one suspicious met in right superior pubic ramus. Liver lesion biopsy is consistent with renal cell carcinoma.\par It is not certain that the histology of RCC is clear cell vs non-clear cell. IMDC risk of this patient he has 5 out of 6 risks including leukocytosis, anemia, thrombocytosis, time from dx to initiate systemic treatment less than one year and high calcemia. He has poor risk of mRCC. Based on mccRCC the standard of care is combined immunotherapy nivo and ipi vs sutent, checkmate 214 in the intermediate and poor risk of mccRCC patients with significant overall survival benefit, keynote 426 keytruda plus axitinib vs sutent in good, intermediate and poor risk of mccRCC patients showed OS, PFS and MICHEL benefit, [place] in the intermediate and poor risk of mccRCC patients showed cabozantinib demonstrated a significant clinical benefit in PFS and MICHEL over standard-of-care sunitinib as first-line therapy in patients with intermediate- or poor-risk mRCC. Consider the longer duration of response and less side effects and recommended nivo and ipi. He agreed to sign the consent. S/P cycle 4 nivo and ipi. His back pain was resolved. His appetite is normal. S/o cycle 12 nivolumab maintenance treatment.\par \par \par Plan \par -Continue maintenance #14  nivolumab 480 mg IV monthly given overall positive treatment response on the recent scans\par -check labs \par -will have interval scans next visit\par RTC  TEB in 4 weeks for follow up \par \par \par

## 2020-09-19 ENCOUNTER — INPATIENT (INPATIENT)
Facility: HOSPITAL | Age: 70
LOS: 2 days | Discharge: HOME | DRG: 696 | End: 2020-09-22
Attending: FAMILY MEDICINE | Admitting: STUDENT IN AN ORGANIZED HEALTH CARE EDUCATION/TRAINING PROGRAM
Payer: MEDICARE

## 2020-09-19 VITALS
RESPIRATION RATE: 19 BRPM | WEIGHT: 156.97 LBS | HEART RATE: 109 BPM | TEMPERATURE: 98 F | SYSTOLIC BLOOD PRESSURE: 180 MMHG | HEIGHT: 69 IN | DIASTOLIC BLOOD PRESSURE: 87 MMHG | OXYGEN SATURATION: 99 %

## 2020-09-19 DIAGNOSIS — I10 ESSENTIAL (PRIMARY) HYPERTENSION: ICD-10-CM

## 2020-09-19 DIAGNOSIS — C78.02 SECONDARY MALIGNANT NEOPLASM OF LEFT LUNG: ICD-10-CM

## 2020-09-19 DIAGNOSIS — D72.829 ELEVATED WHITE BLOOD CELL COUNT, UNSPECIFIED: ICD-10-CM

## 2020-09-19 DIAGNOSIS — R16.0 HEPATOMEGALY, NOT ELSEWHERE CLASSIFIED: ICD-10-CM

## 2020-09-19 DIAGNOSIS — N17.9 ACUTE KIDNEY FAILURE, UNSPECIFIED: ICD-10-CM

## 2020-09-19 DIAGNOSIS — Z29.9 ENCOUNTER FOR PROPHYLACTIC MEASURES, UNSPECIFIED: ICD-10-CM

## 2020-09-19 DIAGNOSIS — Z98.890 OTHER SPECIFIED POSTPROCEDURAL STATES: Chronic | ICD-10-CM

## 2020-09-19 DIAGNOSIS — S86.019A STRAIN OF UNSPECIFIED ACHILLES TENDON, INITIAL ENCOUNTER: Chronic | ICD-10-CM

## 2020-09-19 DIAGNOSIS — R31.0 GROSS HEMATURIA: ICD-10-CM

## 2020-09-19 DIAGNOSIS — I82.3 EMBOLISM AND THROMBOSIS OF RENAL VEIN: ICD-10-CM

## 2020-09-19 DIAGNOSIS — N40.0 BENIGN PROSTATIC HYPERPLASIA WITHOUT LOWER URINARY TRACT SYMPTOMS: ICD-10-CM

## 2020-09-19 LAB
ALBUMIN SERPL ELPH-MCNC: 3.9 G/DL — SIGNIFICANT CHANGE UP (ref 3.3–5)
ALP SERPL-CCNC: 58 U/L — SIGNIFICANT CHANGE UP (ref 40–120)
ALT FLD-CCNC: 22 U/L — SIGNIFICANT CHANGE UP (ref 12–78)
ANION GAP SERPL CALC-SCNC: 10 MMOL/L — SIGNIFICANT CHANGE UP (ref 5–17)
APPEARANCE UR: ABNORMAL
APTT BLD: 28.1 SEC — SIGNIFICANT CHANGE UP (ref 27.5–35.5)
AST SERPL-CCNC: 20 U/L — SIGNIFICANT CHANGE UP (ref 15–37)
BACTERIA # UR AUTO: ABNORMAL
BILIRUB SERPL-MCNC: 0.9 MG/DL — SIGNIFICANT CHANGE UP (ref 0.2–1.2)
BILIRUB UR-MCNC: NEGATIVE — SIGNIFICANT CHANGE UP
BUN SERPL-MCNC: 16 MG/DL — SIGNIFICANT CHANGE UP (ref 7–23)
CALCIUM SERPL-MCNC: 9.1 MG/DL — SIGNIFICANT CHANGE UP (ref 8.5–10.1)
CHLORIDE SERPL-SCNC: 105 MMOL/L — SIGNIFICANT CHANGE UP (ref 96–108)
CO2 SERPL-SCNC: 21 MMOL/L — LOW (ref 22–31)
COLOR SPEC: ABNORMAL
CREAT SERPL-MCNC: 1.4 MG/DL — HIGH (ref 0.5–1.3)
DIFF PNL FLD: ABNORMAL
GLUCOSE SERPL-MCNC: 206 MG/DL — HIGH (ref 70–99)
GLUCOSE UR QL: NEGATIVE — SIGNIFICANT CHANGE UP
HCT VFR BLD CALC: 36.3 % — LOW (ref 39–50)
HGB BLD-MCNC: 13.4 G/DL — SIGNIFICANT CHANGE UP (ref 13–17)
INR BLD: 1.24 RATIO — HIGH (ref 0.88–1.16)
KETONES UR-MCNC: NEGATIVE — SIGNIFICANT CHANGE UP
LEUKOCYTE ESTERASE UR-ACNC: ABNORMAL
MCHC RBC-ENTMCNC: 32.4 PG — SIGNIFICANT CHANGE UP (ref 27–34)
MCHC RBC-ENTMCNC: 36.9 GM/DL — HIGH (ref 32–36)
MCV RBC AUTO: 87.9 FL — SIGNIFICANT CHANGE UP (ref 80–100)
NITRITE UR-MCNC: NEGATIVE — SIGNIFICANT CHANGE UP
NRBC # BLD: 0 /100 WBCS — SIGNIFICANT CHANGE UP (ref 0–0)
PH UR: 7 — SIGNIFICANT CHANGE UP (ref 5–8)
PLATELET # BLD AUTO: 224 K/UL — SIGNIFICANT CHANGE UP (ref 150–400)
POTASSIUM SERPL-MCNC: 4.1 MMOL/L — SIGNIFICANT CHANGE UP (ref 3.5–5.3)
POTASSIUM SERPL-SCNC: 4.1 MMOL/L — SIGNIFICANT CHANGE UP (ref 3.5–5.3)
PROT SERPL-MCNC: 7.5 G/DL — SIGNIFICANT CHANGE UP (ref 6–8.3)
PROT UR-MCNC: 500 MG/DL
PROTHROM AB SERPL-ACNC: 14.4 SEC — HIGH (ref 10.6–13.6)
RBC # BLD: 4.13 M/UL — LOW (ref 4.2–5.8)
RBC # FLD: 12.1 % — SIGNIFICANT CHANGE UP (ref 10.3–14.5)
RBC CASTS # UR COMP ASSIST: >50 /HPF (ref 0–4)
SODIUM SERPL-SCNC: 136 MMOL/L — SIGNIFICANT CHANGE UP (ref 135–145)
SP GR SPEC: 1.01 — SIGNIFICANT CHANGE UP (ref 1.01–1.02)
UROBILINOGEN FLD QL: NEGATIVE — SIGNIFICANT CHANGE UP
WBC # BLD: 21.6 K/UL — HIGH (ref 3.8–10.5)
WBC # FLD AUTO: 21.6 K/UL — HIGH (ref 3.8–10.5)
WBC UR QL: SIGNIFICANT CHANGE UP

## 2020-09-19 PROCEDURE — 99285 EMERGENCY DEPT VISIT HI MDM: CPT

## 2020-09-19 PROCEDURE — 93010 ELECTROCARDIOGRAM REPORT: CPT

## 2020-09-19 PROCEDURE — 99223 1ST HOSP IP/OBS HIGH 75: CPT | Mod: GC,AI

## 2020-09-19 PROCEDURE — 71045 X-RAY EXAM CHEST 1 VIEW: CPT | Mod: 26

## 2020-09-19 RX ORDER — MORPHINE SULFATE 50 MG/1
2 CAPSULE, EXTENDED RELEASE ORAL ONCE
Refills: 0 | Status: DISCONTINUED | OUTPATIENT
Start: 2020-09-19 | End: 2020-09-19

## 2020-09-19 RX ORDER — TAMSULOSIN HYDROCHLORIDE 0.4 MG/1
0.4 CAPSULE ORAL AT BEDTIME
Refills: 0 | Status: DISCONTINUED | OUTPATIENT
Start: 2020-09-19 | End: 2020-09-22

## 2020-09-19 RX ORDER — APIXABAN 2.5 MG/1
5 TABLET, FILM COATED ORAL
Refills: 0 | Status: DISCONTINUED | OUTPATIENT
Start: 2020-09-19 | End: 2020-09-19

## 2020-09-19 RX ORDER — SODIUM CHLORIDE 9 MG/ML
1000 INJECTION INTRAMUSCULAR; INTRAVENOUS; SUBCUTANEOUS
Refills: 0 | Status: DISCONTINUED | OUTPATIENT
Start: 2020-09-19 | End: 2020-09-21

## 2020-09-19 RX ORDER — MORPHINE SULFATE 50 MG/1
2 CAPSULE, EXTENDED RELEASE ORAL EVERY 4 HOURS
Refills: 0 | Status: DISCONTINUED | OUTPATIENT
Start: 2020-09-19 | End: 2020-09-20

## 2020-09-19 RX ORDER — CEFTRIAXONE 500 MG/1
1000 INJECTION, POWDER, FOR SOLUTION INTRAMUSCULAR; INTRAVENOUS ONCE
Refills: 0 | Status: COMPLETED | OUTPATIENT
Start: 2020-09-19 | End: 2020-09-19

## 2020-09-19 RX ADMIN — MORPHINE SULFATE 2 MILLIGRAM(S): 50 CAPSULE, EXTENDED RELEASE ORAL at 18:59

## 2020-09-19 RX ADMIN — MORPHINE SULFATE 2 MILLIGRAM(S): 50 CAPSULE, EXTENDED RELEASE ORAL at 19:30

## 2020-09-19 RX ADMIN — SODIUM CHLORIDE 75 MILLILITER(S): 9 INJECTION INTRAMUSCULAR; INTRAVENOUS; SUBCUTANEOUS at 21:00

## 2020-09-19 RX ADMIN — CEFTRIAXONE 1000 MILLIGRAM(S): 500 INJECTION, POWDER, FOR SOLUTION INTRAMUSCULAR; INTRAVENOUS at 19:30

## 2020-09-19 RX ADMIN — CEFTRIAXONE 100 MILLIGRAM(S): 500 INJECTION, POWDER, FOR SOLUTION INTRAMUSCULAR; INTRAVENOUS at 18:59

## 2020-09-19 RX ADMIN — TAMSULOSIN HYDROCHLORIDE 0.4 MILLIGRAM(S): 0.4 CAPSULE ORAL at 23:18

## 2020-09-19 NOTE — H&P ADULT - PROBLEM SELECTOR PLAN 3
chronic - last dose of nivolumab 8/9/20 chronic - last dose of nivolumab 8/9/20  - advise pt to f/u with oncologist outpatient chronic - last dose of nivolumab 8/9/20  left RCC (unclear if clear cell or not) with mets to lung and suspected mets to bone  - advise pt to f/u with oncologist outpatient - will have heme/onc see inpatient

## 2020-09-19 NOTE — H&P ADULT - ASSESSMENT
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         Patient woke up this morning and noticed hematuria after his am BM. He noticed dribbling of urine and felt incomplete emptying for his bladder. Patient co dysuria but denies flank/abd pain, foul smelling urine. Patient had a previous episode in 2013 when he was dx with a UTI. Patients BM was normal this morning. 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  He was suppose to have it repaired but then had other medical issues that took priority.    in ED:  VS: HR 78, /78, RR18, 98F, SpO2 99%   Labs: WBC 21.6, BUN/Cr 16/1.4, H/H 13.4/36.3  PT 14.4, INR 1.24, PTT 28  EKG: NSR, grossly unchanged from previous   ua: large blood, RBC > 50, occasional bact, small leuk esterase    CT chest, abd/pelvis: Left upper pole renal lesion is decreased in size since 11/14/2019. Stable left lower lobe pulmonary nodule. Small left liver lesion decrease in size.       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

## 2020-09-19 NOTE — H&P ADULT - PROBLEM SELECTOR PLAN 8
DVT ppx: SCDs  - hold home Eliquis 5mg BID in setting of active bleed  - PT 14.4, INR 1.24, PTT 28  - resume Eliquis with urology advises chronic   - 7/20 CT chest, abd/pelvis: Left upper pole renal lesion is decreased in size since 11/14/2019. Stable left lower lobe pulmonary nodule. Small left liver lesion decrease in size.  - advise pt to f/u with oncologist outpatient

## 2020-09-19 NOTE — H&P ADULT - NSICDXFAMILYHX_GEN_ALL_CORE_FT
FAMILY HISTORY:  FHx: cancer, breast CA in sister; stomach CA in mother     FAMILY HISTORY:  FH: diabetes mellitus  FHx: cancer, breast CA in sister; stomach CA in mother

## 2020-09-19 NOTE — H&P ADULT - PROBLEM SELECTOR PLAN 7
chronic   - 7/20 CT chest, abd/pelvis: Left upper pole renal lesion is decreased in size since 11/14/2019. Stable left lower lobe pulmonary nodule. Small left liver lesion decrease in size. chronic   - 7/20 CT chest, abd/pelvis: Left upper pole renal lesion is decreased in size since 11/14/2019. Stable left lower lobe pulmonary nodule. Small left liver lesion decrease in size.  - advise pt to f/u with oncologist outpatient chronic - currently /78  - patient is not currently on medications. DASH/TLC diet.  - monitor hemodynamics

## 2020-09-19 NOTE — H&P ADULT - HISTORY OF PRESENT ILLNESS
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 Patient woke up this morning and noticed hematuria after his am BM. He noticed dribbling of urine and felt incomplete emptying for his bladder. Patient co dysuria but denies flank/abd pain, foul smelling urine. Patient had a previous episode in 2013 when he was dx with a UTI. Patients BM was normal this morning. 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  He was suppose to have it repaired but then had other medical issues that took priority.    in ED:  VS: HR 78, /78, RR18, 98F, SpO2 99%   Labs: WBC 21.6, BUN/Cr 16/1.4, H/H 13.4/36.3  PT 14.4, INR 1.24, PTT 28  EKG: NSR, grossly unchanged from previous   CT chest, abd/pelvis: Left upper pole renal lesion is decreased in size since 11/14/2019. Stable left lower lobe pulmonary nodule. Small left liver lesion decrease in size.     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 Patient woke up this morning and noticed hematuria after his am BM. He noticed dribbling of urine and felt incomplete emptying for his bladder. Patient co dysuria but denies flank/abd pain, foul smelling urine. Patient had a previous episode in 2013 when he was dx with a UTI. Patients BM was normal this morning. 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  He was suppose to have it repaired but then had other medical issues that took priority. Patient is actively passing clots through barber cath.     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 Patient woke up this morning and noticed hematuria after his am BM. He noticed dribbling of urine and felt incomplete emptying for his bladder. Patient co dysuria but denies flank/abd pain, foul smelling urine. Patient had a previous episode in 2013 when he was dx with a UTI. Patients BM was normal this morning. He has a large scrotal hernia that swells intermittently and became swollen this morning.  He was suppose to have it repaired but then had other medical issues that took priority. Patient is actively passing clots through barber cath.     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 Patient woke up this morning and noticed hematuria after his am BM. He noticed dribbling of urine and felt incomplete emptying for his bladder. Patient co dysuria but denies flank/abd pain, foul smelling urine. Patient had a previous episode in 2013 when he was dx with a UTI. Patients BM was normal this morning. He has a large inguinal hernia that swells intermittently and became swollen this morning.  He was suppose to have it repaired but then had other medical issues that took priority. Patient is actively passing clots through barber cath.     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 Patient woke up this morning and noticed hematuria after his am BM. He noticed dribbling of urine and felt incomplete emptying for his bladder. Patient c/o dysuria but denies flank pain, foul smelling urine. Patient had a previous episode in 2013 when he was dx with a UTI. Patients BM was normal this morning. He has an indirect inguinal hernia swells intermittently and became swollen this morning.  He was supposed to have it repaired but then had other medical issues that took priority. Patient is actively passing clots at this time, complaining of pain with this. Denies chest pain, palpitations, dyspnea, headache, dizziness, abdominal pain, n/v/d.     In the ED,   VS: HR 78, /78, RR18, 98F, SpO2 99%   Labs: WBC 21.6, BUN/Cr 16/1.4, H/H 13.4/36.3  PT 14.4, INR 1.24, PTT 28  CXR: no acute pulmonary disease on personal read  EKG: NSR, grossly unchanged from previous   UA: large blood, RBC > 50, occasional bact, small leuk esterase  given: bladder irrigation, ceftriaxone 1g IVPB x1 dose, morphine 2mg IVP

## 2020-09-19 NOTE — H&P ADULT - NSHPREVIEWOFSYSTEMS_GEN_ALL_CORE
CONSTITUTIONAL: denies fever, chills, fatigue, weakness  HEENT: denies blurred vision, sore throat  SKIN: denies new lesions, rash  CARDIOVASCULAR: denies chest pain, chest pressure, palpitations  RESPIRATORY: denies shortness of breath, sputum production  GASTROINTESTINAL: denies nausea, vomiting, diarrhea, abdominal pain, flank pain  GENITOURINARY: admits dysuria, hematuria  NEUROLOGICAL: denies numbness, headache, focal weakness  MUSCULOSKELETAL: denies new joint pain, muscle aches  HEMATOLOGIC: denies gross bleeding, bruising  LYMPHATICS: denies enlarged lymph nodes, extremity swelling

## 2020-09-19 NOTE — H&P ADULT - NSHPSOCIALHISTORY_GEN_ALL_CORE
Lives at home  Never a smoker  Denies alcohol use  Denies illicit drug use  Retired from employment Lives at home with his ex  Smoked 1pack per week for about 5 years in his late teens  Denies alcohol use  Smokes marijuana occasionally   Retired from employment

## 2020-09-19 NOTE — ED PROVIDER NOTE - ATTENDING CONTRIBUTION TO CARE
71 yo M w/ hx renal cell carcinoma - p/w gross hematuria today. No fever/chills. no abd pain. no n/v/d. No weakness / dizziness. no malaise. no neck / back pain. no trauma. no agg/allev factors. No other inj or co.  exam: MM Moist. neck supple. no meningeal signs. abd soft, mild suprapubic tend. No cvat. non-toxic, well appearing. no other acute findings.  Check labs, 3-way, CBI, reeval

## 2020-09-19 NOTE — H&P ADULT - PROBLEM SELECTOR PLAN 4
chronic -   - hold home Eliquis 5mg BID in setting of active bleed hematuria chronic  - hold home Eliquis 5mg BID in setting of active bleed hematuria  - consider initiation of heparin if H/H remains stable  - per patient, is due for repeat CT soon to see if he can come off AC  - last took dose 9/19 AM. Will consult hematology for further recs  - hematology/urology collaboration regarding continuation of AC

## 2020-09-19 NOTE — H&P ADULT - NSICDXPASTMEDICALHX_GEN_ALL_CORE_FT
PAST MEDICAL HISTORY:  BPH (benign prostatic hyperplasia)     Hypertension     Liver mass     Metastatic renal cell carcinoma to lung, left on nivolumab    Renal vein thrombosis

## 2020-09-19 NOTE — H&P ADULT - PROBLEM SELECTOR PLAN 2
acute - pt found to have WBC 21.6 in ed, co dysuria   - afebrile  - ua: occasional bact, small leuk esterase, nitrites negative  - given ceftriaxone 1g IVPB x1 in ed acute - pt found to have WBC 21.6 in ED, c/o dysuria   - afebrile and UA is grossly negative for infection, CXR clear  - given ceftriaxone 1g IVPB x1 in ED, will monitor off abx for now  - monitor CBC, trend TLC, follow up all culture data (blood/urine)

## 2020-09-19 NOTE — H&P ADULT - NSHPPHYSICALEXAM_GEN_ALL_CORE
T(C): 37 (09-19-20 @ 19:16), Max: 37 (09-19-20 @ 19:16)  HR: 78 (09-19-20 @ 19:16) (78 - 109)  BP: 125/78 (09-19-20 @ 19:16) (125/78 - 180/87)  RR: 18 (09-19-20 @ 19:16) (18 - 19)  SpO2: 99% (09-19-20 @ 19:16) (99% - 99%)    GENERAL: patient appears well, no acute distress, conversant  EYES: anicteric sclerae, no exudates  ENMT: oropharynx clear without erythema, no exudates, moist mucous membranes  NECK: supple, soft, no thyromegaly noted  LUNGS: clear to auscultation, no intercostal retractions  HEART: S1/S2, regular rate and rhythm, no murmurs noted, no lower extremity edema  GASTROINTESTINAL: abdomen is tense due to flexing in pain, nontender, nondistended, normoactive bowel sounds, no palpable masses  INTEGUMENT: good skin turgor, warm skin, appears well perfused, several black papules on left leg  MUSCULOSKELETAL: no clubbing or cyanosis, no obvious deformity  NEUROLOGIC: awake, alert, oriented x3, good muscle tone in 4 extremities, no obvious sensory deficits  PSYCHIATRIC: mood is good, affect is congruent, linear and logical thought process  HEME/LYMPH: no palpable supraclavicular nodules, no obvious ecchymosis or petechiae T(C): 37 (09-19-20 @ 19:16), Max: 37 (09-19-20 @ 19:16)  HR: 78 (09-19-20 @ 19:16) (78 - 109)  BP: 125/78 (09-19-20 @ 19:16) (125/78 - 180/87)  RR: 18 (09-19-20 @ 19:16) (18 - 19)  SpO2: 99% (09-19-20 @ 19:16) (99% - 99%)    GENERAL: patient appears well, no acute distress, conversant  EYES: anicteric sclerae, no exudates  ENMT: oropharynx clear without erythema, no exudates, moist mucous membranes  NECK: supple, soft, no thyromegaly noted  LUNGS: clear to auscultation, no intercostal retractions  HEART: S1/S2, regular rate and rhythm, no murmurs noted, no lower extremity edema  GASTROINTESTINAL: abdomen is tense due to flexing in pain, nontender, nondistended, normoactive bowel sounds, no palpable masses  GENITOURINARY: normal phallus, scrotal swelling present does not appear incarcerated; barber catheter in place draining gross blood  INTEGUMENT: good skin turgor, warm skin, appears well perfused, several black papules on left leg  MUSCULOSKELETAL: no clubbing or cyanosis, no obvious deformity  NEUROLOGIC: awake, alert, oriented x3, good muscle tone in 4 extremities, no obvious sensory deficits  PSYCHIATRIC: mood is good, affect is congruent, linear and logical thought process  HEME/LYMPH: no palpable supraclavicular nodules, no obvious ecchymosis or petechiae

## 2020-09-19 NOTE — H&P ADULT - PROBLEM SELECTOR PLAN 9
DVT ppx: SCDs.  - high VTE risk. hold home Eliquis 5mg BID in setting of active bleed.  - resume AC when OK with urology

## 2020-09-19 NOTE — ED PROVIDER NOTE - OBJECTIVE STATEMENT
70 year old male hx of renal cell carcinoma and is on chemo, last on 8/9/2020 presenting with flank blood from his penis.  Patient reports it started today.  He had one similar episode in 2013 and was dx with a UTI.  Patient denies chest pain, sob, fevers, chills, ab pain, constipation, diarrhea.  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  He was suppose to have it repaired but then had other medical issues that took priority.

## 2020-09-19 NOTE — H&P ADULT - PROBLEM SELECTOR PLAN 1
acutely gross - started this morning, hx of RCC  - in ED H/H 13.4/36.3  - ua: large blood, RBC > 50, occasional bact, small leuk esterase  - hemodynamically stable acutely gross - started this morning, hx of RCC  - in ED H/H 13.4/36.3  - ua: large blood, RBC > 50, occasional bact, small leuk esterase  - hemodynamically stable  - continue ns 75cc/hr gross hematuria with clots, on eliquis - will need continuous bladder irrigation  - at time of admission H/H stable 13.4/36.3 - trend CBC  - UA: large blood, RBC > 50, occasional bact, small leuk esterase  - hemodynamically stable at present  - hold eliquis for now given the amount of bleeding despite stable H/H  - can consider easily reversible agent (heparin gtt) if H/H remains stable   - Urology consulted by ER: Dr. Graves, follow up recs

## 2020-09-19 NOTE — H&P ADULT - PROBLEM SELECTOR PLAN 5
chronic -   - continue home tamsulosin 0.4mg po qhs likely prerenal azotemia  - will continue gentle IV hydration  - monitor renal indices, avoid nephrotoxic agents

## 2020-09-20 LAB
ALBUMIN SERPL ELPH-MCNC: 3.7 G/DL — SIGNIFICANT CHANGE UP (ref 3.3–5)
ALP SERPL-CCNC: 58 U/L — SIGNIFICANT CHANGE UP (ref 40–120)
ALT FLD-CCNC: 19 U/L — SIGNIFICANT CHANGE UP (ref 12–78)
ANION GAP SERPL CALC-SCNC: 8 MMOL/L — SIGNIFICANT CHANGE UP (ref 5–17)
AST SERPL-CCNC: 19 U/L — SIGNIFICANT CHANGE UP (ref 15–37)
BASOPHILS # BLD AUTO: 0.03 K/UL — SIGNIFICANT CHANGE UP (ref 0–0.2)
BASOPHILS NFR BLD AUTO: 0.2 % — SIGNIFICANT CHANGE UP (ref 0–2)
BILIRUB SERPL-MCNC: 1.7 MG/DL — HIGH (ref 0.2–1.2)
BUN SERPL-MCNC: 15 MG/DL — SIGNIFICANT CHANGE UP (ref 7–23)
CALCIUM SERPL-MCNC: 9.1 MG/DL — SIGNIFICANT CHANGE UP (ref 8.5–10.1)
CHLORIDE SERPL-SCNC: 107 MMOL/L — SIGNIFICANT CHANGE UP (ref 96–108)
CO2 SERPL-SCNC: 25 MMOL/L — SIGNIFICANT CHANGE UP (ref 22–31)
CREAT SERPL-MCNC: 1.1 MG/DL — SIGNIFICANT CHANGE UP (ref 0.5–1.3)
CULTURE RESULTS: NO GROWTH — SIGNIFICANT CHANGE UP
EOSINOPHIL # BLD AUTO: 0.02 K/UL — SIGNIFICANT CHANGE UP (ref 0–0.5)
EOSINOPHIL NFR BLD AUTO: 0.1 % — SIGNIFICANT CHANGE UP (ref 0–6)
GLUCOSE SERPL-MCNC: 156 MG/DL — HIGH (ref 70–99)
HCT VFR BLD CALC: 33.1 % — LOW (ref 39–50)
HGB BLD-MCNC: 12.2 G/DL — LOW (ref 13–17)
IMM GRANULOCYTES NFR BLD AUTO: 0.4 % — SIGNIFICANT CHANGE UP (ref 0–1.5)
LYMPHOCYTES # BLD AUTO: 1.7 K/UL — SIGNIFICANT CHANGE UP (ref 1–3.3)
LYMPHOCYTES # BLD AUTO: 10.5 % — LOW (ref 13–44)
MAGNESIUM SERPL-MCNC: 2 MG/DL — SIGNIFICANT CHANGE UP (ref 1.6–2.6)
MCHC RBC-ENTMCNC: 32.5 PG — SIGNIFICANT CHANGE UP (ref 27–34)
MCHC RBC-ENTMCNC: 36.9 GM/DL — HIGH (ref 32–36)
MCV RBC AUTO: 88.3 FL — SIGNIFICANT CHANGE UP (ref 80–100)
MONOCYTES # BLD AUTO: 1.16 K/UL — HIGH (ref 0–0.9)
MONOCYTES NFR BLD AUTO: 7.1 % — SIGNIFICANT CHANGE UP (ref 2–14)
NEUTROPHILS # BLD AUTO: 13.27 K/UL — HIGH (ref 1.8–7.4)
NEUTROPHILS NFR BLD AUTO: 81.7 % — HIGH (ref 43–77)
NRBC # BLD: 0 /100 WBCS — SIGNIFICANT CHANGE UP (ref 0–0)
PHOSPHATE SERPL-MCNC: 2 MG/DL — LOW (ref 2.5–4.5)
PLATELET # BLD AUTO: 189 K/UL — SIGNIFICANT CHANGE UP (ref 150–400)
POTASSIUM SERPL-MCNC: 4 MMOL/L — SIGNIFICANT CHANGE UP (ref 3.5–5.3)
POTASSIUM SERPL-SCNC: 4 MMOL/L — SIGNIFICANT CHANGE UP (ref 3.5–5.3)
PROT SERPL-MCNC: 7 G/DL — SIGNIFICANT CHANGE UP (ref 6–8.3)
RBC # BLD: 3.75 M/UL — LOW (ref 4.2–5.8)
RBC # FLD: 12.2 % — SIGNIFICANT CHANGE UP (ref 10.3–14.5)
SARS-COV-2 IGG SERPL QL IA: NEGATIVE — SIGNIFICANT CHANGE UP
SARS-COV-2 IGM SERPL IA-ACNC: <0.1 INDEX — SIGNIFICANT CHANGE UP
SARS-COV-2 RNA SPEC QL NAA+PROBE: SIGNIFICANT CHANGE UP
SODIUM SERPL-SCNC: 140 MMOL/L — SIGNIFICANT CHANGE UP (ref 135–145)
SPECIMEN SOURCE: SIGNIFICANT CHANGE UP
WBC # BLD: 16.25 K/UL — HIGH (ref 3.8–10.5)
WBC # FLD AUTO: 16.25 K/UL — HIGH (ref 3.8–10.5)

## 2020-09-20 PROCEDURE — 99233 SBSQ HOSP IP/OBS HIGH 50: CPT

## 2020-09-20 RX ORDER — INFLUENZA VIRUS VACCINE 15; 15; 15; 15 UG/.5ML; UG/.5ML; UG/.5ML; UG/.5ML
0.5 SUSPENSION INTRAMUSCULAR ONCE
Refills: 0 | Status: DISCONTINUED | OUTPATIENT
Start: 2020-09-20 | End: 2020-09-22

## 2020-09-20 RX ORDER — MORPHINE SULFATE 50 MG/1
1 CAPSULE, EXTENDED RELEASE ORAL EVERY 4 HOURS
Refills: 0 | Status: DISCONTINUED | OUTPATIENT
Start: 2020-09-20 | End: 2020-09-22

## 2020-09-20 RX ORDER — OXYBUTYNIN CHLORIDE 5 MG
5 TABLET ORAL THREE TIMES A DAY
Refills: 0 | Status: DISCONTINUED | OUTPATIENT
Start: 2020-09-20 | End: 2020-09-21

## 2020-09-20 RX ORDER — MORPHINE SULFATE 50 MG/1
2 CAPSULE, EXTENDED RELEASE ORAL EVERY 4 HOURS
Refills: 0 | Status: DISCONTINUED | OUTPATIENT
Start: 2020-09-20 | End: 2020-09-22

## 2020-09-20 RX ORDER — PHENAZOPYRIDINE HCL 100 MG
100 TABLET ORAL EVERY 8 HOURS
Refills: 0 | Status: DISCONTINUED | OUTPATIENT
Start: 2020-09-20 | End: 2020-09-22

## 2020-09-20 RX ADMIN — MORPHINE SULFATE 2 MILLIGRAM(S): 50 CAPSULE, EXTENDED RELEASE ORAL at 04:23

## 2020-09-20 RX ADMIN — TAMSULOSIN HYDROCHLORIDE 0.4 MILLIGRAM(S): 0.4 CAPSULE ORAL at 21:43

## 2020-09-20 RX ADMIN — Medication 100 MILLIGRAM(S): at 21:42

## 2020-09-20 RX ADMIN — Medication 5 MILLIGRAM(S): at 12:01

## 2020-09-20 RX ADMIN — MORPHINE SULFATE 2 MILLIGRAM(S): 50 CAPSULE, EXTENDED RELEASE ORAL at 04:08

## 2020-09-20 NOTE — CONSULT NOTE ADULT - PROBLEM/RECOMMENDATION-4
Home health called patient is having some weakness, more confusion and requested to do a urine culture. I said that would be fine. They will go to his house to day to do one.
DISPLAY PLAN FREE TEXT

## 2020-09-20 NOTE — PROGRESS NOTE ADULT - PROBLEM SELECTOR PLAN 8
chronic   - 7/20 CT chest, abd/pelvis: Left upper pole renal lesion is decreased in size since 11/14/2019. Stable left lower lobe pulmonary nodule. Small left liver lesion decrease in size.  - advise pt to f/u with oncologist outpatient

## 2020-09-20 NOTE — CONSULT NOTE ADULT - SUBJECTIVE AND OBJECTIVE BOX
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 Patient woke up this morning and noticed hematuria after his am BM. He noticed dribbling of urine and felt incomplete emptying for his bladder. Patient c/o dysuria but denies flank pain, foul smelling urine. Patient had a previous episode in 2013 when he was dx with a UTI. Patients BM was normal this morning. He has an indirect inguinal hernia swells intermittently and became swollen this morning.  He was supposed to have it repaired but then had other medical issues that took priority. Patient is actively passing clots at this time, complaining of pain with this. Denies chest pain, palpitations, dyspnea, headache, dizziness, abdominal pain, n/v/d.     In the ED,   VS: HR 78, /78, RR18, 98F, SpO2 99%   Labs: WBC 21.6, BUN/Cr 16/1.4, H/H 13.4/36.3  PT 14.4, INR 1.24, PTT 28  CXR: no acute pulmonary disease on personal read  EKG: NSR, grossly unchanged from previous   UA: large blood, RBC > 50, occasional bact, small leuk esterase  given: bladder irrigation, ceftriaxone 1g IVPB x1 dose, morphine 2mg IVP (19 Sep 2020 19:35)       ROS: Metastatic renal cell ca diagnosed in 6/2019 on opdivo with disease response.  on eliquis for renal vein thrombosis  Negative except for:    PAST MEDICAL & SURGICAL HISTORY:  BPH (benign prostatic hyperplasia)    Renal vein thrombosis    Metastatic renal cell carcinoma to lung, left  on nivolumab    Liver mass    Hypertension    Achilles rupture    History of liver biopsy        SOCIAL HISTORY:    FAMILY HISTORY:  FH: diabetes mellitus    FHx: cancer  breast CA in sister; stomach CA in mother        MEDICATIONS  (STANDING):  influenza   Vaccine 0.5 milliLiter(s) IntraMuscular once  sodium chloride 0.9%. 1000 milliLiter(s) (75 mL/Hr) IV Continuous <Continuous>  tamsulosin 0.4 milliGRAM(s) Oral at bedtime    MEDICATIONS  (PRN):  morphine  - Injectable 2 milliGRAM(s) IV Push every 4 hours PRN moderate-severe pain with passage of clots      Allergies    No Known Allergies    Intolerances        Vital Signs Last 24 Hrs  T(C): 36.9 (20 Sep 2020 04:24), Max: 37 (19 Sep 2020 19:16)  T(F): 98.4 (20 Sep 2020 04:24), Max: 98.6 (19 Sep 2020 19:16)  HR: 68 (20 Sep 2020 04:24) (68 - 109)  BP: 122/71 (20 Sep 2020 04:24) (116/63 - 180/87)  BP(mean): --  RR: 16 (20 Sep 2020 04:24) (16 - 19)  SpO2: 95% (20 Sep 2020 04:24) (95% - 100%)    PHYSICAL EXAM  General: adult in NAD  HEENT: clear oropharynx, anicteric sclera, pink conjunctivae  Neck: supple  CV: normal S1S2 with no murmur rubs or gallops  Lungs: clear to auscultation, no wheezes, no rhales  Abdomen: soft non-tender non-distended, no hepato/splenomegaly.  barber in place with CBI with blood tinged fluid  Ext: no clubbing cyanosis or edema  Skin: no rashes and no petichiae  Neuro: alert and oriented X3 no focal deficits      LABS:    CBC Full  -  ( 20 Sep 2020 07:46 )  WBC Count : 16.25 K/uL  RBC Count : 3.75 M/uL  Hemoglobin : 12.2 g/dL  Hematocrit : 33.1 %  Platelet Count - Automated : 189 K/uL  Mean Cell Volume : 88.3 fl  Mean Cell Hemoglobin : 32.5 pg  Mean Cell Hemoglobin Concentration : 36.9 gm/dL  Auto Neutrophil # : 13.27 K/uL  Auto Lymphocyte # : 1.70 K/uL  Auto Monocyte # : 1.16 K/uL  Auto Eosinophil # : 0.02 K/uL  Auto Basophil # : 0.03 K/uL  Auto Neutrophil % : 81.7 %  Auto Lymphocyte % : 10.5 %  Auto Monocyte % : 7.1 %  Auto Eosinophil % : 0.1 %  Auto Basophil % : 0.2 %    09-20    140  |  107  |  15  ----------------------------<  156<H>  4.0   |  25  |  1.10    Ca    9.1      20 Sep 2020 07:46  Phos  2.0     09-20  Mg     2.0     09-20    TPro  7.0  /  Alb  3.7  /  TBili  1.7<H>  /  DBili  x   /  AST  19  /  ALT  19  /  AlkPhos  58  09-20    PT/INR - ( 19 Sep 2020 19:24 )   PT: 14.4 sec;   INR: 1.24 ratio         PTT - ( 19 Sep 2020 19:24 )  PTT:28.1 sec          BLOOD SMEAR INTERPRETATION:    RADIOLOGY & ADDITIONAL STUDIES:    < from: CT Abdomen and Pelvis w/ Oral Cont and w/ IV Cont (07.08.20 @ 09:48) >  EXAM:  CT CHEST IC      EXAM:  CT ABDOMEN AND PELVIS OC IC        PROCEDURE DATE:  07/08/2020           INTERPRETATION:  CLINICAL INFORMATION: c64.2 ADMDIAG1: C64.2 MALIGNANT NEOPLASM OF LEFT KIDNEY, EXCEPT RENAL PELVIS/    COMPARISON: 2.19.20. 11/14/2019    PROCEDURE:   CT of the Chest, Abdomen and Pelvis was performed with intravenous contrast.   Intravenous contrast: 90 ml Omnipaque 350. 10 ml discarded.  Oral contrast: None.  Sagittal and coronal reformats were performed.    FINDINGS:    CHEST:     LUNGS AND LARGE AIRWAYS: Patent central airways. A 4 mm left lower lobe nodule series 2 image 78 unchanged. Linear scarring right upper lobe.  PLEURA: No pleural effusion.  VESSELS: Within normal limits.  HEART: Heart size is normal.  No pericardial effusion.  MEDIASTINUM AND JT: No lymphadenopathy.   CHEST WALL AND LOWER NECK: Gynecomastia.     ABDOMEN AND PELVIS:    LIVER: A 6 cm hypodense left liver lesion with capsular retraction previously 13 mm on 11/14/2019.  BILE DUCTS: Normal caliber.  GALLBLADDER: Within normal limits.  SPLEEN: Within normal limits.   PANCREAS: Within normal limits.  ADRENALS: Within normal limits.   KIDNEYS/URETERS: Left upper pole calyceal lesion measures 1.3 x 1.6 cm previously 3.7 x 2 cm. Subcentimeterright renal lesion too small to characterize.    BLADDER: Within normal limits.   REPRODUCTIVE ORGANS: The prostate is enlarged.    BOWEL: No bowel obstruction.  PERITONEUM: No ascites.   VESSELS:  Atretic left renal vein.  RETROPERITONEUM/LYMPH NODES: Subcentimeter nodes have improved.     ABDOMINAL WALL: Large right inguinal hernia containing cecum and small bowel.  BONES: Within normal limits.     IMPRESSION: Left upper pole renal lesion is decreased in size since 11/14/2019.    Stable left lower lobe pulmonary nodule.    Small left liver lesion decrease in size.                   ELADIA VAZ M.D., ATTENDING RADIOLOGIST   This document has been electronically signed. Jul 8 2020 10:54AM                < end of copied text >

## 2020-09-20 NOTE — ED ADULT NURSE REASSESSMENT NOTE - NS ED NURSE REASSESS COMMENT FT1
3 way barber cath inserted, tolerated well ,CBI in progress, bloody urine with several blood clots
Pt feels discomfort and pressure in bladder. Irrigation performed. No clots came out. CBI is draining pink tinged. Will reassess.
Pt was feeling pressure in the bladder. 3 way catheter irrigated. clots came out. CBI is now draining continuously.
Pt has 3 way urinary catheter with CBI running. Bags changed. Catheter was irrigated with several large clots that came out. Catheter is draining with nice flow at this time. Pt states after irrigation the pressure was relieved. Scrotum is enlarged. Lab at bedside drawing blood cultures. VSS will reassess.

## 2020-09-20 NOTE — PROGRESS NOTE ADULT - SUBJECTIVE AND OBJECTIVE BOX
INTERVAL HPI/OVERNIGHT EVENTS:  Patient seen and examined at bedside. Complaining of bladder spasms and feeling of bladder fullness. CBI running, pink tinged urine noted in bag, clear fluid in tube. Denies any CP, SOB, N/V.    MEDICATIONS  (STANDING):  influenza   Vaccine 0.5 milliLiter(s) IntraMuscular once  phenazopyridine 100 milliGRAM(s) Oral every 8 hours  sodium chloride 0.9%. 1000 milliLiter(s) (75 mL/Hr) IV Continuous <Continuous>  tamsulosin 0.4 milliGRAM(s) Oral at bedtime    MEDICATIONS  (PRN):  morphine  - Injectable 2 milliGRAM(s) IV Push every 4 hours PRN severe pain (7-10) with passage of clots  morphine  - Injectable 1 milliGRAM(s) IV Push every 4 hours PRN Moderate Pain (4 - 6)  oxybutynin 5 milliGRAM(s) Oral three times a day PRN Bladder spasms      Allergies    No Known Allergies    Intolerances      ROS:  CONSTITUTIONAL: No weakness, fevers or chills  EYES/ENT: No visual changes;  No vertigo or throat pain   NECK: No pain or stiffness  RESPIRATORY: No cough, wheezing, hemoptysis; No shortness of breath  CARDIOVASCULAR: No chest pain or palpitations  GASTROINTESTINAL: No abdominal or epigastric pain. No nausea, vomiting, or hematemesis  GENITOURINARY: admits bladder spasms and fullness and pain  NEUROLOGICAL: No numbness or weakness  SKIN: No itching, burning, rashes, or lesions   All other review of systems is negative unless indicated above.    Vital Signs Last 24 Hrs  T(C): 36.8 (20 Sep 2020 14:17), Max: 37 (19 Sep 2020 19:16)  T(F): 98.2 (20 Sep 2020 14:17), Max: 98.6 (19 Sep 2020 19:16)  HR: 84 (20 Sep 2020 14:17) (68 - 84)  BP: 126/68 (20 Sep 2020 14:17) (116/63 - 132/66)  BP(mean): --  RR: 18 (20 Sep 2020 14:17) (16 - 18)  SpO2: 95% (20 Sep 2020 14:17) (95% - 100%)    - @ 07:01  -   @ 07:00  --------------------------------------------------------  IN: 375 mL / OUT: 0 mL / NET: 375 mL     @ 07:01  -   @ 18:30  --------------------------------------------------------  IN: 0 mL / OUT: 8901 mL / NET: -8901 mL      Physical Exam:  General: WN/WD NAD  Neurology: A&Ox3, nonfocal, BACON x 4  Respiratory: CTA B/L  CV: RRR, S1S2, no murmurs, rubs or gallops  Abdominal: Soft, NT, ND +BS  : +CBI, pink tinged urine noted in bag, clear urine in tubing  Extremities: No edema, + peripheral pulses      LABS:                        12.2   16.25 )-----------( 189      ( 20 Sep 2020 07:46 )             33.1     09-20    140  |  107  |  15  ----------------------------<  156<H>  4.0   |  25  |  1.10    Ca    9.1      20 Sep 2020 07:46  Phos  2.0     09-20  Mg     2.0     -20    TPro  7.0  /  Alb  3.7  /  TBili  1.7<H>  /  DBili  x   /  AST  19  /  ALT  19  /  AlkPhos  58  09-20    PT/INR - ( 19 Sep 2020 19:24 )   PT: 14.4 sec;   INR: 1.24 ratio         PTT - ( 19 Sep 2020 19:24 )  PTT:28.1 sec  Urinalysis Basic - ( 19 Sep 2020 19:24 )    Color: Red / Appearance: bloody / S.010 / pH: x  Gluc: x / Ketone: Negative  / Bili: Negative / Urobili: Negative   Blood: x / Protein: 500 mg/dL / Nitrite: Negative   Leuk Esterase: Small / RBC: >50 /HPF / WBC 3-5   Sq Epi: x / Non Sq Epi: x / Bacteria: Occasional        RADIOLOGY & ADDITIONAL TESTS:

## 2020-09-20 NOTE — PROGRESS NOTE ADULT - PROBLEM SELECTOR PLAN 3
chronic - last dose of nivolumab 8/9/20  left RCC (unclear if clear cell or not) with mets to lung and suspected mets to bone  - advise pt to f/u with oncologist outpatient - will have heme/onc see inpatient

## 2020-09-20 NOTE — CONSULT NOTE ADULT - ASSESSMENT
RCC under treatment  Resolving hematuria  Continue withholding anticoagulants for the time being  Will slow down CBI  Dr. Salguero to see pt tomorrow

## 2020-09-20 NOTE — CONSULT NOTE ADULT - ASSESSMENT
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  Original scans suggest probable left renal vein thrombosis and has been on eliquis with subsequent scans only showing atretic left renal vein.  Now presents with hematuria to rule out UTI. Question of need of contined need of eliquis.  Patient states he was about due for scans and his oncologist was going to address this issue  Leukocytosis appears reactive and is improving but await culture reposts.     Recommendations:  1.  continue to hold anticoagulation  2.  follow CBC  3.  to repeat CT scan to evaluate renal vein thrombosis. and disease status  4.  continue urologic management of hematuria  5.  further heme onc recommendations pending above

## 2020-09-20 NOTE — PROGRESS NOTE ADULT - PROBLEM SELECTOR PLAN 4
chronic  - hold home Eliquis 5mg BID in setting of active bleed hematuria  - consider initiation of heparin if H/H remains stable  - per patient, is due for repeat CT soon to see if he can come off AC  - last took dose 9/19 AM. Will consult hematology for further recs  - hematology/urology collaboration regarding continuation of AC- will check MRI abdomen

## 2020-09-20 NOTE — PROGRESS NOTE ADULT - ASSESSMENT
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

## 2020-09-20 NOTE — PROGRESS NOTE ADULT - PROBLEM SELECTOR PLAN 5
likely prerenal azotemia  - will continue gentle IV hydration  - monitor renal indices, avoid nephrotoxic agents  -Cr 1.1, JOSE MARIA resolved

## 2020-09-20 NOTE — PROGRESS NOTE ADULT - PROBLEM SELECTOR PLAN 2
acute - pt found to have WBC 21.6 in ED, c/o dysuria   - afebrile and UA is grossly negative for infection, CXR clear  - given ceftriaxone 1g IVPB x1 in ED, will monitor off abx for now  - monitor CBC, trend TLC, follow up all culture data (blood/urine)  -Leukocytosis improving, may have been reactive in setting of pain/hematuria

## 2020-09-20 NOTE — CONSULT NOTE ADULT - SUBJECTIVE AND OBJECTIVE BOX
Coverage for Dr. Salguero    Patient is a 70y old  Male who presents with a chief complaint of hematuria (20 Sep 2020 11:10)    HISTORY OF PRESENT ILLNESS:  69 y/o male with metastatic RCC with renal vein thrombosis, initially diagnosed last year in , when he was admitted here at [facility].  He was evaluated at that time by Dr. Sagluero who referred care to Dr. Martinez at [facility].  It is unclear if pt ever saw Dr. Martinez, but pt has been undergoing chemotherapy, with shrinking of the tumors and mets.  Pt admitted with gross hematuria and clot retention.  CBI was started yesterday, but has cleared up significantly.  Heme-onc note appreciated.      PAST MEDICAL & SURGICAL HISTORY:  BPH (benign prostatic hyperplasia)    Renal vein thrombosis    Metastatic renal cell carcinoma to lung, left  on nivolumab    Liver mass    Hypertension    Achilles rupture    History of liver biopsy        REVIEW OF SYSTEMS:    CONSTITUTIONAL: No weakness, fevers or chills  SKIN: No itching, burning, rashes, or lesions   EYES/ENT: No visual changes;  No vertigo or throat pain   NECK: No pain or stiffness  RESPIRATORY: No cough, wheezing, hemoptysis; No shortness of breath  CARDIOVASCULAR: No chest pain or palpitations  GASTROINTESTINAL: No abdominal or epigastric pain. No nausea, vomiting, diarrhea  NEUROLOGICAL: No numbness or weakness  GENITOURINARY:     No hematuria, dysuria, incontinence    All other review of systems is negative unless indicated above.    MEDICATIONS  (STANDING):  influenza   Vaccine 0.5 milliLiter(s) IntraMuscular once  sodium chloride 0.9%. 1000 milliLiter(s) (75 mL/Hr) IV Continuous <Continuous>  tamsulosin 0.4 milliGRAM(s) Oral at bedtime    MEDICATIONS  (PRN):  morphine  - Injectable 2 milliGRAM(s) IV Push every 4 hours PRN moderate-severe pain with passage of clots  oxybutynin 5 milliGRAM(s) Oral three times a day PRN Bladder spasms      Allergies    No Known Allergies      SOCIAL HISTORY:   Smoking: brief smoking hx in distant past   Work: retired  for 3 nursing homes      FAMILY HISTORY:  FH: diabetes mellitus    FHx: cancer  breast CA in sister; stomach CA in mother        Vital Signs Last 24 Hrs  T(C): 36.9 (20 Sep 2020 04:24), Max: 37 (19 Sep 2020 19:16)  T(F): 98.4 (20 Sep 2020 04:24), Max: 98.6 (19 Sep 2020 19:16)  HR: 68 (20 Sep 2020 04:24) (68 - 109)  BP: 122/71 (20 Sep 2020 04:24) (116/63 - 180/87)  BP(mean): --  RR: 16 (20 Sep 2020 04:24) (16 - 19)  SpO2: 95% (20 Sep 2020 04:24) (95% - 100%)    PHYSICAL EXAM:    Constitutional: NAD, well-developed  HEENT: PERRLA, EOMI  Neck: Supple, full ROM  Back: No CVA tenderness  Respiratory: Normal repiratory effort  Cardiovascular: RRR  Abd: Soft, NT/ND  : CBI in progress.  clear, colorless effluent.  Neurological: A&O x 3, no focal deficits  Psychiatric: Normal mood, normal affect  Musculoskeletal: no clubbing/cyanosis/edema  Skin: No rashes    LABS:                        12.2   16.25 )-----------( 189      ( 20 Sep 2020 07:46 )             33.1     09-20    140  |  107  |  15  ----------------------------<  156<H>  4.0   |  25  |  1.10    Ca    9.1      20 Sep 2020 07:46  Phos  2.0     09-20  Mg     2.0     09-20    TPro  7.0  /  Alb  3.7  /  TBili  1.7<H>  /  DBili  x   /  AST  19  /  ALT  19  /  AlkPhos  58  09-20    PT/INR - ( 19 Sep 2020 19:24 )   PT: 14.4 sec;   INR: 1.24 ratio         PTT - ( 19 Sep 2020 19:24 )  PTT:28.1 sec  Urinalysis Basic - ( 19 Sep 2020 19:24 )    Color: Red / Appearance: bloody / S.010 / pH: x  Gluc: x / Ketone: Negative  / Bili: Negative / Urobili: Negative   Blood: x / Protein: 500 mg/dL / Nitrite: Negative   Leuk Esterase: Small / RBC: >50 /HPF / WBC 3-5   Sq Epi: x / Non Sq Epi: x / Bacteria: Occasional      Urine Culture:       RADIOLOGY & ADDITIONAL STUDIES:      EXAM:  CT CHEST IC      EXAM:  CT ABDOMEN AND PELVIS OC IC        PROCEDURE DATE:  2020           INTERPRETATION:  CLINICAL INFORMATION: c64.2 ADMDIAG1: C64.2 MALIGNANT NEOPLASM OF LEFT KIDNEY, EXCEPT RENAL PELVIS/    COMPARISON: 2.19.20. 2019    PROCEDURE:   CT of the Chest, Abdomen and Pelvis was performed with intravenous contrast.   Intravenous contrast: 90 ml Omnipaque 350. 10 ml discarded.  Oral contrast: None.  Sagittal and coronal reformats were performed.    FINDINGS:    CHEST:     LUNGS AND LARGE AIRWAYS: Patent central airways. A 4 mm left lower lobe nodule series 2 image 78 unchanged. Linear scarring right upper lobe.  PLEURA: No pleural effusion.  VESSELS: Within normal limits.  HEART: Heart size is normal.  No pericardial effusion.  MEDIASTINUM AND JT: No lymphadenopathy.   CHEST WALL AND LOWER NECK: Gynecomastia.     ABDOMEN AND PELVIS:    LIVER: A 6 cm hypodense left liver lesion with capsular retraction previously 13 mm on 2019.  BILE DUCTS: Normal caliber.  GALLBLADDER: Within normal limits.  SPLEEN: Within normal limits.   PANCREAS: Within normal limits.  ADRENALS: Within normal limits.   KIDNEYS/URETERS: Left upper pole calyceal lesion measures 1.3 x 1.6 cm previously 3.7 x 2 cm. Subcentimeterright renal lesion too small to characterize.    BLADDER: Within normal limits.   REPRODUCTIVE ORGANS: The prostate is enlarged.    BOWEL: No bowel obstruction.  PERITONEUM: No ascites.   VESSELS:  Atretic left renal vein.  RETROPERITONEUM/LYMPH NODES: Subcentimeter nodes have improved.     ABDOMINAL WALL: Large right inguinal hernia containing cecum and small bowel.  BONES: Within normal limits.     IMPRESSION: Left upper pole renal lesion is decreased in size since 2019.    Stable left lower lobe pulmonary nodule.    Small left liver lesion decrease in size.

## 2020-09-21 LAB
ALBUMIN SERPL ELPH-MCNC: 3.4 G/DL — SIGNIFICANT CHANGE UP (ref 3.3–5)
ALP SERPL-CCNC: 52 U/L — SIGNIFICANT CHANGE UP (ref 40–120)
ALT FLD-CCNC: 23 U/L — SIGNIFICANT CHANGE UP (ref 12–78)
ANION GAP SERPL CALC-SCNC: 7 MMOL/L — SIGNIFICANT CHANGE UP (ref 5–17)
AST SERPL-CCNC: 33 U/L — SIGNIFICANT CHANGE UP (ref 15–37)
BASOPHILS # BLD AUTO: 0.03 K/UL — SIGNIFICANT CHANGE UP (ref 0–0.2)
BASOPHILS NFR BLD AUTO: 0.3 % — SIGNIFICANT CHANGE UP (ref 0–2)
BILIRUB SERPL-MCNC: 1.4 MG/DL — HIGH (ref 0.2–1.2)
BUN SERPL-MCNC: 12 MG/DL — SIGNIFICANT CHANGE UP (ref 7–23)
CALCIUM SERPL-MCNC: 8.7 MG/DL — SIGNIFICANT CHANGE UP (ref 8.5–10.1)
CHLORIDE SERPL-SCNC: 111 MMOL/L — HIGH (ref 96–108)
CO2 SERPL-SCNC: 24 MMOL/L — SIGNIFICANT CHANGE UP (ref 22–31)
CREAT SERPL-MCNC: 1.1 MG/DL — SIGNIFICANT CHANGE UP (ref 0.5–1.3)
EOSINOPHIL # BLD AUTO: 0.02 K/UL — SIGNIFICANT CHANGE UP (ref 0–0.5)
EOSINOPHIL NFR BLD AUTO: 0.2 % — SIGNIFICANT CHANGE UP (ref 0–6)
GLUCOSE SERPL-MCNC: 122 MG/DL — HIGH (ref 70–99)
HCT VFR BLD CALC: 30.1 % — LOW (ref 39–50)
HGB BLD-MCNC: 10.8 G/DL — LOW (ref 13–17)
IMM GRANULOCYTES NFR BLD AUTO: 0.7 % — SIGNIFICANT CHANGE UP (ref 0–1.5)
LYMPHOCYTES # BLD AUTO: 1.29 K/UL — SIGNIFICANT CHANGE UP (ref 1–3.3)
LYMPHOCYTES # BLD AUTO: 10.8 % — LOW (ref 13–44)
MCHC RBC-ENTMCNC: 32.1 PG — SIGNIFICANT CHANGE UP (ref 27–34)
MCHC RBC-ENTMCNC: 35.9 GM/DL — SIGNIFICANT CHANGE UP (ref 32–36)
MCV RBC AUTO: 89.6 FL — SIGNIFICANT CHANGE UP (ref 80–100)
MONOCYTES # BLD AUTO: 0.89 K/UL — SIGNIFICANT CHANGE UP (ref 0–0.9)
MONOCYTES NFR BLD AUTO: 7.5 % — SIGNIFICANT CHANGE UP (ref 2–14)
NEUTROPHILS # BLD AUTO: 9.63 K/UL — HIGH (ref 1.8–7.4)
NEUTROPHILS NFR BLD AUTO: 80.5 % — HIGH (ref 43–77)
NRBC # BLD: 0 /100 WBCS — SIGNIFICANT CHANGE UP (ref 0–0)
PLATELET # BLD AUTO: 169 K/UL — SIGNIFICANT CHANGE UP (ref 150–400)
POTASSIUM SERPL-MCNC: 4.2 MMOL/L — SIGNIFICANT CHANGE UP (ref 3.5–5.3)
POTASSIUM SERPL-SCNC: 4.2 MMOL/L — SIGNIFICANT CHANGE UP (ref 3.5–5.3)
PROT SERPL-MCNC: 6.8 G/DL — SIGNIFICANT CHANGE UP (ref 6–8.3)
RBC # BLD: 3.36 M/UL — LOW (ref 4.2–5.8)
RBC # FLD: 12 % — SIGNIFICANT CHANGE UP (ref 10.3–14.5)
SODIUM SERPL-SCNC: 142 MMOL/L — SIGNIFICANT CHANGE UP (ref 135–145)
WBC # BLD: 11.94 K/UL — HIGH (ref 3.8–10.5)
WBC # FLD AUTO: 11.94 K/UL — HIGH (ref 3.8–10.5)

## 2020-09-21 PROCEDURE — 74183 MRI ABD W/O CNTR FLWD CNTR: CPT | Mod: 26

## 2020-09-21 PROCEDURE — 99232 SBSQ HOSP IP/OBS MODERATE 35: CPT

## 2020-09-21 RX ADMIN — Medication 100 MILLIGRAM(S): at 21:19

## 2020-09-21 RX ADMIN — TAMSULOSIN HYDROCHLORIDE 0.4 MILLIGRAM(S): 0.4 CAPSULE ORAL at 21:19

## 2020-09-21 RX ADMIN — Medication 100 MILLIGRAM(S): at 13:48

## 2020-09-21 RX ADMIN — Medication 100 MILLIGRAM(S): at 05:15

## 2020-09-21 NOTE — PROGRESS NOTE ADULT - SUBJECTIVE AND OBJECTIVE BOX
Patient seen and examined;  Chart reviewed and events noted;   Locke catheter removed this morning; reports he did still have some blood tinged urine and few clots      MEDICATIONS  (STANDING):  phenazopyridine 100 milliGRAM(s) Oral every 8 hours  sodium chloride 0.9%. 1000 milliLiter(s) (75 mL/Hr) IV Continuous <Continuous>  tamsulosin 0.4 milliGRAM(s) Oral at bedtime    MEDICATIONS  (PRN):  morphine  - Injectable 2 milliGRAM(s) IV Push every 4 hours PRN severe pain (7-10) with passage of clots  morphine  - Injectable 1 milliGRAM(s) IV Push every 4 hours PRN Moderate Pain (4 - 6)  oxybutynin 5 milliGRAM(s) Oral three times a day PRN Bladder spasms      Vital Signs Last 24 Hrs  T(C): 36.8 (21 Sep 2020 05:07), Max: 36.8 (20 Sep 2020 14:17)  T(F): 98.2 (21 Sep 2020 05:07), Max: 98.2 (20 Sep 2020 14:17)  HR: 80 (21 Sep 2020 05:07) (80 - 96)  BP: 103/56 (21 Sep 2020 05:07) (103/56 - 131/66)  RR: 18 (21 Sep 2020 05:07) (18 - 18)  SpO2: 98% (21 Sep 2020 05:07) (95% - 98%)    PHYSICAL EXAM  General: adult in NAD  HEENT: clear oropharynx, anicteric sclera, pink conjunctivae  Neck: supple  CV: normal S1S2 with no murmur rubs or gallops  Lungs: clear to auscultation, no wheezes, no rhales  Abdomen: soft non-tender non-distended, no hepato/splenomegaly  Ext: no clubbing cyanosis or edema  Skin: no rashes and no petichiae  Neuro: alert and oriented X3 no focal deficits      LABS:                        10.8   11.94 )-----------( 169      ( 21 Sep 2020 09:44 )             30.1     Hemoglobin: 10.8 g/dL (09-21 @ 09:44)  Hemoglobin: 12.2 g/dL (09-20 @ 07:46)  Hemoglobin: 13.4 g/dL (09-19 @ 18:31)    WBC Count: 11.94 K/uL (09-21 @ 09:44)  WBC Count: 16.25 K/uL (09-20 @ 07:46)  WBC Count: 21.60 K/uL (09-19 @ 18:31)    09-21    142  |  111<H>  |  12  ----------------------------<  122<H>  4.2   |  24  |  1.10    Ca    8.7      21 Sep 2020 09:44  Phos  2.0     09-20  Mg     2.0     09-20    TPro  6.8  /  Alb  3.4  /  TBili  1.4<H>  /  DBili  x   /  AST  33  /  ALT  23  /  AlkPhos  52  09-21    PT/INR - ( 19 Sep 2020 19:24 )   PT: 14.4 sec;   INR: 1.24 ratio    PTT - ( 19 Sep 2020 19:24 )  PTT:28.1 sec

## 2020-09-21 NOTE — PROGRESS NOTE ADULT - PROBLEM SELECTOR PLAN 1
gross hematuria with clots, on eliquis - will need continuous bladder irrigation  - at time of admission H/H stable 13.4/36.3 - trend CBC- Hgb 10.8 this AM  - UA: large blood, RBC > 50, occasional bact, small leuk esterase  - hemodynamically stable at present  - hold eliquis for now given the amount of bleeding despite stable H/H  - can consider easily reversible agent (heparin gtt) if H/H remains stable- Heme/Onc consulted, may not need to restart AC- will check MRI to evaluate renal vein thrombus- as per Heme/Onc- should stop eliquis going forward  - Urology consulted by ER: Dr. Graves, follow up recs

## 2020-09-21 NOTE — PROGRESS NOTE ADULT - ASSESSMENT
71 yo male with metatstatic left renal cell carcinoma to liver lung and LNs on opdivo with evidence of good disease response.  Original scans suggest probable left renal vein thrombosis and he was started on eliquis with subsequent scans only showing atretic left renal vein.  He presented to the hospital on 9/19/20 with hematuria    - leukocytosis resolving  - noted progressive anemia in setting of persistent hematuria  - would hold off resuming anticoagulation irrespective of imaging at this point given dropping H/H and persistent hematuria  - appreciate  input  - patient to follow up with his primary oncologist at [facility] upon discharge   - no additional heme onc interventions indicated at this time; will sign off; reconsult if needed  - case discussed with Dr. Bello (hospitalist)

## 2020-09-21 NOTE — PROGRESS NOTE ADULT - SUBJECTIVE AND OBJECTIVE BOX
INTERVAL HPI/OVERNIGHT EVENTS:  Patient seen and examined at bedside. Overnight, patient complaining of pressure and pain caused by barber, barber removed and patient states he felt much better after. Patient states he is voiding and sometimes passing clots. Denies any CP, SOB, abd pain.    MEDICATIONS  (STANDING):  influenza   Vaccine 0.5 milliLiter(s) IntraMuscular once  phenazopyridine 100 milliGRAM(s) Oral every 8 hours  sodium chloride 0.9%. 1000 milliLiter(s) (75 mL/Hr) IV Continuous <Continuous>  tamsulosin 0.4 milliGRAM(s) Oral at bedtime    MEDICATIONS  (PRN):  morphine  - Injectable 2 milliGRAM(s) IV Push every 4 hours PRN severe pain (7-10) with passage of clots  morphine  - Injectable 1 milliGRAM(s) IV Push every 4 hours PRN Moderate Pain (4 - 6)      Allergies    No Known Allergies    Intolerances      ROS:  CONSTITUTIONAL: No weakness, fevers or chills  EYES/ENT: No visual changes;  No vertigo or throat pain   NECK: No pain or stiffness  RESPIRATORY: No cough, wheezing, hemoptysis; No shortness of breath  CARDIOVASCULAR: No chest pain or palpitations  GASTROINTESTINAL: No abdominal or epigastric pain. No nausea, vomiting, or hematemesis  GENITOURINARY: admits passing clots during urination  NEUROLOGICAL: No numbness or weakness  SKIN: No itching, burning, rashes, or lesions   All other review of systems is negative unless indicated above.    Vital Signs Last 24 Hrs  T(C): 36.6 (21 Sep 2020 13:34), Max: 36.8 (21 Sep 2020 05:07)  T(F): 97.9 (21 Sep 2020 13:34), Max: 98.2 (21 Sep 2020 05:07)  HR: 85 (21 Sep 2020 13:34) (80 - 96)  BP: 135/70 (21 Sep 2020 13:34) (103/56 - 135/70)  BP(mean): --  RR: 18 (21 Sep 2020 13:34) (18 - 18)  SpO2: 99% (21 Sep 2020 13:34) (98% - 99%)    - @ 07:01  -  - @ 07:00  --------------------------------------------------------  IN: 975 mL / OUT: 9501 mL / NET: -8526 mL     @ 07:01  -   @ 14:26  --------------------------------------------------------  IN: 0 mL / OUT: 201 mL / NET: -201 mL      Physical Exam:  General: WN/WD NAD  Neurology: A&Ox3, nonfocal, BACON x 4  Respiratory: CTA B/L  CV: RRR, S1S2, no murmurs, rubs or gallops  Abdominal: Soft, NT, ND +BS  : +R scrotal hernia- not painful to palpation  Extremities: No edema, + peripheral pulses      LABS:                        10.8   11.94 )-----------( 169      ( 21 Sep 2020 09:44 )             30.1         142  |  111<H>  |  12  ----------------------------<  122<H>  4.2   |  24  |  1.10    Ca    8.7      21 Sep 2020 09:44  Phos  2.0     09-20  Mg     2.0     20    TPro  6.8  /  Alb  3.4  /  TBili  1.4<H>  /  DBili  x   /  AST  33  /  ALT  23  /  AlkPhos  52  21    PT/INR - ( 19 Sep 2020 19:24 )   PT: 14.4 sec;   INR: 1.24 ratio         PTT - ( 19 Sep 2020 19:24 )  PTT:28.1 sec  Urinalysis Basic - ( 19 Sep 2020 19:24 )    Color: Red / Appearance: bloody / S.010 / pH: x  Gluc: x / Ketone: Negative  / Bili: Negative / Urobili: Negative   Blood: x / Protein: 500 mg/dL / Nitrite: Negative   Leuk Esterase: Small / RBC: >50 /HPF / WBC 3-5   Sq Epi: x / Non Sq Epi: x / Bacteria: Occasional        RADIOLOGY & ADDITIONAL TESTS:

## 2020-09-21 NOTE — PROGRESS NOTE ADULT - ASSESSMENT
Gross hematuria on Eliquis with history met renal cell and  tumor thrombus, MRI pending.  Has been managed by Oncology, has not had  Oncologic Surgical cosultation    Patient instructed to take it easy, hydrate and report if any difficulty urinating

## 2020-09-21 NOTE — PROGRESS NOTE ADULT - PROBLEM SELECTOR PLAN 2
acute - pt found to have WBC 21.6 in ED, c/o dysuria   - afebrile and UA is grossly negative for infection, CXR clear  - given ceftriaxone 1g IVPB x1 in ED, will monitor off abx for now  - monitor CBC, trend TLC, follow up all culture data (blood/urine)- negative cultures  - Leukocytosis improving, may have been reactive in setting of pain/hematuria

## 2020-09-21 NOTE — PROGRESS NOTE ADULT - SUBJECTIVE AND OBJECTIVE BOX
Gu Progress Note:    Feels better, voiding, urine clearing, no dysuria  just returned s/p MRI - results pending    69 y/o M with PMHx of metastatic left renal cell carcinoma (to lung and suspicion of mets to bone) on nivolumab (last dose 20), liver mass, renal vein thrombosis (on Eliquis HTN presented to the ED with a chief complaint of gross hematuria. Patient woke up this morning and noticed hematuria after his am BM. He noticed dribbling of urine and felt incomplete emptying for his bladder. Patient c/o dysuria but denies flank pain, foul smelling urine. Patient had a previous episode in  when he was dx with a UTI. Patients BM was normal this morning. He has an indirect inguinal hernia swells intermittently and became swollen this morning.  He was supposed to have it repaired but then had other medical issues that took priority. Patient is actively passing clots at this time, complaining of pain with this. Denies chest pain, palpitations, dyspnea, headache, dizziness, abdominal pain, n/v/d.     Has not had consult with  regarding kidney cancer and if benefit with cytoreductive surgery, discussed with patient importance of team approach to his  cancer  management    PAST MEDICAL & SURGICAL HISTORY:  BPH (benign prostatic hyperplasia)    Renal vein thrombosis    Metastatic renal cell carcinoma to lung, left  on nivolumab    Liver mass    Hypertension    Achilles rupture    History of liver biopsy          MEDICATIONS  (STANDING):  influenza   Vaccine 0.5 milliLiter(s) IntraMuscular once  phenazopyridine 100 milliGRAM(s) Oral every 8 hours  sodium chloride 0.9%. 1000 milliLiter(s) (75 mL/Hr) IV Continuous <Continuous>  tamsulosin 0.4 milliGRAM(s) Oral at bedtime    MEDICATIONS  (PRN):  morphine  - Injectable 2 milliGRAM(s) IV Push every 4 hours PRN severe pain (7-10) with passage of clots  morphine  - Injectable 1 milliGRAM(s) IV Push every 4 hours PRN Moderate Pain (4 - 6)  oxybutynin 5 milliGRAM(s) Oral three times a day PRN Bladder spasms      Allergies    No Known Allergies    Intolerances            FAMILY HISTORY:  FH: diabetes mellitus    FHx: cancer  breast CA in sister; stomach CA in mother        Vital Signs Last 24 Hrs  T(C): 36.8 (21 Sep 2020 05:07), Max: 36.8 (20 Sep 2020 14:17)  T(F): 98.2 (21 Sep 2020 05:07), Max: 98.2 (20 Sep 2020 14:17)  HR: 80 (21 Sep 2020 05:07) (80 - 96)  BP: 103/56 (21 Sep 2020 05:07) (103/56 - 131/66)  BP(mean): --  RR: 18 (21 Sep 2020 05:07) (18 - 18)  SpO2: 98% (21 Sep 2020 05:07) (95% - 98%)    PHYSICAL EXAM:    Constitutional: NAD, well-developed    Asymptomatic, AO  Abd: BS+, soft, NT/ND, No CVAT  : Normal  phallus, patent  meatus, bilateral descended testes, no masses barber with grossly clear urine  Extremities: No peripheral edema    LABS:                        10.8   11.94 )-----------( 169      ( 21 Sep 2020 09:44 )             30.1         142  |  111<H>  |  12  ----------------------------<  122<H>  4.2   |  24  |  1.10    Ca    8.7      21 Sep 2020 09:44  Phos  2.0     20  Mg     2.0     20    TPro  6.8  /  Alb  3.4  /  TBili  1.4<H>  /  DBili  x   /  AST  33  /  ALT  23  /  AlkPhos  52  21    PT/INR - ( 19 Sep 2020 19:24 )   PT: 14.4 sec;   INR: 1.24 ratio         PTT - ( 19 Sep 2020 19:24 )  PTT:28.1 sec  Urinalysis Basic - ( 19 Sep 2020 19:24 )    Color: Red / Appearance: bloody / S.010 / pH: x  Gluc: x / Ketone: Negative  / Bili: Negative / Urobili: Negative   Blood: x / Protein: 500 mg/dL / Nitrite: Negative   Leuk Esterase: Small / RBC: >50 /HPF / WBC 3-5   Sq Epi: x / Non Sq Epi: x / Bacteria: Occasional      Urine Culture:  @ 00:44  Urine Culure Results   No growth  Organism --    Hemoglobin: 10.8 g/dL ( @ 09:44)  Hematocrit: 30.1 % ( @ 09:44)  Hemoglobin: 12.2 g/dL ( @ 07:46)  Hematocrit: 33.1 % ( @ 07:46)  Hemoglobin: 13.4 g/dL ( @ 18:31)  Hematocrit: 36.3 % ( @ 18:31)      RADIOLOGY & ADDITIONAL STUDIES:

## 2020-09-22 ENCOUNTER — TRANSCRIPTION ENCOUNTER (OUTPATIENT)
Age: 70
End: 2020-09-22

## 2020-09-22 VITALS
DIASTOLIC BLOOD PRESSURE: 64 MMHG | HEART RATE: 71 BPM | RESPIRATION RATE: 18 BRPM | OXYGEN SATURATION: 98 % | TEMPERATURE: 99 F | SYSTOLIC BLOOD PRESSURE: 118 MMHG

## 2020-09-22 LAB
ANION GAP SERPL CALC-SCNC: 8 MMOL/L — SIGNIFICANT CHANGE UP (ref 5–17)
BUN SERPL-MCNC: 14 MG/DL — SIGNIFICANT CHANGE UP (ref 7–23)
CALCIUM SERPL-MCNC: 8.5 MG/DL — SIGNIFICANT CHANGE UP (ref 8.5–10.1)
CHLORIDE SERPL-SCNC: 110 MMOL/L — HIGH (ref 96–108)
CO2 SERPL-SCNC: 23 MMOL/L — SIGNIFICANT CHANGE UP (ref 22–31)
CREAT SERPL-MCNC: 1.1 MG/DL — SIGNIFICANT CHANGE UP (ref 0.5–1.3)
GLUCOSE SERPL-MCNC: 91 MG/DL — SIGNIFICANT CHANGE UP (ref 70–99)
HCT VFR BLD CALC: 28.7 % — LOW (ref 39–50)
HGB BLD-MCNC: 10.3 G/DL — LOW (ref 13–17)
MCHC RBC-ENTMCNC: 32.2 PG — SIGNIFICANT CHANGE UP (ref 27–34)
MCHC RBC-ENTMCNC: 35.9 GM/DL — SIGNIFICANT CHANGE UP (ref 32–36)
MCV RBC AUTO: 89.7 FL — SIGNIFICANT CHANGE UP (ref 80–100)
NRBC # BLD: 0 /100 WBCS — SIGNIFICANT CHANGE UP (ref 0–0)
PLATELET # BLD AUTO: 166 K/UL — SIGNIFICANT CHANGE UP (ref 150–400)
POTASSIUM SERPL-MCNC: 3.7 MMOL/L — SIGNIFICANT CHANGE UP (ref 3.5–5.3)
POTASSIUM SERPL-SCNC: 3.7 MMOL/L — SIGNIFICANT CHANGE UP (ref 3.5–5.3)
RBC # BLD: 3.2 M/UL — LOW (ref 4.2–5.8)
RBC # FLD: 12 % — SIGNIFICANT CHANGE UP (ref 10.3–14.5)
SODIUM SERPL-SCNC: 141 MMOL/L — SIGNIFICANT CHANGE UP (ref 135–145)
WBC # BLD: 9.34 K/UL — SIGNIFICANT CHANGE UP (ref 3.8–10.5)
WBC # FLD AUTO: 9.34 K/UL — SIGNIFICANT CHANGE UP (ref 3.8–10.5)

## 2020-09-22 PROCEDURE — 84100 ASSAY OF PHOSPHORUS: CPT

## 2020-09-22 PROCEDURE — 93005 ELECTROCARDIOGRAM TRACING: CPT

## 2020-09-22 PROCEDURE — 36415 COLL VENOUS BLD VENIPUNCTURE: CPT

## 2020-09-22 PROCEDURE — 80053 COMPREHEN METABOLIC PANEL: CPT

## 2020-09-22 PROCEDURE — 80048 BASIC METABOLIC PNL TOTAL CA: CPT

## 2020-09-22 PROCEDURE — 99239 HOSP IP/OBS DSCHRG MGMT >30: CPT

## 2020-09-22 PROCEDURE — 74183 MRI ABD W/O CNTR FLWD CNTR: CPT

## 2020-09-22 PROCEDURE — 96375 TX/PRO/DX INJ NEW DRUG ADDON: CPT

## 2020-09-22 PROCEDURE — 83735 ASSAY OF MAGNESIUM: CPT

## 2020-09-22 PROCEDURE — 85027 COMPLETE CBC AUTOMATED: CPT

## 2020-09-22 PROCEDURE — 85610 PROTHROMBIN TIME: CPT

## 2020-09-22 PROCEDURE — 85730 THROMBOPLASTIN TIME PARTIAL: CPT

## 2020-09-22 PROCEDURE — 99285 EMERGENCY DEPT VISIT HI MDM: CPT | Mod: 25

## 2020-09-22 PROCEDURE — 86769 SARS-COV-2 COVID-19 ANTIBODY: CPT

## 2020-09-22 PROCEDURE — 81001 URINALYSIS AUTO W/SCOPE: CPT

## 2020-09-22 PROCEDURE — 87086 URINE CULTURE/COLONY COUNT: CPT

## 2020-09-22 PROCEDURE — A9579: CPT

## 2020-09-22 PROCEDURE — 85025 COMPLETE CBC W/AUTO DIFF WBC: CPT

## 2020-09-22 PROCEDURE — 96365 THER/PROPH/DIAG IV INF INIT: CPT

## 2020-09-22 PROCEDURE — U0003: CPT

## 2020-09-22 PROCEDURE — 87040 BLOOD CULTURE FOR BACTERIA: CPT

## 2020-09-22 PROCEDURE — 71045 X-RAY EXAM CHEST 1 VIEW: CPT

## 2020-09-22 RX ADMIN — Medication 100 MILLIGRAM(S): at 05:42

## 2020-09-22 NOTE — DISCHARGE NOTE PROVIDER - CARE PROVIDERS DIRECT ADDRESSES
,DirectAddress_Unknown,[email or web address],DirectAddress_Unknown

## 2020-09-22 NOTE — DISCHARGE NOTE PROVIDER - HOSPITAL COURSE
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 Patient woke up this morning and noticed hematuria after his am BM. He noticed dribbling of urine and felt incomplete emptying for his bladder. Patient c/o dysuria but denies flank pain, foul smelling urine. Patient had a previous episode in 2013 when he was dx with a UTI. Patients BM was normal this morning. He has an indirect inguinal hernia swells intermittently and became swollen this morning.  He was supposed to have it repaired but then had other medical issues that took priority. Patient is actively passing clots at this time, complaining of pain with this. Denies chest pain, palpitations, dyspnea, headache, dizziness, abdominal pain, n/v/d.     In the ED,   VS: HR 78, /78, RR18, 98F, SpO2 99%   Labs: WBC 21.6, BUN/Cr 16/1.4, H/H 13.4/36.3  PT 14.4, INR 1.24, PTT 28  CXR: no acute pulmonary disease on personal read  EKG: NSR, grossly unchanged from previous   UA: large blood, RBC > 50, occasional bact, small leuk esterase  given: bladder irrigation, ceftriaxone 1g IVPB x1 dose, morphine 2mg IVP (19 Sep 2020 19:35)      ---  HOSPITAL COURSE: Patient admitted for hematuria. Patient followed by urology and hematology during hospital course. Patient was started on CBI to help remove clots. Urine initially bloody, but became pink-tinged and then clear over time. CBI stopped and barber removed. Patient reportedly felt much better after barber removed, able to urinate by himself- reported some clots initially, but since resolved. States bladder spasms improved as well. Patient's eliquis was stopped, MRI done showing no evidence of renal vein thrombus, discussed with Heme, no reason to continue eliquis for now. Patient's leukocytosis and JOSE MARIA resolved. Urine culture and blood cultures negative.    Patient improved clinically throughout hospital course. Patient seen and examined on day of discharge.    Vital Signs Last 24 Hrs  T(C): 36.9 (22 Sep 2020 05:24), Max: 36.9 (22 Sep 2020 05:24)  T(F): 98.4 (22 Sep 2020 05:24), Max: 98.4 (22 Sep 2020 05:24)  HR: 78 (22 Sep 2020 05:24) (78 - 85)  BP: 113/67 (22 Sep 2020 05:24) (108/57 - 135/70)  BP(mean): --  RR: 18 (22 Sep 2020 05:24) (18 - 18)  SpO2: 96% (22 Sep 2020 05:24) (96% - 99%)    Physical Exam:  General: Well developed, well nourished, in no acute distress  Neurology: A&Ox3, nonfocal  Respiratory: CTA B/L, No wheezing, rales, or rhonchi  CV: RRR, S1/S2 present, no murmurs, rubs, or gallops  Abdominal: Soft, nontender, non-distended, normoactive bowel sounds  : +R inguinal hernia, no signs of torsion  Extremities: No C/C/E, peripheral pulses present  Skin: warm, dry    ---  CONSULTANTS:   Dr. Salguero (Urology)  Dr. Adkins (Heme/Onc)    Total time spent on discharge including coordination of care by attending physician: 39 minutes

## 2020-09-22 NOTE — DISCHARGE NOTE NURSING/CASE MANAGEMENT/SOCIAL WORK - PATIENT PORTAL LINK FT
You can access the FollowMyHealth Patient Portal offered by [facility] by registering at the following website: [email or web address]. By joining [employer]’s FollowMyHealth portal, you will also be able to view your health information using other applications (apps) compatible with our system.

## 2020-09-22 NOTE — PROGRESS NOTE ADULT - SUBJECTIVE AND OBJECTIVE BOX
Gu Progress Note:    ASX, voiding without gross hematuria MRI reviewed and patient aware findings,    PAST MEDICAL & SURGICAL HISTORY:  BPH (benign prostatic hyperplasia)    Renal vein thrombosis    Metastatic renal cell carcinoma to lung, left  on nivolumab    Liver mass    Hypertension    Achilles rupture    History of liver biopsy          MEDICATIONS  (STANDING):  influenza   Vaccine 0.5 milliLiter(s) IntraMuscular once  phenazopyridine 100 milliGRAM(s) Oral every 8 hours  tamsulosin 0.4 milliGRAM(s) Oral at bedtime    MEDICATIONS  (PRN):  morphine  - Injectable 2 milliGRAM(s) IV Push every 4 hours PRN severe pain (7-10) with passage of clots  morphine  - Injectable 1 milliGRAM(s) IV Push every 4 hours PRN Moderate Pain (4 - 6)      Allergies    No Known Allergies    Intolerances            FAMILY HISTORY:  FH: diabetes mellitus    FHx: cancer  breast CA in sister; stomach CA in mother        Vital Signs Last 24 Hrs  T(C): 36.9 (22 Sep 2020 05:24), Max: 36.9 (22 Sep 2020 05:24)  T(F): 98.4 (22 Sep 2020 05:24), Max: 98.4 (22 Sep 2020 05:24)  HR: 78 (22 Sep 2020 05:24) (78 - 85)  BP: 113/67 (22 Sep 2020 05:24) (108/57 - 135/70)  BP(mean): --  RR: 18 (22 Sep 2020 05:24) (18 - 18)  SpO2: 96% (22 Sep 2020 05:24) (96% - 99%)    PHYSICAL EXAM:    Constitutional: NAD, well-developed    Asymptomatic, AO  Abd: BS+, soft, NT/ND, No CVAT  : Normal phallus, patent  meatus, bilateral descended testes, no masses   Extremities: No peripheral edema    LABS:                        10.3   9.34  )-----------( 166      ( 22 Sep 2020 07:40 )             28.7     09-22    141  |  110<H>  |  14  ----------------------------<  91  3.7   |  23  |  1.10    Ca    8.5      22 Sep 2020 07:40    TPro  6.8  /  Alb  3.4  /  TBili  1.4<H>  /  DBili  x   /  AST  33  /  ALT  23  /  AlkPhos  52  09-21        Urine Culture:   Hemoglobin: 10.3 g/dL (09-22 @ 07:40)  Hematocrit: 28.7 % (09-22 @ 07:40)  Hemoglobin: 10.8 g/dL (09-21 @ 09:44)  Hematocrit: 30.1 % (09-21 @ 09:44)      RADIOLOGY & ADDITIONAL STUDIES:    < from: MR Abdomen w/wo IV Cont (09.21.20 @ 12:31) >  EXAM:  MR ABDOMEN WAW IC                            PROCEDURE DATE:  09/21/2020          INTERPRETATION:  History gross hematuria. History of renal cell carcinoma and renal vein thrombosis.    MR abdomen multiphasic without with IV contrast.  The gallbladder is unremarkable. No biliary dilatation. The liver pancreas spleen adrenals not remarkable.  Compared to a prior CT of 7/8/2010 is no significant change in the 1.3 cm indeterminate right upper pole renal cortical lesion.  There is no significant change in the left upper pole renal mass/calyceal dilatation. Line there is no evidence of renal vein thrombosis.  No ascites.  Nonspecific subcentimeter retroperitoneal lymph nodes.  Normal marrow signal.    Impression:    Stable bilateral renallesions as described compared to CT of 7/8/2020  No evidence of renal vein thrombosis..            JULIOCESAR ORDONEZ M.D., ATTENDING RADIOLOGIST  This document has been electronically signed. Sep 21 2020  3:58PM    < end of copied text >

## 2020-09-22 NOTE — DISCHARGE NOTE PROVIDER - PROVIDER TOKENS
PROVIDER:[TOKEN:[[id number]],FOLLOWUP:[2 weeks]],PROVIDER:[TOKEN:[[id number]],FOLLOWUP:[2 weeks]],PROVIDER:[TOKEN:[[id number]],FOLLOWUP:[2 weeks]]

## 2020-09-22 NOTE — PROGRESS NOTE ADULT - ASSESSMENT
Stable for discharge with resolved gross hematuria, have referred patient fro  Opinion regarding merits of Cysto reductive surgery, al so requires cystoscopy for hematuria

## 2020-09-22 NOTE — DISCHARGE NOTE PROVIDER - NSDCCPCAREPLAN_GEN_ALL_CORE_FT
PRINCIPAL DISCHARGE DIAGNOSIS  Diagnosis: Gross hematuria  Assessment and Plan of Treatment: -Resolved  -STOP eliquis, MRI done showing no evidence of renal vein thrombus  -Follow up with your urologist within 2 weeks of discharge- you may need a cystoscopy      SECONDARY DISCHARGE DIAGNOSES  Diagnosis: Renal cell carcinoma  Assessment and Plan of Treatment: -MRI showing stable lesions as compared to CT from 7/2020  -Please follow up with your Oncologist within 2 weeks of discharge

## 2020-09-22 NOTE — DISCHARGE NOTE PROVIDER - CARE PROVIDER_API CALL
Kip Ravi  HEMATOLOGY  [address] [place] [address]  Phone: [phone]  Fax: [phone]  Follow Up Time: 2 weeks    Yovani Martinez  UROLOGY  [address] [place] [address], [address]  [place] [address]  Phone: [phone]  Fax: [phone]  Follow Up Time: 2 weeks    Chencho Wong  INTERNAL MEDICINE  [address] [place] [address], [address] [place] [address]  Phone: [phone]  Fax: [phone]  Follow Up Time: 2 weeks

## 2020-09-22 NOTE — DISCHARGE NOTE PROVIDER - NSDCFUSCHEDAPPT_GEN_ALL_CORE_FT
ZULEMA VERA ; 10/07/2020 ; [facility]  ZULEMA VERA ; 10/09/2020 ; [facility]

## 2020-09-23 PROBLEM — I82.3 EMBOLISM AND THROMBOSIS OF RENAL VEIN: Chronic | Status: ACTIVE | Noted: 2020-09-19

## 2020-09-23 PROBLEM — C78.02 SECONDARY MALIGNANT NEOPLASM OF LEFT LUNG: Chronic | Status: ACTIVE | Noted: 2020-09-19

## 2020-09-23 PROBLEM — N40.0 BENIGN PROSTATIC HYPERPLASIA WITHOUT LOWER URINARY TRACT SYMPTOMS: Chronic | Status: ACTIVE | Noted: 2020-09-19

## 2020-09-25 LAB
CULTURE RESULTS: SIGNIFICANT CHANGE UP
CULTURE RESULTS: SIGNIFICANT CHANGE UP
SPECIMEN SOURCE: SIGNIFICANT CHANGE UP
SPECIMEN SOURCE: SIGNIFICANT CHANGE UP

## 2020-09-30 ENCOUNTER — OUTPATIENT (OUTPATIENT)
Dept: OUTPATIENT SERVICES | Facility: HOSPITAL | Age: 70
LOS: 1 days | Discharge: HOME | End: 2020-09-30

## 2020-09-30 DIAGNOSIS — Z98.890 OTHER SPECIFIED POSTPROCEDURAL STATES: Chronic | ICD-10-CM

## 2020-09-30 DIAGNOSIS — C64.9 MALIGNANT NEOPLASM OF UNSPECIFIED KIDNEY, EXCEPT RENAL PELVIS: ICD-10-CM

## 2020-09-30 DIAGNOSIS — S86.019A STRAIN OF UNSPECIFIED ACHILLES TENDON, INITIAL ENCOUNTER: Chronic | ICD-10-CM

## 2020-10-02 ENCOUNTER — APPOINTMENT (OUTPATIENT)
Dept: HEMATOLOGY ONCOLOGY | Facility: CLINIC | Age: 70
End: 2020-10-02
Payer: MEDICARE

## 2020-10-02 ENCOUNTER — RESULT REVIEW (OUTPATIENT)
Age: 70
End: 2020-10-02

## 2020-10-02 ENCOUNTER — APPOINTMENT (OUTPATIENT)
Dept: UROLOGY | Facility: CLINIC | Age: 70
End: 2020-10-02
Payer: MEDICARE

## 2020-10-02 VITALS
OXYGEN SATURATION: 99 % | TEMPERATURE: 98.4 F | RESPIRATION RATE: 16 BRPM | WEIGHT: 149.91 LBS | HEART RATE: 67 BPM | HEIGHT: 69.49 IN | SYSTOLIC BLOOD PRESSURE: 157 MMHG | BODY MASS INDEX: 21.71 KG/M2 | DIASTOLIC BLOOD PRESSURE: 73 MMHG

## 2020-10-02 VITALS
DIASTOLIC BLOOD PRESSURE: 74 MMHG | BODY MASS INDEX: 21.92 KG/M2 | WEIGHT: 148 LBS | HEIGHT: 68.98 IN | SYSTOLIC BLOOD PRESSURE: 116 MMHG | RESPIRATION RATE: 17 BRPM | HEART RATE: 77 BPM

## 2020-10-02 VITALS — TEMPERATURE: 97.3 F

## 2020-10-02 DIAGNOSIS — Z87.448 PERSONAL HISTORY OF OTHER DISEASES OF URINARY SYSTEM: ICD-10-CM

## 2020-10-02 LAB
BASOPHILS # BLD AUTO: 0.05 K/UL — SIGNIFICANT CHANGE UP (ref 0–0.2)
BASOPHILS NFR BLD AUTO: 0.5 % — SIGNIFICANT CHANGE UP (ref 0–2)
EOSINOPHIL # BLD AUTO: 0.08 K/UL — SIGNIFICANT CHANGE UP (ref 0–0.5)
EOSINOPHIL NFR BLD AUTO: 0.8 % — SIGNIFICANT CHANGE UP (ref 0–6)
HCT VFR BLD CALC: 34.8 % — LOW (ref 39–50)
HGB BLD-MCNC: 11.6 G/DL — LOW (ref 13–17)
IMM GRANULOCYTES NFR BLD AUTO: 0.5 % — SIGNIFICANT CHANGE UP (ref 0–1.5)
LYMPHOCYTES # BLD AUTO: 1.46 K/UL — SIGNIFICANT CHANGE UP (ref 1–3.3)
LYMPHOCYTES # BLD AUTO: 15.5 % — SIGNIFICANT CHANGE UP (ref 13–44)
MCHC RBC-ENTMCNC: 31.7 PG — SIGNIFICANT CHANGE UP (ref 27–34)
MCHC RBC-ENTMCNC: 33.3 G/DL — SIGNIFICANT CHANGE UP (ref 32–36)
MCV RBC AUTO: 95.1 FL — SIGNIFICANT CHANGE UP (ref 80–100)
MONOCYTES # BLD AUTO: 0.78 K/UL — SIGNIFICANT CHANGE UP (ref 0–0.9)
MONOCYTES NFR BLD AUTO: 8.3 % — SIGNIFICANT CHANGE UP (ref 2–14)
NEUTROPHILS # BLD AUTO: 7.02 K/UL — SIGNIFICANT CHANGE UP (ref 1.8–7.4)
NEUTROPHILS NFR BLD AUTO: 74.4 % — SIGNIFICANT CHANGE UP (ref 43–77)
NRBC # BLD: 0 /100 WBCS — SIGNIFICANT CHANGE UP (ref 0–0)
PLATELET # BLD AUTO: 335 K/UL — SIGNIFICANT CHANGE UP (ref 150–400)
RBC # BLD: 3.66 M/UL — LOW (ref 4.2–5.8)
RBC # FLD: 12.6 % — SIGNIFICANT CHANGE UP (ref 10.3–14.5)
WBC # BLD: 9.44 K/UL — SIGNIFICANT CHANGE UP (ref 3.8–10.5)
WBC # FLD AUTO: 9.44 K/UL — SIGNIFICANT CHANGE UP (ref 3.8–10.5)

## 2020-10-02 PROCEDURE — 99214 OFFICE O/P EST MOD 30 MIN: CPT

## 2020-10-02 PROCEDURE — 88112 CYTOPATH CELL ENHANCE TECH: CPT | Mod: 26

## 2020-10-02 NOTE — PHYSICAL EXAM
[General Appearance - Well Developed] : well developed [General Appearance - Well Nourished] : well nourished [Normal Appearance] : normal appearance [Well Groomed] : well groomed [General Appearance - In No Acute Distress] : no acute distress [Edema] : no peripheral edema [Respiration, Rhythm And Depth] : normal respiratory rhythm and effort [Exaggerated Use Of Accessory Muscles For Inspiration] : no accessory muscle use [Abdomen Soft] : soft [Abdomen Tenderness] : non-tender [Costovertebral Angle Tenderness] : no ~M costovertebral angle tenderness [Normal Station and Gait] : the gait and station were normal for the patient's age [] : no rash [Oriented To Time, Place, And Person] : oriented to person, place, and time [Affect] : the affect was normal [Mood] : the mood was normal [Not Anxious] : not anxious

## 2020-10-02 NOTE — ASSESSMENT
[FreeTextEntry1] : Patient is a 71 yo male with history of metastatic RCC, on nivolumab with Dr. Ravi presenting for evaluation of hematuria.\par  -Urine culture/cytology.\par - Flomax\par - Plan for cystoscopy.

## 2020-10-02 NOTE — HISTORY OF PRESENT ILLNESS
[FreeTextEntry1] : Patient is a 69 yo male with history of metastatic RCC .\par Patient currently on nivolumab with Dr. Ravi for metastatic RCC. Patient doing well with good response to medicaiton. Liver and renal masses decreasing. \par Patient presenting for evaluation because Patient was admitted for hematuria to [facility]. Patient was started on CBI to help remove clots. Urine initially bloody, but became pink-tinged and then clear over time. CBI stopped and barber removed. Patient reportedly felt much better after barber removed, able to urinate by himself- reported some clots initially, but since resolved.  Patient's eliquis was stopped, MRI done showing no evidence of renal vein thrombus, discussed with Heme, no reason to continue eliquis for now. Patient's leukocytosis and JOSE MARIA resolved. Urine culture and blood cultures negative. \par Urine color is currently clear, stream can be weak at times. Patient feels well currently.\par Patient took flomax in hosptial and reports great improvement in stream. \par

## 2020-10-04 LAB — BACTERIA UR CULT: NORMAL

## 2020-10-05 LAB
ALBUMIN SERPL ELPH-MCNC: 4.9 G/DL
ALP BLD-CCNC: 58 U/L
ALT SERPL-CCNC: 19 U/L
ANION GAP SERPL CALC-SCNC: 11 MMOL/L
AST SERPL-CCNC: 21 U/L
BILIRUB SERPL-MCNC: 0.8 MG/DL
BUN SERPL-MCNC: 17 MG/DL
CALCIUM SERPL-MCNC: 10 MG/DL
CHLORIDE SERPL-SCNC: 105 MMOL/L
CO2 SERPL-SCNC: 25 MMOL/L
CREAT SERPL-MCNC: 1.52 MG/DL
GLUCOSE SERPL-MCNC: 112 MG/DL
LDH SERPL-CCNC: 194 U/L
POTASSIUM SERPL-SCNC: 5.2 MMOL/L
PROT SERPL-MCNC: 7.4 G/DL
SODIUM SERPL-SCNC: 140 MMOL/L
URINE CYTOLOGY: NORMAL

## 2020-10-05 NOTE — ASSESSMENT
[Palliative] : Goals of care discussed with patient: Palliative [Palliative Care Plan] : not applicable at this time [FreeTextEntry1] : Finesse Gallagher is a 70 years old male who presented with intermittent midback pain, 10/10 radiating to stomach from both sides. CT c/a/p and MRI abdomen showed multiple lung nodules, one liver lesion, retroperitoneal nodes. Bone scan showed one suspicious met in right superior pubic ramus. Liver lesion biopsy is consistent with renal cell carcinoma.\par It is not certain that the histology of RCC is clear cell vs non-clear cell. IMDC risk of this patient he has 5 out of 6 risks including leukocytosis, anemia, thrombocytosis, time from dx to initiate systemic treatment less than one year and high calcemia. He has poor risk of mRCC. Based on mccRCC the standard of care is combined immunotherapy nivo and ipi vs sutent, checkmate 214 in the intermediate and poor risk of mccRCC patients with significant overall survival benefit, keynote 426 keytruda plus axitinib vs sutent in good, intermediate and poor risk of mccRCC patients showed OS, PFS and MICHEL benefit, [place] in the intermediate and poor risk of mccRCC patients showed cabozantinib demonstrated a significant clinical benefit in PFS and MICHEL over standard-of-care sunitinib as first-line therapy in patients with intermediate- or poor-risk mRCC. Consider the longer duration of response and less side effects and recommended nivo and ipi. He agreed to sign the consent. S/P cycle 4 nivo and ipi. His back pain was resolved. His appetite is normal.On2 nivolumab maintenance treatment.\par Etiology of hematuria is unclear however now resolved. He will need cystoscopy to r/o abnormality in the bladder and obtain restaging scan. Eliquis stopped during hospitalization with hematology consult. Rose had MRI abd and chest X-ray but will still need CT torso so fully restage him. \par \par \par Plan \par -Continue maintenance #15  nivolumab 480 mg IV monthly given overall positive treatment response on the recent scans\par -check labs \par -will have interval scans next visit\par RTC  TEB in 3 weeks for follow up \par \par \par Reid Gonzales, ANP-BC

## 2020-10-05 NOTE — REVIEW OF SYSTEMS
[Skin Rash] : skin rash [Negative] : Allergic/Immunologic [Fever] : no fever [Chills] : no chills [Night Sweats] : no night sweats [Fatigue] : no fatigue [Recent Change In Weight] : ~T no recent weight change [Chest Pain] : no chest pain [Shortness Of Breath] : no shortness of breath [Cough] : no cough [Abdominal Pain] : no abdominal pain [Constipation] : no constipation [Diarrhea] : no diarrhea [Easy Bleeding] : no tendency for easy bleeding [Easy Bruising] : no tendency for easy bruising [de-identified] : scattered skin bumps in the lower leg skin

## 2020-10-05 NOTE — HISTORY OF PRESENT ILLNESS
[T: ___] : T[unfilled] [N: ___] : N[unfilled] [M: ___] : M[unfilled] [AJCC Stage: ____] : AJCC Stage: [unfilled] [Home] : at home, [unfilled] , at the time of the visit. [Medical Office: ([facility])___] : at the medical office located in  [Patient] : the patient [Self] : self [de-identified] : Finesse Zuniga is a 70 years old male who initially presented with intermittent midback pain, 10/10 radiating to stomach from both sides on June 1, 2019. He has poor appetite.  On june 12 he was diagnosed UTI and on cipro for 4 days, on June 16, was started cefpodoxime 100mg BID for 7 days.  CT chest/abdomen/pelvis revealed significant abnormal appearance of the left kidney with multiple heterogeneous areas of low attenuation involving the upper and midpole which may represent malignancy. There are necrotic lymph nodes in the left para-aortic location at the level of the kidney and below the level of the kidney. Adenopathy surrounds the left renal artery. The left renal vein demonstrates question of partially occlusive thrombus. Nonspecific scattered tiny pulmonary nodules. Lower lobe subpleural nodules measure up to 6 mm. There is a well-circumscribed hypodense lesion in the anterior aspect of the lateral segment of the left lobe measuring 3.2 x 2.2 cm. He underwent CT guided liver lesion biopsy which showed consistent with primary renal cell carcinoma, IHC CK7/CK19/PAX8 positive. MRI abdomen waw con showed multiple bilateral pulmonary nodules increased in size and number compared with recent CT. The largest is pleural-based in the left lower lobe and measures 1.5 x 1.1 cm. LIVER: Rim-enhancing subcapsular mass in segment 3 and measures 4.4 x 2.6 cm. A 4.2cm mass in mid to lower pole of the lft kidney suspicious for urothelial malignancy arising within the left renal collecting system. Regional lymphadenopathy and hepatic and pulmonary metastases. Nonocclusive thrombus within the left renal vein. He was started on eliquis. Bone scan showed  a focus of mildly increased uptake in the right superior \par pubic ramus suspicious of bone mets.\par He has lost 17lbs in one month. He denies nausea, vomiting and constipation and diarrhea. \par \par 7/19/19 s/p cycle 1 nivo and ipi, he reports that his intermittent midback pain, 10/10 radiating to stomach from both sides on June 1, 2019 has been resolved. His appetite is improving. He denies nausea, vomiting and diarrhea, skin rashes, joint pain. \par \par 7/31: Reports feeling better since last treatment, no back pain or stomach pain. Had gone to the ER on 7/19 for hyponatremia, was KS'ed after received fluid.  Sodium today improved. Reports some leg cramping.  Reports improved appetite, energy level has improved.  Denies coughing and SOB.  Reports has also gained weight.\par \par 8/21/19 : Here for week 7 of cycle 2 ipilimiumab + nivolumab. Has been feeling much improved, gained 4 kg since his last visit. His energy is good and is able to perform ADLs and do chores at home now. No ha, dizziness, sob, cough, chest pain, nausea, vomiting, diarrhea. There is no change in past medical hx, family hx, and social history since our last visit.\par \par 9/11/2019: s/p cycle 3, he feels completely fine, his energy level is back to normal. He has done normal ADLs. He denies skin rashes, joint pain, diarrhea. \par \par 10/16/2019 s/p cycle 4, he feels completely fine, his energy level is back to normal. He has done normal ADLs. He denies skin rashes, joint pain, diarrhea. \par \par 11/13/2019 now on Nivolumab maintenance treatment. He feels fine, has normal ADLs. He denies skin rashes, joint pain, diarrhea. \par \par 11/14/19 : Restaging scan: liver and RP nodes again with great response. There is a left lung lesion which is 7 mm, increased from 5 mm. \par \par 12/11/19 : met RCC on single agent nivolumab after completing combination treatment. He is tolerating therapy well without any issues thus far. He denies ha, sob,cp,nvd, rash, vision changes, night sweats. His energy is good and maintains normal lifestyle. \par \par 1/15/2020 : Cycle 5 nivolumab 480 mg monthly. Pt here for f/u. Overall feels well. Still gaining more weight, appetite is good. Denies any loss of energy, n/v/diarrhea, fevers, pain, or new skin rashes.  [de-identified] : metastatic renal cell carcinoma [de-identified] : 2/19/2020: Cycle 6 Nivolumab. Renal function Cr trending up and now 1.43. His Cr has been normal since starting therapy however started at 1.4. \par He received 5 doses of nivolumab and last scan was 11/2019. \par He feels well overall without significant change. Weigh stable and good appetite. \par \par 4/22/2020: cycle 8 nivolumab, he feels overall fine. weight is stable and no pain. \par \par 6/12/2020 cycle 10 nivolumab maintenance, feels completely fine except several bumps in the left lower leg skin, scabbed now. \par \par 7/8/2020 CT c/a/p showed stable lung nodules, continuous decrease in size in the liver lesion and renal lesion. \par \par 7/10/2020 s/p cycle 11 nivolumab maintenance. He feels completely fine except several bumps in the left lower leg skin, scabbed. He will see Dermatologist. \par \par 8/10/2020 s/p cycle 12 nivolumab maintenance. His appetite is ok. His skin bumps are getting better. \par \par 9/9/2020 s/p cycle 13 nivolumab maintenance. He feels overall fine with great appetite. His skin bumps have scabs. \par \par 9/18-22 : Patient admitted for hematuria. Patient followed by urology and hematology during hospital course. Patient was started on CBI to help remove clots. Urine initially bloody, but became pink-tinged and then clear over time. CBI stopped and barber removed. Patient reportedly felt much better after barber removed, able to urinate by himself- reported some clots initially, but since resolved. States bladder spasms improved as well. Patient's eliquis was stopped, MRI done showing no evidence of renal vein thrombus, discussed \par with Heme, no reason to continue eliquis for now. Patient's leukocytosis and JOSE MARIA resolved. Urine culture and blood cultures negative. \par Patient improved clinically throughout hospital course. Patient seen and examined on day of discharge. \par \par 10/2/2020 : Ws seen by Dr. Javier and is being set up for cystoscopy next week. He feels well in general and back to himself. He has lost a 4-5 pounds but trying to gain back weight. He denies any hematuria since the admission.He denies night sweats, fevers, chill, headaches, SOB, CP, NVD. \par

## 2020-10-07 ENCOUNTER — RESULT REVIEW (OUTPATIENT)
Age: 70
End: 2020-10-07

## 2020-10-07 ENCOUNTER — LABORATORY RESULT (OUTPATIENT)
Age: 70
End: 2020-10-07

## 2020-10-07 ENCOUNTER — APPOINTMENT (OUTPATIENT)
Dept: INFUSION THERAPY | Facility: HOSPITAL | Age: 70
End: 2020-10-07

## 2020-10-07 LAB
BASOPHILS # BLD AUTO: 0.03 K/UL — SIGNIFICANT CHANGE UP (ref 0–0.2)
BASOPHILS NFR BLD AUTO: 0.5 % — SIGNIFICANT CHANGE UP (ref 0–2)
EOSINOPHIL # BLD AUTO: 0.13 K/UL — SIGNIFICANT CHANGE UP (ref 0–0.5)
EOSINOPHIL NFR BLD AUTO: 2 % — SIGNIFICANT CHANGE UP (ref 0–6)
HCT VFR BLD CALC: 33.3 % — LOW (ref 39–50)
HGB BLD-MCNC: 11.2 G/DL — LOW (ref 13–17)
IMM GRANULOCYTES NFR BLD AUTO: 0.5 % — SIGNIFICANT CHANGE UP (ref 0–1.5)
LYMPHOCYTES # BLD AUTO: 1.39 K/UL — SIGNIFICANT CHANGE UP (ref 1–3.3)
LYMPHOCYTES # BLD AUTO: 21.2 % — SIGNIFICANT CHANGE UP (ref 13–44)
MCHC RBC-ENTMCNC: 30.9 PG — SIGNIFICANT CHANGE UP (ref 27–34)
MCHC RBC-ENTMCNC: 33.6 G/DL — SIGNIFICANT CHANGE UP (ref 32–36)
MCV RBC AUTO: 91.7 FL — SIGNIFICANT CHANGE UP (ref 80–100)
MONOCYTES # BLD AUTO: 0.78 K/UL — SIGNIFICANT CHANGE UP (ref 0–0.9)
MONOCYTES NFR BLD AUTO: 11.9 % — SIGNIFICANT CHANGE UP (ref 2–14)
NEUTROPHILS # BLD AUTO: 4.2 K/UL — SIGNIFICANT CHANGE UP (ref 1.8–7.4)
NEUTROPHILS NFR BLD AUTO: 63.9 % — SIGNIFICANT CHANGE UP (ref 43–77)
NRBC # BLD: 0 /100 WBCS — SIGNIFICANT CHANGE UP (ref 0–0)
PLATELET # BLD AUTO: 266 K/UL — SIGNIFICANT CHANGE UP (ref 150–400)
RBC # BLD: 3.63 M/UL — LOW (ref 4.2–5.8)
RBC # FLD: 12.2 % — SIGNIFICANT CHANGE UP (ref 10.3–14.5)
WBC # BLD: 6.56 K/UL — SIGNIFICANT CHANGE UP (ref 3.8–10.5)
WBC # FLD AUTO: 6.56 K/UL — SIGNIFICANT CHANGE UP (ref 3.8–10.5)

## 2020-10-08 DIAGNOSIS — Z51.11 ENCOUNTER FOR ANTINEOPLASTIC CHEMOTHERAPY: ICD-10-CM

## 2020-10-09 ENCOUNTER — APPOINTMENT (OUTPATIENT)
Dept: HEMATOLOGY ONCOLOGY | Facility: CLINIC | Age: 70
End: 2020-10-09

## 2020-10-21 ENCOUNTER — APPOINTMENT (OUTPATIENT)
Dept: HEMATOLOGY ONCOLOGY | Facility: CLINIC | Age: 70
End: 2020-10-21
Payer: MEDICARE

## 2020-10-21 VITALS
SYSTOLIC BLOOD PRESSURE: 125 MMHG | WEIGHT: 155.84 LBS | DIASTOLIC BLOOD PRESSURE: 78 MMHG | HEART RATE: 64 BPM | RESPIRATION RATE: 16 BRPM | OXYGEN SATURATION: 99 % | BODY MASS INDEX: 22.69 KG/M2 | TEMPERATURE: 97.2 F

## 2020-10-21 PROCEDURE — 99214 OFFICE O/P EST MOD 30 MIN: CPT

## 2020-10-23 ENCOUNTER — APPOINTMENT (OUTPATIENT)
Dept: UROLOGY | Facility: CLINIC | Age: 70
End: 2020-10-23
Payer: MEDICARE

## 2020-10-23 ENCOUNTER — OUTPATIENT (OUTPATIENT)
Dept: OUTPATIENT SERVICES | Facility: HOSPITAL | Age: 70
LOS: 1 days | End: 2020-10-23
Payer: MEDICARE

## 2020-10-23 ENCOUNTER — APPOINTMENT (OUTPATIENT)
Dept: CT IMAGING | Facility: IMAGING CENTER | Age: 70
End: 2020-10-23
Payer: MEDICARE

## 2020-10-23 VITALS — SYSTOLIC BLOOD PRESSURE: 137 MMHG | DIASTOLIC BLOOD PRESSURE: 78 MMHG | HEART RATE: 68 BPM

## 2020-10-23 VITALS — TEMPERATURE: 97.3 F

## 2020-10-23 DIAGNOSIS — Z98.890 OTHER SPECIFIED POSTPROCEDURAL STATES: Chronic | ICD-10-CM

## 2020-10-23 DIAGNOSIS — C64.2 MALIGNANT NEOPLASM OF LEFT KIDNEY, EXCEPT RENAL PELVIS: ICD-10-CM

## 2020-10-23 DIAGNOSIS — Z00.8 ENCOUNTER FOR OTHER GENERAL EXAMINATION: ICD-10-CM

## 2020-10-23 DIAGNOSIS — R31.0 GROSS HEMATURIA: ICD-10-CM

## 2020-10-23 DIAGNOSIS — S86.019A STRAIN OF UNSPECIFIED ACHILLES TENDON, INITIAL ENCOUNTER: Chronic | ICD-10-CM

## 2020-10-23 DIAGNOSIS — C78.00 SECONDARY MALIGNANT NEOPLASM OF UNSPECIFIED LUNG: ICD-10-CM

## 2020-10-23 PROCEDURE — 52000 CYSTOURETHROSCOPY: CPT

## 2020-10-23 PROCEDURE — 74177 CT ABD & PELVIS W/CONTRAST: CPT

## 2020-10-23 PROCEDURE — 71260 CT THORAX DX C+: CPT

## 2020-10-23 PROCEDURE — 74177 CT ABD & PELVIS W/CONTRAST: CPT | Mod: 26

## 2020-10-23 PROCEDURE — 71260 CT THORAX DX C+: CPT | Mod: 26

## 2020-10-28 ENCOUNTER — OUTPATIENT (OUTPATIENT)
Dept: OUTPATIENT SERVICES | Facility: HOSPITAL | Age: 70
LOS: 1 days | Discharge: HOME | End: 2020-10-28

## 2020-10-28 DIAGNOSIS — Z98.890 OTHER SPECIFIED POSTPROCEDURAL STATES: Chronic | ICD-10-CM

## 2020-10-28 DIAGNOSIS — C64.9 MALIGNANT NEOPLASM OF UNSPECIFIED KIDNEY, EXCEPT RENAL PELVIS: ICD-10-CM

## 2020-10-28 DIAGNOSIS — S86.019A STRAIN OF UNSPECIFIED ACHILLES TENDON, INITIAL ENCOUNTER: Chronic | ICD-10-CM

## 2020-10-29 DIAGNOSIS — R31.0 GROSS HEMATURIA: ICD-10-CM

## 2020-10-30 ENCOUNTER — APPOINTMENT (OUTPATIENT)
Dept: UROLOGY | Facility: CLINIC | Age: 70
End: 2020-10-30

## 2020-11-04 ENCOUNTER — RESULT REVIEW (OUTPATIENT)
Age: 70
End: 2020-11-04

## 2020-11-04 ENCOUNTER — APPOINTMENT (OUTPATIENT)
Dept: INFUSION THERAPY | Facility: HOSPITAL | Age: 70
End: 2020-11-04

## 2020-11-04 ENCOUNTER — LABORATORY RESULT (OUTPATIENT)
Age: 70
End: 2020-11-04

## 2020-11-04 DIAGNOSIS — Z51.11 ENCOUNTER FOR ANTINEOPLASTIC CHEMOTHERAPY: ICD-10-CM

## 2020-11-04 LAB
BASOPHILS # BLD AUTO: 0.03 K/UL — SIGNIFICANT CHANGE UP (ref 0–0.2)
BASOPHILS NFR BLD AUTO: 0.4 % — SIGNIFICANT CHANGE UP (ref 0–2)
EOSINOPHIL # BLD AUTO: 0.09 K/UL — SIGNIFICANT CHANGE UP (ref 0–0.5)
EOSINOPHIL NFR BLD AUTO: 1.1 % — SIGNIFICANT CHANGE UP (ref 0–6)
HCT VFR BLD CALC: 36.4 % — LOW (ref 39–50)
HGB BLD-MCNC: 12 G/DL — LOW (ref 13–17)
IMM GRANULOCYTES NFR BLD AUTO: 0.5 % — SIGNIFICANT CHANGE UP (ref 0–1.5)
LYMPHOCYTES # BLD AUTO: 1.39 K/UL — SIGNIFICANT CHANGE UP (ref 1–3.3)
LYMPHOCYTES # BLD AUTO: 17.2 % — SIGNIFICANT CHANGE UP (ref 13–44)
MCHC RBC-ENTMCNC: 29.2 PG — SIGNIFICANT CHANGE UP (ref 27–34)
MCHC RBC-ENTMCNC: 33 G/DL — SIGNIFICANT CHANGE UP (ref 32–36)
MCV RBC AUTO: 88.6 FL — SIGNIFICANT CHANGE UP (ref 80–100)
MONOCYTES # BLD AUTO: 0.67 K/UL — SIGNIFICANT CHANGE UP (ref 0–0.9)
MONOCYTES NFR BLD AUTO: 8.3 % — SIGNIFICANT CHANGE UP (ref 2–14)
NEUTROPHILS # BLD AUTO: 5.88 K/UL — SIGNIFICANT CHANGE UP (ref 1.8–7.4)
NEUTROPHILS NFR BLD AUTO: 72.5 % — SIGNIFICANT CHANGE UP (ref 43–77)
NRBC # BLD: 0 /100 WBCS — SIGNIFICANT CHANGE UP (ref 0–0)
PLATELET # BLD AUTO: 262 K/UL — SIGNIFICANT CHANGE UP (ref 150–400)
RBC # BLD: 4.11 M/UL — LOW (ref 4.2–5.8)
RBC # FLD: 12.2 % — SIGNIFICANT CHANGE UP (ref 10.3–14.5)
WBC # BLD: 8.1 K/UL — SIGNIFICANT CHANGE UP (ref 3.8–10.5)
WBC # FLD AUTO: 8.1 K/UL — SIGNIFICANT CHANGE UP (ref 3.8–10.5)

## 2020-11-04 NOTE — CONSULT LETTER
[Courtesy Letter:] : I had the pleasure of seeing your patient, [unfilled], in my office today. [Please see my note below.] : Please see my note below. [Dear  ___] : Dear  [unfilled], [Consult Closing:] : Thank you very much for allowing me to participate in the care of this patient.  If you have any questions, please do not hesitate to contact me. [Sincerely,] : Sincerely, [FreeTextEntry3] : Reid Gonzales, ANP-BC

## 2020-11-04 NOTE — REVIEW OF SYSTEMS
[Skin Rash] : skin rash [Negative] : Allergic/Immunologic [Fever] : no fever [Chills] : no chills [Night Sweats] : no night sweats [Fatigue] : no fatigue [Recent Change In Weight] : ~T no recent weight change [Chest Pain] : no chest pain [Shortness Of Breath] : no shortness of breath [Cough] : no cough [Abdominal Pain] : no abdominal pain [Constipation] : no constipation [Diarrhea] : no diarrhea [Easy Bleeding] : no tendency for easy bleeding [Easy Bruising] : no tendency for easy bruising [de-identified] : scattered skin bumps in the lower leg skin but no change

## 2020-11-04 NOTE — HISTORY OF PRESENT ILLNESS
[N: ___] : N[unfilled] [T: ___] : T[unfilled] [AJCC Stage: ____] : AJCC Stage: [unfilled] [M: ___] : M[unfilled] [Medical Office: ([facility])___] : at the medical office located in  [Home] : at home, [unfilled] , at the time of the visit. [Patient] : the patient [Self] : self [de-identified] : Finesse Zuniga is a 70 years old male who initially presented with intermittent midback pain, 10/10 radiating to stomach from both sides on June 1, 2019. He has poor appetite.  On june 12 he was diagnosed UTI and on cipro for 4 days, on June 16, was started cefpodoxime 100mg BID for 7 days.  CT chest/abdomen/pelvis revealed significant abnormal appearance of the left kidney with multiple heterogeneous areas of low attenuation involving the upper and midpole which may represent malignancy. There are necrotic lymph nodes in the left para-aortic location at the level of the kidney and below the level of the kidney. Adenopathy surrounds the left renal artery. The left renal vein demonstrates question of partially occlusive thrombus. Nonspecific scattered tiny pulmonary nodules. Lower lobe subpleural nodules measure up to 6 mm. There is a well-circumscribed hypodense lesion in the anterior aspect of the lateral segment of the left lobe measuring 3.2 x 2.2 cm. He underwent CT guided liver lesion biopsy which showed consistent with primary renal cell carcinoma, IHC CK7/CK19/PAX8 positive. MRI abdomen waw con showed multiple bilateral pulmonary nodules increased in size and number compared with recent CT. The largest is pleural-based in the left lower lobe and measures 1.5 x 1.1 cm. LIVER: Rim-enhancing subcapsular mass in segment 3 and measures 4.4 x 2.6 cm. A 4.2cm mass in mid to lower pole of the lft kidney suspicious for urothelial malignancy arising within the left renal collecting system. Regional lymphadenopathy and hepatic and pulmonary metastases. Nonocclusive thrombus within the left renal vein. He was started on eliquis. Bone scan showed  a focus of mildly increased uptake in the right superior \par pubic ramus suspicious of bone mets.\par He has lost 17lbs in one month. He denies nausea, vomiting and constipation and diarrhea. \par \par 7/19/19 s/p cycle 1 nivo and ipi, he reports that his intermittent midback pain, 10/10 radiating to stomach from both sides on June 1, 2019 has been resolved. His appetite is improving. He denies nausea, vomiting and diarrhea, skin rashes, joint pain. \par \par 7/31: Reports feeling better since last treatment, no back pain or stomach pain. Had gone to the ER on 7/19 for hyponatremia, was MS'ed after received fluid.  Sodium today improved. Reports some leg cramping.  Reports improved appetite, energy level has improved.  Denies coughing and SOB.  Reports has also gained weight.\par \par 8/21/19 : Here for week 7 of cycle 2 ipilimiumab + nivolumab. Has been feeling much improved, gained 4 kg since his last visit. His energy is good and is able to perform ADLs and do chores at home now. No ha, dizziness, sob, cough, chest pain, nausea, vomiting, diarrhea. There is no change in past medical hx, family hx, and social history since our last visit.\par \par 9/11/2019: s/p cycle 3, he feels completely fine, his energy level is back to normal. He has done normal ADLs. He denies skin rashes, joint pain, diarrhea. \par \par 10/16/2019 s/p cycle 4, he feels completely fine, his energy level is back to normal. He has done normal ADLs. He denies skin rashes, joint pain, diarrhea. \par \par 11/13/2019 now on Nivolumab maintenance treatment. He feels fine, has normal ADLs. He denies skin rashes, joint pain, diarrhea. \par \par 11/14/19 : Restaging scan: liver and RP nodes again with great response. There is a left lung lesion which is 7 mm, increased from 5 mm. \par \par 12/11/19 : met RCC on single agent nivolumab after completing combination treatment. He is tolerating therapy well without any issues thus far. He denies ha, sob,cp,nvd, rash, vision changes, night sweats. His energy is good and maintains normal lifestyle. \par \par 1/15/2020 : Cycle 5 nivolumab 480 mg monthly. Pt here for f/u. Overall feels well. Still gaining more weight, appetite is good. Denies any loss of energy, n/v/diarrhea, fevers, pain, or new skin rashes. \par \par 2/19/2020: Cycle 6 Nivolumab. Renal function Cr trending up and now 1.43. His Cr has been normal since starting therapy however started at 1.4. \par He received 5 doses of nivolumab and last scan was 11/2019. \par He feels well overall without significant change. Weigh stable and good appetite. \par \par 4/22/2020: cycle 8 nivolumab, he feels overall fine. weight is stable and no pain. \par \par 6/12/2020 cycle 10 nivolumab maintenance, feels completely fine except several bumps in the left lower leg skin, scabbed now. \par \par 7/8/2020 CT c/a/p showed stable lung nodules, continuous decrease in size in the liver lesion and renal lesion. \par \par 7/10/2020 s/p cycle 11 nivolumab maintenance. He feels completely fine except several bumps in the left lower leg skin, scabbed. He will see Dermatologist. \par \par 8/10/2020 s/p cycle 12 nivolumab maintenance. His appetite is ok. His skin bumps are getting better. \par \par 9/9/2020 s/p cycle 13 nivolumab maintenance. He feels overall fine with great appetite. His skin bumps have scabs. \par \par 9/18-22 : Patient admitted for hematuria. Patient followed by urology and hematology during hospital course. Patient was started on CBI to help remove clots. Urine initially bloody, but became pink-tinged and then clear over time. CBI stopped and barber removed. Patient reportedly felt much better after barber removed, able to urinate by himself- reported some clots initially, but since resolved. States bladder spasms improved as well. Patient's eliquis was stopped, MRI done showing no evidence of renal vein thrombus, discussed \par with Heme, no reason to continue eliquis for now. Patient's leukocytosis and JOSE MARIA resolved. Urine culture and blood cultures negative. \par Patient improved clinically throughout hospital course. Patient seen and examined on day of discharge. \par \par 10/2/2020 : Ws seen by Dr. Javier and is being set up for cystoscopy next week. He feels well in general and back to himself. He has lost a 4-5 pounds but trying to gain back weight. He denies any hematuria since the admission.He denies night sweats, fevers, chill, headaches, SOB, CP, NVD.  [de-identified] : metastatic renal cell carcinoma [de-identified] : 10/21/2020 : Mr. Call is here for a follow up. Restaging scans are set up on 10/23/2020 and cystoscopy as well with recent history of gross hematuria. \par His R inguinal hernia has increased and is bothersome. He was working hunched and bent and his hernia became painful. He was able to push it back in but was uncomfortable and he noticed urine flow is different with position of hernia. \par He otherwise denies headaches, SOB, CP, cough, NVD, vision changes, dizziness, rash, fevers, chills, or malaise. \par

## 2020-11-04 NOTE — PHYSICAL EXAM
[Fully active, able to carry on all pre-disease performance without restriction] : Status 0 - Fully active, able to carry on all pre-disease performance without restriction [Normal] : affect appropriate [de-identified] : R sided hernia, large.

## 2020-11-04 NOTE — ASSESSMENT
[Palliative] : Goals of care discussed with patient: Palliative [Palliative Care Plan] : not applicable at this time [FreeTextEntry1] : Finesse Gallagher is a 70 years old male who presented with intermittent midback pain, 10/10 radiating to stomach from both sides. CT c/a/p and MRI abdomen showed multiple lung nodules, one liver lesion, retroperitoneal nodes. Bone scan showed one suspicious met in right superior pubic ramus. Liver lesion biopsy is consistent with renal cell carcinoma.\par It is not certain that the histology of RCC is clear cell vs non-clear cell. IMDC risk of this patient he has 5 out of 6 risks including leukocytosis, anemia, thrombocytosis, time from dx to initiate systemic treatment less than one year and high calcemia. He has poor risk of mRCC. Based on mccRCC the standard of care is combined immunotherapy nivo and ipi vs sutent, checkmate 214 in the intermediate and poor risk of mccRCC patients with significant overall survival benefit, keynote 426 keytruda plus axitinib vs sutent in good, intermediate and poor risk of mccRCC patients showed OS, PFS and MICHEL benefit, [place] in the intermediate and poor risk of mccRCC patients showed cabozantinib demonstrated a significant clinical benefit in PFS and MICHEL over standard-of-care sunitinib as first-line therapy in patients with intermediate- or poor-risk mRCC. Consider the longer duration of response and less side effects and recommended nivo and ipi. He agreed to sign the consent. S/P cycle 4 nivo and ipi. His back pain was resolved. His appetite is normal.On2 nivolumab maintenance treatment.\par Etiology of hematuria is unclear however now resolved. He will need cystoscopy to r/o abnormality in the bladder and obtain restaging scan. Eliquis stopped during hospitalization with hematology consult. He had MRI abd and chest X-ray but will still need CT torso so fully restage him. \par Mr. Call will have CT scan of torso on 1/23//2020 then treatment on 11/4/2020 assuming no PD is seen on the scans. \par \par \par Plan \par RCC: \par Continue maintenance #17  nivolumab 480 mg IV monthly assuming no PD on restaging scan. \par - CT as planned on 10/23/2020, we will follow up with the scan result next week via telephone. \par \par R Inguinal Hernia : \par - quite large and bothersome. \par - may consider surgical intervention if desired\par \par Gross Hematuria:\par -None at this point however will have follow up cystoscopy on 10/23/2020 with Dr. Javier. \par RTC before his next treatment. \par \par \par Reid Gonzales, ANP-BC

## 2020-11-28 ENCOUNTER — OUTPATIENT (OUTPATIENT)
Dept: OUTPATIENT SERVICES | Facility: HOSPITAL | Age: 70
LOS: 1 days | Discharge: HOME | End: 2020-11-28

## 2020-11-28 DIAGNOSIS — C64.9 MALIGNANT NEOPLASM OF UNSPECIFIED KIDNEY, EXCEPT RENAL PELVIS: ICD-10-CM

## 2020-11-28 DIAGNOSIS — S86.019A STRAIN OF UNSPECIFIED ACHILLES TENDON, INITIAL ENCOUNTER: Chronic | ICD-10-CM

## 2020-11-28 DIAGNOSIS — Z98.890 OTHER SPECIFIED POSTPROCEDURAL STATES: Chronic | ICD-10-CM

## 2020-12-02 ENCOUNTER — APPOINTMENT (OUTPATIENT)
Dept: INFUSION THERAPY | Facility: HOSPITAL | Age: 70
End: 2020-12-02

## 2020-12-02 ENCOUNTER — LABORATORY RESULT (OUTPATIENT)
Age: 70
End: 2020-12-02

## 2020-12-02 ENCOUNTER — APPOINTMENT (OUTPATIENT)
Dept: HEMATOLOGY ONCOLOGY | Facility: CLINIC | Age: 70
End: 2020-12-02
Payer: MEDICARE

## 2020-12-02 VITALS
WEIGHT: 155.62 LBS | RESPIRATION RATE: 16 BRPM | DIASTOLIC BLOOD PRESSURE: 93 MMHG | TEMPERATURE: 96.8 F | OXYGEN SATURATION: 99 % | BODY MASS INDEX: 22.66 KG/M2 | SYSTOLIC BLOOD PRESSURE: 153 MMHG | HEART RATE: 51 BPM

## 2020-12-02 DIAGNOSIS — R16.0 HEPATOMEGALY, NOT ELSEWHERE CLASSIFIED: ICD-10-CM

## 2020-12-02 DIAGNOSIS — Z51.11 ENCOUNTER FOR ANTINEOPLASTIC CHEMOTHERAPY: ICD-10-CM

## 2020-12-02 DIAGNOSIS — R11.2 NAUSEA WITH VOMITING, UNSPECIFIED: ICD-10-CM

## 2020-12-02 PROCEDURE — 99214 OFFICE O/P EST MOD 30 MIN: CPT

## 2020-12-02 PROCEDURE — 99072 ADDL SUPL MATRL&STAF TM PHE: CPT

## 2020-12-02 RX ORDER — TAMSULOSIN HYDROCHLORIDE 0.4 MG/1
0.4 CAPSULE ORAL
Qty: 30 | Refills: 3 | Status: DISCONTINUED | COMMUNITY
Start: 2020-10-03 | End: 2020-12-02

## 2020-12-02 RX ORDER — APIXABAN 5 MG/1
5 TABLET, FILM COATED ORAL
Qty: 60 | Refills: 3 | Status: DISCONTINUED | COMMUNITY
Start: 2019-07-03 | End: 2020-12-02

## 2020-12-06 NOTE — ASSESSMENT
[Palliative] : Goals of care discussed with patient: Palliative [Palliative Care Plan] : not applicable at this time [FreeTextEntry1] : Finesse Gallagher is a 70 years old male who presented with intermittent midback pain, 10/10 radiating to stomach from both sides. CT c/a/p and MRI abdomen showed multiple lung nodules, one liver lesion, retroperitoneal nodes. Bone scan showed one suspicious met in right superior pubic ramus. Liver lesion biopsy is consistent with renal cell carcinoma.\par It is not certain that the histology of RCC is clear cell vs non-clear cell. IMDC risk of this patient he has 5 out of 6 risks including leukocytosis, anemia, thrombocytosis, time from dx to initiate systemic treatment less than one year and high calcemia. He has poor risk of mRCC. Based on mccRCC the standard of care is combined immunotherapy nivo and ipi vs sutent, checkmate 214 in the intermediate and poor risk of mccRCC patients with significant overall survival benefit, keynote 426 keytruda plus axitinib vs sutent in good, intermediate and poor risk of mccRCC patients showed OS, PFS and MICHEL benefit, [place] in the intermediate and poor risk of mccRCC patients showed cabozantinib demonstrated a significant clinical benefit in PFS and MICHEL over standard-of-care sunitinib as first-line therapy in patients with intermediate- or poor-risk mRCC. Consider the longer duration of response and less side effects and recommended nivo and ipi. He agreed to sign the consent. S/P cycle 4 nivo and ipi. His back pain was resolved and maintained on single agnet Nivolumab. \par He had an episode of gross hematuria leading to hospitalization in 9/2020 however etiology of hematuria is unclear and resolved. Follow up cystoscopy to r/o abnormality in the bladder showed no abnormality.  Eliquis stopped during hospitalization with hematology consult. \par His most recent 10/23/2020 restaging scan showed minimal disease and unchanged. \par \par \par Plan \par RCC: \par Continue maintenance #19 today  nivolumab 480 mg IV monthly \par - Next CT will be after cycle 21 (the end of January). \par - Labs today\par \par R Inguinal Hernia : \par - quite large and bothersome. \par - may consider surgical intervention if desired\par \par Gross Hematuria:\par -Normal cystoscopy and resolved.\par \par RTC before his next treatment. \par \par \par Reid Gnozales, ANP-BC

## 2020-12-06 NOTE — REVIEW OF SYSTEMS
[Skin Rash] : skin rash [Negative] : Allergic/Immunologic [Fever] : no fever [Chills] : no chills [Night Sweats] : no night sweats [Fatigue] : no fatigue [Recent Change In Weight] : ~T no recent weight change [Chest Pain] : no chest pain [Shortness Of Breath] : no shortness of breath [Cough] : no cough [Abdominal Pain] : no abdominal pain [Constipation] : no constipation [Diarrhea] : no diarrhea [Easy Bleeding] : no tendency for easy bleeding [Easy Bruising] : no tendency for easy bruising [de-identified] : scattered skin bumps in the lower leg skin but no change

## 2020-12-06 NOTE — HISTORY OF PRESENT ILLNESS
[T: ___] : T[unfilled] [N: ___] : N[unfilled] [M: ___] : M[unfilled] [AJCC Stage: ____] : AJCC Stage: [unfilled] [de-identified] : Finesse Zuniga is a 70 years old male who initially presented with intermittent midback pain, 10/10 radiating to stomach from both sides on June 1, 2019. He has poor appetite.  On june 12 he was diagnosed UTI and on cipro for 4 days, on June 16, was started cefpodoxime 100mg BID for 7 days.  CT chest/abdomen/pelvis revealed significant abnormal appearance of the left kidney with multiple heterogeneous areas of low attenuation involving the upper and midpole which may represent malignancy. There are necrotic lymph nodes in the left para-aortic location at the level of the kidney and below the level of the kidney. Adenopathy surrounds the left renal artery. The left renal vein demonstrates question of partially occlusive thrombus. Nonspecific scattered tiny pulmonary nodules. Lower lobe subpleural nodules measure up to 6 mm. There is a well-circumscribed hypodense lesion in the anterior aspect of the lateral segment of the left lobe measuring 3.2 x 2.2 cm. He underwent CT guided liver lesion biopsy which showed consistent with primary renal cell carcinoma, IHC CK7/CK19/PAX8 positive. MRI abdomen waw con showed multiple bilateral pulmonary nodules increased in size and number compared with recent CT. The largest is pleural-based in the left lower lobe and measures 1.5 x 1.1 cm. LIVER: Rim-enhancing subcapsular mass in segment 3 and measures 4.4 x 2.6 cm. A 4.2cm mass in mid to lower pole of the lft kidney suspicious for urothelial malignancy arising within the left renal collecting system. Regional lymphadenopathy and hepatic and pulmonary metastases. Nonocclusive thrombus within the left renal vein. He was started on eliquis. Bone scan showed  a focus of mildly increased uptake in the right superior \par pubic ramus suspicious of bone mets.\par He has lost 17lbs in one month. He denies nausea, vomiting and constipation and diarrhea. \par \par 7/19/19 s/p cycle 1 nivo and ipi, he reports that his intermittent midback pain, 10/10 radiating to stomach from both sides on June 1, 2019 has been resolved. His appetite is improving. He denies nausea, vomiting and diarrhea, skin rashes, joint pain. \par \par 7/31: Reports feeling better since last treatment, no back pain or stomach pain. Had gone to the ER on 7/19 for hyponatremia, was RI'ed after received fluid.  Sodium today improved. Reports some leg cramping.  Reports improved appetite, energy level has improved.  Denies coughing and SOB.  Reports has also gained weight.\par \par 8/21/19 : Here for week 7 of cycle 2 ipilimiumab + nivolumab. Has been feeling much improved, gained 4 kg since his last visit. His energy is good and is able to perform ADLs and do chores at home now. No ha, dizziness, sob, cough, chest pain, nausea, vomiting, diarrhea. There is no change in past medical hx, family hx, and social history since our last visit.\par \par 9/11/2019: s/p cycle 3, he feels completely fine, his energy level is back to normal. He has done normal ADLs. He denies skin rashes, joint pain, diarrhea. \par \par 10/16/2019 s/p cycle 4, he feels completely fine, his energy level is back to normal. He has done normal ADLs. He denies skin rashes, joint pain, diarrhea. \par \par 11/13/2019 now on Nivolumab maintenance treatment. He feels fine, has normal ADLs. He denies skin rashes, joint pain, diarrhea. \par \par 11/14/19 : Restaging scan: liver and RP nodes again with great response. There is a left lung lesion which is 7 mm, increased from 5 mm. \par \par 12/11/19 : met RCC on single agent nivolumab after completing combination treatment. He is tolerating therapy well without any issues thus far. He denies ha, sob,cp,nvd, rash, vision changes, night sweats. His energy is good and maintains normal lifestyle. \par \par 1/15/2020 : Cycle 5 nivolumab 480 mg monthly. Pt here for f/u. Overall feels well. Still gaining more weight, appetite is good. Denies any loss of energy, n/v/diarrhea, fevers, pain, or new skin rashes. \par \par 2/19/2020: Cycle 6 Nivolumab. Renal function Cr trending up and now 1.43. His Cr has been normal since starting therapy however started at 1.4. \par He received 5 doses of nivolumab and last scan was 11/2019. \par He feels well overall without significant change. Weigh stable and good appetite. \par \par 4/22/2020: cycle 8 nivolumab, he feels overall fine. weight is stable and no pain. \par \par 6/12/2020 cycle 10 nivolumab maintenance, feels completely fine except several bumps in the left lower leg skin, scabbed now. \par \par 7/8/2020 CT c/a/p showed stable lung nodules, continuous decrease in size in the liver lesion and renal lesion. \par \par 7/10/2020 s/p cycle 11 nivolumab maintenance. He feels completely fine except several bumps in the left lower leg skin, scabbed. He will see Dermatologist. \par \par 8/10/2020 s/p cycle 12 nivolumab maintenance. His appetite is ok. His skin bumps are getting better. \par \par 9/9/2020 s/p cycle 13 nivolumab maintenance. He feels overall fine with great appetite. His skin bumps have scabs. \par \par 9/18-22 : Patient admitted for hematuria. Patient followed by urology and hematology during hospital course. Patient was started on CBI to help remove clots. Urine initially bloody, but became pink-tinged and then clear over time. CBI stopped and barber removed. Patient reportedly felt much better after barber removed, able to urinate by himself- reported some clots initially, but since resolved. States bladder spasms improved as well. Patient's eliquis was stopped, MRI done showing no evidence of renal vein thrombus, discussed \par with Heme, no reason to continue eliquis for now. Patient's leukocytosis and JOSE MARIA resolved. Urine culture and blood cultures negative. \par Patient improved clinically throughout hospital course. Patient seen and examined on day of discharge. \par \par 10/2/2020 : Ws seen by Dr. Javier and is being set up for cystoscopy next week. He feels well in general and back to himself. He has lost a 4-5 pounds but trying to gain back weight. He denies any hematuria since the admission.He denies night sweats, fevers, chill, headaches, SOB, CP, NVD. \par \par 10/21/2020 : Mr. Call is here for a follow up. Restaging scans are set up on 10/23/2020 and cystoscopy as well with recent history of gross hematuria. \par His R inguinal hernia has increased and is bothersome. He was working hunched and bent and his hernia became painful. He was able to push it back in but was uncomfortable and he noticed urine flow is different with position of hernia. \par He otherwise denies headaches, SOB, CP, cough, NVD, vision changes, dizziness, rash, fevers, chills, or malaise. \par \par 10/23/2020 : CT torso : IMPRESSION:\par Unchanged subcentimeter left pulmonary nodules. No new pulmonary nodules appreciated.\par No significant interval change in a left hepatic lobe lesion. No new hepatic lesions identified.\par No significant interval change in an ill-defined left upper pole calyceal lesion.\par Large right inguinal hernia which contains nonobstructed small and large bowel.\par  [de-identified] : metastatic renal cell carcinoma [de-identified] : 12/2/2020 : Mr. VERA comes in today for a follow up and Cycle 19 of nivolumab. 10/23/2020 ; Ct showed stable disease as above. R inguinal hernia does become bothersome when walking a long distance or working around the house but can retract. There is no pain. He continues to walk 2 miles everyday with his dog and is pleased wit his scan result. \par Mr. VERA denies fevers, chills, headaches, cough, SOB, chest pain, any swelling, nausea, vomiting, diarrhea, rash, or malaise.

## 2020-12-06 NOTE — PHYSICAL EXAM
[Fully active, able to carry on all pre-disease performance without restriction] : Status 0 - Fully active, able to carry on all pre-disease performance without restriction [Normal] : affect appropriate [de-identified] : R sided hernia, large.

## 2020-12-30 ENCOUNTER — OUTPATIENT (OUTPATIENT)
Dept: OUTPATIENT SERVICES | Facility: HOSPITAL | Age: 70
LOS: 1 days | Discharge: HOME | End: 2020-12-30

## 2020-12-30 DIAGNOSIS — C64.9 MALIGNANT NEOPLASM OF UNSPECIFIED KIDNEY, EXCEPT RENAL PELVIS: ICD-10-CM

## 2020-12-30 DIAGNOSIS — S86.019A STRAIN OF UNSPECIFIED ACHILLES TENDON, INITIAL ENCOUNTER: Chronic | ICD-10-CM

## 2020-12-30 DIAGNOSIS — Z98.890 OTHER SPECIFIED POSTPROCEDURAL STATES: Chronic | ICD-10-CM

## 2021-01-06 ENCOUNTER — LABORATORY RESULT (OUTPATIENT)
Age: 71
End: 2021-01-06

## 2021-01-06 ENCOUNTER — RESULT REVIEW (OUTPATIENT)
Age: 71
End: 2021-01-06

## 2021-01-06 ENCOUNTER — APPOINTMENT (OUTPATIENT)
Dept: HEMATOLOGY ONCOLOGY | Facility: CLINIC | Age: 71
End: 2021-01-06
Payer: MEDICARE

## 2021-01-06 ENCOUNTER — APPOINTMENT (OUTPATIENT)
Dept: INFUSION THERAPY | Facility: HOSPITAL | Age: 71
End: 2021-01-06

## 2021-01-06 VITALS
BODY MASS INDEX: 23.75 KG/M2 | RESPIRATION RATE: 14 BRPM | OXYGEN SATURATION: 99 % | SYSTOLIC BLOOD PRESSURE: 148 MMHG | HEART RATE: 67 BPM | WEIGHT: 163.14 LBS | TEMPERATURE: 98 F | DIASTOLIC BLOOD PRESSURE: 81 MMHG

## 2021-01-06 DIAGNOSIS — Z51.11 ENCOUNTER FOR ANTINEOPLASTIC CHEMOTHERAPY: ICD-10-CM

## 2021-01-06 LAB
BASOPHILS # BLD AUTO: 0.05 K/UL — SIGNIFICANT CHANGE UP (ref 0–0.2)
BASOPHILS NFR BLD AUTO: 0.5 % — SIGNIFICANT CHANGE UP (ref 0–2)
EOSINOPHIL # BLD AUTO: 0.15 K/UL — SIGNIFICANT CHANGE UP (ref 0–0.5)
EOSINOPHIL NFR BLD AUTO: 1.6 % — SIGNIFICANT CHANGE UP (ref 0–6)
HCT VFR BLD CALC: 40.1 % — SIGNIFICANT CHANGE UP (ref 39–50)
HGB BLD-MCNC: 13.2 G/DL — SIGNIFICANT CHANGE UP (ref 13–17)
IMM GRANULOCYTES NFR BLD AUTO: 0.4 % — SIGNIFICANT CHANGE UP (ref 0–1.5)
LYMPHOCYTES # BLD AUTO: 1.53 K/UL — SIGNIFICANT CHANGE UP (ref 1–3.3)
LYMPHOCYTES # BLD AUTO: 16.5 % — SIGNIFICANT CHANGE UP (ref 13–44)
MCHC RBC-ENTMCNC: 27.6 PG — SIGNIFICANT CHANGE UP (ref 27–34)
MCHC RBC-ENTMCNC: 32.9 G/DL — SIGNIFICANT CHANGE UP (ref 32–36)
MCV RBC AUTO: 83.9 FL — SIGNIFICANT CHANGE UP (ref 80–100)
MONOCYTES # BLD AUTO: 0.81 K/UL — SIGNIFICANT CHANGE UP (ref 0–0.9)
MONOCYTES NFR BLD AUTO: 8.7 % — SIGNIFICANT CHANGE UP (ref 2–14)
NEUTROPHILS # BLD AUTO: 6.7 K/UL — SIGNIFICANT CHANGE UP (ref 1.8–7.4)
NEUTROPHILS NFR BLD AUTO: 72.3 % — SIGNIFICANT CHANGE UP (ref 43–77)
NRBC # BLD: 0 /100 WBCS — SIGNIFICANT CHANGE UP (ref 0–0)
PLATELET # BLD AUTO: 210 K/UL — SIGNIFICANT CHANGE UP (ref 150–400)
RBC # BLD: 4.78 M/UL — SIGNIFICANT CHANGE UP (ref 4.2–5.8)
RBC # FLD: 15.5 % — HIGH (ref 10.3–14.5)
WBC # BLD: 9.28 K/UL — SIGNIFICANT CHANGE UP (ref 3.8–10.5)
WBC # FLD AUTO: 9.28 K/UL — SIGNIFICANT CHANGE UP (ref 3.8–10.5)

## 2021-01-06 PROCEDURE — 99072 ADDL SUPL MATRL&STAF TM PHE: CPT

## 2021-01-06 PROCEDURE — 99214 OFFICE O/P EST MOD 30 MIN: CPT

## 2021-01-06 RX ORDER — NIVOLUMAB 10 MG/ML
240 INJECTION INTRAVENOUS
Refills: 0 | Status: ACTIVE | COMMUNITY
Start: 2021-01-06

## 2021-01-07 NOTE — REVIEW OF SYSTEMS
[Skin Rash] : skin rash [Negative] : Allergic/Immunologic [Fever] : no fever [Chills] : no chills [Night Sweats] : no night sweats [Fatigue] : no fatigue [Recent Change In Weight] : ~T no recent weight change [Chest Pain] : no chest pain [Shortness Of Breath] : no shortness of breath [Cough] : no cough [Abdominal Pain] : no abdominal pain [Constipation] : no constipation [Diarrhea] : no diarrhea [Easy Bleeding] : no tendency for easy bleeding [Easy Bruising] : no tendency for easy bruising [de-identified] : scattered skin bumps in the lower leg skin but no change

## 2021-01-07 NOTE — PHYSICAL EXAM
[Fully active, able to carry on all pre-disease performance without restriction] : Status 0 - Fully active, able to carry on all pre-disease performance without restriction [Normal] : affect appropriate [de-identified] : R sided hernia, large.

## 2021-01-07 NOTE — HISTORY OF PRESENT ILLNESS
[T: ___] : T[unfilled] [N: ___] : N[unfilled] [M: ___] : M[unfilled] [AJCC Stage: ____] : AJCC Stage: [unfilled] [de-identified] : Finesse Zuniga is a 70 years old male who initially presented with intermittent midback pain, 10/10 radiating to stomach from both sides on June 1, 2019. He has poor appetite.  On june 12 he was diagnosed UTI and on cipro for 4 days, on June 16, was started cefpodoxime 100mg BID for 7 days.  CT chest/abdomen/pelvis revealed significant abnormal appearance of the left kidney with multiple heterogeneous areas of low attenuation involving the upper and midpole which may represent malignancy. There are necrotic lymph nodes in the left para-aortic location at the level of the kidney and below the level of the kidney. Adenopathy surrounds the left renal artery. The left renal vein demonstrates question of partially occlusive thrombus. Nonspecific scattered tiny pulmonary nodules. Lower lobe subpleural nodules measure up to 6 mm. There is a well-circumscribed hypodense lesion in the anterior aspect of the lateral segment of the left lobe measuring 3.2 x 2.2 cm. He underwent CT guided liver lesion biopsy which showed consistent with primary renal cell carcinoma, IHC CK7/CK19/PAX8 positive. MRI abdomen waw con showed multiple bilateral pulmonary nodules increased in size and number compared with recent CT. The largest is pleural-based in the left lower lobe and measures 1.5 x 1.1 cm. LIVER: Rim-enhancing subcapsular mass in segment 3 and measures 4.4 x 2.6 cm. A 4.2cm mass in mid to lower pole of the lft kidney suspicious for urothelial malignancy arising within the left renal collecting system. Regional lymphadenopathy and hepatic and pulmonary metastases. Nonocclusive thrombus within the left renal vein. He was started on eliquis. Bone scan showed  a focus of mildly increased uptake in the right superior \par pubic ramus suspicious of bone mets.\par He has lost 17lbs in one month. He denies nausea, vomiting and constipation and diarrhea. \par \par 7/19/19 s/p cycle 1 nivo and ipi, he reports that his intermittent midback pain, 10/10 radiating to stomach from both sides on June 1, 2019 has been resolved. His appetite is improving. He denies nausea, vomiting and diarrhea, skin rashes, joint pain. \par \par 7/31: Reports feeling better since last treatment, no back pain or stomach pain. Had gone to the ER on 7/19 for hyponatremia, was MD'ed after received fluid.  Sodium today improved. Reports some leg cramping.  Reports improved appetite, energy level has improved.  Denies coughing and SOB.  Reports has also gained weight.\par \par 8/21/19 : Here for week 7 of cycle 2 ipilimiumab + nivolumab. Has been feeling much improved, gained 4 kg since his last visit. His energy is good and is able to perform ADLs and do chores at home now. No ha, dizziness, sob, cough, chest pain, nausea, vomiting, diarrhea. There is no change in past medical hx, family hx, and social history since our last visit.\par \par 9/11/2019: s/p cycle 3, he feels completely fine, his energy level is back to normal. He has done normal ADLs. He denies skin rashes, joint pain, diarrhea. \par \par 10/16/2019 s/p cycle 4, he feels completely fine, his energy level is back to normal. He has done normal ADLs. He denies skin rashes, joint pain, diarrhea. \par \par 11/13/2019 now on Nivolumab maintenance treatment. He feels fine, has normal ADLs. He denies skin rashes, joint pain, diarrhea. \par \par 11/14/19 : Restaging scan: liver and RP nodes again with great response. There is a left lung lesion which is 7 mm, increased from 5 mm. \par \par 12/11/19 : met RCC on single agent nivolumab after completing combination treatment. He is tolerating therapy well without any issues thus far. He denies ha, sob,cp,nvd, rash, vision changes, night sweats. His energy is good and maintains normal lifestyle. \par \par 1/15/2020 : Cycle 5 nivolumab 480 mg monthly. Pt here for f/u. Overall feels well. Still gaining more weight, appetite is good. Denies any loss of energy, n/v/diarrhea, fevers, pain, or new skin rashes. \par \par 2/19/2020: Cycle 6 Nivolumab. Renal function Cr trending up and now 1.43. His Cr has been normal since starting therapy however started at 1.4. \par He received 5 doses of nivolumab and last scan was 11/2019. \par He feels well overall without significant change. Weigh stable and good appetite. \par \par 4/22/2020: cycle 8 nivolumab, he feels overall fine. weight is stable and no pain. \par \par 6/12/2020 cycle 10 nivolumab maintenance, feels completely fine except several bumps in the left lower leg skin, scabbed now. \par \par 7/8/2020 CT c/a/p showed stable lung nodules, continuous decrease in size in the liver lesion and renal lesion. \par \par 7/10/2020 s/p cycle 11 nivolumab maintenance. He feels completely fine except several bumps in the left lower leg skin, scabbed. He will see Dermatologist. \par \par 8/10/2020 s/p cycle 12 nivolumab maintenance. His appetite is ok. His skin bumps are getting better. \par \par 9/9/2020 s/p cycle 13 nivolumab maintenance. He feels overall fine with great appetite. His skin bumps have scabs. \par \par 9/18-22 : Patient admitted for hematuria. Patient followed by urology and hematology during hospital course. Patient was started on CBI to help remove clots. Urine initially bloody, but became pink-tinged and then clear over time. CBI stopped and barber removed. Patient reportedly felt much better after barber removed, able to urinate by himself- reported some clots initially, but since resolved. States bladder spasms improved as well. Patient's eliquis was stopped, MRI done showing no evidence of renal vein thrombus, discussed \par with Heme, no reason to continue eliquis for now. Patient's leukocytosis and JOSE MARIA resolved. Urine culture and blood cultures negative. \par Patient improved clinically throughout hospital course. Patient seen and examined on day of discharge. \par \par 10/2/2020 : Ws seen by Dr. Javier and is being set up for cystoscopy next week. He feels well in general and back to himself. He has lost a 4-5 pounds but trying to gain back weight. He denies any hematuria since the admission.He denies night sweats, fevers, chill, headaches, SOB, CP, NVD. \par \par 10/21/2020 : Mr. Vera is here for a follow up. Restaging scans are set up on 10/23/2020 and cystoscopy as well with recent history of gross hematuria. \par His R inguinal hernia has increased and is bothersome. He was working hunched and bent and his hernia became painful. He was able to push it back in but was uncomfortable and he noticed urine flow is different with position of hernia. \par He otherwise denies headaches, SOB, CP, cough, NVD, vision changes, dizziness, rash, fevers, chills, or malaise. \par \par 10/23/2020 : CT torso : IMPRESSION:\par Unchanged subcentimeter left pulmonary nodules. No new pulmonary nodules appreciated.\par No significant interval change in a left hepatic lobe lesion. No new hepatic lesions identified.\par No significant interval change in an ill-defined left upper pole calyceal lesion.\par Large right inguinal hernia which contains nonobstructed small and large bowel.\par \par 12/2/2020 : Mr. VERA comes in today for a follow up and Cycle 19 of nivolumab. 10/23/2020 ; Ct showed stable disease as above. R inguinal hernia does become bothersome when walking a long distance or working around the house but can retract. There is no pain. He continues to walk 2 miles everyday with his dog and is pleased wit his scan result. \par Mr. VERA denies fevers, chills, headaches, cough, SOB, chest pain, any swelling, nausea, vomiting, diarrhea, rash, or malaise. \par  [de-identified] : metastatic renal cell carcinoma [de-identified] : 1/6/2021 : Mr. VERA comes in today for nivolumab #20. He continues to be active and walks 2-3 miles a day with his dog. Mr. VERA denies fevers, chills, headaches, cough, SOB, chest pain, any swelling, nausea, vomiting, diarrhea, rash, or malaise. We will obtain COVID-19 test today.

## 2021-01-07 NOTE — ASSESSMENT
[Palliative] : Goals of care discussed with patient: Palliative [Palliative Care Plan] : not applicable at this time [FreeTextEntry1] : Finesse Gallagher is a 70 years old male who presented with intermittent midback pain, 10/10 radiating to stomach from both sides. CT c/a/p and MRI abdomen showed multiple lung nodules, one liver lesion, retroperitoneal nodes. Bone scan showed one suspicious met in right superior pubic ramus. Liver lesion biopsy is consistent with renal cell carcinoma.\par It is not certain that the histology of RCC is clear cell vs non-clear cell. IMDC risk of this patient he has 5 out of 6 risks including leukocytosis, anemia, thrombocytosis, time from dx to initiate systemic treatment less than one year and high calcemia. He has poor risk of mRCC. Based on [facility] the standard of care is combined immunotherapy nivo and ipi vs sutent, checkmate 214 in the intermediate and poor risk of mccRCC patients with significant overall survival benefit, keynote 426 keytruda plus axitinib vs sutent in good, intermediate and poor risk of [facility]RCC patients showed OS, PFS and MICHEL benefit, [place] in the intermediate and poor risk of mccRCC patients showed cabozantinib demonstrated a significant clinical benefit in PFS and MICHEL over standard-of-care sunitinib as first-line therapy in patients with intermediate- or poor-risk mRCC. Consider the longer duration of response and less side effects and recommended nivo and ipi. He agreed to sign the consent. S/P cycle 4 nivo and ipi. His back pain was resolved and maintained on single agnet Nivolumab. \par He had an episode of gross hematuria leading to hospitalization in 9/2020 however etiology of hematuria is unclear and resolved. Follow up cystoscopy to r/o abnormality in the bladder showed no abnormality.  Eliquis stopped during hospitalization with hematology consult. \par His most recent 10/23/2020 restaging scan showed minimal disease and unchanged. \par \par \par Plan \par RCC: \par Continue maintenance #20 today  nivolumab 480 mg IV monthly \par - Next CT will be after cycle 21 (the end of January). \par - Labs today\par -COVID-19 done today in the office. \par \par R Inguinal Hernia : \par - quite large and bothersome. \par - may consider surgical intervention if desired\par \par Gross Hematuria:\par -Normal cystoscopy and resolved.\par \par RTC before his next treatment. \par \par \par Reid Gonzales, ANP-BC

## 2021-01-09 LAB — SARS-COV-2 N GENE NPH QL NAA+PROBE: NOT DETECTED

## 2021-01-29 ENCOUNTER — OUTPATIENT (OUTPATIENT)
Dept: OUTPATIENT SERVICES | Facility: HOSPITAL | Age: 71
LOS: 1 days | Discharge: HOME | End: 2021-01-29

## 2021-01-29 DIAGNOSIS — S86.019A STRAIN OF UNSPECIFIED ACHILLES TENDON, INITIAL ENCOUNTER: Chronic | ICD-10-CM

## 2021-01-29 DIAGNOSIS — C64.9 MALIGNANT NEOPLASM OF UNSPECIFIED KIDNEY, EXCEPT RENAL PELVIS: ICD-10-CM

## 2021-01-29 DIAGNOSIS — Z98.890 OTHER SPECIFIED POSTPROCEDURAL STATES: Chronic | ICD-10-CM

## 2021-02-03 ENCOUNTER — LABORATORY RESULT (OUTPATIENT)
Age: 71
End: 2021-02-03

## 2021-02-03 ENCOUNTER — RESULT REVIEW (OUTPATIENT)
Age: 71
End: 2021-02-03

## 2021-02-03 ENCOUNTER — APPOINTMENT (OUTPATIENT)
Dept: HEMATOLOGY ONCOLOGY | Facility: CLINIC | Age: 71
End: 2021-02-03

## 2021-02-03 ENCOUNTER — APPOINTMENT (OUTPATIENT)
Dept: INFUSION THERAPY | Facility: HOSPITAL | Age: 71
End: 2021-02-03

## 2021-02-03 ENCOUNTER — APPOINTMENT (OUTPATIENT)
Dept: HEMATOLOGY ONCOLOGY | Facility: CLINIC | Age: 71
End: 2021-02-03
Payer: MEDICARE

## 2021-02-03 DIAGNOSIS — Z51.11 ENCOUNTER FOR ANTINEOPLASTIC CHEMOTHERAPY: ICD-10-CM

## 2021-02-03 LAB
BASOPHILS # BLD AUTO: 0.04 K/UL — SIGNIFICANT CHANGE UP (ref 0–0.2)
BASOPHILS NFR BLD AUTO: 0.5 % — SIGNIFICANT CHANGE UP (ref 0–2)
EOSINOPHIL # BLD AUTO: 0.11 K/UL — SIGNIFICANT CHANGE UP (ref 0–0.5)
EOSINOPHIL NFR BLD AUTO: 1.3 % — SIGNIFICANT CHANGE UP (ref 0–6)
HCT VFR BLD CALC: 37 % — LOW (ref 39–50)
HGB BLD-MCNC: 12.8 G/DL — LOW (ref 13–17)
IMM GRANULOCYTES NFR BLD AUTO: 0.4 % — SIGNIFICANT CHANGE UP (ref 0–1.5)
LYMPHOCYTES # BLD AUTO: 1.74 K/UL — SIGNIFICANT CHANGE UP (ref 1–3.3)
LYMPHOCYTES # BLD AUTO: 20.7 % — SIGNIFICANT CHANGE UP (ref 13–44)
MCHC RBC-ENTMCNC: 29.3 PG — SIGNIFICANT CHANGE UP (ref 27–34)
MCHC RBC-ENTMCNC: 34.6 G/DL — SIGNIFICANT CHANGE UP (ref 32–36)
MCV RBC AUTO: 84.7 FL — SIGNIFICANT CHANGE UP (ref 80–100)
MONOCYTES # BLD AUTO: 0.65 K/UL — SIGNIFICANT CHANGE UP (ref 0–0.9)
MONOCYTES NFR BLD AUTO: 7.7 % — SIGNIFICANT CHANGE UP (ref 2–14)
NEUTROPHILS # BLD AUTO: 5.83 K/UL — SIGNIFICANT CHANGE UP (ref 1.8–7.4)
NEUTROPHILS NFR BLD AUTO: 69.4 % — SIGNIFICANT CHANGE UP (ref 43–77)
NRBC # BLD: 0 /100 WBCS — SIGNIFICANT CHANGE UP (ref 0–0)
PLATELET # BLD AUTO: 207 K/UL — SIGNIFICANT CHANGE UP (ref 150–400)
RBC # BLD: 4.37 M/UL — SIGNIFICANT CHANGE UP (ref 4.2–5.8)
RBC # FLD: 17.2 % — HIGH (ref 10.3–14.5)
WBC # BLD: 8.4 K/UL — SIGNIFICANT CHANGE UP (ref 3.8–10.5)
WBC # FLD AUTO: 8.4 K/UL — SIGNIFICANT CHANGE UP (ref 3.8–10.5)

## 2021-02-03 PROCEDURE — 99212 OFFICE O/P EST SF 10 MIN: CPT

## 2021-02-03 PROCEDURE — 99072 ADDL SUPL MATRL&STAF TM PHE: CPT

## 2021-02-05 NOTE — REVIEW OF SYSTEMS
[Skin Rash] : skin rash [Negative] : Allergic/Immunologic [Fever] : no fever [Chills] : no chills [Night Sweats] : no night sweats [Fatigue] : no fatigue [Recent Change In Weight] : ~T no recent weight change [Chest Pain] : no chest pain [Shortness Of Breath] : no shortness of breath [Cough] : no cough [Abdominal Pain] : no abdominal pain [Constipation] : no constipation [Diarrhea] : no diarrhea [Easy Bleeding] : no tendency for easy bleeding [Easy Bruising] : no tendency for easy bruising [de-identified] : scattered skin bumps in the lower leg skin but no change

## 2021-02-05 NOTE — HISTORY OF PRESENT ILLNESS
[T: ___] : T[unfilled] [N: ___] : N[unfilled] [M: ___] : M[unfilled] [AJCC Stage: ____] : AJCC Stage: [unfilled] [de-identified] : Finesse Zuniga is a 70 years old male who initially presented with intermittent midback pain, 10/10 radiating to stomach from both sides on June 1, 2019. He has poor appetite.  On june 12 he was diagnosed UTI and on cipro for 4 days, on June 16, was started cefpodoxime 100mg BID for 7 days.  CT chest/abdomen/pelvis revealed significant abnormal appearance of the left kidney with multiple heterogeneous areas of low attenuation involving the upper and midpole which may represent malignancy. There are necrotic lymph nodes in the left para-aortic location at the level of the kidney and below the level of the kidney. Adenopathy surrounds the left renal artery. The left renal vein demonstrates question of partially occlusive thrombus. Nonspecific scattered tiny pulmonary nodules. Lower lobe subpleural nodules measure up to 6 mm. There is a well-circumscribed hypodense lesion in the anterior aspect of the lateral segment of the left lobe measuring 3.2 x 2.2 cm. He underwent CT guided liver lesion biopsy which showed consistent with primary renal cell carcinoma, IHC CK7/CK19/PAX8 positive. MRI abdomen waw con showed multiple bilateral pulmonary nodules increased in size and number compared with recent CT. The largest is pleural-based in the left lower lobe and measures 1.5 x 1.1 cm. LIVER: Rim-enhancing subcapsular mass in segment 3 and measures 4.4 x 2.6 cm. A 4.2cm mass in mid to lower pole of the lft kidney suspicious for urothelial malignancy arising within the left renal collecting system. Regional lymphadenopathy and hepatic and pulmonary metastases. Nonocclusive thrombus within the left renal vein. He was started on eliquis. Bone scan showed  a focus of mildly increased uptake in the right superior \par pubic ramus suspicious of bone mets.\par He has lost 17lbs in one month. He denies nausea, vomiting and constipation and diarrhea. \par \par 7/19/19 s/p cycle 1 nivo and ipi, he reports that his intermittent midback pain, 10/10 radiating to stomach from both sides on June 1, 2019 has been resolved. His appetite is improving. He denies nausea, vomiting and diarrhea, skin rashes, joint pain. \par \par 7/31: Reports feeling better since last treatment, no back pain or stomach pain. Had gone to the ER on 7/19 for hyponatremia, was [place]'ed after received fluid.  Sodium today improved. Reports some leg cramping.  Reports improved appetite, energy level has improved.  Denies coughing and SOB.  Reports has also gained weight.\par \par 8/21/19 : Here for week 7 of cycle 2 ipilimiumab + nivolumab. Has been feeling much improved, gained 4 kg since his last visit. His energy is good and is able to perform ADLs and do chores at home now. No ha, dizziness, sob, cough, chest pain, nausea, vomiting, diarrhea. There is no change in past medical hx, family hx, and social history since our last visit.\par \par 9/11/2019: s/p cycle 3, he feels completely fine, his energy level is back to normal. He has done normal ADLs. He denies skin rashes, joint pain, diarrhea. \par \par 10/16/2019 s/p cycle 4, he feels completely fine, his energy level is back to normal. He has done normal ADLs. He denies skin rashes, joint pain, diarrhea. \par \par 11/13/2019 now on Nivolumab maintenance treatment. He feels fine, has normal ADLs. He denies skin rashes, joint pain, diarrhea. \par \par 11/14/19 : Restaging scan: liver and RP nodes again with great response. There is a left lung lesion which is 7 mm, increased from 5 mm. \par \par 12/11/19 : met RCC on single agent nivolumab after completing combination treatment. He is tolerating therapy well without any issues thus far. He denies ha, sob,cp,nvd, rash, vision changes, night sweats. His energy is good and maintains normal lifestyle. \par \par 1/15/2020 : Cycle 5 nivolumab 480 mg monthly. Pt here for f/u. Overall feels well. Still gaining more weight, appetite is good. Denies any loss of energy, n/v/diarrhea, fevers, pain, or new skin rashes. \par \par 2/19/2020: Cycle 6 Nivolumab. Renal function Cr trending up and now 1.43. His Cr has been normal since starting therapy however started at 1.4. \par He received 5 doses of nivolumab and last scan was 11/2019. \par He feels well overall without significant change. Weigh stable and good appetite. \par \par 4/22/2020: cycle 8 nivolumab, he feels overall fine. weight is stable and no pain. \par \par 6/12/2020 cycle 10 nivolumab maintenance, feels completely fine except several bumps in the left lower leg skin, scabbed now. \par \par 7/8/2020 CT c/a/p showed stable lung nodules, continuous decrease in size in the liver lesion and renal lesion. \par \par 7/10/2020 s/p cycle 11 nivolumab maintenance. He feels completely fine except several bumps in the left lower leg skin, scabbed. He will see Dermatologist. \par \par 8/10/2020 s/p cycle 12 nivolumab maintenance. His appetite is ok. His skin bumps are getting better. \par \par 9/9/2020 s/p cycle 13 nivolumab maintenance. He feels overall fine with great appetite. His skin bumps have scabs. \par \par 9/18-22 : Patient admitted for hematuria. Patient followed by urology and hematology during hospital course. Patient was started on CBI to help remove clots. Urine initially bloody, but became pink-tinged and then clear over time. CBI stopped and barber removed. Patient reportedly felt much better after barber removed, able to urinate by himself- reported some clots initially, but since resolved. States bladder spasms improved as well. Patient's eliquis was stopped, MRI done showing no evidence of renal vein thrombus, discussed \par with Heme, no reason to continue eliquis for now. Patient's leukocytosis and JOSE MARIA resolved. Urine culture and blood cultures negative. \par Patient improved clinically throughout hospital course. Patient seen and examined on day of discharge. \par \par 10/2/2020 : Ws seen by Dr. Javier and is being set up for cystoscopy next week. He feels well in general and back to himself. He has lost a 4-5 pounds but trying to gain back weight. He denies any hematuria since the admission.He denies night sweats, fevers, chill, headaches, SOB, CP, NVD. \par \par 10/21/2020 : Mr. Vera is here for a follow up. Restaging scans are set up on 10/23/2020 and cystoscopy as well with recent history of gross hematuria. \par His R inguinal hernia has increased and is bothersome. He was working hunched and bent and his hernia became painful. He was able to push it back in but was uncomfortable and he noticed urine flow is different with position of hernia. \par He otherwise denies headaches, SOB, CP, cough, NVD, vision changes, dizziness, rash, fevers, chills, or malaise. \par \par 10/23/2020 : CT torso : IMPRESSION:\par Unchanged subcentimeter left pulmonary nodules. No new pulmonary nodules appreciated.\par No significant interval change in a left hepatic lobe lesion. No new hepatic lesions identified.\par No significant interval change in an ill-defined left upper pole calyceal lesion.\par Large right inguinal hernia which contains nonobstructed small and large bowel.\par \par 12/2/2020 : Mr. VERA comes in today for a follow up and Cycle 19 of nivolumab. 10/23/2020 ; Ct showed stable disease as above. R inguinal hernia does become bothersome when walking a long distance or working around the house but can retract. There is no pain. He continues to walk 2 miles everyday with his dog and is pleased wit his scan result. \par Mr. VERA denies fevers, chills, headaches, cough, SOB, chest pain, any swelling, nausea, vomiting, diarrhea, rash, or malaise. \par  [de-identified] : metastatic renal cell carcinoma [de-identified] : 1/6/2021 : Mr. VERA comes in today for nivolumab #20. He continues to be active and walks 2-3 miles a day with his dog. Mr. VERA denies fevers, chills, headaches, cough, SOB, chest pain, any swelling, nausea, vomiting, diarrhea, rash, or malaise. We will obtain COVID-19 test today. \par \par 2/3/2021 : Mr. VERA is here for a follow up and nivolumab #21. No chagne in his health, medication sand AEs. Mr. VERA denies fevers, chills, headaches, cough, SOB, chest pain, any swelling, nausea, vomiting, diarrhea, rash, or malaise.

## 2021-02-05 NOTE — ASSESSMENT
[Palliative] : Goals of care discussed with patient: Palliative [Palliative Care Plan] : not applicable at this time [FreeTextEntry1] : Finesse Gallagher is a 70 years old male who presented with intermittent midback pain, 10/10 radiating to stomach from both sides. CT c/a/p and MRI abdomen showed multiple lung nodules, one liver lesion, retroperitoneal nodes. Bone scan showed one suspicious met in right superior pubic ramus. Liver lesion biopsy is consistent with renal cell carcinoma.\par It is not certain that the histology of RCC is clear cell vs non-clear cell. IMDC risk of this patient he has 5 out of 6 risks including leukocytosis, anemia, thrombocytosis, time from dx to initiate systemic treatment less than one year and high calcemia. He has poor risk of mRCC. Based on [facility] the standard of care is combined immunotherapy nivo and ipi vs sutent, checkmate 214 in the intermediate and poor risk of [facility]RCC patients with significant overall survival benefit, keynote 426 keytruda plus axitinib vs sutent in good, intermediate and poor risk of [facility]RCC patients showed OS, PFS and MICHEL benefit, [place] in the intermediate and poor risk of [facility]RCC patients showed cabozantinib demonstrated a significant clinical benefit in PFS and MICHEL over standard-of-care sunitinib as first-line therapy in patients with intermediate- or poor-risk mRCC. Consider the longer duration of response and less side effects and recommended nivo and ipi. He agreed to sign the consent. S/P cycle 4 nivo and ipi. His back pain was resolved and maintained on single agnet Nivolumab. \par He had an episode of gross hematuria leading to hospitalization in 9/2020 however etiology of hematuria is unclear and resolved. Follow up cystoscopy to r/o abnormality in the bladder showed no abnormality.  Eliquis stopped during hospitalization with hematology consult. \par His most recent 10/23/2020 restaging scan showed minimal disease and unchanged. \par \par \par Plan \par RCC: \par Continue maintenance #20 today  nivolumab 480 mg IV monthly \par - Next CT will be after cycle 21 \par - Labs today\par -COVID-19 done today in the office. \par \par R Inguinal Hernia : \par - quite large and bothersome. \par - may consider surgical intervention if desired\par \par Gross Hematuria:\par -Normal cystoscopy and resolved.\par \par RTC before his next treatment. \par \par \par Zabrina-young Christian, ANP-BC

## 2021-02-05 NOTE — PHYSICAL EXAM
[Fully active, able to carry on all pre-disease performance without restriction] : Status 0 - Fully active, able to carry on all pre-disease performance without restriction [Normal] : affect appropriate [de-identified] : R sided hernia, large.

## 2021-02-26 ENCOUNTER — OUTPATIENT (OUTPATIENT)
Dept: OUTPATIENT SERVICES | Facility: HOSPITAL | Age: 71
LOS: 1 days | Discharge: HOME | End: 2021-02-26

## 2021-02-26 DIAGNOSIS — C64.9 MALIGNANT NEOPLASM OF UNSPECIFIED KIDNEY, EXCEPT RENAL PELVIS: ICD-10-CM

## 2021-02-26 DIAGNOSIS — Z98.890 OTHER SPECIFIED POSTPROCEDURAL STATES: Chronic | ICD-10-CM

## 2021-02-26 DIAGNOSIS — S86.019A STRAIN OF UNSPECIFIED ACHILLES TENDON, INITIAL ENCOUNTER: Chronic | ICD-10-CM

## 2021-02-28 ENCOUNTER — LABORATORY RESULT (OUTPATIENT)
Age: 71
End: 2021-02-28

## 2021-03-01 ENCOUNTER — APPOINTMENT (OUTPATIENT)
Dept: HEMATOLOGY ONCOLOGY | Facility: CLINIC | Age: 71
End: 2021-03-01

## 2021-03-03 ENCOUNTER — LABORATORY RESULT (OUTPATIENT)
Age: 71
End: 2021-03-03

## 2021-03-03 ENCOUNTER — APPOINTMENT (OUTPATIENT)
Dept: HEMATOLOGY ONCOLOGY | Facility: CLINIC | Age: 71
End: 2021-03-03
Payer: MEDICARE

## 2021-03-03 ENCOUNTER — RESULT REVIEW (OUTPATIENT)
Age: 71
End: 2021-03-03

## 2021-03-03 ENCOUNTER — APPOINTMENT (OUTPATIENT)
Dept: INFUSION THERAPY | Facility: HOSPITAL | Age: 71
End: 2021-03-03

## 2021-03-03 VITALS
HEIGHT: 69.69 IN | SYSTOLIC BLOOD PRESSURE: 141 MMHG | DIASTOLIC BLOOD PRESSURE: 84 MMHG | BODY MASS INDEX: 24.16 KG/M2 | RESPIRATION RATE: 15 BRPM | WEIGHT: 166.89 LBS | OXYGEN SATURATION: 100 % | TEMPERATURE: 97.5 F | HEART RATE: 60 BPM

## 2021-03-03 LAB
BASOPHILS # BLD AUTO: 0.05 K/UL — SIGNIFICANT CHANGE UP (ref 0–0.2)
BASOPHILS NFR BLD AUTO: 0.6 % — SIGNIFICANT CHANGE UP (ref 0–2)
EOSINOPHIL # BLD AUTO: 0.17 K/UL — SIGNIFICANT CHANGE UP (ref 0–0.5)
EOSINOPHIL NFR BLD AUTO: 2 % — SIGNIFICANT CHANGE UP (ref 0–6)
HCT VFR BLD CALC: 43 % — SIGNIFICANT CHANGE UP (ref 39–50)
HGB BLD-MCNC: 14.8 G/DL — SIGNIFICANT CHANGE UP (ref 13–17)
IMM GRANULOCYTES NFR BLD AUTO: 0.5 % — SIGNIFICANT CHANGE UP (ref 0–1.5)
LYMPHOCYTES # BLD AUTO: 1.89 K/UL — SIGNIFICANT CHANGE UP (ref 1–3.3)
LYMPHOCYTES # BLD AUTO: 21.7 % — SIGNIFICANT CHANGE UP (ref 13–44)
MCHC RBC-ENTMCNC: 30.2 PG — SIGNIFICANT CHANGE UP (ref 27–34)
MCHC RBC-ENTMCNC: 34.4 G/DL — SIGNIFICANT CHANGE UP (ref 32–36)
MCV RBC AUTO: 87.8 FL — SIGNIFICANT CHANGE UP (ref 80–100)
MONOCYTES # BLD AUTO: 0.7 K/UL — SIGNIFICANT CHANGE UP (ref 0–0.9)
MONOCYTES NFR BLD AUTO: 8 % — SIGNIFICANT CHANGE UP (ref 2–14)
NEUTROPHILS # BLD AUTO: 5.85 K/UL — SIGNIFICANT CHANGE UP (ref 1.8–7.4)
NEUTROPHILS NFR BLD AUTO: 67.2 % — SIGNIFICANT CHANGE UP (ref 43–77)
NRBC # BLD: 0 /100 WBCS — SIGNIFICANT CHANGE UP (ref 0–0)
PLATELET # BLD AUTO: 202 K/UL — SIGNIFICANT CHANGE UP (ref 150–400)
RBC # BLD: 4.9 M/UL — SIGNIFICANT CHANGE UP (ref 4.2–5.8)
RBC # FLD: 15.5 % — HIGH (ref 10.3–14.5)
WBC # BLD: 8.7 K/UL — SIGNIFICANT CHANGE UP (ref 3.8–10.5)
WBC # FLD AUTO: 8.7 K/UL — SIGNIFICANT CHANGE UP (ref 3.8–10.5)

## 2021-03-03 PROCEDURE — 99212 OFFICE O/P EST SF 10 MIN: CPT

## 2021-03-03 PROCEDURE — 99072 ADDL SUPL MATRL&STAF TM PHE: CPT

## 2021-03-04 DIAGNOSIS — Z51.11 ENCOUNTER FOR ANTINEOPLASTIC CHEMOTHERAPY: ICD-10-CM

## 2021-03-26 ENCOUNTER — OUTPATIENT (OUTPATIENT)
Dept: OUTPATIENT SERVICES | Facility: HOSPITAL | Age: 71
LOS: 1 days | Discharge: HOME | End: 2021-03-26

## 2021-03-26 ENCOUNTER — OUTPATIENT (OUTPATIENT)
Dept: OUTPATIENT SERVICES | Facility: HOSPITAL | Age: 71
LOS: 1 days | End: 2021-03-26
Payer: MEDICARE

## 2021-03-26 ENCOUNTER — APPOINTMENT (OUTPATIENT)
Dept: CT IMAGING | Facility: IMAGING CENTER | Age: 71
End: 2021-03-26
Payer: MEDICARE

## 2021-03-26 DIAGNOSIS — S86.019A STRAIN OF UNSPECIFIED ACHILLES TENDON, INITIAL ENCOUNTER: Chronic | ICD-10-CM

## 2021-03-26 DIAGNOSIS — Z00.8 ENCOUNTER FOR OTHER GENERAL EXAMINATION: ICD-10-CM

## 2021-03-26 DIAGNOSIS — Z98.890 OTHER SPECIFIED POSTPROCEDURAL STATES: Chronic | ICD-10-CM

## 2021-03-26 DIAGNOSIS — C64.9 MALIGNANT NEOPLASM OF UNSPECIFIED KIDNEY, EXCEPT RENAL PELVIS: ICD-10-CM

## 2021-03-26 PROCEDURE — 71260 CT THORAX DX C+: CPT

## 2021-03-26 PROCEDURE — 74177 CT ABD & PELVIS W/CONTRAST: CPT | Mod: 26

## 2021-03-26 PROCEDURE — 74177 CT ABD & PELVIS W/CONTRAST: CPT

## 2021-03-26 PROCEDURE — 71260 CT THORAX DX C+: CPT | Mod: 26

## 2021-03-31 ENCOUNTER — APPOINTMENT (OUTPATIENT)
Dept: HEMATOLOGY ONCOLOGY | Facility: CLINIC | Age: 71
End: 2021-03-31
Payer: MEDICARE

## 2021-03-31 ENCOUNTER — APPOINTMENT (OUTPATIENT)
Dept: INFUSION THERAPY | Facility: HOSPITAL | Age: 71
End: 2021-03-31

## 2021-03-31 ENCOUNTER — LABORATORY RESULT (OUTPATIENT)
Age: 71
End: 2021-03-31

## 2021-03-31 ENCOUNTER — RESULT REVIEW (OUTPATIENT)
Age: 71
End: 2021-03-31

## 2021-03-31 VITALS
HEART RATE: 56 BPM | RESPIRATION RATE: 16 BRPM | SYSTOLIC BLOOD PRESSURE: 146 MMHG | WEIGHT: 169.51 LBS | BODY MASS INDEX: 24.55 KG/M2 | OXYGEN SATURATION: 99 % | DIASTOLIC BLOOD PRESSURE: 84 MMHG | TEMPERATURE: 97.4 F

## 2021-03-31 LAB
BASOPHILS # BLD AUTO: 0.03 K/UL — SIGNIFICANT CHANGE UP (ref 0–0.2)
BASOPHILS NFR BLD AUTO: 0.3 % — SIGNIFICANT CHANGE UP (ref 0–2)
EOSINOPHIL # BLD AUTO: 0.23 K/UL — SIGNIFICANT CHANGE UP (ref 0–0.5)
EOSINOPHIL NFR BLD AUTO: 2.7 % — SIGNIFICANT CHANGE UP (ref 0–6)
HCT VFR BLD CALC: 39.3 % — SIGNIFICANT CHANGE UP (ref 39–50)
HGB BLD-MCNC: 13.7 G/DL — SIGNIFICANT CHANGE UP (ref 13–17)
IMM GRANULOCYTES NFR BLD AUTO: 0.3 % — SIGNIFICANT CHANGE UP (ref 0–1.5)
LYMPHOCYTES # BLD AUTO: 1.7 K/UL — SIGNIFICANT CHANGE UP (ref 1–3.3)
LYMPHOCYTES # BLD AUTO: 19.8 % — SIGNIFICANT CHANGE UP (ref 13–44)
MCHC RBC-ENTMCNC: 30.5 PG — SIGNIFICANT CHANGE UP (ref 27–34)
MCHC RBC-ENTMCNC: 34.9 G/DL — SIGNIFICANT CHANGE UP (ref 32–36)
MCV RBC AUTO: 87.5 FL — SIGNIFICANT CHANGE UP (ref 80–100)
MONOCYTES # BLD AUTO: 0.69 K/UL — SIGNIFICANT CHANGE UP (ref 0–0.9)
MONOCYTES NFR BLD AUTO: 8 % — SIGNIFICANT CHANGE UP (ref 2–14)
NEUTROPHILS # BLD AUTO: 5.9 K/UL — SIGNIFICANT CHANGE UP (ref 1.8–7.4)
NEUTROPHILS NFR BLD AUTO: 68.9 % — SIGNIFICANT CHANGE UP (ref 43–77)
NRBC # BLD: 0 /100 WBCS — SIGNIFICANT CHANGE UP (ref 0–0)
PLATELET # BLD AUTO: 204 K/UL — SIGNIFICANT CHANGE UP (ref 150–400)
RBC # BLD: 4.49 M/UL — SIGNIFICANT CHANGE UP (ref 4.2–5.8)
RBC # FLD: 14.3 % — SIGNIFICANT CHANGE UP (ref 10.3–14.5)
WBC # BLD: 8.58 K/UL — SIGNIFICANT CHANGE UP (ref 3.8–10.5)
WBC # FLD AUTO: 8.58 K/UL — SIGNIFICANT CHANGE UP (ref 3.8–10.5)

## 2021-03-31 PROCEDURE — 99212 OFFICE O/P EST SF 10 MIN: CPT

## 2021-03-31 PROCEDURE — 99072 ADDL SUPL MATRL&STAF TM PHE: CPT

## 2021-03-31 NOTE — REVIEW OF SYSTEMS
[Skin Rash] : skin rash [Negative] : Allergic/Immunologic [Fever] : no fever [Chills] : no chills [Night Sweats] : no night sweats [Fatigue] : no fatigue [Recent Change In Weight] : ~T no recent weight change [Chest Pain] : no chest pain [Shortness Of Breath] : no shortness of breath [Cough] : no cough [Abdominal Pain] : no abdominal pain [Constipation] : no constipation [Diarrhea] : no diarrhea [Easy Bleeding] : no tendency for easy bleeding [Easy Bruising] : no tendency for easy bruising [de-identified] : scattered skin bumps in the lower leg skin but no change

## 2021-03-31 NOTE — HISTORY OF PRESENT ILLNESS
[T: ___] : T[unfilled] [N: ___] : N[unfilled] [M: ___] : M[unfilled] [AJCC Stage: ____] : AJCC Stage: [unfilled] [de-identified] : Finesse Zuniga is a 70 years old male who initially presented with intermittent midback pain, 10/10 radiating to stomach from both sides on June 1, 2019. He has poor appetite.  On june 12 he was diagnosed UTI and on cipro for 4 days, on June 16, was started cefpodoxime 100mg BID for 7 days.  CT chest/abdomen/pelvis revealed significant abnormal appearance of the left kidney with multiple heterogeneous areas of low attenuation involving the upper and midpole which may represent malignancy. There are necrotic lymph nodes in the left para-aortic location at the level of the kidney and below the level of the kidney. Adenopathy surrounds the left renal artery. The left renal vein demonstrates question of partially occlusive thrombus. Nonspecific scattered tiny pulmonary nodules. Lower lobe subpleural nodules measure up to 6 mm. There is a well-circumscribed hypodense lesion in the anterior aspect of the lateral segment of the left lobe measuring 3.2 x 2.2 cm. He underwent CT guided liver lesion biopsy which showed consistent with primary renal cell carcinoma, IHC CK7/CK19/PAX8 positive. MRI abdomen waw con showed multiple bilateral pulmonary nodules increased in size and number compared with recent CT. The largest is pleural-based in the left lower lobe and measures 1.5 x 1.1 cm. LIVER: Rim-enhancing subcapsular mass in segment 3 and measures 4.4 x 2.6 cm. A 4.2cm mass in mid to lower pole of the lft kidney suspicious for urothelial malignancy arising within the left renal collecting system. Regional lymphadenopathy and hepatic and pulmonary metastases. Nonocclusive thrombus within the left renal vein. He was started on eliquis. Bone scan showed  a focus of mildly increased uptake in the right superior \par pubic ramus suspicious of bone mets.\par He has lost 17lbs in one month. He denies nausea, vomiting and constipation and diarrhea. \par \par 7/19/19 s/p cycle 1 nivo and ipi, he reports that his intermittent midback pain, 10/10 radiating to stomach from both sides on June 1, 2019 has been resolved. His appetite is improving. He denies nausea, vomiting and diarrhea, skin rashes, joint pain. \par \par 7/31: Reports feeling better since last treatment, no back pain or stomach pain. Had gone to the ER on 7/19 for hyponatremia, was AK'ed after received fluid.  Sodium today improved. Reports some leg cramping.  Reports improved appetite, energy level has improved.  Denies coughing and SOB.  Reports has also gained weight.\par \par 8/21/19 : Here for week 7 of cycle 2 ipilimiumab + nivolumab. Has been feeling much improved, gained 4 kg since his last visit. His energy is good and is able to perform ADLs and do chores at home now. No ha, dizziness, sob, cough, chest pain, nausea, vomiting, diarrhea. There is no change in past medical hx, family hx, and social history since our last visit.\par \par 9/11/2019: s/p cycle 3, he feels completely fine, his energy level is back to normal. He has done normal ADLs. He denies skin rashes, joint pain, diarrhea. \par \par 10/16/2019 s/p cycle 4, he feels completely fine, his energy level is back to normal. He has done normal ADLs. He denies skin rashes, joint pain, diarrhea. \par \par 11/13/2019 now on Nivolumab maintenance treatment. He feels fine, has normal ADLs. He denies skin rashes, joint pain, diarrhea. \par \par 11/14/19 : Restaging scan: liver and RP nodes again with great response. There is a left lung lesion which is 7 mm, increased from 5 mm. \par \par 12/11/19 : met RCC on single agent nivolumab after completing combination treatment. He is tolerating therapy well without any issues thus far. He denies ha, sob,cp,nvd, rash, vision changes, night sweats. His energy is good and maintains normal lifestyle. \par \par 1/15/2020 : Cycle 5 nivolumab 480 mg monthly. Pt here for f/u. Overall feels well. Still gaining more weight, appetite is good. Denies any loss of energy, n/v/diarrhea, fevers, pain, or new skin rashes. \par \par 2/19/2020: Cycle 6 Nivolumab. Renal function Cr trending up and now 1.43. His Cr has been normal since starting therapy however started at 1.4. \par He received 5 doses of nivolumab and last scan was 11/2019. \par He feels well overall without significant change. Weigh stable and good appetite. \par \par 4/22/2020: cycle 8 nivolumab, he feels overall fine. weight is stable and no pain. \par \par 6/12/2020 cycle 10 nivolumab maintenance, feels completely fine except several bumps in the left lower leg skin, scabbed now. \par \par 7/8/2020 CT c/a/p showed stable lung nodules, continuous decrease in size in the liver lesion and renal lesion. \par \par 7/10/2020 s/p cycle 11 nivolumab maintenance. He feels completely fine except several bumps in the left lower leg skin, scabbed. He will see Dermatologist. \par \par 8/10/2020 s/p cycle 12 nivolumab maintenance. His appetite is ok. His skin bumps are getting better. \par \par 9/9/2020 s/p cycle 13 nivolumab maintenance. He feels overall fine with great appetite. His skin bumps have scabs. \par \par 9/18-22 : Patient admitted for hematuria. Patient followed by urology and hematology during hospital course. Patient was started on CBI to help remove clots. Urine initially bloody, but became pink-tinged and then clear over time. CBI stopped and barber removed. Patient reportedly felt much better after barber removed, able to urinate by himself- reported some clots initially, but since resolved. States bladder spasms improved as well. Patient's eliquis was stopped, MRI done showing no evidence of renal vein thrombus, discussed \par with Heme, no reason to continue eliquis for now. Patient's leukocytosis and JOSE MARIA resolved. Urine culture and blood cultures negative. \par Patient improved clinically throughout hospital course. Patient seen and examined on day of discharge. \par \par 10/2/2020 : Ws seen by Dr. Javier and is being set up for cystoscopy next week. He feels well in general and back to himself. He has lost a 4-5 pounds but trying to gain back weight. He denies any hematuria since the admission.He denies night sweats, fevers, chill, headaches, SOB, CP, NVD. \par \par 10/21/2020 : Mr. Vera is here for a follow up. Restaging scans are set up on 10/23/2020 and cystoscopy as well with recent history of gross hematuria. \par His R inguinal hernia has increased and is bothersome. He was working hunched and bent and his hernia became painful. He was able to push it back in but was uncomfortable and he noticed urine flow is different with position of hernia. \par He otherwise denies headaches, SOB, CP, cough, NVD, vision changes, dizziness, rash, fevers, chills, or malaise. \par \par 10/23/2020 : CT torso : IMPRESSION:\par Unchanged subcentimeter left pulmonary nodules. No new pulmonary nodules appreciated.\par No significant interval change in a left hepatic lobe lesion. No new hepatic lesions identified.\par No significant interval change in an ill-defined left upper pole calyceal lesion.\par Large right inguinal hernia which contains nonobstructed small and large bowel.\par \par 12/2/2020 : Mr. VERA comes in today for a follow up and Cycle 19 of nivolumab. 10/23/2020 ; Ct showed stable disease as above. R inguinal hernia does become bothersome when walking a long distance or working around the house but can retract. There is no pain. He continues to walk 2 miles everyday with his dog and is pleased wit his scan result. \par Mr. VERA denies fevers, chills, headaches, cough, SOB, chest pain, any swelling, nausea, vomiting, diarrhea, rash, or malaise. \par \par 1/6/2021 : Mr. VERA comes in today for nivolumab #20. He continues to be active and walks 2-3 miles a day with his dog. Mr. VERA denies fevers, chills, headaches, cough, SOB, chest pain, any swelling, nausea, vomiting, diarrhea, rash, or malaise. We will obtain COVID-19 test today. \par \par 2/3/2021 : Mr. VERA is here for a follow up and nivolumab #21. No chagne in his health, medication sand AEs. Mr. VERA denies fevers, chills, headaches, cough, SOB, chest pain, any swelling, nausea, vomiting, diarrhea, rash, or malaise. \par \par \par  [de-identified] : metastatic renal cell carcinoma [de-identified] : 3/3/2021: Pt presents for a follow-up and nivolumab #22. He is doing well, exercising (walk/run 3 miles/day). Has good appetite. Denies fever, chills, N/V/C/D, cough, dyspnea, or any other symptoms.

## 2021-03-31 NOTE — PHYSICAL EXAM
[Fully active, able to carry on all pre-disease performance without restriction] : Status 0 - Fully active, able to carry on all pre-disease performance without restriction [Normal] : affect appropriate [de-identified] : R sided hernia, large.

## 2021-03-31 NOTE — HISTORY OF PRESENT ILLNESS
[T: ___] : T[unfilled] [N: ___] : N[unfilled] [M: ___] : M[unfilled] [AJCC Stage: ____] : AJCC Stage: [unfilled] [de-identified] : Finesse Zuniga is a 70 years old male who initially presented with intermittent midback pain, 10/10 radiating to stomach from both sides on June 1, 2019. He has poor appetite.  On june 12 he was diagnosed UTI and on cipro for 4 days, on June 16, was started cefpodoxime 100mg BID for 7 days.  CT chest/abdomen/pelvis revealed significant abnormal appearance of the left kidney with multiple heterogeneous areas of low attenuation involving the upper and midpole which may represent malignancy. There are necrotic lymph nodes in the left para-aortic location at the level of the kidney and below the level of the kidney. Adenopathy surrounds the left renal artery. The left renal vein demonstrates question of partially occlusive thrombus. Nonspecific scattered tiny pulmonary nodules. Lower lobe subpleural nodules measure up to 6 mm. There is a well-circumscribed hypodense lesion in the anterior aspect of the lateral segment of the left lobe measuring 3.2 x 2.2 cm. He underwent CT guided liver lesion biopsy which showed consistent with primary renal cell carcinoma, IHC CK7/CK19/PAX8 positive. MRI abdomen waw con showed multiple bilateral pulmonary nodules increased in size and number compared with recent CT. The largest is pleural-based in the left lower lobe and measures 1.5 x 1.1 cm. LIVER: Rim-enhancing subcapsular mass in segment 3 and measures 4.4 x 2.6 cm. A 4.2cm mass in mid to lower pole of the lft kidney suspicious for urothelial malignancy arising within the left renal collecting system. Regional lymphadenopathy and hepatic and pulmonary metastases. Nonocclusive thrombus within the left renal vein. He was started on eliquis. Bone scan showed  a focus of mildly increased uptake in the right superior \par pubic ramus suspicious of bone mets.\par He has lost 17lbs in one month. He denies nausea, vomiting and constipation and diarrhea. \par \par 7/19/19 s/p cycle 1 nivo and ipi, he reports that his intermittent midback pain, 10/10 radiating to stomach from both sides on June 1, 2019 has been resolved. His appetite is improving. He denies nausea, vomiting and diarrhea, skin rashes, joint pain. \par \par 7/31: Reports feeling better since last treatment, no back pain or stomach pain. Had gone to the ER on 7/19 for hyponatremia, was OR'ed after received fluid.  Sodium today improved. Reports some leg cramping.  Reports improved appetite, energy level has improved.  Denies coughing and SOB.  Reports has also gained weight.\par \par 8/21/19 : Here for week 7 of cycle 2 ipilimiumab + nivolumab. Has been feeling much improved, gained 4 kg since his last visit. His energy is good and is able to perform ADLs and do chores at home now. No ha, dizziness, sob, cough, chest pain, nausea, vomiting, diarrhea. There is no change in past medical hx, family hx, and social history since our last visit.\par \par 9/11/2019: s/p cycle 3, he feels completely fine, his energy level is back to normal. He has done normal ADLs. He denies skin rashes, joint pain, diarrhea. \par \par 10/16/2019 s/p cycle 4, he feels completely fine, his energy level is back to normal. He has done normal ADLs. He denies skin rashes, joint pain, diarrhea. \par \par 11/13/2019 now on Nivolumab maintenance treatment. He feels fine, has normal ADLs. He denies skin rashes, joint pain, diarrhea. \par \par 11/14/19 : Restaging scan: liver and RP nodes again with great response. There is a left lung lesion which is 7 mm, increased from 5 mm. \par \par 12/11/19 : met RCC on single agent nivolumab after completing combination treatment. He is tolerating therapy well without any issues thus far. He denies ha, sob,cp,nvd, rash, vision changes, night sweats. His energy is good and maintains normal lifestyle. \par \par 1/15/2020 : Cycle 5 nivolumab 480 mg monthly. Pt here for f/u. Overall feels well. Still gaining more weight, appetite is good. Denies any loss of energy, n/v/diarrhea, fevers, pain, or new skin rashes. \par \par 2/19/2020: Cycle 6 Nivolumab. Renal function Cr trending up and now 1.43. His Cr has been normal since starting therapy however started at 1.4. \par He received 5 doses of nivolumab and last scan was 11/2019. \par He feels well overall without significant change. Weigh stable and good appetite. \par \par 4/22/2020: cycle 8 nivolumab, he feels overall fine. weight is stable and no pain. \par \par 6/12/2020 cycle 10 nivolumab maintenance, feels completely fine except several bumps in the left lower leg skin, scabbed now. \par \par 7/8/2020 CT c/a/p showed stable lung nodules, continuous decrease in size in the liver lesion and renal lesion. \par \par 7/10/2020 s/p cycle 11 nivolumab maintenance. He feels completely fine except several bumps in the left lower leg skin, scabbed. He will see Dermatologist. \par \par 8/10/2020 s/p cycle 12 nivolumab maintenance. His appetite is ok. His skin bumps are getting better. \par \par 9/9/2020 s/p cycle 13 nivolumab maintenance. He feels overall fine with great appetite. His skin bumps have scabs. \par \par 9/18-22 : Patient admitted for hematuria. Patient followed by urology and hematology during hospital course. Patient was started on CBI to help remove clots. Urine initially bloody, but became pink-tinged and then clear over time. CBI stopped and barber removed. Patient reportedly felt much better after barber removed, able to urinate by himself- reported some clots initially, but since resolved. States bladder spasms improved as well. Patient's eliquis was stopped, MRI done showing no evidence of renal vein thrombus, discussed \par with Heme, no reason to continue eliquis for now. Patient's leukocytosis and JOSE MARIA resolved. Urine culture and blood cultures negative. \par Patient improved clinically throughout hospital course. Patient seen and examined on day of discharge. \par \par 10/2/2020 : Ws seen by Dr. Javier and is being set up for cystoscopy next week. He feels well in general and back to himself. He has lost a 4-5 pounds but trying to gain back weight. He denies any hematuria since the admission.He denies night sweats, fevers, chill, headaches, SOB, CP, NVD. \par \par 10/21/2020 : Mr. Vera is here for a follow up. Restaging scans are set up on 10/23/2020 and cystoscopy as well with recent history of gross hematuria. \par His R inguinal hernia has increased and is bothersome. He was working hunched and bent and his hernia became painful. He was able to push it back in but was uncomfortable and he noticed urine flow is different with position of hernia. \par He otherwise denies headaches, SOB, CP, cough, NVD, vision changes, dizziness, rash, fevers, chills, or malaise. \par \par 10/23/2020 : CT torso : IMPRESSION:\par Unchanged subcentimeter left pulmonary nodules. No new pulmonary nodules appreciated.\par No significant interval change in a left hepatic lobe lesion. No new hepatic lesions identified.\par No significant interval change in an ill-defined left upper pole calyceal lesion.\par Large right inguinal hernia which contains nonobstructed small and large bowel.\par \par 12/2/2020 : Mr. VERA comes in today for a follow up and Cycle 19 of nivolumab. 10/23/2020 ; Ct showed stable disease as above. R inguinal hernia does become bothersome when walking a long distance or working around the house but can retract. There is no pain. He continues to walk 2 miles everyday with his dog and is pleased wit his scan result. \par Mr. VERA denies fevers, chills, headaches, cough, SOB, chest pain, any swelling, nausea, vomiting, diarrhea, rash, or malaise. \par \par 1/6/2021 : Mr. VERA comes in today for nivolumab #20. He continues to be active and walks 2-3 miles a day with his dog. Mr. VERA denies fevers, chills, headaches, cough, SOB, chest pain, any swelling, nausea, vomiting, diarrhea, rash, or malaise. We will obtain COVID-19 test today. \par \par 2/3/2021 : Mr. VERA is here for a follow up and nivolumab #21. No chagne in his health, medication sand AEs. Mr. VERA denies fevers, chills, headaches, cough, SOB, chest pain, any swelling, nausea, vomiting, diarrhea, rash, or malaise. \par \par 3/3/2021: Pt presents for a follow-up and nivolumab #22. He is doing well, exercising (walk/run 3 miles/day). Has good appetite. Denies fever, chills, N/V/C/D, cough, dyspnea, or any other symptoms. restaging CT ordered.  [de-identified] : metastatic renal cell carcinoma [de-identified] : 3/26/2021: Restaging Scan : IMPRESSION:\par Unchanged 4 mm pulmonary nodule in the periphery of the left lower lobe is minimally decreased/not significantly changed. No new pulmonary nodules appreciated.\par No significant interval change in an ill-defined left upper pole calyceal lesion or a small indeterminate right upper pole renal lesion.\par Unchanged small hepatic lesion. No new hepatic lesions seen.\par Large right inguinal hernia containing nonobstructed cecum, appendix, and small bowel, unchanged.\par \par 3/31/2021: Cycle 23 Nivolumab single agent. Mr. Call has been tolerating therapy well ands restaging images showed stable disease in the chest, abd pelvis, large hernia R inguinal. He feels well in general without any issues. Keeps himself busy with home improvement projects. He continues to walk 2 miles daily and now bikes as the weather is warming up. Mr. CALL denies fevers, chills, headaches, cough, SOB, chest pain, any swelling, nausea, vomiting, diarrhea, rash, or malaise.

## 2021-03-31 NOTE — PHYSICAL EXAM
[Fully active, able to carry on all pre-disease performance without restriction] : Status 0 - Fully active, able to carry on all pre-disease performance without restriction [Normal] : affect appropriate [de-identified] : R sided hernia, large.

## 2021-03-31 NOTE — ASSESSMENT
[Palliative] : Goals of care discussed with patient: Palliative [Palliative Care Plan] : not applicable at this time [FreeTextEntry1] : Finesse Gallagher is a 70 years old male who presented with intermittent midback pain, 10/10 radiating to stomach from both sides. CT c/a/p and MRI abdomen showed multiple lung nodules, one liver lesion, retroperitoneal nodes. Bone scan showed one suspicious met in right superior pubic ramus. Liver lesion biopsy is consistent with renal cell carcinoma.\par It is not certain that the histology of RCC is clear cell vs non-clear cell. IMDC risk of this patient he has 5 out of 6 risks including leukocytosis, anemia, thrombocytosis, time from dx to initiate systemic treatment less than one year and high calcemia. He has poor risk of mRCC. Based on [facility] the standard of care is combined immunotherapy nivo and ipi vs sutent, checkmate 214 in the intermediate and poor risk of [facility]RCC patients with significant overall survival benefit, keynote 426 keytruda plus axitinib vs sutent in good, intermediate and poor risk of [facility] patients showed OS, PFS and MICHEL benefit, [place] in the intermediate and poor risk of [facility]RCC patients showed cabozantinib demonstrated a significant clinical benefit in PFS and MICHEL over standard-of-care sunitinib as first-line therapy in patients with intermediate- or poor-risk mRCC. Consider the longer duration of response and less side effects and recommended nivo and ipi. He agreed to sign the consent. S/P cycle 4 nivo and ipi. His back pain was resolved and maintained on single agnet Nivolumab. \par He had an episode of gross hematuria leading to hospitalization in 9/2020 however etiology of hematuria is unclear and resolved. Follow up cystoscopy to r/o abnormality in the bladder showed no abnormality. Eliquis stopped during hospitalization with hematology consult. \par His most recent 10/23/2020 restaging scan showed minimal disease and unchanged. \par \par \par Plan \par RCC: \par Continue maintenance #23 today  nivolumab 480 mg IV monthly \par - Will perform F/U CT C/A/P after cycle 25. \par - Labs today\par \par R Inguinal Hernia : \par - quite large and bothersome. \par - may consider surgical intervention if desired\par \par \par RTC 4 weeks.

## 2021-03-31 NOTE — ASSESSMENT
[Palliative] : Goals of care discussed with patient: Palliative [Palliative Care Plan] : not applicable at this time [FreeTextEntry1] : Finesse Gallagher is a 70 years old male who presented with intermittent midback pain, 10/10 radiating to stomach from both sides. CT c/a/p and MRI abdomen showed multiple lung nodules, one liver lesion, retroperitoneal nodes. Bone scan showed one suspicious met in right superior pubic ramus. Liver lesion biopsy is consistent with renal cell carcinoma.\par It is not certain that the histology of RCC is clear cell vs non-clear cell. IMDC risk of this patient he has 5 out of 6 risks including leukocytosis, anemia, thrombocytosis, time from dx to initiate systemic treatment less than one year and high calcemia. He has poor risk of mRCC. Based on [facility] the standard of care is combined immunotherapy nivo and ipi vs sutent, checkmate 214 in the intermediate and poor risk of [facility] patients with significant overall survival benefit, keynote 426 keytruda plus axitinib vs sutent in good, intermediate and poor risk of [facility] patients showed OS, PFS and MICHEL benefit, [place] in the intermediate and poor risk of [facility] patients showed cabozantinib demonstrated a significant clinical benefit in PFS and MICHEL over standard-of-care sunitinib as first-line therapy in patients with intermediate- or poor-risk mRCC. Consider the longer duration of response and less side effects and recommended nivo and ipi. He agreed to sign the consent. S/P cycle 4 nivo and ipi. His back pain was resolved and maintained on single agnet Nivolumab. \par He had an episode of gross hematuria leading to hospitalization in 9/2020 however etiology of hematuria is unclear and resolved. Follow up cystoscopy to r/o abnormality in the bladder showed no abnormality. Eliquis stopped during hospitalization with hematology consult. \par His most recent 10/23/2020 restaging scan showed minimal disease and unchanged. \par \par \par Plan \par RCC: \par Continue maintenance #22 today  nivolumab 480 mg IV monthly \par - Will perform F/U CT C/A/P.\par - Labs today\par \par R Inguinal Hernia : \par - quite large and bothersome. \par - may consider surgical intervention if desired\par \par \par RTC 4 weeks.

## 2021-03-31 NOTE — REVIEW OF SYSTEMS
[Skin Rash] : skin rash [Negative] : Allergic/Immunologic [Fever] : no fever [Chills] : no chills [Night Sweats] : no night sweats [Fatigue] : no fatigue [Recent Change In Weight] : ~T no recent weight change [Chest Pain] : no chest pain [Shortness Of Breath] : no shortness of breath [Cough] : no cough [Abdominal Pain] : no abdominal pain [Constipation] : no constipation [Diarrhea] : no diarrhea [Easy Bleeding] : no tendency for easy bleeding [Easy Bruising] : no tendency for easy bruising [de-identified] : scattered skin bumps in the lower leg skin but no change

## 2021-04-01 ENCOUNTER — NON-APPOINTMENT (OUTPATIENT)
Age: 71
End: 2021-04-01

## 2021-04-01 DIAGNOSIS — R11.2 NAUSEA WITH VOMITING, UNSPECIFIED: ICD-10-CM

## 2021-04-01 DIAGNOSIS — Z51.11 ENCOUNTER FOR ANTINEOPLASTIC CHEMOTHERAPY: ICD-10-CM

## 2021-04-09 ENCOUNTER — TRANSCRIPTION ENCOUNTER (OUTPATIENT)
Age: 71
End: 2021-04-09

## 2021-04-23 ENCOUNTER — OUTPATIENT (OUTPATIENT)
Dept: OUTPATIENT SERVICES | Facility: HOSPITAL | Age: 71
LOS: 1 days | Discharge: HOME | End: 2021-04-23

## 2021-04-23 DIAGNOSIS — S86.019A STRAIN OF UNSPECIFIED ACHILLES TENDON, INITIAL ENCOUNTER: Chronic | ICD-10-CM

## 2021-04-23 DIAGNOSIS — Z98.890 OTHER SPECIFIED POSTPROCEDURAL STATES: Chronic | ICD-10-CM

## 2021-04-23 DIAGNOSIS — C64.9 MALIGNANT NEOPLASM OF UNSPECIFIED KIDNEY, EXCEPT RENAL PELVIS: ICD-10-CM

## 2021-04-23 NOTE — CONSULT NOTE ADULT - CONSULT REQUESTED BY NAME
Dr. Napoles Initiate Treatment: Avar wash to the face and ears twice daily\\n\\nAzelaic Acid 15%, Ivermectin 1%, Metronidazole 1% Cream - Apply to affected areas on face twice daily Discontinue Regimen: Triamcinolone Samples Given: Avar face wash Detail Level: Zone Render In Strict Bullet Format?: No

## 2021-04-28 ENCOUNTER — LABORATORY RESULT (OUTPATIENT)
Age: 71
End: 2021-04-28

## 2021-04-28 ENCOUNTER — APPOINTMENT (OUTPATIENT)
Dept: HEMATOLOGY ONCOLOGY | Facility: CLINIC | Age: 71
End: 2021-04-28
Payer: MEDICARE

## 2021-04-28 ENCOUNTER — APPOINTMENT (OUTPATIENT)
Dept: INFUSION THERAPY | Facility: HOSPITAL | Age: 71
End: 2021-04-28

## 2021-04-28 ENCOUNTER — RESULT REVIEW (OUTPATIENT)
Age: 71
End: 2021-04-28

## 2021-04-28 VITALS
BODY MASS INDEX: 24.35 KG/M2 | WEIGHT: 168.21 LBS | TEMPERATURE: 97.6 F | OXYGEN SATURATION: 100 % | HEART RATE: 57 BPM | DIASTOLIC BLOOD PRESSURE: 74 MMHG | RESPIRATION RATE: 18 BRPM | HEIGHT: 69.69 IN | SYSTOLIC BLOOD PRESSURE: 145 MMHG

## 2021-04-28 LAB
BASOPHILS # BLD AUTO: 0.04 K/UL — SIGNIFICANT CHANGE UP (ref 0–0.2)
BASOPHILS NFR BLD AUTO: 0.3 % — SIGNIFICANT CHANGE UP (ref 0–2)
EOSINOPHIL # BLD AUTO: 0.15 K/UL — SIGNIFICANT CHANGE UP (ref 0–0.5)
EOSINOPHIL NFR BLD AUTO: 1.3 % — SIGNIFICANT CHANGE UP (ref 0–6)
HCT VFR BLD CALC: 38.8 % — LOW (ref 39–50)
HGB BLD-MCNC: 13.4 G/DL — SIGNIFICANT CHANGE UP (ref 13–17)
IMM GRANULOCYTES NFR BLD AUTO: 0.4 % — SIGNIFICANT CHANGE UP (ref 0–1.5)
LYMPHOCYTES # BLD AUTO: 1.63 K/UL — SIGNIFICANT CHANGE UP (ref 1–3.3)
LYMPHOCYTES # BLD AUTO: 14.2 % — SIGNIFICANT CHANGE UP (ref 13–44)
MCHC RBC-ENTMCNC: 30.7 PG — SIGNIFICANT CHANGE UP (ref 27–34)
MCHC RBC-ENTMCNC: 34.5 G/DL — SIGNIFICANT CHANGE UP (ref 32–36)
MCV RBC AUTO: 88.8 FL — SIGNIFICANT CHANGE UP (ref 80–100)
MONOCYTES # BLD AUTO: 0.8 K/UL — SIGNIFICANT CHANGE UP (ref 0–0.9)
MONOCYTES NFR BLD AUTO: 7 % — SIGNIFICANT CHANGE UP (ref 2–14)
NEUTROPHILS # BLD AUTO: 8.83 K/UL — HIGH (ref 1.8–7.4)
NEUTROPHILS NFR BLD AUTO: 76.8 % — SIGNIFICANT CHANGE UP (ref 43–77)
NRBC # BLD: 0 /100 WBCS — SIGNIFICANT CHANGE UP (ref 0–0)
PLATELET # BLD AUTO: 228 K/UL — SIGNIFICANT CHANGE UP (ref 150–400)
RBC # BLD: 4.37 M/UL — SIGNIFICANT CHANGE UP (ref 4.2–5.8)
RBC # FLD: 13.7 % — SIGNIFICANT CHANGE UP (ref 10.3–14.5)
WBC # BLD: 11.5 K/UL — HIGH (ref 3.8–10.5)
WBC # FLD AUTO: 11.5 K/UL — HIGH (ref 3.8–10.5)

## 2021-04-28 PROCEDURE — 99072 ADDL SUPL MATRL&STAF TM PHE: CPT

## 2021-04-28 PROCEDURE — 99212 OFFICE O/P EST SF 10 MIN: CPT

## 2021-04-28 NOTE — HISTORY OF PRESENT ILLNESS
[T: ___] : T[unfilled] [N: ___] : N[unfilled] [M: ___] : M[unfilled] [AJCC Stage: ____] : AJCC Stage: [unfilled] [de-identified] : Finesse Zuniga is a 70 years old male who initially presented with intermittent midback pain, 10/10 radiating to stomach from both sides on June 1, 2019. He has poor appetite.  On june 12 he was diagnosed UTI and on cipro for 4 days, on June 16, was started cefpodoxime 100mg BID for 7 days.  CT chest/abdomen/pelvis revealed significant abnormal appearance of the left kidney with multiple heterogeneous areas of low attenuation involving the upper and midpole which may represent malignancy. There are necrotic lymph nodes in the left para-aortic location at the level of the kidney and below the level of the kidney. Adenopathy surrounds the left renal artery. The left renal vein demonstrates question of partially occlusive thrombus. Nonspecific scattered tiny pulmonary nodules. Lower lobe subpleural nodules measure up to 6 mm. There is a well-circumscribed hypodense lesion in the anterior aspect of the lateral segment of the left lobe measuring 3.2 x 2.2 cm. He underwent CT guided liver lesion biopsy which showed consistent with primary renal cell carcinoma, IHC CK7/CK19/PAX8 positive. MRI abdomen waw con showed multiple bilateral pulmonary nodules increased in size and number compared with recent CT. The largest is pleural-based in the left lower lobe and measures 1.5 x 1.1 cm. LIVER: Rim-enhancing subcapsular mass in segment 3 and measures 4.4 x 2.6 cm. A 4.2cm mass in mid to lower pole of the lft kidney suspicious for urothelial malignancy arising within the left renal collecting system. Regional lymphadenopathy and hepatic and pulmonary metastases. Nonocclusive thrombus within the left renal vein. He was started on eliquis. Bone scan showed  a focus of mildly increased uptake in the right superior \par pubic ramus suspicious of bone mets.\par He has lost 17lbs in one month. He denies nausea, vomiting and constipation and diarrhea. \par \par 7/19/19 s/p cycle 1 nivo and ipi, he reports that his intermittent midback pain, 10/10 radiating to stomach from both sides on June 1, 2019 has been resolved. His appetite is improving. He denies nausea, vomiting and diarrhea, skin rashes, joint pain. \par \par 7/31: Reports feeling better since last treatment, no back pain or stomach pain. Had gone to the ER on 7/19 for hyponatremia, was LA'ed after received fluid.  Sodium today improved. Reports some leg cramping.  Reports improved appetite, energy level has improved.  Denies coughing and SOB.  Reports has also gained weight.\par \par 8/21/19 : Here for week 7 of cycle 2 ipilimiumab + nivolumab. Has been feeling much improved, gained 4 kg since his last visit. His energy is good and is able to perform ADLs and do chores at home now. No ha, dizziness, sob, cough, chest pain, nausea, vomiting, diarrhea. There is no change in past medical hx, family hx, and social history since our last visit.\par \par 9/11/2019: s/p cycle 3, he feels completely fine, his energy level is back to normal. He has done normal ADLs. He denies skin rashes, joint pain, diarrhea. \par \par 10/16/2019 s/p cycle 4, he feels completely fine, his energy level is back to normal. He has done normal ADLs. He denies skin rashes, joint pain, diarrhea. \par \par 11/13/2019 now on Nivolumab maintenance treatment. He feels fine, has normal ADLs. He denies skin rashes, joint pain, diarrhea. \par \par 11/14/19 : Restaging scan: liver and RP nodes again with great response. There is a left lung lesion which is 7 mm, increased from 5 mm. \par \par 12/11/19 : met RCC on single agent nivolumab after completing combination treatment. He is tolerating therapy well without any issues thus far. He denies ha, sob,cp,nvd, rash, vision changes, night sweats. His energy is good and maintains normal lifestyle. \par \par 1/15/2020 : Cycle 5 nivolumab 480 mg monthly. Pt here for f/u. Overall feels well. Still gaining more weight, appetite is good. Denies any loss of energy, n/v/diarrhea, fevers, pain, or new skin rashes. \par \par 2/19/2020: Cycle 6 Nivolumab. Renal function Cr trending up and now 1.43. His Cr has been normal since starting therapy however started at 1.4. \par He received 5 doses of nivolumab and last scan was 11/2019. \par He feels well overall without significant change. Weigh stable and good appetite. \par \par 4/22/2020: cycle 8 nivolumab, he feels overall fine. weight is stable and no pain. \par \par 6/12/2020 cycle 10 nivolumab maintenance, feels completely fine except several bumps in the left lower leg skin, scabbed now. \par \par 7/8/2020 CT c/a/p showed stable lung nodules, continuous decrease in size in the liver lesion and renal lesion. \par \par 7/10/2020 s/p cycle 11 nivolumab maintenance. He feels completely fine except several bumps in the left lower leg skin, scabbed. He will see Dermatologist. \par \par 8/10/2020 s/p cycle 12 nivolumab maintenance. His appetite is ok. His skin bumps are getting better. \par \par 9/9/2020 s/p cycle 13 nivolumab maintenance. He feels overall fine with great appetite. His skin bumps have scabs. \par \par 9/18-22 : Patient admitted for hematuria. Patient followed by urology and hematology during hospital course. Patient was started on CBI to help remove clots. Urine initially bloody, but became pink-tinged and then clear over time. CBI stopped and barber removed. Patient reportedly felt much better after barber removed, able to urinate by himself- reported some clots initially, but since resolved. States bladder spasms improved as well. Patient's eliquis was stopped, MRI done showing no evidence of renal vein thrombus, discussed \par with Heme, no reason to continue eliquis for now. Patient's leukocytosis and JOSE MARIA resolved. Urine culture and blood cultures negative. \par Patient improved clinically throughout hospital course. Patient seen and examined on day of discharge. \par \par 10/2/2020 : Ws seen by Dr. Javier and is being set up for cystoscopy next week. He feels well in general and back to himself. He has lost a 4-5 pounds but trying to gain back weight. He denies any hematuria since the admission.He denies night sweats, fevers, chill, headaches, SOB, CP, NVD. \par \par 10/21/2020 : Mr. Vera is here for a follow up. Restaging scans are set up on 10/23/2020 and cystoscopy as well with recent history of gross hematuria. \par His R inguinal hernia has increased and is bothersome. He was working hunched and bent and his hernia became painful. He was able to push it back in but was uncomfortable and he noticed urine flow is different with position of hernia. \par He otherwise denies headaches, SOB, CP, cough, NVD, vision changes, dizziness, rash, fevers, chills, or malaise. \par \par 10/23/2020 : CT torso : IMPRESSION:\par Unchanged subcentimeter left pulmonary nodules. No new pulmonary nodules appreciated.\par No significant interval change in a left hepatic lobe lesion. No new hepatic lesions identified.\par No significant interval change in an ill-defined left upper pole calyceal lesion.\par Large right inguinal hernia which contains nonobstructed small and large bowel.\par \par 12/2/2020 : Mr. VERA comes in today for a follow up and Cycle 19 of nivolumab. 10/23/2020 ; Ct showed stable disease as above. R inguinal hernia does become bothersome when walking a long distance or working around the house but can retract. There is no pain. He continues to walk 2 miles everyday with his dog and is pleased wit his scan result. \par Mr. VERA denies fevers, chills, headaches, cough, SOB, chest pain, any swelling, nausea, vomiting, diarrhea, rash, or malaise. \par \par 1/6/2021 : Mr. VERA comes in today for nivolumab #20. He continues to be active and walks 2-3 miles a day with his dog. Mr. VERA denies fevers, chills, headaches, cough, SOB, chest pain, any swelling, nausea, vomiting, diarrhea, rash, or malaise. We will obtain COVID-19 test today. \par \par 2/3/2021 : Mr. VERA is here for a follow up and nivolumab #21. No chagne in his health, medication sand AEs. Mr. VERA denies fevers, chills, headaches, cough, SOB, chest pain, any swelling, nausea, vomiting, diarrhea, rash, or malaise. \par \par 3/3/2021: Pt presents for a follow-up and nivolumab #22. He is doing well, exercising (walk/run 3 miles/day). Has good appetite. Denies fever, chills, N/V/C/D, cough, dyspnea, or any other symptoms. restaging CT ordered.  [de-identified] : metastatic renal cell carcinoma [de-identified] : 3/26/21 CT c/a/p showed stable disease in the lungs, liver and kidney, no lymphadenopathy. \par \par 3/31/2021: Cycle 23 Nivolumab single agent. Mr. Call has been tolerating therapy well ands restaging images showed stable disease in the chest, abd pelvis, large hernia R inguinal. He feels well in general without any issues. Keeps himself busy with home improvement projects. He continues to walk 2 miles daily and now bikes as the weather is warming up. Mr. CALL denies fevers, chills, headaches, cough, SOB, chest pain, any swelling, nausea, vomiting, diarrhea, rash, or malaise. \par \par 4/28/2021: C24 Nivolumab due today. Interval scans again reviewed with stable disease. Noted to have slight  creatinine bump. Pt denies any dehydration and tries to stay hydrated. Noted right sided hip strain last week but improved within 2 days after Aleve and warm compresses. Possibly related to overexerting as walking more than usual. Continues to stay active and overall feels well. Denies any fevers, chills, dyspnea, chest pain, n/v/d.

## 2021-04-28 NOTE — PHYSICAL EXAM
[Fully active, able to carry on all pre-disease performance without restriction] : Status 0 - Fully active, able to carry on all pre-disease performance without restriction [Normal] : affect appropriate [de-identified] : R sided hernia, large. right calf strain

## 2021-04-28 NOTE — ASSESSMENT
[Palliative] : Goals of care discussed with patient: Palliative [Palliative Care Plan] : not applicable at this time [FreeTextEntry1] : Finesse Gallagher is a 71 years old male who presented with intermittent midback pain, 10/10 radiating to stomach from both sides. CT c/a/p and MRI abdomen showed multiple lung nodules, one liver lesion, retroperitoneal nodes. Bone scan showed one suspicious met in right superior pubic ramus. Liver lesion biopsy is consistent with renal cell carcinoma.\par It is not certain that the histology of RCC is clear cell vs non-clear cell. IMDC risk of this patient he has 5 out of 6 risks including leukocytosis, anemia, thrombocytosis, time from dx to initiate systemic treatment less than one year and high calcemia. He has poor risk of mRCC. Based on [facility] the standard of care is combined immunotherapy nivo and ipi vs sutent, checkmate 214 in the intermediate and poor risk of [facility]ccRCC patients with significant overall survival benefit, keynote 426 keytruda plus axitinib vs sutent in good, intermediate and poor risk of [facility]RCC patients showed OS, PFS and MICHEL benefit, [place] in the intermediate and poor risk of [facility]RCC patients showed cabozantinib demonstrated a significant clinical benefit in PFS and MICHEL over standard-of-care sunitinib as first-line therapy in patients with intermediate- or poor-risk mRCC. Consider the longer duration of response and less side effects and recommended nivo and ipi. He agreed to sign the consent. S/P cycle 4 nivo and ipi. His back pain was resolved and maintained on single agnet Nivolumab. \par He had an episode of gross hematuria leading to hospitalization in 9/2020 however etiology of hematuria is unclear and resolved. Follow up cystoscopy to r/o abnormality in the bladder showed no abnormality. Eliquis stopped during hospitalization with hematology consult. \par His most recent 3/26/2020 restaging scan showed minimal disease and unchanged from prior. \par \par \par Plan \par RCC: \par Continue maintenance #24 today  nivolumab 480 mg IV monthly \par - Will perform F/U CT C/A/P after cycle 25\par mildly elevate creatinine, will follow up \par - Labs today\par \par R Inguinal Hernia : \par - quite large and bothersome. \par - may consider surgical intervention if desired\par \par RTC 4 weeks

## 2021-04-29 DIAGNOSIS — Z51.11 ENCOUNTER FOR ANTINEOPLASTIC CHEMOTHERAPY: ICD-10-CM

## 2021-05-25 ENCOUNTER — OUTPATIENT (OUTPATIENT)
Dept: OUTPATIENT SERVICES | Facility: HOSPITAL | Age: 71
LOS: 1 days | Discharge: HOME | End: 2021-05-25

## 2021-05-25 DIAGNOSIS — C64.9 MALIGNANT NEOPLASM OF UNSPECIFIED KIDNEY, EXCEPT RENAL PELVIS: ICD-10-CM

## 2021-05-25 DIAGNOSIS — Z98.890 OTHER SPECIFIED POSTPROCEDURAL STATES: Chronic | ICD-10-CM

## 2021-05-25 DIAGNOSIS — S86.019A STRAIN OF UNSPECIFIED ACHILLES TENDON, INITIAL ENCOUNTER: Chronic | ICD-10-CM

## 2021-05-26 ENCOUNTER — APPOINTMENT (OUTPATIENT)
Dept: INFUSION THERAPY | Facility: HOSPITAL | Age: 71
End: 2021-05-26

## 2021-05-26 ENCOUNTER — RESULT REVIEW (OUTPATIENT)
Age: 71
End: 2021-05-26

## 2021-05-26 ENCOUNTER — LABORATORY RESULT (OUTPATIENT)
Age: 71
End: 2021-05-26

## 2021-05-26 ENCOUNTER — APPOINTMENT (OUTPATIENT)
Dept: HEMATOLOGY ONCOLOGY | Facility: CLINIC | Age: 71
End: 2021-05-26
Payer: MEDICARE

## 2021-05-26 VITALS
BODY MASS INDEX: 24 KG/M2 | TEMPERATURE: 96.9 F | RESPIRATION RATE: 16 BRPM | SYSTOLIC BLOOD PRESSURE: 140 MMHG | OXYGEN SATURATION: 97 % | DIASTOLIC BLOOD PRESSURE: 69 MMHG | WEIGHT: 165.77 LBS | HEART RATE: 50 BPM

## 2021-05-26 DIAGNOSIS — N28.89 OTHER SPECIFIED DISORDERS OF KIDNEY AND URETER: ICD-10-CM

## 2021-05-26 DIAGNOSIS — I82.3 EMBOLISM AND THROMBOSIS OF RENAL VEIN: ICD-10-CM

## 2021-05-26 LAB
BASOPHILS # BLD AUTO: 0.05 K/UL — SIGNIFICANT CHANGE UP (ref 0–0.2)
BASOPHILS NFR BLD AUTO: 0.5 % — SIGNIFICANT CHANGE UP (ref 0–2)
EOSINOPHIL # BLD AUTO: 0.17 K/UL — SIGNIFICANT CHANGE UP (ref 0–0.5)
EOSINOPHIL NFR BLD AUTO: 1.7 % — SIGNIFICANT CHANGE UP (ref 0–6)
HCT VFR BLD CALC: 41 % — SIGNIFICANT CHANGE UP (ref 39–50)
HGB BLD-MCNC: 14.1 G/DL — SIGNIFICANT CHANGE UP (ref 13–17)
IMM GRANULOCYTES NFR BLD AUTO: 0.4 % — SIGNIFICANT CHANGE UP (ref 0–1.5)
LYMPHOCYTES # BLD AUTO: 1.75 K/UL — SIGNIFICANT CHANGE UP (ref 1–3.3)
LYMPHOCYTES # BLD AUTO: 17.9 % — SIGNIFICANT CHANGE UP (ref 13–44)
MCHC RBC-ENTMCNC: 30.8 PG — SIGNIFICANT CHANGE UP (ref 27–34)
MCHC RBC-ENTMCNC: 34.4 G/DL — SIGNIFICANT CHANGE UP (ref 32–36)
MCV RBC AUTO: 89.5 FL — SIGNIFICANT CHANGE UP (ref 80–100)
MONOCYTES # BLD AUTO: 0.76 K/UL — SIGNIFICANT CHANGE UP (ref 0–0.9)
MONOCYTES NFR BLD AUTO: 7.8 % — SIGNIFICANT CHANGE UP (ref 2–14)
NEUTROPHILS # BLD AUTO: 7 K/UL — SIGNIFICANT CHANGE UP (ref 1.8–7.4)
NEUTROPHILS NFR BLD AUTO: 71.7 % — SIGNIFICANT CHANGE UP (ref 43–77)
NRBC # BLD: 0 /100 WBCS — SIGNIFICANT CHANGE UP (ref 0–0)
PLATELET # BLD AUTO: 178 K/UL — SIGNIFICANT CHANGE UP (ref 150–400)
RBC # BLD: 4.58 M/UL — SIGNIFICANT CHANGE UP (ref 4.2–5.8)
RBC # FLD: 13.7 % — SIGNIFICANT CHANGE UP (ref 10.3–14.5)
WBC # BLD: 9.77 K/UL — SIGNIFICANT CHANGE UP (ref 3.8–10.5)
WBC # FLD AUTO: 9.77 K/UL — SIGNIFICANT CHANGE UP (ref 3.8–10.5)

## 2021-05-26 PROCEDURE — 99212 OFFICE O/P EST SF 10 MIN: CPT

## 2021-05-26 PROCEDURE — 99072 ADDL SUPL MATRL&STAF TM PHE: CPT

## 2021-05-27 DIAGNOSIS — Z51.11 ENCOUNTER FOR ANTINEOPLASTIC CHEMOTHERAPY: ICD-10-CM

## 2021-05-27 NOTE — ASSESSMENT
[Palliative] : Goals of care discussed with patient: Palliative [Palliative Care Plan] : not applicable at this time [FreeTextEntry1] : Finesse Gallagher is a 71 years old male who presented with intermittent midback pain, 10/10 radiating to stomach from both sides. CT c/a/p and MRI abdomen showed multiple lung nodules, one liver lesion, retroperitoneal nodes. Bone scan showed one suspicious met in right superior pubic ramus. Liver lesion biopsy is consistent with renal cell carcinoma.\par It is not certain that the histology of RCC is clear cell vs non-clear cell. IMDC risk of this patient he has 5 out of 6 risks including leukocytosis, anemia, thrombocytosis, time from dx to initiate systemic treatment less than one year and high calcemia. He has poor risk of mRCC. Based on [facility] the standard of care is combined immunotherapy nivo and ipi vs sutent, checkmate 214 in the intermediate and poor risk of [facility]RCC patients with significant overall survival benefit, keynote 426 keytruda plus axitinib vs sutent in good, intermediate and poor risk of [facility]RCC patients showed OS, PFS and MICHEL benefit, [place] in the intermediate and poor risk of m[facility]RCC patients showed cabozantinib demonstrated a significant clinical benefit in PFS and MICHEL over standard-of-care sunitinib as first-line therapy in patients with intermediate- or poor-risk mRCC. Consider the longer duration of response and less side effects and recommended nivo and ipi. He agreed to sign the consent. S/P cycle 4 nivo and ipi. His back pain was resolved and maintained on single agnet Nivolumab. \par He had an episode of gross hematuria leading to hospitalization in 9/2020 however etiology of hematuria is unclear and resolved. Follow up cystoscopy to r/o abnormality in the bladder showed no abnormality. Eliquis stopped during hospitalization with hematology consult. \par His most recent 3/26/2021 restaging scan showed minimal disease and unchanged from prior. \par \par \par Plan \par RCC: \par Continue maintenance #25 today  nivolumab 480 mg IV monthly \par - Will perform F/U CT C/A/P after cycle 26\par mildly elevate creatinine, will follow up \par - Labs today\par \par R Inguinal Hernia : \par - quite large and bothersome. \par - may consider surgical intervention if desired\par \par RTC 4 weeks\par \par Reid Gonzales, MSN, ANP-BC\par

## 2021-05-27 NOTE — HISTORY OF PRESENT ILLNESS
[T: ___] : T[unfilled] [N: ___] : N[unfilled] [M: ___] : M[unfilled] [AJCC Stage: ____] : AJCC Stage: [unfilled] [de-identified] : Finesse Zuniga is a 70 years old male who initially presented with intermittent midback pain, 10/10 radiating to stomach from both sides on June 1, 2019. He has poor appetite.  On june 12 he was diagnosed UTI and on cipro for 4 days, on June 16, was started cefpodoxime 100mg BID for 7 days.  CT chest/abdomen/pelvis revealed significant abnormal appearance of the left kidney with multiple heterogeneous areas of low attenuation involving the upper and midpole which may represent malignancy. There are necrotic lymph nodes in the left para-aortic location at the level of the kidney and below the level of the kidney. Adenopathy surrounds the left renal artery. The left renal vein demonstrates question of partially occlusive thrombus. Nonspecific scattered tiny pulmonary nodules. Lower lobe subpleural nodules measure up to 6 mm. There is a well-circumscribed hypodense lesion in the anterior aspect of the lateral segment of the left lobe measuring 3.2 x 2.2 cm. He underwent CT guided liver lesion biopsy which showed consistent with primary renal cell carcinoma, IHC CK7/CK19/PAX8 positive. MRI abdomen waw con showed multiple bilateral pulmonary nodules increased in size and number compared with recent CT. The largest is pleural-based in the left lower lobe and measures 1.5 x 1.1 cm. LIVER: Rim-enhancing subcapsular mass in segment 3 and measures 4.4 x 2.6 cm. A 4.2cm mass in mid to lower pole of the lft kidney suspicious for urothelial malignancy arising within the left renal collecting system. Regional lymphadenopathy and hepatic and pulmonary metastases. Nonocclusive thrombus within the left renal vein. He was started on eliquis. Bone scan showed  a focus of mildly increased uptake in the right superior \par pubic ramus suspicious of bone mets.\par He has lost 17lbs in one month. He denies nausea, vomiting and constipation and diarrhea. \par \par 7/2019: ipilimumab + nivolumab initiated and to nivolumab maintenance post 4 cycles and continues on therapy.\par \par 9/18-22/2020 : Patient admitted for hematuria. Patient followed by urology and hematology during hospital course. Patient was started on CBI to help remove clots. Urine initially bloody, but became pink-tinged and then clear over time. CBI stopped and barber removed. Patient reportedly felt much better after barber removed, able to urinate by himself- reported some clots initially, but since resolved. States bladder spasms improved as well. Patient's eliquis was stopped, MRI done showing no evidence of renal vein thrombus, discussed  with Heme, no reason to continue eliquis for now. Patient's leukocytosis and JOSE MARIA resolved. Urine culture and blood cultures negative. \par Patient improved clinically throughout hospital course. Patient seen and examined on day of discharge. \par Cystoscopy was negative, performed by Dr. Javier. \par  [de-identified] : metastatic renal cell carcinoma [FreeTextEntry1] : Ipi + nivo (7/2019) > nivolumab 480 mg monthly - cont [de-identified] : 5/26/2021 : Mr. Call is here for nivolumab. Last scan 3/29/2021, will need another set of scan the end or beginning of July. He walks 2 miles a day with his dog and bikes 3-4 miles a day. He feels well overall. Mr. CALL denies fevers, chills, headaches, cough, SOB, chest pain, any swelling, nausea, vomiting, diarrhea, rash, or malaise.

## 2021-05-27 NOTE — PHYSICAL EXAM
[Fully active, able to carry on all pre-disease performance without restriction] : Status 0 - Fully active, able to carry on all pre-disease performance without restriction [Normal] : affect appropriate [de-identified] : R sided hernia, large. right calf strain

## 2021-06-03 ENCOUNTER — NON-APPOINTMENT (OUTPATIENT)
Age: 71
End: 2021-06-03

## 2021-06-23 ENCOUNTER — LABORATORY RESULT (OUTPATIENT)
Age: 71
End: 2021-06-23

## 2021-06-23 ENCOUNTER — APPOINTMENT (OUTPATIENT)
Dept: HEMATOLOGY ONCOLOGY | Facility: CLINIC | Age: 71
End: 2021-06-23
Payer: MEDICARE

## 2021-06-23 ENCOUNTER — RESULT REVIEW (OUTPATIENT)
Age: 71
End: 2021-06-23

## 2021-06-23 ENCOUNTER — APPOINTMENT (OUTPATIENT)
Dept: INFUSION THERAPY | Facility: HOSPITAL | Age: 71
End: 2021-06-23

## 2021-06-23 VITALS
DIASTOLIC BLOOD PRESSURE: 75 MMHG | BODY MASS INDEX: 23.94 KG/M2 | WEIGHT: 165.35 LBS | RESPIRATION RATE: 18 BRPM | HEIGHT: 69.69 IN | TEMPERATURE: 97.8 F | OXYGEN SATURATION: 99 % | SYSTOLIC BLOOD PRESSURE: 137 MMHG | HEART RATE: 55 BPM

## 2021-06-23 LAB
BASOPHILS # BLD AUTO: 0.04 K/UL — SIGNIFICANT CHANGE UP (ref 0–0.2)
BASOPHILS NFR BLD AUTO: 0.3 % — SIGNIFICANT CHANGE UP (ref 0–2)
EOSINOPHIL # BLD AUTO: 0.14 K/UL — SIGNIFICANT CHANGE UP (ref 0–0.5)
EOSINOPHIL NFR BLD AUTO: 1.2 % — SIGNIFICANT CHANGE UP (ref 0–6)
HCT VFR BLD CALC: 41.4 % — SIGNIFICANT CHANGE UP (ref 39–50)
HGB BLD-MCNC: 14.2 G/DL — SIGNIFICANT CHANGE UP (ref 13–17)
IMM GRANULOCYTES NFR BLD AUTO: 0.6 % — SIGNIFICANT CHANGE UP (ref 0–1.5)
LYMPHOCYTES # BLD AUTO: 1.8 K/UL — SIGNIFICANT CHANGE UP (ref 1–3.3)
LYMPHOCYTES # BLD AUTO: 15 % — SIGNIFICANT CHANGE UP (ref 13–44)
MCHC RBC-ENTMCNC: 30.7 PG — SIGNIFICANT CHANGE UP (ref 27–34)
MCHC RBC-ENTMCNC: 34.3 G/DL — SIGNIFICANT CHANGE UP (ref 32–36)
MCV RBC AUTO: 89.6 FL — SIGNIFICANT CHANGE UP (ref 80–100)
MONOCYTES # BLD AUTO: 0.76 K/UL — SIGNIFICANT CHANGE UP (ref 0–0.9)
MONOCYTES NFR BLD AUTO: 6.3 % — SIGNIFICANT CHANGE UP (ref 2–14)
NEUTROPHILS # BLD AUTO: 9.19 K/UL — HIGH (ref 1.8–7.4)
NEUTROPHILS NFR BLD AUTO: 76.6 % — SIGNIFICANT CHANGE UP (ref 43–77)
NRBC # BLD: 0 /100 WBCS — SIGNIFICANT CHANGE UP (ref 0–0)
PLATELET # BLD AUTO: 199 K/UL — SIGNIFICANT CHANGE UP (ref 150–400)
RBC # BLD: 4.62 M/UL — SIGNIFICANT CHANGE UP (ref 4.2–5.8)
RBC # FLD: 13.5 % — SIGNIFICANT CHANGE UP (ref 10.3–14.5)
WBC # BLD: 12 K/UL — HIGH (ref 3.8–10.5)
WBC # FLD AUTO: 12 K/UL — HIGH (ref 3.8–10.5)

## 2021-06-23 PROCEDURE — 99072 ADDL SUPL MATRL&STAF TM PHE: CPT

## 2021-06-23 PROCEDURE — 99212 OFFICE O/P EST SF 10 MIN: CPT

## 2021-06-23 NOTE — HISTORY OF PRESENT ILLNESS
[T: ___] : T[unfilled] [N: ___] : N[unfilled] [M: ___] : M[unfilled] [AJCC Stage: ____] : AJCC Stage: [unfilled] [de-identified] : Finesse Zuniga is a 70 years old male who initially presented with intermittent midback pain, 10/10 radiating to stomach from both sides on June 1, 2019. He has poor appetite.  On june 12 he was diagnosed UTI and on cipro for 4 days, on June 16, was started cefpodoxime 100mg BID for 7 days.  CT chest/abdomen/pelvis revealed significant abnormal appearance of the left kidney with multiple heterogeneous areas of low attenuation involving the upper and midpole which may represent malignancy. There are necrotic lymph nodes in the left para-aortic location at the level of the kidney and below the level of the kidney. Adenopathy surrounds the left renal artery. The left renal vein demonstrates question of partially occlusive thrombus. Nonspecific scattered tiny pulmonary nodules. Lower lobe subpleural nodules measure up to 6 mm. There is a well-circumscribed hypodense lesion in the anterior aspect of the lateral segment of the left lobe measuring 3.2 x 2.2 cm. He underwent CT guided liver lesion biopsy which showed consistent with primary renal cell carcinoma, IHC CK7/CK19/PAX8 positive. MRI abdomen waw con showed multiple bilateral pulmonary nodules increased in size and number compared with recent CT. The largest is pleural-based in the left lower lobe and measures 1.5 x 1.1 cm. LIVER: Rim-enhancing subcapsular mass in segment 3 and measures 4.4 x 2.6 cm. A 4.2cm mass in mid to lower pole of the lft kidney suspicious for urothelial malignancy arising within the left renal collecting system. Regional lymphadenopathy and hepatic and pulmonary metastases. Nonocclusive thrombus within the left renal vein. He was started on eliquis. Bone scan showed  a focus of mildly increased uptake in the right superior \par pubic ramus suspicious of bone mets.\par He has lost 17lbs in one month. He denies nausea, vomiting and constipation and diarrhea. \par \par 7/2019: ipilimumab + nivolumab initiated and to nivolumab maintenance post 4 cycles and continues on therapy.\par \par 9/18-22/2020 : Patient admitted for hematuria. Patient followed by urology and hematology during hospital course. Patient was started on CBI to help remove clots. Urine initially bloody, but became pink-tinged and then clear over time. CBI stopped and barber removed. Patient reportedly felt much better after barber removed, able to urinate by himself- reported some clots initially, but since resolved. States bladder spasms improved as well. Patient's eliquis was stopped, MRI done showing no evidence of renal vein thrombus, discussed  with Heme, no reason to continue eliquis for now. Patient's leukocytosis and JOSE MARIA resolved. Urine culture and blood cultures negative. \par Patient improved clinically throughout hospital course. Patient seen and examined on day of discharge. \par Cystoscopy was negative, performed by Dr. Javeir. \par  [de-identified] : metastatic renal cell carcinoma [FreeTextEntry1] : Ipi + nivo (7/2019) > nivolumab 480 mg monthly - cont [de-identified] : 5/26/2021 : Mr. Call is here for nivolumab. Last scan 3/29/2021, will need another set of scan the end or beginning of July. He walks 2 miles a day with his dog and bikes 3-4 miles a day. He feels well overall. Mr. CALL denies fevers, chills, headaches, cough, SOB, chest pain, any swelling, nausea, vomiting, diarrhea, rash, or malaise. \par \par 6/23/21: Bothering Rt side inguinal hernia without pain or signs of incarceration. Doing well overall. Scheduled for nivo today. He denies fevers, chills, headaches, cough, SOB, chest pain, any swelling, nausea, vomiting, diarrhea, rash, or malaise.

## 2021-06-23 NOTE — PHYSICAL EXAM
[Fully active, able to carry on all pre-disease performance without restriction] : Status 0 - Fully active, able to carry on all pre-disease performance without restriction [Normal] : affect appropriate [de-identified] : right inguinal bulging into testicle [de-identified] : R sided inguinal hernia, large. right calf strain

## 2021-06-23 NOTE — ASSESSMENT
[Palliative] : Goals of care discussed with patient: Palliative [Palliative Care Plan] : not applicable at this time [FreeTextEntry1] : Finesse Gallagher is a 71 years old male who presented with intermittent midback pain, 10/10 radiating to stomach from both sides. CT c/a/p and MRI abdomen showed multiple lung nodules, one liver lesion, retroperitoneal nodes. Bone scan showed one suspicious met in right superior pubic ramus. Liver lesion biopsy is consistent with renal cell carcinoma.\par It is not certain that the histology of RCC is clear cell vs non-clear cell. IMDC risk of this patient he has 5 out of 6 risks including leukocytosis, anemia, thrombocytosis, time from dx to initiate systemic treatment less than one year and high calcemia. He has poor risk of mRCC. Based on [facility] the standard of care is combined immunotherapy nivo and ipi vs sutent, checkmate 214 in the intermediate and poor risk of mccRCC patients with significant overall survival benefit, keynote 426 keytruda plus axitinib vs sutent in good, intermediate and poor risk of [facility]RCC patients showed OS, PFS and MICHEL benefit, [place] in the intermediate and poor risk of [facility]ccRCC patients showed cabozantinib demonstrated a significant clinical benefit in PFS and MICHEL over standard-of-care sunitinib as first-line therapy in patients with intermediate- or poor-risk mRCC. Consider the longer duration of response and less side effects and recommended nivo and ipi. He agreed to sign the consent. S/P cycle 4 nivo and ipi. His back pain was resolved and maintained on single agnet Nivolumab. \par He had an episode of gross hematuria leading to hospitalization in 9/2020 however etiology of hematuria is unclear and resolved. Follow up cystoscopy to r/o abnormality in the bladder showed no abnormality. Eliquis stopped during hospitalization with hematology consult. \par His most recent 3/26/2021 restaging scan showed minimal disease and unchanged from prior. He has right inguinal hernia.\par \par \par Plan \par RCC: \par Continue maintenance #26 today  nivolumab 480 mg IV monthly \par - Will perform F/U CT C/A/P after cycle 26\par mildly elevate creatinine, will follow up \par - Labs today\par \par R Inguinal Hernia : \par - quite large and bothersome. \par - follow up with surg asap\par \par RTC 4 weeks\par \par \par

## 2021-06-29 ENCOUNTER — RESULT REVIEW (OUTPATIENT)
Age: 71
End: 2021-06-29

## 2021-07-07 ENCOUNTER — APPOINTMENT (OUTPATIENT)
Dept: CT IMAGING | Facility: IMAGING CENTER | Age: 71
End: 2021-07-07
Payer: MEDICARE

## 2021-07-07 ENCOUNTER — OUTPATIENT (OUTPATIENT)
Dept: OUTPATIENT SERVICES | Facility: HOSPITAL | Age: 71
LOS: 1 days | End: 2021-07-07
Payer: MEDICARE

## 2021-07-07 DIAGNOSIS — Z79.899 OTHER LONG TERM (CURRENT) DRUG THERAPY: ICD-10-CM

## 2021-07-07 DIAGNOSIS — C78.00 SECONDARY MALIGNANT NEOPLASM OF UNSPECIFIED LUNG: ICD-10-CM

## 2021-07-07 DIAGNOSIS — Z00.8 ENCOUNTER FOR OTHER GENERAL EXAMINATION: ICD-10-CM

## 2021-07-07 DIAGNOSIS — Z98.890 OTHER SPECIFIED POSTPROCEDURAL STATES: Chronic | ICD-10-CM

## 2021-07-07 DIAGNOSIS — S86.019A STRAIN OF UNSPECIFIED ACHILLES TENDON, INITIAL ENCOUNTER: Chronic | ICD-10-CM

## 2021-07-07 DIAGNOSIS — C64.2 MALIGNANT NEOPLASM OF LEFT KIDNEY, EXCEPT RENAL PELVIS: ICD-10-CM

## 2021-07-07 PROCEDURE — 71260 CT THORAX DX C+: CPT

## 2021-07-07 PROCEDURE — 74177 CT ABD & PELVIS W/CONTRAST: CPT | Mod: 26

## 2021-07-07 PROCEDURE — 74177 CT ABD & PELVIS W/CONTRAST: CPT

## 2021-07-07 PROCEDURE — 71260 CT THORAX DX C+: CPT | Mod: 26

## 2021-07-27 ENCOUNTER — OUTPATIENT (OUTPATIENT)
Dept: OUTPATIENT SERVICES | Facility: HOSPITAL | Age: 71
LOS: 1 days | Discharge: HOME | End: 2021-07-27

## 2021-07-27 DIAGNOSIS — Z98.890 OTHER SPECIFIED POSTPROCEDURAL STATES: Chronic | ICD-10-CM

## 2021-07-27 DIAGNOSIS — C64.9 MALIGNANT NEOPLASM OF UNSPECIFIED KIDNEY, EXCEPT RENAL PELVIS: ICD-10-CM

## 2021-07-27 DIAGNOSIS — S86.019A STRAIN OF UNSPECIFIED ACHILLES TENDON, INITIAL ENCOUNTER: Chronic | ICD-10-CM

## 2021-07-28 ENCOUNTER — LABORATORY RESULT (OUTPATIENT)
Age: 71
End: 2021-07-28

## 2021-07-28 ENCOUNTER — APPOINTMENT (OUTPATIENT)
Dept: INFUSION THERAPY | Facility: HOSPITAL | Age: 71
End: 2021-07-28

## 2021-07-28 ENCOUNTER — RESULT REVIEW (OUTPATIENT)
Age: 71
End: 2021-07-28

## 2021-07-28 ENCOUNTER — APPOINTMENT (OUTPATIENT)
Dept: HEMATOLOGY ONCOLOGY | Facility: CLINIC | Age: 71
End: 2021-07-28
Payer: MEDICARE

## 2021-07-28 VITALS
OXYGEN SATURATION: 99 % | SYSTOLIC BLOOD PRESSURE: 128 MMHG | TEMPERATURE: 97.4 F | WEIGHT: 164.91 LBS | BODY MASS INDEX: 23.88 KG/M2 | RESPIRATION RATE: 16 BRPM | HEART RATE: 64 BPM | HEIGHT: 69.69 IN | DIASTOLIC BLOOD PRESSURE: 82 MMHG

## 2021-07-28 LAB
BASOPHILS # BLD AUTO: 0.05 K/UL — SIGNIFICANT CHANGE UP (ref 0–0.2)
BASOPHILS NFR BLD AUTO: 0.6 % — SIGNIFICANT CHANGE UP (ref 0–2)
EOSINOPHIL # BLD AUTO: 0.15 K/UL — SIGNIFICANT CHANGE UP (ref 0–0.5)
EOSINOPHIL NFR BLD AUTO: 1.8 % — SIGNIFICANT CHANGE UP (ref 0–6)
HCT VFR BLD CALC: 39.9 % — SIGNIFICANT CHANGE UP (ref 39–50)
HGB BLD-MCNC: 14 G/DL — SIGNIFICANT CHANGE UP (ref 13–17)
IMM GRANULOCYTES NFR BLD AUTO: 0.4 % — SIGNIFICANT CHANGE UP (ref 0–1.5)
LYMPHOCYTES # BLD AUTO: 1.87 K/UL — SIGNIFICANT CHANGE UP (ref 1–3.3)
LYMPHOCYTES # BLD AUTO: 22.5 % — SIGNIFICANT CHANGE UP (ref 13–44)
MCHC RBC-ENTMCNC: 31.7 PG — SIGNIFICANT CHANGE UP (ref 27–34)
MCHC RBC-ENTMCNC: 35.1 G/DL — SIGNIFICANT CHANGE UP (ref 32–36)
MCV RBC AUTO: 90.3 FL — SIGNIFICANT CHANGE UP (ref 80–100)
MONOCYTES # BLD AUTO: 0.6 K/UL — SIGNIFICANT CHANGE UP (ref 0–0.9)
MONOCYTES NFR BLD AUTO: 7.2 % — SIGNIFICANT CHANGE UP (ref 2–14)
NEUTROPHILS # BLD AUTO: 5.62 K/UL — SIGNIFICANT CHANGE UP (ref 1.8–7.4)
NEUTROPHILS NFR BLD AUTO: 67.5 % — SIGNIFICANT CHANGE UP (ref 43–77)
NRBC # BLD: 0 /100 WBCS — SIGNIFICANT CHANGE UP (ref 0–0)
PLATELET # BLD AUTO: 178 K/UL — SIGNIFICANT CHANGE UP (ref 150–400)
RBC # BLD: 4.42 M/UL — SIGNIFICANT CHANGE UP (ref 4.2–5.8)
RBC # FLD: 13.1 % — SIGNIFICANT CHANGE UP (ref 10.3–14.5)
WBC # BLD: 8.32 K/UL — SIGNIFICANT CHANGE UP (ref 3.8–10.5)
WBC # FLD AUTO: 8.32 K/UL — SIGNIFICANT CHANGE UP (ref 3.8–10.5)

## 2021-07-28 PROCEDURE — 99072 ADDL SUPL MATRL&STAF TM PHE: CPT

## 2021-07-28 PROCEDURE — 99212 OFFICE O/P EST SF 10 MIN: CPT

## 2021-07-29 DIAGNOSIS — Z51.11 ENCOUNTER FOR ANTINEOPLASTIC CHEMOTHERAPY: ICD-10-CM

## 2021-08-02 NOTE — HISTORY OF PRESENT ILLNESS
[T: ___] : T[unfilled] [N: ___] : N[unfilled] [M: ___] : M[unfilled] [AJCC Stage: ____] : AJCC Stage: [unfilled] [de-identified] : Finesse Zuniga is a 70 years old male who initially presented with intermittent midback pain, 10/10 radiating to stomach from both sides on June 1, 2019. He has poor appetite.  On june 12 he was diagnosed UTI and on cipro for 4 days, on June 16, was started cefpodoxime 100mg BID for 7 days.  CT chest/abdomen/pelvis revealed significant abnormal appearance of the left kidney with multiple heterogeneous areas of low attenuation involving the upper and midpole which may represent malignancy. There are necrotic lymph nodes in the left para-aortic location at the level of the kidney and below the level of the kidney. Adenopathy surrounds the left renal artery. The left renal vein demonstrates question of partially occlusive thrombus. Nonspecific scattered tiny pulmonary nodules. Lower lobe subpleural nodules measure up to 6 mm. There is a well-circumscribed hypodense lesion in the anterior aspect of the lateral segment of the left lobe measuring 3.2 x 2.2 cm. He underwent CT guided liver lesion biopsy which showed consistent with primary renal cell carcinoma, IHC CK7/CK19/PAX8 positive. MRI abdomen waw con showed multiple bilateral pulmonary nodules increased in size and number compared with recent CT. The largest is pleural-based in the left lower lobe and measures 1.5 x 1.1 cm. LIVER: Rim-enhancing subcapsular mass in segment 3 and measures 4.4 x 2.6 cm. A 4.2cm mass in mid to lower pole of the lft kidney suspicious for urothelial malignancy arising within the left renal collecting system. Regional lymphadenopathy and hepatic and pulmonary metastases. Nonocclusive thrombus within the left renal vein. He was started on eliquis. Bone scan showed  a focus of mildly increased uptake in the right superior \par pubic ramus suspicious of bone mets.\par He has lost 17lbs in one month. He denies nausea, vomiting and constipation and diarrhea. \par \par 7/2019: ipilimumab + nivolumab initiated and to nivolumab maintenance post 4 cycles and continues on therapy.\par \par 9/18-22/2020 : Patient admitted for hematuria. Patient followed by urology and hematology during hospital course. Patient was started on CBI to help remove clots. Urine initially bloody, but became pink-tinged and then clear over time. CBI stopped and barber removed. Patient reportedly felt much better after barber removed, able to urinate by himself- reported some clots initially, but since resolved. States bladder spasms improved as well. Patient's eliquis was stopped, MRI done showing no evidence of renal vein thrombus, discussed  with Heme, no reason to continue eliquis for now. Patient's leukocytosis and JOSE MARIA resolved. Urine culture and blood cultures negative. \par Patient improved clinically throughout hospital course. Patient seen and examined on day of discharge. \par Cystoscopy was negative, performed by Dr. Javier. \par  [de-identified] : metastatic renal cell carcinoma [FreeTextEntry1] : Ipi + nivo (7/2019) > nivolumab 480 mg monthly - cont [de-identified] : 5/26/2021 : Mr. Call is here for nivolumab. Last scan 3/29/2021, will need another set of scan the end or beginning of July. He walks 2 miles a day with his dog and bikes 3-4 miles a day. He feels well overall. Mr. CALL denies fevers, chills, headaches, cough, SOB, chest pain, any swelling, nausea, vomiting, diarrhea, rash, or malaise. \par \par 6/23/21: Bothering Rt side inguinal hernia without pain or signs of incarceration. Doing well overall. Scheduled for nivo today. He denies fevers, chills, headaches, cough, SOB, chest pain, any swelling, nausea, vomiting, diarrhea, rash, or malaise.\par \par 7/7/2021 : CT showed stable disease\par \par 7/28/2021 : Here for nivolumab. 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 Mr. CALL denies fevers, chills, headaches, cough, SOB, chest pain, any swelling, nausea, vomiting, diarrhea, rash, or malaise. He has been tolerating therapy extremely well without sig issues. He is to see a general surgeon for hernia repair soon.

## 2021-08-02 NOTE — PHYSICAL EXAM
[Fully active, able to carry on all pre-disease performance without restriction] : Status 0 - Fully active, able to carry on all pre-disease performance without restriction [Normal] : affect appropriate [de-identified] : right inguinal bulging into testicle [de-identified] : R sided inguinal hernia, large. right calf strain

## 2021-08-25 ENCOUNTER — APPOINTMENT (OUTPATIENT)
Dept: INFUSION THERAPY | Facility: HOSPITAL | Age: 71
End: 2021-08-25

## 2021-08-25 ENCOUNTER — LABORATORY RESULT (OUTPATIENT)
Age: 71
End: 2021-08-25

## 2021-08-25 ENCOUNTER — RESULT REVIEW (OUTPATIENT)
Age: 71
End: 2021-08-25

## 2021-08-25 ENCOUNTER — APPOINTMENT (OUTPATIENT)
Dept: HEMATOLOGY ONCOLOGY | Facility: CLINIC | Age: 71
End: 2021-08-25
Payer: MEDICARE

## 2021-08-25 VITALS
HEIGHT: 69.72 IN | TEMPERATURE: 97.5 F | RESPIRATION RATE: 17 BRPM | DIASTOLIC BLOOD PRESSURE: 77 MMHG | WEIGHT: 165.79 LBS | OXYGEN SATURATION: 99 % | SYSTOLIC BLOOD PRESSURE: 138 MMHG | HEART RATE: 58 BPM | BODY MASS INDEX: 24 KG/M2

## 2021-08-25 DIAGNOSIS — Z79.899 OTHER LONG TERM (CURRENT) DRUG THERAPY: ICD-10-CM

## 2021-08-25 LAB
BASOPHILS # BLD AUTO: 0.04 K/UL — SIGNIFICANT CHANGE UP (ref 0–0.2)
BASOPHILS NFR BLD AUTO: 0.5 % — SIGNIFICANT CHANGE UP (ref 0–2)
EOSINOPHIL # BLD AUTO: 0.16 K/UL — SIGNIFICANT CHANGE UP (ref 0–0.5)
EOSINOPHIL NFR BLD AUTO: 1.9 % — SIGNIFICANT CHANGE UP (ref 0–6)
HCT VFR BLD CALC: 39.1 % — SIGNIFICANT CHANGE UP (ref 39–50)
HGB BLD-MCNC: 14 G/DL — SIGNIFICANT CHANGE UP (ref 13–17)
IMM GRANULOCYTES NFR BLD AUTO: 0.3 % — SIGNIFICANT CHANGE UP (ref 0–1.5)
LYMPHOCYTES # BLD AUTO: 1.58 K/UL — SIGNIFICANT CHANGE UP (ref 1–3.3)
LYMPHOCYTES # BLD AUTO: 18.4 % — SIGNIFICANT CHANGE UP (ref 13–44)
MCHC RBC-ENTMCNC: 32.6 PG — SIGNIFICANT CHANGE UP (ref 27–34)
MCHC RBC-ENTMCNC: 35.8 G/DL — SIGNIFICANT CHANGE UP (ref 32–36)
MCV RBC AUTO: 90.9 FL — SIGNIFICANT CHANGE UP (ref 80–100)
MONOCYTES # BLD AUTO: 0.71 K/UL — SIGNIFICANT CHANGE UP (ref 0–0.9)
MONOCYTES NFR BLD AUTO: 8.2 % — SIGNIFICANT CHANGE UP (ref 2–14)
NEUTROPHILS # BLD AUTO: 6.09 K/UL — SIGNIFICANT CHANGE UP (ref 1.8–7.4)
NEUTROPHILS NFR BLD AUTO: 70.7 % — SIGNIFICANT CHANGE UP (ref 43–77)
NRBC # BLD: 0 /100 WBCS — SIGNIFICANT CHANGE UP (ref 0–0)
PLATELET # BLD AUTO: 181 K/UL — SIGNIFICANT CHANGE UP (ref 150–400)
RBC # BLD: 4.3 M/UL — SIGNIFICANT CHANGE UP (ref 4.2–5.8)
RBC # FLD: 13.3 % — SIGNIFICANT CHANGE UP (ref 10.3–14.5)
WBC # BLD: 8.61 K/UL — SIGNIFICANT CHANGE UP (ref 3.8–10.5)
WBC # FLD AUTO: 8.61 K/UL — SIGNIFICANT CHANGE UP (ref 3.8–10.5)

## 2021-08-25 PROCEDURE — 99213 OFFICE O/P EST LOW 20 MIN: CPT

## 2021-08-26 NOTE — ASSESSMENT
[Palliative] : Goals of care discussed with patient: Palliative [Palliative Care Plan] : not applicable at this time [FreeTextEntry1] : Finesse Gallagher is a 71 years old male who presented with intermittent midback pain, 10/10 radiating to stomach from both sides. CT c/a/p and MRI abdomen showed multiple lung nodules, one liver lesion, retroperitoneal nodes. Bone scan showed one suspicious met in right superior pubic ramus. Liver lesion biopsy is consistent with renal cell carcinoma.\par It is not certain that the histology of RCC is clear cell vs non-clear cell. IMDC risk of this patient he has 5 out of 6 risks including leukocytosis, anemia, thrombocytosis, time from dx to initiate systemic treatment less than one year and high calcemia. He has poor risk of mRCC. Based on [facility] the standard of care is combined immunotherapy nivo and ipi vs sutent, checkmate 214 in the intermediate and poor risk of [facility]RCC patients with significant overall survival benefit, keynote 426 keytruda plus axitinib vs sutent in good, intermediate and poor risk of [facility]RCC patients showed OS, PFS and MICHEL benefit, [place] in the intermediate and poor risk of [facility]RCC patients showed cabozantinib demonstrated a significant clinical benefit in PFS and MICHEL over standard-of-care sunitinib as first-line therapy in patients with intermediate- or poor-risk mRCC. Consider the longer duration of response and less side effects and recommended nivo and ipi. He agreed to sign the consent. S/P cycle 4 nivo and ipi. His back pain was resolved and maintained on single agnet Nivolumab. \par He had an episode of gross hematuria leading to hospitalization in 9/2020 however etiology of hematuria is unclear and resolved. Follow up cystoscopy to r/o abnormality in the bladder showed no abnormality. Eliquis stopped during hospitalization with hematology consult. \par His most recent 7/7//2021 restaging scan showed minimal disease and unchanged from prior. He has right inguinal hernia.\par \par \par Plan \par RCC: \par Continue maintenance #28 today  nivolumab 480 mg IV monthly \par - Will perform F/U CT C/A/P after cycle 29\par mildly elevated creatinine, will follow up \par - Labs today\par \par R Inguinal Hernia : \par - quite large and bothersome. \par - follow up with surg PRN\par \par RTC 4 weeks\par \par Reid Gonzales, MSN, ANP-BC\par \par \par

## 2021-08-26 NOTE — HISTORY OF PRESENT ILLNESS
[T: ___] : T[unfilled] [N: ___] : N[unfilled] [M: ___] : M[unfilled] [AJCC Stage: ____] : AJCC Stage: [unfilled] [de-identified] : Finesse Zuniga is a 70 years old male who initially presented with intermittent midback pain, 10/10 radiating to stomach from both sides on June 1, 2019. He has poor appetite.  On june 12 he was diagnosed UTI and on cipro for 4 days, on June 16, was started cefpodoxime 100mg BID for 7 days.  CT chest/abdomen/pelvis revealed significant abnormal appearance of the left kidney with multiple heterogeneous areas of low attenuation involving the upper and midpole which may represent malignancy. There are necrotic lymph nodes in the left para-aortic location at the level of the kidney and below the level of the kidney. Adenopathy surrounds the left renal artery. The left renal vein demonstrates question of partially occlusive thrombus. Nonspecific scattered tiny pulmonary nodules. Lower lobe subpleural nodules measure up to 6 mm. There is a well-circumscribed hypodense lesion in the anterior aspect of the lateral segment of the left lobe measuring 3.2 x 2.2 cm. He underwent CT guided liver lesion biopsy which showed consistent with primary renal cell carcinoma, IHC CK7/CK19/PAX8 positive. MRI abdomen waw con showed multiple bilateral pulmonary nodules increased in size and number compared with recent CT. The largest is pleural-based in the left lower lobe and measures 1.5 x 1.1 cm. LIVER: Rim-enhancing subcapsular mass in segment 3 and measures 4.4 x 2.6 cm. A 4.2cm mass in mid to lower pole of the lft kidney suspicious for urothelial malignancy arising within the left renal collecting system. Regional lymphadenopathy and hepatic and pulmonary metastases. Nonocclusive thrombus within the left renal vein. He was started on eliquis. Bone scan showed  a focus of mildly increased uptake in the right superior \par pubic ramus suspicious of bone mets.\par He has lost 17lbs in one month. He denies nausea, vomiting and constipation and diarrhea. \par \par 7/2019: ipilimumab + nivolumab initiated and to nivolumab maintenance post 4 cycles and continues on therapy.\par \par 9/18-22/2020 : Patient admitted for hematuria. Patient followed by urology and hematology during hospital course. Patient was started on CBI to help remove clots. Urine initially bloody, but became pink-tinged and then clear over time. CBI stopped and barber removed. Patient reportedly felt much better after barber removed, able to urinate by himself- reported some clots initially, but since resolved. States bladder spasms improved as well. Patient's eliquis was stopped, MRI done showing no evidence of renal vein thrombus, discussed  with Heme, no reason to continue eliquis for now. Patient's leukocytosis and JOSE MARIA resolved. Urine culture and blood cultures negative. \par Patient improved clinically throughout hospital course. Patient seen and examined on day of discharge. \par Cystoscopy was negative, performed by Dr. Javier. \par  [de-identified] : metastatic renal cell carcinoma [FreeTextEntry1] : Ipi + nivo (7/2019) > nivolumab 480 mg monthly - cont [de-identified] : 5/26/2021 : Mr. Call is here for nivolumab. Last scan 3/29/2021, will need another set of scan the end or beginning of July. He walks 2 miles a day with his dog and bikes 3-4 miles a day. He feels well overall. Mr. CALL denies fevers, chills, headaches, cough, SOB, chest pain, any swelling, nausea, vomiting, diarrhea, rash, or malaise. \par \par 6/23/21: Bothering Rt side inguinal hernia without pain or signs of incarceration. Doing well overall. Scheduled for nivo today. He denies fevers, chills, headaches, cough, SOB, chest pain, any swelling, nausea, vomiting, diarrhea, rash, or malaise.\par \par 7/7/2021 : CT showed stable disease\par \par 7/28/2021 : Here for nivolumab. 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 Mr. CALL denies fevers, chills, headaches, cough, SOB, chest pain, any swelling, nausea, vomiting, diarrhea, rash, or malaise. He has been tolerating therapy extremely well without sig issues. He is to see a general surgeon for hernia repair soon. \par \par 8/25/2021 : Here for nivolumab, scan due in September. Mr. CALL denies fevers, chills, headaches, cough, SOB, chest pain, any swelling, nausea, vomiting, diarrhea, rash, or malaise.

## 2021-08-26 NOTE — PHYSICAL EXAM
[Fully active, able to carry on all pre-disease performance without restriction] : Status 0 - Fully active, able to carry on all pre-disease performance without restriction [Normal] : affect appropriate [de-identified] : right inguinal bulging into testicle [de-identified] : R sided inguinal hernia, large. right calf strain

## 2021-08-30 ENCOUNTER — RESULT REVIEW (OUTPATIENT)
Age: 71
End: 2021-08-30

## 2021-09-04 ENCOUNTER — APPOINTMENT (OUTPATIENT)
Dept: CT IMAGING | Facility: IMAGING CENTER | Age: 71
End: 2021-09-04
Payer: MEDICARE

## 2021-09-04 ENCOUNTER — OUTPATIENT (OUTPATIENT)
Dept: OUTPATIENT SERVICES | Facility: HOSPITAL | Age: 71
LOS: 1 days | End: 2021-09-04
Payer: MEDICARE

## 2021-09-04 DIAGNOSIS — C64.2 MALIGNANT NEOPLASM OF LEFT KIDNEY, EXCEPT RENAL PELVIS: ICD-10-CM

## 2021-09-04 DIAGNOSIS — S86.019A STRAIN OF UNSPECIFIED ACHILLES TENDON, INITIAL ENCOUNTER: Chronic | ICD-10-CM

## 2021-09-04 DIAGNOSIS — Z00.8 ENCOUNTER FOR OTHER GENERAL EXAMINATION: ICD-10-CM

## 2021-09-04 DIAGNOSIS — Z98.890 OTHER SPECIFIED POSTPROCEDURAL STATES: Chronic | ICD-10-CM

## 2021-09-04 DIAGNOSIS — Z29.8 ENCOUNTER FOR OTHER SPECIFIED PROPHYLACTIC MEASURES: ICD-10-CM

## 2021-09-04 DIAGNOSIS — C78.00 SECONDARY MALIGNANT NEOPLASM OF UNSPECIFIED LUNG: ICD-10-CM

## 2021-09-04 PROCEDURE — 74177 CT ABD & PELVIS W/CONTRAST: CPT

## 2021-09-04 PROCEDURE — 71260 CT THORAX DX C+: CPT

## 2021-09-04 PROCEDURE — 82565 ASSAY OF CREATININE: CPT

## 2021-09-04 PROCEDURE — 74177 CT ABD & PELVIS W/CONTRAST: CPT | Mod: 26

## 2021-09-04 PROCEDURE — 71260 CT THORAX DX C+: CPT | Mod: 26

## 2021-09-20 ENCOUNTER — OUTPATIENT (OUTPATIENT)
Dept: OUTPATIENT SERVICES | Facility: HOSPITAL | Age: 71
LOS: 1 days | Discharge: HOME | End: 2021-09-20

## 2021-09-20 DIAGNOSIS — Z98.890 OTHER SPECIFIED POSTPROCEDURAL STATES: Chronic | ICD-10-CM

## 2021-09-20 DIAGNOSIS — C64.9 MALIGNANT NEOPLASM OF UNSPECIFIED KIDNEY, EXCEPT RENAL PELVIS: ICD-10-CM

## 2021-09-20 DIAGNOSIS — S86.019A STRAIN OF UNSPECIFIED ACHILLES TENDON, INITIAL ENCOUNTER: Chronic | ICD-10-CM

## 2021-09-22 ENCOUNTER — LABORATORY RESULT (OUTPATIENT)
Age: 71
End: 2021-09-22

## 2021-09-22 ENCOUNTER — RESULT REVIEW (OUTPATIENT)
Age: 71
End: 2021-09-22

## 2021-09-22 ENCOUNTER — APPOINTMENT (OUTPATIENT)
Dept: INFUSION THERAPY | Facility: HOSPITAL | Age: 71
End: 2021-09-22

## 2021-09-22 DIAGNOSIS — Z51.11 ENCOUNTER FOR ANTINEOPLASTIC CHEMOTHERAPY: ICD-10-CM

## 2021-09-22 LAB
BASOPHILS # BLD AUTO: 0.03 K/UL — SIGNIFICANT CHANGE UP (ref 0–0.2)
BASOPHILS NFR BLD AUTO: 0.4 % — SIGNIFICANT CHANGE UP (ref 0–2)
EOSINOPHIL # BLD AUTO: 0.14 K/UL — SIGNIFICANT CHANGE UP (ref 0–0.5)
EOSINOPHIL NFR BLD AUTO: 1.7 % — SIGNIFICANT CHANGE UP (ref 0–6)
HCT VFR BLD CALC: 40.3 % — SIGNIFICANT CHANGE UP (ref 39–50)
HGB BLD-MCNC: 14.2 G/DL — SIGNIFICANT CHANGE UP (ref 13–17)
IMM GRANULOCYTES NFR BLD AUTO: 0.4 % — SIGNIFICANT CHANGE UP (ref 0–1.5)
LYMPHOCYTES # BLD AUTO: 1.62 K/UL — SIGNIFICANT CHANGE UP (ref 1–3.3)
LYMPHOCYTES # BLD AUTO: 19.4 % — SIGNIFICANT CHANGE UP (ref 13–44)
MCHC RBC-ENTMCNC: 32.3 PG — SIGNIFICANT CHANGE UP (ref 27–34)
MCHC RBC-ENTMCNC: 35.2 G/DL — SIGNIFICANT CHANGE UP (ref 32–36)
MCV RBC AUTO: 91.8 FL — SIGNIFICANT CHANGE UP (ref 80–100)
MONOCYTES # BLD AUTO: 0.55 K/UL — SIGNIFICANT CHANGE UP (ref 0–0.9)
MONOCYTES NFR BLD AUTO: 6.6 % — SIGNIFICANT CHANGE UP (ref 2–14)
NEUTROPHILS # BLD AUTO: 5.99 K/UL — SIGNIFICANT CHANGE UP (ref 1.8–7.4)
NEUTROPHILS NFR BLD AUTO: 71.5 % — SIGNIFICANT CHANGE UP (ref 43–77)
NRBC # BLD: 0 /100 WBCS — SIGNIFICANT CHANGE UP (ref 0–0)
PLATELET # BLD AUTO: 195 K/UL — SIGNIFICANT CHANGE UP (ref 150–400)
RBC # BLD: 4.39 M/UL — SIGNIFICANT CHANGE UP (ref 4.2–5.8)
RBC # FLD: 13.1 % — SIGNIFICANT CHANGE UP (ref 10.3–14.5)
WBC # BLD: 8.36 K/UL — SIGNIFICANT CHANGE UP (ref 3.8–10.5)
WBC # FLD AUTO: 8.36 K/UL — SIGNIFICANT CHANGE UP (ref 3.8–10.5)

## 2021-10-25 ENCOUNTER — OUTPATIENT (OUTPATIENT)
Dept: OUTPATIENT SERVICES | Facility: HOSPITAL | Age: 71
LOS: 1 days | Discharge: HOME | End: 2021-10-25

## 2021-10-25 DIAGNOSIS — Z98.890 OTHER SPECIFIED POSTPROCEDURAL STATES: Chronic | ICD-10-CM

## 2021-10-25 DIAGNOSIS — S86.019A STRAIN OF UNSPECIFIED ACHILLES TENDON, INITIAL ENCOUNTER: Chronic | ICD-10-CM

## 2021-10-25 DIAGNOSIS — C64.9 MALIGNANT NEOPLASM OF UNSPECIFIED KIDNEY, EXCEPT RENAL PELVIS: ICD-10-CM

## 2021-10-27 ENCOUNTER — LABORATORY RESULT (OUTPATIENT)
Age: 71
End: 2021-10-27

## 2021-10-27 ENCOUNTER — RESULT REVIEW (OUTPATIENT)
Age: 71
End: 2021-10-27

## 2021-10-27 ENCOUNTER — APPOINTMENT (OUTPATIENT)
Dept: INFUSION THERAPY | Facility: HOSPITAL | Age: 71
End: 2021-10-27

## 2021-10-27 ENCOUNTER — APPOINTMENT (OUTPATIENT)
Dept: HEMATOLOGY ONCOLOGY | Facility: CLINIC | Age: 71
End: 2021-10-27
Payer: MEDICARE

## 2021-10-27 VITALS
RESPIRATION RATE: 16 BRPM | DIASTOLIC BLOOD PRESSURE: 81 MMHG | WEIGHT: 158.73 LBS | TEMPERATURE: 98 F | SYSTOLIC BLOOD PRESSURE: 128 MMHG | BODY MASS INDEX: 22.96 KG/M2 | OXYGEN SATURATION: 100 % | HEART RATE: 70 BPM

## 2021-10-27 DIAGNOSIS — Z51.11 ENCOUNTER FOR ANTINEOPLASTIC CHEMOTHERAPY: ICD-10-CM

## 2021-10-27 LAB
BASOPHILS # BLD AUTO: 0.03 K/UL — SIGNIFICANT CHANGE UP (ref 0–0.2)
BASOPHILS NFR BLD AUTO: 0.4 % — SIGNIFICANT CHANGE UP (ref 0–2)
EOSINOPHIL # BLD AUTO: 0.08 K/UL — SIGNIFICANT CHANGE UP (ref 0–0.5)
EOSINOPHIL NFR BLD AUTO: 1.1 % — SIGNIFICANT CHANGE UP (ref 0–6)
HCT VFR BLD CALC: 41.3 % — SIGNIFICANT CHANGE UP (ref 39–50)
HGB BLD-MCNC: 14.9 G/DL — SIGNIFICANT CHANGE UP (ref 13–17)
IMM GRANULOCYTES NFR BLD AUTO: 0.7 % — SIGNIFICANT CHANGE UP (ref 0–1.5)
LYMPHOCYTES # BLD AUTO: 0.92 K/UL — LOW (ref 1–3.3)
LYMPHOCYTES # BLD AUTO: 12.2 % — LOW (ref 13–44)
MCHC RBC-ENTMCNC: 32.8 PG — SIGNIFICANT CHANGE UP (ref 27–34)
MCHC RBC-ENTMCNC: 36.1 G/DL — HIGH (ref 32–36)
MCV RBC AUTO: 91 FL — SIGNIFICANT CHANGE UP (ref 80–100)
MONOCYTES # BLD AUTO: 0.87 K/UL — SIGNIFICANT CHANGE UP (ref 0–0.9)
MONOCYTES NFR BLD AUTO: 11.5 % — SIGNIFICANT CHANGE UP (ref 2–14)
NEUTROPHILS # BLD AUTO: 5.62 K/UL — SIGNIFICANT CHANGE UP (ref 1.8–7.4)
NEUTROPHILS NFR BLD AUTO: 74.1 % — SIGNIFICANT CHANGE UP (ref 43–77)
NRBC # BLD: 0 /100 WBCS — SIGNIFICANT CHANGE UP (ref 0–0)
PLATELET # BLD AUTO: 170 K/UL — SIGNIFICANT CHANGE UP (ref 150–400)
RBC # BLD: 4.54 M/UL — SIGNIFICANT CHANGE UP (ref 4.2–5.8)
RBC # FLD: 12.8 % — SIGNIFICANT CHANGE UP (ref 10.3–14.5)
WBC # BLD: 7.57 K/UL — SIGNIFICANT CHANGE UP (ref 3.8–10.5)
WBC # FLD AUTO: 7.57 K/UL — SIGNIFICANT CHANGE UP (ref 3.8–10.5)

## 2021-10-27 PROCEDURE — 99214 OFFICE O/P EST MOD 30 MIN: CPT

## 2021-10-28 NOTE — PHYSICAL EXAM
[Fully active, able to carry on all pre-disease performance without restriction] : Status 0 - Fully active, able to carry on all pre-disease performance without restriction [Normal] : affect appropriate [de-identified] : right inguinal bulging into testicle [de-identified] : R sided inguinal hernia, large. right calf strain

## 2021-10-28 NOTE — ASSESSMENT
[Palliative] : Goals of care discussed with patient: Palliative [Palliative Care Plan] : not applicable at this time [FreeTextEntry1] : Finesse Gallagher is a 71 years old male who presented with intermittent midback pain, 10/10 radiating to stomach from both sides. CT c/a/p and MRI abdomen showed multiple lung nodules, one liver lesion, retroperitoneal nodes. Bone scan showed one suspicious met in right superior pubic ramus. Liver lesion biopsy is consistent with renal cell carcinoma.\par It is not certain that the histology of RCC is clear cell vs non-clear cell. IMDC risk of this patient he has 5 out of 6 risks including leukocytosis, anemia, thrombocytosis, time from dx to initiate systemic treatment less than one year and high calcemia. He has poor risk of mRCC. Based on [facility] the standard of care is combined immunotherapy nivo and ipi vs sutent, checkmate 214 in the intermediate and poor risk of [facility]RCC patients with significant overall survival benefit, keynote 426 keytruda plus axitinib vs sutent in good, intermediate and poor risk of [facility] patients showed OS, PFS and MICHEL benefit, [place] in the intermediate and poor risk of [facility]RCC patients showed cabozantinib demonstrated a significant clinical benefit in PFS and MICHEL over standard-of-care sunitinib as first-line therapy in patients with intermediate- or poor-risk mRCC. Consider the longer duration of response and less side effects and recommended nivo and ipi. He agreed to sign the consent. S/P cycle 4 nivo and ipi. His back pain was resolved and maintained on single agnet Nivolumab. \par He had an episode of gross hematuria leading to hospitalization in 9/2020 however etiology of hematuria is unclear and resolved. Follow up cystoscopy to r/o abnormality in the bladder showed no abnormality. Eliquis stopped during hospitalization with hematology consult. \par His most recent 9/4/2021 restaging scan showed minimal disease and unchanged from prior. He has right inguinal hernia.\par \par \par Plan \par RCC: \par Continue maintenance #30 today  nivolumab 480 mg IV monthly \par -Reviewed CT imaging findings with patient. Scans remain stable. WIll repeat 3 months from last imaging \par - Labs today. Will follow up resuilts\par \par R Inguinal Hernia : \par - quite large and bothersome. \par - follow up with surg PRN\par \par RTC in one month RPA\par \par \par \par \par

## 2021-10-28 NOTE — HISTORY OF PRESENT ILLNESS
[T: ___] : T[unfilled] [N: ___] : N[unfilled] [M: ___] : M[unfilled] [AJCC Stage: ____] : AJCC Stage: [unfilled] [de-identified] : Finesse Zuniga is a 70 years old male who initially presented with intermittent midback pain, 10/10 radiating to stomach from both sides on June 1, 2019. He has poor appetite.  On june 12 he was diagnosed UTI and on cipro for 4 days, on June 16, was started cefpodoxime 100mg BID for 7 days.  CT chest/abdomen/pelvis revealed significant abnormal appearance of the left kidney with multiple heterogeneous areas of low attenuation involving the upper and midpole which may represent malignancy. There are necrotic lymph nodes in the left para-aortic location at the level of the kidney and below the level of the kidney. Adenopathy surrounds the left renal artery. The left renal vein demonstrates question of partially occlusive thrombus. Nonspecific scattered tiny pulmonary nodules. Lower lobe subpleural nodules measure up to 6 mm. There is a well-circumscribed hypodense lesion in the anterior aspect of the lateral segment of the left lobe measuring 3.2 x 2.2 cm. He underwent CT guided liver lesion biopsy which showed consistent with primary renal cell carcinoma, IHC CK7/CK19/PAX8 positive. MRI abdomen waw con showed multiple bilateral pulmonary nodules increased in size and number compared with recent CT. The largest is pleural-based in the left lower lobe and measures 1.5 x 1.1 cm. LIVER: Rim-enhancing subcapsular mass in segment 3 and measures 4.4 x 2.6 cm. A 4.2cm mass in mid to lower pole of the lft kidney suspicious for urothelial malignancy arising within the left renal collecting system. Regional lymphadenopathy and hepatic and pulmonary metastases. Nonocclusive thrombus within the left renal vein. He was started on eliquis. Bone scan showed  a focus of mildly increased uptake in the right superior \par pubic ramus suspicious of bone mets.\par He has lost 17lbs in one month. He denies nausea, vomiting and constipation and diarrhea. \par \par 7/2019: ipilimumab + nivolumab initiated and to nivolumab maintenance post 4 cycles and continues on therapy.\par \par 9/18-22/2020 : Patient admitted for hematuria. Patient followed by urology and hematology during hospital course. Patient was started on CBI to help remove clots. Urine initially bloody, but became pink-tinged and then clear over time. CBI stopped and barber removed. Patient reportedly felt much better after barber removed, able to urinate by himself- reported some clots initially, but since resolved. States bladder spasms improved as well. Patient's eliquis was stopped, MRI done showing no evidence of renal vein thrombus, discussed  with Heme, no reason to continue eliquis for now. Patient's leukocytosis and JOSE MARIA resolved. Urine culture and blood cultures negative. \par Patient improved clinically throughout hospital course. Patient seen and examined on day of discharge. \par Cystoscopy was negative, performed by Dr. Javier. \par  [de-identified] : metastatic renal cell carcinoma [FreeTextEntry1] : Ipi + nivo (7/2019) > nivolumab 480 mg monthly - cont [de-identified] : 5/26/2021 : Mr. Call is here for nivolumab. Last scan 3/29/2021, will need another set of scan the end or beginning of July. He walks 2 miles a day with his dog and bikes 3-4 miles a day. He feels well overall. Mr. CALL denies fevers, chills, headaches, cough, SOB, chest pain, any swelling, nausea, vomiting, diarrhea, rash, or malaise. \par \par 6/23/21: Bothering Rt side inguinal hernia without pain or signs of incarceration. Doing well overall. Scheduled for nivo today. He denies fevers, chills, headaches, cough, SOB, chest pain, any swelling, nausea, vomiting, diarrhea, rash, or malaise.\par \par 7/7/2021 : CT showed stable disease\par \par 7/28/2021 : Here for nivolumab. 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 Mr. CALL denies fevers, chills, headaches, cough, SOB, chest pain, any swelling, nausea, vomiting, diarrhea, rash, or malaise. He has been tolerating therapy extremely well without sig issues. He is to see a general surgeon for hernia repair soon. \par \par 8/25/2021 : Here for nivolumab, scan due in September. Mr. CALL denies fevers, chills, headaches, cough, SOB, chest pain, any swelling, nausea, vomiting, diarrhea, rash, or malaise. \par \par 10/27/21: Pt presents for follow up. He is scheduled for Nivolumab which he has been tolerating well. Scheduled to receive cycle #30 today. Denies any diarrhea/ itchiness, cough, hematuria, rash. Appetite good. Energy good. No new complaints. Recieved both flu shot and COVID booster recently

## 2021-11-21 ENCOUNTER — RX RENEWAL (OUTPATIENT)
Age: 71
End: 2021-11-21

## 2021-11-24 ENCOUNTER — RESULT REVIEW (OUTPATIENT)
Age: 71
End: 2021-11-24

## 2021-11-24 ENCOUNTER — APPOINTMENT (OUTPATIENT)
Dept: INFUSION THERAPY | Facility: HOSPITAL | Age: 71
End: 2021-11-24

## 2021-11-24 ENCOUNTER — LABORATORY RESULT (OUTPATIENT)
Age: 71
End: 2021-11-24

## 2021-11-24 ENCOUNTER — APPOINTMENT (OUTPATIENT)
Dept: HEMATOLOGY ONCOLOGY | Facility: CLINIC | Age: 71
End: 2021-11-24
Payer: MEDICARE

## 2021-11-24 VITALS
DIASTOLIC BLOOD PRESSURE: 72 MMHG | BODY MASS INDEX: 23.27 KG/M2 | HEART RATE: 68 BPM | OXYGEN SATURATION: 100 % | WEIGHT: 160.94 LBS | TEMPERATURE: 98.1 F | RESPIRATION RATE: 16 BRPM | SYSTOLIC BLOOD PRESSURE: 120 MMHG

## 2021-11-24 LAB
BASOPHILS # BLD AUTO: 0.04 K/UL — SIGNIFICANT CHANGE UP (ref 0–0.2)
BASOPHILS NFR BLD AUTO: 0.6 % — SIGNIFICANT CHANGE UP (ref 0–2)
EOSINOPHIL # BLD AUTO: 0.18 K/UL — SIGNIFICANT CHANGE UP (ref 0–0.5)
EOSINOPHIL NFR BLD AUTO: 2.7 % — SIGNIFICANT CHANGE UP (ref 0–6)
HCT VFR BLD CALC: 41.9 % — SIGNIFICANT CHANGE UP (ref 39–50)
HGB BLD-MCNC: 14.5 G/DL — SIGNIFICANT CHANGE UP (ref 13–17)
IMM GRANULOCYTES NFR BLD AUTO: 0.3 % — SIGNIFICANT CHANGE UP (ref 0–1.5)
LYMPHOCYTES # BLD AUTO: 1.78 K/UL — SIGNIFICANT CHANGE UP (ref 1–3.3)
LYMPHOCYTES # BLD AUTO: 26.8 % — SIGNIFICANT CHANGE UP (ref 13–44)
MCHC RBC-ENTMCNC: 32.2 PG — SIGNIFICANT CHANGE UP (ref 27–34)
MCHC RBC-ENTMCNC: 34.6 G/DL — SIGNIFICANT CHANGE UP (ref 32–36)
MCV RBC AUTO: 92.9 FL — SIGNIFICANT CHANGE UP (ref 80–100)
MONOCYTES # BLD AUTO: 0.62 K/UL — SIGNIFICANT CHANGE UP (ref 0–0.9)
MONOCYTES NFR BLD AUTO: 9.3 % — SIGNIFICANT CHANGE UP (ref 2–14)
NEUTROPHILS # BLD AUTO: 4 K/UL — SIGNIFICANT CHANGE UP (ref 1.8–7.4)
NEUTROPHILS NFR BLD AUTO: 60.3 % — SIGNIFICANT CHANGE UP (ref 43–77)
NRBC # BLD: 0 /100 WBCS — SIGNIFICANT CHANGE UP (ref 0–0)
PLATELET # BLD AUTO: 182 K/UL — SIGNIFICANT CHANGE UP (ref 150–400)
RBC # BLD: 4.51 M/UL — SIGNIFICANT CHANGE UP (ref 4.2–5.8)
RBC # FLD: 12.8 % — SIGNIFICANT CHANGE UP (ref 10.3–14.5)
WBC # BLD: 6.64 K/UL — SIGNIFICANT CHANGE UP (ref 3.8–10.5)
WBC # FLD AUTO: 6.64 K/UL — SIGNIFICANT CHANGE UP (ref 3.8–10.5)

## 2021-11-24 PROCEDURE — 99213 OFFICE O/P EST LOW 20 MIN: CPT

## 2021-11-27 NOTE — ASSESSMENT
[Palliative] : Goals of care discussed with patient: Palliative [Palliative Care Plan] : not applicable at this time [FreeTextEntry1] : Finesse Gallagher is a 71 years old male who presented with intermittent midback pain, 10/10 radiating to stomach from both sides. CT c/a/p and MRI abdomen showed multiple lung nodules, one liver lesion, retroperitoneal nodes. Bone scan showed one suspicious met in right superior pubic ramus. Liver lesion biopsy is consistent with renal cell carcinoma.\par It is not certain that the histology of RCC is clear cell vs non-clear cell. IMDC risk of this patient he has 5 out of 6 risks including leukocytosis, anemia, thrombocytosis, time from dx to initiate systemic treatment less than one year and high calcemia. He has poor risk of mRCC. Based on [facility] the standard of care is combined immunotherapy nivo and ipi vs sutent, checkmate 214 in the intermediate and poor risk of [facility]RCC patients with significant overall survival benefit, keynote 426 keytruda plus axitinib vs sutent in good, intermediate and poor risk of [facility] patients showed OS, PFS and MICHEL benefit, [place] in the intermediate and poor risk of [facility]RCC patients showed cabozantinib demonstrated a significant clinical benefit in PFS and MICHEL over standard-of-care sunitinib as first-line therapy in patients with intermediate- or poor-risk mRCC. Consider the longer duration of response and less side effects and recommended nivo and ipi. He agreed to sign the consent. S/P cycle 4 nivo and ipi. His back pain was resolved and maintained on single agnet Nivolumab. \par He had an episode of gross hematuria leading to hospitalization in 9/2020 however etiology of hematuria is unclear and resolved. Follow up cystoscopy to r/o abnormality in the bladder showed no abnormality. Eliquis stopped during hospitalization with hematology consult. \par His most recent 9/4/2021 restaging scan showed minimal disease and unchanged from prior. He has right inguinal hernia.\par \par \par Plan \par RCC: \par Continue maintenance #31 today  nivolumab 480 mg IV monthly \par -Last imaging studies from 9/14/21 remain stable. Next imaging scheduled for 12/17/21\par - Labs today. Will follow up resuilts\par \par R Inguinal Hernia : \par - quite large and bothersome. \par - follow up with surg PRN\par \par RTC in one month RPA\par \par \par \par \par

## 2021-11-27 NOTE — PHYSICAL EXAM
[Fully active, able to carry on all pre-disease performance without restriction] : Status 0 - Fully active, able to carry on all pre-disease performance without restriction [Normal] : affect appropriate [de-identified] : right inguinal bulging into testicle [de-identified] : R sided inguinal hernia, large. right calf strain

## 2021-11-27 NOTE — HISTORY OF PRESENT ILLNESS
[T: ___] : T[unfilled] [N: ___] : N[unfilled] [M: ___] : M[unfilled] [AJCC Stage: ____] : AJCC Stage: [unfilled] [de-identified] : Finesse Zuniga is a 70 years old male who initially presented with intermittent midback pain, 10/10 radiating to stomach from both sides on June 1, 2019. He has poor appetite.  On june 12 he was diagnosed UTI and on cipro for 4 days, on June 16, was started cefpodoxime 100mg BID for 7 days.  CT chest/abdomen/pelvis revealed significant abnormal appearance of the left kidney with multiple heterogeneous areas of low attenuation involving the upper and midpole which may represent malignancy. There are necrotic lymph nodes in the left para-aortic location at the level of the kidney and below the level of the kidney. Adenopathy surrounds the left renal artery. The left renal vein demonstrates question of partially occlusive thrombus. Nonspecific scattered tiny pulmonary nodules. Lower lobe subpleural nodules measure up to 6 mm. There is a well-circumscribed hypodense lesion in the anterior aspect of the lateral segment of the left lobe measuring 3.2 x 2.2 cm. He underwent CT guided liver lesion biopsy which showed consistent with primary renal cell carcinoma, IHC CK7/CK19/PAX8 positive. MRI abdomen waw con showed multiple bilateral pulmonary nodules increased in size and number compared with recent CT. The largest is pleural-based in the left lower lobe and measures 1.5 x 1.1 cm. LIVER: Rim-enhancing subcapsular mass in segment 3 and measures 4.4 x 2.6 cm. A 4.2cm mass in mid to lower pole of the lft kidney suspicious for urothelial malignancy arising within the left renal collecting system. Regional lymphadenopathy and hepatic and pulmonary metastases. Nonocclusive thrombus within the left renal vein. He was started on eliquis. Bone scan showed  a focus of mildly increased uptake in the right superior \par pubic ramus suspicious of bone mets.\par He has lost 17lbs in one month. He denies nausea, vomiting and constipation and diarrhea. \par \par 7/2019: ipilimumab + nivolumab initiated and to nivolumab maintenance post 4 cycles and continues on therapy.\par \par 9/18-22/2020 : Patient admitted for hematuria. Patient followed by urology and hematology during hospital course. Patient was started on CBI to help remove clots. Urine initially bloody, but became pink-tinged and then clear over time. CBI stopped and barber removed. Patient reportedly felt much better after barber removed, able to urinate by himself- reported some clots initially, but since resolved. States bladder spasms improved as well. Patient's eliquis was stopped, MRI done showing no evidence of renal vein thrombus, discussed  with Heme, no reason to continue eliquis for now. Patient's leukocytosis and JOSE MARIA resolved. Urine culture and blood cultures negative. \par Patient improved clinically throughout hospital course. Patient seen and examined on day of discharge. \par Cystoscopy was negative, performed by Dr. Javier. \par  [de-identified] : metastatic renal cell carcinoma [FreeTextEntry1] : Ipi + nivo (7/2019) > nivolumab 480 mg monthly - cont [de-identified] : 5/26/2021 : Mr. Call is here for nivolumab. Last scan 3/29/2021, will need another set of scan the end or beginning of July. He walks 2 miles a day with his dog and bikes 3-4 miles a day. He feels well overall. Mr. CALL denies fevers, chills, headaches, cough, SOB, chest pain, any swelling, nausea, vomiting, diarrhea, rash, or malaise. \par \par 6/23/21: Bothering Rt side inguinal hernia without pain or signs of incarceration. Doing well overall. Scheduled for nivo today. He denies fevers, chills, headaches, cough, SOB, chest pain, any swelling, nausea, vomiting, diarrhea, rash, or malaise.\par \par 7/7/2021 : CT showed stable disease\par \par 7/28/2021 : Here for nivolumab. 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 Mr. CALL denies fevers, chills, headaches, cough, SOB, chest pain, any swelling, nausea, vomiting, diarrhea, rash, or malaise. He has been tolerating therapy extremely well without sig issues. He is to see a general surgeon for hernia repair soon. \par \par 8/25/2021 : Here for nivolumab, scan due in September. Mr. CALL denies fevers, chills, headaches, cough, SOB, chest pain, any swelling, nausea, vomiting, diarrhea, rash, or malaise. \par \par 10/27/21: Pt presents for follow up. He is scheduled for Nivolumab which he has been tolerating well. Scheduled to receive cycle #30 today. Denies any diarrhea/ itchiness, cough, hematuria, rash. Appetite good. Energy good. No new complaints. Recieved both flu shot and COVID booster recently\par \par 11/24/21: Pt feeling well. Offers no complaints. Scheduled for C#31 nivolumab today. Due for imaging next month. Already scheduled

## 2021-12-21 ENCOUNTER — OUTPATIENT (OUTPATIENT)
Dept: OUTPATIENT SERVICES | Facility: HOSPITAL | Age: 71
LOS: 1 days | End: 2021-12-21
Payer: MEDICARE

## 2021-12-21 ENCOUNTER — APPOINTMENT (OUTPATIENT)
Dept: CT IMAGING | Facility: IMAGING CENTER | Age: 71
End: 2021-12-21
Payer: MEDICARE

## 2021-12-21 ENCOUNTER — RESULT REVIEW (OUTPATIENT)
Age: 71
End: 2021-12-21

## 2021-12-21 DIAGNOSIS — Z98.890 OTHER SPECIFIED POSTPROCEDURAL STATES: Chronic | ICD-10-CM

## 2021-12-21 DIAGNOSIS — S86.019A STRAIN OF UNSPECIFIED ACHILLES TENDON, INITIAL ENCOUNTER: Chronic | ICD-10-CM

## 2021-12-21 DIAGNOSIS — N28.89 OTHER SPECIFIED DISORDERS OF KIDNEY AND URETER: ICD-10-CM

## 2021-12-21 DIAGNOSIS — Z00.8 ENCOUNTER FOR OTHER GENERAL EXAMINATION: ICD-10-CM

## 2021-12-21 DIAGNOSIS — C78.00 SECONDARY MALIGNANT NEOPLASM OF UNSPECIFIED LUNG: ICD-10-CM

## 2021-12-21 PROCEDURE — 74177 CT ABD & PELVIS W/CONTRAST: CPT

## 2021-12-21 PROCEDURE — 74177 CT ABD & PELVIS W/CONTRAST: CPT | Mod: 26

## 2021-12-21 PROCEDURE — 71260 CT THORAX DX C+: CPT

## 2021-12-21 PROCEDURE — 71260 CT THORAX DX C+: CPT | Mod: 26

## 2021-12-25 ENCOUNTER — OUTPATIENT (OUTPATIENT)
Dept: OUTPATIENT SERVICES | Facility: HOSPITAL | Age: 71
LOS: 1 days | Discharge: HOME | End: 2021-12-25

## 2021-12-25 DIAGNOSIS — S86.019A STRAIN OF UNSPECIFIED ACHILLES TENDON, INITIAL ENCOUNTER: Chronic | ICD-10-CM

## 2021-12-25 DIAGNOSIS — C64.9 MALIGNANT NEOPLASM OF UNSPECIFIED KIDNEY, EXCEPT RENAL PELVIS: ICD-10-CM

## 2021-12-25 DIAGNOSIS — Z98.890 OTHER SPECIFIED POSTPROCEDURAL STATES: Chronic | ICD-10-CM

## 2021-12-29 ENCOUNTER — APPOINTMENT (OUTPATIENT)
Dept: HEMATOLOGY ONCOLOGY | Facility: CLINIC | Age: 71
End: 2021-12-29
Payer: MEDICARE

## 2021-12-29 ENCOUNTER — RESULT REVIEW (OUTPATIENT)
Age: 71
End: 2021-12-29

## 2021-12-29 ENCOUNTER — APPOINTMENT (OUTPATIENT)
Dept: INFUSION THERAPY | Facility: HOSPITAL | Age: 71
End: 2021-12-29

## 2021-12-29 VITALS
WEIGHT: 161.38 LBS | DIASTOLIC BLOOD PRESSURE: 71 MMHG | HEIGHT: 69.72 IN | SYSTOLIC BLOOD PRESSURE: 116 MMHG | BODY MASS INDEX: 23.36 KG/M2 | HEART RATE: 61 BPM | RESPIRATION RATE: 17 BRPM | OXYGEN SATURATION: 100 % | TEMPERATURE: 97.3 F

## 2021-12-29 DIAGNOSIS — Z51.11 ENCOUNTER FOR ANTINEOPLASTIC CHEMOTHERAPY: ICD-10-CM

## 2021-12-29 LAB
ALBUMIN SERPL ELPH-MCNC: 4.4 G/DL — SIGNIFICANT CHANGE UP (ref 3.3–5)
ALP SERPL-CCNC: 69 U/L — SIGNIFICANT CHANGE UP (ref 40–120)
ALT FLD-CCNC: 18 U/L — SIGNIFICANT CHANGE UP (ref 10–45)
ANION GAP SERPL CALC-SCNC: 10 MMOL/L — SIGNIFICANT CHANGE UP (ref 5–17)
AST SERPL-CCNC: 21 U/L — SIGNIFICANT CHANGE UP (ref 10–40)
BASOPHILS # BLD AUTO: 0.03 K/UL — SIGNIFICANT CHANGE UP (ref 0–0.2)
BASOPHILS NFR BLD AUTO: 0.4 % — SIGNIFICANT CHANGE UP (ref 0–2)
BILIRUB SERPL-MCNC: 0.8 MG/DL — SIGNIFICANT CHANGE UP (ref 0.2–1.2)
BUN SERPL-MCNC: 14 MG/DL — SIGNIFICANT CHANGE UP (ref 7–23)
CALCIUM SERPL-MCNC: 9.4 MG/DL — SIGNIFICANT CHANGE UP (ref 8.4–10.5)
CHLORIDE SERPL-SCNC: 106 MMOL/L — SIGNIFICANT CHANGE UP (ref 96–108)
CO2 SERPL-SCNC: 26 MMOL/L — SIGNIFICANT CHANGE UP (ref 22–31)
CREAT SERPL-MCNC: 1.34 MG/DL — HIGH (ref 0.5–1.3)
EOSINOPHIL # BLD AUTO: 0.3 K/UL — SIGNIFICANT CHANGE UP (ref 0–0.5)
EOSINOPHIL NFR BLD AUTO: 3.8 % — SIGNIFICANT CHANGE UP (ref 0–6)
GLUCOSE SERPL-MCNC: 116 MG/DL — HIGH (ref 70–99)
HCT VFR BLD CALC: 41.2 % — SIGNIFICANT CHANGE UP (ref 39–50)
HGB BLD-MCNC: 14.2 G/DL — SIGNIFICANT CHANGE UP (ref 13–17)
IMM GRANULOCYTES NFR BLD AUTO: 0.4 % — SIGNIFICANT CHANGE UP (ref 0–1.5)
LYMPHOCYTES # BLD AUTO: 1.75 K/UL — SIGNIFICANT CHANGE UP (ref 1–3.3)
LYMPHOCYTES # BLD AUTO: 22.1 % — SIGNIFICANT CHANGE UP (ref 13–44)
MCHC RBC-ENTMCNC: 31.9 PG — SIGNIFICANT CHANGE UP (ref 27–34)
MCHC RBC-ENTMCNC: 34.5 G/DL — SIGNIFICANT CHANGE UP (ref 32–36)
MCV RBC AUTO: 92.6 FL — SIGNIFICANT CHANGE UP (ref 80–100)
MONOCYTES # BLD AUTO: 0.65 K/UL — SIGNIFICANT CHANGE UP (ref 0–0.9)
MONOCYTES NFR BLD AUTO: 8.2 % — SIGNIFICANT CHANGE UP (ref 2–14)
NEUTROPHILS # BLD AUTO: 5.16 K/UL — SIGNIFICANT CHANGE UP (ref 1.8–7.4)
NEUTROPHILS NFR BLD AUTO: 65.1 % — SIGNIFICANT CHANGE UP (ref 43–77)
NRBC # BLD: 0 /100 WBCS — SIGNIFICANT CHANGE UP (ref 0–0)
PLATELET # BLD AUTO: 208 K/UL — SIGNIFICANT CHANGE UP (ref 150–400)
POTASSIUM SERPL-MCNC: 4.9 MMOL/L — SIGNIFICANT CHANGE UP (ref 3.5–5.3)
POTASSIUM SERPL-SCNC: 4.9 MMOL/L — SIGNIFICANT CHANGE UP (ref 3.5–5.3)
PROT SERPL-MCNC: 7.1 G/DL — SIGNIFICANT CHANGE UP (ref 6–8.3)
RBC # BLD: 4.45 M/UL — SIGNIFICANT CHANGE UP (ref 4.2–5.8)
RBC # FLD: 13 % — SIGNIFICANT CHANGE UP (ref 10.3–14.5)
SODIUM SERPL-SCNC: 142 MMOL/L — SIGNIFICANT CHANGE UP (ref 135–145)
WBC # BLD: 7.92 K/UL — SIGNIFICANT CHANGE UP (ref 3.8–10.5)
WBC # FLD AUTO: 7.92 K/UL — SIGNIFICANT CHANGE UP (ref 3.8–10.5)

## 2021-12-29 PROCEDURE — 99212 OFFICE O/P EST SF 10 MIN: CPT

## 2021-12-29 RX ORDER — TAMSULOSIN HYDROCHLORIDE 0.4 MG/1
0.4 CAPSULE ORAL
Qty: 90 | Refills: 3 | Status: DISCONTINUED | COMMUNITY
Start: 2019-07-03 | End: 2021-12-29

## 2021-12-29 NOTE — PHYSICAL EXAM
[Fully active, able to carry on all pre-disease performance without restriction] : Status 0 - Fully active, able to carry on all pre-disease performance without restriction [Normal] : affect appropriate [de-identified] : no edema  [de-identified] : large right inguinal hernia

## 2021-12-29 NOTE — RESULTS/DATA
[FreeTextEntry1] : EXAM:  CT CHEST IC\par \par EXAM:  CT ABDOMEN AND PELVIS IC\par \par PROCEDURE DATE:  12/21/2021\par \par INTERPRETATION:  CLINICAL INFORMATION: Metastatic renal cell carcinoma for follow-up.\par \par COMPARISON: Prior CT dated 9/4/2021.\par \par CONTRAST/COMPLICATIONS:\par IV Contrast: Omnipaque 350   90 cc administered   10 cc discarded\par Oral Contrast:  Omnipaque 300\par Complications: None reported at time of study completion\par \par PROCEDURE:\par CT of the Chest, Abdomen and Pelvis was performed.\par Sagittal and coronal reformats were performed.\par \par FINDINGS:\par CHEST:\par LUNGS AND LARGE AIRWAYS: Patent central airways. Right apical linear opacity and left lower lobe linear opacity (2:77) are unchanged. A 0.3 cm left upper lobe nodule (2:21) is stable.\par PLEURA: No pleural effusion.\par VESSELS: Atherosclerotic changes of the aorta.\par HEART: Heart size is normal. No pericardial effusion.\par MEDIASTINUM AND JT: No lymphadenopathy.\par CHEST WALL AND LOWER NECK: Bilateral gynecomastia.\par \par ABDOMEN AND PELVIS:\par LIVER: Capsular retraction anteriorly in the left lobe is stable.\par BILE DUCTS: Normal caliber.\par GALLBLADDER: Within normal limits.\par SPLEEN: Within normal limits.\par PANCREAS: Within normal limits.\par ADRENALS: Within normal limits.\par KIDNEYS/URETERS: Indeterminate 1.4 cm right upper pole mass and subcentimeter lower pole hypodense focus in the right kidney are unchanged. Cortical atrophy is again noted in the upper pole of the left kidney with minimal infiltration of the adjacent fat, unchanged since 9/4/2021.  No hydronephrosis.\par \par BLADDER: Within normal limits.\par REPRODUCTIVE ORGANS: Prostate gland is markedly enlarged and again noted to extend into the base of the urinary bladder.\par \par BOWEL: No bowel obstruction.\par PERITONEUM: No ascites. Nonspecific infiltration of the fat in the right lower quadrant (2:120), similar in appearance to prior CT dated 9/4/2021.\par VESSELS: Within normal limits.\par RETROPERITONEUM/LYMPH NODES: No lymphadenopathy.\par ABDOMINAL WALL: Very large right inguinal hernia containing nonobstructed large and small bowel and small amount of fluid.\par BONES: Degenerative changes.\par \par IMPRESSION:\par Stable appearance of the chest compared with 9/4/2021.\par Indeterminate 1.4 cm hypodense lesion in the upper pole of the right kidney is unchanged.\par Atrophy is again noted involving the upper pole of the left kidney, unchanged.\par Very large right inguinal hernia containing nonobstructed large and small bowel and a small amount of fluid.\par Enlarged heterogeneous prostate gland.\par Nonspecific ill-defined infiltration of the fat in the right lower quadrant, not significantly changed. Attention to this area is suggested on follow-up CT.\par

## 2021-12-29 NOTE — HISTORY OF PRESENT ILLNESS
[T: ___] : T[unfilled] [N: ___] : N[unfilled] [M: ___] : M[unfilled] [AJCC Stage: ____] : AJCC Stage: [unfilled] [de-identified] : Finesse Zuniga is a 70 years old male who initially presented with intermittent midback pain, 10/10 radiating to stomach from both sides on June 1, 2019. He has poor appetite.  On june 12 he was diagnosed UTI and on cipro for 4 days, on June 16, was started cefpodoxime 100mg BID for 7 days.  CT chest/abdomen/pelvis revealed significant abnormal appearance of the left kidney with multiple heterogeneous areas of low attenuation involving the upper and midpole which may represent malignancy. There are necrotic lymph nodes in the left para-aortic location at the level of the kidney and below the level of the kidney. Adenopathy surrounds the left renal artery. The left renal vein demonstrates question of partially occlusive thrombus. Nonspecific scattered tiny pulmonary nodules. Lower lobe subpleural nodules measure up to 6 mm. There is a well-circumscribed hypodense lesion in the anterior aspect of the lateral segment of the left lobe measuring 3.2 x 2.2 cm. He underwent CT guided liver lesion biopsy which showed consistent with primary renal cell carcinoma, IHC CK7/CK19/PAX8 positive. MRI abdomen waw con showed multiple bilateral pulmonary nodules increased in size and number compared with recent CT. The largest is pleural-based in the left lower lobe and measures 1.5 x 1.1 cm. LIVER: Rim-enhancing subcapsular mass in segment 3 and measures 4.4 x 2.6 cm. A 4.2cm mass in mid to lower pole of the lft kidney suspicious for urothelial malignancy arising within the left renal collecting system. Regional lymphadenopathy and hepatic and pulmonary metastases. Nonocclusive thrombus within the left renal vein. He was started on eliquis. Bone scan showed  a focus of mildly increased uptake in the right superior \par pubic ramus suspicious of bone mets.\par He has lost 17lbs in one month. He denies nausea, vomiting and constipation and diarrhea. \par \par 7/2019: ipilimumab + nivolumab initiated and to nivolumab maintenance post 4 cycles and continues on therapy.\par \par 9/18-22/2020 : Patient admitted for hematuria. Patient followed by urology and hematology during hospital course. Patient was started on CBI to help remove clots. Urine initially bloody, but became pink-tinged and then clear over time. CBI stopped and barber removed. Patient reportedly felt much better after barber removed, able to urinate by himself- reported some clots initially, but since resolved. States bladder spasms improved as well. Patient's eliquis was stopped, MRI done showing no evidence of renal vein thrombus, discussed  with Heme, no reason to continue eliquis for now. Patient's leukocytosis and JOSE MARIA resolved. Urine culture and blood cultures negative. \par Patient improved clinically throughout hospital course. Patient seen and examined on day of discharge. \par Cystoscopy was negative, performed by Dr. Javier. \par  [de-identified] : metastatic renal cell carcinoma [FreeTextEntry1] : Ipi + nivo (7/2019) > nivolumab 480 mg monthly - cont [de-identified] : 5/26/2021 : Mr. Call is here for nivolumab. Last scan 3/29/2021, will need another set of scan the end or beginning of July. He walks 2 miles a day with his dog and bikes 3-4 miles a day. He feels well overall. Mr. CALL denies fevers, chills, headaches, cough, SOB, chest pain, any swelling, nausea, vomiting, diarrhea, rash, or malaise. \par \par 6/23/21: Bothering Rt side inguinal hernia without pain or signs of incarceration. Doing well overall. Scheduled for nivo today. He denies fevers, chills, headaches, cough, SOB, chest pain, any swelling, nausea, vomiting, diarrhea, rash, or malaise.\par \par 7/7/2021 : CT showed stable disease\par \par 7/28/2021 : Here for nivolumab. 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 Mr. CALL denies fevers, chills, headaches, cough, SOB, chest pain, any swelling, nausea, vomiting, diarrhea, rash, or malaise. He has been tolerating therapy extremely well without sig issues. He is to see a general surgeon for hernia repair soon. \par \par 8/25/2021 : Here for nivolumab, scan due in September. Mr. CALL denies fevers, chills, headaches, cough, SOB, chest pain, any swelling, nausea, vomiting, diarrhea, rash, or malaise. \par \par 10/27/21: Pt presents for follow up. He is scheduled for Nivolumab which he has been tolerating well. Scheduled to receive cycle #30 today. Denies any diarrhea/ itchiness, cough, hematuria, rash. Appetite good. Energy good. No new complaints. Recieved both flu shot and COVID booster recently\par \par 11/24/21: Pt feeling well. Offers no complaints. Scheduled for C#31 nivolumab today. Due for imaging next month. Already scheduled\par \par 12/21/21 CT chest/abd/pelvis - Stable appearance of the chest compared with 9/4/2021. Indeterminate 1.4 cm hypodense lesion in the upper pole of the right kidney is unchanged. Atrophy is again noted involving the upper pole of the left kidney, unchanged. Very large right inguinal hernia containing non-obstructed large and small bowel and a small amount of fluid. Enlarged heterogeneous prostate gland. Nonspecific ill-defined infiltration of the fat in the right lower quadrant, not significantly changed. Attention to this area is suggested on follow-up CT.\par \par 12/29/21 - presents for ongoing Opdivo cycle #32 today. Overall feeling "good", energy is good. Appetite is good. No complaints.

## 2021-12-29 NOTE — REVIEW OF SYSTEMS
[Fever] : no fever [Chills] : no chills [Night Sweats] : no night sweats [Fatigue] : no fatigue [Recent Change In Weight] : ~T no recent weight change [Eye Pain] : no eye pain [Vision Problems] : no vision problems [Dysphagia] : no dysphagia [Nosebleeds] : no nosebleeds [Odynophagia] : no odynophagia [Mucosal Pain] : no mucosal pain [Chest Pain] : no chest pain [Palpitations] : no palpitations [Lower Ext Edema] : no lower extremity edema [Shortness Of Breath] : no shortness of breath [Cough] : no cough [Abdominal Pain] : no abdominal pain [Vomiting] : no vomiting [Constipation] : no constipation [Diarrhea] : no diarrhea [Dysuria] : no dysuria [Incontinence] : no incontinence [Joint Pain] : no joint pain [Joint Stiffness] : no joint stiffness [Muscle Pain] : no muscle pain [Skin Rash] : no skin rash [Dizziness] : no dizziness [Anxiety] : no anxiety [Depression] : no depression [Muscle Weakness] : no muscle weakness [Easy Bleeding] : no tendency for easy bleeding [Easy Bruising] : no tendency for easy bruising

## 2021-12-30 ENCOUNTER — NON-APPOINTMENT (OUTPATIENT)
Age: 71
End: 2021-12-30

## 2022-01-24 ENCOUNTER — OUTPATIENT (OUTPATIENT)
Dept: OUTPATIENT SERVICES | Facility: HOSPITAL | Age: 72
LOS: 1 days | Discharge: HOME | End: 2022-01-24

## 2022-01-24 DIAGNOSIS — C64.9 MALIGNANT NEOPLASM OF UNSPECIFIED KIDNEY, EXCEPT RENAL PELVIS: ICD-10-CM

## 2022-01-24 DIAGNOSIS — S86.019A STRAIN OF UNSPECIFIED ACHILLES TENDON, INITIAL ENCOUNTER: Chronic | ICD-10-CM

## 2022-01-24 DIAGNOSIS — Z98.890 OTHER SPECIFIED POSTPROCEDURAL STATES: Chronic | ICD-10-CM

## 2022-01-26 ENCOUNTER — APPOINTMENT (OUTPATIENT)
Dept: HEMATOLOGY ONCOLOGY | Facility: CLINIC | Age: 72
End: 2022-01-26

## 2022-01-26 ENCOUNTER — RESULT REVIEW (OUTPATIENT)
Age: 72
End: 2022-01-26

## 2022-01-26 ENCOUNTER — APPOINTMENT (OUTPATIENT)
Dept: HEMATOLOGY ONCOLOGY | Facility: CLINIC | Age: 72
End: 2022-01-26
Payer: MEDICARE

## 2022-01-26 ENCOUNTER — APPOINTMENT (OUTPATIENT)
Dept: INFUSION THERAPY | Facility: HOSPITAL | Age: 72
End: 2022-01-26

## 2022-01-26 VITALS
WEIGHT: 165.13 LBS | RESPIRATION RATE: 16 BRPM | HEIGHT: 69.72 IN | BODY MASS INDEX: 23.91 KG/M2 | SYSTOLIC BLOOD PRESSURE: 141 MMHG | DIASTOLIC BLOOD PRESSURE: 82 MMHG | HEART RATE: 58 BPM | TEMPERATURE: 97.2 F | OXYGEN SATURATION: 100 %

## 2022-01-26 DIAGNOSIS — Z51.11 ENCOUNTER FOR ANTINEOPLASTIC CHEMOTHERAPY: ICD-10-CM

## 2022-01-26 LAB
ALBUMIN SERPL ELPH-MCNC: 4.4 G/DL — SIGNIFICANT CHANGE UP (ref 3.3–5)
ALP SERPL-CCNC: 67 U/L — SIGNIFICANT CHANGE UP (ref 40–120)
ALT FLD-CCNC: 19 U/L — SIGNIFICANT CHANGE UP (ref 10–45)
ANION GAP SERPL CALC-SCNC: 15 MMOL/L — SIGNIFICANT CHANGE UP (ref 5–17)
AST SERPL-CCNC: 23 U/L — SIGNIFICANT CHANGE UP (ref 10–40)
BASOPHILS # BLD AUTO: 0.04 K/UL — SIGNIFICANT CHANGE UP (ref 0–0.2)
BASOPHILS NFR BLD AUTO: 0.5 % — SIGNIFICANT CHANGE UP (ref 0–2)
BILIRUB SERPL-MCNC: 0.6 MG/DL — SIGNIFICANT CHANGE UP (ref 0.2–1.2)
BUN SERPL-MCNC: 17 MG/DL — SIGNIFICANT CHANGE UP (ref 7–23)
CALCIUM SERPL-MCNC: 10 MG/DL — SIGNIFICANT CHANGE UP (ref 8.4–10.5)
CHLORIDE SERPL-SCNC: 103 MMOL/L — SIGNIFICANT CHANGE UP (ref 96–108)
CO2 SERPL-SCNC: 22 MMOL/L — SIGNIFICANT CHANGE UP (ref 22–31)
CREAT SERPL-MCNC: 1.19 MG/DL — SIGNIFICANT CHANGE UP (ref 0.5–1.3)
EOSINOPHIL # BLD AUTO: 0.2 K/UL — SIGNIFICANT CHANGE UP (ref 0–0.5)
EOSINOPHIL NFR BLD AUTO: 2.3 % — SIGNIFICANT CHANGE UP (ref 0–6)
GLUCOSE SERPL-MCNC: 129 MG/DL — HIGH (ref 70–99)
HCT VFR BLD CALC: 42.6 % — SIGNIFICANT CHANGE UP (ref 39–50)
HGB BLD-MCNC: 14.7 G/DL — SIGNIFICANT CHANGE UP (ref 13–17)
IMM GRANULOCYTES NFR BLD AUTO: 0.4 % — SIGNIFICANT CHANGE UP (ref 0–1.5)
LYMPHOCYTES # BLD AUTO: 1.78 K/UL — SIGNIFICANT CHANGE UP (ref 1–3.3)
LYMPHOCYTES # BLD AUTO: 20.9 % — SIGNIFICANT CHANGE UP (ref 13–44)
MCHC RBC-ENTMCNC: 31.8 PG — SIGNIFICANT CHANGE UP (ref 27–34)
MCHC RBC-ENTMCNC: 34.5 G/DL — SIGNIFICANT CHANGE UP (ref 32–36)
MCV RBC AUTO: 92.2 FL — SIGNIFICANT CHANGE UP (ref 80–100)
MONOCYTES # BLD AUTO: 0.71 K/UL — SIGNIFICANT CHANGE UP (ref 0–0.9)
MONOCYTES NFR BLD AUTO: 8.3 % — SIGNIFICANT CHANGE UP (ref 2–14)
NEUTROPHILS # BLD AUTO: 5.77 K/UL — SIGNIFICANT CHANGE UP (ref 1.8–7.4)
NEUTROPHILS NFR BLD AUTO: 67.6 % — SIGNIFICANT CHANGE UP (ref 43–77)
NRBC # BLD: 0 /100 WBCS — SIGNIFICANT CHANGE UP (ref 0–0)
PLATELET # BLD AUTO: 184 K/UL — SIGNIFICANT CHANGE UP (ref 150–400)
POTASSIUM SERPL-MCNC: 5.1 MMOL/L — SIGNIFICANT CHANGE UP (ref 3.5–5.3)
POTASSIUM SERPL-SCNC: 5.1 MMOL/L — SIGNIFICANT CHANGE UP (ref 3.5–5.3)
PROT SERPL-MCNC: 7.1 G/DL — SIGNIFICANT CHANGE UP (ref 6–8.3)
RBC # BLD: 4.62 M/UL — SIGNIFICANT CHANGE UP (ref 4.2–5.8)
RBC # FLD: 13.1 % — SIGNIFICANT CHANGE UP (ref 10.3–14.5)
SODIUM SERPL-SCNC: 140 MMOL/L — SIGNIFICANT CHANGE UP (ref 135–145)
T4 FREE+ TSH PNL SERPL: 1.89 UIU/ML — SIGNIFICANT CHANGE UP (ref 0.27–4.2)
WBC # BLD: 8.53 K/UL — SIGNIFICANT CHANGE UP (ref 3.8–10.5)
WBC # FLD AUTO: 8.53 K/UL — SIGNIFICANT CHANGE UP (ref 3.8–10.5)

## 2022-01-26 PROCEDURE — 99214 OFFICE O/P EST MOD 30 MIN: CPT

## 2022-01-28 NOTE — REVIEW OF SYSTEMS
[Negative] : Heme/Lymph [Fever] : no fever [Chills] : no chills [Night Sweats] : no night sweats [Fatigue] : no fatigue [Recent Change In Weight] : ~T no recent weight change [Eye Pain] : no eye pain [Vision Problems] : no vision problems [Dysphagia] : no dysphagia [Nosebleeds] : no nosebleeds [Odynophagia] : no odynophagia [Mucosal Pain] : no mucosal pain [Chest Pain] : no chest pain [Palpitations] : no palpitations [Lower Ext Edema] : no lower extremity edema [Shortness Of Breath] : no shortness of breath [Cough] : no cough [Abdominal Pain] : no abdominal pain [Vomiting] : no vomiting [Constipation] : no constipation [Diarrhea] : no diarrhea [Dysuria] : no dysuria [Incontinence] : no incontinence [Joint Pain] : no joint pain [Joint Stiffness] : no joint stiffness [Muscle Pain] : no muscle pain [Skin Rash] : no skin rash [Dizziness] : no dizziness [Anxiety] : no anxiety [Depression] : no depression [Muscle Weakness] : no muscle weakness [Easy Bleeding] : no tendency for easy bleeding [Easy Bruising] : no tendency for easy bruising [FreeTextEntry8] : Large R. Inguinal hernia

## 2022-01-28 NOTE — HISTORY OF PRESENT ILLNESS
[T: ___] : T[unfilled] [N: ___] : N[unfilled] [M: ___] : M[unfilled] [AJCC Stage: ____] : AJCC Stage: [unfilled] [de-identified] : Finesse Zuniga is a 70 years old male who initially presented with intermittent midback pain, 10/10 radiating to stomach from both sides on June 1, 2019. He has poor appetite.  On june 12 he was diagnosed UTI and on cipro for 4 days, on June 16, was started cefpodoxime 100mg BID for 7 days.  CT chest/abdomen/pelvis revealed significant abnormal appearance of the left kidney with multiple heterogeneous areas of low attenuation involving the upper and midpole which may represent malignancy. There are necrotic lymph nodes in the left para-aortic location at the level of the kidney and below the level of the kidney. Adenopathy surrounds the left renal artery. The left renal vein demonstrates question of partially occlusive thrombus. Nonspecific scattered tiny pulmonary nodules. Lower lobe subpleural nodules measure up to 6 mm. There is a well-circumscribed hypodense lesion in the anterior aspect of the lateral segment of the left lobe measuring 3.2 x 2.2 cm. He underwent CT guided liver lesion biopsy which showed consistent with primary renal cell carcinoma, IHC CK7/CK19/PAX8 positive. MRI abdomen waw con showed multiple bilateral pulmonary nodules increased in size and number compared with recent CT. The largest is pleural-based in the left lower lobe and measures 1.5 x 1.1 cm. LIVER: Rim-enhancing subcapsular mass in segment 3 and measures 4.4 x 2.6 cm. A 4.2cm mass in mid to lower pole of the lft kidney suspicious for urothelial malignancy arising within the left renal collecting system. Regional lymphadenopathy and hepatic and pulmonary metastases. Nonocclusive thrombus within the left renal vein. He was started on eliquis. Bone scan showed  a focus of mildly increased uptake in the right superior \par pubic ramus suspicious of bone mets.\par He has lost 17lbs in one month. He denies nausea, vomiting and constipation and diarrhea. \par \par 7/2019: ipilimumab + nivolumab initiated and to nivolumab maintenance post 4 cycles and continues on therapy.\par \par 9/18-22/2020 : Patient admitted for hematuria. Patient followed by urology and hematology during hospital course. Patient was started on CBI to help remove clots. Urine initially bloody, but became pink-tinged and then clear over time. CBI stopped and barber removed. Patient reportedly felt much better after barber removed, able to urinate by himself- reported some clots initially, but since resolved. States bladder spasms improved as well. Patient's eliquis was stopped, MRI done showing no evidence of renal vein thrombus, discussed  with Heme, no reason to continue eliquis for now. Patient's leukocytosis and JOSE MARIA resolved. Urine culture and blood cultures negative. \par Patient improved clinically throughout hospital course. Patient seen and examined on day of discharge. \par Cystoscopy was negative, performed by Dr. Javier. \par  [de-identified] : metastatic renal cell carcinoma [FreeTextEntry1] : Ipi + nivo (7/2019) > nivolumab 480 mg monthly - cont [de-identified] : 5/26/2021 : Mr. Call is here for nivolumab. Last scan 3/29/2021, will need another set of scan the end or beginning of July. He walks 2 miles a day with his dog and bikes 3-4 miles a day. He feels well overall. Mr. CALL denies fevers, chills, headaches, cough, SOB, chest pain, any swelling, nausea, vomiting, diarrhea, rash, or malaise. \par \par 6/23/21: Bothering Rt side inguinal hernia without pain or signs of incarceration. Doing well overall. Scheduled for nivo today. He denies fevers, chills, headaches, cough, SOB, chest pain, any swelling, nausea, vomiting, diarrhea, rash, or malaise.\par \par 7/7/2021 : CT showed stable disease\par \par 7/28/2021 : Here for nivolumab. 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 Mr. CALL denies fevers, chills, headaches, cough, SOB, chest pain, any swelling, nausea, vomiting, diarrhea, rash, or malaise. He has been tolerating therapy extremely well without sig issues. He is to see a general surgeon for hernia repair soon. \par \par 8/25/2021 : Here for nivolumab, scan due in September. Mr. CALL denies fevers, chills, headaches, cough, SOB, chest pain, any swelling, nausea, vomiting, diarrhea, rash, or malaise. \par \par 10/27/21: Pt presents for follow up. He is scheduled for Nivolumab which he has been tolerating well. Scheduled to receive cycle #30 today. Denies any diarrhea/ itchiness, cough, hematuria, rash. Appetite good. Energy good. No new complaints. Recieved both flu shot and COVID booster recently\par \par 11/24/21: Pt feeling well. Offers no complaints. Scheduled for C#31 nivolumab today. Due for imaging next month. Already scheduled\par \par 12/21/21 CT chest/abd/pelvis - Stable appearance of the chest compared with 9/4/2021. Indeterminate 1.4 cm hypodense lesion in the upper pole of the right kidney is unchanged. Atrophy is again noted involving the upper pole of the left kidney, unchanged. Very large right inguinal hernia containing non-obstructed large and small bowel and a small amount of fluid. Enlarged heterogeneous prostate gland. Nonspecific ill-defined infiltration of the fat in the right lower quadrant, not significantly changed. Attention to this area is suggested on follow-up CT.\par \par 12/29/21 - presents for ongoing Opdivo cycle #32 today. Overall feeling "good", energy is good. Appetite is good. No complaints. \par \par 1/26/22: Pt presents for f/u. He is feeling well. Tolerating nivolumab wo any irAE.. No new complaints. Denies cough/sob/pain/diarrhea. Appetite is good. Energy good.

## 2022-01-28 NOTE — ASSESSMENT
[Palliative] : Goals of care discussed with patient: Palliative [Palliative Care Plan] : not applicable at this time [FreeTextEntry1] : Finesse Gallagher is a 71 years old male who presented with intermittent midback pain, 10/10 radiating to stomach from both sides. CT c/a/p and MRI abdomen showed multiple lung nodules, one liver lesion, retroperitoneal nodes. Bone scan showed one suspicious met in right superior pubic ramus. Liver lesion biopsy is consistent with renal cell carcinoma.\par It is not certain that the histology of RCC is clear cell vs non-clear cell. IMDC risk of this patient he has 5 out of 6 risks including leukocytosis, anemia, thrombocytosis, time from dx to initiate systemic treatment less than one year and high calcemia. He has poor risk of mRCC. Based on [facility]RCC the standard of care is combined immunotherapy nivo and ipi vs sutent, checkmate 214 in the intermediate and poor risk of mccRCC patients with significant overall survival benefit, keynote 426 keytruda plus axitinib vs sutent in good, intermediate and poor risk of [facility]RCC patients showed OS, PFS and MICHEL benefit, [place] in the intermediate and poor risk of [facility]ccRCC patients showed cabozantinib demonstrated a significant clinical benefit in PFS and MICHEL over standard-of-care sunitinib as first-line therapy in patients with intermediate- or poor-risk mRCC. Consider the longer duration of response and less side effects and recommended nivo and ipi. He agreed to sign the consent. S/P cycle 4 nivo and ipi. His back pain was resolved and maintained on single agnet Nivolumab. \par He had an episode of gross hematuria leading to hospitalization in 9/2020 however etiology of hematuria is unclear and resolved. Follow up cystoscopy to r/o abnormality in the bladder showed no abnormality. Eliquis stopped during hospitalization with hematology consult. \par His most recent 12/17/2021 restaging scan showed minimal disease and unchanged from prior. He has right inguinal hernia.\par \par \par Plan \par RCC: \par Continue maintenance #32 today  nivolumab 480 mg IV monthly \par -Last imaging studies from 12/17/21 remain stable. Next imaging scheduled for 3 months\par - Labs today. Will follow up results\par \par R Inguinal Hernia : \par - quite large and bothersome. \par - encouraged scheduling consult/repair with surg as RCC is stable and hernia is bothersome to pt. Pt informed that he was given a referral by PCP. There is no oncologic contraindication for repair\par \par RTC in one month RPA\par \par \par \par \par

## 2022-01-28 NOTE — PHYSICAL EXAM
[Fully active, able to carry on all pre-disease performance without restriction] : Status 0 - Fully active, able to carry on all pre-disease performance without restriction [Normal] : affect appropriate [de-identified] : no edema  [de-identified] : large right inguinal hernia

## 2022-02-28 ENCOUNTER — OUTPATIENT (OUTPATIENT)
Dept: OUTPATIENT SERVICES | Facility: HOSPITAL | Age: 72
LOS: 1 days | Discharge: HOME | End: 2022-02-28

## 2022-02-28 DIAGNOSIS — Z98.890 OTHER SPECIFIED POSTPROCEDURAL STATES: Chronic | ICD-10-CM

## 2022-02-28 DIAGNOSIS — C64.9 MALIGNANT NEOPLASM OF UNSPECIFIED KIDNEY, EXCEPT RENAL PELVIS: ICD-10-CM

## 2022-02-28 DIAGNOSIS — S86.019A STRAIN OF UNSPECIFIED ACHILLES TENDON, INITIAL ENCOUNTER: Chronic | ICD-10-CM

## 2022-03-02 ENCOUNTER — APPOINTMENT (OUTPATIENT)
Dept: INFUSION THERAPY | Facility: HOSPITAL | Age: 72
End: 2022-03-02

## 2022-03-02 ENCOUNTER — RESULT REVIEW (OUTPATIENT)
Age: 72
End: 2022-03-02

## 2022-03-02 ENCOUNTER — APPOINTMENT (OUTPATIENT)
Dept: HEMATOLOGY ONCOLOGY | Facility: CLINIC | Age: 72
End: 2022-03-02
Payer: MEDICARE

## 2022-03-02 VITALS
SYSTOLIC BLOOD PRESSURE: 153 MMHG | TEMPERATURE: 97 F | OXYGEN SATURATION: 99 % | RESPIRATION RATE: 16 BRPM | HEIGHT: 69.72 IN | HEART RATE: 60 BPM | BODY MASS INDEX: 23.62 KG/M2 | DIASTOLIC BLOOD PRESSURE: 79 MMHG | WEIGHT: 163.14 LBS

## 2022-03-02 DIAGNOSIS — Z51.11 ENCOUNTER FOR ANTINEOPLASTIC CHEMOTHERAPY: ICD-10-CM

## 2022-03-02 LAB
ALBUMIN SERPL ELPH-MCNC: 4.3 G/DL — SIGNIFICANT CHANGE UP (ref 3.3–5)
ALP SERPL-CCNC: 67 U/L — SIGNIFICANT CHANGE UP (ref 40–120)
ALT FLD-CCNC: 15 U/L — SIGNIFICANT CHANGE UP (ref 10–45)
ANION GAP SERPL CALC-SCNC: 11 MMOL/L — SIGNIFICANT CHANGE UP (ref 5–17)
AST SERPL-CCNC: 17 U/L — SIGNIFICANT CHANGE UP (ref 10–40)
BASOPHILS # BLD AUTO: 0.04 K/UL — SIGNIFICANT CHANGE UP (ref 0–0.2)
BASOPHILS NFR BLD AUTO: 0.5 % — SIGNIFICANT CHANGE UP (ref 0–2)
BILIRUB SERPL-MCNC: 0.6 MG/DL — SIGNIFICANT CHANGE UP (ref 0.2–1.2)
BUN SERPL-MCNC: 13 MG/DL — SIGNIFICANT CHANGE UP (ref 7–23)
CALCIUM SERPL-MCNC: 9.4 MG/DL — SIGNIFICANT CHANGE UP (ref 8.4–10.5)
CHLORIDE SERPL-SCNC: 106 MMOL/L — SIGNIFICANT CHANGE UP (ref 96–108)
CO2 SERPL-SCNC: 23 MMOL/L — SIGNIFICANT CHANGE UP (ref 22–31)
CREAT SERPL-MCNC: 1.2 MG/DL — SIGNIFICANT CHANGE UP (ref 0.5–1.3)
EGFR: 65 ML/MIN/1.73M2 — SIGNIFICANT CHANGE UP
EOSINOPHIL # BLD AUTO: 0.14 K/UL — SIGNIFICANT CHANGE UP (ref 0–0.5)
EOSINOPHIL NFR BLD AUTO: 1.7 % — SIGNIFICANT CHANGE UP (ref 0–6)
GLUCOSE SERPL-MCNC: 107 MG/DL — HIGH (ref 70–99)
HCT VFR BLD CALC: 42.1 % — SIGNIFICANT CHANGE UP (ref 39–50)
HGB BLD-MCNC: 15 G/DL — SIGNIFICANT CHANGE UP (ref 13–17)
IMM GRANULOCYTES NFR BLD AUTO: 0.2 % — SIGNIFICANT CHANGE UP (ref 0–1.5)
LYMPHOCYTES # BLD AUTO: 1.84 K/UL — SIGNIFICANT CHANGE UP (ref 1–3.3)
LYMPHOCYTES # BLD AUTO: 22.4 % — SIGNIFICANT CHANGE UP (ref 13–44)
MCHC RBC-ENTMCNC: 32.3 PG — SIGNIFICANT CHANGE UP (ref 27–34)
MCHC RBC-ENTMCNC: 35.6 G/DL — SIGNIFICANT CHANGE UP (ref 32–36)
MCV RBC AUTO: 90.7 FL — SIGNIFICANT CHANGE UP (ref 80–100)
MONOCYTES # BLD AUTO: 0.62 K/UL — SIGNIFICANT CHANGE UP (ref 0–0.9)
MONOCYTES NFR BLD AUTO: 7.6 % — SIGNIFICANT CHANGE UP (ref 2–14)
NEUTROPHILS # BLD AUTO: 5.54 K/UL — SIGNIFICANT CHANGE UP (ref 1.8–7.4)
NEUTROPHILS NFR BLD AUTO: 67.6 % — SIGNIFICANT CHANGE UP (ref 43–77)
NRBC # BLD: 0 /100 WBCS — SIGNIFICANT CHANGE UP (ref 0–0)
PLATELET # BLD AUTO: 171 K/UL — SIGNIFICANT CHANGE UP (ref 150–400)
POTASSIUM SERPL-MCNC: 4.2 MMOL/L — SIGNIFICANT CHANGE UP (ref 3.5–5.3)
POTASSIUM SERPL-SCNC: 4.2 MMOL/L — SIGNIFICANT CHANGE UP (ref 3.5–5.3)
PROT SERPL-MCNC: 6.8 G/DL — SIGNIFICANT CHANGE UP (ref 6–8.3)
RBC # BLD: 4.64 M/UL — SIGNIFICANT CHANGE UP (ref 4.2–5.8)
RBC # FLD: 12.8 % — SIGNIFICANT CHANGE UP (ref 10.3–14.5)
SODIUM SERPL-SCNC: 140 MMOL/L — SIGNIFICANT CHANGE UP (ref 135–145)
T4 FREE+ TSH PNL SERPL: 1.62 UIU/ML — SIGNIFICANT CHANGE UP (ref 0.27–4.2)
WBC # BLD: 8.2 K/UL — SIGNIFICANT CHANGE UP (ref 3.8–10.5)
WBC # FLD AUTO: 8.2 K/UL — SIGNIFICANT CHANGE UP (ref 3.8–10.5)

## 2022-03-02 PROCEDURE — 99212 OFFICE O/P EST SF 10 MIN: CPT

## 2022-03-02 NOTE — PHYSICAL EXAM
[de-identified] : no edema  [de-identified] : right sliding mass, nontender [de-identified] : large right inguinal hernia

## 2022-03-02 NOTE — ASSESSMENT
[FreeTextEntry1] : Finesse Gallagher is a 71 years old male who presented with intermittent midback pain, 10/10 radiating to stomach from both sides. CT c/a/p and MRI abdomen showed multiple lung nodules, one liver lesion, retroperitoneal nodes. Bone scan showed one suspicious met in right superior pubic ramus. Liver lesion biopsy is consistent with renal cell carcinoma.\par It is not certain that the histology of RCC is clear cell vs non-clear cell. IMDC risk of this patient he has 5 out of 6 risks including leukocytosis, anemia, thrombocytosis, time from dx to initiate systemic treatment less than one year and high calcemia. He has poor risk of mRCC. Based on [facility]RCC the standard of care is combined immunotherapy nivo and ipi vs sutent, checkmate 214 in the intermediate and poor risk of mccRCC patients with significant overall survival benefit, keynote 426 keytruda plus axitinib vs sutent in good, intermediate and poor risk of mccRCC patients showed OS, PFS and MICHEL benefit, [place] in the intermediate and poor risk of mccRCC patients showed cabozantinib demonstrated a significant clinical benefit in PFS and MICHEL over standard-of-care sunitinib as first-line therapy in patients with intermediate- or poor-risk mRCC. Consider the longer duration of response and less side effects and recommended nivo and ipi. He agreed to sign the consent. S/P cycle 4 nivo and ipi. His back pain was resolved and maintained on single agnet Nivolumab. \par He had an episode of gross hematuria leading to hospitalization in 9/2020 however etiology of hematuria is unclear and resolved. Follow up cystoscopy to r/o abnormality in the bladder showed no abnormality. Eliquis stopped during hospitalization with hematology consult. \par His most recent 9/4/2021 restaging scan showed minimal disease and unchanged from prior. He has right inguinal hernia.\par \par \par Plan \par RCC: \par Continue maintenance #33 today  nivolumab 480 mg IV monthly \par  images due next visit \par - Labs today. Will follow up resuilts\par \par R Inguinal Hernia : \par - quite large and bothersome. \par - follow up with surg PRN\par \par RTC in one month RPA\par \par \par \par \par

## 2022-03-02 NOTE — HISTORY OF PRESENT ILLNESS
[de-identified] : Finesse Zuniga is a 70 years old male who initially presented with intermittent midback pain, 10/10 radiating to stomach from both sides on June 1, 2019. He has poor appetite.  On june 12 he was diagnosed UTI and on cipro for 4 days, on June 16, was started cefpodoxime 100mg BID for 7 days.  CT chest/abdomen/pelvis revealed significant abnormal appearance of the left kidney with multiple heterogeneous areas of low attenuation involving the upper and midpole which may represent malignancy. There are necrotic lymph nodes in the left para-aortic location at the level of the kidney and below the level of the kidney. Adenopathy surrounds the left renal artery. The left renal vein demonstrates question of partially occlusive thrombus. Nonspecific scattered tiny pulmonary nodules. Lower lobe subpleural nodules measure up to 6 mm. There is a well-circumscribed hypodense lesion in the anterior aspect of the lateral segment of the left lobe measuring 3.2 x 2.2 cm. He underwent CT guided liver lesion biopsy which showed consistent with primary renal cell carcinoma, IHC CK7/CK19/PAX8 positive. MRI abdomen waw con showed multiple bilateral pulmonary nodules increased in size and number compared with recent CT. The largest is pleural-based in the left lower lobe and measures 1.5 x 1.1 cm. LIVER: Rim-enhancing subcapsular mass in segment 3 and measures 4.4 x 2.6 cm. A 4.2cm mass in mid to lower pole of the lft kidney suspicious for urothelial malignancy arising within the left renal collecting system. Regional lymphadenopathy and hepatic and pulmonary metastases. Nonocclusive thrombus within the left renal vein. He was started on eliquis. Bone scan showed  a focus of mildly increased uptake in the right superior \par pubic ramus suspicious of bone mets.\par He has lost 17lbs in one month. He denies nausea, vomiting and constipation and diarrhea. \par \par 7/2019: ipilimumab + nivolumab initiated and to nivolumab maintenance post 4 cycles and continues on therapy.\par \par 9/18-22/2020 : Patient admitted for hematuria. Patient followed by urology and hematology during hospital course. Patient was started on CBI to help remove clots. Urine initially bloody, but became pink-tinged and then clear over time. CBI stopped and barber removed. Patient reportedly felt much better after barber removed, able to urinate by himself- reported some clots initially, but since resolved. States bladder spasms improved as well. Patient's eliquis was stopped, MRI done showing no evidence of renal vein thrombus, discussed  with Heme, no reason to continue eliquis for now. Patient's leukocytosis and JOSE MARIA resolved. Urine culture and blood cultures negative. \par Patient improved clinically throughout hospital course. Patient seen and examined on day of discharge. \par Cystoscopy was negative, performed by Dr. Javier. \par  [de-identified] : metastatic renal cell carcinoma [FreeTextEntry1] : Ipi + nivo (7/2019) > nivolumab 480 mg monthly - cont [de-identified] : 5/26/2021 : Mr. Call is here for nivolumab. Last scan 3/29/2021, will need another set of scan the end or beginning of July. He walks 2 miles a day with his dog and bikes 3-4 miles a day. He feels well overall. Mr. CALL denies fevers, chills, headaches, cough, SOB, chest pain, any swelling, nausea, vomiting, diarrhea, rash, or malaise. \par \par 6/23/21: Bothering Rt side inguinal hernia without pain or signs of incarceration. Doing well overall. Scheduled for nivo today. He denies fevers, chills, headaches, cough, SOB, chest pain, any swelling, nausea, vomiting, diarrhea, rash, or malaise.\par \par 7/7/2021 : CT showed stable disease\par \par 7/28/2021 : Here for nivolumab. 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 Mr. CALL denies fevers, chills, headaches, cough, SOB, chest pain, any swelling, nausea, vomiting, diarrhea, rash, or malaise. He has been tolerating therapy extremely well without sig issues. He is to see a general surgeon for hernia repair soon. \par \par 8/25/2021 : Here for nivolumab, scan due in September. Mr. CALL denies fevers, chills, headaches, cough, SOB, chest pain, any swelling, nausea, vomiting, diarrhea, rash, or malaise. \par \par 10/27/21: Pt presents for follow up. He is scheduled for Nivolumab which he has been tolerating well. Scheduled to receive cycle #30 today. Denies any diarrhea/ itchiness, cough, hematuria, rash. Appetite good. Energy good. No new complaints. Recieved both flu shot and COVID booster recently\par \par 11/24/21: Pt feeling well. Offers no complaints. Scheduled for C#31 nivolumab today. Due for imaging next month. Already scheduled\par \par 12/21/21 CT chest/abd/pelvis - Stable appearance of the chest compared with 9/4/2021. Indeterminate 1.4 cm hypodense lesion in the upper pole of the right kidney is unchanged. Atrophy is again noted involving the upper pole of the left kidney, unchanged. Very large right inguinal hernia containing non-obstructed large and small bowel and a small amount of fluid. Enlarged heterogeneous prostate gland. Nonspecific ill-defined infiltration of the fat in the right lower quadrant, not significantly changed. Attention to this area is suggested on follow-up CT.\par \par 12/29/21 - presents for ongoing Opdivo cycle #32 today. Overall feeling "good", energy is good. Appetite is good. No complaints. \par \par 3/2/22 feels quite well, denies any pain and gross hematuria. He has a schedule in April to take care of right inguinal hernia.

## 2022-03-04 ENCOUNTER — NON-APPOINTMENT (OUTPATIENT)
Age: 72
End: 2022-03-04

## 2022-03-30 ENCOUNTER — RESULT REVIEW (OUTPATIENT)
Age: 72
End: 2022-03-30

## 2022-03-30 ENCOUNTER — APPOINTMENT (OUTPATIENT)
Dept: INFUSION THERAPY | Facility: HOSPITAL | Age: 72
End: 2022-03-30

## 2022-03-30 LAB
ALBUMIN SERPL ELPH-MCNC: 4.4 G/DL — SIGNIFICANT CHANGE UP (ref 3.3–5)
ALP SERPL-CCNC: 67 U/L — SIGNIFICANT CHANGE UP (ref 40–120)
ALT FLD-CCNC: 20 U/L — SIGNIFICANT CHANGE UP (ref 10–45)
ANION GAP SERPL CALC-SCNC: 10 MMOL/L — SIGNIFICANT CHANGE UP (ref 5–17)
AST SERPL-CCNC: 19 U/L — SIGNIFICANT CHANGE UP (ref 10–40)
BASOPHILS # BLD AUTO: 0.04 K/UL — SIGNIFICANT CHANGE UP (ref 0–0.2)
BASOPHILS NFR BLD AUTO: 0.5 % — SIGNIFICANT CHANGE UP (ref 0–2)
BILIRUB SERPL-MCNC: 0.6 MG/DL — SIGNIFICANT CHANGE UP (ref 0.2–1.2)
BUN SERPL-MCNC: 14 MG/DL — SIGNIFICANT CHANGE UP (ref 7–23)
CALCIUM SERPL-MCNC: 9.4 MG/DL — SIGNIFICANT CHANGE UP (ref 8.4–10.5)
CHLORIDE SERPL-SCNC: 106 MMOL/L — SIGNIFICANT CHANGE UP (ref 96–108)
CO2 SERPL-SCNC: 24 MMOL/L — SIGNIFICANT CHANGE UP (ref 22–31)
CREAT SERPL-MCNC: 1.26 MG/DL — SIGNIFICANT CHANGE UP (ref 0.5–1.3)
EGFR: 61 ML/MIN/1.73M2 — SIGNIFICANT CHANGE UP
EOSINOPHIL # BLD AUTO: 0.22 K/UL — SIGNIFICANT CHANGE UP (ref 0–0.5)
EOSINOPHIL NFR BLD AUTO: 2.6 % — SIGNIFICANT CHANGE UP (ref 0–6)
GLUCOSE SERPL-MCNC: 110 MG/DL — HIGH (ref 70–99)
HCT VFR BLD CALC: 41.7 % — SIGNIFICANT CHANGE UP (ref 39–50)
HGB BLD-MCNC: 14.7 G/DL — SIGNIFICANT CHANGE UP (ref 13–17)
IMM GRANULOCYTES NFR BLD AUTO: 0.4 % — SIGNIFICANT CHANGE UP (ref 0–1.5)
LYMPHOCYTES # BLD AUTO: 1.78 K/UL — SIGNIFICANT CHANGE UP (ref 1–3.3)
LYMPHOCYTES # BLD AUTO: 21.2 % — SIGNIFICANT CHANGE UP (ref 13–44)
MCHC RBC-ENTMCNC: 32.5 PG — SIGNIFICANT CHANGE UP (ref 27–34)
MCHC RBC-ENTMCNC: 35.3 G/DL — SIGNIFICANT CHANGE UP (ref 32–36)
MCV RBC AUTO: 92.3 FL — SIGNIFICANT CHANGE UP (ref 80–100)
MONOCYTES # BLD AUTO: 0.68 K/UL — SIGNIFICANT CHANGE UP (ref 0–0.9)
MONOCYTES NFR BLD AUTO: 8.1 % — SIGNIFICANT CHANGE UP (ref 2–14)
NEUTROPHILS # BLD AUTO: 5.65 K/UL — SIGNIFICANT CHANGE UP (ref 1.8–7.4)
NEUTROPHILS NFR BLD AUTO: 67.2 % — SIGNIFICANT CHANGE UP (ref 43–77)
NRBC # BLD: 0 /100 WBCS — SIGNIFICANT CHANGE UP (ref 0–0)
PLATELET # BLD AUTO: 171 K/UL — SIGNIFICANT CHANGE UP (ref 150–400)
POTASSIUM SERPL-MCNC: 5 MMOL/L — SIGNIFICANT CHANGE UP (ref 3.5–5.3)
POTASSIUM SERPL-SCNC: 5 MMOL/L — SIGNIFICANT CHANGE UP (ref 3.5–5.3)
PROT SERPL-MCNC: 7 G/DL — SIGNIFICANT CHANGE UP (ref 6–8.3)
RBC # BLD: 4.52 M/UL — SIGNIFICANT CHANGE UP (ref 4.2–5.8)
RBC # FLD: 13 % — SIGNIFICANT CHANGE UP (ref 10.3–14.5)
SODIUM SERPL-SCNC: 141 MMOL/L — SIGNIFICANT CHANGE UP (ref 135–145)
T4 FREE+ TSH PNL SERPL: 1.58 UIU/ML — SIGNIFICANT CHANGE UP (ref 0.27–4.2)
WBC # BLD: 8.4 K/UL — SIGNIFICANT CHANGE UP (ref 3.8–10.5)
WBC # FLD AUTO: 8.4 K/UL — SIGNIFICANT CHANGE UP (ref 3.8–10.5)

## 2022-04-01 ENCOUNTER — OUTPATIENT (OUTPATIENT)
Dept: OUTPATIENT SERVICES | Facility: HOSPITAL | Age: 72
LOS: 1 days | Discharge: HOME | End: 2022-04-01

## 2022-04-01 DIAGNOSIS — C64.9 MALIGNANT NEOPLASM OF UNSPECIFIED KIDNEY, EXCEPT RENAL PELVIS: ICD-10-CM

## 2022-04-01 DIAGNOSIS — Z98.890 OTHER SPECIFIED POSTPROCEDURAL STATES: Chronic | ICD-10-CM

## 2022-04-01 DIAGNOSIS — S86.019A STRAIN OF UNSPECIFIED ACHILLES TENDON, INITIAL ENCOUNTER: Chronic | ICD-10-CM

## 2022-04-06 ENCOUNTER — APPOINTMENT (OUTPATIENT)
Dept: HEMATOLOGY ONCOLOGY | Facility: CLINIC | Age: 72
End: 2022-04-06
Payer: MEDICARE

## 2022-04-06 VITALS
SYSTOLIC BLOOD PRESSURE: 121 MMHG | DIASTOLIC BLOOD PRESSURE: 74 MMHG | WEIGHT: 164.66 LBS | BODY MASS INDEX: 24.39 KG/M2 | TEMPERATURE: 97.3 F | HEART RATE: 61 BPM | OXYGEN SATURATION: 99 % | HEIGHT: 69 IN | RESPIRATION RATE: 16 BRPM

## 2022-04-06 PROCEDURE — 99213 OFFICE O/P EST LOW 20 MIN: CPT

## 2022-04-06 NOTE — ASSESSMENT
[Palliative] : Goals of care discussed with patient: Palliative [Palliative Care Plan] : not applicable at this time [FreeTextEntry1] : Finesse Gallagher is a 71 years old male who presented with intermittent midback pain, 10/10 radiating to stomach from both sides. CT c/a/p and MRI abdomen showed multiple lung nodules, one liver lesion, retroperitoneal nodes. Bone scan showed one suspicious met in right superior pubic ramus. Liver lesion biopsy is consistent with renal cell carcinoma.\par It is not certain that the histology of RCC is clear cell vs non-clear cell. IMDC risk of this patient he has 5 out of 6 risks including leukocytosis, anemia, thrombocytosis, time from dx to initiate systemic treatment less than one year and high calcemia. He has poor risk of mRCC. Based on [facility]RCC the standard of care is combined immunotherapy nivo and ipi vs sutent, checkmate 214 in the intermediate and poor risk of mccRCC patients with significant overall survival benefit, keynote 426 keytruda plus axitinib vs sutent in good, intermediate and poor risk of mccRCC patients showed OS, PFS and MICHEL benefit, [place] in the intermediate and poor risk of mccRCC patients showed cabozantinib demonstrated a significant clinical benefit in PFS and MICHEL over standard-of-care sunitinib as first-line therapy in patients with intermediate- or poor-risk mRCC. Consider the longer duration of response and less side effects and recommended nivo and ipi. He agreed to sign the consent. S/P cycle 4 nivo and ipi. His back pain was resolved and maintained on single agnet Nivolumab. \par He had an episode of gross hematuria leading to hospitalization in 9/2020 however etiology of hematuria is unclear and resolved. Follow up cystoscopy to r/o abnormality in the bladder showed no abnormality. Eliquis stopped during hospitalization with hematology consult. \par His most recent 9/4/2021 restaging scan showed minimal disease and unchanged from prior. He has right inguinal hernia.\par \par \par Plan \par RCC: \par Continue maintenance #34 today  nivolumab 480 mg IV monthly \par  images due \par - Labs today. Will follow up resuilts\par \par R Inguinal Hernia : \par - quite large and bothersome. \par - follow up with surg PRN\par \par RTC in one month RPA\par \par \par \par \par

## 2022-04-06 NOTE — HISTORY OF PRESENT ILLNESS
[T: ___] : T[unfilled] [N: ___] : N[unfilled] [M: ___] : M[unfilled] [AJCC Stage: ____] : AJCC Stage: [unfilled] [de-identified] : Finesse Zuniga is a 70 years old male who initially presented with intermittent midback pain, 10/10 radiating to stomach from both sides on June 1, 2019. He has poor appetite.  On june 12 he was diagnosed UTI and on cipro for 4 days, on June 16, was started cefpodoxime 100mg BID for 7 days.  CT chest/abdomen/pelvis revealed significant abnormal appearance of the left kidney with multiple heterogeneous areas of low attenuation involving the upper and midpole which may represent malignancy. There are necrotic lymph nodes in the left para-aortic location at the level of the kidney and below the level of the kidney. Adenopathy surrounds the left renal artery. The left renal vein demonstrates question of partially occlusive thrombus. Nonspecific scattered tiny pulmonary nodules. Lower lobe subpleural nodules measure up to 6 mm. There is a well-circumscribed hypodense lesion in the anterior aspect of the lateral segment of the left lobe measuring 3.2 x 2.2 cm. He underwent CT guided liver lesion biopsy which showed consistent with primary renal cell carcinoma, IHC CK7/CK19/PAX8 positive. MRI abdomen waw con showed multiple bilateral pulmonary nodules increased in size and number compared with recent CT. The largest is pleural-based in the left lower lobe and measures 1.5 x 1.1 cm. LIVER: Rim-enhancing subcapsular mass in segment 3 and measures 4.4 x 2.6 cm. A 4.2cm mass in mid to lower pole of the lft kidney suspicious for urothelial malignancy arising within the left renal collecting system. Regional lymphadenopathy and hepatic and pulmonary metastases. Nonocclusive thrombus within the left renal vein. He was started on eliquis. Bone scan showed  a focus of mildly increased uptake in the right superior \par pubic ramus suspicious of bone mets.\par He has lost 17lbs in one month. He denies nausea, vomiting and constipation and diarrhea. \par \par 7/2019: ipilimumab + nivolumab initiated and to nivolumab maintenance post 4 cycles and continues on therapy.\par \par 9/18-22/2020 : Patient admitted for hematuria. Patient followed by urology and hematology during hospital course. Patient was started on CBI to help remove clots. Urine initially bloody, but became pink-tinged and then clear over time. CBI stopped and barber removed. Patient reportedly felt much better after barber removed, able to urinate by himself- reported some clots initially, but since resolved. States bladder spasms improved as well. Patient's eliquis was stopped, MRI done showing no evidence of renal vein thrombus, discussed  with Heme, no reason to continue eliquis for now. Patient's leukocytosis and JOSE MARIA resolved. Urine culture and blood cultures negative. \par Patient improved clinically throughout hospital course. Patient seen and examined on day of discharge. \par Cystoscopy was negative, performed by Dr. Javier. \par  [de-identified] : metastatic renal cell carcinoma [FreeTextEntry1] : Ipi + nivo (7/2019) > nivolumab 480 mg monthly - cont [de-identified] : 5/26/2021 : Mr. Call is here for nivolumab. Last scan 3/29/2021, will need another set of scan the end or beginning of July. He walks 2 miles a day with his dog and bikes 3-4 miles a day. He feels well overall. Mr. CALL denies fevers, chills, headaches, cough, SOB, chest pain, any swelling, nausea, vomiting, diarrhea, rash, or malaise. \par \par 6/23/21: Bothering Rt side inguinal hernia without pain or signs of incarceration. Doing well overall. Scheduled for nivo today. He denies fevers, chills, headaches, cough, SOB, chest pain, any swelling, nausea, vomiting, diarrhea, rash, or malaise.\par \par 7/7/2021 : CT showed stable disease\par \par 7/28/2021 : Here for nivolumab. 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 Mr. CALL denies fevers, chills, headaches, cough, SOB, chest pain, any swelling, nausea, vomiting, diarrhea, rash, or malaise. He has been tolerating therapy extremely well without sig issues. He is to see a general surgeon for hernia repair soon. \par \par 8/25/2021 : Here for nivolumab, scan due in September. Mr. CALL denies fevers, chills, headaches, cough, SOB, chest pain, any swelling, nausea, vomiting, diarrhea, rash, or malaise. \par \par 10/27/21: Pt presents for follow up. He is scheduled for Nivolumab which he has been tolerating well. Scheduled to receive cycle #30 today. Denies any diarrhea/ itchiness, cough, hematuria, rash. Appetite good. Energy good. No new complaints. Recieved both flu shot and COVID booster recently\par \par 11/24/21: Pt feeling well. Offers no complaints. Scheduled for C#31 nivolumab today. Due for imaging next month. Already scheduled\par \par 12/21/21 CT chest/abd/pelvis - Stable appearance of the chest compared with 9/4/2021. Indeterminate 1.4 cm hypodense lesion in the upper pole of the right kidney is unchanged. Atrophy is again noted involving the upper pole of the left kidney, unchanged. Very large right inguinal hernia containing non-obstructed large and small bowel and a small amount of fluid. Enlarged heterogeneous prostate gland. Nonspecific ill-defined infiltration of the fat in the right lower quadrant, not significantly changed. Attention to this area is suggested on follow-up CT.\par \par 12/29/21 - presents for ongoing Opdivo cycle #32 today. Overall feeling "good", energy is good. Appetite is good. No complaints. \par \par 3/2/22 feels quite well, denies any pain and gross hematuria. He has a schedule in April to take care of right inguinal hernia. \par \par 4/6/22 feels fine, denies pain and gross hematuria. His right inguinal hernia is getting worse.

## 2022-04-06 NOTE — PHYSICAL EXAM
[Fully active, able to carry on all pre-disease performance without restriction] : Status 0 - Fully active, able to carry on all pre-disease performance without restriction [Normal] : affect appropriate [de-identified] : no edema  [de-identified] : right sliding mass, nontender [de-identified] : large right inguinal hernia

## 2022-04-16 ENCOUNTER — APPOINTMENT (OUTPATIENT)
Dept: CT IMAGING | Facility: IMAGING CENTER | Age: 72
End: 2022-04-16
Payer: MEDICARE

## 2022-04-16 ENCOUNTER — OUTPATIENT (OUTPATIENT)
Dept: OUTPATIENT SERVICES | Facility: HOSPITAL | Age: 72
LOS: 1 days | End: 2022-04-16
Payer: MEDICARE

## 2022-04-16 DIAGNOSIS — Z00.8 ENCOUNTER FOR OTHER GENERAL EXAMINATION: ICD-10-CM

## 2022-04-16 DIAGNOSIS — S86.019A STRAIN OF UNSPECIFIED ACHILLES TENDON, INITIAL ENCOUNTER: Chronic | ICD-10-CM

## 2022-04-16 DIAGNOSIS — Z98.890 OTHER SPECIFIED POSTPROCEDURAL STATES: Chronic | ICD-10-CM

## 2022-04-16 PROCEDURE — 74178 CT ABD&PLV WO CNTR FLWD CNTR: CPT | Mod: 26

## 2022-04-16 PROCEDURE — 74178 CT ABD&PLV WO CNTR FLWD CNTR: CPT

## 2022-04-16 PROCEDURE — 71260 CT THORAX DX C+: CPT

## 2022-04-16 PROCEDURE — 71260 CT THORAX DX C+: CPT | Mod: 26

## 2022-04-27 ENCOUNTER — APPOINTMENT (OUTPATIENT)
Dept: INFUSION THERAPY | Facility: HOSPITAL | Age: 72
End: 2022-04-27

## 2022-04-27 ENCOUNTER — RESULT REVIEW (OUTPATIENT)
Age: 72
End: 2022-04-27

## 2022-04-27 ENCOUNTER — APPOINTMENT (OUTPATIENT)
Dept: HEMATOLOGY ONCOLOGY | Facility: CLINIC | Age: 72
End: 2022-04-27

## 2022-04-27 ENCOUNTER — APPOINTMENT (OUTPATIENT)
Dept: HEMATOLOGY ONCOLOGY | Facility: CLINIC | Age: 72
End: 2022-04-27
Payer: MEDICARE

## 2022-04-27 VITALS
OXYGEN SATURATION: 98 % | DIASTOLIC BLOOD PRESSURE: 83 MMHG | WEIGHT: 157.19 LBS | TEMPERATURE: 96.4 F | HEART RATE: 77 BPM | BODY MASS INDEX: 23.21 KG/M2 | RESPIRATION RATE: 16 BRPM | SYSTOLIC BLOOD PRESSURE: 136 MMHG

## 2022-04-27 DIAGNOSIS — Z51.11 ENCOUNTER FOR ANTINEOPLASTIC CHEMOTHERAPY: ICD-10-CM

## 2022-04-27 LAB
ALBUMIN SERPL ELPH-MCNC: 4.2 G/DL — SIGNIFICANT CHANGE UP (ref 3.3–5)
ALP SERPL-CCNC: 68 U/L — SIGNIFICANT CHANGE UP (ref 40–120)
ALT FLD-CCNC: 20 U/L — SIGNIFICANT CHANGE UP (ref 10–45)
ANION GAP SERPL CALC-SCNC: 11 MMOL/L — SIGNIFICANT CHANGE UP (ref 5–17)
AST SERPL-CCNC: 28 U/L — SIGNIFICANT CHANGE UP (ref 10–40)
BASOPHILS # BLD AUTO: 0.03 K/UL — SIGNIFICANT CHANGE UP (ref 0–0.2)
BASOPHILS NFR BLD AUTO: 0.4 % — SIGNIFICANT CHANGE UP (ref 0–2)
BILIRUB SERPL-MCNC: 1.3 MG/DL — HIGH (ref 0.2–1.2)
BUN SERPL-MCNC: 16 MG/DL — SIGNIFICANT CHANGE UP (ref 7–23)
CALCIUM SERPL-MCNC: 9.6 MG/DL — SIGNIFICANT CHANGE UP (ref 8.4–10.5)
CHLORIDE SERPL-SCNC: 103 MMOL/L — SIGNIFICANT CHANGE UP (ref 96–108)
CO2 SERPL-SCNC: 23 MMOL/L — SIGNIFICANT CHANGE UP (ref 22–31)
CREAT SERPL-MCNC: 1.23 MG/DL — SIGNIFICANT CHANGE UP (ref 0.5–1.3)
EGFR: 63 ML/MIN/1.73M2 — SIGNIFICANT CHANGE UP
EOSINOPHIL # BLD AUTO: 0.06 K/UL — SIGNIFICANT CHANGE UP (ref 0–0.5)
EOSINOPHIL NFR BLD AUTO: 0.7 % — SIGNIFICANT CHANGE UP (ref 0–6)
GLUCOSE SERPL-MCNC: 104 MG/DL — HIGH (ref 70–99)
HCT VFR BLD CALC: 41.1 % — SIGNIFICANT CHANGE UP (ref 39–50)
HGB BLD-MCNC: 14.2 G/DL — SIGNIFICANT CHANGE UP (ref 13–17)
IMM GRANULOCYTES NFR BLD AUTO: 0.4 % — SIGNIFICANT CHANGE UP (ref 0–1.5)
LYMPHOCYTES # BLD AUTO: 1.3 K/UL — SIGNIFICANT CHANGE UP (ref 1–3.3)
LYMPHOCYTES # BLD AUTO: 16.1 % — SIGNIFICANT CHANGE UP (ref 13–44)
MCHC RBC-ENTMCNC: 32.1 PG — SIGNIFICANT CHANGE UP (ref 27–34)
MCHC RBC-ENTMCNC: 34.5 G/DL — SIGNIFICANT CHANGE UP (ref 32–36)
MCV RBC AUTO: 92.8 FL — SIGNIFICANT CHANGE UP (ref 80–100)
MONOCYTES # BLD AUTO: 0.91 K/UL — HIGH (ref 0–0.9)
MONOCYTES NFR BLD AUTO: 11.3 % — SIGNIFICANT CHANGE UP (ref 2–14)
NEUTROPHILS # BLD AUTO: 5.72 K/UL — SIGNIFICANT CHANGE UP (ref 1.8–7.4)
NEUTROPHILS NFR BLD AUTO: 71.1 % — SIGNIFICANT CHANGE UP (ref 43–77)
NRBC # BLD: 0 /100 WBCS — SIGNIFICANT CHANGE UP (ref 0–0)
PLATELET # BLD AUTO: 163 K/UL — SIGNIFICANT CHANGE UP (ref 150–400)
POTASSIUM SERPL-MCNC: 5.6 MMOL/L — HIGH (ref 3.5–5.3)
POTASSIUM SERPL-SCNC: 5.6 MMOL/L — HIGH (ref 3.5–5.3)
PROT SERPL-MCNC: 7.4 G/DL — SIGNIFICANT CHANGE UP (ref 6–8.3)
RBC # BLD: 4.43 M/UL — SIGNIFICANT CHANGE UP (ref 4.2–5.8)
RBC # FLD: 12.7 % — SIGNIFICANT CHANGE UP (ref 10.3–14.5)
SODIUM SERPL-SCNC: 137 MMOL/L — SIGNIFICANT CHANGE UP (ref 135–145)
T4 FREE+ TSH PNL SERPL: 2.32 UIU/ML — SIGNIFICANT CHANGE UP (ref 0.27–4.2)
WBC # BLD: 8.05 K/UL — SIGNIFICANT CHANGE UP (ref 3.8–10.5)
WBC # FLD AUTO: 8.05 K/UL — SIGNIFICANT CHANGE UP (ref 3.8–10.5)

## 2022-04-27 PROCEDURE — 99214 OFFICE O/P EST MOD 30 MIN: CPT

## 2022-04-27 NOTE — HISTORY OF PRESENT ILLNESS
[T: ___] : T[unfilled] [N: ___] : N[unfilled] [M: ___] : M[unfilled] [AJCC Stage: ____] : AJCC Stage: [unfilled] [de-identified] : Finesse Zuniga is a 70 years old male who initially presented with intermittent midback pain, 10/10 radiating to stomach from both sides on June 1, 2019. He has poor appetite.  On june 12 he was diagnosed UTI and on cipro for 4 days, on June 16, was started cefpodoxime 100mg BID for 7 days.  CT chest/abdomen/pelvis revealed significant abnormal appearance of the left kidney with multiple heterogeneous areas of low attenuation involving the upper and midpole which may represent malignancy. There are necrotic lymph nodes in the left para-aortic location at the level of the kidney and below the level of the kidney. Adenopathy surrounds the left renal artery. The left renal vein demonstrates question of partially occlusive thrombus. Nonspecific scattered tiny pulmonary nodules. Lower lobe subpleural nodules measure up to 6 mm. There is a well-circumscribed hypodense lesion in the anterior aspect of the lateral segment of the left lobe measuring 3.2 x 2.2 cm. He underwent CT guided liver lesion biopsy which showed consistent with primary renal cell carcinoma, IHC CK7/CK19/PAX8 positive. MRI abdomen waw con showed multiple bilateral pulmonary nodules increased in size and number compared with recent CT. The largest is pleural-based in the left lower lobe and measures 1.5 x 1.1 cm. LIVER: Rim-enhancing subcapsular mass in segment 3 and measures 4.4 x 2.6 cm. A 4.2cm mass in mid to lower pole of the lft kidney suspicious for urothelial malignancy arising within the left renal collecting system. Regional lymphadenopathy and hepatic and pulmonary metastases. Nonocclusive thrombus within the left renal vein. He was started on eliquis. Bone scan showed  a focus of mildly increased uptake in the right superior \par pubic ramus suspicious of bone mets.\par He has lost 17lbs in one month. He denies nausea, vomiting and constipation and diarrhea. \par \par 7/2019: ipilimumab + nivolumab initiated and to nivolumab maintenance post 4 cycles and continues on therapy.\par \par 9/18-22/2020 : Patient admitted for hematuria. Patient followed by urology and hematology during hospital course. Patient was started on CBI to help remove clots. Urine initially bloody, but became pink-tinged and then clear over time. CBI stopped and barber removed. Patient reportedly felt much better after barber removed, able to urinate by himself- reported some clots initially, but since resolved. States bladder spasms improved as well. Patient's eliquis was stopped, MRI done showing no evidence of renal vein thrombus, discussed  with Heme, no reason to continue eliquis for now. Patient's leukocytosis and JOSE MARIA resolved. Urine culture and blood cultures negative. \par Patient improved clinically throughout hospital course. Patient seen and examined on day of discharge. \par Cystoscopy was negative, performed by Dr. Javier. \par  [de-identified] : metastatic renal cell carcinoma [FreeTextEntry1] : Ipi + nivo (7/2019) > nivolumab 480 mg monthly - cont [de-identified] : 5/26/2021 : Mr. Call is here for nivolumab. Last scan 3/29/2021, will need another set of scan the end or beginning of July. He walks 2 miles a day with his dog and bikes 3-4 miles a day. He feels well overall. Mr. CALL denies fevers, chills, headaches, cough, SOB, chest pain, any swelling, nausea, vomiting, diarrhea, rash, or malaise. \par \par 6/23/21: Bothering Rt side inguinal hernia without pain or signs of incarceration. Doing well overall. Scheduled for nivo today. He denies fevers, chills, headaches, cough, SOB, chest pain, any swelling, nausea, vomiting, diarrhea, rash, or malaise.\par \par 7/7/2021 : CT showed stable disease\par \par 7/28/2021 : Here for nivolumab. 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 Mr. CALL denies fevers, chills, headaches, cough, SOB, chest pain, any swelling, nausea, vomiting, diarrhea, rash, or malaise. He has been tolerating therapy extremely well without sig issues. He is to see a general surgeon for hernia repair soon. \par \par 8/25/2021 : Here for nivolumab, scan due in September. Mr. CALL denies fevers, chills, headaches, cough, SOB, chest pain, any swelling, nausea, vomiting, diarrhea, rash, or malaise. \par \par 10/27/21: Pt presents for follow up. He is scheduled for Nivolumab which he has been tolerating well. Scheduled to receive cycle #30 today. Denies any diarrhea/ itchiness, cough, hematuria, rash. Appetite good. Energy good. No new complaints. Recieved both flu shot and COVID booster recently\par \par 11/24/21: Pt feeling well. Offers no complaints. Scheduled for C#31 nivolumab today. Due for imaging next month. Already scheduled\par \par 12/21/21 CT chest/abd/pelvis - Stable appearance of the chest compared with 9/4/2021. Indeterminate 1.4 cm hypodense lesion in the upper pole of the right kidney is unchanged. Atrophy is again noted involving the upper pole of the left kidney, unchanged. Very large right inguinal hernia containing non-obstructed large and small bowel and a small amount of fluid. Enlarged heterogeneous prostate gland. Nonspecific ill-defined infiltration of the fat in the right lower quadrant, not significantly changed. Attention to this area is suggested on follow-up CT.\par \par 12/29/21 - presents for ongoing Opdivo cycle #32 today. Overall feeling "good", energy is good. Appetite is good. No complaints. \par \par 3/2/22 feels quite well, denies any pain and gross hematuria. He has a schedule in April to take care of right inguinal hernia. \par \par 4/6/22 feels fine, denies pain and gross hematuria. His right inguinal hernia is getting worse. \par \par 4/27/22: Pt returns for follow up. He reports 24 period of severe diarrhea on monday. No fevers. Resolved spontaneously. Pt reports he had imaging done 2 weeks ago. Eager fto discuss findings. Feeling well now. Pt will be  meeting with surgeon recommended by PMD for Right inguinal hernia repair

## 2022-04-27 NOTE — PHYSICAL EXAM
[Fully active, able to carry on all pre-disease performance without restriction] : Status 0 - Fully active, able to carry on all pre-disease performance without restriction [Normal] : affect appropriate [de-identified] : no edema  [de-identified] : right sliding mass, nontender [de-identified] : large right inguinal hernia

## 2022-04-27 NOTE — ASSESSMENT
[Palliative] : Goals of care discussed with patient: Palliative [Palliative Care Plan] : not applicable at this time [FreeTextEntry1] : Finesse Gallagher is a 71 years old male who presented with intermittent midback pain, 10/10 radiating to stomach from both sides. CT c/a/p and MRI abdomen showed multiple lung nodules, one liver lesion, retroperitoneal nodes. Bone scan showed one suspicious met in right superior pubic ramus. Liver lesion biopsy is consistent with renal cell carcinoma.\par It is not certain that the histology of RCC is clear cell vs non-clear cell. IMDC risk of this patient he has 5 out of 6 risks including leukocytosis, anemia, thrombocytosis, time from dx to initiate systemic treatment less than one year and high calcemia. He has poor risk of mRCC. Based on [facility]RCC the standard of care is combined immunotherapy nivo and ipi vs sutent, checkmate 214 in the intermediate and poor risk of mccRCC patients with significant overall survival benefit, keynote 426 keytruda plus axitinib vs sutent in good, intermediate and poor risk of [facility]RCC patients showed OS, PFS and MICHEL benefit, [place] in the intermediate and poor risk of mccRCC patients showed cabozantinib demonstrated a significant clinical benefit in PFS and MICHEL over standard-of-care sunitinib as first-line therapy in patients with intermediate- or poor-risk mRCC. Consider the longer duration of response and less side effects and recommended nivo and ipi. He agreed to sign the consent. S/P cycle 4 nivo and ipi. His back pain was resolved and maintained on single agnet Nivolumab. \par He had an episode of gross hematuria leading to hospitalization in 9/2020 however etiology of hematuria is unclear and resolved. Follow up cystoscopy to r/o abnormality in the bladder showed no abnormality. Eliquis stopped during hospitalization with hematology consult. \par His most recent 9/4/2021 restaging scan showed minimal disease and unchanged from prior. He has right inguinal hernia.\par \par \par Plan \par RCC: \par Continue maintenance #35 today  nivolumab 480 mg IV monthly \par Personally reviewed findings on recent imaging with patient which revealed stable findings. \par Recent episode of diarrhea likely viral and not immune mediated. Reviewed possibility of  immune mediated  colitis for immunotherapy and instructed patient to inform office if diarrhea returns. \par - Labs today. Will follow up resuilts\par \par R Inguinal Hernia : \par - quite large and bothersome. \par - Pt will be meeting with surgeon in near future to discuss repair\par RTC in one month RPA\par \par \par \par \par

## 2022-05-19 ENCOUNTER — OUTPATIENT (OUTPATIENT)
Dept: OUTPATIENT SERVICES | Facility: HOSPITAL | Age: 72
LOS: 1 days | Discharge: HOME | End: 2022-05-19

## 2022-05-19 DIAGNOSIS — C64.9 MALIGNANT NEOPLASM OF UNSPECIFIED KIDNEY, EXCEPT RENAL PELVIS: ICD-10-CM

## 2022-05-19 DIAGNOSIS — S86.019A STRAIN OF UNSPECIFIED ACHILLES TENDON, INITIAL ENCOUNTER: Chronic | ICD-10-CM

## 2022-05-19 DIAGNOSIS — Z98.890 OTHER SPECIFIED POSTPROCEDURAL STATES: Chronic | ICD-10-CM

## 2022-05-25 ENCOUNTER — RESULT REVIEW (OUTPATIENT)
Age: 72
End: 2022-05-25

## 2022-05-25 ENCOUNTER — APPOINTMENT (OUTPATIENT)
Dept: HEMATOLOGY ONCOLOGY | Facility: CLINIC | Age: 72
End: 2022-05-25
Payer: MEDICARE

## 2022-05-25 ENCOUNTER — APPOINTMENT (OUTPATIENT)
Dept: INFUSION THERAPY | Facility: HOSPITAL | Age: 72
End: 2022-05-25

## 2022-05-25 VITALS
RESPIRATION RATE: 16 BRPM | WEIGHT: 165.32 LBS | SYSTOLIC BLOOD PRESSURE: 148 MMHG | TEMPERATURE: 96.8 F | BODY MASS INDEX: 24.42 KG/M2 | HEART RATE: 58 BPM | DIASTOLIC BLOOD PRESSURE: 75 MMHG | OXYGEN SATURATION: 99 %

## 2022-05-25 DIAGNOSIS — Z51.11 ENCOUNTER FOR ANTINEOPLASTIC CHEMOTHERAPY: ICD-10-CM

## 2022-05-25 LAB
ALBUMIN SERPL ELPH-MCNC: 4.2 G/DL — SIGNIFICANT CHANGE UP (ref 3.3–5)
ALP SERPL-CCNC: 58 U/L — SIGNIFICANT CHANGE UP (ref 40–120)
ALT FLD-CCNC: 17 U/L — SIGNIFICANT CHANGE UP (ref 10–45)
ANION GAP SERPL CALC-SCNC: 11 MMOL/L — SIGNIFICANT CHANGE UP (ref 5–17)
AST SERPL-CCNC: 28 U/L — SIGNIFICANT CHANGE UP (ref 10–40)
BASOPHILS # BLD AUTO: 0.04 K/UL — SIGNIFICANT CHANGE UP (ref 0–0.2)
BASOPHILS NFR BLD AUTO: 0.6 % — SIGNIFICANT CHANGE UP (ref 0–2)
BILIRUB SERPL-MCNC: 0.6 MG/DL — SIGNIFICANT CHANGE UP (ref 0.2–1.2)
BUN SERPL-MCNC: 17 MG/DL — SIGNIFICANT CHANGE UP (ref 7–23)
CALCIUM SERPL-MCNC: 9.1 MG/DL — SIGNIFICANT CHANGE UP (ref 8.4–10.5)
CHLORIDE SERPL-SCNC: 107 MMOL/L — SIGNIFICANT CHANGE UP (ref 96–108)
CO2 SERPL-SCNC: 23 MMOL/L — SIGNIFICANT CHANGE UP (ref 22–31)
CREAT SERPL-MCNC: 1.15 MG/DL — SIGNIFICANT CHANGE UP (ref 0.5–1.3)
EGFR: 68 ML/MIN/1.73M2 — SIGNIFICANT CHANGE UP
EOSINOPHIL # BLD AUTO: 0.23 K/UL — SIGNIFICANT CHANGE UP (ref 0–0.5)
EOSINOPHIL NFR BLD AUTO: 3.2 % — SIGNIFICANT CHANGE UP (ref 0–6)
GLUCOSE SERPL-MCNC: 104 MG/DL — HIGH (ref 70–99)
HCT VFR BLD CALC: 39.9 % — SIGNIFICANT CHANGE UP (ref 39–50)
HGB BLD-MCNC: 13.8 G/DL — SIGNIFICANT CHANGE UP (ref 13–17)
IMM GRANULOCYTES NFR BLD AUTO: 0.4 % — SIGNIFICANT CHANGE UP (ref 0–1.5)
LYMPHOCYTES # BLD AUTO: 1.91 K/UL — SIGNIFICANT CHANGE UP (ref 1–3.3)
LYMPHOCYTES # BLD AUTO: 26.3 % — SIGNIFICANT CHANGE UP (ref 13–44)
MCHC RBC-ENTMCNC: 32.2 PG — SIGNIFICANT CHANGE UP (ref 27–34)
MCHC RBC-ENTMCNC: 34.6 G/DL — SIGNIFICANT CHANGE UP (ref 32–36)
MCV RBC AUTO: 93 FL — SIGNIFICANT CHANGE UP (ref 80–100)
MONOCYTES # BLD AUTO: 0.63 K/UL — SIGNIFICANT CHANGE UP (ref 0–0.9)
MONOCYTES NFR BLD AUTO: 8.7 % — SIGNIFICANT CHANGE UP (ref 2–14)
NEUTROPHILS # BLD AUTO: 4.42 K/UL — SIGNIFICANT CHANGE UP (ref 1.8–7.4)
NEUTROPHILS NFR BLD AUTO: 60.8 % — SIGNIFICANT CHANGE UP (ref 43–77)
NRBC # BLD: 0 /100 WBCS — SIGNIFICANT CHANGE UP (ref 0–0)
PLATELET # BLD AUTO: 160 K/UL — SIGNIFICANT CHANGE UP (ref 150–400)
POTASSIUM SERPL-MCNC: 4.9 MMOL/L — SIGNIFICANT CHANGE UP (ref 3.5–5.3)
POTASSIUM SERPL-SCNC: 4.9 MMOL/L — SIGNIFICANT CHANGE UP (ref 3.5–5.3)
PROT SERPL-MCNC: 6.8 G/DL — SIGNIFICANT CHANGE UP (ref 6–8.3)
RBC # BLD: 4.29 M/UL — SIGNIFICANT CHANGE UP (ref 4.2–5.8)
RBC # FLD: 13.2 % — SIGNIFICANT CHANGE UP (ref 10.3–14.5)
SODIUM SERPL-SCNC: 142 MMOL/L — SIGNIFICANT CHANGE UP (ref 135–145)
T4 FREE+ TSH PNL SERPL: 1.14 UIU/ML — SIGNIFICANT CHANGE UP (ref 0.27–4.2)
WBC # BLD: 7.26 K/UL — SIGNIFICANT CHANGE UP (ref 3.8–10.5)
WBC # FLD AUTO: 7.26 K/UL — SIGNIFICANT CHANGE UP (ref 3.8–10.5)

## 2022-05-25 PROCEDURE — 99214 OFFICE O/P EST MOD 30 MIN: CPT

## 2022-05-25 RX ORDER — LISINOPRIL 10 MG/1
10 TABLET ORAL DAILY
Qty: 30 | Refills: 4 | Status: DISCONTINUED | COMMUNITY
Start: 2019-07-03 | End: 2022-05-25

## 2022-06-01 ENCOUNTER — APPOINTMENT (OUTPATIENT)
Dept: HEMATOLOGY ONCOLOGY | Facility: CLINIC | Age: 72
End: 2022-06-01

## 2022-06-22 ENCOUNTER — RESULT REVIEW (OUTPATIENT)
Age: 72
End: 2022-06-22

## 2022-06-22 ENCOUNTER — APPOINTMENT (OUTPATIENT)
Dept: HEMATOLOGY ONCOLOGY | Facility: CLINIC | Age: 72
End: 2022-06-22
Payer: MEDICARE

## 2022-06-22 ENCOUNTER — APPOINTMENT (OUTPATIENT)
Dept: INFUSION THERAPY | Facility: HOSPITAL | Age: 72
End: 2022-06-22

## 2022-06-22 VITALS
BODY MASS INDEX: 24.06 KG/M2 | SYSTOLIC BLOOD PRESSURE: 114 MMHG | DIASTOLIC BLOOD PRESSURE: 73 MMHG | OXYGEN SATURATION: 99 % | RESPIRATION RATE: 16 BRPM | WEIGHT: 162.92 LBS | TEMPERATURE: 97.1 F | HEART RATE: 58 BPM

## 2022-06-22 LAB
ALBUMIN SERPL ELPH-MCNC: 4.5 G/DL — SIGNIFICANT CHANGE UP (ref 3.3–5)
ALP SERPL-CCNC: 62 U/L — SIGNIFICANT CHANGE UP (ref 40–120)
ALT FLD-CCNC: 21 U/L — SIGNIFICANT CHANGE UP (ref 10–45)
ANION GAP SERPL CALC-SCNC: 4 MMOL/L — LOW (ref 5–17)
AST SERPL-CCNC: 69 U/L — HIGH (ref 10–40)
BASOPHILS # BLD AUTO: 0.05 K/UL — SIGNIFICANT CHANGE UP (ref 0–0.2)
BASOPHILS NFR BLD AUTO: 0.6 % — SIGNIFICANT CHANGE UP (ref 0–2)
BILIRUB SERPL-MCNC: 0.9 MG/DL — SIGNIFICANT CHANGE UP (ref 0.2–1.2)
BUN SERPL-MCNC: 18 MG/DL — SIGNIFICANT CHANGE UP (ref 7–23)
CALCIUM SERPL-MCNC: 9.3 MG/DL — SIGNIFICANT CHANGE UP (ref 8.4–10.5)
CHLORIDE SERPL-SCNC: 103 MMOL/L — SIGNIFICANT CHANGE UP (ref 96–108)
CO2 SERPL-SCNC: 28 MMOL/L — SIGNIFICANT CHANGE UP (ref 22–31)
CREAT SERPL-MCNC: 1.22 MG/DL — SIGNIFICANT CHANGE UP (ref 0.5–1.3)
EGFR: 64 ML/MIN/1.73M2 — SIGNIFICANT CHANGE UP
EOSINOPHIL # BLD AUTO: 0.19 K/UL — SIGNIFICANT CHANGE UP (ref 0–0.5)
EOSINOPHIL NFR BLD AUTO: 2.1 % — SIGNIFICANT CHANGE UP (ref 0–6)
GLUCOSE SERPL-MCNC: 108 MG/DL — HIGH (ref 70–99)
HCT VFR BLD CALC: 41.2 % — SIGNIFICANT CHANGE UP (ref 39–50)
HGB BLD-MCNC: 14.5 G/DL — SIGNIFICANT CHANGE UP (ref 13–17)
IMM GRANULOCYTES NFR BLD AUTO: 0.3 % — SIGNIFICANT CHANGE UP (ref 0–1.5)
LYMPHOCYTES # BLD AUTO: 2 K/UL — SIGNIFICANT CHANGE UP (ref 1–3.3)
LYMPHOCYTES # BLD AUTO: 22.3 % — SIGNIFICANT CHANGE UP (ref 13–44)
MCHC RBC-ENTMCNC: 32.4 PG — SIGNIFICANT CHANGE UP (ref 27–34)
MCHC RBC-ENTMCNC: 35.2 G/DL — SIGNIFICANT CHANGE UP (ref 32–36)
MCV RBC AUTO: 92.2 FL — SIGNIFICANT CHANGE UP (ref 80–100)
MONOCYTES # BLD AUTO: 0.69 K/UL — SIGNIFICANT CHANGE UP (ref 0–0.9)
MONOCYTES NFR BLD AUTO: 7.7 % — SIGNIFICANT CHANGE UP (ref 2–14)
NEUTROPHILS # BLD AUTO: 6.01 K/UL — SIGNIFICANT CHANGE UP (ref 1.8–7.4)
NEUTROPHILS NFR BLD AUTO: 67 % — SIGNIFICANT CHANGE UP (ref 43–77)
NRBC # BLD: 0 /100 WBCS — SIGNIFICANT CHANGE UP (ref 0–0)
PLATELET # BLD AUTO: 173 K/UL — SIGNIFICANT CHANGE UP (ref 150–400)
POTASSIUM SERPL-MCNC: SIGNIFICANT CHANGE UP (ref 3.5–5.3)
POTASSIUM SERPL-SCNC: SIGNIFICANT CHANGE UP (ref 3.5–5.3)
PROT SERPL-MCNC: 7.7 G/DL — SIGNIFICANT CHANGE UP (ref 6–8.3)
RBC # BLD: 4.47 M/UL — SIGNIFICANT CHANGE UP (ref 4.2–5.8)
RBC # FLD: 12.7 % — SIGNIFICANT CHANGE UP (ref 10.3–14.5)
SODIUM SERPL-SCNC: 135 MMOL/L — SIGNIFICANT CHANGE UP (ref 135–145)
T4 FREE+ TSH PNL SERPL: 1.61 UIU/ML — SIGNIFICANT CHANGE UP (ref 0.27–4.2)
WBC # BLD: 8.97 K/UL — SIGNIFICANT CHANGE UP (ref 3.8–10.5)
WBC # FLD AUTO: 8.97 K/UL — SIGNIFICANT CHANGE UP (ref 3.8–10.5)

## 2022-06-22 PROCEDURE — 99213 OFFICE O/P EST LOW 20 MIN: CPT

## 2022-06-22 NOTE — ASSESSMENT
[Palliative] : Goals of care discussed with patient: Palliative [Palliative Care Plan] : not applicable at this time [FreeTextEntry1] : Finesse Gallagher is a 71 years old male who presented with intermittent midback pain, 10/10 radiating to stomach from both sides. CT c/a/p and MRI abdomen showed multiple lung nodules, one liver lesion, retroperitoneal nodes. Bone scan showed one suspicious met in right superior pubic ramus. Liver lesion biopsy is consistent with renal cell carcinoma.\par It is not certain that the histology of RCC is clear cell vs non-clear cell. IMDC risk of this patient he has 5 out of 6 risks including leukocytosis, anemia, thrombocytosis, time from dx to initiate systemic treatment less than one year and high calcemia. He has poor risk of mRCC. Based on [facility] the standard of care is combined immunotherapy nivo and ipi vs sutent, checkmate 214 in the intermediate and poor risk of [facility]RCC patients with significant overall survival benefit, keynote 426 keytruda plus axitinib vs sutent in good, intermediate and poor risk of [facility]RCC patients showed OS, PFS and MICHEL benefit, [place] in the intermediate and poor risk of [facility]RCC patients showed cabozantinib demonstrated a significant clinical benefit in PFS and MICHEL over standard-of-care sunitinib as first-line therapy in patients with intermediate- or poor-risk mRCC. Consider the longer duration of response and less side effects and recommended nivo and ipi. He agreed to sign the consent. S/P cycle 4 nivo and ipi. His back pain was resolved and maintained on single agnet Nivolumab. \par He had an episode of gross hematuria leading to hospitalization in 9/2020 however etiology of hematuria is unclear and resolved. Follow up cystoscopy to r/o abnormality in the bladder showed no abnormality. Eliquis stopped during hospitalization with hematology consult. \par His most recent 9/4/2021 restaging scan showed minimal disease and unchanged from prior. He has right inguinal hernia.\par \par \par Plan \par RCC: \par Continue maintenance #36 today  nivolumab 480 mg IV monthly \par Personally reviewed findings on recent imaging with patient which revealed stable findings. \par Recent episode of diarrhea likely viral and not immune mediated. Reviewed possibility of  immune mediated  colitis for immunotherapy and instructed patient to inform office if diarrhea returns. \par - Labs today. Will follow up resuilts\par \par R Inguinal Hernia : \par - quite large and bothersome. \par - Pt will be meeting with surgeon in near future to discuss repair\par RTC in one month RPA\par \par \par \par \par

## 2022-06-22 NOTE — REVIEW OF SYSTEMS
[Negative] : Allergic/Immunologic [Fever] : no fever [Chills] : no chills [Night Sweats] : no night sweats [Fatigue] : no fatigue [Recent Change In Weight] : ~T no recent weight change [Eye Pain] : no eye pain [Vision Problems] : no vision problems [Dysphagia] : no dysphagia [Nosebleeds] : no nosebleeds [Odynophagia] : no odynophagia [Mucosal Pain] : no mucosal pain [Chest Pain] : no chest pain [Palpitations] : no palpitations [Lower Ext Edema] : no lower extremity edema [Shortness Of Breath] : no shortness of breath [Cough] : no cough [Abdominal Pain] : no abdominal pain [Vomiting] : no vomiting [Constipation] : no constipation [Diarrhea] : no diarrhea [Dysuria] : no dysuria [Incontinence] : no incontinence [Joint Pain] : no joint pain [Joint Stiffness] : no joint stiffness [Muscle Pain] : no muscle pain [Skin Rash] : no skin rash [Dizziness] : no dizziness [Anxiety] : no anxiety [Depression] : no depression [Muscle Weakness] : no muscle weakness [Easy Bleeding] : no tendency for easy bleeding [Easy Bruising] : no tendency for easy bruising

## 2022-06-22 NOTE — HISTORY OF PRESENT ILLNESS
[T: ___] : T[unfilled] [N: ___] : N[unfilled] [M: ___] : M[unfilled] [AJCC Stage: ____] : AJCC Stage: [unfilled] [de-identified] : Finesse Zuniga is a 70 years old male who initially presented with intermittent midback pain, 10/10 radiating to stomach from both sides on June 1, 2019. He has poor appetite.  On june 12 he was diagnosed UTI and on cipro for 4 days, on June 16, was started cefpodoxime 100mg BID for 7 days.  CT chest/abdomen/pelvis revealed significant abnormal appearance of the left kidney with multiple heterogeneous areas of low attenuation involving the upper and midpole which may represent malignancy. There are necrotic lymph nodes in the left para-aortic location at the level of the kidney and below the level of the kidney. Adenopathy surrounds the left renal artery. The left renal vein demonstrates question of partially occlusive thrombus. Nonspecific scattered tiny pulmonary nodules. Lower lobe subpleural nodules measure up to 6 mm. There is a well-circumscribed hypodense lesion in the anterior aspect of the lateral segment of the left lobe measuring 3.2 x 2.2 cm. He underwent CT guided liver lesion biopsy which showed consistent with primary renal cell carcinoma, IHC CK7/CK19/PAX8 positive. MRI abdomen waw con showed multiple bilateral pulmonary nodules increased in size and number compared with recent CT. The largest is pleural-based in the left lower lobe and measures 1.5 x 1.1 cm. LIVER: Rim-enhancing subcapsular mass in segment 3 and measures 4.4 x 2.6 cm. A 4.2cm mass in mid to lower pole of the lft kidney suspicious for urothelial malignancy arising within the left renal collecting system. Regional lymphadenopathy and hepatic and pulmonary metastases. Nonocclusive thrombus within the left renal vein. He was started on eliquis. Bone scan showed  a focus of mildly increased uptake in the right superior \par pubic ramus suspicious of bone mets.\par He has lost 17lbs in one month. He denies nausea, vomiting and constipation and diarrhea. \par \par 7/2019: ipilimumab + nivolumab initiated and to nivolumab maintenance post 4 cycles and continues on therapy.\par \par 9/18-22/2020 : Patient admitted for hematuria. Patient followed by urology and hematology during hospital course. Patient was started on CBI to help remove clots. Urine initially bloody, but became pink-tinged and then clear over time. CBI stopped and barber removed. Patient reportedly felt much better after barber removed, able to urinate by himself- reported some clots initially, but since resolved. States bladder spasms improved as well. Patient's eliquis was stopped, MRI done showing no evidence of renal vein thrombus, discussed  with Heme, no reason to continue eliquis for now. Patient's leukocytosis and JOSE MARIA resolved. Urine culture and blood cultures negative. \par Patient improved clinically throughout hospital course. Patient seen and examined on day of discharge. \par Cystoscopy was negative, performed by Dr. Javier. \par  [de-identified] : metastatic renal cell carcinoma [FreeTextEntry1] : Ipi + nivo (7/2019) > nivolumab 480 mg monthly - cont [de-identified] : 5/26/2021 : Mr. Call is here for nivolumab. Last scan 3/29/2021, will need another set of scan the end or beginning of July. He walks 2 miles a day with his dog and bikes 3-4 miles a day. He feels well overall. Mr. CALL denies fevers, chills, headaches, cough, SOB, chest pain, any swelling, nausea, vomiting, diarrhea, rash, or malaise. \par \par 6/23/21: Bothering Rt side inguinal hernia without pain or signs of incarceration. Doing well overall. Scheduled for nivo today. He denies fevers, chills, headaches, cough, SOB, chest pain, any swelling, nausea, vomiting, diarrhea, rash, or malaise.\par \par 7/7/2021 : CT showed stable disease\par \par 7/28/2021 : Here for nivolumab. 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 Mr. CALL denies fevers, chills, headaches, cough, SOB, chest pain, any swelling, nausea, vomiting, diarrhea, rash, or malaise. He has been tolerating therapy extremely well without sig issues. He is to see a general surgeon for hernia repair soon. \par \par 8/25/2021 : Here for nivolumab, scan due in September. Mr. CALL denies fevers, chills, headaches, cough, SOB, chest pain, any swelling, nausea, vomiting, diarrhea, rash, or malaise. \par \par 10/27/21: Pt presents for follow up. He is scheduled for Nivolumab which he has been tolerating well. Scheduled to receive cycle #30 today. Denies any diarrhea/ itchiness, cough, hematuria, rash. Appetite good. Energy good. No new complaints. Recieved both flu shot and COVID booster recently\par \par 11/24/21: Pt feeling well. Offers no complaints. Scheduled for C#31 nivolumab today. Due for imaging next month. Already scheduled\par \par 12/21/21 CT chest/abd/pelvis - Stable appearance of the chest compared with 9/4/2021. Indeterminate 1.4 cm hypodense lesion in the upper pole of the right kidney is unchanged. Atrophy is again noted involving the upper pole of the left kidney, unchanged. Very large right inguinal hernia containing non-obstructed large and small bowel and a small amount of fluid. Enlarged heterogeneous prostate gland. Nonspecific ill-defined infiltration of the fat in the right lower quadrant, not significantly changed. Attention to this area is suggested on follow-up CT.\par \par 12/29/21 - presents for ongoing Opdivo cycle #32 today. Overall feeling "good", energy is good. Appetite is good. No complaints. \par \par 3/2/22 feels quite well, denies any pain and gross hematuria. He has a schedule in April to take care of right inguinal hernia. \par \par 4/6/22 feels fine, denies pain and gross hematuria. His right inguinal hernia is getting worse. \par \par 4/27/22: Pt returns for follow up. He reports 24 period of severe diarrhea on monday. No fevers. Resolved spontaneously. Pt reports he had imaging done 2 weeks ago. Eager fto discuss findings. Feeling well now. Pt will be  meeting with surgeon recommended by PMD for Right inguinal hernia repair.\par \par 6/22/22 feels well. He will meet his surgeon for his right inguinal hernia.

## 2022-06-22 NOTE — PHYSICAL EXAM
[Fully active, able to carry on all pre-disease performance without restriction] : Status 0 - Fully active, able to carry on all pre-disease performance without restriction [Normal] : affect appropriate [de-identified] : no edema  [de-identified] : right sliding mass, nontender [de-identified] : large right inguinal hernia

## 2022-06-23 ENCOUNTER — RESULT REVIEW (OUTPATIENT)
Age: 72
End: 2022-06-23

## 2022-06-23 ENCOUNTER — NON-APPOINTMENT (OUTPATIENT)
Age: 72
End: 2022-06-23

## 2022-06-24 LAB
HCT VFR BLD CALC: 43 % — SIGNIFICANT CHANGE UP (ref 39–50)
HGB BLD-MCNC: 14.5 G/DL — SIGNIFICANT CHANGE UP (ref 13–17)
MCHC RBC-ENTMCNC: 32.1 PG — SIGNIFICANT CHANGE UP (ref 27–34)
MCHC RBC-ENTMCNC: 33.7 GM/DL — SIGNIFICANT CHANGE UP (ref 32–36)
MCV RBC AUTO: 95.1 FL — SIGNIFICANT CHANGE UP (ref 80–100)
PLATELET # BLD AUTO: 180 K/UL — SIGNIFICANT CHANGE UP (ref 150–400)
RBC # BLD: 4.52 M/UL — SIGNIFICANT CHANGE UP (ref 4.2–5.8)
RBC # FLD: 13.5 % — SIGNIFICANT CHANGE UP (ref 10.3–14.5)
WBC # BLD: 9.4 K/UL — SIGNIFICANT CHANGE UP (ref 3.8–10.5)
WBC # FLD AUTO: 9.4 K/UL — SIGNIFICANT CHANGE UP (ref 3.8–10.5)

## 2022-07-07 ENCOUNTER — OUTPATIENT (OUTPATIENT)
Dept: OUTPATIENT SERVICES | Facility: HOSPITAL | Age: 72
LOS: 1 days | Discharge: HOME | End: 2022-07-07

## 2022-07-07 DIAGNOSIS — Z98.890 OTHER SPECIFIED POSTPROCEDURAL STATES: Chronic | ICD-10-CM

## 2022-07-07 DIAGNOSIS — S86.019A STRAIN OF UNSPECIFIED ACHILLES TENDON, INITIAL ENCOUNTER: Chronic | ICD-10-CM

## 2022-07-07 DIAGNOSIS — C64.9 MALIGNANT NEOPLASM OF UNSPECIFIED KIDNEY, EXCEPT RENAL PELVIS: ICD-10-CM

## 2022-07-16 NOTE — ED ADULT NURSE NOTE - CAS TRG GEN SKIN COLOR
Hospitalist Progress Note      Hospital summary: Luc Lujan is a [de-identified] y.o. male with a pmhx prostate cancer, and hypertension who presents with  Fever, and chills. His sx started after receiveing a blood transfusion.      In the ED,   He was fevrile to 102.9, tachycardic to 125, with low normal BP, and tachypea to 33. Labs were significant.     In the Ed, he received tylenol, cefepime, vancomycin, levaquin, cefepime, and 2Lns 7/15/2022      Assessment/Plan:  #Septic shock om poa   #Neutropenic fever  - unclear source   - blood cx sent . 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 2.1 cm left common iliac artery aneurysm    Critical Care Attestation: This patient is unstable and critically ill. Due to a high probability of clinically significant, life threatening deterioration, the patient required my highest level of preparedness to intervene emergently and I personally spent this critical care time directly and personally managing the patient. This critical care time included obtaining a history; examining the patient; pulse oximetry; ordering and review of studies; arranging urgent treatment with development of a management plan; evaluation of patient's response to treatment; frequent reassessment; and, discussions with other providers and/or family.  This critical care time was performed to assess and manage the high probability of imminent, life-threatening deterioration that could result in multi-organ failure and death. It was exclusive of separately billable procedures, treating other patients, and teaching time. Time Spent:   I personally spent 30 minutes in providing critical care time. Code status: Full  DVT prophylaxis: SCDs  Disposition: TBD   ----------------------------------------------    CC: Fever     S: Patient is seen and examined at bedside this AM. He feels tired. No cough, abd pain or diarrhea. He does not know what type of cancer he has or treatment. He just said that he gets transfusions  Discussed with nursing     Spoke with son on phone and updated patient's status - critically ill, sepsis - unclear source of infection , CT showed colitis- on broad spectrum abx. Son does not know patient's cancer hx, diagnosis or treatment. Review of Systems:  A comprehensive review of systems was negative.     O:  Visit Vitals  BP (!) 104/59 (BP 1 Location: Right arm, BP Patient Position: Lying)   Pulse 99   Temp 98.9 °F (37.2 °C)   Resp 16   Ht 5' 9\" (1.753 m)   Wt 93.8 kg (206 lb 12.7 oz)   SpO2 96%   BMI 30.54 kg/m²       PHYSICAL EXAM:  Gen: mild distress, ill looking   HEENT: anicteric sclerae, normal conjunctiva, oropharynx clear, MM moist  Neck: supple, trachea midline, no adenopathy  Heart: RRR, no MRG, no JVD, no peripheral edema  Lungs: CTA b/l, non-labored respirations  Abd: soft, NT, ND, BS+, no organomegaly  Extr: warm  Skin: dry, no rash  Neuro: CN II-XII grossly intact, normal speech, moves all extremities  Psych: normal mood, appropriate affect    No intake or output data in the 24 hours ending 07/16/22 1721     Recent labs & imaging reviewed:  Recent Results (from the past 24 hour(s))   EKG, 12 LEAD, INITIAL    Collection Time: 07/15/22  6:03 PM   Result Value Ref Range    Ventricular Rate 128 BPM    Atrial Rate 128 BPM    P-R Interval 138 ms    QRS Duration 142 ms    Q-T Interval 346 ms    QTC Calculation (Bezet) 505 ms    Calculated P Axis 6 degrees    Calculated R Axis -85 degrees    Calculated T Axis 61 degrees    Diagnosis       Sinus tachycardia with premature ventricular complexes or fusion complexes  Right bundle branch block  Left anterior fascicular block  ** Bifascicular block **  Abnormal ECG  When compared with ECG of 28-MAR-2012 15:11,  Previous ECG has undetermined rhythm, needs review  (RBBB and left anterior fascicular block) is now present  Confirmed by Muna Wright ([id number]) on 7/16/2022 9:32:26 AM     CULTURE, BLOOD    Collection Time: 07/15/22  6:30 PM    Specimen: Blood   Result Value Ref Range    Special Requests: NO SPECIAL REQUESTS      Culture result: NO GROWTH AFTER 5 HOURS     CBC WITH AUTOMATED DIFF    Collection Time: 07/15/22  6:38 PM   Result Value Ref Range    WBC 1.1 (L) 4.1 - 11.1 K/uL    RBC 2.82 (L) 4.10 - 5.70 M/uL    HGB 7.8 (L) 12.1 - 17.0 g/dL    HCT 22.6 (L) 36.6 - 50.3 %    MCV 80.1 80.0 - 99.0 FL    MCH 27.7 26.0 - 34.0 PG    MCHC 34.5 30.0 - 36.5 g/dL    RDW 18.2 (H) 11.5 - 14.5 %    PLATELET 22 (LL) 804 - 400 K/uL    NRBC 0.0 0  WBC    ABSOLUTE NRBC 0.00 0.00 - 0.01 K/uL    NEUTROPHILS 11 (L) 32 - 75 %    LYMPHOCYTES 65 (H) 12 - 49 %    MONOCYTES 22 (H) 5 - 13 %    EOSINOPHILS 2 0 - 7 %    BASOPHILS 0 0 - 1 %    IMMATURE GRANULOCYTES 0 %    ABS. NEUTROPHILS 0.1 (L) 1.8 - 8.0 K/UL    ABS. LYMPHOCYTES 0.8 0.8 - 3.5 K/UL    ABS. MONOCYTES 0.2 0.0 - 1.0 K/UL    ABS. EOSINOPHILS 0.0 0.0 - 0.4 K/UL    ABS. BASOPHILS 0.0 0.0 - 0.1 K/UL    ABS. IMM. GRANS. 0.0 K/UL    DF MANUAL      RBC COMMENTS ANISOCYTOSIS  1+        RBC COMMENTS MICROCYTOSIS  1+        RBC COMMENTS HYPOCHROMIA  1+        RBC COMMENTS POIKILOCYTOSIS  1+        RBC COMMENTS TEARDROP CELLS  PRESENT        WBC COMMENTS Pathology Review Requested      Pathologist review        Pathologic examination results can be viewed in [facility] Chart Review under the Pathology tab.    METABOLIC PANEL, COMPREHENSIVE    Collection Time: 07/15/22  6:38 PM   Result Value Ref Range    Sodium 133 (L) 136 - 145 mmol/L    Potassium 3.7 3.5 - 5.1 mmol/L    Chloride 101 97 - 108 mmol/L    CO2 27 21 - 32 mmol/L    Anion gap 5 5 - 15 mmol/L    Glucose 123 (H) 65 - 100 mg/dL    BUN 16 6 - 20 MG/DL    Creatinine 0.90 0.70 - 1.30 MG/DL    BUN/Creatinine ratio 18 12 - 20      GFR est AA >60 >60 ml/min/1.73m2    GFR est non-AA >60 >60 ml/min/1.73m2    Calcium 9.1 8.5 - 10.1 MG/DL    Bilirubin, total 2.7 (H) 0.2 - 1.0 MG/DL    ALT (SGPT) 12 12 - 78 U/L    AST (SGOT) 8 (L) 15 - 37 U/L    Alk.  phosphatase 103 45 - 117 U/L    Protein, total 7.9 6.4 - 8.2 g/dL    Albumin 3.6 3.5 - 5.0 g/dL    Globulin 4.3 (H) 2.0 - 4.0 g/dL    A-G Ratio 0.8 (L) 1.1 - 2.2     TYPE & SCREEN    Collection Time: 07/15/22  6:38 PM   Result Value Ref Range    Crossmatch Expiration 07/18/2022,2359     ABO/Rh(D) AB POSITIVE     Antibody screen NEG     Comment Preivously identified anti A1    PROCALCITONIN    Collection Time: 07/15/22  6:38 PM   Result Value Ref Range    Procalcitonin 0.10 ng/mL   LD    Collection Time: 07/15/22  6:38 PM   Result Value Ref Range     85 - 241 U/L   POC LACTIC ACID    Collection Time: 07/15/22  6:58 PM   Result Value Ref Range    Lactic Acid (POC) 1.67 0.40 - 2.00 mmol/L   CULTURE, BLOOD    Collection Time: 07/15/22 10:56 PM    Specimen: Blood   Result Value Ref Range    Special Requests: NO SPECIAL REQUESTS      Culture result: NO GROWTH AFTER 5 HOURS     CULTURE, BLOOD    Collection Time: 07/15/22 10:56 PM    Specimen: Blood   Result Value Ref Range    Special Requests: NO SPECIAL REQUESTS      Culture result: NO GROWTH AFTER 6 HOURS     COVID-19 RAPID TEST    Collection Time: 07/15/22 11:10 PM   Result Value Ref Range    Specimen source Nasopharyngeal      COVID-19 rapid test Not detected NOTD     LACTIC ACID    Collection Time: 07/15/22 11:14 PM   Result Value Ref Range    Lactic acid 1.0 0.4 - 2.0 MMOL/L   SAMPLES BEING HELD    Collection Time: 07/15/22 11:24 PM   Result Value Ref Range    SAMPLES BEING HELD 1PST,1DRK GRN,1LAV,1BLUE,1RED     COMMENT        Add-on orders for these samples will be processed based on acceptable specimen integrity and analyte stability, which may vary by analyte. TROPONIN-HIGH SENSITIVITY    Collection Time: 07/15/22 11:24 PM   Result Value Ref Range    Troponin-High Sensitivity 8 0 - 76 ng/L   CBC WITH AUTOMATED DIFF    Collection Time: 07/16/22 10:27 AM   Result Value Ref Range    WBC 0.9 (LL) 4.1 - 11.1 K/uL    RBC 2.38 (L) 4.10 - 5.70 M/uL    HGB 6.4 (L) 12.1 - 17.0 g/dL    HCT 18.9 (L) 36.6 - 50.3 %    MCV 79.4 (L) 80.0 - 99.0 FL    MCH 26.9 26.0 - 34.0 PG    MCHC 33.9 30.0 - 36.5 g/dL    RDW 18.5 (H) 11.5 - 14.5 %    PLATELET 16 (LL) 608 - 400 K/uL    NRBC 0.0 0  WBC    ABSOLUTE NRBC 0.00 0.00 - 0.01 K/uL    NEUTROPHILS 19 (L) 32 - 75 %    LYMPHOCYTES 42 12 - 49 %    MONOCYTES 37 (H) 5 - 13 %    EOSINOPHILS 2 0 - 7 %    BASOPHILS 0 0 - 1 %    IMMATURE GRANULOCYTES 0 %    ABS. NEUTROPHILS 0.2 (L) 1.8 - 8.0 K/UL    ABS. LYMPHOCYTES 0.4 (L) 0.8 - 3.5 K/UL    ABS. MONOCYTES 0.3 0.0 - 1.0 K/UL    ABS. EOSINOPHILS 0.0 0.0 - 0.4 K/UL    ABS. BASOPHILS 0.0 0.0 - 0.1 K/UL    ABS. IMM. GRANS. 0.0 K/UL    DF MANUAL      RBC COMMENTS ANISOCYTOSIS  1+        RBC COMMENTS POIKILOCYTOSIS  PRESENT        WBC COMMENTS Differential performed on buffy coat smear.      METABOLIC PANEL, BASIC    Collection Time: 07/16/22 10:27 AM   Result Value Ref Range    Sodium 136 136 - 145 mmol/L    Potassium 3.6 3.5 - 5.1 mmol/L    Chloride 103 97 - 108 mmol/L    CO2 28 21 - 32 mmol/L    Anion gap 5 5 - 15 mmol/L    Glucose 111 (H) 65 - 100 mg/dL    BUN 14 6 - 20 MG/DL    Creatinine 0.87 0.70 - 1.30 MG/DL    BUN/Creatinine ratio 16 12 - 20      GFR est AA >60 >60 ml/min/1.73m2    GFR est non-AA >60 >60 ml/min/1.73m2    Calcium 8.3 (L) 8.5 - 10.1 MG/DL   RESPIRATORY VIRUS PANEL W/COVID-19, PCR    Collection Time: 07/16/22 12:54 PM    Specimen: Nasopharyngeal   Result Value Ref Range    Adenovirus Not detected NOTD      Coronavirus 229E Not detected NOTD      Coronavirus HKU1 Not detected NOTD      Coronavirus CVNL63 Not detected NOTD      Coronavirus OC43 Not detected NOTD      SARS-CoV-2, PCR Not detected NOTD      Metapneumovirus Not detected NOTD      Rhinovirus and Enterovirus Not detected NOTD      Influenza A Not detected NOTD      Influenza A, subtype H1 Not detected NOTD      Influenza A, subtype H3 Not detected NOTD      INFLUENZA A H1N1 PCR Not detected NOTD      Influenza B Not detected NOTD      Parainfluenza 1 Not detected NOTD      Parainfluenza 2 Not detected NOTD      Parainfluenza 3 Not detected NOTD      Parainfluenza virus 4 Not detected NOTD      RSV by PCR Not detected NOTD      B. parapertussis, PCR Not detected NOTD      Bordetella pertussis - PCR Not detected NOTD      Chlamydophila pneumoniae DNA, QL, PCR Not detected NOTD      Mycoplasma pneumoniae DNA, QL, PCR Not detected NOTD     RBC, ALLOCATE    Collection Time: 07/16/22  5:00 PM   Result Value Ref Range    HISTORY CHECKED? Historical check performed      Recent Labs     07/16/22 1027 07/15/22  1838   WBC 0.9* 1.1*   HGB 6.4* 7.8*   HCT 18.9* 22.6*   PLT 16* 22*     Recent Labs     07/16/22 1027 07/15/22  1838    133*   K 3.6 3.7    101   CO2 28 27   BUN 14 16   CREA 0.87 0.90   * 123*   CA 8.3* 9.1     Recent Labs     07/15/22  1838   ALT 12      TBILI 2.7*   TP 7.9   ALB 3.6   GLOB 4.3*     No results for input(s): INR, PTP, APTT, INREXT in the last 72 hours. No results for input(s): FE, TIBC, PSAT, FERR in the last 72 hours. No results found for: FOL, RBCF   No results for input(s): PH, PCO2, PO2 in the last 72 hours. No results for input(s): CPK, CKNDX, TROIQ in the last 72 hours.     No lab exists for component: CPKMB  No results found for: CHOL, CHOLX, CHLST, CHOLV, HDL, HDLP, LDL, LDLC, DLDLP, TGLX, TRIGL, Maryann Andrade, [facility]  No results found for: GLUCPOC  No results found for: COLOR, APPRN, SPGRU, REFSG, SUDHAKAR, PROTU, GLUCU, KETU, BILU, UROU, MIDLRED, LEUKU, GLUKE, EPSU, BACTU, WBCU, RBCU, CASTS, UCRY    Med list reviewed  Current Facility-Administered Medications   Medication Dose Route Frequency    cefepime (MAXIPIME) 2 g in 0.9% sodium chloride (MBP/ADV) 100 mL MBP  2 g IntraVENous Q8H    Vancomycin per Pharmacy Dosing   Other Rx Dosing/Monitoring    vancomycin (VANCOCIN) 750 mg in 0.9% sodium chloride 250 mL ([id number])  750 mg IntraVENous Q12H    0.9% sodium chloride infusion  100 mL/hr IntraVENous CONTINUOUS    metroNIDAZOLE (FLAGYL) IVPB premix 500 mg  500 mg IntraVENous Q8H    0.9% sodium chloride infusion 250 mL  250 mL IntraVENous PRN    sodium chloride (NS) flush 5-10 mL  5-10 mL IntraVENous PRN    acetaminophen (TYLENOL) tablet 1,000 mg  1,000 mg Oral Q6H PRN     Facility-Administered Medications Ordered in Other Encounters   Medication Dose Route Frequency    0.9% sodium chloride infusion  25 mL/hr IntraVENous CONTINUOUS    0.9% sodium chloride infusion 250 mL  250 mL IntraVENous PRN       Care Plan discussed with:  Patient/Family and Nurse    Stefan Clarke MD  Internal Medicine  Date of Service: 7/16/2022 Normal for race

## 2022-07-20 ENCOUNTER — APPOINTMENT (OUTPATIENT)
Dept: INFUSION THERAPY | Facility: HOSPITAL | Age: 72
End: 2022-07-20

## 2022-07-20 ENCOUNTER — RESULT REVIEW (OUTPATIENT)
Age: 72
End: 2022-07-20

## 2022-07-20 ENCOUNTER — APPOINTMENT (OUTPATIENT)
Dept: HEMATOLOGY ONCOLOGY | Facility: CLINIC | Age: 72
End: 2022-07-20

## 2022-07-20 VITALS
DIASTOLIC BLOOD PRESSURE: 67 MMHG | HEART RATE: 55 BPM | SYSTOLIC BLOOD PRESSURE: 112 MMHG | RESPIRATION RATE: 16 BRPM | BODY MASS INDEX: 23.67 KG/M2 | OXYGEN SATURATION: 99 % | WEIGHT: 160.25 LBS | TEMPERATURE: 96.6 F

## 2022-07-20 DIAGNOSIS — Z51.11 ENCOUNTER FOR ANTINEOPLASTIC CHEMOTHERAPY: ICD-10-CM

## 2022-07-20 LAB
ALBUMIN SERPL ELPH-MCNC: 4 G/DL — SIGNIFICANT CHANGE UP (ref 3.3–5)
ALP SERPL-CCNC: 54 U/L — SIGNIFICANT CHANGE UP (ref 40–120)
ALT FLD-CCNC: 20 U/L — SIGNIFICANT CHANGE UP (ref 10–45)
ANION GAP SERPL CALC-SCNC: 5 MMOL/L — SIGNIFICANT CHANGE UP (ref 5–17)
AST SERPL-CCNC: 40 U/L — SIGNIFICANT CHANGE UP (ref 10–40)
BASOPHILS # BLD AUTO: 0.03 K/UL — SIGNIFICANT CHANGE UP (ref 0–0.2)
BASOPHILS NFR BLD AUTO: 0.4 % — SIGNIFICANT CHANGE UP (ref 0–2)
BILIRUB SERPL-MCNC: 0.7 MG/DL — SIGNIFICANT CHANGE UP (ref 0.2–1.2)
BUN SERPL-MCNC: 14 MG/DL — SIGNIFICANT CHANGE UP (ref 7–23)
CALCIUM SERPL-MCNC: 9.2 MG/DL — SIGNIFICANT CHANGE UP (ref 8.4–10.5)
CHLORIDE SERPL-SCNC: 106 MMOL/L — SIGNIFICANT CHANGE UP (ref 96–108)
CO2 SERPL-SCNC: 25 MMOL/L — SIGNIFICANT CHANGE UP (ref 22–31)
CREAT SERPL-MCNC: 1.24 MG/DL — SIGNIFICANT CHANGE UP (ref 0.5–1.3)
EGFR: 63 ML/MIN/1.73M2 — SIGNIFICANT CHANGE UP
EOSINOPHIL # BLD AUTO: 0.15 K/UL — SIGNIFICANT CHANGE UP (ref 0–0.5)
EOSINOPHIL NFR BLD AUTO: 2 % — SIGNIFICANT CHANGE UP (ref 0–6)
GLUCOSE SERPL-MCNC: 113 MG/DL — HIGH (ref 70–99)
HCT VFR BLD CALC: 37.9 % — LOW (ref 39–50)
HGB BLD-MCNC: 13.5 G/DL — SIGNIFICANT CHANGE UP (ref 13–17)
IMM GRANULOCYTES NFR BLD AUTO: 0.3 % — SIGNIFICANT CHANGE UP (ref 0–1.5)
LYMPHOCYTES # BLD AUTO: 1.65 K/UL — SIGNIFICANT CHANGE UP (ref 1–3.3)
LYMPHOCYTES # BLD AUTO: 21.6 % — SIGNIFICANT CHANGE UP (ref 13–44)
MCHC RBC-ENTMCNC: 32.1 PG — SIGNIFICANT CHANGE UP (ref 27–34)
MCHC RBC-ENTMCNC: 35.6 G/DL — SIGNIFICANT CHANGE UP (ref 32–36)
MCV RBC AUTO: 90 FL — SIGNIFICANT CHANGE UP (ref 80–100)
MONOCYTES # BLD AUTO: 0.65 K/UL — SIGNIFICANT CHANGE UP (ref 0–0.9)
MONOCYTES NFR BLD AUTO: 8.5 % — SIGNIFICANT CHANGE UP (ref 2–14)
NEUTROPHILS # BLD AUTO: 5.13 K/UL — SIGNIFICANT CHANGE UP (ref 1.8–7.4)
NEUTROPHILS NFR BLD AUTO: 67.2 % — SIGNIFICANT CHANGE UP (ref 43–77)
NRBC # BLD: 0 /100 WBCS — SIGNIFICANT CHANGE UP (ref 0–0)
PLATELET # BLD AUTO: 174 K/UL — SIGNIFICANT CHANGE UP (ref 150–400)
POTASSIUM SERPL-MCNC: 5.8 MMOL/L — HIGH (ref 3.5–5.3)
POTASSIUM SERPL-SCNC: 5.8 MMOL/L — HIGH (ref 3.5–5.3)
PROT SERPL-MCNC: 6.7 G/DL — SIGNIFICANT CHANGE UP (ref 6–8.3)
RBC # BLD: 4.21 M/UL — SIGNIFICANT CHANGE UP (ref 4.2–5.8)
RBC # FLD: 12.4 % — SIGNIFICANT CHANGE UP (ref 10.3–14.5)
SODIUM SERPL-SCNC: 136 MMOL/L — SIGNIFICANT CHANGE UP (ref 135–145)
T4 FREE+ TSH PNL SERPL: 1.52 UIU/ML — SIGNIFICANT CHANGE UP (ref 0.27–4.2)
WBC # BLD: 7.63 K/UL — SIGNIFICANT CHANGE UP (ref 3.8–10.5)
WBC # FLD AUTO: 7.63 K/UL — SIGNIFICANT CHANGE UP (ref 3.8–10.5)

## 2022-07-20 PROCEDURE — 99213 OFFICE O/P EST LOW 20 MIN: CPT

## 2022-07-20 NOTE — PHYSICAL EXAM
[Fully active, able to carry on all pre-disease performance without restriction] : Status 0 - Fully active, able to carry on all pre-disease performance without restriction [Normal] : affect appropriate [de-identified] : no edema  [de-identified] : right sliding mass, nontender [de-identified] : large right inguinal hernia

## 2022-07-20 NOTE — HISTORY OF PRESENT ILLNESS
[T: ___] : T[unfilled] [N: ___] : N[unfilled] [M: ___] : M[unfilled] [AJCC Stage: ____] : AJCC Stage: [unfilled] [de-identified] : Finesse Zuniga is a 70 years old male who initially presented with intermittent midback pain, 10/10 radiating to stomach from both sides on June 1, 2019. He has poor appetite.  On june 12 he was diagnosed UTI and on cipro for 4 days, on June 16, was started cefpodoxime 100mg BID for 7 days.  CT chest/abdomen/pelvis revealed significant abnormal appearance of the left kidney with multiple heterogeneous areas of low attenuation involving the upper and midpole which may represent malignancy. There are necrotic lymph nodes in the left para-aortic location at the level of the kidney and below the level of the kidney. Adenopathy surrounds the left renal artery. The left renal vein demonstrates question of partially occlusive thrombus. Nonspecific scattered tiny pulmonary nodules. Lower lobe subpleural nodules measure up to 6 mm. There is a well-circumscribed hypodense lesion in the anterior aspect of the lateral segment of the left lobe measuring 3.2 x 2.2 cm. He underwent CT guided liver lesion biopsy which showed consistent with primary renal cell carcinoma, IHC CK7/CK19/PAX8 positive. MRI abdomen waw con showed multiple bilateral pulmonary nodules increased in size and number compared with recent CT. The largest is pleural-based in the left lower lobe and measures 1.5 x 1.1 cm. LIVER: Rim-enhancing subcapsular mass in segment 3 and measures 4.4 x 2.6 cm. A 4.2cm mass in mid to lower pole of the lft kidney suspicious for urothelial malignancy arising within the left renal collecting system. Regional lymphadenopathy and hepatic and pulmonary metastases. Nonocclusive thrombus within the left renal vein. He was started on eliquis. Bone scan showed  a focus of mildly increased uptake in the right superior \par pubic ramus suspicious of bone mets.\par He has lost 17lbs in one month. He denies nausea, vomiting and constipation and diarrhea. \par \par 7/2019: ipilimumab + nivolumab initiated and to nivolumab maintenance post 4 cycles and continues on therapy.\par \par 9/18-22/2020 : Patient admitted for hematuria. Patient followed by urology and hematology during hospital course. Patient was started on CBI to help remove clots. Urine initially bloody, but became pink-tinged and then clear over time. CBI stopped and barber removed. Patient reportedly felt much better after barber removed, able to urinate by himself- reported some clots initially, but since resolved. States bladder spasms improved as well. Patient's eliquis was stopped, MRI done showing no evidence of renal vein thrombus, discussed  with Heme, no reason to continue eliquis for now. Patient's leukocytosis and JOSE MARIA resolved. Urine culture and blood cultures negative. \par Patient improved clinically throughout hospital course. Patient seen and examined on day of discharge. \par Cystoscopy was negative, performed by Dr. Javier. \par  [de-identified] : metastatic renal cell carcinoma [FreeTextEntry1] : Ipi + nivo (7/2019) > nivolumab 480 mg monthly - cont [de-identified] : 5/26/2021 : Mr. Call is here for nivolumab. Last scan 3/29/2021, will need another set of scan the end or beginning of July. He walks 2 miles a day with his dog and bikes 3-4 miles a day. He feels well overall. Mr. CALL denies fevers, chills, headaches, cough, SOB, chest pain, any swelling, nausea, vomiting, diarrhea, rash, or malaise. \par \par 6/23/21: Bothering Rt side inguinal hernia without pain or signs of incarceration. Doing well overall. Scheduled for nivo today. He denies fevers, chills, headaches, cough, SOB, chest pain, any swelling, nausea, vomiting, diarrhea, rash, or malaise.\par \par 7/7/2021 : CT showed stable disease\par \par 7/28/2021 : Here for nivolumab. 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 Mr. CALL denies fevers, chills, headaches, cough, SOB, chest pain, any swelling, nausea, vomiting, diarrhea, rash, or malaise. He has been tolerating therapy extremely well without sig issues. He is to see a general surgeon for hernia repair soon. \par \par 8/25/2021 : Here for nivolumab, scan due in September. Mr. CALL denies fevers, chills, headaches, cough, SOB, chest pain, any swelling, nausea, vomiting, diarrhea, rash, or malaise. \par \par 10/27/21: Pt presents for follow up. He is scheduled for Nivolumab which he has been tolerating well. Scheduled to receive cycle #30 today. Denies any diarrhea/ itchiness, cough, hematuria, rash. Appetite good. Energy good. No new complaints. Recieved both flu shot and COVID booster recently\par \par 11/24/21: Pt feeling well. Offers no complaints. Scheduled for C#31 nivolumab today. Due for imaging next month. Already scheduled\par \par 12/21/21 CT chest/abd/pelvis - Stable appearance of the chest compared with 9/4/2021. Indeterminate 1.4 cm hypodense lesion in the upper pole of the right kidney is unchanged. Atrophy is again noted involving the upper pole of the left kidney, unchanged. Very large right inguinal hernia containing non-obstructed large and small bowel and a small amount of fluid. Enlarged heterogeneous prostate gland. Nonspecific ill-defined infiltration of the fat in the right lower quadrant, not significantly changed. Attention to this area is suggested on follow-up CT.\par \par 12/29/21 - presents for ongoing Opdivo cycle #32 today. Overall feeling "good", energy is good. Appetite is good. No complaints. \par \par 3/2/22 feels quite well, denies any pain and gross hematuria. He has a schedule in April to take care of right inguinal hernia. \par \par 4/6/22 feels fine, denies pain and gross hematuria. His right inguinal hernia is getting worse. \par \par 4/27/22: Pt returns for follow up. He reports 24 period of severe diarrhea on monday. No fevers. Resolved spontaneously. Pt reports he had imaging done 2 weeks ago. Eager fto discuss findings. Feeling well now. Pt will be  meeting with surgeon recommended by PMD for Right inguinal hernia repair.\par \par 6/22/22 feels well. He will meet his surgeon for his right inguinal hernia. \par \par 7/20/22 feels overall fine. Denies any pain and weight loss.

## 2022-07-20 NOTE — ASSESSMENT
[Palliative] : Goals of care discussed with patient: Palliative [Palliative Care Plan] : not applicable at this time [FreeTextEntry1] : Finesse Gallagher is a 71 years old male who presented with intermittent midback pain, 10/10 radiating to stomach from both sides. CT c/a/p and MRI abdomen showed multiple lung nodules, one liver lesion, retroperitoneal nodes. Bone scan showed one suspicious met in right superior pubic ramus. Liver lesion biopsy is consistent with renal cell carcinoma.\par It is not certain that the histology of RCC is clear cell vs non-clear cell. IMDC risk of this patient he has 5 out of 6 risks including leukocytosis, anemia, thrombocytosis, time from dx to initiate systemic treatment less than one year and hyperalcemia. He has poor risk of mRCC. Based on [facility]RCC the standard of care is combined immunotherapy nivo and ipi vs sutent, checkmate 214 in the intermediate and poor risk of mccRCC patients with significant overall survival benefit, keynote 426 keytruda plus axitinib vs sutent in good, intermediate and poor risk of [facility]RCC patients showed OS, PFS and MICHEL benefit, [place] in the intermediate and poor risk of mccRCC patients showed cabozantinib demonstrated a significant clinical benefit in PFS and MICHEL over standard-of-care sunitinib as first-line therapy in patients with intermediate- or poor-risk mRCC. Consider the longer duration of response and less side effects and recommended nivo and ipi. He agreed to sign the consent. S/P cycle 4 nivo and ipi. His back pain was resolved and maintained on single agnet Nivolumab. \par \par He had an episode of gross hematuria leading to hospitalization in 9/2020 however etiology of hematuria is unclear and resolved. Follow up cystoscopy to r/o abnormality in the bladder showed no abnormality. Eliquis stopped during hospitalization with hematology consult. \par \par His most recent 9/4/2021 restaging scan showed minimal disease and unchanged from prior. He has right inguinal hernia.\par \par \par Plan \par RCC: \par Continue maintenance #37 today  nivolumab 480 mg IV monthly \par Personally reviewed findings on recent imaging with patient which revealed stable findings. \par Recent episode of diarrhea likely viral and not immune mediated. Reviewed possibility of  immune mediated  colitis for immunotherapy and instructed patient to inform office if diarrhea returns\par will have interval scans every 4 months, next one due in mid August \par - Labs today. Will follow up resuilts\par \par R Inguinal Hernia : \par - quite large and bothersome. \par - Pt will be meeting with surgeon in near future to discuss repair\par RTC in one month RPA\par \par \par \par \par

## 2022-08-11 ENCOUNTER — OUTPATIENT (OUTPATIENT)
Dept: OUTPATIENT SERVICES | Facility: HOSPITAL | Age: 72
LOS: 1 days | End: 2022-08-11
Payer: MEDICARE

## 2022-08-11 ENCOUNTER — APPOINTMENT (OUTPATIENT)
Dept: CT IMAGING | Facility: CLINIC | Age: 72
End: 2022-08-11

## 2022-08-11 DIAGNOSIS — S86.019A STRAIN OF UNSPECIFIED ACHILLES TENDON, INITIAL ENCOUNTER: Chronic | ICD-10-CM

## 2022-08-11 DIAGNOSIS — C64.2 MALIGNANT NEOPLASM OF LEFT KIDNEY, EXCEPT RENAL PELVIS: ICD-10-CM

## 2022-08-11 DIAGNOSIS — Z98.890 OTHER SPECIFIED POSTPROCEDURAL STATES: Chronic | ICD-10-CM

## 2022-08-11 PROCEDURE — 74177 CT ABD & PELVIS W/CONTRAST: CPT

## 2022-08-11 PROCEDURE — 71260 CT THORAX DX C+: CPT

## 2022-08-11 PROCEDURE — 71260 CT THORAX DX C+: CPT | Mod: 26

## 2022-08-11 PROCEDURE — 74177 CT ABD & PELVIS W/CONTRAST: CPT | Mod: 26

## 2022-08-17 ENCOUNTER — RESULT REVIEW (OUTPATIENT)
Age: 72
End: 2022-08-17

## 2022-08-17 ENCOUNTER — APPOINTMENT (OUTPATIENT)
Dept: INFUSION THERAPY | Facility: HOSPITAL | Age: 72
End: 2022-08-17

## 2022-08-17 ENCOUNTER — APPOINTMENT (OUTPATIENT)
Dept: HEMATOLOGY ONCOLOGY | Facility: CLINIC | Age: 72
End: 2022-08-17

## 2022-08-17 VITALS
WEIGHT: 156.53 LBS | TEMPERATURE: 96.4 F | RESPIRATION RATE: 16 BRPM | OXYGEN SATURATION: 99 % | BODY MASS INDEX: 23.12 KG/M2 | HEART RATE: 54 BPM | DIASTOLIC BLOOD PRESSURE: 73 MMHG | SYSTOLIC BLOOD PRESSURE: 118 MMHG

## 2022-08-17 DIAGNOSIS — Z85.89 PERSONAL HISTORY OF MALIGNANT NEOPLASM OF OTHER ORGANS AND SYSTEMS: ICD-10-CM

## 2022-08-17 LAB
ALBUMIN SERPL ELPH-MCNC: 4.1 G/DL — SIGNIFICANT CHANGE UP (ref 3.3–5)
ALP SERPL-CCNC: 57 U/L — SIGNIFICANT CHANGE UP (ref 40–120)
ALT FLD-CCNC: 14 U/L — SIGNIFICANT CHANGE UP (ref 10–45)
ANION GAP SERPL CALC-SCNC: 11 MMOL/L — SIGNIFICANT CHANGE UP (ref 5–17)
AST SERPL-CCNC: 19 U/L — SIGNIFICANT CHANGE UP (ref 10–40)
BASOPHILS # BLD AUTO: 0.03 K/UL — SIGNIFICANT CHANGE UP (ref 0–0.2)
BASOPHILS NFR BLD AUTO: 0.4 % — SIGNIFICANT CHANGE UP (ref 0–2)
BILIRUB SERPL-MCNC: 1.4 MG/DL — HIGH (ref 0.2–1.2)
BUN SERPL-MCNC: 14 MG/DL — SIGNIFICANT CHANGE UP (ref 7–23)
CALCIUM SERPL-MCNC: 9.7 MG/DL — SIGNIFICANT CHANGE UP (ref 8.4–10.5)
CHLORIDE SERPL-SCNC: 105 MMOL/L — SIGNIFICANT CHANGE UP (ref 96–108)
CO2 SERPL-SCNC: 23 MMOL/L — SIGNIFICANT CHANGE UP (ref 22–31)
CREAT SERPL-MCNC: 1.25 MG/DL — SIGNIFICANT CHANGE UP (ref 0.5–1.3)
EGFR: 62 ML/MIN/1.73M2 — SIGNIFICANT CHANGE UP
EOSINOPHIL # BLD AUTO: 0.11 K/UL — SIGNIFICANT CHANGE UP (ref 0–0.5)
EOSINOPHIL NFR BLD AUTO: 1.5 % — SIGNIFICANT CHANGE UP (ref 0–6)
GLUCOSE SERPL-MCNC: 99 MG/DL — SIGNIFICANT CHANGE UP (ref 70–99)
HCT VFR BLD CALC: 40.5 % — SIGNIFICANT CHANGE UP (ref 39–50)
HGB BLD-MCNC: 14.2 G/DL — SIGNIFICANT CHANGE UP (ref 13–17)
IMM GRANULOCYTES NFR BLD AUTO: 0.4 % — SIGNIFICANT CHANGE UP (ref 0–1.5)
LYMPHOCYTES # BLD AUTO: 1.43 K/UL — SIGNIFICANT CHANGE UP (ref 1–3.3)
LYMPHOCYTES # BLD AUTO: 19 % — SIGNIFICANT CHANGE UP (ref 13–44)
MCHC RBC-ENTMCNC: 32.1 PG — SIGNIFICANT CHANGE UP (ref 27–34)
MCHC RBC-ENTMCNC: 35.1 G/DL — SIGNIFICANT CHANGE UP (ref 32–36)
MCV RBC AUTO: 91.6 FL — SIGNIFICANT CHANGE UP (ref 80–100)
MONOCYTES # BLD AUTO: 0.63 K/UL — SIGNIFICANT CHANGE UP (ref 0–0.9)
MONOCYTES NFR BLD AUTO: 8.4 % — SIGNIFICANT CHANGE UP (ref 2–14)
NEUTROPHILS # BLD AUTO: 5.28 K/UL — SIGNIFICANT CHANGE UP (ref 1.8–7.4)
NEUTROPHILS NFR BLD AUTO: 70.3 % — SIGNIFICANT CHANGE UP (ref 43–77)
NRBC # BLD: 0 /100 WBCS — SIGNIFICANT CHANGE UP (ref 0–0)
PLATELET # BLD AUTO: 177 K/UL — SIGNIFICANT CHANGE UP (ref 150–400)
POTASSIUM SERPL-MCNC: 4.3 MMOL/L — SIGNIFICANT CHANGE UP (ref 3.5–5.3)
POTASSIUM SERPL-SCNC: 4.3 MMOL/L — SIGNIFICANT CHANGE UP (ref 3.5–5.3)
PROT SERPL-MCNC: 6.8 G/DL — SIGNIFICANT CHANGE UP (ref 6–8.3)
RBC # BLD: 4.42 M/UL — SIGNIFICANT CHANGE UP (ref 4.2–5.8)
RBC # FLD: 12.6 % — SIGNIFICANT CHANGE UP (ref 10.3–14.5)
SODIUM SERPL-SCNC: 140 MMOL/L — SIGNIFICANT CHANGE UP (ref 135–145)
T4 FREE SERPL-MCNC: 1.2 NG/DL — SIGNIFICANT CHANGE UP (ref 0.9–1.8)
TSH SERPL-MCNC: 1.99 UIU/ML — SIGNIFICANT CHANGE UP (ref 0.27–4.2)
WBC # BLD: 7.51 K/UL — SIGNIFICANT CHANGE UP (ref 3.8–10.5)
WBC # FLD AUTO: 7.51 K/UL — SIGNIFICANT CHANGE UP (ref 3.8–10.5)

## 2022-08-17 PROCEDURE — 99214 OFFICE O/P EST MOD 30 MIN: CPT

## 2022-08-19 PROBLEM — Z85.89 H/O KNOWN METASTASIS TO LIVER: Status: ACTIVE | Noted: 2019-07-30

## 2022-08-30 ENCOUNTER — NON-APPOINTMENT (OUTPATIENT)
Age: 72
End: 2022-08-30

## 2022-08-30 LAB
ALBUMIN SERPL ELPH-MCNC: 4.4 G/DL
ALP BLD-CCNC: 61 U/L
ALT SERPL-CCNC: 14 U/L
ANION GAP SERPL CALC-SCNC: 12 MMOL/L
AST SERPL-CCNC: 21 U/L
BILIRUB SERPL-MCNC: 1.2 MG/DL
BUN SERPL-MCNC: 17 MG/DL
CALCIUM SERPL-MCNC: 9.8 MG/DL
CHLORIDE SERPL-SCNC: 106 MMOL/L
CO2 SERPL-SCNC: 24 MMOL/L
CREAT SERPL-MCNC: 1.35 MG/DL
EGFR: 56 ML/MIN/1.73M2
GLUCOSE SERPL-MCNC: 118 MG/DL
POTASSIUM SERPL-SCNC: 4.7 MMOL/L
PROT SERPL-MCNC: 7 G/DL
SODIUM SERPL-SCNC: 141 MMOL/L

## 2022-09-10 ENCOUNTER — OUTPATIENT (OUTPATIENT)
Dept: OUTPATIENT SERVICES | Facility: HOSPITAL | Age: 72
LOS: 1 days | Discharge: HOME | End: 2022-09-10

## 2022-09-10 DIAGNOSIS — C64.9 MALIGNANT NEOPLASM OF UNSPECIFIED KIDNEY, EXCEPT RENAL PELVIS: ICD-10-CM

## 2022-09-10 DIAGNOSIS — Z98.890 OTHER SPECIFIED POSTPROCEDURAL STATES: Chronic | ICD-10-CM

## 2022-09-10 DIAGNOSIS — S86.019A STRAIN OF UNSPECIFIED ACHILLES TENDON, INITIAL ENCOUNTER: Chronic | ICD-10-CM

## 2022-09-14 ENCOUNTER — APPOINTMENT (OUTPATIENT)
Dept: INFUSION THERAPY | Facility: HOSPITAL | Age: 72
End: 2022-09-14

## 2022-09-14 ENCOUNTER — RESULT REVIEW (OUTPATIENT)
Age: 72
End: 2022-09-14

## 2022-09-14 ENCOUNTER — APPOINTMENT (OUTPATIENT)
Dept: HEMATOLOGY ONCOLOGY | Facility: CLINIC | Age: 72
End: 2022-09-14

## 2022-09-14 VITALS
TEMPERATURE: 96.6 F | HEART RATE: 58 BPM | SYSTOLIC BLOOD PRESSURE: 149 MMHG | BODY MASS INDEX: 23.6 KG/M2 | WEIGHT: 159.81 LBS | RESPIRATION RATE: 16 BRPM | DIASTOLIC BLOOD PRESSURE: 87 MMHG | OXYGEN SATURATION: 99 %

## 2022-09-14 DIAGNOSIS — Z51.11 ENCOUNTER FOR ANTINEOPLASTIC CHEMOTHERAPY: ICD-10-CM

## 2022-09-14 LAB
ALBUMIN SERPL ELPH-MCNC: 4.3 G/DL — SIGNIFICANT CHANGE UP (ref 3.3–5)
ALP SERPL-CCNC: 59 U/L — SIGNIFICANT CHANGE UP (ref 40–120)
ALT FLD-CCNC: 16 U/L — SIGNIFICANT CHANGE UP (ref 10–45)
ANION GAP SERPL CALC-SCNC: 12 MMOL/L — SIGNIFICANT CHANGE UP (ref 5–17)
AST SERPL-CCNC: 22 U/L — SIGNIFICANT CHANGE UP (ref 10–40)
BASOPHILS # BLD AUTO: 0.04 K/UL — SIGNIFICANT CHANGE UP (ref 0–0.2)
BASOPHILS NFR BLD AUTO: 0.5 % — SIGNIFICANT CHANGE UP (ref 0–2)
BILIRUB SERPL-MCNC: 0.8 MG/DL — SIGNIFICANT CHANGE UP (ref 0.2–1.2)
BUN SERPL-MCNC: 15 MG/DL — SIGNIFICANT CHANGE UP (ref 7–23)
CALCIUM SERPL-MCNC: 9.6 MG/DL — SIGNIFICANT CHANGE UP (ref 8.4–10.5)
CHLORIDE SERPL-SCNC: 105 MMOL/L — SIGNIFICANT CHANGE UP (ref 96–108)
CO2 SERPL-SCNC: 22 MMOL/L — SIGNIFICANT CHANGE UP (ref 22–31)
CREAT SERPL-MCNC: 1.22 MG/DL — SIGNIFICANT CHANGE UP (ref 0.5–1.3)
EGFR: 64 ML/MIN/1.73M2 — SIGNIFICANT CHANGE UP
EOSINOPHIL # BLD AUTO: 0.15 K/UL — SIGNIFICANT CHANGE UP (ref 0–0.5)
EOSINOPHIL NFR BLD AUTO: 1.8 % — SIGNIFICANT CHANGE UP (ref 0–6)
GLUCOSE SERPL-MCNC: 113 MG/DL — HIGH (ref 70–99)
HCT VFR BLD CALC: 37.6 % — LOW (ref 39–50)
HGB BLD-MCNC: 13.4 G/DL — SIGNIFICANT CHANGE UP (ref 13–17)
IMM GRANULOCYTES NFR BLD AUTO: 0.2 % — SIGNIFICANT CHANGE UP (ref 0–1.5)
LYMPHOCYTES # BLD AUTO: 1.59 K/UL — SIGNIFICANT CHANGE UP (ref 1–3.3)
LYMPHOCYTES # BLD AUTO: 19.5 % — SIGNIFICANT CHANGE UP (ref 13–44)
MCHC RBC-ENTMCNC: 32.2 PG — SIGNIFICANT CHANGE UP (ref 27–34)
MCHC RBC-ENTMCNC: 35.6 G/DL — SIGNIFICANT CHANGE UP (ref 32–36)
MCV RBC AUTO: 90.4 FL — SIGNIFICANT CHANGE UP (ref 80–100)
MONOCYTES # BLD AUTO: 0.6 K/UL — SIGNIFICANT CHANGE UP (ref 0–0.9)
MONOCYTES NFR BLD AUTO: 7.4 % — SIGNIFICANT CHANGE UP (ref 2–14)
NEUTROPHILS # BLD AUTO: 5.74 K/UL — SIGNIFICANT CHANGE UP (ref 1.8–7.4)
NEUTROPHILS NFR BLD AUTO: 70.6 % — SIGNIFICANT CHANGE UP (ref 43–77)
NRBC # BLD: 0 /100 WBCS — SIGNIFICANT CHANGE UP (ref 0–0)
PLATELET # BLD AUTO: 160 K/UL — SIGNIFICANT CHANGE UP (ref 150–400)
POTASSIUM SERPL-MCNC: 4.6 MMOL/L — SIGNIFICANT CHANGE UP (ref 3.5–5.3)
POTASSIUM SERPL-SCNC: 4.6 MMOL/L — SIGNIFICANT CHANGE UP (ref 3.5–5.3)
PROT SERPL-MCNC: 6.8 G/DL — SIGNIFICANT CHANGE UP (ref 6–8.3)
RBC # BLD: 4.16 M/UL — LOW (ref 4.2–5.8)
RBC # FLD: 12.4 % — SIGNIFICANT CHANGE UP (ref 10.3–14.5)
SODIUM SERPL-SCNC: 139 MMOL/L — SIGNIFICANT CHANGE UP (ref 135–145)
T4 FREE SERPL-MCNC: 1.2 NG/DL — SIGNIFICANT CHANGE UP (ref 0.9–1.8)
TSH SERPL-MCNC: 1.41 UIU/ML — SIGNIFICANT CHANGE UP (ref 0.27–4.2)
WBC # BLD: 8.14 K/UL — SIGNIFICANT CHANGE UP (ref 3.8–10.5)
WBC # FLD AUTO: 8.14 K/UL — SIGNIFICANT CHANGE UP (ref 3.8–10.5)

## 2022-09-14 PROCEDURE — 99213 OFFICE O/P EST LOW 20 MIN: CPT

## 2022-09-14 NOTE — ASSESSMENT
[Palliative] : Goals of care discussed with patient: Palliative [FreeTextEntry1] : Mr. Mcknight is a 72 year old male with metastatic renal cell carcinoma. On initial presentation CT c/a/p and MRI abdomen showed multiple lung nodules, one liver lesion, retroperitoneal nodes. Bone scan showed one suspicious met in right superior pubic ramus. Liver lesion biopsy is consistent with renal cell carcinoma.\par It is not certain that the histology of RCC is clear cell vs non-clear cell. IMDC risk of this patient he has 5 out of 6 risks including leukocytosis, anemia, thrombocytosis, time from dx to initiate systemic treatment less than one year and hyperalcemia. He has poor risk of mRCC. Based on mccRCC the standard of care is combined immunotherapy nivo and ipi vs sutent, checkmate 214 in the intermediate and poor risk of mccRCC patients with significant overall survival benefit, keynote 426 keytruda plus axitinib vs sutent in good, intermediate and poor risk of mccRCC patients showed OS, PFS and MICHEL benefit, [place] in the intermediate and poor risk of mccRCC patients showed cabozantinib demonstrated a significant clinical benefit in PFS and MICHEL over standard-of-care sunitinib as first-line therapy in patients with intermediate- or poor-risk mRCC. Consider the longer duration of response and less side effects and recommended nivo and ipi. Completed 4 cycles of  nivo and ipi, continued on maintenance nivolumab with interval scans to monitor disease. Of note, on diagnosis patient had L renal vein thrombus and was started on eliquis. He had an episode of gross hematuria leading to hospitalization in 9/2020 however etiology of hematuria is unclear and resolved. Follow up cystoscopy to r/o abnormality in the bladder showed no abnormality. Eliquis stopped during hospitalization with hematology consult. Scans have continued to show stability. Most recent scans from 8/11/22 showed stable disease in L kidney and stable R renal cyst, noted non specific infiltration in omentum, cannot exclude carcinomatosis, will continue to follow with close interval scan. Patient also has large R inguinal hernia. \par \par Plan\par Metastatic RCC \par - asymptomatic and diet has returned to normal \par - continue maintenance nivolumab [id number] \par - continue to monitor interval scans, most recent scans from 8/11/22 showed stable disease in L kidney and stable R renal cyst, noted non specific infiltration in omentum, cannot exclude carcinomatosis next due in 3 months (early November) \par - continue to monitor blood work \par \par \par R Inguinal Hernia\par - patient is in the process of getting an appointment for evaluation for surgery, patient to alert us when and if he is having procedure\par -seeing surgery next week \par \par RTC in 4 weeks

## 2022-09-14 NOTE — REVIEW OF SYSTEMS
[Fatigue] : fatigue [Negative] : Allergic/Immunologic [Fever] : no fever [Chills] : no chills [Night Sweats] : no night sweats [Recent Change In Weight] : ~T no recent weight change [Eye Pain] : no eye pain [Vision Problems] : no vision problems [Dysphagia] : no dysphagia [Nosebleeds] : no nosebleeds [Odynophagia] : no odynophagia [Mucosal Pain] : no mucosal pain [Chest Pain] : no chest pain [Palpitations] : no palpitations [Lower Ext Edema] : no lower extremity edema [Shortness Of Breath] : no shortness of breath [Cough] : no cough [Abdominal Pain] : no abdominal pain [Vomiting] : no vomiting [Constipation] : no constipation [Diarrhea] : no diarrhea [Dysuria] : no dysuria [Incontinence] : no incontinence [Joint Pain] : no joint pain [Joint Stiffness] : no joint stiffness [Muscle Pain] : no muscle pain [Skin Rash] : no skin rash [Dizziness] : no dizziness [Anxiety] : no anxiety [Depression] : no depression [Muscle Weakness] : no muscle weakness [Easy Bleeding] : no tendency for easy bleeding [Easy Bruising] : no tendency for easy bruising [FreeTextEntry2] : some fatigue but able to do ADLs

## 2022-09-14 NOTE — PHYSICAL EXAM
[Fully active, able to carry on all pre-disease performance without restriction] : Status 0 - Fully active, able to carry on all pre-disease performance without restriction [Normal] : affect appropriate [de-identified] : large right inguinal hernia, stable from prior

## 2022-09-14 NOTE — HISTORY OF PRESENT ILLNESS
[T: ___] : T[unfilled] [N: ___] : N[unfilled] [M: ___] : M[unfilled] [AJCC Stage: ____] : AJCC Stage: [unfilled] [N/A] : Currently not applicable [de-identified] : Finesse Zuniga is a 70 years old male who initially presented with intermittent midback pain, 10/10 radiating to stomach from both sides on June 1, 2019. He has poor appetite.  On june 12 he was diagnosed UTI and on cipro for 4 days, on June 16, was started cefpodoxime 100mg BID for 7 days.  CT chest/abdomen/pelvis revealed significant abnormal appearance of the left kidney with multiple heterogeneous areas of low attenuation involving the upper and midpole which may represent malignancy. There are necrotic lymph nodes in the left para-aortic location at the level of the kidney and below the level of the kidney. Adenopathy surrounds the left renal artery. The left renal vein demonstrates question of partially occlusive thrombus. Nonspecific scattered tiny pulmonary nodules. Lower lobe subpleural nodules measure up to 6 mm. There is a well-circumscribed hypodense lesion in the anterior aspect of the lateral segment of the left lobe measuring 3.2 x 2.2 cm. He underwent CT guided liver lesion biopsy which showed consistent with primary renal cell carcinoma, IHC CK7/CK19/PAX8 positive. MRI abdomen waw con showed multiple bilateral pulmonary nodules increased in size and number compared with recent CT. The largest is pleural-based in the left lower lobe and measures 1.5 x 1.1 cm. LIVER: Rim-enhancing subcapsular mass in segment 3 and measures 4.4 x 2.6 cm. A 4.2cm mass in mid to lower pole of the lft kidney suspicious for urothelial malignancy arising within the left renal collecting system. Regional lymphadenopathy and hepatic and pulmonary metastases. Nonocclusive thrombus within the left renal vein. He was started on eliquis. Bone scan showed  a focus of mildly increased uptake in the right superior \par pubic ramus suspicious of bone mets.\par He has lost 17lbs in one month. He denies nausea, vomiting and constipation and diarrhea. \par \par 7/2019: ipilimumab + nivolumab initiated and to nivolumab maintenance post 4 cycles and continues on therapy.\par \par 9/18-22/2020 : Patient admitted for hematuria. Patient followed by urology and hematology during hospital course. Patient was started on CBI to help remove clots. Urine initially bloody, but became pink-tinged and then clear over time. CBI stopped and barber removed. Patient reportedly felt much better after barber removed, able to urinate by himself- reported some clots initially, but since resolved. States bladder spasms improved as well. Patient's eliquis was stopped, MRI done showing no evidence of renal vein thrombus, discussed  with Heme, no reason to continue eliquis for now. Patient's leukocytosis and JOSE MARIA resolved. Urine culture and blood cultures negative. \par Patient improved clinically throughout hospital course. Patient seen and examined on day of discharge. \par Cystoscopy was negative, performed by Dr. Javier. \par \par 5/26/2021 : Mr. Vera is here for nivolumab. Last scan 3/29/2021, will need another set of scan the end or beginning of July. He walks 2 miles a day with his dog and bikes 3-4 miles a day. He feels well overall. Mr. VERA denies fevers, chills, headaches, cough, SOB, chest pain, any swelling, nausea, vomiting, diarrhea, rash, or malaise. \par \par 6/23/21: Bothering Rt side inguinal hernia without pain or signs of incarceration. Doing well overall. Scheduled for nivo today. He denies fevers, chills, headaches, cough, SOB, chest pain, any swelling, nausea, vomiting, diarrhea, rash, or malaise.\par \par 7/7/2021 : CT showed stable disease\par \par 7/28/2021 : Here for nivolumab. 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 Mr. VERA denies fevers, chills, headaches, cough, SOB, chest pain, any swelling, nausea, vomiting, diarrhea, rash, or malaise. He has been tolerating therapy extremely well without sig issues. He is to see a general surgeon for hernia repair soon. \par \par 8/25/2021 : Here for nivolumab, scan due in September. Mr. VERA denies fevers, chills, headaches, cough, SOB, chest pain, any swelling, nausea, vomiting, diarrhea, rash, or malaise. \par \par 10/27/21: Pt presents for follow up. He is scheduled for Nivolumab which he has been tolerating well. Scheduled to receive cycle #30 today. Denies any diarrhea/ itchiness, cough, hematuria, rash. Appetite good. Energy good. No new complaints. Recieved both flu shot and COVID booster recently\par \par 11/24/21: Pt feeling well. Offers no complaints. Scheduled for C#31 nivolumab today. Due for imaging next month. Already scheduled\par \par 12/21/21 CT chest/abd/pelvis - Stable appearance of the chest compared with 9/4/2021. Indeterminate 1.4 cm hypodense lesion in the upper pole of the right kidney is unchanged. Atrophy is again noted involving the upper pole of the left kidney, unchanged. Very large right inguinal hernia containing non-obstructed large and small bowel and a small amount of fluid. Enlarged heterogeneous prostate gland. Nonspecific ill-defined infiltration of the fat in the right lower quadrant, not significantly changed. Attention to this area is suggested on follow-up CT.\par \par 12/29/21 - presents for ongoing Opdivo cycle #32 today. Overall feeling "good", energy is good. Appetite is good. No complaints. \par \par 3/2/22 feels quite well, denies any pain and gross hematuria. He has a schedule in April to take care of right inguinal hernia. \par \par 4/6/22 feels fine, denies pain and gross hematuria. His right inguinal hernia is getting worse. \par \par 4/27/22: Pt returns for follow up. He reports 24 period of severe diarrhea on monday. No fevers. Resolved spontaneously. Pt reports he had imaging done 2 weeks ago. Eager fto discuss findings. Feeling well now. Pt will be  meeting with surgeon recommended by PMD for Right inguinal hernia repair.\par \par 6/22/22 feels well. He will meet his surgeon for his right inguinal hernia. \par \par 7/20/22 feels overall fine. Denies any pain and weight loss. \par  [de-identified] : metastatic renal cell carcinoma [FreeTextEntry1] : Ipi + nivo (7/2019) > nivolumab 480 mg monthly - cont [de-identified] : \par \par 8/17/22: Patient returns for follow up, continued on nivolumab. Had CT scans 1 week ago that show stable L renal mass and stable R renal cyst. Also remarkable for non specific mild increase in illdefined omental infiltration - omental carcinomatosis is not excluded, will continue short interval scanning. Patient reports having less appetite than normal. He feels less motivation to cook for himself. He does drink an ensure everyday. He feels he gets moncada sooner and has lost a few pounds. He denies nausea, vomiting and GERD. Patient is often tired, but is still able to complete every thing he needs to do, it may just take him a bit longer. He is working on setting up his inguinal hernia surgery.\par \par 9/14: energy is good. seeing the surgeon next week for the hernia. appetite is okay now. no longer has the feeling of getting full early. able to eat full meals

## 2022-10-12 ENCOUNTER — RESULT REVIEW (OUTPATIENT)
Age: 72
End: 2022-10-12

## 2022-10-12 ENCOUNTER — APPOINTMENT (OUTPATIENT)
Dept: HEMATOLOGY ONCOLOGY | Facility: CLINIC | Age: 72
End: 2022-10-12

## 2022-10-12 ENCOUNTER — APPOINTMENT (OUTPATIENT)
Dept: INFUSION THERAPY | Facility: HOSPITAL | Age: 72
End: 2022-10-12

## 2022-10-12 VITALS
DIASTOLIC BLOOD PRESSURE: 81 MMHG | OXYGEN SATURATION: 99 % | SYSTOLIC BLOOD PRESSURE: 142 MMHG | HEIGHT: 68.78 IN | WEIGHT: 157.85 LBS | TEMPERATURE: 97.3 F | HEART RATE: 66 BPM | BODY MASS INDEX: 23.38 KG/M2 | RESPIRATION RATE: 16 BRPM

## 2022-10-12 LAB
ALBUMIN SERPL ELPH-MCNC: 4.2 G/DL — SIGNIFICANT CHANGE UP (ref 3.3–5)
ALP SERPL-CCNC: 61 U/L — SIGNIFICANT CHANGE UP (ref 40–120)
ALT FLD-CCNC: 18 U/L — SIGNIFICANT CHANGE UP (ref 10–45)
ANION GAP SERPL CALC-SCNC: 16 MMOL/L — SIGNIFICANT CHANGE UP (ref 5–17)
AST SERPL-CCNC: 38 U/L — SIGNIFICANT CHANGE UP (ref 10–40)
BASOPHILS # BLD AUTO: 0.03 K/UL — SIGNIFICANT CHANGE UP (ref 0–0.2)
BASOPHILS NFR BLD AUTO: 0.3 % — SIGNIFICANT CHANGE UP (ref 0–2)
BILIRUB SERPL-MCNC: 1 MG/DL — SIGNIFICANT CHANGE UP (ref 0.2–1.2)
BUN SERPL-MCNC: 15 MG/DL — SIGNIFICANT CHANGE UP (ref 7–23)
CALCIUM SERPL-MCNC: 9.7 MG/DL — SIGNIFICANT CHANGE UP (ref 8.4–10.5)
CHLORIDE SERPL-SCNC: 102 MMOL/L — SIGNIFICANT CHANGE UP (ref 96–108)
CO2 SERPL-SCNC: 20 MMOL/L — LOW (ref 22–31)
CREAT SERPL-MCNC: 1.16 MG/DL — SIGNIFICANT CHANGE UP (ref 0.5–1.3)
EGFR: 68 ML/MIN/1.73M2 — SIGNIFICANT CHANGE UP
EOSINOPHIL # BLD AUTO: 0.18 K/UL — SIGNIFICANT CHANGE UP (ref 0–0.5)
EOSINOPHIL NFR BLD AUTO: 1.7 % — SIGNIFICANT CHANGE UP (ref 0–6)
GLUCOSE SERPL-MCNC: 99 MG/DL — SIGNIFICANT CHANGE UP (ref 70–99)
HCT VFR BLD CALC: 41.3 % — SIGNIFICANT CHANGE UP (ref 39–50)
HGB BLD-MCNC: 14.6 G/DL — SIGNIFICANT CHANGE UP (ref 13–17)
IMM GRANULOCYTES NFR BLD AUTO: 0.4 % — SIGNIFICANT CHANGE UP (ref 0–0.9)
LYMPHOCYTES # BLD AUTO: 1.72 K/UL — SIGNIFICANT CHANGE UP (ref 1–3.3)
LYMPHOCYTES # BLD AUTO: 16.3 % — SIGNIFICANT CHANGE UP (ref 13–44)
MCHC RBC-ENTMCNC: 32.3 PG — SIGNIFICANT CHANGE UP (ref 27–34)
MCHC RBC-ENTMCNC: 35.4 G/DL — SIGNIFICANT CHANGE UP (ref 32–36)
MCV RBC AUTO: 91.4 FL — SIGNIFICANT CHANGE UP (ref 80–100)
MONOCYTES # BLD AUTO: 0.74 K/UL — SIGNIFICANT CHANGE UP (ref 0–0.9)
MONOCYTES NFR BLD AUTO: 7 % — SIGNIFICANT CHANGE UP (ref 2–14)
NEUTROPHILS # BLD AUTO: 7.84 K/UL — HIGH (ref 1.8–7.4)
NEUTROPHILS NFR BLD AUTO: 74.3 % — SIGNIFICANT CHANGE UP (ref 43–77)
NRBC # BLD: 0 /100 WBCS — SIGNIFICANT CHANGE UP (ref 0–0)
PLATELET # BLD AUTO: 174 K/UL — SIGNIFICANT CHANGE UP (ref 150–400)
POTASSIUM SERPL-MCNC: 5.3 MMOL/L — SIGNIFICANT CHANGE UP (ref 3.5–5.3)
POTASSIUM SERPL-SCNC: 5.3 MMOL/L — SIGNIFICANT CHANGE UP (ref 3.5–5.3)
PROT SERPL-MCNC: 7.1 G/DL — SIGNIFICANT CHANGE UP (ref 6–8.3)
RBC # BLD: 4.52 M/UL — SIGNIFICANT CHANGE UP (ref 4.2–5.8)
RBC # FLD: 12.3 % — SIGNIFICANT CHANGE UP (ref 10.3–14.5)
SODIUM SERPL-SCNC: 138 MMOL/L — SIGNIFICANT CHANGE UP (ref 135–145)
T4 FREE SERPL-MCNC: 1.1 NG/DL — SIGNIFICANT CHANGE UP (ref 0.9–1.8)
TSH SERPL-MCNC: 1.44 UIU/ML — SIGNIFICANT CHANGE UP (ref 0.27–4.2)
WBC # BLD: 10.55 K/UL — HIGH (ref 3.8–10.5)
WBC # FLD AUTO: 10.55 K/UL — HIGH (ref 3.8–10.5)

## 2022-10-12 PROCEDURE — 99213 OFFICE O/P EST LOW 20 MIN: CPT

## 2022-10-13 NOTE — HISTORY OF PRESENT ILLNESS
[T: ___] : T[unfilled] [N: ___] : N[unfilled] [M: ___] : M[unfilled] [AJCC Stage: ____] : AJCC Stage: [unfilled] [N/A] : Currently not applicable [de-identified] : Finesse Zuniga is a 70 years old male who initially presented with intermittent midback pain, 10/10 radiating to stomach from both sides on June 1, 2019. He has poor appetite.  On june 12 he was diagnosed UTI and on cipro for 4 days, on June 16, was started cefpodoxime 100mg BID for 7 days.  CT chest/abdomen/pelvis revealed significant abnormal appearance of the left kidney with multiple heterogeneous areas of low attenuation involving the upper and midpole which may represent malignancy. There are necrotic lymph nodes in the left para-aortic location at the level of the kidney and below the level of the kidney. Adenopathy surrounds the left renal artery. The left renal vein demonstrates question of partially occlusive thrombus. Nonspecific scattered tiny pulmonary nodules. Lower lobe subpleural nodules measure up to 6 mm. There is a well-circumscribed hypodense lesion in the anterior aspect of the lateral segment of the left lobe measuring 3.2 x 2.2 cm. He underwent CT guided liver lesion biopsy which showed consistent with primary renal cell carcinoma, IHC CK7/CK19/PAX8 positive. MRI abdomen waw con showed multiple bilateral pulmonary nodules increased in size and number compared with recent CT. The largest is pleural-based in the left lower lobe and measures 1.5 x 1.1 cm. LIVER: Rim-enhancing subcapsular mass in segment 3 and measures 4.4 x 2.6 cm. A 4.2cm mass in mid to lower pole of the lft kidney suspicious for urothelial malignancy arising within the left renal collecting system. Regional lymphadenopathy and hepatic and pulmonary metastases. Nonocclusive thrombus within the left renal vein. He was started on eliquis. Bone scan showed  a focus of mildly increased uptake in the right superior \par pubic ramus suspicious of bone mets.\par He has lost 17lbs in one month. He denies nausea, vomiting and constipation and diarrhea. \par \par 7/2019: ipilimumab + nivolumab initiated and to nivolumab maintenance post 4 cycles and continues on therapy.\par \par 9/18-22/2020 : Patient admitted for hematuria. Patient followed by urology and hematology during hospital course. Patient was started on CBI to help remove clots. Urine initially bloody, but became pink-tinged and then clear over time. CBI stopped and barber removed. Patient reportedly felt much better after barber removed, able to urinate by himself- reported some clots initially, but since resolved. States bladder spasms improved as well. Patient's eliquis was stopped, MRI done showing no evidence of renal vein thrombus, discussed  with Heme, no reason to continue eliquis for now. Patient's leukocytosis and JOSE MARIA resolved. Urine culture and blood cultures negative. \par Patient improved clinically throughout hospital course. Patient seen and examined on day of discharge. \par Cystoscopy was negative, performed by Dr. Javier. \par \par 5/26/2021 : Mr. Vera is here for nivolumab. Last scan 3/29/2021, will need another set of scan the end or beginning of July. He walks 2 miles a day with his dog and bikes 3-4 miles a day. He feels well overall. Mr. VERA denies fevers, chills, headaches, cough, SOB, chest pain, any swelling, nausea, vomiting, diarrhea, rash, or malaise. \par \par 6/23/21: Bothering Rt side inguinal hernia without pain or signs of incarceration. Doing well overall. Scheduled for nivo today. He denies fevers, chills, headaches, cough, SOB, chest pain, any swelling, nausea, vomiting, diarrhea, rash, or malaise.\par \par 7/7/2021 : CT showed stable disease\par \par 7/28/2021 : Here for nivolumab. 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 Mr. VERA denies fevers, chills, headaches, cough, SOB, chest pain, any swelling, nausea, vomiting, diarrhea, rash, or malaise. He has been tolerating therapy extremely well without sig issues. He is to see a general surgeon for hernia repair soon. \par \par 8/25/2021 : Here for nivolumab, scan due in September. Mr. VERA denies fevers, chills, headaches, cough, SOB, chest pain, any swelling, nausea, vomiting, diarrhea, rash, or malaise. \par \par 10/27/21: Pt presents for follow up. He is scheduled for Nivolumab which he has been tolerating well. Scheduled to receive cycle #30 today. Denies any diarrhea/ itchiness, cough, hematuria, rash. Appetite good. Energy good. No new complaints. Recieved both flu shot and COVID booster recently\par \par 11/24/21: Pt feeling well. Offers no complaints. Scheduled for C#31 nivolumab today. Due for imaging next month. Already scheduled\par \par 12/21/21 CT chest/abd/pelvis - Stable appearance of the chest compared with 9/4/2021. Indeterminate 1.4 cm hypodense lesion in the upper pole of the right kidney is unchanged. Atrophy is again noted involving the upper pole of the left kidney, unchanged. Very large right inguinal hernia containing non-obstructed large and small bowel and a small amount of fluid. Enlarged heterogeneous prostate gland. Nonspecific ill-defined infiltration of the fat in the right lower quadrant, not significantly changed. Attention to this area is suggested on follow-up CT.\par \par 12/29/21 - presents for ongoing Opdivo cycle #32 today. Overall feeling "good", energy is good. Appetite is good. No complaints. \par \par 3/2/22 feels quite well, denies any pain and gross hematuria. He has a schedule in April to take care of right inguinal hernia. \par \par 4/6/22 feels fine, denies pain and gross hematuria. His right inguinal hernia is getting worse. \par \par 4/27/22: Pt returns for follow up. He reports 24 period of severe diarrhea on monday. No fevers. Resolved spontaneously. Pt reports he had imaging done 2 weeks ago. Eager fto discuss findings. Feeling well now. Pt will be  meeting with surgeon recommended by PMD for Right inguinal hernia repair.\par \par 6/22/22 feels well. He will meet his surgeon for his right inguinal hernia. \par \par 7/20/22 feels overall fine. Denies any pain and weight loss. \par  [de-identified] : metastatic renal cell carcinoma [FreeTextEntry1] : Ipi + nivo (7/2019) > nivolumab 480 mg monthly - cont [de-identified] : \par \par 8/17/22: Patient returns for follow up, continued on nivolumab. Had CT scans 1 week ago that show stable L renal mass and stable R renal cyst. Also remarkable for non specific mild increase in illdefined omental infiltration - omental carcinomatosis is not excluded, will continue short interval scanning. Patient reports having less appetite than normal. He feels less motivation to cook for himself. He does drink an ensure everyday. He feels he gets moncada sooner and has lost a few pounds. He denies nausea, vomiting and GERD. Patient is often tired, but is still able to complete every thing he needs to do, it may just take him a bit longer. He is working on setting up his inguinal hernia surgery.\par \par 9/14: energy is good. seeing the surgeon next week for the hernia. appetite is okay now. no longer has the feeling of getting full early. able to eat full meals\par \par 10/12: appetite back to normal. no rash, pain, CP, SOB. energy is okay

## 2022-10-13 NOTE — ASSESSMENT
[Palliative] : Goals of care discussed with patient: Palliative [FreeTextEntry1] : Mr. Mcknight is a 72 year old male with metastatic renal cell carcinoma. On initial presentation CT c/a/p and MRI abdomen showed multiple lung nodules, one liver lesion, retroperitoneal nodes. Bone scan showed one suspicious met in right superior pubic ramus. Liver lesion biopsy is consistent with renal cell carcinoma.\par It is not certain that the histology of RCC is clear cell vs non-clear cell. IMDC risk of this patient he has 5 out of 6 risks including leukocytosis, anemia, thrombocytosis, time from dx to initiate systemic treatment less than one year and hyperalcemia. He has poor risk of mRCC. Based on [facility]RCC the standard of care is combined immunotherapy nivo and ipi vs sutent, checkmate 214 in the intermediate and poor risk of mccRCC patients with significant overall survival benefit, keynote 426 keytruda plus axitinib vs sutent in good, intermediate and poor risk of mccRCC patients showed OS, PFS and MICHEL benefit, [place] in the intermediate and poor risk of mccRCC patients showed cabozantinib demonstrated a significant clinical benefit in PFS and MICHEL over standard-of-care sunitinib as first-line therapy in patients with intermediate- or poor-risk mRCC. Consider the longer duration of response and less side effects and recommended nivo and ipi. Completed 4 cycles of  nivo and ipi, continued on maintenance nivolumab with interval scans to monitor disease. Of note, on diagnosis patient had L renal vein thrombus and was started on eliquis. He had an episode of gross hematuria leading to hospitalization in 9/2020 however etiology of hematuria is unclear and resolved. Follow up cystoscopy to r/o abnormality in the bladder showed no abnormality. Eliquis stopped during hospitalization with hematology consult. Scans have continued to show stability. Most recent scans from 8/11/22 showed stable disease in L kidney and stable R renal cyst, noted non specific infiltration in omentum, cannot exclude carcinomatosis, will continue to follow with close interval scan. Patient also has large R inguinal hernia. \par \par Plan\par Metastatic RCC \par - asymptomatic and diet has returned to normal \par - continue maintenance nivolumab [id number] \par - continue to monitor interval scans, most recent scans from 8/11/22 showed stable disease in L kidney and stable R renal cyst, noted non specific infiltration in omentum, cannot exclude carcinomatosis next due in 3 months (early November). scans ordered\par - continue to monitor blood work \par \par R Inguinal Hernia\par - patient is in the process of getting an appointment for evaluation for surgery, patient to alert us when and if he is having procedure\par -has seen surgeon and going to schedule \par \par RTC in 4 weeks

## 2022-10-13 NOTE — PHYSICAL EXAM
[Fully active, able to carry on all pre-disease performance without restriction] : Status 0 - Fully active, able to carry on all pre-disease performance without restriction [Normal] : affect appropriate [de-identified] : large right inguinal hernia, stable from prior

## 2022-11-02 ENCOUNTER — APPOINTMENT (OUTPATIENT)
Dept: CT IMAGING | Facility: CLINIC | Age: 72
End: 2022-11-02

## 2022-11-02 ENCOUNTER — OUTPATIENT (OUTPATIENT)
Dept: OUTPATIENT SERVICES | Facility: HOSPITAL | Age: 72
LOS: 1 days | End: 2022-11-02
Payer: MEDICARE

## 2022-11-02 ENCOUNTER — RESULT REVIEW (OUTPATIENT)
Age: 72
End: 2022-11-02

## 2022-11-02 DIAGNOSIS — S86.019A STRAIN OF UNSPECIFIED ACHILLES TENDON, INITIAL ENCOUNTER: Chronic | ICD-10-CM

## 2022-11-02 DIAGNOSIS — C64.2 MALIGNANT NEOPLASM OF LEFT KIDNEY, EXCEPT RENAL PELVIS: ICD-10-CM

## 2022-11-02 DIAGNOSIS — Z00.8 ENCOUNTER FOR OTHER GENERAL EXAMINATION: ICD-10-CM

## 2022-11-02 DIAGNOSIS — Z98.890 OTHER SPECIFIED POSTPROCEDURAL STATES: Chronic | ICD-10-CM

## 2022-11-02 PROCEDURE — 74177 CT ABD & PELVIS W/CONTRAST: CPT | Mod: 26

## 2022-11-02 PROCEDURE — 71260 CT THORAX DX C+: CPT | Mod: 26

## 2022-11-02 PROCEDURE — 74177 CT ABD & PELVIS W/CONTRAST: CPT

## 2022-11-02 PROCEDURE — 71260 CT THORAX DX C+: CPT

## 2022-11-09 ENCOUNTER — RESULT REVIEW (OUTPATIENT)
Age: 72
End: 2022-11-09

## 2022-11-09 ENCOUNTER — APPOINTMENT (OUTPATIENT)
Dept: INFUSION THERAPY | Facility: HOSPITAL | Age: 72
End: 2022-11-09

## 2022-11-09 ENCOUNTER — APPOINTMENT (OUTPATIENT)
Dept: HEMATOLOGY ONCOLOGY | Facility: CLINIC | Age: 72
End: 2022-11-09

## 2022-11-09 VITALS
RESPIRATION RATE: 16 BRPM | HEART RATE: 53 BPM | BODY MASS INDEX: 23.95 KG/M2 | WEIGHT: 161.16 LBS | SYSTOLIC BLOOD PRESSURE: 130 MMHG | TEMPERATURE: 97.9 F | OXYGEN SATURATION: 99 % | DIASTOLIC BLOOD PRESSURE: 75 MMHG

## 2022-11-09 LAB
ALBUMIN SERPL ELPH-MCNC: 4.3 G/DL — SIGNIFICANT CHANGE UP (ref 3.3–5)
ALP SERPL-CCNC: 59 U/L — SIGNIFICANT CHANGE UP (ref 40–120)
ALT FLD-CCNC: 18 U/L — SIGNIFICANT CHANGE UP (ref 10–45)
ANION GAP SERPL CALC-SCNC: 13 MMOL/L — SIGNIFICANT CHANGE UP (ref 5–17)
AST SERPL-CCNC: 24 U/L — SIGNIFICANT CHANGE UP (ref 10–40)
BASOPHILS # BLD AUTO: 0.04 K/UL — SIGNIFICANT CHANGE UP (ref 0–0.2)
BASOPHILS NFR BLD AUTO: 0.4 % — SIGNIFICANT CHANGE UP (ref 0–2)
BILIRUB SERPL-MCNC: 0.9 MG/DL — SIGNIFICANT CHANGE UP (ref 0.2–1.2)
BUN SERPL-MCNC: 14 MG/DL — SIGNIFICANT CHANGE UP (ref 7–23)
CALCIUM SERPL-MCNC: 9.3 MG/DL — SIGNIFICANT CHANGE UP (ref 8.4–10.5)
CHLORIDE SERPL-SCNC: 105 MMOL/L — SIGNIFICANT CHANGE UP (ref 96–108)
CO2 SERPL-SCNC: 21 MMOL/L — LOW (ref 22–31)
CREAT SERPL-MCNC: 1.15 MG/DL — SIGNIFICANT CHANGE UP (ref 0.5–1.3)
EGFR: 68 ML/MIN/1.73M2 — SIGNIFICANT CHANGE UP
EOSINOPHIL # BLD AUTO: 0.21 K/UL — SIGNIFICANT CHANGE UP (ref 0–0.5)
EOSINOPHIL NFR BLD AUTO: 2.1 % — SIGNIFICANT CHANGE UP (ref 0–6)
GLUCOSE SERPL-MCNC: 88 MG/DL — SIGNIFICANT CHANGE UP (ref 70–99)
HCT VFR BLD CALC: 41.3 % — SIGNIFICANT CHANGE UP (ref 39–50)
HGB BLD-MCNC: 14.4 G/DL — SIGNIFICANT CHANGE UP (ref 13–17)
IMM GRANULOCYTES NFR BLD AUTO: 0.4 % — SIGNIFICANT CHANGE UP (ref 0–0.9)
LYMPHOCYTES # BLD AUTO: 2.24 K/UL — SIGNIFICANT CHANGE UP (ref 1–3.3)
LYMPHOCYTES # BLD AUTO: 22.5 % — SIGNIFICANT CHANGE UP (ref 13–44)
MCHC RBC-ENTMCNC: 32.4 PG — SIGNIFICANT CHANGE UP (ref 27–34)
MCHC RBC-ENTMCNC: 34.9 G/DL — SIGNIFICANT CHANGE UP (ref 32–36)
MCV RBC AUTO: 92.8 FL — SIGNIFICANT CHANGE UP (ref 80–100)
MONOCYTES # BLD AUTO: 0.69 K/UL — SIGNIFICANT CHANGE UP (ref 0–0.9)
MONOCYTES NFR BLD AUTO: 6.9 % — SIGNIFICANT CHANGE UP (ref 2–14)
NEUTROPHILS # BLD AUTO: 6.74 K/UL — SIGNIFICANT CHANGE UP (ref 1.8–7.4)
NEUTROPHILS NFR BLD AUTO: 67.7 % — SIGNIFICANT CHANGE UP (ref 43–77)
NRBC # BLD: 0 /100 WBCS — SIGNIFICANT CHANGE UP (ref 0–0)
PLATELET # BLD AUTO: 191 K/UL — SIGNIFICANT CHANGE UP (ref 150–400)
POTASSIUM SERPL-MCNC: 4.3 MMOL/L — SIGNIFICANT CHANGE UP (ref 3.5–5.3)
POTASSIUM SERPL-SCNC: 4.3 MMOL/L — SIGNIFICANT CHANGE UP (ref 3.5–5.3)
PROT SERPL-MCNC: 6.8 G/DL — SIGNIFICANT CHANGE UP (ref 6–8.3)
RBC # BLD: 4.45 M/UL — SIGNIFICANT CHANGE UP (ref 4.2–5.8)
RBC # FLD: 12.8 % — SIGNIFICANT CHANGE UP (ref 10.3–14.5)
SODIUM SERPL-SCNC: 139 MMOL/L — SIGNIFICANT CHANGE UP (ref 135–145)
WBC # BLD: 9.96 K/UL — SIGNIFICANT CHANGE UP (ref 3.8–10.5)
WBC # FLD AUTO: 9.96 K/UL — SIGNIFICANT CHANGE UP (ref 3.8–10.5)

## 2022-11-09 PROCEDURE — 99213 OFFICE O/P EST LOW 20 MIN: CPT

## 2022-11-10 LAB
T4 FREE SERPL-MCNC: 1.1 NG/DL — SIGNIFICANT CHANGE UP (ref 0.9–1.8)
TSH SERPL-MCNC: 2.88 UIU/ML — SIGNIFICANT CHANGE UP (ref 0.27–4.2)

## 2022-11-14 NOTE — HISTORY OF PRESENT ILLNESS
[T: ___] : T[unfilled] [N: ___] : N[unfilled] [M: ___] : M[unfilled] [AJCC Stage: ____] : AJCC Stage: [unfilled] [N/A] : Currently not applicable [de-identified] : Finesse Zuniga is a 70 years old male who initially presented with intermittent midback pain, 10/10 radiating to stomach from both sides on June 1, 2019. He has poor appetite.  On june 12 he was diagnosed UTI and on cipro for 4 days, on June 16, was started cefpodoxime 100mg BID for 7 days.  CT chest/abdomen/pelvis revealed significant abnormal appearance of the left kidney with multiple heterogeneous areas of low attenuation involving the upper and midpole which may represent malignancy. There are necrotic lymph nodes in the left para-aortic location at the level of the kidney and below the level of the kidney. Adenopathy surrounds the left renal artery. The left renal vein demonstrates question of partially occlusive thrombus. Nonspecific scattered tiny pulmonary nodules. Lower lobe subpleural nodules measure up to 6 mm. There is a well-circumscribed hypodense lesion in the anterior aspect of the lateral segment of the left lobe measuring 3.2 x 2.2 cm. He underwent CT guided liver lesion biopsy which showed consistent with primary renal cell carcinoma, IHC CK7/CK19/PAX8 positive. MRI abdomen waw con showed multiple bilateral pulmonary nodules increased in size and number compared with recent CT. The largest is pleural-based in the left lower lobe and measures 1.5 x 1.1 cm. LIVER: Rim-enhancing subcapsular mass in segment 3 and measures 4.4 x 2.6 cm. A 4.2cm mass in mid to lower pole of the lft kidney suspicious for urothelial malignancy arising within the left renal collecting system. Regional lymphadenopathy and hepatic and pulmonary metastases. Nonocclusive thrombus within the left renal vein. He was started on eliquis. Bone scan showed  a focus of mildly increased uptake in the right superior \par pubic ramus suspicious of bone mets.\par He has lost 17lbs in one month. He denies nausea, vomiting and constipation and diarrhea. \par \par 7/2019: ipilimumab + nivolumab initiated and to nivolumab maintenance post 4 cycles and continues on therapy.\par \par 9/18-22/2020 : Patient admitted for hematuria. Patient followed by urology and hematology during hospital course. Patient was started on CBI to help remove clots. Urine initially bloody, but became pink-tinged and then clear over time. CBI stopped and barber removed. Patient reportedly felt much better after barber removed, able to urinate by himself- reported some clots initially, but since resolved. States bladder spasms improved as well. Patient's eliquis was stopped, MRI done showing no evidence of renal vein thrombus, discussed  with Heme, no reason to continue eliquis for now. Patient's leukocytosis and JOSE MARIA resolved. Urine culture and blood cultures negative. \par Patient improved clinically throughout hospital course. Patient seen and examined on day of discharge. \par Cystoscopy was negative, performed by Dr. Javier. \par \par 5/26/2021 : Mr. Vera is here for nivolumab. Last scan 3/29/2021, will need another set of scan the end or beginning of July. He walks 2 miles a day with his dog and bikes 3-4 miles a day. He feels well overall. Mr. VERA denies fevers, chills, headaches, cough, SOB, chest pain, any swelling, nausea, vomiting, diarrhea, rash, or malaise. \par \par 6/23/21: Bothering Rt side inguinal hernia without pain or signs of incarceration. Doing well overall. Scheduled for nivo today. He denies fevers, chills, headaches, cough, SOB, chest pain, any swelling, nausea, vomiting, diarrhea, rash, or malaise.\par \par 7/7/2021 : CT showed stable disease\par \par 7/28/2021 : Here for nivolumab. 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 Mr. VERA denies fevers, chills, headaches, cough, SOB, chest pain, any swelling, nausea, vomiting, diarrhea, rash, or malaise. He has been tolerating therapy extremely well without sig issues. He is to see a general surgeon for hernia repair soon. \par \par 8/25/2021 : Here for nivolumab, scan due in September. Mr. VERA denies fevers, chills, headaches, cough, SOB, chest pain, any swelling, nausea, vomiting, diarrhea, rash, or malaise. \par \par 10/27/21: Pt presents for follow up. He is scheduled for Nivolumab which he has been tolerating well. Scheduled to receive cycle #30 today. Denies any diarrhea/ itchiness, cough, hematuria, rash. Appetite good. Energy good. No new complaints. Recieved both flu shot and COVID booster recently\par \par 11/24/21: Pt feeling well. Offers no complaints. Scheduled for C#31 nivolumab today. Due for imaging next month. Already scheduled\par \par 12/21/21 CT chest/abd/pelvis - Stable appearance of the chest compared with 9/4/2021. Indeterminate 1.4 cm hypodense lesion in the upper pole of the right kidney is unchanged. Atrophy is again noted involving the upper pole of the left kidney, unchanged. Very large right inguinal hernia containing non-obstructed large and small bowel and a small amount of fluid. Enlarged heterogeneous prostate gland. Nonspecific ill-defined infiltration of the fat in the right lower quadrant, not significantly changed. Attention to this area is suggested on follow-up CT.\par \par 12/29/21 - presents for ongoing Opdivo cycle #32 today. Overall feeling "good", energy is good. Appetite is good. No complaints. \par \par 3/2/22 feels quite well, denies any pain and gross hematuria. He has a schedule in April to take care of right inguinal hernia. \par \par 4/6/22 feels fine, denies pain and gross hematuria. His right inguinal hernia is getting worse. \par \par 4/27/22: Pt returns for follow up. He reports 24 period of severe diarrhea on monday. No fevers. Resolved spontaneously. Pt reports he had imaging done 2 weeks ago. Eager fto discuss findings. Feeling well now. Pt will be  meeting with surgeon recommended by PMD for Right inguinal hernia repair.\par \par 6/22/22 feels well. He will meet his surgeon for his right inguinal hernia. \par \par 7/20/22 feels overall fine. Denies any pain and weight loss. \par  [de-identified] : metastatic renal cell carcinoma [FreeTextEntry1] : Ipi + nivo (7/2019) > nivolumab 480 mg monthly - cont [de-identified] : \par \par 8/17/22: Patient returns for follow up, continued on nivolumab. Had CT scans 1 week ago that show stable L renal mass and stable R renal cyst. Also remarkable for non specific mild increase in illdefined omental infiltration - omental carcinomatosis is not excluded, will continue short interval scanning. Patient reports having less appetite than normal. He feels less motivation to cook for himself. He does drink an ensure everyday. He feels he gets moncada sooner and has lost a few pounds. He denies nausea, vomiting and GERD. Patient is often tired, but is still able to complete every thing he needs to do, it may just take him a bit longer. He is working on setting up his inguinal hernia surgery.\par \par 9/14: energy is good. seeing the surgeon next week for the hernia. appetite is okay now. no longer has the feeling of getting full early. able to eat full meals\par \par 10/12: appetite back to normal. no rash, pain, CP, SOB. energy is okay\par \par 11/9/22: feels well today. no new changes

## 2022-11-14 NOTE — PHYSICAL EXAM
[Fully active, able to carry on all pre-disease performance without restriction] : Status 0 - Fully active, able to carry on all pre-disease performance without restriction [Normal] : affect appropriate [de-identified] : large right inguinal hernia, stable from prior

## 2022-12-05 ENCOUNTER — OUTPATIENT (OUTPATIENT)
Dept: OUTPATIENT SERVICES | Facility: HOSPITAL | Age: 72
LOS: 1 days | Discharge: HOME | End: 2022-12-05

## 2022-12-05 DIAGNOSIS — Z98.890 OTHER SPECIFIED POSTPROCEDURAL STATES: Chronic | ICD-10-CM

## 2022-12-05 DIAGNOSIS — C64.9 MALIGNANT NEOPLASM OF UNSPECIFIED KIDNEY, EXCEPT RENAL PELVIS: ICD-10-CM

## 2022-12-05 DIAGNOSIS — S86.019A STRAIN OF UNSPECIFIED ACHILLES TENDON, INITIAL ENCOUNTER: Chronic | ICD-10-CM

## 2022-12-07 ENCOUNTER — RESULT REVIEW (OUTPATIENT)
Age: 72
End: 2022-12-07

## 2022-12-07 ENCOUNTER — NON-APPOINTMENT (OUTPATIENT)
Age: 72
End: 2022-12-07

## 2022-12-07 ENCOUNTER — APPOINTMENT (OUTPATIENT)
Dept: INFUSION THERAPY | Facility: HOSPITAL | Age: 72
End: 2022-12-07

## 2022-12-07 DIAGNOSIS — Z51.11 ENCOUNTER FOR ANTINEOPLASTIC CHEMOTHERAPY: ICD-10-CM

## 2022-12-07 LAB
ALBUMIN SERPL ELPH-MCNC: 4 G/DL — SIGNIFICANT CHANGE UP (ref 3.3–5)
ALP SERPL-CCNC: 65 U/L — SIGNIFICANT CHANGE UP (ref 40–120)
ALT FLD-CCNC: 14 U/L — SIGNIFICANT CHANGE UP (ref 10–45)
ANION GAP SERPL CALC-SCNC: 10 MMOL/L — SIGNIFICANT CHANGE UP (ref 5–17)
AST SERPL-CCNC: 17 U/L — SIGNIFICANT CHANGE UP (ref 10–40)
BASOPHILS # BLD AUTO: 0.03 K/UL — SIGNIFICANT CHANGE UP (ref 0–0.2)
BASOPHILS NFR BLD AUTO: 0.4 % — SIGNIFICANT CHANGE UP (ref 0–2)
BILIRUB SERPL-MCNC: 0.4 MG/DL — SIGNIFICANT CHANGE UP (ref 0.2–1.2)
BUN SERPL-MCNC: 15 MG/DL — SIGNIFICANT CHANGE UP (ref 7–23)
CALCIUM SERPL-MCNC: 8.9 MG/DL — SIGNIFICANT CHANGE UP (ref 8.4–10.5)
CHLORIDE SERPL-SCNC: 106 MMOL/L — SIGNIFICANT CHANGE UP (ref 96–108)
CO2 SERPL-SCNC: 24 MMOL/L — SIGNIFICANT CHANGE UP (ref 22–31)
CREAT SERPL-MCNC: 1.14 MG/DL — SIGNIFICANT CHANGE UP (ref 0.5–1.3)
EGFR: 69 ML/MIN/1.73M2 — SIGNIFICANT CHANGE UP
EOSINOPHIL # BLD AUTO: 0.18 K/UL — SIGNIFICANT CHANGE UP (ref 0–0.5)
EOSINOPHIL NFR BLD AUTO: 2.4 % — SIGNIFICANT CHANGE UP (ref 0–6)
GLUCOSE SERPL-MCNC: 112 MG/DL — HIGH (ref 70–99)
HCT VFR BLD CALC: 36.7 % — LOW (ref 39–50)
HGB BLD-MCNC: 12.9 G/DL — LOW (ref 13–17)
IMM GRANULOCYTES NFR BLD AUTO: 0.3 % — SIGNIFICANT CHANGE UP (ref 0–0.9)
LYMPHOCYTES # BLD AUTO: 1.55 K/UL — SIGNIFICANT CHANGE UP (ref 1–3.3)
LYMPHOCYTES # BLD AUTO: 20.4 % — SIGNIFICANT CHANGE UP (ref 13–44)
MCHC RBC-ENTMCNC: 32 PG — SIGNIFICANT CHANGE UP (ref 27–34)
MCHC RBC-ENTMCNC: 35.1 G/DL — SIGNIFICANT CHANGE UP (ref 32–36)
MCV RBC AUTO: 91.1 FL — SIGNIFICANT CHANGE UP (ref 80–100)
MONOCYTES # BLD AUTO: 0.56 K/UL — SIGNIFICANT CHANGE UP (ref 0–0.9)
MONOCYTES NFR BLD AUTO: 7.4 % — SIGNIFICANT CHANGE UP (ref 2–14)
NEUTROPHILS # BLD AUTO: 5.25 K/UL — SIGNIFICANT CHANGE UP (ref 1.8–7.4)
NEUTROPHILS NFR BLD AUTO: 69.1 % — SIGNIFICANT CHANGE UP (ref 43–77)
NRBC # BLD: 0 /100 WBCS — SIGNIFICANT CHANGE UP (ref 0–0)
PLATELET # BLD AUTO: 168 K/UL — SIGNIFICANT CHANGE UP (ref 150–400)
POTASSIUM SERPL-MCNC: 4.5 MMOL/L — SIGNIFICANT CHANGE UP (ref 3.5–5.3)
POTASSIUM SERPL-SCNC: 4.5 MMOL/L — SIGNIFICANT CHANGE UP (ref 3.5–5.3)
PROT SERPL-MCNC: 6.3 G/DL — SIGNIFICANT CHANGE UP (ref 6–8.3)
RBC # BLD: 4.03 M/UL — LOW (ref 4.2–5.8)
RBC # FLD: 12.3 % — SIGNIFICANT CHANGE UP (ref 10.3–14.5)
SODIUM SERPL-SCNC: 140 MMOL/L — SIGNIFICANT CHANGE UP (ref 135–145)
T4 FREE SERPL-MCNC: 1.1 NG/DL — SIGNIFICANT CHANGE UP (ref 0.9–1.8)
TSH SERPL-MCNC: 1.44 UIU/ML — SIGNIFICANT CHANGE UP (ref 0.27–4.2)
WBC # BLD: 7.59 K/UL — SIGNIFICANT CHANGE UP (ref 3.8–10.5)
WBC # FLD AUTO: 7.59 K/UL — SIGNIFICANT CHANGE UP (ref 3.8–10.5)

## 2023-01-04 ENCOUNTER — RESULT REVIEW (OUTPATIENT)
Age: 73
End: 2023-01-04

## 2023-01-04 ENCOUNTER — APPOINTMENT (OUTPATIENT)
Dept: HEMATOLOGY ONCOLOGY | Facility: CLINIC | Age: 73
End: 2023-01-04
Payer: MEDICARE

## 2023-01-04 ENCOUNTER — APPOINTMENT (OUTPATIENT)
Dept: INFUSION THERAPY | Facility: HOSPITAL | Age: 73
End: 2023-01-04

## 2023-01-04 VITALS
OXYGEN SATURATION: 99 % | BODY MASS INDEX: 23.24 KG/M2 | WEIGHT: 158.73 LBS | TEMPERATURE: 96.7 F | HEART RATE: 59 BPM | HEIGHT: 69.17 IN | RESPIRATION RATE: 16 BRPM | SYSTOLIC BLOOD PRESSURE: 130 MMHG | DIASTOLIC BLOOD PRESSURE: 81 MMHG

## 2023-01-04 LAB
ALBUMIN SERPL ELPH-MCNC: 4.3 G/DL — SIGNIFICANT CHANGE UP (ref 3.3–5)
ALP SERPL-CCNC: 67 U/L — SIGNIFICANT CHANGE UP (ref 40–120)
ALT FLD-CCNC: 18 U/L — SIGNIFICANT CHANGE UP (ref 10–45)
ANION GAP SERPL CALC-SCNC: 11 MMOL/L — SIGNIFICANT CHANGE UP (ref 5–17)
AST SERPL-CCNC: 20 U/L — SIGNIFICANT CHANGE UP (ref 10–40)
BASOPHILS # BLD AUTO: 0.05 K/UL — SIGNIFICANT CHANGE UP (ref 0–0.2)
BASOPHILS NFR BLD AUTO: 0.7 % — SIGNIFICANT CHANGE UP (ref 0–2)
BILIRUB SERPL-MCNC: 0.5 MG/DL — SIGNIFICANT CHANGE UP (ref 0.2–1.2)
BUN SERPL-MCNC: 17 MG/DL — SIGNIFICANT CHANGE UP (ref 7–23)
CALCIUM SERPL-MCNC: 9.6 MG/DL — SIGNIFICANT CHANGE UP (ref 8.4–10.5)
CHLORIDE SERPL-SCNC: 103 MMOL/L — SIGNIFICANT CHANGE UP (ref 96–108)
CO2 SERPL-SCNC: 26 MMOL/L — SIGNIFICANT CHANGE UP (ref 22–31)
CREAT SERPL-MCNC: 1.24 MG/DL — SIGNIFICANT CHANGE UP (ref 0.5–1.3)
EGFR: 63 ML/MIN/1.73M2 — SIGNIFICANT CHANGE UP
EOSINOPHIL # BLD AUTO: 0.19 K/UL — SIGNIFICANT CHANGE UP (ref 0–0.5)
EOSINOPHIL NFR BLD AUTO: 2.6 % — SIGNIFICANT CHANGE UP (ref 0–6)
GLUCOSE SERPL-MCNC: 119 MG/DL — HIGH (ref 70–99)
HCT VFR BLD CALC: 39 % — SIGNIFICANT CHANGE UP (ref 39–50)
HGB BLD-MCNC: 13.7 G/DL — SIGNIFICANT CHANGE UP (ref 13–17)
IMM GRANULOCYTES NFR BLD AUTO: 0.5 % — SIGNIFICANT CHANGE UP (ref 0–0.9)
LYMPHOCYTES # BLD AUTO: 1.37 K/UL — SIGNIFICANT CHANGE UP (ref 1–3.3)
LYMPHOCYTES # BLD AUTO: 18.6 % — SIGNIFICANT CHANGE UP (ref 13–44)
MCHC RBC-ENTMCNC: 32.1 PG — SIGNIFICANT CHANGE UP (ref 27–34)
MCHC RBC-ENTMCNC: 35.1 G/DL — SIGNIFICANT CHANGE UP (ref 32–36)
MCV RBC AUTO: 91.3 FL — SIGNIFICANT CHANGE UP (ref 80–100)
MONOCYTES # BLD AUTO: 0.61 K/UL — SIGNIFICANT CHANGE UP (ref 0–0.9)
MONOCYTES NFR BLD AUTO: 8.3 % — SIGNIFICANT CHANGE UP (ref 2–14)
NEUTROPHILS # BLD AUTO: 5.09 K/UL — SIGNIFICANT CHANGE UP (ref 1.8–7.4)
NEUTROPHILS NFR BLD AUTO: 69.3 % — SIGNIFICANT CHANGE UP (ref 43–77)
NRBC # BLD: 0 /100 WBCS — SIGNIFICANT CHANGE UP (ref 0–0)
PLATELET # BLD AUTO: 175 K/UL — SIGNIFICANT CHANGE UP (ref 150–400)
POTASSIUM SERPL-MCNC: 4.5 MMOL/L — SIGNIFICANT CHANGE UP (ref 3.5–5.3)
POTASSIUM SERPL-SCNC: 4.5 MMOL/L — SIGNIFICANT CHANGE UP (ref 3.5–5.3)
PROT SERPL-MCNC: 7 G/DL — SIGNIFICANT CHANGE UP (ref 6–8.3)
RBC # BLD: 4.27 M/UL — SIGNIFICANT CHANGE UP (ref 4.2–5.8)
RBC # FLD: 12.7 % — SIGNIFICANT CHANGE UP (ref 10.3–14.5)
SODIUM SERPL-SCNC: 140 MMOL/L — SIGNIFICANT CHANGE UP (ref 135–145)
WBC # BLD: 7.35 K/UL — SIGNIFICANT CHANGE UP (ref 3.8–10.5)
WBC # FLD AUTO: 7.35 K/UL — SIGNIFICANT CHANGE UP (ref 3.8–10.5)

## 2023-01-04 PROCEDURE — 99213 OFFICE O/P EST LOW 20 MIN: CPT

## 2023-01-04 NOTE — HISTORY OF PRESENT ILLNESS
[T: ___] : T[unfilled] [N: ___] : N[unfilled] [M: ___] : M[unfilled] [AJCC Stage: ____] : AJCC Stage: [unfilled] [N/A] : Currently not applicable [de-identified] : Finesse Zuniga is a 70 years old male who initially presented with intermittent midback pain, 10/10 radiating to stomach from both sides on June 1, 2019. He has poor appetite.  On june 12 he was diagnosed UTI and on cipro for 4 days, on June 16, was started cefpodoxime 100mg BID for 7 days.  CT chest/abdomen/pelvis revealed significant abnormal appearance of the left kidney with multiple heterogeneous areas of low attenuation involving the upper and midpole which may represent malignancy. There are necrotic lymph nodes in the left para-aortic location at the level of the kidney and below the level of the kidney. Adenopathy surrounds the left renal artery. The left renal vein demonstrates question of partially occlusive thrombus. Nonspecific scattered tiny pulmonary nodules. Lower lobe subpleural nodules measure up to 6 mm. There is a well-circumscribed hypodense lesion in the anterior aspect of the lateral segment of the left lobe measuring 3.2 x 2.2 cm. He underwent CT guided liver lesion biopsy which showed consistent with primary renal cell carcinoma, IHC CK7/CK19/PAX8 positive. MRI abdomen waw con showed multiple bilateral pulmonary nodules increased in size and number compared with recent CT. The largest is pleural-based in the left lower lobe and measures 1.5 x 1.1 cm. LIVER: Rim-enhancing subcapsular mass in segment 3 and measures 4.4 x 2.6 cm. A 4.2cm mass in mid to lower pole of the lft kidney suspicious for urothelial malignancy arising within the left renal collecting system. Regional lymphadenopathy and hepatic and pulmonary metastases. Nonocclusive thrombus within the left renal vein. He was started on eliquis. Bone scan showed  a focus of mildly increased uptake in the right superior \par pubic ramus suspicious of bone mets.\par He has lost 17lbs in one month. He denies nausea, vomiting and constipation and diarrhea. \par \par 7/2019: ipilimumab + nivolumab initiated and to nivolumab maintenance post 4 cycles and continues on therapy.\par \par 9/18-22/2020 : Patient admitted for hematuria. Patient followed by urology and hematology during hospital course. Patient was started on CBI to help remove clots. Urine initially bloody, but became pink-tinged and then clear over time. CBI stopped and barber removed. Patient reportedly felt much better after barber removed, able to urinate by himself- reported some clots initially, but since resolved. States bladder spasms improved as well. Patient's eliquis was stopped, MRI done showing no evidence of renal vein thrombus, discussed  with Heme, no reason to continue eliquis for now. Patient's leukocytosis and JOSE MARIA resolved. Urine culture and blood cultures negative. \par Patient improved clinically throughout hospital course. Patient seen and examined on day of discharge. \par Cystoscopy was negative, performed by Dr. Javier. \par \par 5/26/2021 : Mr. Vera is here for nivolumab. Last scan 3/29/2021, will need another set of scan the end or beginning of July. He walks 2 miles a day with his dog and bikes 3-4 miles a day. He feels well overall. Mr. VERA denies fevers, chills, headaches, cough, SOB, chest pain, any swelling, nausea, vomiting, diarrhea, rash, or malaise. \par \par 6/23/21: Bothering Rt side inguinal hernia without pain or signs of incarceration. Doing well overall. Scheduled for nivo today. He denies fevers, chills, headaches, cough, SOB, chest pain, any swelling, nausea, vomiting, diarrhea, rash, or malaise.\par \par 7/7/2021 : CT showed stable disease\par \par 7/28/2021 : Here for nivolumab. 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 Mr. VERA denies fevers, chills, headaches, cough, SOB, chest pain, any swelling, nausea, vomiting, diarrhea, rash, or malaise. He has been tolerating therapy extremely well without sig issues. He is to see a general surgeon for hernia repair soon. \par \par 8/25/2021 : Here for nivolumab, scan due in September. Mr. VERA denies fevers, chills, headaches, cough, SOB, chest pain, any swelling, nausea, vomiting, diarrhea, rash, or malaise. \par \par 10/27/21: Pt presents for follow up. He is scheduled for Nivolumab which he has been tolerating well. Scheduled to receive cycle #30 today. Denies any diarrhea/ itchiness, cough, hematuria, rash. Appetite good. Energy good. No new complaints. Recieved both flu shot and COVID booster recently\par \par 11/24/21: Pt feeling well. Offers no complaints. Scheduled for C#31 nivolumab today. Due for imaging next month. Already scheduled\par \par 12/21/21 CT chest/abd/pelvis - Stable appearance of the chest compared with 9/4/2021. Indeterminate 1.4 cm hypodense lesion in the upper pole of the right kidney is unchanged. Atrophy is again noted involving the upper pole of the left kidney, unchanged. Very large right inguinal hernia containing non-obstructed large and small bowel and a small amount of fluid. Enlarged heterogeneous prostate gland. Nonspecific ill-defined infiltration of the fat in the right lower quadrant, not significantly changed. Attention to this area is suggested on follow-up CT.\par \par 12/29/21 - presents for ongoing Opdivo cycle #32 today. Overall feeling "good", energy is good. Appetite is good. No complaints. \par \par 3/2/22 feels quite well, denies any pain and gross hematuria. He has a schedule in April to take care of right inguinal hernia. \par \par 4/6/22 feels fine, denies pain and gross hematuria. His right inguinal hernia is getting worse. \par \par 4/27/22: Pt returns for follow up. He reports 24 period of severe diarrhea on monday. No fevers. Resolved spontaneously. Pt reports he had imaging done 2 weeks ago. Eager fto discuss findings. Feeling well now. Pt will be  meeting with surgeon recommended by PMD for Right inguinal hernia repair.\par \par 6/22/22 feels well. He will meet his surgeon for his right inguinal hernia. \par \par 7/20/22 feels overall fine. Denies any pain and weight loss. \par  [de-identified] : metastatic renal cell carcinoma [FreeTextEntry1] : Ipi + nivo (7/2019) > nivolumab 480 mg monthly - cont [de-identified] : \par \par 8/17/22: Patient returns for follow up, continued on nivolumab. Had CT scans 1 week ago that show stable L renal mass and stable R renal cyst. Also remarkable for non specific mild increase in illdefined omental infiltration - omental carcinomatosis is not excluded, will continue short interval scanning. Patient reports having less appetite than normal. He feels less motivation to cook for himself. He does drink an ensure everyday. He feels he gets moncada sooner and has lost a few pounds. He denies nausea, vomiting and GERD. Patient is often tired, but is still able to complete every thing he needs to do, it may just take him a bit longer. He is working on setting up his inguinal hernia surgery.\par \par 9/14: energy is good. seeing the surgeon next week for the hernia. appetite is okay now. no longer has the feeling of getting full early. able to eat full meals\par \par 10/12: appetite back to normal. no rash, pain, CP, SOB. energy is okay\par \par 11/9/22: feels well today. no new changes \par \par 1/4/22: doing well today. no new issues. energy good. appetite good. no new joint pain, rash, SOB, diarreha.

## 2023-01-04 NOTE — PHYSICAL EXAM
[Fully active, able to carry on all pre-disease performance without restriction] : Status 0 - Fully active, able to carry on all pre-disease performance without restriction [Normal] : affect appropriate [de-identified] : large right inguinal hernia, stable from prior

## 2023-01-04 NOTE — ASSESSMENT
[Palliative] : Goals of care discussed with patient: Palliative [FreeTextEntry1] : Mr. Mcknight is a 72 year old male with metastatic renal cell carcinoma. On initial presentation CT c/a/p and MRI abdomen showed multiple lung nodules, one liver lesion, retroperitoneal nodes. Bone scan showed one suspicious met in right superior pubic ramus. Liver lesion biopsy is consistent with renal cell carcinoma.\par It is not certain that the histology of RCC is clear cell vs non-clear cell. IMDC risk of this patient he has 5 out of 6 risks including leukocytosis, anemia, thrombocytosis, time from dx to initiate systemic treatment less than one year and hyperalcemia. He has poor risk of mRCC. Based on [facility] the standard of care is combined immunotherapy nivo and ipi vs sutent, checkmate 214 in the intermediate and poor risk of mccRCC patients with significant overall survival benefit, keynote 426 keytruda plus axitinib vs sutent in good, intermediate and poor risk of [facility]RCC patients showed OS, PFS and MICHEL benefit, [place] in the intermediate and poor risk of mccRCC patients showed cabozantinib demonstrated a significant clinical benefit in PFS and MICHEL over standard-of-care sunitinib as first-line therapy in patients with intermediate- or poor-risk mRCC. Consider the longer duration of response and less side effects and recommended nivo and ipi. Completed 4 cycles of  nivo and ipi, continued on maintenance nivolumab with interval scans to monitor disease. Of note, on diagnosis patient had L renal vein thrombus and was started on eliquis. He had an episode of gross hematuria leading to hospitalization in 9/2020 however etiology of hematuria is unclear and resolved. Follow up cystoscopy to r/o abnormality in the bladder showed no abnormality. Eliquis stopped during hospitalization with hematology consult. Remains off of eliquis. Scans have continued to show stability. Most recent scans from 8/11/22 showed stable disease in L kidney and stable R renal cyst, noted non specific infiltration in omentum, cannot exclude carcinomatosis, will continue to follow with close interval scan. Patient also has large R inguinal hernia. \par \par Plan\par Metastatic RCC \par - asymptomatic and diet has returned to normal \par - continue maintenance nivolumab [id number], due today\par - continue to monitor interval scans, most recent scans from 11/2/22 with stable findings. next in March 2022\par - continue to monitor blood work \par \par R Inguinal Hernia\par - patient is in the process of getting an appointment for evaluation for surgery, patient to alert us when and if he is having procedure\par -has seen surgeon and going to schedule early this year\par \par RTC in 4 weeks

## 2023-01-05 LAB
T4 FREE SERPL-MCNC: 1.2 NG/DL — SIGNIFICANT CHANGE UP (ref 0.9–1.8)
TSH SERPL-MCNC: 1.12 UIU/ML — SIGNIFICANT CHANGE UP (ref 0.27–4.2)

## 2023-02-01 ENCOUNTER — RESULT REVIEW (OUTPATIENT)
Age: 73
End: 2023-02-01

## 2023-02-01 ENCOUNTER — APPOINTMENT (OUTPATIENT)
Dept: INFUSION THERAPY | Facility: HOSPITAL | Age: 73
End: 2023-02-01

## 2023-02-01 ENCOUNTER — APPOINTMENT (OUTPATIENT)
Dept: HEMATOLOGY ONCOLOGY | Facility: CLINIC | Age: 73
End: 2023-02-01
Payer: MEDICARE

## 2023-02-01 VITALS
HEIGHT: 69.13 IN | HEART RATE: 58 BPM | WEIGHT: 162.26 LBS | SYSTOLIC BLOOD PRESSURE: 126 MMHG | TEMPERATURE: 98.1 F | RESPIRATION RATE: 16 BRPM | BODY MASS INDEX: 23.76 KG/M2 | OXYGEN SATURATION: 100 % | DIASTOLIC BLOOD PRESSURE: 79 MMHG

## 2023-02-01 LAB
ALBUMIN SERPL ELPH-MCNC: 4.2 G/DL — SIGNIFICANT CHANGE UP (ref 3.3–5)
ALP SERPL-CCNC: 61 U/L — SIGNIFICANT CHANGE UP (ref 40–120)
ALT FLD-CCNC: 17 U/L — SIGNIFICANT CHANGE UP (ref 10–45)
ANION GAP SERPL CALC-SCNC: 11 MMOL/L — SIGNIFICANT CHANGE UP (ref 5–17)
AST SERPL-CCNC: 29 U/L — SIGNIFICANT CHANGE UP (ref 10–40)
BASOPHILS # BLD AUTO: 0.03 K/UL — SIGNIFICANT CHANGE UP (ref 0–0.2)
BASOPHILS NFR BLD AUTO: 0.4 % — SIGNIFICANT CHANGE UP (ref 0–2)
BILIRUB SERPL-MCNC: 0.6 MG/DL — SIGNIFICANT CHANGE UP (ref 0.2–1.2)
BUN SERPL-MCNC: 18 MG/DL — SIGNIFICANT CHANGE UP (ref 7–23)
CALCIUM SERPL-MCNC: 9.3 MG/DL — SIGNIFICANT CHANGE UP (ref 8.4–10.5)
CHLORIDE SERPL-SCNC: 105 MMOL/L — SIGNIFICANT CHANGE UP (ref 96–108)
CO2 SERPL-SCNC: 24 MMOL/L — SIGNIFICANT CHANGE UP (ref 22–31)
CREAT SERPL-MCNC: 1.19 MG/DL — SIGNIFICANT CHANGE UP (ref 0.5–1.3)
EGFR: 65 ML/MIN/1.73M2 — SIGNIFICANT CHANGE UP
EOSINOPHIL # BLD AUTO: 0.18 K/UL — SIGNIFICANT CHANGE UP (ref 0–0.5)
EOSINOPHIL NFR BLD AUTO: 2.4 % — SIGNIFICANT CHANGE UP (ref 0–6)
GLUCOSE SERPL-MCNC: 93 MG/DL — SIGNIFICANT CHANGE UP (ref 70–99)
HCT VFR BLD CALC: 38.6 % — LOW (ref 39–50)
HGB BLD-MCNC: 13.5 G/DL — SIGNIFICANT CHANGE UP (ref 13–17)
IMM GRANULOCYTES NFR BLD AUTO: 0.3 % — SIGNIFICANT CHANGE UP (ref 0–0.9)
LYMPHOCYTES # BLD AUTO: 1.65 K/UL — SIGNIFICANT CHANGE UP (ref 1–3.3)
LYMPHOCYTES # BLD AUTO: 21.6 % — SIGNIFICANT CHANGE UP (ref 13–44)
MCHC RBC-ENTMCNC: 31.5 PG — SIGNIFICANT CHANGE UP (ref 27–34)
MCHC RBC-ENTMCNC: 35 G/DL — SIGNIFICANT CHANGE UP (ref 32–36)
MCV RBC AUTO: 90.2 FL — SIGNIFICANT CHANGE UP (ref 80–100)
MONOCYTES # BLD AUTO: 0.58 K/UL — SIGNIFICANT CHANGE UP (ref 0–0.9)
MONOCYTES NFR BLD AUTO: 7.6 % — SIGNIFICANT CHANGE UP (ref 2–14)
NEUTROPHILS # BLD AUTO: 5.19 K/UL — SIGNIFICANT CHANGE UP (ref 1.8–7.4)
NEUTROPHILS NFR BLD AUTO: 67.7 % — SIGNIFICANT CHANGE UP (ref 43–77)
NRBC # BLD: 0 /100 WBCS — SIGNIFICANT CHANGE UP (ref 0–0)
PLATELET # BLD AUTO: 166 K/UL — SIGNIFICANT CHANGE UP (ref 150–400)
POTASSIUM SERPL-MCNC: 5 MMOL/L — SIGNIFICANT CHANGE UP (ref 3.5–5.3)
POTASSIUM SERPL-SCNC: 5 MMOL/L — SIGNIFICANT CHANGE UP (ref 3.5–5.3)
PROT SERPL-MCNC: 6.9 G/DL — SIGNIFICANT CHANGE UP (ref 6–8.3)
RBC # BLD: 4.28 M/UL — SIGNIFICANT CHANGE UP (ref 4.2–5.8)
RBC # FLD: 12.6 % — SIGNIFICANT CHANGE UP (ref 10.3–14.5)
SODIUM SERPL-SCNC: 140 MMOL/L — SIGNIFICANT CHANGE UP (ref 135–145)
T4 FREE SERPL-MCNC: 1.2 NG/DL — SIGNIFICANT CHANGE UP (ref 0.9–1.8)
TSH SERPL-MCNC: 1.25 UIU/ML — SIGNIFICANT CHANGE UP (ref 0.27–4.2)
WBC # BLD: 7.65 K/UL — SIGNIFICANT CHANGE UP (ref 3.8–10.5)
WBC # FLD AUTO: 7.65 K/UL — SIGNIFICANT CHANGE UP (ref 3.8–10.5)

## 2023-02-01 PROCEDURE — 99214 OFFICE O/P EST MOD 30 MIN: CPT

## 2023-02-05 NOTE — HISTORY OF PRESENT ILLNESS
[FreeTextEntry3] : I, Gary Dwyer, acted solely as a scribe for Dr. Ian Bagley MD, KOL[place].. on this date 11/08/2021  .\par  \par All medical record entries made by the Scribe were at my, Dr. Ian Bagley MD, [place]., direction and personally dictated by me on 11/08/2021 . I have reviewed the chart and agree that the record accurately reflects my personal performance of the history, physical exam, assessment and plan. I have also personally directed, reviewed, and agreed with the chart.  [T: ___] : T[unfilled] [N: ___] : N[unfilled] [M: ___] : M[unfilled] [AJCC Stage: ____] : AJCC Stage: [unfilled] [N/A] : Currently not applicable [de-identified] : Finesse uZniga is a 70 years old male who initially presented with intermittent midback pain, 10/10 radiating to stomach from both sides on June 1, 2019. He has poor appetite.  On june 12 he was diagnosed UTI and on cipro for 4 days, on June 16, was started cefpodoxime 100mg BID for 7 days.  CT chest/abdomen/pelvis revealed significant abnormal appearance of the left kidney with multiple heterogeneous areas of low attenuation involving the upper and midpole which may represent malignancy. There are necrotic lymph nodes in the left para-aortic location at the level of the kidney and below the level of the kidney. Adenopathy surrounds the left renal artery. The left renal vein demonstrates question of partially occlusive thrombus. Nonspecific scattered tiny pulmonary nodules. Lower lobe subpleural nodules measure up to 6 mm. There is a well-circumscribed hypodense lesion in the anterior aspect of the lateral segment of the left lobe measuring 3.2 x 2.2 cm. He underwent CT guided liver lesion biopsy which showed consistent with primary renal cell carcinoma, IHC CK7/CK19/PAX8 positive. MRI abdomen waw con showed multiple bilateral pulmonary nodules increased in size and number compared with recent CT. The largest is pleural-based in the left lower lobe and measures 1.5 x 1.1 cm. LIVER: Rim-enhancing subcapsular mass in segment 3 and measures 4.4 x 2.6 cm. A 4.2cm mass in mid to lower pole of the lft kidney suspicious for urothelial malignancy arising within the left renal collecting system. Regional lymphadenopathy and hepatic and pulmonary metastases. Nonocclusive thrombus within the left renal vein. He was started on eliquis. Bone scan showed  a focus of mildly increased uptake in the right superior \par pubic ramus suspicious of bone mets.\par He has lost 17lbs in one month. He denies nausea, vomiting and constipation and diarrhea. \par \par 7/2019: ipilimumab + nivolumab initiated and to nivolumab maintenance post 4 cycles and continues on therapy.\par \par 9/18-22/2020 : Patient admitted for hematuria. Patient followed by urology and hematology during hospital course. Patient was started on CBI to help remove clots. Urine initially bloody, but became pink-tinged and then clear over time. CBI stopped and barber removed. Patient reportedly felt much better after barber removed, able to urinate by himself- reported some clots initially, but since resolved. States bladder spasms improved as well. Patient's eliquis was stopped, MRI done showing no evidence of renal vein thrombus, discussed  with Heme, no reason to continue eliquis for now. Patient's leukocytosis and JOSE MARIA resolved. Urine culture and blood cultures negative. \par Patient improved clinically throughout hospital course. Patient seen and examined on day of discharge. \par Cystoscopy was negative, performed by Dr. Javier. \par \par 5/26/2021 : Mr. Vera is here for nivolumab. Last scan 3/29/2021, will need another set of scan the end or beginning of July. He walks 2 miles a day with his dog and bikes 3-4 miles a day. He feels well overall. Mr. VERA denies fevers, chills, headaches, cough, SOB, chest pain, any swelling, nausea, vomiting, diarrhea, rash, or malaise. \par \par 6/23/21: Bothering Rt side inguinal hernia without pain or signs of incarceration. Doing well overall. Scheduled for nivo today. He denies fevers, chills, headaches, cough, SOB, chest pain, any swelling, nausea, vomiting, diarrhea, rash, or malaise.\par \par 7/7/2021 : CT showed stable disease\par \par 7/28/2021 : Here for nivolumab. 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 Mr. VERA denies fevers, chills, headaches, cough, SOB, chest pain, any swelling, nausea, vomiting, diarrhea, rash, or malaise. He has been tolerating therapy extremely well without sig issues. He is to see a general surgeon for hernia repair soon. \par \par 8/25/2021 : Here for nivolumab, scan due in September. Mr. VERA denies fevers, chills, headaches, cough, SOB, chest pain, any swelling, nausea, vomiting, diarrhea, rash, or malaise. \par \par 10/27/21: Pt presents for follow up. He is scheduled for Nivolumab which he has been tolerating well. Scheduled to receive cycle #30 today. Denies any diarrhea/ itchiness, cough, hematuria, rash. Appetite good. Energy good. No new complaints. Recieved both flu shot and COVID booster recently\par \par 11/24/21: Pt feeling well. Offers no complaints. Scheduled for C#31 nivolumab today. Due for imaging next month. Already scheduled\par \par 12/21/21 CT chest/abd/pelvis - Stable appearance of the chest compared with 9/4/2021. Indeterminate 1.4 cm hypodense lesion in the upper pole of the right kidney is unchanged. Atrophy is again noted involving the upper pole of the left kidney, unchanged. Very large right inguinal hernia containing non-obstructed large and small bowel and a small amount of fluid. Enlarged heterogeneous prostate gland. Nonspecific ill-defined infiltration of the fat in the right lower quadrant, not significantly changed. Attention to this area is suggested on follow-up CT.\par \par 12/29/21 - presents for ongoing Opdivo cycle #32 today. Overall feeling "good", energy is good. Appetite is good. No complaints. \par \par 3/2/22 feels quite well, denies any pain and gross hematuria. He has a schedule in April to take care of right inguinal hernia. \par \par 4/6/22 feels fine, denies pain and gross hematuria. His right inguinal hernia is getting worse. \par \par 4/27/22: Pt returns for follow up. He reports 24 period of severe diarrhea on monday. No fevers. Resolved spontaneously. Pt reports he had imaging done 2 weeks ago. Eager fto discuss findings. Feeling well now. Pt will be  meeting with surgeon recommended by PMD for Right inguinal hernia repair.\par \par 6/22/22 feels well. He will meet his surgeon for his right inguinal hernia. \par \par 7/20/22 feels overall fine. Denies any pain and weight loss. \par \par \par \par 8/17/22: Patient returns for follow up, continued on nivolumab. Had CT scans 1 week ago that show stable L renal mass and stable R renal cyst. Also remarkable for non specific mild increase in illdefined omental infiltration - omental carcinomatosis is not excluded, will continue short interval scanning. Patient reports having less appetite than normal. He feels less motivation to cook for himself. He does drink an ensure everyday. He feels he gets moncada sooner and has lost a few pounds. He denies nausea, vomiting and GERD. Patient is often tired, but is still able to complete every thing he needs to do, it may just take him a bit longer. He is working on setting up his inguinal hernia surgery.\par \par 9/14: energy is good. seeing the surgeon next week for the hernia. appetite is okay now. no longer has the feeling of getting full early. able to eat full meals\par \par 10/12: appetite back to normal. no rash, pain, CP, SOB. energy is okay\par \par 11/9/22: feels well today. no new changes \par \par 1/4/22: doing well today. no new issues. energy good. appetite good. no new joint pain, rash, SOB, diarreha.\par  [de-identified] : metastatic renal cell carcinoma [FreeTextEntry1] : Ipi + nivo (7/2019) > nivolumab 480 mg monthly - cont [de-identified] : \par \par 2/1/23: Returns for follow up and treatment with nivolumab. Patient has been feeling well. Denies fatigue, walks 2-2.5 miles with his dog per day. He has not yet seen a surgeon for his inguinal hernia. His PCP gave him a surgeon referral, he says he has not gotten up the nerve to call. Denies pain. \par Patient denies fever, chills, headache, vision changes, cough, shortness of breath, chest pain, palpitations, abdominal pain, nausea/vomiting, diarrhea, constipation, dysuria, hematuria, itching, rashes, joint pain, mucositis, changes in sensation.\par

## 2023-02-05 NOTE — REVIEW OF SYSTEMS
[Fever] : no fever [Chills] : no chills [Fatigue] : no fatigue [Eye Pain] : no eye pain [Dysphagia] : no dysphagia [Odynophagia] : no odynophagia [Mucosal Pain] : no mucosal pain [Chest Pain] : no chest pain [Palpitations] : no palpitations [Lower Ext Edema] : no lower extremity edema [Shortness Of Breath] : no shortness of breath [Cough] : no cough [Abdominal Pain] : no abdominal pain [Vomiting] : no vomiting [Constipation] : no constipation [Diarrhea] : no diarrhea [Dysuria] : no dysuria [Joint Pain] : no joint pain [Muscle Pain] : no muscle pain [Skin Rash] : no skin rash [Dizziness] : no dizziness

## 2023-02-05 NOTE — PHYSICAL EXAM
[Normal] : affect appropriate [de-identified] : anicteric  [de-identified] : supple, non tender, FROM  [de-identified] : no edema  [de-identified] : large right inguinal hernia, stable from prior

## 2023-02-05 NOTE — ASSESSMENT
[FreeTextEntry1] : Mr. Mcknight is a 72 year old male with metastatic renal cell carcinoma. On initial presentation CT c/a/p and MRI abdomen showed multiple lung nodules, one liver lesion, retroperitoneal nodes. Bone scan showed one suspicious met in right superior pubic ramus. Liver lesion biopsy is consistent with renal cell carcinoma.\par It is not certain that the histology of RCC is clear cell vs non-clear cell. IMDC risk of this patient he has 5 out of 6 risks including leukocytosis, anemia, thrombocytosis, time from dx to initiate systemic treatment less than one year and hyperalcemia. He has poor risk of mRCC. Based on [facility] the standard of care is combined immunotherapy nivo and ipi vs sutent, checkmate 214 in the intermediate and poor risk of mccRCC patients with significant overall survival benefit, keynote 426 keytruda plus axitinib vs sutent in good, intermediate and poor risk of [facility]RCC patients showed OS, PFS and MICHEL benefit, [place] in the intermediate and poor risk of mccRCC patients showed cabozantinib demonstrated a significant clinical benefit in PFS and MICHEL over standard-of-care sunitinib as first-line therapy in patients with intermediate- or poor-risk mRCC. Consider the longer duration of response and less side effects and recommended nivo and ipi. Completed 4 cycles of  nivo and ipi, continued on maintenance nivolumab with interval scans to monitor disease. Of note, on diagnosis patient had L renal vein thrombus and was started on eliquis. He had an episode of gross hematuria leading to hospitalization in 9/2020 however etiology of hematuria is unclear and resolved. Follow up cystoscopy to r/o abnormality in the bladder showed no abnormality. Eliquis stopped during hospitalization with hematology consult. Remains off of eliquis. Scans have continued to show stability. Scans from 8/11/22 showed stable disease in L kidney and stable R renal cyst, noted non specific infiltration in omentum, cannot exclude carcinomatosis. CTs in November 2022 show stable disease.  Patient also has large R inguinal hernia. \par \par Plan\par Metastatic RCC \par - continue maintenance nivolumab [id number], due today\par - continue to monitor interval scans, most recent scans from 11/2/22 with stable findings, next in March 2023\par - continue to monitor blood work \par - monitor for iRAEs, reviewed with patient today; including but not limited to: fatigue, skin rash, joint pain, hypothyroidism, pneumonitis, hepatitis, nephritis, colitis, myocarditis, pericarditis, hypophysitis.\par \par R Inguinal Hernia \par - patient has yet to see a general surgeon for consultation, has the name of a doctor from his PCP, encouraged him to go for consultation\par \par Instructed to contact our office with any new/worsening symptoms.\par Patient educated regarding plan of care, all questions/concerns addressed to the best of my abilities and patient's apparent satisfaction.\par RTC 4 weeks \par

## 2023-02-21 ENCOUNTER — OUTPATIENT (OUTPATIENT)
Dept: OUTPATIENT SERVICES | Facility: HOSPITAL | Age: 73
LOS: 1 days | Discharge: HOME | End: 2023-02-21

## 2023-02-21 DIAGNOSIS — S86.019A STRAIN OF UNSPECIFIED ACHILLES TENDON, INITIAL ENCOUNTER: Chronic | ICD-10-CM

## 2023-02-21 DIAGNOSIS — Z98.890 OTHER SPECIFIED POSTPROCEDURAL STATES: Chronic | ICD-10-CM

## 2023-02-21 DIAGNOSIS — C64.9 MALIGNANT NEOPLASM OF UNSPECIFIED KIDNEY, EXCEPT RENAL PELVIS: ICD-10-CM

## 2023-03-01 ENCOUNTER — RESULT REVIEW (OUTPATIENT)
Age: 73
End: 2023-03-01

## 2023-03-01 ENCOUNTER — APPOINTMENT (OUTPATIENT)
Dept: HEMATOLOGY ONCOLOGY | Facility: CLINIC | Age: 73
End: 2023-03-01
Payer: MEDICARE

## 2023-03-01 ENCOUNTER — APPOINTMENT (OUTPATIENT)
Dept: INFUSION THERAPY | Facility: HOSPITAL | Age: 73
End: 2023-03-01

## 2023-03-01 VITALS
HEIGHT: 69.13 IN | RESPIRATION RATE: 16 BRPM | WEIGHT: 160.94 LBS | BODY MASS INDEX: 23.57 KG/M2 | TEMPERATURE: 98.1 F | OXYGEN SATURATION: 99 % | HEART RATE: 54 BPM | DIASTOLIC BLOOD PRESSURE: 81 MMHG | SYSTOLIC BLOOD PRESSURE: 132 MMHG

## 2023-03-01 DIAGNOSIS — Z51.11 ENCOUNTER FOR ANTINEOPLASTIC CHEMOTHERAPY: ICD-10-CM

## 2023-03-01 LAB
ALBUMIN SERPL ELPH-MCNC: 4.1 G/DL — SIGNIFICANT CHANGE UP (ref 3.3–5)
ALP SERPL-CCNC: 62 U/L — SIGNIFICANT CHANGE UP (ref 40–120)
ALT FLD-CCNC: 14 U/L — SIGNIFICANT CHANGE UP (ref 10–45)
ANION GAP SERPL CALC-SCNC: 11 MMOL/L — SIGNIFICANT CHANGE UP (ref 5–17)
AST SERPL-CCNC: 29 U/L — SIGNIFICANT CHANGE UP (ref 10–40)
BASOPHILS # BLD AUTO: 0.05 K/UL — SIGNIFICANT CHANGE UP (ref 0–0.2)
BASOPHILS NFR BLD AUTO: 0.5 % — SIGNIFICANT CHANGE UP (ref 0–2)
BILIRUB SERPL-MCNC: 0.8 MG/DL — SIGNIFICANT CHANGE UP (ref 0.2–1.2)
BUN SERPL-MCNC: 14 MG/DL — SIGNIFICANT CHANGE UP (ref 7–23)
CALCIUM SERPL-MCNC: 9.4 MG/DL — SIGNIFICANT CHANGE UP (ref 8.4–10.5)
CHLORIDE SERPL-SCNC: 105 MMOL/L — SIGNIFICANT CHANGE UP (ref 96–108)
CO2 SERPL-SCNC: 24 MMOL/L — SIGNIFICANT CHANGE UP (ref 22–31)
CREAT SERPL-MCNC: 1.15 MG/DL — SIGNIFICANT CHANGE UP (ref 0.5–1.3)
EGFR: 68 ML/MIN/1.73M2 — SIGNIFICANT CHANGE UP
EOSINOPHIL # BLD AUTO: 0.19 K/UL — SIGNIFICANT CHANGE UP (ref 0–0.5)
EOSINOPHIL NFR BLD AUTO: 1.9 % — SIGNIFICANT CHANGE UP (ref 0–6)
GLUCOSE SERPL-MCNC: 103 MG/DL — HIGH (ref 70–99)
HCT VFR BLD CALC: 40.2 % — SIGNIFICANT CHANGE UP (ref 39–50)
HGB BLD-MCNC: 14.3 G/DL — SIGNIFICANT CHANGE UP (ref 13–17)
IMM GRANULOCYTES NFR BLD AUTO: 0.5 % — SIGNIFICANT CHANGE UP (ref 0–0.9)
LYMPHOCYTES # BLD AUTO: 1.77 K/UL — SIGNIFICANT CHANGE UP (ref 1–3.3)
LYMPHOCYTES # BLD AUTO: 17.8 % — SIGNIFICANT CHANGE UP (ref 13–44)
MCHC RBC-ENTMCNC: 31.8 PG — SIGNIFICANT CHANGE UP (ref 27–34)
MCHC RBC-ENTMCNC: 35.6 G/DL — SIGNIFICANT CHANGE UP (ref 32–36)
MCV RBC AUTO: 89.3 FL — SIGNIFICANT CHANGE UP (ref 80–100)
MONOCYTES # BLD AUTO: 0.75 K/UL — SIGNIFICANT CHANGE UP (ref 0–0.9)
MONOCYTES NFR BLD AUTO: 7.6 % — SIGNIFICANT CHANGE UP (ref 2–14)
NEUTROPHILS # BLD AUTO: 7.11 K/UL — SIGNIFICANT CHANGE UP (ref 1.8–7.4)
NEUTROPHILS NFR BLD AUTO: 71.7 % — SIGNIFICANT CHANGE UP (ref 43–77)
NRBC # BLD: 0 /100 WBCS — SIGNIFICANT CHANGE UP (ref 0–0)
PLATELET # BLD AUTO: 183 K/UL — SIGNIFICANT CHANGE UP (ref 150–400)
POTASSIUM SERPL-MCNC: 5 MMOL/L — SIGNIFICANT CHANGE UP (ref 3.5–5.3)
POTASSIUM SERPL-SCNC: 5 MMOL/L — SIGNIFICANT CHANGE UP (ref 3.5–5.3)
PROT SERPL-MCNC: 6.9 G/DL — SIGNIFICANT CHANGE UP (ref 6–8.3)
RBC # BLD: 4.5 M/UL — SIGNIFICANT CHANGE UP (ref 4.2–5.8)
RBC # FLD: 12.4 % — SIGNIFICANT CHANGE UP (ref 10.3–14.5)
SODIUM SERPL-SCNC: 140 MMOL/L — SIGNIFICANT CHANGE UP (ref 135–145)
T4 FREE SERPL-MCNC: 1.1 NG/DL — SIGNIFICANT CHANGE UP (ref 0.9–1.8)
TSH SERPL-MCNC: 1.42 UIU/ML — SIGNIFICANT CHANGE UP (ref 0.27–4.2)
WBC # BLD: 9.92 K/UL — SIGNIFICANT CHANGE UP (ref 3.8–10.5)
WBC # FLD AUTO: 9.92 K/UL — SIGNIFICANT CHANGE UP (ref 3.8–10.5)

## 2023-03-01 PROCEDURE — 99214 OFFICE O/P EST MOD 30 MIN: CPT

## 2023-03-09 ENCOUNTER — APPOINTMENT (OUTPATIENT)
Dept: CT IMAGING | Facility: CLINIC | Age: 73
End: 2023-03-09
Payer: MEDICARE

## 2023-03-09 ENCOUNTER — OUTPATIENT (OUTPATIENT)
Dept: OUTPATIENT SERVICES | Facility: HOSPITAL | Age: 73
LOS: 1 days | End: 2023-03-09
Payer: MEDICARE

## 2023-03-09 DIAGNOSIS — S86.019A STRAIN OF UNSPECIFIED ACHILLES TENDON, INITIAL ENCOUNTER: Chronic | ICD-10-CM

## 2023-03-09 DIAGNOSIS — Z98.890 OTHER SPECIFIED POSTPROCEDURAL STATES: Chronic | ICD-10-CM

## 2023-03-09 DIAGNOSIS — Z00.8 ENCOUNTER FOR OTHER GENERAL EXAMINATION: ICD-10-CM

## 2023-03-09 DIAGNOSIS — C64.2 MALIGNANT NEOPLASM OF LEFT KIDNEY, EXCEPT RENAL PELVIS: ICD-10-CM

## 2023-03-09 PROCEDURE — 71260 CT THORAX DX C+: CPT

## 2023-03-09 PROCEDURE — 74177 CT ABD & PELVIS W/CONTRAST: CPT | Mod: 26

## 2023-03-09 PROCEDURE — 74177 CT ABD & PELVIS W/CONTRAST: CPT

## 2023-03-09 PROCEDURE — 71260 CT THORAX DX C+: CPT | Mod: 26

## 2023-03-10 NOTE — ASSESSMENT
[FreeTextEntry1] : Mr. Mcknight is a 72 year old male with metastatic renal cell carcinoma. On initial presentation CT c/a/p and MRI abdomen showed multiple lung nodules, one liver lesion, retroperitoneal nodes. Bone scan showed one suspicious met in right superior pubic ramus. Liver lesion biopsy is consistent with renal cell carcinoma.\par It is not certain that the histology of RCC is clear cell vs non-clear cell. IMDC risk of this patient he has 5 out of 6 risks including leukocytosis, anemia, thrombocytosis, time from dx to initiate systemic treatment less than one year and hyperalcemia. He has poor risk of mRCC. Based on [facility] the standard of care is combined immunotherapy nivo and ipi vs sutent, checkmate 214 in the intermediate and poor risk of [facility]RCC patients with significant overall survival benefit, keynote 426 keytruda plus axitinib vs sutent in good, intermediate and poor risk of [facility]ccRCC patients showed OS, PFS and MICHEL benefit, [place] in the intermediate and poor risk of [facility]RCC patients showed cabozantinib demonstrated a significant clinical benefit in PFS and MICHEL over standard-of-care sunitinib as first-line therapy in patients with intermediate- or poor-risk mRCC. Consider the longer duration of response and less side effects and recommended nivo and ipi. Completed 4 cycles of  nivo and ipi, continued on maintenance nivolumab with interval scans to monitor disease. Of note, on diagnosis patient had L renal vein thrombus and was started on eliquis. He had an episode of gross hematuria leading to hospitalization in 9/2020 however etiology of hematuria is unclear and resolved. Follow up cystoscopy to r/o abnormality in the bladder showed no abnormality. Eliquis stopped during hospitalization with hematology consult. Remains off of eliquis. Scans have continued to show stability. Scans from 8/11/22 showed stable disease in L kidney and stable R renal cyst, noted non specific infiltration in omentum, cannot exclude carcinomatosis. CTs in November 2022 show stable disease.  Patient also has large R inguinal hernia. \par \par Plan\par Metastatic RCC \par - continue maintenance nivolumab [id number], proceed with treatment today\par - continue to monitor interval scans, most recent scans from 11/2/22 with stable findings, next in March 2023, ordered today\par - continue to monitor blood work \par - monitor for iRAEs, reviewed with patient today; including but not limited to: fatigue, skin rash, joint pain, hypothyroidism, pneumonitis, hepatitis, nephritis, colitis, myocarditis, pericarditis, hypophysitis.\par \par R Inguinal Hernia \par - patient had recent surgery consultation, is contemplating having surgery done in the Spring... \par \par Instructed to contact our office with any new/worsening symptoms.\par Patient educated regarding plan of care, all questions/concerns addressed to the best of my abilities and patient's apparent satisfaction.\par RTC 4 weeks \par

## 2023-03-10 NOTE — PHYSICAL EXAM
[Normal] : affect appropriate [Fully active, able to carry on all pre-disease performance without restriction] : Status 0 - Fully active, able to carry on all pre-disease performance without restriction [de-identified] : anicteric  [de-identified] : supple, non tender, FROM  [de-identified] : no edema  [de-identified] : large right inguinal hernia, stable from prior

## 2023-03-10 NOTE — REVIEW OF SYSTEMS
[Fever] : no fever [Chills] : no chills [Eye Pain] : no eye pain [Fatigue] : no fatigue [Dysphagia] : no dysphagia [Odynophagia] : no odynophagia [Mucosal Pain] : no mucosal pain [Chest Pain] : no chest pain [Palpitations] : no palpitations [Lower Ext Edema] : no lower extremity edema [Shortness Of Breath] : no shortness of breath [Cough] : no cough [Abdominal Pain] : no abdominal pain [Vomiting] : no vomiting [Constipation] : no constipation [Diarrhea] : no diarrhea [Dysuria] : no dysuria [Joint Pain] : no joint pain [Muscle Pain] : no muscle pain [Skin Rash] : no skin rash [Dizziness] : no dizziness

## 2023-03-10 NOTE — HISTORY OF PRESENT ILLNESS
[T: ___] : T[unfilled] [N: ___] : N[unfilled] [M: ___] : M[unfilled] [AJCC Stage: ____] : AJCC Stage: [unfilled] [N/A] : Currently not applicable [de-identified] : Finesse Zuniga is a 70 years old male who initially presented with intermittent midback pain, 10/10 radiating to stomach from both sides on June 1, 2019. He has poor appetite.  On june 12 he was diagnosed UTI and on cipro for 4 days, on June 16, was started cefpodoxime 100mg BID for 7 days.  CT chest/abdomen/pelvis revealed significant abnormal appearance of the left kidney with multiple heterogeneous areas of low attenuation involving the upper and midpole which may represent malignancy. There are necrotic lymph nodes in the left para-aortic location at the level of the kidney and below the level of the kidney. Adenopathy surrounds the left renal artery. The left renal vein demonstrates question of partially occlusive thrombus. Nonspecific scattered tiny pulmonary nodules. Lower lobe subpleural nodules measure up to 6 mm. There is a well-circumscribed hypodense lesion in the anterior aspect of the lateral segment of the left lobe measuring 3.2 x 2.2 cm. He underwent CT guided liver lesion biopsy which showed consistent with primary renal cell carcinoma, IHC CK7/CK19/PAX8 positive. MRI abdomen waw con showed multiple bilateral pulmonary nodules increased in size and number compared with recent CT. The largest is pleural-based in the left lower lobe and measures 1.5 x 1.1 cm. LIVER: Rim-enhancing subcapsular mass in segment 3 and measures 4.4 x 2.6 cm. A 4.2cm mass in mid to lower pole of the lft kidney suspicious for urothelial malignancy arising within the left renal collecting system. Regional lymphadenopathy and hepatic and pulmonary metastases. Nonocclusive thrombus within the left renal vein. He was started on eliquis. Bone scan showed  a focus of mildly increased uptake in the right superior \par pubic ramus suspicious of bone mets.\par He has lost 17lbs in one month. He denies nausea, vomiting and constipation and diarrhea. \par \par 7/2019: ipilimumab + nivolumab initiated and to nivolumab maintenance post 4 cycles and continues on therapy.\par \par 9/18-22/2020 : Patient admitted for hematuria. Patient followed by urology and hematology during hospital course. Patient was started on CBI to help remove clots. Urine initially bloody, but became pink-tinged and then clear over time. CBI stopped and barber removed. Patient reportedly felt much better after barber removed, able to urinate by himself- reported some clots initially, but since resolved. States bladder spasms improved as well. Patient's eliquis was stopped, MRI done showing no evidence of renal vein thrombus, discussed  with Heme, no reason to continue eliquis for now. Patient's leukocytosis and JOSE MARIA resolved. Urine culture and blood cultures negative. \par Patient improved clinically throughout hospital course. Patient seen and examined on day of discharge. \par Cystoscopy was negative, performed by Dr. Javier. \par \par 5/26/2021 : Mr. Vera is here for nivolumab. Last scan 3/29/2021, will need another set of scan the end or beginning of July. He walks 2 miles a day with his dog and bikes 3-4 miles a day. He feels well overall. Mr. VERA denies fevers, chills, headaches, cough, SOB, chest pain, any swelling, nausea, vomiting, diarrhea, rash, or malaise. \par \par 6/23/21: Bothering Rt side inguinal hernia without pain or signs of incarceration. Doing well overall. Scheduled for nivo today. He denies fevers, chills, headaches, cough, SOB, chest pain, any swelling, nausea, vomiting, diarrhea, rash, or malaise.\par \par 7/7/2021 : CT showed stable disease\par \par 7/28/2021 : Here for nivolumab. 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 Mr. VERA denies fevers, chills, headaches, cough, SOB, chest pain, any swelling, nausea, vomiting, diarrhea, rash, or malaise. He has been tolerating therapy extremely well without sig issues. He is to see a general surgeon for hernia repair soon. \par \par 8/25/2021 : Here for nivolumab, scan due in September. Mr. VERA denies fevers, chills, headaches, cough, SOB, chest pain, any swelling, nausea, vomiting, diarrhea, rash, or malaise. \par \par 10/27/21: Pt presents for follow up. He is scheduled for Nivolumab which he has been tolerating well. Scheduled to receive cycle #30 today. Denies any diarrhea/ itchiness, cough, hematuria, rash. Appetite good. Energy good. No new complaints. Recieved both flu shot and COVID booster recently\par \par 11/24/21: Pt feeling well. Offers no complaints. Scheduled for C#31 nivolumab today. Due for imaging next month. Already scheduled\par \par 12/21/21 CT chest/abd/pelvis - Stable appearance of the chest compared with 9/4/2021. Indeterminate 1.4 cm hypodense lesion in the upper pole of the right kidney is unchanged. Atrophy is again noted involving the upper pole of the left kidney, unchanged. Very large right inguinal hernia containing non-obstructed large and small bowel and a small amount of fluid. Enlarged heterogeneous prostate gland. Nonspecific ill-defined infiltration of the fat in the right lower quadrant, not significantly changed. Attention to this area is suggested on follow-up CT.\par \par 12/29/21 - presents for ongoing Opdivo cycle #32 today. Overall feeling "good", energy is good. Appetite is good. No complaints. \par \par 3/2/22 feels quite well, denies any pain and gross hematuria. He has a schedule in April to take care of right inguinal hernia. \par \par 4/6/22 feels fine, denies pain and gross hematuria. His right inguinal hernia is getting worse. \par \par 4/27/22: Pt returns for follow up. He reports 24 period of severe diarrhea on monday. No fevers. Resolved spontaneously. Pt reports he had imaging done 2 weeks ago. Eager fto discuss findings. Feeling well now. Pt will be  meeting with surgeon recommended by PMD for Right inguinal hernia repair.\par \par 6/22/22 feels well. He will meet his surgeon for his right inguinal hernia. \par \par 7/20/22 feels overall fine. Denies any pain and weight loss. \par \par \par \par 8/17/22: Patient returns for follow up, continued on nivolumab. Had CT scans 1 week ago that show stable L renal mass and stable R renal cyst. Also remarkable for non specific mild increase in illdefined omental infiltration - omental carcinomatosis is not excluded, will continue short interval scanning. Patient reports having less appetite than normal. He feels less motivation to cook for himself. He does drink an ensure everyday. He feels he gets moncada sooner and has lost a few pounds. He denies nausea, vomiting and GERD. Patient is often tired, but is still able to complete every thing he needs to do, it may just take him a bit longer. He is working on setting up his inguinal hernia surgery.\par \par 9/14: energy is good. seeing the surgeon next week for the hernia. appetite is okay now. no longer has the feeling of getting full early. able to eat full meals\par \par 10/12: appetite back to normal. no rash, pain, CP, SOB. energy is okay\par \par 11/9/22: feels well today. no new changes \par \par 1/4/22: doing well today. no new issues. energy good. appetite good. no new joint pain, rash, SOB, diarreha.\par \par 2/1/23: Returns for follow up and treatment with nivolumab. Patient has been feeling well. Denies fatigue, walks 2-2.5 miles with his dog per day. He has not yet seen a surgeon for his inguinal hernia. His PCP gave him a surgeon referral, he says he has not gotten up the nerve to call. Denies pain. \par Patient denies fever, chills, headache, vision changes, cough, shortness of breath, chest pain, palpitations, abdominal pain, nausea/vomiting, diarrhea, constipation, dysuria, hematuria, itching, rashes, joint pain, mucositis, changes in sensation.\par \par  [de-identified] : metastatic renal cell carcinoma [FreeTextEntry1] : Ipi + nivo (7/2019) > nivolumab 480 mg monthly - cont [de-identified] : \par \par 3/1/23: continued on nivolumab. Patient feels well overall, eating well, has good energy, goes on daily walks with his dog. He notes his urine stream is not as strong in the morning, but improves throughout the day. Denies dysuria/hematuria. He went to see a surgeon last month and was described what his inguinal hernia repair would be, he is planning to have the surgery but not until the Spring.. he denies any change in color or size of hernia.

## 2023-03-20 NOTE — BRIEF OPERATIVE NOTE - ESTIMATED BLOOD LOSS
1 Birth Control Pills Pregnancy And Lactation Text: This medication should be avoided if pregnant and for the first 30 days post-partum.

## 2023-03-29 ENCOUNTER — RESULT REVIEW (OUTPATIENT)
Age: 73
End: 2023-03-29

## 2023-03-29 ENCOUNTER — APPOINTMENT (OUTPATIENT)
Dept: HEMATOLOGY ONCOLOGY | Facility: CLINIC | Age: 73
End: 2023-03-29
Payer: MEDICARE

## 2023-03-29 ENCOUNTER — APPOINTMENT (OUTPATIENT)
Dept: INFUSION THERAPY | Facility: HOSPITAL | Age: 73
End: 2023-03-29

## 2023-03-29 VITALS
SYSTOLIC BLOOD PRESSURE: 139 MMHG | OXYGEN SATURATION: 100 % | DIASTOLIC BLOOD PRESSURE: 79 MMHG | BODY MASS INDEX: 23.24 KG/M2 | HEART RATE: 53 BPM | WEIGHT: 158.73 LBS | TEMPERATURE: 98.2 F | HEIGHT: 69.13 IN | RESPIRATION RATE: 16 BRPM

## 2023-03-29 LAB
ALBUMIN SERPL ELPH-MCNC: 4.4 G/DL — SIGNIFICANT CHANGE UP (ref 3.3–5)
ALP SERPL-CCNC: 66 U/L — SIGNIFICANT CHANGE UP (ref 40–120)
ALT FLD-CCNC: 14 U/L — SIGNIFICANT CHANGE UP (ref 10–45)
ANION GAP SERPL CALC-SCNC: 12 MMOL/L — SIGNIFICANT CHANGE UP (ref 5–17)
AST SERPL-CCNC: 17 U/L — SIGNIFICANT CHANGE UP (ref 10–40)
BASOPHILS # BLD AUTO: 0.03 K/UL — SIGNIFICANT CHANGE UP (ref 0–0.2)
BASOPHILS NFR BLD AUTO: 0.4 % — SIGNIFICANT CHANGE UP (ref 0–2)
BILIRUB SERPL-MCNC: 0.7 MG/DL — SIGNIFICANT CHANGE UP (ref 0.2–1.2)
BUN SERPL-MCNC: 23 MG/DL — SIGNIFICANT CHANGE UP (ref 7–23)
CALCIUM SERPL-MCNC: 10 MG/DL — SIGNIFICANT CHANGE UP (ref 8.4–10.5)
CHLORIDE SERPL-SCNC: 103 MMOL/L — SIGNIFICANT CHANGE UP (ref 96–108)
CO2 SERPL-SCNC: 23 MMOL/L — SIGNIFICANT CHANGE UP (ref 22–31)
CREAT SERPL-MCNC: 1.32 MG/DL — HIGH (ref 0.5–1.3)
EGFR: 58 ML/MIN/1.73M2 — LOW
EOSINOPHIL # BLD AUTO: 0.13 K/UL — SIGNIFICANT CHANGE UP (ref 0–0.5)
EOSINOPHIL NFR BLD AUTO: 1.5 % — SIGNIFICANT CHANGE UP (ref 0–6)
GLUCOSE SERPL-MCNC: 104 MG/DL — HIGH (ref 70–99)
HCT VFR BLD CALC: 39.8 % — SIGNIFICANT CHANGE UP (ref 39–50)
HGB BLD-MCNC: 14.1 G/DL — SIGNIFICANT CHANGE UP (ref 13–17)
IMM GRANULOCYTES NFR BLD AUTO: 0.6 % — SIGNIFICANT CHANGE UP (ref 0–0.9)
LYMPHOCYTES # BLD AUTO: 1.5 K/UL — SIGNIFICANT CHANGE UP (ref 1–3.3)
LYMPHOCYTES # BLD AUTO: 17.5 % — SIGNIFICANT CHANGE UP (ref 13–44)
MCHC RBC-ENTMCNC: 31.8 PG — SIGNIFICANT CHANGE UP (ref 27–34)
MCHC RBC-ENTMCNC: 35.4 G/DL — SIGNIFICANT CHANGE UP (ref 32–36)
MCV RBC AUTO: 89.6 FL — SIGNIFICANT CHANGE UP (ref 80–100)
MONOCYTES # BLD AUTO: 0.71 K/UL — SIGNIFICANT CHANGE UP (ref 0–0.9)
MONOCYTES NFR BLD AUTO: 8.3 % — SIGNIFICANT CHANGE UP (ref 2–14)
NEUTROPHILS # BLD AUTO: 6.15 K/UL — SIGNIFICANT CHANGE UP (ref 1.8–7.4)
NEUTROPHILS NFR BLD AUTO: 71.7 % — SIGNIFICANT CHANGE UP (ref 43–77)
NRBC # BLD: 0 /100 WBCS — SIGNIFICANT CHANGE UP (ref 0–0)
PLATELET # BLD AUTO: 195 K/UL — SIGNIFICANT CHANGE UP (ref 150–400)
POTASSIUM SERPL-MCNC: 4.9 MMOL/L — SIGNIFICANT CHANGE UP (ref 3.5–5.3)
POTASSIUM SERPL-SCNC: 4.9 MMOL/L — SIGNIFICANT CHANGE UP (ref 3.5–5.3)
PROT SERPL-MCNC: 6.8 G/DL — SIGNIFICANT CHANGE UP (ref 6–8.3)
RBC # BLD: 4.44 M/UL — SIGNIFICANT CHANGE UP (ref 4.2–5.8)
RBC # FLD: 12.4 % — SIGNIFICANT CHANGE UP (ref 10.3–14.5)
SODIUM SERPL-SCNC: 138 MMOL/L — SIGNIFICANT CHANGE UP (ref 135–145)
T4 FREE SERPL-MCNC: 1.2 NG/DL — SIGNIFICANT CHANGE UP (ref 0.9–1.8)
TSH SERPL-MCNC: 1.34 UIU/ML — SIGNIFICANT CHANGE UP (ref 0.27–4.2)
WBC # BLD: 8.57 K/UL — SIGNIFICANT CHANGE UP (ref 3.8–10.5)
WBC # FLD AUTO: 8.57 K/UL — SIGNIFICANT CHANGE UP (ref 3.8–10.5)

## 2023-03-29 PROCEDURE — 99213 OFFICE O/P EST LOW 20 MIN: CPT

## 2023-03-29 NOTE — REVIEW OF SYSTEMS
[Negative] : Allergic/Immunologic [Fever] : no fever [Chills] : no chills [Fatigue] : no fatigue [Eye Pain] : no eye pain [Dysphagia] : no dysphagia [Odynophagia] : no odynophagia [Mucosal Pain] : no mucosal pain [Chest Pain] : no chest pain [Palpitations] : no palpitations [Lower Ext Edema] : no lower extremity edema [Shortness Of Breath] : no shortness of breath [Cough] : no cough [Abdominal Pain] : no abdominal pain [Vomiting] : no vomiting [Constipation] : no constipation [Diarrhea] : no diarrhea [Dysuria] : no dysuria [Joint Pain] : no joint pain [Muscle Pain] : no muscle pain [Skin Rash] : no skin rash [Dizziness] : no dizziness

## 2023-03-29 NOTE — HISTORY OF PRESENT ILLNESS
[de-identified] : Finesse Zuniga is a 70 years old male who initially presented with intermittent midback pain, 10/10 radiating to stomach from both sides on June 1, 2019. He has poor appetite.  On june 12 he was diagnosed UTI and on cipro for 4 days, on June 16, was started cefpodoxime 100mg BID for 7 days.  CT chest/abdomen/pelvis revealed significant abnormal appearance of the left kidney with multiple heterogeneous areas of low attenuation involving the upper and midpole which may represent malignancy. There are necrotic lymph nodes in the left para-aortic location at the level of the kidney and below the level of the kidney. Adenopathy surrounds the left renal artery. The left renal vein demonstrates question of partially occlusive thrombus. Nonspecific scattered tiny pulmonary nodules. Lower lobe subpleural nodules measure up to 6 mm. There is a well-circumscribed hypodense lesion in the anterior aspect of the lateral segment of the left lobe measuring 3.2 x 2.2 cm. He underwent CT guided liver lesion biopsy which showed consistent with primary renal cell carcinoma, IHC CK7/CK19/PAX8 positive. MRI abdomen waw con showed multiple bilateral pulmonary nodules increased in size and number compared with recent CT. The largest is pleural-based in the left lower lobe and measures 1.5 x 1.1 cm. LIVER: Rim-enhancing subcapsular mass in segment 3 and measures 4.4 x 2.6 cm. A 4.2cm mass in mid to lower pole of the lft kidney suspicious for urothelial malignancy arising within the left renal collecting system. Regional lymphadenopathy and hepatic and pulmonary metastases. Nonocclusive thrombus within the left renal vein. He was started on eliquis. Bone scan showed  a focus of mildly increased uptake in the right superior \par pubic ramus suspicious of bone mets.\par He has lost 17lbs in one month. He denies nausea, vomiting and constipation and diarrhea. \par \par 7/2019: ipilimumab + nivolumab initiated and to nivolumab maintenance post 4 cycles and continues on therapy.\par \par 9/18-22/2020 : Patient admitted for hematuria. Patient followed by urology and hematology during hospital course. Patient was started on CBI to help remove clots. Urine initially bloody, but became pink-tinged and then clear over time. CBI stopped and barber removed. Patient reportedly felt much better after barber removed, able to urinate by himself- reported some clots initially, but since resolved. States bladder spasms improved as well. Patient's eliquis was stopped, MRI done showing no evidence of renal vein thrombus, discussed  with Heme, no reason to continue eliquis for now. Patient's leukocytosis and JOSE MARIA resolved. Urine culture and blood cultures negative. \par Patient improved clinically throughout hospital course. Patient seen and examined on day of discharge. \par Cystoscopy was negative, performed by Dr. Javier. \par \par 5/26/2021 : Mr. Vera is here for nivolumab. Last scan 3/29/2021, will need another set of scan the end or beginning of July. He walks 2 miles a day with his dog and bikes 3-4 miles a day. He feels well overall. Mr. VERA denies fevers, chills, headaches, cough, SOB, chest pain, any swelling, nausea, vomiting, diarrhea, rash, or malaise. \par \par 6/23/21: Bothering Rt side inguinal hernia without pain or signs of incarceration. Doing well overall. Scheduled for nivo today. He denies fevers, chills, headaches, cough, SOB, chest pain, any swelling, nausea, vomiting, diarrhea, rash, or malaise.\par \par 7/7/2021 : CT showed stable disease\par \par 7/28/2021 : Here for nivolumab. 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 Mr. VERA denies fevers, chills, headaches, cough, SOB, chest pain, any swelling, nausea, vomiting, diarrhea, rash, or malaise. He has been tolerating therapy extremely well without sig issues. He is to see a general surgeon for hernia repair soon. \par \par 8/25/2021 : Here for nivolumab, scan due in September. Mr. VERA denies fevers, chills, headaches, cough, SOB, chest pain, any swelling, nausea, vomiting, diarrhea, rash, or malaise. \par \par 10/27/21: Pt presents for follow up. He is scheduled for Nivolumab which he has been tolerating well. Scheduled to receive cycle #30 today. Denies any diarrhea/ itchiness, cough, hematuria, rash. Appetite good. Energy good. No new complaints. Recieved both flu shot and COVID booster recently\par \par 11/24/21: Pt feeling well. Offers no complaints. Scheduled for C#31 nivolumab today. Due for imaging next month. Already scheduled\par \par 12/21/21 CT chest/abd/pelvis - Stable appearance of the chest compared with 9/4/2021. Indeterminate 1.4 cm hypodense lesion in the upper pole of the right kidney is unchanged. Atrophy is again noted involving the upper pole of the left kidney, unchanged. Very large right inguinal hernia containing non-obstructed large and small bowel and a small amount of fluid. Enlarged heterogeneous prostate gland. Nonspecific ill-defined infiltration of the fat in the right lower quadrant, not significantly changed. Attention to this area is suggested on follow-up CT.\par \par 12/29/21 - presents for ongoing Opdivo cycle #32 today. Overall feeling "good", energy is good. Appetite is good. No complaints. \par \par 3/2/22 feels quite well, denies any pain and gross hematuria. He has a schedule in April to take care of right inguinal hernia. \par \par 4/6/22 feels fine, denies pain and gross hematuria. His right inguinal hernia is getting worse. \par \par 4/27/22: Pt returns for follow up. He reports 24 period of severe diarrhea on monday. No fevers. Resolved spontaneously. Pt reports he had imaging done 2 weeks ago. Eager fto discuss findings. Feeling well now. Pt will be  meeting with surgeon recommended by PMD for Right inguinal hernia repair.\par \par 6/22/22 feels well. He will meet his surgeon for his right inguinal hernia. \par \par 7/20/22 feels overall fine. Denies any pain and weight loss. \par \par \par \par 8/17/22: Patient returns for follow up, continued on nivolumab. Had CT scans 1 week ago that show stable L renal mass and stable R renal cyst. Also remarkable for non specific mild increase in illdefined omental infiltration - omental carcinomatosis is not excluded, will continue short interval scanning. Patient reports having less appetite than normal. He feels less motivation to cook for himself. He does drink an ensure everyday. He feels he gets moncada sooner and has lost a few pounds. He denies nausea, vomiting and GERD. Patient is often tired, but is still able to complete every thing he needs to do, it may just take him a bit longer. He is working on setting up his inguinal hernia surgery.\par \par 9/14: energy is good. seeing the surgeon next week for the hernia. appetite is okay now. no longer has the feeling of getting full early. able to eat full meals\par \par 10/12: appetite back to normal. no rash, pain, CP, SOB. energy is okay\par \par 11/9/22: feels well today. no new changes \par \par 1/4/22: doing well today. no new issues. energy good. appetite good. no new joint pain, rash, SOB, diarreha.\par \par 2/1/23: Returns for follow up and treatment with nivolumab. Patient has been feeling well. Denies fatigue, walks 2-2.5 miles with his dog per day. He has not yet seen a surgeon for his inguinal hernia. His PCP gave him a surgeon referral, he says he has not gotten up the nerve to call. Denies pain. \par Patient denies fever, chills, headache, vision changes, cough, shortness of breath, chest pain, palpitations, abdominal pain, nausea/vomiting, diarrhea, constipation, dysuria, hematuria, itching, rashes, joint pain, mucositis, changes in sensation.\par \par  [de-identified] : metastatic renal cell carcinoma [FreeTextEntry1] : Ipi + nivo (7/2019) > nivolumab 480 mg monthly - cont [de-identified] : \par \par 3/1/23: continued on nivolumab. Patient feels well overall, eating well, has good energy, goes on daily walks with his dog. He notes his urine stream is not as strong in the morning, but improves throughout the day. Denies dysuria/hematuria. He went to see a surgeon last month and was described what his inguinal hernia repair would be, he is planning to have the surgery but not until the Spring.. he denies any change in color or size of hernia. \par \par 3/9/23 CT c/a/p showed Multiple bilateral small renal lesions overall unchanged when compared to the prior examinations and can be consistent with a neoplastic process.\par Diffuse areas of omental/peritoneal thickening and areas of nodularity and diffuse small lymph nodes within the mesentery and retroperitoneum but overall configuration unchanged\par \par 3/29/23 reports feeling overall fine, denies skin and joint pain

## 2023-03-29 NOTE — ASSESSMENT
[FreeTextEntry1] : Mr. Mcknight is a 72 year old male with metastatic renal cell carcinoma. On initial presentation CT c/a/p and MRI abdomen showed multiple lung nodules, one liver lesion, retroperitoneal nodes. Bone scan showed one suspicious met in right superior pubic ramus. Liver lesion biopsy is consistent with renal cell carcinoma.\par It is not certain that the histology of RCC is clear cell vs non-clear cell. IMDC risk of this patient he has 5 out of 6 risks including leukocytosis, anemia, thrombocytosis, time from dx to initiate systemic treatment less than one year and hyperalcemia. He has poor risk of mRCC. Based on [facility]RCC the standard of care is combined immunotherapy nivo and ipi vs sutent, checkmate 214 in the intermediate and poor risk of mccRCC patients with significant overall survival benefit, keynote 426 keytruda plus axitinib vs sutent in good, intermediate and poor risk of [facility]RCC patients showed OS, PFS and MICHEL benefit, [place] in the intermediate and poor risk of mccRCC patients showed cabozantinib demonstrated a significant clinical benefit in PFS and MICHEL over standard-of-care sunitinib as first-line therapy in patients with intermediate- or poor-risk mRCC. Consider the longer duration of response and less side effects and recommended nivo and ipi. Completed 4 cycles of  nivo and ipi, continued on maintenance nivolumab with interval scans to monitor disease. Of note, on diagnosis patient had L renal vein thrombus and was started on eliquis. He had an episode of gross hematuria leading to hospitalization in 9/2020 however etiology of hematuria is unclear and resolved. Follow up cystoscopy to r/o abnormality in the bladder showed no abnormality. Eliquis stopped during hospitalization with hematology consult. Remains off of eliquis. Scans have continued to show stability. Scans from 8/11/22 showed stable disease in L kidney and stable R renal cyst, noted non specific infiltration in omentum, cannot exclude carcinomatosis. CTs in November 2022 show stable disease.  Patient also has large R inguinal hernia. March CT c/a/p showed stable disease in lungs, kidney and retroperitoneum.  \par \par Plan\par Metastatic RCC \par - continue maintenance nivolumab [id number], proceed with treatment today\par - continue to monitor interval scans, most recent scans from 11/2/22 with stable findings, next in March 2023, ordered today\par - continue to monitor blood work \par - monitor for iRAEs, reviewed with patient today; including but not limited to: fatigue, skin rash, joint pain, hypothyroidism, pneumonitis, hepatitis, nephritis, colitis, myocarditis, pericarditis, hypophysitis.\par \par R Inguinal Hernia \par - patient had recent surgery consultation, is contemplating having surgery done in the Spring... \par \par Instructed to contact our office with any new/worsening symptoms.\par Patient educated regarding plan of care, all questions/concerns addressed to the best of my abilities and patient's apparent satisfaction.\par RTC 4 weeks \par

## 2023-03-29 NOTE — PHYSICAL EXAM
[de-identified] : anicteric  [de-identified] : supple, non tender, FROM  [de-identified] : no edema  [de-identified] : large right inguinal hernia, stable from prior

## 2023-04-19 ENCOUNTER — OUTPATIENT (OUTPATIENT)
Dept: OUTPATIENT SERVICES | Facility: HOSPITAL | Age: 73
LOS: 1 days | Discharge: HOME | End: 2023-04-19

## 2023-04-19 DIAGNOSIS — S86.019A STRAIN OF UNSPECIFIED ACHILLES TENDON, INITIAL ENCOUNTER: Chronic | ICD-10-CM

## 2023-04-19 DIAGNOSIS — Z98.890 OTHER SPECIFIED POSTPROCEDURAL STATES: Chronic | ICD-10-CM

## 2023-04-19 DIAGNOSIS — C64.9 MALIGNANT NEOPLASM OF UNSPECIFIED KIDNEY, EXCEPT RENAL PELVIS: ICD-10-CM

## 2023-04-26 ENCOUNTER — RESULT REVIEW (OUTPATIENT)
Age: 73
End: 2023-04-26

## 2023-04-26 ENCOUNTER — APPOINTMENT (OUTPATIENT)
Dept: HEMATOLOGY ONCOLOGY | Facility: CLINIC | Age: 73
End: 2023-04-26
Payer: MEDICARE

## 2023-04-26 ENCOUNTER — APPOINTMENT (OUTPATIENT)
Dept: INFUSION THERAPY | Facility: HOSPITAL | Age: 73
End: 2023-04-26

## 2023-04-26 VITALS
SYSTOLIC BLOOD PRESSURE: 117 MMHG | BODY MASS INDEX: 23.35 KG/M2 | DIASTOLIC BLOOD PRESSURE: 74 MMHG | HEART RATE: 53 BPM | WEIGHT: 158.73 LBS | RESPIRATION RATE: 16 BRPM | TEMPERATURE: 96.9 F | OXYGEN SATURATION: 100 %

## 2023-04-26 DIAGNOSIS — Z51.11 ENCOUNTER FOR ANTINEOPLASTIC CHEMOTHERAPY: ICD-10-CM

## 2023-04-26 LAB
BASOPHILS # BLD AUTO: 0.03 K/UL — SIGNIFICANT CHANGE UP (ref 0–0.2)
BASOPHILS NFR BLD AUTO: 0.4 % — SIGNIFICANT CHANGE UP (ref 0–2)
EOSINOPHIL # BLD AUTO: 0.14 K/UL — SIGNIFICANT CHANGE UP (ref 0–0.5)
EOSINOPHIL NFR BLD AUTO: 2 % — SIGNIFICANT CHANGE UP (ref 0–6)
HCT VFR BLD CALC: 41.5 % — SIGNIFICANT CHANGE UP (ref 39–50)
HGB BLD-MCNC: 14.9 G/DL — SIGNIFICANT CHANGE UP (ref 13–17)
IMM GRANULOCYTES NFR BLD AUTO: 0.3 % — SIGNIFICANT CHANGE UP (ref 0–0.9)
LYMPHOCYTES # BLD AUTO: 1.46 K/UL — SIGNIFICANT CHANGE UP (ref 1–3.3)
LYMPHOCYTES # BLD AUTO: 20.4 % — SIGNIFICANT CHANGE UP (ref 13–44)
MCHC RBC-ENTMCNC: 32.3 PG — SIGNIFICANT CHANGE UP (ref 27–34)
MCHC RBC-ENTMCNC: 35.9 G/DL — SIGNIFICANT CHANGE UP (ref 32–36)
MCV RBC AUTO: 89.8 FL — SIGNIFICANT CHANGE UP (ref 80–100)
MONOCYTES # BLD AUTO: 0.58 K/UL — SIGNIFICANT CHANGE UP (ref 0–0.9)
MONOCYTES NFR BLD AUTO: 8.1 % — SIGNIFICANT CHANGE UP (ref 2–14)
NEUTROPHILS # BLD AUTO: 4.93 K/UL — SIGNIFICANT CHANGE UP (ref 1.8–7.4)
NEUTROPHILS NFR BLD AUTO: 68.8 % — SIGNIFICANT CHANGE UP (ref 43–77)
NRBC # BLD: 0 /100 WBCS — SIGNIFICANT CHANGE UP (ref 0–0)
PLATELET # BLD AUTO: 174 K/UL — SIGNIFICANT CHANGE UP (ref 150–400)
RBC # BLD: 4.62 M/UL — SIGNIFICANT CHANGE UP (ref 4.2–5.8)
RBC # FLD: 12.6 % — SIGNIFICANT CHANGE UP (ref 10.3–14.5)
T4 FREE SERPL-MCNC: 1.2 NG/DL — SIGNIFICANT CHANGE UP (ref 0.9–1.8)
TSH SERPL-MCNC: 1.21 UIU/ML — SIGNIFICANT CHANGE UP (ref 0.27–4.2)
WBC # BLD: 7.16 K/UL — SIGNIFICANT CHANGE UP (ref 3.8–10.5)
WBC # FLD AUTO: 7.16 K/UL — SIGNIFICANT CHANGE UP (ref 3.8–10.5)

## 2023-04-26 PROCEDURE — 99213 OFFICE O/P EST LOW 20 MIN: CPT

## 2023-04-27 NOTE — PHYSICAL EXAM
[Fully active, able to carry on all pre-disease performance without restriction] : Status 0 - Fully active, able to carry on all pre-disease performance without restriction [Normal] : affect appropriate [de-identified] : anicteric  [de-identified] : supple, non tender, FROM  [de-identified] : no edema  [de-identified] : large right inguinal hernia, stable from prior

## 2023-04-27 NOTE — ASSESSMENT
[FreeTextEntry1] : Mr. Mcknight is a 72 year old male with metastatic renal cell carcinoma. On initial presentation CT c/a/p and MRI abdomen showed multiple lung nodules, one liver lesion, retroperitoneal nodes. Bone scan showed one suspicious met in right superior pubic ramus. Liver lesion biopsy is consistent with renal cell carcinoma.\par It is not certain that the histology of RCC is clear cell vs non-clear cell. IMDC risk of this patient he has 5 out of 6 risks including leukocytosis, anemia, thrombocytosis, time from dx to initiate systemic treatment less than one year and hyperalcemia. He has poor risk of mRCC. Based on mccRCC the standard of care is combined immunotherapy nivo and ipi vs sutent, checkmate 214 in the intermediate and poor risk of mccRCC patients with significant overall survival benefit, keynote 426 keytruda plus axitinib vs sutent in good, intermediate and poor risk of mccRCC patients showed OS, PFS and MICHEL benefit, [place] in the intermediate and poor risk of mccRCC patients showed cabozantinib demonstrated a significant clinical benefit in PFS and MICHEL over standard-of-care sunitinib as first-line therapy in patients with intermediate- or poor-risk mRCC. Consider the longer duration of response and less side effects and recommended nivo and ipi. Completed 4 cycles of  nivo and ipi, continued on maintenance nivolumab with interval scans to monitor disease. Of note, on diagnosis patient had L renal vein thrombus and was started on eliquis. He had an episode of gross hematuria leading to hospitalization in 9/2020 however etiology of hematuria is unclear and resolved. Follow up cystoscopy to r/o abnormality in the bladder showed no abnormality. Eliquis stopped during hospitalization with hematology consult. Remains off of eliquis. Scans have continued to show stability. Scans from 8/11/22 showed stable disease in L kidney and stable R renal cyst, noted non specific infiltration in omentum, cannot exclude carcinomatosis. CTs in November 2022 show stable disease.  Patient also has large R inguinal hernia. March CT c/a/p showed stable disease in lungs, kidney and retroperitoneum.  \par \par Plan\par Metastatic RCC \par - continue maintenance nivolumab [id number], proceed with treatment today\par - continue to monitor interval scans, most recent scans from 3/9/23 with stable findings, next scans in July \par - continue to monitor blood work \par - monitor for iRAEs, reviewed with patient today; including but not limited to: fatigue, skin rash, joint pain, hypothyroidism, pneumonitis, hepatitis, nephritis, colitis, myocarditis, pericarditis, hypophysitis.\par \par R Inguinal Hernia \par - patient had recent surgery consultation, is contemplating having surgery done in the Spring... \par \par Instructed to contact our office with any new/worsening symptoms.\par Patient educated regarding plan of care, all questions/concerns addressed to the best of my abilities and patient's apparent satisfaction.\par RTC 4 weeks \par \par d/w Dr. Ravi \par [facility] Heme/onc PGY6

## 2023-04-27 NOTE — HISTORY OF PRESENT ILLNESS
[T: ___] : T[unfilled] [N: ___] : N[unfilled] [M: ___] : M[unfilled] [AJCC Stage: ____] : AJCC Stage: [unfilled] [N/A] : Currently not applicable [de-identified] : Finesse Zuniga is a 70 years old male who initially presented with intermittent midback pain, 10/10 radiating to stomach from both sides on June 1, 2019. He has poor appetite.  On june 12 he was diagnosed UTI and on cipro for 4 days, on June 16, was started cefpodoxime 100mg BID for 7 days.  CT chest/abdomen/pelvis revealed significant abnormal appearance of the left kidney with multiple heterogeneous areas of low attenuation involving the upper and midpole which may represent malignancy. There are necrotic lymph nodes in the left para-aortic location at the level of the kidney and below the level of the kidney. Adenopathy surrounds the left renal artery. The left renal vein demonstrates question of partially occlusive thrombus. Nonspecific scattered tiny pulmonary nodules. Lower lobe subpleural nodules measure up to 6 mm. There is a well-circumscribed hypodense lesion in the anterior aspect of the lateral segment of the left lobe measuring 3.2 x 2.2 cm. He underwent CT guided liver lesion biopsy which showed consistent with primary renal cell carcinoma, IHC CK7/CK19/PAX8 positive. MRI abdomen waw con showed multiple bilateral pulmonary nodules increased in size and number compared with recent CT. The largest is pleural-based in the left lower lobe and measures 1.5 x 1.1 cm. LIVER: Rim-enhancing subcapsular mass in segment 3 and measures 4.4 x 2.6 cm. A 4.2cm mass in mid to lower pole of the lft kidney suspicious for urothelial malignancy arising within the left renal collecting system. Regional lymphadenopathy and hepatic and pulmonary metastases. Nonocclusive thrombus within the left renal vein. He was started on eliquis. Bone scan showed  a focus of mildly increased uptake in the right superior \par pubic ramus suspicious of bone mets.\par He has lost 17lbs in one month. He denies nausea, vomiting and constipation and diarrhea. \par \par 7/2019: ipilimumab + nivolumab initiated and to nivolumab maintenance post 4 cycles and continues on therapy.\par \par 9/18-22/2020 : Patient admitted for hematuria. Patient followed by urology and hematology during hospital course. Patient was started on CBI to help remove clots. Urine initially bloody, but became pink-tinged and then clear over time. CBI stopped and barber removed. Patient reportedly felt much better after barber removed, able to urinate by himself- reported some clots initially, but since resolved. States bladder spasms improved as well. Patient's eliquis was stopped, MRI done showing no evidence of renal vein thrombus, discussed  with Heme, no reason to continue eliquis for now. Patient's leukocytosis and JOSE MARIA resolved. Urine culture and blood cultures negative. \par Patient improved clinically throughout hospital course. Patient seen and examined on day of discharge. \par Cystoscopy was negative, performed by Dr. Javier. \par \par 5/26/2021 : Mr. Vera is here for nivolumab. Last scan 3/29/2021, will need another set of scan the end or beginning of July. He walks 2 miles a day with his dog and bikes 3-4 miles a day. He feels well overall. Mr. VERA denies fevers, chills, headaches, cough, SOB, chest pain, any swelling, nausea, vomiting, diarrhea, rash, or malaise. \par \par 6/23/21: Bothering Rt side inguinal hernia without pain or signs of incarceration. Doing well overall. Scheduled for nivo today. He denies fevers, chills, headaches, cough, SOB, chest pain, any swelling, nausea, vomiting, diarrhea, rash, or malaise.\par \par 7/7/2021 : CT showed stable disease\par \par 7/28/2021 : Here for nivolumab. 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 Mr. VERA denies fevers, chills, headaches, cough, SOB, chest pain, any swelling, nausea, vomiting, diarrhea, rash, or malaise. He has been tolerating therapy extremely well without sig issues. He is to see a general surgeon for hernia repair soon. \par \par 8/25/2021 : Here for nivolumab, scan due in September. Mr. VERA denies fevers, chills, headaches, cough, SOB, chest pain, any swelling, nausea, vomiting, diarrhea, rash, or malaise. \par \par 10/27/21: Pt presents for follow up. He is scheduled for Nivolumab which he has been tolerating well. Scheduled to receive cycle #30 today. Denies any diarrhea/ itchiness, cough, hematuria, rash. Appetite good. Energy good. No new complaints. Recieved both flu shot and COVID booster recently\par \par 11/24/21: Pt feeling well. Offers no complaints. Scheduled for C#31 nivolumab today. Due for imaging next month. Already scheduled\par \par 12/21/21 CT chest/abd/pelvis - Stable appearance of the chest compared with 9/4/2021. Indeterminate 1.4 cm hypodense lesion in the upper pole of the right kidney is unchanged. Atrophy is again noted involving the upper pole of the left kidney, unchanged. Very large right inguinal hernia containing non-obstructed large and small bowel and a small amount of fluid. Enlarged heterogeneous prostate gland. Nonspecific ill-defined infiltration of the fat in the right lower quadrant, not significantly changed. Attention to this area is suggested on follow-up CT.\par \par 12/29/21 - presents for ongoing Opdivo cycle #32 today. Overall feeling "good", energy is good. Appetite is good. No complaints. \par \par 3/2/22 feels quite well, denies any pain and gross hematuria. He has a schedule in April to take care of right inguinal hernia. \par \par 4/6/22 feels fine, denies pain and gross hematuria. His right inguinal hernia is getting worse. \par \par 4/27/22: Pt returns for follow up. He reports 24 period of severe diarrhea on monday. No fevers. Resolved spontaneously. Pt reports he had imaging done 2 weeks ago. Eager fto discuss findings. Feeling well now. Pt will be  meeting with surgeon recommended by PMD for Right inguinal hernia repair.\par \par 6/22/22 feels well. He will meet his surgeon for his right inguinal hernia. \par \par 7/20/22 feels overall fine. Denies any pain and weight loss. \par \par \par \par 8/17/22: Patient returns for follow up, continued on nivolumab. Had CT scans 1 week ago that show stable L renal mass and stable R renal cyst. Also remarkable for non specific mild increase in illdefined omental infiltration - omental carcinomatosis is not excluded, will continue short interval scanning. Patient reports having less appetite than normal. He feels less motivation to cook for himself. He does drink an ensure everyday. He feels he gets moncada sooner and has lost a few pounds. He denies nausea, vomiting and GERD. Patient is often tired, but is still able to complete every thing he needs to do, it may just take him a bit longer. He is working on setting up his inguinal hernia surgery.\par \par 9/14: energy is good. seeing the surgeon next week for the hernia. appetite is okay now. no longer has the feeling of getting full early. able to eat full meals\par \par 10/12: appetite back to normal. no rash, pain, CP, SOB. energy is okay\par \par 11/9/22: feels well today. no new changes \par \par 1/4/22: doing well today. no new issues. energy good. appetite good. no new joint pain, rash, SOB, diarreha.\par \par 2/1/23: Returns for follow up and treatment with nivolumab. Patient has been feeling well. Denies fatigue, walks 2-2.5 miles with his dog per day. He has not yet seen a surgeon for his inguinal hernia. His PCP gave him a surgeon referral, he says he has not gotten up the nerve to call. Denies pain. \par Patient denies fever, chills, headache, vision changes, cough, shortness of breath, chest pain, palpitations, abdominal pain, nausea/vomiting, diarrhea, constipation, dysuria, hematuria, itching, rashes, joint pain, mucositis, changes in sensation.\par \par  [de-identified] : metastatic renal cell carcinoma [FreeTextEntry1] : Ipi + nivo (7/2019) > nivolumab 480 mg monthly - cont [de-identified] : \par \par 3/1/23: continued on nivolumab. Patient feels well overall, eating well, has good energy, goes on daily walks with his dog. He notes his urine stream is not as strong in the morning, but improves throughout the day. Denies dysuria/hematuria. He went to see a surgeon last month and was described what his inguinal hernia repair would be, he is planning to have the surgery but not until the Spring.. he denies any change in color or size of hernia. \par \par 3/9/23 CT c/a/p showed Multiple bilateral small renal lesions overall unchanged when compared to the prior examinations and can be consistent with a neoplastic process.\par Diffuse areas of omental/peritoneal thickening and areas of nodularity and diffuse small lymph nodes within the mesentery and retroperitoneum but overall configuration unchanged\par \par 3/29/23 reports feeling overall fine, denies skin and joint pain\par \par 4/26/23: feels well, no new changes compared to last visit

## 2023-05-02 NOTE — DISCHARGE NOTE PROVIDER - NSDCCPGOAL_GEN_ALL_CORE_FT
The order for CPAP has been sent to the following durable medical equipment company, please contact them with any questions:    [facility] ( [place])  [address] [place]  [address]  Phone: [phone]  Fax [phone]    [facility] ( [place])  [address]  [address]  [place] [address]  Phone [phone]  Fax [phone]    [facility] ([place])   [address]   [address]  [place] [address]  Phone [phone]   Fax [phone]    [facility] ( [place])  [address]  [place] [address]  Phone [phone]    [facility] ( [place] )  [address] [place] [address]  Phone [phone]  Fax [phone]     To get better and follow your care plan as instructed.

## 2023-05-16 ENCOUNTER — NON-APPOINTMENT (OUTPATIENT)
Age: 73
End: 2023-05-16

## 2023-05-24 ENCOUNTER — RESULT REVIEW (OUTPATIENT)
Age: 73
End: 2023-05-24

## 2023-05-24 ENCOUNTER — APPOINTMENT (OUTPATIENT)
Dept: INFUSION THERAPY | Facility: HOSPITAL | Age: 73
End: 2023-05-24

## 2023-05-24 ENCOUNTER — APPOINTMENT (OUTPATIENT)
Dept: HEMATOLOGY ONCOLOGY | Facility: CLINIC | Age: 73
End: 2023-05-24
Payer: MEDICARE

## 2023-05-24 VITALS
DIASTOLIC BLOOD PRESSURE: 72 MMHG | OXYGEN SATURATION: 100 % | RESPIRATION RATE: 16 BRPM | WEIGHT: 158.73 LBS | BODY MASS INDEX: 23.24 KG/M2 | HEIGHT: 69.13 IN | TEMPERATURE: 96.8 F | SYSTOLIC BLOOD PRESSURE: 131 MMHG | HEART RATE: 53 BPM

## 2023-05-24 LAB
ALBUMIN SERPL ELPH-MCNC: 4.3 G/DL — SIGNIFICANT CHANGE UP (ref 3.3–5)
ALP SERPL-CCNC: 58 U/L — SIGNIFICANT CHANGE UP (ref 40–120)
ALT FLD-CCNC: 19 U/L — SIGNIFICANT CHANGE UP (ref 10–45)
ANION GAP SERPL CALC-SCNC: 14 MMOL/L — SIGNIFICANT CHANGE UP (ref 5–17)
AST SERPL-CCNC: 33 U/L — SIGNIFICANT CHANGE UP (ref 10–40)
BASOPHILS # BLD AUTO: 0.04 K/UL — SIGNIFICANT CHANGE UP (ref 0–0.2)
BASOPHILS NFR BLD AUTO: 0.5 % — SIGNIFICANT CHANGE UP (ref 0–2)
BILIRUB SERPL-MCNC: 0.9 MG/DL — SIGNIFICANT CHANGE UP (ref 0.2–1.2)
BUN SERPL-MCNC: 18 MG/DL — SIGNIFICANT CHANGE UP (ref 7–23)
CALCIUM SERPL-MCNC: 9.5 MG/DL — SIGNIFICANT CHANGE UP (ref 8.4–10.5)
CHLORIDE SERPL-SCNC: 107 MMOL/L — SIGNIFICANT CHANGE UP (ref 96–108)
CO2 SERPL-SCNC: 20 MMOL/L — LOW (ref 22–31)
CREAT SERPL-MCNC: 1.2 MG/DL — SIGNIFICANT CHANGE UP (ref 0.5–1.3)
EGFR: 65 ML/MIN/1.73M2 — SIGNIFICANT CHANGE UP
EOSINOPHIL # BLD AUTO: 0.14 K/UL — SIGNIFICANT CHANGE UP (ref 0–0.5)
EOSINOPHIL NFR BLD AUTO: 1.7 % — SIGNIFICANT CHANGE UP (ref 0–6)
GLUCOSE SERPL-MCNC: 111 MG/DL — HIGH (ref 70–99)
HCT VFR BLD CALC: 38.3 % — LOW (ref 39–50)
HGB BLD-MCNC: 13.5 G/DL — SIGNIFICANT CHANGE UP (ref 13–17)
IMM GRANULOCYTES NFR BLD AUTO: 0.2 % — SIGNIFICANT CHANGE UP (ref 0–0.9)
LYMPHOCYTES # BLD AUTO: 1.44 K/UL — SIGNIFICANT CHANGE UP (ref 1–3.3)
LYMPHOCYTES # BLD AUTO: 17.6 % — SIGNIFICANT CHANGE UP (ref 13–44)
MCHC RBC-ENTMCNC: 32 PG — SIGNIFICANT CHANGE UP (ref 27–34)
MCHC RBC-ENTMCNC: 35.2 G/DL — SIGNIFICANT CHANGE UP (ref 32–36)
MCV RBC AUTO: 90.8 FL — SIGNIFICANT CHANGE UP (ref 80–100)
MONOCYTES # BLD AUTO: 0.59 K/UL — SIGNIFICANT CHANGE UP (ref 0–0.9)
MONOCYTES NFR BLD AUTO: 7.2 % — SIGNIFICANT CHANGE UP (ref 2–14)
NEUTROPHILS # BLD AUTO: 5.96 K/UL — SIGNIFICANT CHANGE UP (ref 1.8–7.4)
NEUTROPHILS NFR BLD AUTO: 72.8 % — SIGNIFICANT CHANGE UP (ref 43–77)
NRBC # BLD: 0 /100 WBCS — SIGNIFICANT CHANGE UP (ref 0–0)
PLATELET # BLD AUTO: 168 K/UL — SIGNIFICANT CHANGE UP (ref 150–400)
POTASSIUM SERPL-MCNC: 5.1 MMOL/L — SIGNIFICANT CHANGE UP (ref 3.5–5.3)
POTASSIUM SERPL-SCNC: 5.1 MMOL/L — SIGNIFICANT CHANGE UP (ref 3.5–5.3)
PROT SERPL-MCNC: 6.7 G/DL — SIGNIFICANT CHANGE UP (ref 6–8.3)
RBC # BLD: 4.22 M/UL — SIGNIFICANT CHANGE UP (ref 4.2–5.8)
RBC # FLD: 12.4 % — SIGNIFICANT CHANGE UP (ref 10.3–14.5)
SODIUM SERPL-SCNC: 141 MMOL/L — SIGNIFICANT CHANGE UP (ref 135–145)
T4 FREE SERPL-MCNC: 1.2 NG/DL — SIGNIFICANT CHANGE UP (ref 0.9–1.8)
TSH SERPL-MCNC: 0.95 UIU/ML — SIGNIFICANT CHANGE UP (ref 0.27–4.2)
WBC # BLD: 8.19 K/UL — SIGNIFICANT CHANGE UP (ref 3.8–10.5)
WBC # FLD AUTO: 8.19 K/UL — SIGNIFICANT CHANGE UP (ref 3.8–10.5)

## 2023-05-24 PROCEDURE — 99214 OFFICE O/P EST MOD 30 MIN: CPT

## 2023-05-27 NOTE — HISTORY OF PRESENT ILLNESS
[de-identified] : Finesse Zuniga is a 70 years old male who initially presented with intermittent midback pain, 10/10 radiating to stomach from both sides on June 1, 2019. He has poor appetite.  On june 12 he was diagnosed UTI and on cipro for 4 days, on June 16, was started cefpodoxime 100mg BID for 7 days.  CT chest/abdomen/pelvis revealed significant abnormal appearance of the left kidney with multiple heterogeneous areas of low attenuation involving the upper and midpole which may represent malignancy. There are necrotic lymph nodes in the left para-aortic location at the level of the kidney and below the level of the kidney. Adenopathy surrounds the left renal artery. The left renal vein demonstrates question of partially occlusive thrombus. Nonspecific scattered tiny pulmonary nodules. Lower lobe subpleural nodules measure up to 6 mm. There is a well-circumscribed hypodense lesion in the anterior aspect of the lateral segment of the left lobe measuring 3.2 x 2.2 cm. He underwent CT guided liver lesion biopsy which showed consistent with primary renal cell carcinoma, IHC CK7/CK19/PAX8 positive. MRI abdomen waw con showed multiple bilateral pulmonary nodules increased in size and number compared with recent CT. The largest is pleural-based in the left lower lobe and measures 1.5 x 1.1 cm. LIVER: Rim-enhancing subcapsular mass in segment 3 and measures 4.4 x 2.6 cm. A 4.2cm mass in mid to lower pole of the lft kidney suspicious for urothelial malignancy arising within the left renal collecting system. Regional lymphadenopathy and hepatic and pulmonary metastases. Nonocclusive thrombus within the left renal vein. He was started on eliquis. Bone scan showed  a focus of mildly increased uptake in the right superior \par pubic ramus suspicious of bone mets.\par He has lost 17lbs in one month. He denies nausea, vomiting and constipation and diarrhea. \par \par 7/2019: ipilimumab + nivolumab initiated and to nivolumab maintenance post 4 cycles and continues on therapy.\par \par 9/18-22/2020 : Patient admitted for hematuria. Patient followed by urology and hematology during hospital course. Patient was started on CBI to help remove clots. Urine initially bloody, but became pink-tinged and then clear over time. CBI stopped and barber removed. Patient reportedly felt much better after barber removed, able to urinate by himself- reported some clots initially, but since resolved. States bladder spasms improved as well. Patient's eliquis was stopped, MRI done showing no evidence of renal vein thrombus, discussed  with Heme, no reason to continue eliquis for now. Patient's leukocytosis and JOSE MARIA resolved. Urine culture and blood cultures negative. \par Patient improved clinically throughout hospital course. Patient seen and examined on day of discharge. \par Cystoscopy was negative, performed by Dr. Javier. \par \par 5/26/2021 : Mr. Vera is here for nivolumab. Last scan 3/29/2021, will need another set of scan the end or beginning of July. He walks 2 miles a day with his dog and bikes 3-4 miles a day. He feels well overall. Mr. VERA denies fevers, chills, headaches, cough, SOB, chest pain, any swelling, nausea, vomiting, diarrhea, rash, or malaise. \par \par 6/23/21: Bothering Rt side inguinal hernia without pain or signs of incarceration. Doing well overall. Scheduled for nivo today. He denies fevers, chills, headaches, cough, SOB, chest pain, any swelling, nausea, vomiting, diarrhea, rash, or malaise.\par \par 7/7/2021 : CT showed stable disease\par \par 7/28/2021 : Here for nivolumab. 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 Mr. VERA denies fevers, chills, headaches, cough, SOB, chest pain, any swelling, nausea, vomiting, diarrhea, rash, or malaise. He has been tolerating therapy extremely well without sig issues. He is to see a general surgeon for hernia repair soon. \par \par 8/25/2021 : Here for nivolumab, scan due in September. Mr. VERA denies fevers, chills, headaches, cough, SOB, chest pain, any swelling, nausea, vomiting, diarrhea, rash, or malaise. \par \par 10/27/21: Pt presents for follow up. He is scheduled for Nivolumab which he has been tolerating well. Scheduled to receive cycle #30 today. Denies any diarrhea/ itchiness, cough, hematuria, rash. Appetite good. Energy good. No new complaints. Recieved both flu shot and COVID booster recently\par \par 11/24/21: Pt feeling well. Offers no complaints. Scheduled for C#31 nivolumab today. Due for imaging next month. Already scheduled\par \par 12/21/21 CT chest/abd/pelvis - Stable appearance of the chest compared with 9/4/2021. Indeterminate 1.4 cm hypodense lesion in the upper pole of the right kidney is unchanged. Atrophy is again noted involving the upper pole of the left kidney, unchanged. Very large right inguinal hernia containing non-obstructed large and small bowel and a small amount of fluid. Enlarged heterogeneous prostate gland. Nonspecific ill-defined infiltration of the fat in the right lower quadrant, not significantly changed. Attention to this area is suggested on follow-up CT.\par \par 12/29/21 - presents for ongoing Opdivo cycle #32 today. Overall feeling "good", energy is good. Appetite is good. No complaints. \par \par 3/2/22 feels quite well, denies any pain and gross hematuria. He has a schedule in April to take care of right inguinal hernia. \par \par 4/6/22 feels fine, denies pain and gross hematuria. His right inguinal hernia is getting worse. \par \par 4/27/22: Pt returns for follow up. He reports 24 period of severe diarrhea on monday. No fevers. Resolved spontaneously. Pt reports he had imaging done 2 weeks ago. Eager fto discuss findings. Feeling well now. Pt will be  meeting with surgeon recommended by PMD for Right inguinal hernia repair.\par \par 6/22/22 feels well. He will meet his surgeon for his right inguinal hernia. \par \par 7/20/22 feels overall fine. Denies any pain and weight loss. \par \par \par \par 8/17/22: Patient returns for follow up, continued on nivolumab. Had CT scans 1 week ago that show stable L renal mass and stable R renal cyst. Also remarkable for non specific mild increase in illdefined omental infiltration - omental carcinomatosis is not excluded, will continue short interval scanning. Patient reports having less appetite than normal. He feels less motivation to cook for himself. He does drink an ensure everyday. He feels he gets moncada sooner and has lost a few pounds. He denies nausea, vomiting and GERD. Patient is often tired, but is still able to complete every thing he needs to do, it may just take him a bit longer. He is working on setting up his inguinal hernia surgery.\par \par 9/14: energy is good. seeing the surgeon next week for the hernia. appetite is okay now. no longer has the feeling of getting full early. able to eat full meals\par \par 10/12: appetite back to normal. no rash, pain, CP, SOB. energy is okay\par \par 11/9/22: feels well today. no new changes \par \par 1/4/22: doing well today. no new issues. energy good. appetite good. no new joint pain, rash, SOB, diarreha.\par \par 2/1/23: Returns for follow up and treatment with nivolumab. Patient has been feeling well. Denies fatigue, walks 2-2.5 miles with his dog per day. He has not yet seen a surgeon for his inguinal hernia. His PCP gave him a surgeon referral, he says he has not gotten up the nerve to call. Denies pain. \par Patient denies fever, chills, headache, vision changes, cough, shortness of breath, chest pain, palpitations, abdominal pain, nausea/vomiting, diarrhea, constipation, dysuria, hematuria, itching, rashes, joint pain, mucositis, changes in sensation.\par \par 3/1/23: continued on nivolumab. Patient feels well overall, eating well, has good energy, goes on daily walks with his dog. He notes his urine stream is not as strong in the morning, but improves throughout the day. Denies dysuria/hematuria. He went to see a surgeon last month and was described what his inguinal hernia repair would be, he is planning to have the surgery but not until the Spring.. he denies any change in color or size of hernia. \par \par 3/9/23 CT c/a/p showed Multiple bilateral small renal lesions overall unchanged when compared to the prior examinations and can be consistent with a neoplastic process.\par Diffuse areas of omental/peritoneal thickening and areas of nodularity and diffuse small lymph nodes within the mesentery and retroperitoneum but overall configuration unchanged\par \par 3/29/23 reports feeling overall fine, denies skin and joint pain\par \par 4/26/23: feels well, no new changes compared to last visit [de-identified] : metastatic renal cell carcinoma [FreeTextEntry1] : Ipi + nivo (7/2019) > nivolumab 480 mg monthly - cont [de-identified] : \par \par 5/24/23: continued on nivolumab for met RCC. Overall, patient is doing well. He is exercising more, walking with his dog, riding his bike. Appetite is good. He is not planning to pursue hernia surgery until after his next scans in July, he wants to feel comfortable. Patient denies fever, chills, headache, vision changes, cough, shortness of breath, chest pain, palpitations, abdominal pain, nausea/vomiting, diarrhea, constipation, dysuria, hematuria, itching, rashes, muscle and joint pain, paraesthesias. \par

## 2023-05-27 NOTE — PHYSICAL EXAM
[de-identified] : anicteric  [de-identified] : supple, non tender, FROM  [de-identified] : no edema  [de-identified] : large right inguinal hernia, stable from prior

## 2023-05-27 NOTE — REVIEW OF SYSTEMS
[Fever] : no fever [Chills] : no chills [Fatigue] : no fatigue [Eye Pain] : no eye pain [Dysphagia] : no dysphagia [Odynophagia] : no odynophagia [Mucosal Pain] : no mucosal pain [Chest Pain] : no chest pain [Palpitations] : no palpitations [Lower Ext Edema] : no lower extremity edema [Shortness Of Breath] : no shortness of breath [Cough] : no cough [Abdominal Pain] : no abdominal pain [Vomiting] : no vomiting [Constipation] : no constipation [Dysuria] : no dysuria [Joint Pain] : no joint pain [Muscle Pain] : no muscle pain [Skin Rash] : no skin rash [Dizziness] : no dizziness [Difficulty Walking] : no difficulty walking [Easy Bleeding] : no tendency for easy bleeding [Easy Bruising] : no tendency for easy bruising

## 2023-05-27 NOTE — ASSESSMENT
[FreeTextEntry1] : Mr. Mcknight is a 72 year old male with metastatic renal cell carcinoma. On initial presentation CT c/a/p and MRI abdomen showed multiple lung nodules, one liver lesion, retroperitoneal nodes. Bone scan showed one suspicious met in right superior pubic ramus. Liver lesion biopsy is consistent with renal cell carcinoma.\par It is not certain that the histology of RCC is clear cell vs non-clear cell. IMDC risk of this patient he has 5 out of 6 risks including leukocytosis, anemia, thrombocytosis, time from dx to initiate systemic treatment less than one year and hyperalcemia. He has poor risk of mRCC. Based on [facility] the standard of care is combined immunotherapy nivo and ipi vs sutent, checkmate 214 in the intermediate and poor risk of mccRCC patients with significant overall survival benefit, keynote 426 keytruda plus axitinib vs sutent in good, intermediate and poor risk of mccRCC patients showed OS, PFS and MICHEL benefit, [place] in the intermediate and poor risk of mccRCC patients showed cabozantinib demonstrated a significant clinical benefit in PFS and MICHEL over standard-of-care sunitinib as first-line therapy in patients with intermediate- or poor-risk mRCC. Consider the longer duration of response and less side effects and recommended nivo and ipi. Completed 4 cycles of  nivo and ipi, continued on maintenance nivolumab with interval scans to monitor disease. Of note, on diagnosis patient had L renal vein thrombus and was started on eliquis. He had an episode of gross hematuria leading to hospitalization in 9/2020 however etiology of hematuria is unclear and resolved. Follow up cystoscopy to r/o abnormality in the bladder showed no abnormality. Eliquis stopped during hospitalization with hematology consult. Remains off of eliquis. Scans have continued to show stability. Scans from 8/11/22 showed stable disease in L kidney and stable R renal cyst, noted non specific infiltration in omentum, cannot exclude carcinomatosis. CTs in November 2022 show stable disease.  Patient also has large R inguinal hernia. March 2023 CT c/a/p showed stable disease in lungs, kidney and retroperitoneum.  \par \par Plan\par Metastatic RCC \par - continue maintenance nivolumab [id number], proceed with treatment today\par - continue to monitor interval scans, most recent scans from 3/9/23 with stable findings, next scans in July \par - continue to monitor blood work \par - monitor for iRAEs, reviewed with patient today; continues to tolerate treatment well \par \par Instructed to contact our office with any new/worsening symptoms.\par Patient educated regarding plan of care, all questions/concerns addressed to the best of my abilities and patient's apparent satisfaction.\par RTC 4 weeks

## 2023-06-09 ENCOUNTER — OUTPATIENT (OUTPATIENT)
Dept: OUTPATIENT SERVICES | Facility: HOSPITAL | Age: 73
LOS: 1 days | Discharge: HOME | End: 2023-06-09

## 2023-06-09 DIAGNOSIS — Z98.890 OTHER SPECIFIED POSTPROCEDURAL STATES: Chronic | ICD-10-CM

## 2023-06-09 DIAGNOSIS — S86.019A STRAIN OF UNSPECIFIED ACHILLES TENDON, INITIAL ENCOUNTER: Chronic | ICD-10-CM

## 2023-06-09 DIAGNOSIS — C64.9 MALIGNANT NEOPLASM OF UNSPECIFIED KIDNEY, EXCEPT RENAL PELVIS: ICD-10-CM

## 2023-06-21 ENCOUNTER — RESULT REVIEW (OUTPATIENT)
Age: 73
End: 2023-06-21

## 2023-06-21 ENCOUNTER — APPOINTMENT (OUTPATIENT)
Dept: INFUSION THERAPY | Facility: HOSPITAL | Age: 73
End: 2023-06-21

## 2023-06-21 ENCOUNTER — APPOINTMENT (OUTPATIENT)
Dept: HEMATOLOGY ONCOLOGY | Facility: CLINIC | Age: 73
End: 2023-06-21
Payer: MEDICARE

## 2023-06-21 VITALS
DIASTOLIC BLOOD PRESSURE: 82 MMHG | WEIGHT: 158.73 LBS | OXYGEN SATURATION: 99 % | RESPIRATION RATE: 16 BRPM | TEMPERATURE: 96.6 F | HEART RATE: 51 BPM | SYSTOLIC BLOOD PRESSURE: 131 MMHG | BODY MASS INDEX: 23.35 KG/M2

## 2023-06-21 LAB
ALBUMIN SERPL ELPH-MCNC: 4.2 G/DL — SIGNIFICANT CHANGE UP (ref 3.3–5)
ALP SERPL-CCNC: 57 U/L — SIGNIFICANT CHANGE UP (ref 40–120)
ALT FLD-CCNC: 19 U/L — SIGNIFICANT CHANGE UP (ref 10–45)
ANION GAP SERPL CALC-SCNC: 12 MMOL/L — SIGNIFICANT CHANGE UP (ref 5–17)
AST SERPL-CCNC: 35 U/L — SIGNIFICANT CHANGE UP (ref 10–40)
BASOPHILS # BLD AUTO: 0.03 K/UL — SIGNIFICANT CHANGE UP (ref 0–0.2)
BASOPHILS NFR BLD AUTO: 0.4 % — SIGNIFICANT CHANGE UP (ref 0–2)
BILIRUB SERPL-MCNC: 0.7 MG/DL — SIGNIFICANT CHANGE UP (ref 0.2–1.2)
BUN SERPL-MCNC: 19 MG/DL — SIGNIFICANT CHANGE UP (ref 7–23)
CALCIUM SERPL-MCNC: 9.1 MG/DL — SIGNIFICANT CHANGE UP (ref 8.4–10.5)
CHLORIDE SERPL-SCNC: 108 MMOL/L — SIGNIFICANT CHANGE UP (ref 96–108)
CO2 SERPL-SCNC: 22 MMOL/L — SIGNIFICANT CHANGE UP (ref 22–31)
CREAT SERPL-MCNC: 1.34 MG/DL — HIGH (ref 0.5–1.3)
EGFR: 57 ML/MIN/1.73M2 — LOW
EOSINOPHIL # BLD AUTO: 0.14 K/UL — SIGNIFICANT CHANGE UP (ref 0–0.5)
EOSINOPHIL NFR BLD AUTO: 1.8 % — SIGNIFICANT CHANGE UP (ref 0–6)
GLUCOSE SERPL-MCNC: 95 MG/DL — SIGNIFICANT CHANGE UP (ref 70–99)
HCT VFR BLD CALC: 38.6 % — LOW (ref 39–50)
HGB BLD-MCNC: 13.7 G/DL — SIGNIFICANT CHANGE UP (ref 13–17)
IMM GRANULOCYTES NFR BLD AUTO: 0.4 % — SIGNIFICANT CHANGE UP (ref 0–0.9)
LYMPHOCYTES # BLD AUTO: 1.47 K/UL — SIGNIFICANT CHANGE UP (ref 1–3.3)
LYMPHOCYTES # BLD AUTO: 18.8 % — SIGNIFICANT CHANGE UP (ref 13–44)
MCHC RBC-ENTMCNC: 32.2 PG — SIGNIFICANT CHANGE UP (ref 27–34)
MCHC RBC-ENTMCNC: 35.5 G/DL — SIGNIFICANT CHANGE UP (ref 32–36)
MCV RBC AUTO: 90.6 FL — SIGNIFICANT CHANGE UP (ref 80–100)
MONOCYTES # BLD AUTO: 0.66 K/UL — SIGNIFICANT CHANGE UP (ref 0–0.9)
MONOCYTES NFR BLD AUTO: 8.5 % — SIGNIFICANT CHANGE UP (ref 2–14)
NEUTROPHILS # BLD AUTO: 5.47 K/UL — SIGNIFICANT CHANGE UP (ref 1.8–7.4)
NEUTROPHILS NFR BLD AUTO: 70.1 % — SIGNIFICANT CHANGE UP (ref 43–77)
NRBC # BLD: 0 /100 WBCS — SIGNIFICANT CHANGE UP (ref 0–0)
PLATELET # BLD AUTO: 146 K/UL — LOW (ref 150–400)
POTASSIUM SERPL-MCNC: 5.5 MMOL/L — HIGH (ref 3.5–5.3)
POTASSIUM SERPL-SCNC: 5.5 MMOL/L — HIGH (ref 3.5–5.3)
PROT SERPL-MCNC: 6.9 G/DL — SIGNIFICANT CHANGE UP (ref 6–8.3)
RBC # BLD: 4.26 M/UL — SIGNIFICANT CHANGE UP (ref 4.2–5.8)
RBC # FLD: 12.3 % — SIGNIFICANT CHANGE UP (ref 10.3–14.5)
SODIUM SERPL-SCNC: 142 MMOL/L — SIGNIFICANT CHANGE UP (ref 135–145)
T4 FREE SERPL-MCNC: 1.2 NG/DL — SIGNIFICANT CHANGE UP (ref 0.9–1.8)
TSH SERPL-MCNC: 1.33 UIU/ML — SIGNIFICANT CHANGE UP (ref 0.27–4.2)
WBC # BLD: 7.8 K/UL — SIGNIFICANT CHANGE UP (ref 3.8–10.5)
WBC # FLD AUTO: 7.8 K/UL — SIGNIFICANT CHANGE UP (ref 3.8–10.5)

## 2023-06-21 PROCEDURE — 99214 OFFICE O/P EST MOD 30 MIN: CPT

## 2023-06-21 NOTE — HISTORY OF PRESENT ILLNESS
[T: ___] : T[unfilled] [N: ___] : N[unfilled] [M: ___] : M[unfilled] [AJCC Stage: ____] : AJCC Stage: [unfilled] [N/A] : Currently not applicable [de-identified] : Finesse Zuniga is a 70 years old male who initially presented with intermittent midback pain, 10/10 radiating to stomach from both sides on June 1, 2019. He has poor appetite.  On june 12 he was diagnosed UTI and on cipro for 4 days, on June 16, was started cefpodoxime 100mg BID for 7 days.  CT chest/abdomen/pelvis revealed significant abnormal appearance of the left kidney with multiple heterogeneous areas of low attenuation involving the upper and midpole which may represent malignancy. There are necrotic lymph nodes in the left para-aortic location at the level of the kidney and below the level of the kidney. Adenopathy surrounds the left renal artery. The left renal vein demonstrates question of partially occlusive thrombus. Nonspecific scattered tiny pulmonary nodules. Lower lobe subpleural nodules measure up to 6 mm. There is a well-circumscribed hypodense lesion in the anterior aspect of the lateral segment of the left lobe measuring 3.2 x 2.2 cm. He underwent CT guided liver lesion biopsy which showed consistent with primary renal cell carcinoma, IHC CK7/CK19/PAX8 positive. MRI abdomen waw con showed multiple bilateral pulmonary nodules increased in size and number compared with recent CT. The largest is pleural-based in the left lower lobe and measures 1.5 x 1.1 cm. LIVER: Rim-enhancing subcapsular mass in segment 3 and measures 4.4 x 2.6 cm. A 4.2cm mass in mid to lower pole of the lft kidney suspicious for urothelial malignancy arising within the left renal collecting system. Regional lymphadenopathy and hepatic and pulmonary metastases. Nonocclusive thrombus within the left renal vein. He was started on eliquis. Bone scan showed  a focus of mildly increased uptake in the right superior \par pubic ramus suspicious of bone mets.\par He has lost 17lbs in one month. He denies nausea, vomiting and constipation and diarrhea. \par \par 7/2019: ipilimumab + nivolumab initiated and to nivolumab maintenance post 4 cycles and continues on therapy.\par \par 9/18-22/2020 : Patient admitted for hematuria. Patient followed by urology and hematology during hospital course. Patient was started on CBI to help remove clots. Urine initially bloody, but became pink-tinged and then clear over time. CBI stopped and barber removed. Patient reportedly felt much better after barber removed, able to urinate by himself- reported some clots initially, but since resolved. States bladder spasms improved as well. Patient's eliquis was stopped, MRI done showing no evidence of renal vein thrombus, discussed  with Heme, no reason to continue eliquis for now. Patient's leukocytosis and JOSE MARIA resolved. Urine culture and blood cultures negative. \par Patient improved clinically throughout hospital course. Patient seen and examined on day of discharge. \par Cystoscopy was negative, performed by Dr. Javier. \par \par 5/26/2021 : Mr. Vera is here for nivolumab. Last scan 3/29/2021, will need another set of scan the end or beginning of July. He walks 2 miles a day with his dog and bikes 3-4 miles a day. He feels well overall. Mr. VERA denies fevers, chills, headaches, cough, SOB, chest pain, any swelling, nausea, vomiting, diarrhea, rash, or malaise. \par \par 6/23/21: Bothering Rt side inguinal hernia without pain or signs of incarceration. Doing well overall. Scheduled for nivo today. He denies fevers, chills, headaches, cough, SOB, chest pain, any swelling, nausea, vomiting, diarrhea, rash, or malaise.\par \par 7/7/2021 : CT showed stable disease\par \par 7/28/2021 : Here for nivolumab. 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 Mr. VERA denies fevers, chills, headaches, cough, SOB, chest pain, any swelling, nausea, vomiting, diarrhea, rash, or malaise. He has been tolerating therapy extremely well without sig issues. He is to see a general surgeon for hernia repair soon. \par \par 8/25/2021 : Here for nivolumab, scan due in September. Mr. VERA denies fevers, chills, headaches, cough, SOB, chest pain, any swelling, nausea, vomiting, diarrhea, rash, or malaise. \par \par 10/27/21: Pt presents for follow up. He is scheduled for Nivolumab which he has been tolerating well. Scheduled to receive cycle #30 today. Denies any diarrhea/ itchiness, cough, hematuria, rash. Appetite good. Energy good. No new complaints. Recieved both flu shot and COVID booster recently\par \par 11/24/21: Pt feeling well. Offers no complaints. Scheduled for C#31 nivolumab today. Due for imaging next month. Already scheduled\par \par 12/21/21 CT chest/abd/pelvis - Stable appearance of the chest compared with 9/4/2021. Indeterminate 1.4 cm hypodense lesion in the upper pole of the right kidney is unchanged. Atrophy is again noted involving the upper pole of the left kidney, unchanged. Very large right inguinal hernia containing non-obstructed large and small bowel and a small amount of fluid. Enlarged heterogeneous prostate gland. Nonspecific ill-defined infiltration of the fat in the right lower quadrant, not significantly changed. Attention to this area is suggested on follow-up CT.\par \par 12/29/21 - presents for ongoing Opdivo cycle #32 today. Overall feeling "good", energy is good. Appetite is good. No complaints. \par \par 3/2/22 feels quite well, denies any pain and gross hematuria. He has a schedule in April to take care of right inguinal hernia. \par \par 4/6/22 feels fine, denies pain and gross hematuria. His right inguinal hernia is getting worse. \par \par 4/27/22: Pt returns for follow up. He reports 24 period of severe diarrhea on monday. No fevers. Resolved spontaneously. Pt reports he had imaging done 2 weeks ago. Eager fto discuss findings. Feeling well now. Pt will be  meeting with surgeon recommended by PMD for Right inguinal hernia repair.\par \par 6/22/22 feels well. He will meet his surgeon for his right inguinal hernia. \par \par 7/20/22 feels overall fine. Denies any pain and weight loss. \par \par \par \par 8/17/22: Patient returns for follow up, continued on nivolumab. Had CT scans 1 week ago that show stable L renal mass and stable R renal cyst. Also remarkable for non specific mild increase in illdefined omental infiltration - omental carcinomatosis is not excluded, will continue short interval scanning. Patient reports having less appetite than normal. He feels less motivation to cook for himself. He does drink an ensure everyday. He feels he gets [place] sooner and has lost a few pounds. He denies nausea, vomiting and GERD. Patient is often tired, but is still able to complete every thing he needs to do, it may just take him a bit longer. He is working on setting up his inguinal hernia surgery.\par \par 9/14: energy is good. seeing the surgeon next week for the hernia. appetite is okay now. no longer has the feeling of getting full early. able to eat full meals\par \par 10/12: appetite back to normal. no rash, pain, CP, SOB. energy is okay\par \par 11/9/22: feels well today. no new changes \par \par 1/4/22: doing well today. no new issues. energy good. appetite good. no new joint pain, rash, SOB, diarreha.\par \par 2/1/23: Returns for follow up and treatment with nivolumab. Patient has been feeling well. Denies fatigue, walks 2-2.5 miles with his dog per day. He has not yet seen a surgeon for his inguinal hernia. His PCP gave him a surgeon referral, he says he has not gotten up the nerve to call. Denies pain. \par Patient denies fever, chills, headache, vision changes, cough, shortness of breath, chest pain, palpitations, abdominal pain, nausea/vomiting, diarrhea, constipation, dysuria, hematuria, itching, rashes, joint pain, mucositis, changes in sensation.\par \par 3/1/23: continued on nivolumab. Patient feels well overall, eating well, has good energy, goes on daily walks with his dog. He notes his urine stream is not as strong in the morning, but improves throughout the day. Denies dysuria/hematuria. He went to see a surgeon last month and was described what his inguinal hernia repair would be, he is planning to have the surgery but not until the Spring.. he denies any change in color or size of hernia. \par \par 3/9/23 CT c/a/p showed Multiple bilateral small renal lesions overall unchanged when compared to the prior examinations and can be consistent with a neoplastic process.\par Diffuse areas of omental/peritoneal thickening and areas of nodularity and diffuse small lymph nodes within the mesentery and retroperitoneum but overall configuration unchanged\par \par 3/29/23 reports feeling overall fine, denies skin and joint pain\par \par 4/26/23: feels well, no new changes compared to last visit\par \par 5/24/23: continued on nivolumab for met RCC. Overall, patient is doing well. He is exercising more, walking with his dog, riding his bike. Appetite is good. He is not planning to pursue hernia surgery until after his next scans in July, he wants to feel comfortable. Patient denies fever, chills, headache, vision changes, cough, shortness of breath, chest pain, palpitations, abdominal pain, nausea/vomiting, diarrhea, constipation, dysuria, hematuria, itching, rashes, muscle and joint pain, paraesthesias. \par  [de-identified] : metastatic renal cell carcinoma [FreeTextEntry1] : Ipi + nivo (7/2019) > nivolumab 480 mg monthly - cont [de-identified] : \par \par 6/21/23: continued on nivolumab. Energy level is good, doing his same routine. Appetite is good. Last week he pulled a muscle in back when doing yard work, pain lasted for about 3-4 days and has now resolved. Otherwise no joint or muscle pain. Pt notes that due to hernia he often feels as though he is not completely emptying his bladder. Notes nocturia x 2. Denies shortness of breath, coughing, constipation, diarrhea, itching, rashes.

## 2023-06-21 NOTE — PHYSICAL EXAM
[Fully active, able to carry on all pre-disease performance without restriction] : Status 0 - Fully active, able to carry on all pre-disease performance without restriction [Normal] : affect appropriate [de-identified] : anicteric  [de-identified] : supple, non tender, FROM  [de-identified] : no edema  [de-identified] : large right inguinal hernia, stable from prior

## 2023-06-21 NOTE — ASSESSMENT
[FreeTextEntry1] : Mr. Mcknight is a 72 year old male with metastatic renal cell carcinoma. On initial presentation CT c/a/p and MRI abdomen showed multiple lung nodules, one liver lesion, retroperitoneal nodes. Bone scan showed one suspicious met in right superior pubic ramus. Liver lesion biopsy is consistent with renal cell carcinoma.\par It is not certain that the histology of RCC is clear cell vs non-clear cell. IMDC risk of this patient he has 5 out of 6 risks including leukocytosis, anemia, thrombocytosis, time from dx to initiate systemic treatment less than one year and hyperalcemia. He has poor risk of mRCC. Based on [facility] the standard of care is combined immunotherapy nivo and ipi vs sutent, checkmate 214 in the intermediate and poor risk of [facility]RCC patients with significant overall survival benefit, keynote 426 keytruda plus axitinib vs sutent in good, intermediate and poor risk of [facility]RCC patients showed OS, PFS and MICHEL benefit, [place] in the intermediate and poor risk of [facility]RCC patients showed cabozantinib demonstrated a significant clinical benefit in PFS and MICHEL over standard-of-care sunitinib as first-line therapy in patients with intermediate- or poor-risk mRCC. Consider the longer duration of response and less side effects and recommended nivo and ipi. Completed 4 cycles of  nivo and ipi, continued on maintenance nivolumab with interval scans to monitor disease. Of note, on diagnosis patient had L renal vein thrombus and was started on eliquis. He had an episode of gross hematuria leading to hospitalization in 9/2020 however etiology of hematuria is unclear and resolved. Follow up cystoscopy to r/o abnormality in the bladder showed no abnormality. Eliquis stopped during hospitalization with hematology consult. Remains off of eliquis. Scans have continued to show stability. Scans from 8/11/22 showed stable disease in L kidney and stable R renal cyst, noted non specific infiltration in omentum, cannot exclude carcinomatosis. CTs in November 2022 show stable disease.  Patient also has large R inguinal hernia. March 2023 CT c/a/p showed stable disease in lungs, kidney and retroperitoneum.  \par \par Plan\par Metastatic RCC \par - continue maintenance nivolumab [id number], proceed with treatment today\par - continue to monitor interval scans, most recent scans from 3/9/23 with stable findings, next scans in July, ordered today \par - continue to monitor blood work \par - monitor for iRAEs, reviewed with patient today; continues to tolerate treatment well \par \par Instructed to contact our office with any new/worsening symptoms.\par Patient educated regarding plan of care, all questions/concerns addressed to the best of my abilities and patient's apparent satisfaction.\par RTC 4 weeks

## 2023-06-22 DIAGNOSIS — Z51.11 ENCOUNTER FOR ANTINEOPLASTIC CHEMOTHERAPY: ICD-10-CM

## 2023-07-19 ENCOUNTER — APPOINTMENT (OUTPATIENT)
Dept: INFUSION THERAPY | Facility: HOSPITAL | Age: 73
End: 2023-07-19

## 2023-07-19 ENCOUNTER — RESULT REVIEW (OUTPATIENT)
Age: 73
End: 2023-07-19

## 2023-07-19 ENCOUNTER — APPOINTMENT (OUTPATIENT)
Dept: HEMATOLOGY ONCOLOGY | Facility: CLINIC | Age: 73
End: 2023-07-19
Payer: MEDICARE

## 2023-07-19 VITALS
BODY MASS INDEX: 22.99 KG/M2 | OXYGEN SATURATION: 97 % | HEART RATE: 54 BPM | TEMPERATURE: 97.7 F | RESPIRATION RATE: 16 BRPM | HEIGHT: 69.13 IN | SYSTOLIC BLOOD PRESSURE: 130 MMHG | WEIGHT: 156.97 LBS | DIASTOLIC BLOOD PRESSURE: 70 MMHG

## 2023-07-19 LAB
ALBUMIN SERPL ELPH-MCNC: 4.2 G/DL — SIGNIFICANT CHANGE UP (ref 3.3–5)
ALP SERPL-CCNC: 57 U/L — SIGNIFICANT CHANGE UP (ref 40–120)
ALT FLD-CCNC: 15 U/L — SIGNIFICANT CHANGE UP (ref 10–45)
ANION GAP SERPL CALC-SCNC: 10 MMOL/L — SIGNIFICANT CHANGE UP (ref 5–17)
AST SERPL-CCNC: 26 U/L — SIGNIFICANT CHANGE UP (ref 10–40)
BASOPHILS # BLD AUTO: 0.02 K/UL — SIGNIFICANT CHANGE UP (ref 0–0.2)
BASOPHILS NFR BLD AUTO: 0.2 % — SIGNIFICANT CHANGE UP (ref 0–2)
BILIRUB SERPL-MCNC: 0.9 MG/DL — SIGNIFICANT CHANGE UP (ref 0.2–1.2)
BUN SERPL-MCNC: 15 MG/DL — SIGNIFICANT CHANGE UP (ref 7–23)
CALCIUM SERPL-MCNC: 9.3 MG/DL — SIGNIFICANT CHANGE UP (ref 8.4–10.5)
CHLORIDE SERPL-SCNC: 105 MMOL/L — SIGNIFICANT CHANGE UP (ref 96–108)
CO2 SERPL-SCNC: 24 MMOL/L — SIGNIFICANT CHANGE UP (ref 22–31)
CREAT SERPL-MCNC: 1.27 MG/DL — SIGNIFICANT CHANGE UP (ref 0.5–1.3)
EGFR: 60 ML/MIN/1.73M2 — SIGNIFICANT CHANGE UP
EOSINOPHIL # BLD AUTO: 0.22 K/UL — SIGNIFICANT CHANGE UP (ref 0–0.5)
EOSINOPHIL NFR BLD AUTO: 2.5 % — SIGNIFICANT CHANGE UP (ref 0–6)
GLUCOSE SERPL-MCNC: 106 MG/DL — HIGH (ref 70–99)
HCT VFR BLD CALC: 37.9 % — LOW (ref 39–50)
HGB BLD-MCNC: 13.3 G/DL — SIGNIFICANT CHANGE UP (ref 13–17)
IMM GRANULOCYTES NFR BLD AUTO: 0.3 % — SIGNIFICANT CHANGE UP (ref 0–0.9)
LYMPHOCYTES # BLD AUTO: 1.36 K/UL — SIGNIFICANT CHANGE UP (ref 1–3.3)
LYMPHOCYTES # BLD AUTO: 15.3 % — SIGNIFICANT CHANGE UP (ref 13–44)
MCHC RBC-ENTMCNC: 32 PG — SIGNIFICANT CHANGE UP (ref 27–34)
MCHC RBC-ENTMCNC: 35.1 G/DL — SIGNIFICANT CHANGE UP (ref 32–36)
MCV RBC AUTO: 91.1 FL — SIGNIFICANT CHANGE UP (ref 80–100)
MONOCYTES # BLD AUTO: 0.66 K/UL — SIGNIFICANT CHANGE UP (ref 0–0.9)
MONOCYTES NFR BLD AUTO: 7.4 % — SIGNIFICANT CHANGE UP (ref 2–14)
NEUTROPHILS # BLD AUTO: 6.57 K/UL — SIGNIFICANT CHANGE UP (ref 1.8–7.4)
NEUTROPHILS NFR BLD AUTO: 74.3 % — SIGNIFICANT CHANGE UP (ref 43–77)
NRBC # BLD: 0 /100 WBCS — SIGNIFICANT CHANGE UP (ref 0–0)
PLATELET # BLD AUTO: 175 K/UL — SIGNIFICANT CHANGE UP (ref 150–400)
POTASSIUM SERPL-MCNC: 4.9 MMOL/L — SIGNIFICANT CHANGE UP (ref 3.5–5.3)
POTASSIUM SERPL-SCNC: 4.9 MMOL/L — SIGNIFICANT CHANGE UP (ref 3.5–5.3)
PROT SERPL-MCNC: 6.6 G/DL — SIGNIFICANT CHANGE UP (ref 6–8.3)
RBC # BLD: 4.16 M/UL — LOW (ref 4.2–5.8)
RBC # FLD: 12.1 % — SIGNIFICANT CHANGE UP (ref 10.3–14.5)
SODIUM SERPL-SCNC: 140 MMOL/L — SIGNIFICANT CHANGE UP (ref 135–145)
T4 FREE SERPL-MCNC: 1.2 NG/DL — SIGNIFICANT CHANGE UP (ref 0.9–1.8)
TSH SERPL-MCNC: 1.85 UIU/ML — SIGNIFICANT CHANGE UP (ref 0.27–4.2)
WBC # BLD: 8.86 K/UL — SIGNIFICANT CHANGE UP (ref 3.8–10.5)
WBC # FLD AUTO: 8.86 K/UL — SIGNIFICANT CHANGE UP (ref 3.8–10.5)

## 2023-07-19 PROCEDURE — 99213 OFFICE O/P EST LOW 20 MIN: CPT

## 2023-07-19 NOTE — PHYSICAL EXAM
[Fully active, able to carry on all pre-disease performance without restriction] : Status 0 - Fully active, able to carry on all pre-disease performance without restriction [Normal] : affect appropriate [de-identified] : anicteric  [de-identified] : supple, non tender, FROM  [de-identified] : no edema  [de-identified] : large right inguinal hernia, stable from prior

## 2023-07-19 NOTE — HISTORY OF PRESENT ILLNESS
[T: ___] : T[unfilled] [N: ___] : N[unfilled] [M: ___] : M[unfilled] [AJCC Stage: ____] : AJCC Stage: [unfilled] [N/A] : Currently not applicable [de-identified] : Finesse Zuniga is a 70 years old male who initially presented with intermittent midback pain, 10/10 radiating to stomach from both sides on June 1, 2019. He has poor appetite.  On june 12 he was diagnosed UTI and on cipro for 4 days, on June 16, was started cefpodoxime 100mg BID for 7 days.  CT chest/abdomen/pelvis revealed significant abnormal appearance of the left kidney with multiple heterogeneous areas of low attenuation involving the upper and midpole which may represent malignancy. There are necrotic lymph nodes in the left para-aortic location at the level of the kidney and below the level of the kidney. Adenopathy surrounds the left renal artery. The left renal vein demonstrates question of partially occlusive thrombus. Nonspecific scattered tiny pulmonary nodules. Lower lobe subpleural nodules measure up to 6 mm. There is a well-circumscribed hypodense lesion in the anterior aspect of the lateral segment of the left lobe measuring 3.2 x 2.2 cm. He underwent CT guided liver lesion biopsy which showed consistent with primary renal cell carcinoma, IHC CK7/CK19/PAX8 positive. MRI abdomen waw con showed multiple bilateral pulmonary nodules increased in size and number compared with recent CT. The largest is pleural-based in the left lower lobe and measures 1.5 x 1.1 cm. LIVER: Rim-enhancing subcapsular mass in segment 3 and measures 4.4 x 2.6 cm. A 4.2cm mass in mid to lower pole of the lft kidney suspicious for urothelial malignancy arising within the left renal collecting system. Regional lymphadenopathy and hepatic and pulmonary metastases. Nonocclusive thrombus within the left renal vein. He was started on eliquis. Bone scan showed  a focus of mildly increased uptake in the right superior \par pubic ramus suspicious of bone mets.\par He has lost 17lbs in one month. He denies nausea, vomiting and constipation and diarrhea. \par \par 7/2019: ipilimumab + nivolumab initiated and to nivolumab maintenance post 4 cycles and continues on therapy.\par \par 9/18-22/2020 : Patient admitted for hematuria. Patient followed by urology and hematology during hospital course. Patient was started on CBI to help remove clots. Urine initially bloody, but became pink-tinged and then clear over time. CBI stopped and barber removed. Patient reportedly felt much better after barber removed, able to urinate by himself- reported some clots initially, but since resolved. States bladder spasms improved as well. Patient's eliquis was stopped, MRI done showing no evidence of renal vein thrombus, discussed  with Heme, no reason to continue eliquis for now. Patient's leukocytosis and JOSE MARIA resolved. Urine culture and blood cultures negative. \par Patient improved clinically throughout hospital course. Patient seen and examined on day of discharge. \par Cystoscopy was negative, performed by Dr. Javier. \par \par 5/26/2021 : Mr. Vera is here for nivolumab. Last scan 3/29/2021, will need another set of scan the end or beginning of July. He walks 2 miles a day with his dog and bikes 3-4 miles a day. He feels well overall. Mr. VERA denies fevers, chills, headaches, cough, SOB, chest pain, any swelling, nausea, vomiting, diarrhea, rash, or malaise. \par \par 6/23/21: Bothering Rt side inguinal hernia without pain or signs of incarceration. Doing well overall. Scheduled for nivo today. He denies fevers, chills, headaches, cough, SOB, chest pain, any swelling, nausea, vomiting, diarrhea, rash, or malaise.\par \par 7/7/2021 : CT showed stable disease\par \par 7/28/2021 : Here for nivolumab. 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 Mr. VERA denies fevers, chills, headaches, cough, SOB, chest pain, any swelling, nausea, vomiting, diarrhea, rash, or malaise. He has been tolerating therapy extremely well without sig issues. He is to see a general surgeon for hernia repair soon. \par \par 8/25/2021 : Here for nivolumab, scan due in September. Mr. VERA denies fevers, chills, headaches, cough, SOB, chest pain, any swelling, nausea, vomiting, diarrhea, rash, or malaise. \par \par 10/27/21: Pt presents for follow up. He is scheduled for Nivolumab which he has been tolerating well. Scheduled to receive cycle #30 today. Denies any diarrhea/ itchiness, cough, hematuria, rash. Appetite good. Energy good. No new complaints. Recieved both flu shot and COVID booster recently\par \par 11/24/21: Pt feeling well. Offers no complaints. Scheduled for C#31 nivolumab today. Due for imaging next month. Already scheduled\par \par 12/21/21 CT chest/abd/pelvis - Stable appearance of the chest compared with 9/4/2021. Indeterminate 1.4 cm hypodense lesion in the upper pole of the right kidney is unchanged. Atrophy is again noted involving the upper pole of the left kidney, unchanged. Very large right inguinal hernia containing non-obstructed large and small bowel and a small amount of fluid. Enlarged heterogeneous prostate gland. Nonspecific ill-defined infiltration of the fat in the right lower quadrant, not significantly changed. Attention to this area is suggested on follow-up CT.\par \par 12/29/21 - presents for ongoing Opdivo cycle #32 today. Overall feeling "good", energy is good. Appetite is good. No complaints. \par \par 3/2/22 feels quite well, denies any pain and gross hematuria. He has a schedule in April to take care of right inguinal hernia. \par \par 4/6/22 feels fine, denies pain and gross hematuria. His right inguinal hernia is getting worse. \par \par 4/27/22: Pt returns for follow up. He reports 24 period of severe diarrhea on monday. No fevers. Resolved spontaneously. Pt reports he had imaging done 2 weeks ago. Eager fto discuss findings. Feeling well now. Pt will be  meeting with surgeon recommended by PMD for Right inguinal hernia repair.\par \par 6/22/22 feels well. He will meet his surgeon for his right inguinal hernia. \par \par 7/20/22 feels overall fine. Denies any pain and weight loss. \par \par \par \par 8/17/22: Patient returns for follow up, continued on nivolumab. Had CT scans 1 week ago that show stable L renal mass and stable R renal cyst. Also remarkable for non specific mild increase in illdefined omental infiltration - omental carcinomatosis is not excluded, will continue short interval scanning. Patient reports having less appetite than normal. He feels less motivation to cook for himself. He does drink an ensure everyday. He feels he gets mon[place]ada sooner and has lost a few pounds. He denies nausea, vomiting and GERD. Patient is often tired, but is still able to complete every thing he needs to do, it may just take him a bit longer. He is working on setting up his inguinal hernia surgery.\par \par 9/14: energy is good. seeing the surgeon next week for the hernia. appetite is okay now. no longer has the feeling of getting full early. able to eat full meals\par \par 10/12: appetite back to normal. no rash, pain, CP, SOB. energy is okay\par \par 11/9/22: feels well today. no new changes \par \par 1/4/22: doing well today. no new issues. energy good. appetite good. no new joint pain, rash, SOB, diarreha.\par \par 2/1/23: Returns for follow up and treatment with nivolumab. Patient has been feeling well. Denies fatigue, walks 2-2.5 miles with his dog per day. He has not yet seen a surgeon for his inguinal hernia. His PCP gave him a surgeon referral, he says he has not gotten up the nerve to call. Denies pain. \par Patient denies fever, chills, headache, vision changes, cough, shortness of breath, chest pain, palpitations, abdominal pain, nausea/vomiting, diarrhea, constipation, dysuria, hematuria, itching, rashes, joint pain, mucositis, changes in sensation.\par \par 3/1/23: continued on nivolumab. Patient feels well overall, eating well, has good energy, goes on daily walks with his dog. He notes his urine stream is not as strong in the morning, but improves throughout the day. Denies dysuria/hematuria. He went to see a surgeon last month and was described what his inguinal hernia repair would be, he is planning to have the surgery but not until the Spring.. he denies any change in color or size of hernia. \par \par 3/9/23 CT c/a/p showed Multiple bilateral small renal lesions overall unchanged when compared to the prior examinations and can be consistent with a neoplastic process.\par Diffuse areas of omental/peritoneal thickening and areas of nodularity and diffuse small lymph nodes within the mesentery and retroperitoneum but overall configuration unchanged\par \par 3/29/23 reports feeling overall fine, denies skin and joint pain\par \par 4/26/23: feels well, no new changes compared to last visit\par \par 5/24/23: continued on nivolumab for met RCC. Overall, patient is doing well. He is exercising more, walking with his dog, riding his bike. Appetite is good. He is not planning to pursue hernia surgery until after his next scans in July, he wants to feel comfortable. Patient denies fever, chills, headache, vision changes, cough, shortness of breath, chest pain, palpitations, abdominal pain, nausea/vomiting, diarrhea, constipation, dysuria, hematuria, itching, rashes, muscle and joint pain, paraesthesias. \par  [de-identified] : metastatic renal cell carcinoma [FreeTextEntry1] : Ipi + nivo (7/2019) > nivolumab 480 mg monthly - cont [de-identified] : \par \par 6/21/23: continued on nivolumab. Energy level is good, doing his same routine. Appetite is good. Last week he pulled a muscle in back when doing yard work, pain lasted for about 3-4 days and has now resolved. Otherwise no joint or muscle pain. Pt notes that due to hernia he often feels as though he is not completely emptying his bladder. Notes nocturia x 2. Denies shortness of breath, coughing, constipation, diarrhea, itching, rashes. \par \par 7/19/23 reports feeling well, no fatigue and skin rash.

## 2023-07-19 NOTE — ASSESSMENT
[FreeTextEntry1] : Mr. Mcknight is a 72 year old male with metastatic renal cell carcinoma. On initial presentation CT c/a/p and MRI abdomen showed multiple lung nodules, one liver lesion, retroperitoneal nodes. Bone scan showed one suspicious met in right superior pubic ramus. Liver lesion biopsy is consistent with renal cell carcinoma.\par It is not certain that the histology of RCC is clear cell vs non-clear cell. IMDC risk of this patient he has 5 out of 6 risks including leukocytosis, anemia, thrombocytosis, time from dx to initiate systemic treatment less than one year and hyperalcemia. He has poor risk of mRCC. Based on mccRCC the standard of care is combined immunotherapy nivo and ipi vs sutent, checkmate 214 in the intermediate and poor risk of mccRCC patients with significant overall survival benefit, keynote 426 keytruda plus axitinib vs sutent in good, intermediate and poor risk of mccRCC patients showed OS, PFS and MICHEL benefit, [place] in the intermediate and poor risk of mccRCC patients showed cabozantinib demonstrated a significant clinical benefit in PFS and MICHEL over standard-of-care sunitinib as first-line therapy in patients with intermediate- or poor-risk mRCC. Consider the longer duration of response and less side effects and recommended nivo and ipi. Completed 4 cycles of  nivo and ipi, continued on maintenance nivolumab with interval scans to monitor disease. Of note, on diagnosis patient had L renal vein thrombus and was started on eliquis. He had an episode of gross hematuria leading to hospitalization in 9/2020 however etiology of hematuria is unclear and resolved. Follow up cystoscopy to r/o abnormality in the bladder showed no abnormality. Eliquis stopped during hospitalization with hematology consult. Remains off of eliquis. Scans have continued to show stability. Scans from 8/11/22 showed stable disease in L kidney and stable R renal cyst, noted non specific infiltration in omentum, cannot exclude carcinomatosis. CTs in November 2022 show stable disease.  Patient also has large R inguinal hernia. March 2023 CT c/a/p showed stable disease in lungs, kidney and retroperitoneum.  \par \par Plan\par Metastatic RCC \par - continue maintenance nivolumab [id number], proceed with treatment today\par - continue to monitor interval scans, most recent scans from 3/9/23 with stable findings, next scans in July, ordered due now. He will call to schedule \par - continue to monitor blood work \par - monitor for iRAEs, reviewed with patient today; continues to tolerate treatment well \par \par Instructed to contact our office with any new/worsening symptoms.\par Patient educated regarding plan of care, all questions/concerns addressed to the best of my abilities and patient's apparent satisfaction.\par RTC 4 weeks

## 2023-08-08 ENCOUNTER — OUTPATIENT (OUTPATIENT)
Dept: OUTPATIENT SERVICES | Facility: HOSPITAL | Age: 73
LOS: 1 days | Discharge: HOME | End: 2023-08-08

## 2023-08-08 DIAGNOSIS — Z98.890 OTHER SPECIFIED POSTPROCEDURAL STATES: Chronic | ICD-10-CM

## 2023-08-08 DIAGNOSIS — S86.019A STRAIN OF UNSPECIFIED ACHILLES TENDON, INITIAL ENCOUNTER: Chronic | ICD-10-CM

## 2023-08-08 DIAGNOSIS — C64.9 MALIGNANT NEOPLASM OF UNSPECIFIED KIDNEY, EXCEPT RENAL PELVIS: ICD-10-CM

## 2023-08-16 ENCOUNTER — RESULT REVIEW (OUTPATIENT)
Age: 73
End: 2023-08-16

## 2023-08-16 ENCOUNTER — APPOINTMENT (OUTPATIENT)
Dept: HEMATOLOGY ONCOLOGY | Facility: CLINIC | Age: 73
End: 2023-08-16
Payer: MEDICARE

## 2023-08-16 ENCOUNTER — APPOINTMENT (OUTPATIENT)
Dept: INFUSION THERAPY | Facility: HOSPITAL | Age: 73
End: 2023-08-16

## 2023-08-16 VITALS
HEART RATE: 56 BPM | RESPIRATION RATE: 16 BRPM | OXYGEN SATURATION: 99 % | TEMPERATURE: 96.4 F | SYSTOLIC BLOOD PRESSURE: 120 MMHG | WEIGHT: 156.53 LBS | DIASTOLIC BLOOD PRESSURE: 73 MMHG | BODY MASS INDEX: 23.03 KG/M2

## 2023-08-16 DIAGNOSIS — C78.00 SECONDARY MALIGNANT NEOPLASM OF UNSPECIFIED LUNG: ICD-10-CM

## 2023-08-16 LAB
ALBUMIN SERPL ELPH-MCNC: 4.4 G/DL — SIGNIFICANT CHANGE UP (ref 3.3–5)
ALP SERPL-CCNC: 56 U/L — SIGNIFICANT CHANGE UP (ref 40–120)
ALT FLD-CCNC: 11 U/L — SIGNIFICANT CHANGE UP (ref 10–45)
ANION GAP SERPL CALC-SCNC: 8 MMOL/L — SIGNIFICANT CHANGE UP (ref 5–17)
AST SERPL-CCNC: 27 U/L — SIGNIFICANT CHANGE UP (ref 10–40)
BASOPHILS # BLD AUTO: 0.03 K/UL — SIGNIFICANT CHANGE UP (ref 0–0.2)
BASOPHILS NFR BLD AUTO: 0.3 % — SIGNIFICANT CHANGE UP (ref 0–2)
BILIRUB SERPL-MCNC: 1.1 MG/DL — SIGNIFICANT CHANGE UP (ref 0.2–1.2)
BUN SERPL-MCNC: 16 MG/DL — SIGNIFICANT CHANGE UP (ref 7–23)
CALCIUM SERPL-MCNC: 9.6 MG/DL — SIGNIFICANT CHANGE UP (ref 8.4–10.5)
CHLORIDE SERPL-SCNC: 105 MMOL/L — SIGNIFICANT CHANGE UP (ref 96–108)
CO2 SERPL-SCNC: 28 MMOL/L — SIGNIFICANT CHANGE UP (ref 22–31)
CREAT SERPL-MCNC: 1.29 MG/DL — SIGNIFICANT CHANGE UP (ref 0.5–1.3)
EGFR: 59 ML/MIN/1.73M2 — LOW
EOSINOPHIL # BLD AUTO: 0.17 K/UL — SIGNIFICANT CHANGE UP (ref 0–0.5)
EOSINOPHIL NFR BLD AUTO: 1.9 % — SIGNIFICANT CHANGE UP (ref 0–6)
GLUCOSE SERPL-MCNC: 99 MG/DL — SIGNIFICANT CHANGE UP (ref 70–99)
HCT VFR BLD CALC: 39.1 % — SIGNIFICANT CHANGE UP (ref 39–50)
HGB BLD-MCNC: 13.5 G/DL — SIGNIFICANT CHANGE UP (ref 13–17)
IMM GRANULOCYTES NFR BLD AUTO: 0.4 % — SIGNIFICANT CHANGE UP (ref 0–0.9)
LYMPHOCYTES # BLD AUTO: 1.37 K/UL — SIGNIFICANT CHANGE UP (ref 1–3.3)
LYMPHOCYTES # BLD AUTO: 15.2 % — SIGNIFICANT CHANGE UP (ref 13–44)
MCHC RBC-ENTMCNC: 31.7 PG — SIGNIFICANT CHANGE UP (ref 27–34)
MCHC RBC-ENTMCNC: 34.5 G/DL — SIGNIFICANT CHANGE UP (ref 32–36)
MCV RBC AUTO: 91.8 FL — SIGNIFICANT CHANGE UP (ref 80–100)
MONOCYTES # BLD AUTO: 0.73 K/UL — SIGNIFICANT CHANGE UP (ref 0–0.9)
MONOCYTES NFR BLD AUTO: 8.1 % — SIGNIFICANT CHANGE UP (ref 2–14)
NEUTROPHILS # BLD AUTO: 6.7 K/UL — SIGNIFICANT CHANGE UP (ref 1.8–7.4)
NEUTROPHILS NFR BLD AUTO: 74.1 % — SIGNIFICANT CHANGE UP (ref 43–77)
NRBC # BLD: 0 /100 WBCS — SIGNIFICANT CHANGE UP (ref 0–0)
PLATELET # BLD AUTO: 189 K/UL — SIGNIFICANT CHANGE UP (ref 150–400)
POTASSIUM SERPL-MCNC: 4.6 MMOL/L — SIGNIFICANT CHANGE UP (ref 3.5–5.3)
POTASSIUM SERPL-SCNC: 4.6 MMOL/L — SIGNIFICANT CHANGE UP (ref 3.5–5.3)
PROT SERPL-MCNC: 7.3 G/DL — SIGNIFICANT CHANGE UP (ref 6–8.3)
RBC # BLD: 4.26 M/UL — SIGNIFICANT CHANGE UP (ref 4.2–5.8)
RBC # FLD: 12.4 % — SIGNIFICANT CHANGE UP (ref 10.3–14.5)
SODIUM SERPL-SCNC: 141 MMOL/L — SIGNIFICANT CHANGE UP (ref 135–145)
T4 FREE SERPL-MCNC: 1.1 NG/DL — SIGNIFICANT CHANGE UP (ref 0.9–1.8)
TSH SERPL-MCNC: 1.57 UIU/ML — SIGNIFICANT CHANGE UP (ref 0.27–4.2)
WBC # BLD: 9.04 K/UL — SIGNIFICANT CHANGE UP (ref 3.8–10.5)
WBC # FLD AUTO: 9.04 K/UL — SIGNIFICANT CHANGE UP (ref 3.8–10.5)

## 2023-08-16 PROCEDURE — 99213 OFFICE O/P EST LOW 20 MIN: CPT

## 2023-08-16 NOTE — PHYSICAL EXAM
[Fully active, able to carry on all pre-disease performance without restriction] : Status 0 - Fully active, able to carry on all pre-disease performance without restriction [Normal] : affect appropriate [de-identified] : anicteric  [de-identified] : supple, non tender, FROM  [de-identified] : no edema  [de-identified] : large right inguinal hernia, stable from prior

## 2023-08-16 NOTE — ASSESSMENT
[FreeTextEntry1] : Mr. Mcknight is a 72 year old male with metastatic renal cell carcinoma. On initial presentation CT c/a/p and MRI abdomen showed multiple lung nodules, one liver lesion, retroperitoneal nodes. Bone scan showed one suspicious met in right superior pubic ramus. Liver lesion biopsy is consistent with renal cell carcinoma. It is not certain that the histology of RCC is clear cell vs non-clear cell. IMDC risk of this patient he has 5 out of 6 risks including leukocytosis, anemia, thrombocytosis, time from dx to initiate systemic treatment less than one year and hyperalcemia. He has poor risk of mRCC. Based on mccRCC the standard of care is combined immunotherapy nivo and ipi vs sutent, checkmate 214 in the intermediate and poor risk of mccRCC patients with significant overall survival benefit, keynote 426 keytruda plus axitinib vs sutent in good, intermediate and poor risk of mccRCC patients showed OS, PFS and MICHEL benefit, [place] in the intermediate and poor risk of mccRCC patients showed cabozantinib demonstrated a significant clinical benefit in PFS and MICHEL over standard-of-care sunitinib as first-line therapy in patients with intermediate- or poor-risk mRCC. Consider the longer duration of response and less side effects and recommended nivo and ipi. Completed 4 cycles of  nivo and ipi, continued on maintenance nivolumab with interval scans to monitor disease. Of note, on diagnosis patient had L renal vein thrombus and was started on eliquis. He had an episode of gross hematuria leading to hospitalization in 9/2020 however etiology of hematuria is unclear and resolved. Follow up cystoscopy to r/o abnormality in the bladder showed no abnormality. Eliquis stopped during hospitalization with hematology consult. Remains off of eliquis. Scans have continued to show stability. Scans from 8/11/22 showed stable disease in L kidney and stable R renal cyst, noted non specific infiltration in omentum, cannot exclude carcinomatosis. CTs in November 2022 show stable disease.  Patient also has large R inguinal hernia. March 2023 CT c/a/p showed stable disease in lungs, kidney and retroperitoneum.    Plan Metastatic RCC  - continue maintenance nivolumab [id number], proceed with treatment today - continue to monitor interval scans, most recent scans from 3/9/23 with stable findings, next scans scheduled for 8/22/23 - continue to monitor blood work  - monitor for iRAEs, reviewed with patient today; continues to tolerate treatment well   Instructed to contact our office with any new/worsening symptoms. Patient educated regarding plan of care, all questions/concerns addressed to the best of my abilities and patient's apparent satisfaction. RTC 4 weeks    Patient seen/examined and discussed with Dr.Zhu Margarette Grider MD PGY6 Hematology/Oncology Fellow

## 2023-08-16 NOTE — HISTORY OF PRESENT ILLNESS
[T: ___] : T[unfilled] [N: ___] : N[unfilled] [M: ___] : M[unfilled] [AJCC Stage: ____] : AJCC Stage: [unfilled] [N/A] : Currently not applicable [de-identified] : Finesse Zuniga is a 70 years old male who initially presented with intermittent midback pain, 10/10 radiating to stomach from both sides on June 1, 2019. He has poor appetite.  On june 12 he was diagnosed UTI and on cipro for 4 days, on June 16, was started cefpodoxime 100mg BID for 7 days.  CT chest/abdomen/pelvis revealed significant abnormal appearance of the left kidney with multiple heterogeneous areas of low attenuation involving the upper and midpole which may represent malignancy. There are necrotic lymph nodes in the left para-aortic location at the level of the kidney and below the level of the kidney. Adenopathy surrounds the left renal artery. The left renal vein demonstrates question of partially occlusive thrombus. Nonspecific scattered tiny pulmonary nodules. Lower lobe subpleural nodules measure up to 6 mm. There is a well-circumscribed hypodense lesion in the anterior aspect of the lateral segment of the left lobe measuring 3.2 x 2.2 cm. He underwent CT guided liver lesion biopsy which showed consistent with primary renal cell carcinoma, IHC CK7/CK19/PAX8 positive. MRI abdomen waw con showed multiple bilateral pulmonary nodules increased in size and number compared with recent CT. The largest is pleural-based in the left lower lobe and measures 1.5 x 1.1 cm. LIVER: Rim-enhancing subcapsular mass in segment 3 and measures 4.4 x 2.6 cm. A 4.2cm mass in mid to lower pole of the lft kidney suspicious for urothelial malignancy arising within the left renal collecting system. Regional lymphadenopathy and hepatic and pulmonary metastases. Nonocclusive thrombus within the left renal vein. He was started on eliquis. Bone scan showed  a focus of mildly increased uptake in the right superior  pubic ramus suspicious of bone mets. He has lost 17lbs in one month. He denies nausea, vomiting and constipation and diarrhea.   7/2019: ipilimumab + nivolumab initiated and to nivolumab maintenance post 4 cycles and continues on therapy.  9/18-22/2020 : Patient admitted for hematuria. Patient followed by urology and hematology during hospital course. Patient was started on CBI to help remove clots. Urine initially bloody, but became pink-tinged and then clear over time. CBI stopped and barber removed. Patient reportedly felt much better after barber removed, able to urinate by himself- reported some clots initially, but since resolved. States bladder spasms improved as well. Patient's eliquis was stopped, MRI done showing no evidence of renal vein thrombus, discussed  with Heme, no reason to continue eliquis for now. Patient's leukocytosis and JOSE MARIA resolved. Urine culture and blood cultures negative.  Patient improved clinically throughout hospital course. Patient seen and examined on day of discharge.  Cystoscopy was negative, performed by Dr. Javier.   5/26/2021 : Mr. Vera is here for nivolumab. Last scan 3/29/2021, will need another set of scan the end or beginning of July. He walks 2 miles a day with his dog and bikes 3-4 miles a day. He feels well overall. Mr. VERA denies fevers, chills, headaches, cough, SOB, chest pain, any swelling, nausea, vomiting, diarrhea, rash, or malaise.   6/23/21: Bothering Rt side inguinal hernia without pain or signs of incarceration. Doing well overall. Scheduled for nivo today. He denies fevers, chills, headaches, cough, SOB, chest pain, any swelling, nausea, vomiting, diarrhea, rash, or malaise.  7/7/2021 : CT showed stable disease  7/28/2021 : Here for nivolumab. 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 Mr. VERA denies fevers, chills, headaches, cough, SOB, chest pain, any swelling, nausea, vomiting, diarrhea, rash, or malaise. He has been tolerating therapy extremely well without sig issues. He is to see a general surgeon for hernia repair soon.   8/25/2021 : Here for nivolumab, scan due in September. Mr. VERA denies fevers, chills, headaches, cough, SOB, chest pain, any swelling, nausea, vomiting, diarrhea, rash, or malaise.   10/27/21: Pt presents for follow up. He is scheduled for Nivolumab which he has been tolerating well. Scheduled to receive cycle #30 today. Denies any diarrhea/ itchiness, cough, hematuria, rash. Appetite good. Energy good. No new complaints. Recieved both flu shot and COVID booster recently  11/24/21: Pt feeling well. Offers no complaints. Scheduled for C#31 nivolumab today. Due for imaging next month. Already scheduled  12/21/21 CT chest/abd/pelvis - Stable appearance of the chest compared with 9/4/2021. Indeterminate 1.4 cm hypodense lesion in the upper pole of the right kidney is unchanged. Atrophy is again noted involving the upper pole of the left kidney, unchanged. Very large right inguinal hernia containing non-obstructed large and small bowel and a small amount of fluid. Enlarged heterogeneous prostate gland. Nonspecific ill-defined infiltration of the fat in the right lower quadrant, not significantly changed. Attention to this area is suggested on follow-up CT.  12/29/21 - presents for ongoing Opdivo cycle #32 today. Overall feeling "good", energy is good. Appetite is good. No complaints.   3/2/22 feels quite well, denies any pain and gross hematuria. He has a schedule in April to take care of right inguinal hernia.   4/6/22 feels fine, denies pain and gross hematuria. His right inguinal hernia is getting worse.   4/27/22: Pt returns for follow up. He reports 24 period of severe diarrhea on monday. No fevers. Resolved spontaneously. Pt reports he had imaging done 2 weeks ago. Eager fto discuss findings. Feeling well now. Pt will be  meeting with surgeon recommended by PMD for Right inguinal hernia repair.  6/22/22 feels well. He will meet his surgeon for his right inguinal hernia.   7/20/22 feels overall fine. Denies any pain and weight loss.     8/17/22: Patient returns for follow up, continued on nivolumab. Had CT scans 1 week ago that show stable L renal mass and stable R renal cyst. Also remarkable for non specific mild increase in illdefined omental infiltration - omental carcinomatosis is not excluded, will continue short interval scanning. Patient reports having less appetite than normal. He feels less motivation to cook for himself. He does drink an ensure everyday. He feels he gets moncada sooner and has lost a few pounds. He denies nausea, vomiting and GERD. Patient is often tired, but is still able to complete every thing he needs to do, it may just take him a bit longer. He is working on setting up his inguinal hernia surgery.  9/14: energy is good. seeing the surgeon next week for the hernia. appetite is okay now. no longer has the feeling of getting full early. able to eat full meals  10/12: appetite back to normal. no rash, pain, CP, SOB. energy is okay  11/9/22: feels well today. no new changes   1/4/22: doing well today. no new issues. energy good. appetite good. no new joint pain, rash, SOB, diarreha.  2/1/23: Returns for follow up and treatment with nivolumab. Patient has been feeling well. Denies fatigue, walks 2-2.5 miles with his dog per day. He has not yet seen a surgeon for his inguinal hernia. His PCP gave him a surgeon referral, he says he has not gotten up the nerve to call. Denies pain.  Patient denies fever, chills, headache, vision changes, cough, shortness of breath, chest pain, palpitations, abdominal pain, nausea/vomiting, diarrhea, constipation, dysuria, hematuria, itching, rashes, joint pain, mucositis, changes in sensation.  3/1/23: continued on nivolumab. Patient feels well overall, eating well, has good energy, goes on daily walks with his dog. He notes his urine stream is not as strong in the morning, but improves throughout the day. Denies dysuria/hematuria. He went to see a surgeon last month and was described what his inguinal hernia repair would be, he is planning to have the surgery but not until the Spring.. he denies any change in color or size of hernia.   3/9/23 CT c/a/p showed Multiple bilateral small renal lesions overall unchanged when compared to the prior examinations and can be consistent with a neoplastic process. Diffuse areas of omental/peritoneal thickening and areas of nodularity and diffuse small lymph nodes within the mesentery and retroperitoneum but overall configuration unchanged  3/29/23 reports feeling overall fine, denies skin and joint pain  4/26/23: feels well, no new changes compared to last visit  5/24/23: continued on nivolumab for met RCC. Overall, patient is doing well. He is exercising more, walking with his dog, riding his bike. Appetite is good. He is not planning to pursue hernia surgery until after his next scans in July, he wants to feel comfortable. Patient denies fever, chills, headache, vision changes, cough, shortness of breath, chest pain, palpitations, abdominal pain, nausea/vomiting, diarrhea, constipation, dysuria, hematuria, itching, rashes, muscle and joint pain, paraesthesias.     6/21/23: continued on nivolumab. Energy level is good, doing his same routine. Appetite is good. Last week he pulled a muscle in back when doing yard work, pain lasted for about 3-4 days and has now resolved. Otherwise no joint or muscle pain. Pt notes that due to hernia he often feels as though he is not completely emptying his bladder. Notes nocturia x 2. Denies shortness of breath, coughing, constipation, diarrhea, itching, rashes.   7/19/23 reports feeling well, no fatigue and skin rash.  [de-identified] : metastatic renal cell carcinoma [FreeTextEntry1] : Ipi + nivo (7/2019) > nivolumab 480 mg monthly - cont [de-identified] : 816/23 Patient reports he is doing well. Denies nausea, vomiting, diarrhea, constipation, fevers, chills. No issues with urination, denies blood in urine. No SOB, or chest pain. Reports appetite is good.

## 2023-08-17 DIAGNOSIS — Z51.11 ENCOUNTER FOR ANTINEOPLASTIC CHEMOTHERAPY: ICD-10-CM

## 2023-08-22 ENCOUNTER — APPOINTMENT (OUTPATIENT)
Dept: CT IMAGING | Facility: CLINIC | Age: 73
End: 2023-08-22
Payer: MEDICARE

## 2023-08-22 ENCOUNTER — OUTPATIENT (OUTPATIENT)
Dept: OUTPATIENT SERVICES | Facility: HOSPITAL | Age: 73
LOS: 1 days | End: 2023-08-22

## 2023-08-22 DIAGNOSIS — S86.019A STRAIN OF UNSPECIFIED ACHILLES TENDON, INITIAL ENCOUNTER: Chronic | ICD-10-CM

## 2023-08-22 DIAGNOSIS — C64.2 MALIGNANT NEOPLASM OF LEFT KIDNEY, EXCEPT RENAL PELVIS: ICD-10-CM

## 2023-08-22 DIAGNOSIS — Z98.890 OTHER SPECIFIED POSTPROCEDURAL STATES: Chronic | ICD-10-CM

## 2023-08-22 PROCEDURE — 71260 CT THORAX DX C+: CPT | Mod: 26

## 2023-08-22 PROCEDURE — 74178 CT ABD&PLV WO CNTR FLWD CNTR: CPT | Mod: 26

## 2023-09-10 NOTE — HISTORY OF PRESENT ILLNESS
[T: ___] : T[unfilled] [N: ___] : N[unfilled] [M: ___] : M[unfilled] [AJCC Stage: ____] : AJCC Stage: [unfilled] [de-identified] : Finesse Zuniga is a 70 years old male who initially presented with intermittent midback pain, 10/10 radiating to stomach from both sides on June 1, 2019. He has poor appetite.  On june 12 he was diagnosed UTI and on cipro for 4 days, on June 16, was started cefpodoxime 100mg BID for 7 days.  CT chest/abdomen/pelvis revealed significant abnormal appearance of the left kidney with multiple heterogeneous areas of low attenuation involving the upper and midpole which may represent malignancy. There are necrotic lymph nodes in the left para-aortic location at the level of the kidney and below the level of the kidney. Adenopathy surrounds the left renal artery. The left renal vein demonstrates question of partially occlusive thrombus. Nonspecific scattered tiny pulmonary nodules. Lower lobe subpleural nodules measure up to 6 mm. There is a well-circumscribed hypodense lesion in the anterior aspect of the lateral segment of the left lobe measuring 3.2 x 2.2 cm. He underwent CT guided liver lesion biopsy which showed consistent with primary renal cell carcinoma, IHC CK7/CK19/PAX8 positive. MRI abdomen waw con showed multiple bilateral pulmonary nodules increased in size and number compared with recent CT. The largest is pleural-based in the left lower lobe and measures 1.5 x 1.1 cm. LIVER: Rim-enhancing subcapsular mass in segment 3 and measures 4.4 x 2.6 cm. A 4.2cm mass in mid to lower pole of the lft kidney suspicious for urothelial malignancy arising within the left renal collecting system. Regional lymphadenopathy and hepatic and pulmonary metastases. Nonocclusive thrombus within the left renal vein. He was started on eliquis. Bone scan showed  a focus of mildly increased uptake in the right superior  pubic ramus suspicious of bone mets. He has lost 17lbs in one month. He denies nausea, vomiting and constipation and diarrhea.   7/2019: ipilimumab + nivolumab initiated and to nivolumab maintenance post 4 cycles and continues on therapy.  9/18-22/2020 : Patient admitted for hematuria. Patient followed by urology and hematology during hospital course. Patient was started on CBI to help remove clots. Urine initially bloody, but became pink-tinged and then clear over time. CBI stopped and barber removed. Patient reportedly felt much better after barber removed, able to urinate by himself- reported some clots initially, but since resolved. States bladder spasms improved as well. Patient's eliquis was stopped, MRI done showing no evidence of renal vein thrombus, discussed  with Heme, no reason to continue eliquis for now. Patient's leukocytosis and JOSE MARIA resolved. Urine culture and blood cultures negative.  Patient improved clinically throughout hospital course. Patient seen and examined on day of discharge.  Cystoscopy was negative, performed by Dr. Javier.   5/26/2021 : Mr. Vera is here for nivolumab. Last scan 3/29/2021, will need another set of scan the end or beginning of July. He walks 2 miles a day with his dog and bikes 3-4 miles a day. He feels well overall. Mr. VERA denies fevers, chills, headaches, cough, SOB, chest pain, any swelling, nausea, vomiting, diarrhea, rash, or malaise.   6/23/21: Bothering Rt side inguinal hernia without pain or signs of incarceration. Doing well overall. Scheduled for nivo today. He denies fevers, chills, headaches, cough, SOB, chest pain, any swelling, nausea, vomiting, diarrhea, rash, or malaise.  7/7/2021 : CT showed stable disease  7/28/2021 : Here for nivolumab. 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 Mr. VERA denies fevers, chills, headaches, cough, SOB, chest pain, any swelling, nausea, vomiting, diarrhea, rash, or malaise. He has been tolerating therapy extremely well without sig issues. He is to see a general surgeon for hernia repair soon.   8/25/2021 : Here for nivolumab, scan due in September. Mr. VERA denies fevers, chills, headaches, cough, SOB, chest pain, any swelling, nausea, vomiting, diarrhea, rash, or malaise.   10/27/21: Pt presents for follow up. He is scheduled for Nivolumab which he has been tolerating well. Scheduled to receive cycle #30 today. Denies any diarrhea/ itchiness, cough, hematuria, rash. Appetite good. Energy good. No new complaints. Recieved both flu shot and COVID booster recently  11/24/21: Pt feeling well. Offers no complaints. Scheduled for C#31 nivolumab today. Due for imaging next month. Already scheduled  12/21/21 CT chest/abd/pelvis - Stable appearance of the chest compared with 9/4/2021. Indeterminate 1.4 cm hypodense lesion in the upper pole of the right kidney is unchanged. Atrophy is again noted involving the upper pole of the left kidney, unchanged. Very large right inguinal hernia containing non-obstructed large and small bowel and a small amount of fluid. Enlarged heterogeneous prostate gland. Nonspecific ill-defined infiltration of the fat in the right lower quadrant, not significantly changed. Attention to this area is suggested on follow-up CT.  12/29/21 - presents for ongoing Opdivo cycle #32 today. Overall feeling "good", energy is good. Appetite is good. No complaints.   3/2/22 feels quite well, denies any pain and gross hematuria. He has a schedule in April to take care of right inguinal hernia.   4/6/22 feels fine, denies pain and gross hematuria. His right inguinal hernia is getting worse.   4/27/22: Pt returns for follow up. He reports 24 period of severe diarrhea on monday. No fevers. Resolved spontaneously. Pt reports he had imaging done 2 weeks ago. Eager fto discuss findings. Feeling well now. Pt will be  meeting with surgeon recommended by PMD for Right inguinal hernia repair.  6/22/22 feels well. He will meet his surgeon for his right inguinal hernia.   7/20/22 feels overall fine. Denies any pain and weight loss.     8/17/22: Patient returns for follow up, continued on nivolumab. Had CT scans 1 week ago that show stable L renal mass and stable R renal cyst. Also remarkable for non specific mild increase in illdefined omental infiltration - omental carcinomatosis is not excluded, will continue short interval scanning. Patient reports having less appetite than normal. He feels less motivation to cook for himself. He does drink an ensure everyday. He feels he gets moncada sooner and has lost a few pounds. He denies nausea, vomiting and GERD. Patient is often tired, but is still able to complete every thing he needs to do, it may just take him a bit longer. He is working on setting up his inguinal hernia surgery.  9/14: energy is good. seeing the surgeon next week for the hernia. appetite is okay now. no longer has the feeling of getting full early. able to eat full meals  10/12: appetite back to normal. no rash, pain, CP, SOB. energy is okay  11/9/22: feels well today. no new changes   1/4/22: doing well today. no new issues. energy good. appetite good. no new joint pain, rash, SOB, diarreha.  2/1/23: Returns for follow up and treatment with nivolumab. Patient has been feeling well. Denies fatigue, walks 2-2.5 miles with his dog per day. He has not yet seen a surgeon for his inguinal hernia. His PCP gave him a surgeon referral, he says he has not gotten up the nerve to call. Denies pain.  Patient denies fever, chills, headache, vision changes, cough, shortness of breath, chest pain, palpitations, abdominal pain, nausea/vomiting, diarrhea, constipation, dysuria, hematuria, itching, rashes, joint pain, mucositis, changes in sensation.  3/1/23: continued on nivolumab. Patient feels well overall, eating well, has good energy, goes on daily walks with his dog. He notes his urine stream is not as strong in the morning, but improves throughout the day. Denies dysuria/hematuria. He went to see a surgeon last month and was described what his inguinal hernia repair would be, he is planning to have the surgery but not until the Spring.. he denies any change in color or size of hernia.   3/9/23 CT c/a/p showed Multiple bilateral small renal lesions overall unchanged when compared to the prior examinations and can be consistent with a neoplastic process. Diffuse areas of omental/peritoneal thickening and areas of nodularity and diffuse small lymph nodes within the mesentery and retroperitoneum but overall configuration unchanged  3/29/23 reports feeling overall fine, denies skin and joint pain  4/26/23: feels well, no new changes compared to last visit  5/24/23: continued on nivolumab for met RCC. Overall, patient is doing well. He is exercising more, walking with his dog, riding his bike. Appetite is good. He is not planning to pursue hernia surgery until after his next scans in July, he wants to feel comfortable. Patient denies fever, chills, headache, vision changes, cough, shortness of breath, chest pain, palpitations, abdominal pain, nausea/vomiting, diarrhea, constipation, dysuria, hematuria, itching, rashes, muscle and joint pain, paraesthesias.     6/21/23: continued on nivolumab. Energy level is good, doing his same routine. Appetite is good. Last week he pulled a muscle in back when doing yard work, pain lasted for about 3-4 days and has now resolved. Otherwise no joint or muscle pain. Pt notes that due to hernia he often feels as though he is not completely emptying his bladder. Notes nocturia x 2. Denies shortness of breath, coughing, constipation, diarrhea, itching, rashes.   7/19/23 reports feeling well, no fatigue and skin rash.   816/23 Patient reports he is doing well. Denies nausea, vomiting, diarrhea, constipation, fevers, chills. No issues with urination, denies blood in urine. No SOB, or chest pain. Reports appetite is good.  [FreeTextEntry1] : Ipi + nivo (7/2019) > nivolumab 480 mg monthly - cont [de-identified] : metastatic renal cell carcinoma [de-identified] :  9/13/23:  [N/A] : Currently not applicable

## 2023-09-10 NOTE — ASSESSMENT
[FreeTextEntry1] : Mr. Mcknight is a 73 year old male with metastatic renal cell carcinoma. On initial presentation CT c/a/p and MRI abdomen showed multiple lung nodules, one liver lesion, retroperitoneal nodes. Bone scan showed one suspicious met in right superior pubic ramus. Liver lesion biopsy is consistent with renal cell carcinoma. It is not certain that the histology of RCC is clear cell vs non-clear cell. IMDC risk of this patient he has 5 out of 6 risks including leukocytosis, anemia, thrombocytosis, time from dx to initiate systemic treatment less than one year and hyperalcemia. He has poor risk of mRCC. Based on [facility] the standard of care is combined immunotherapy nivo and ipi vs sutent, checkmate 214 in the intermediate and poor risk of mccRCC patients with significant overall survival benefit, keynote 426 keytruda plus axitinib vs sutent in good, intermediate and poor risk of [facility]RCC patients showed OS, PFS and MICHEL benefit, [place] in the intermediate and poor risk of mccRCC patients showed cabozantinib demonstrated a significant clinical benefit in PFS and MICHEL over standard-of-care sunitinib as first-line therapy in patients with intermediate- or poor-risk mRCC. Consider the longer duration of response and less side effects and recommended nivo and ipi. Completed 4 cycles of  nivo and ipi, continued on maintenance nivolumab with interval scans to monitor disease. Of note, on diagnosis patient had L renal vein thrombus and was started on eliquis. He had an episode of gross hematuria leading to hospitalization in 9/2020 however etiology of hematuria is unclear and resolved. Follow up cystoscopy to r/o abnormality in the bladder showed no abnormality. Eliquis stopped during hospitalization with hematology consult. Remains off of eliquis. Scans have continued to show stability. Scans from 8/11/22 showed stable disease in L kidney and stable R renal cyst, noted non specific infiltration in omentum, cannot exclude carcinomatosis. CTs in November 2022 show stable disease.  Patient also has large R inguinal hernia. March 2023 CT c/a/p showed stable disease in lungs, kidney and retroperitoneum.    Plan Metastatic RCC  - continue maintenance nivolumab [id number], proceed with treatment today - continue to monitor interval scans, most recent scans from 3/9/23 with stable findings, 8/22/23 ____  - continue to monitor blood work  - monitor for iRAEs, reviewed with patient today; continues to tolerate treatment well   Instructed to contact our office with any new/worsening symptoms. Patient educated regarding plan of care, all questions/concerns addressed to the best of my abilities and patient's apparent satisfaction. RTC 4 weeks

## 2023-09-13 ENCOUNTER — APPOINTMENT (OUTPATIENT)
Dept: INFUSION THERAPY | Facility: HOSPITAL | Age: 73
End: 2023-09-13

## 2023-09-13 ENCOUNTER — RESULT REVIEW (OUTPATIENT)
Age: 73
End: 2023-09-13

## 2023-09-13 ENCOUNTER — APPOINTMENT (OUTPATIENT)
Dept: HEMATOLOGY ONCOLOGY | Facility: CLINIC | Age: 73
End: 2023-09-13
Payer: MEDICARE

## 2023-09-13 VITALS
BODY MASS INDEX: 22.41 KG/M2 | SYSTOLIC BLOOD PRESSURE: 127 MMHG | TEMPERATURE: 96.3 F | OXYGEN SATURATION: 100 % | WEIGHT: 152.34 LBS | RESPIRATION RATE: 16 BRPM | HEART RATE: 50 BPM | DIASTOLIC BLOOD PRESSURE: 78 MMHG

## 2023-09-13 LAB
ALBUMIN SERPL ELPH-MCNC: 4.6 G/DL — SIGNIFICANT CHANGE UP (ref 3.3–5)
ALP SERPL-CCNC: 63 U/L — SIGNIFICANT CHANGE UP (ref 40–120)
ALT FLD-CCNC: 9 U/L — LOW (ref 10–45)
ANION GAP SERPL CALC-SCNC: 12 MMOL/L — SIGNIFICANT CHANGE UP (ref 5–17)
AST SERPL-CCNC: 39 U/L — SIGNIFICANT CHANGE UP (ref 10–40)
BILIRUB SERPL-MCNC: 1.4 MG/DL — HIGH (ref 0.2–1.2)
BUN SERPL-MCNC: 14 MG/DL — SIGNIFICANT CHANGE UP (ref 7–23)
CALCIUM SERPL-MCNC: 9.9 MG/DL — SIGNIFICANT CHANGE UP (ref 8.4–10.5)
CHLORIDE SERPL-SCNC: 105 MMOL/L — SIGNIFICANT CHANGE UP (ref 96–108)
CO2 SERPL-SCNC: 24 MMOL/L — SIGNIFICANT CHANGE UP (ref 22–31)
CREAT SERPL-MCNC: 1.3 MG/DL — SIGNIFICANT CHANGE UP (ref 0.5–1.3)
EGFR: 59 ML/MIN/1.73M2 — LOW
GLUCOSE SERPL-MCNC: 100 MG/DL — HIGH (ref 70–99)
POTASSIUM SERPL-MCNC: 5.2 MMOL/L — SIGNIFICANT CHANGE UP (ref 3.5–5.3)
POTASSIUM SERPL-SCNC: 5.2 MMOL/L — SIGNIFICANT CHANGE UP (ref 3.5–5.3)
PROT SERPL-MCNC: 7.7 G/DL — SIGNIFICANT CHANGE UP (ref 6–8.3)
SODIUM SERPL-SCNC: 141 MMOL/L — SIGNIFICANT CHANGE UP (ref 135–145)

## 2023-09-13 PROCEDURE — 99214 OFFICE O/P EST MOD 30 MIN: CPT

## 2023-09-14 LAB
T4 FREE SERPL-MCNC: 1.2 NG/DL — SIGNIFICANT CHANGE UP (ref 0.9–1.8)
TSH SERPL-MCNC: 0.83 UIU/ML — SIGNIFICANT CHANGE UP (ref 0.27–4.2)

## 2023-09-15 NOTE — REVIEW OF SYSTEMS
See notes and updates in telephone refill encounter dated 9/14/2023.    Nela Griffin RN on 9/15/2023 at 1:20 PM     [Negative] : Allergic/Immunologic [Fever] : no fever [Chills] : no chills [Night Sweats] : no night sweats [Fatigue] : no fatigue [Recent Change In Weight] : ~T no recent weight change [Chest Pain] : no chest pain [Shortness Of Breath] : no shortness of breath [Cough] : no cough [Abdominal Pain] : no abdominal pain [Constipation] : no constipation [Diarrhea] : no diarrhea [Skin Rash] : no skin rash [Easy Bleeding] : no tendency for easy bleeding [Easy Bruising] : no tendency for easy bruising

## 2023-09-24 ENCOUNTER — RX RENEWAL (OUTPATIENT)
Age: 73
End: 2023-09-24

## 2023-09-29 LAB
ALBUMIN SERPL ELPH-MCNC: 4.4 G/DL
ALP BLD-CCNC: 67 U/L
ALT SERPL-CCNC: 17 U/L
ANION GAP SERPL CALC-SCNC: 10 MMOL/L
AST SERPL-CCNC: 19 U/L
BILIRUB SERPL-MCNC: 1.1 MG/DL
BUN SERPL-MCNC: 17 MG/DL
CALCIUM SERPL-MCNC: 10.2 MG/DL
CHLORIDE SERPL-SCNC: 105 MMOL/L
CO2 SERPL-SCNC: 27 MMOL/L
CREAT SERPL-MCNC: 1.39 MG/DL
EGFR: 54 ML/MIN/1.73M2
GLUCOSE SERPL-MCNC: 121 MG/DL
POTASSIUM SERPL-SCNC: 5.3 MMOL/L
PROT SERPL-MCNC: 7.1 G/DL
SODIUM SERPL-SCNC: 142 MMOL/L

## 2023-10-08 ENCOUNTER — OUTPATIENT (OUTPATIENT)
Dept: OUTPATIENT SERVICES | Facility: HOSPITAL | Age: 73
LOS: 1 days | Discharge: HOME | End: 2023-10-08

## 2023-10-08 DIAGNOSIS — C64.9 MALIGNANT NEOPLASM OF UNSPECIFIED KIDNEY, EXCEPT RENAL PELVIS: ICD-10-CM

## 2023-10-08 DIAGNOSIS — Z98.890 OTHER SPECIFIED POSTPROCEDURAL STATES: Chronic | ICD-10-CM

## 2023-10-08 DIAGNOSIS — S86.019A STRAIN OF UNSPECIFIED ACHILLES TENDON, INITIAL ENCOUNTER: Chronic | ICD-10-CM

## 2023-10-11 ENCOUNTER — RESULT REVIEW (OUTPATIENT)
Age: 73
End: 2023-10-11

## 2023-10-11 ENCOUNTER — APPOINTMENT (OUTPATIENT)
Dept: INFUSION THERAPY | Facility: HOSPITAL | Age: 73
End: 2023-10-11

## 2023-10-11 ENCOUNTER — APPOINTMENT (OUTPATIENT)
Dept: HEMATOLOGY ONCOLOGY | Facility: CLINIC | Age: 73
End: 2023-10-11
Payer: MEDICARE

## 2023-10-11 VITALS
SYSTOLIC BLOOD PRESSURE: 121 MMHG | RESPIRATION RATE: 16 BRPM | BODY MASS INDEX: 22.28 KG/M2 | OXYGEN SATURATION: 100 % | TEMPERATURE: 97.5 F | HEART RATE: 54 BPM | HEIGHT: 69.13 IN | WEIGHT: 152.12 LBS | DIASTOLIC BLOOD PRESSURE: 74 MMHG

## 2023-10-11 LAB
ALBUMIN SERPL ELPH-MCNC: 4.3 G/DL — SIGNIFICANT CHANGE UP (ref 3.3–5)
ALP SERPL-CCNC: 59 U/L — SIGNIFICANT CHANGE UP (ref 40–120)
ALT FLD-CCNC: 8 U/L — LOW (ref 10–45)
ANION GAP SERPL CALC-SCNC: 10 MMOL/L — SIGNIFICANT CHANGE UP (ref 5–17)
AST SERPL-CCNC: 25 U/L — SIGNIFICANT CHANGE UP (ref 10–40)
BASOPHILS # BLD AUTO: 0.04 K/UL — SIGNIFICANT CHANGE UP (ref 0–0.2)
BASOPHILS NFR BLD AUTO: 0.5 % — SIGNIFICANT CHANGE UP (ref 0–2)
BILIRUB SERPL-MCNC: 0.8 MG/DL — SIGNIFICANT CHANGE UP (ref 0.2–1.2)
BUN SERPL-MCNC: 16 MG/DL — SIGNIFICANT CHANGE UP (ref 7–23)
CALCIUM SERPL-MCNC: 9.6 MG/DL — SIGNIFICANT CHANGE UP (ref 8.4–10.5)
CHLORIDE SERPL-SCNC: 106 MMOL/L — SIGNIFICANT CHANGE UP (ref 96–108)
CO2 SERPL-SCNC: 24 MMOL/L — SIGNIFICANT CHANGE UP (ref 22–31)
CREAT SERPL-MCNC: 1.22 MG/DL — SIGNIFICANT CHANGE UP (ref 0.5–1.3)
EGFR: 63 ML/MIN/1.73M2 — SIGNIFICANT CHANGE UP
EOSINOPHIL # BLD AUTO: 0.14 K/UL — SIGNIFICANT CHANGE UP (ref 0–0.5)
EOSINOPHIL NFR BLD AUTO: 1.6 % — SIGNIFICANT CHANGE UP (ref 0–6)
GLUCOSE SERPL-MCNC: 108 MG/DL — HIGH (ref 70–99)
HCT VFR BLD CALC: 41 % — SIGNIFICANT CHANGE UP (ref 39–50)
HGB BLD-MCNC: 14.2 G/DL — SIGNIFICANT CHANGE UP (ref 13–17)
IMM GRANULOCYTES NFR BLD AUTO: 0.4 % — SIGNIFICANT CHANGE UP (ref 0–0.9)
LYMPHOCYTES # BLD AUTO: 1.46 K/UL — SIGNIFICANT CHANGE UP (ref 1–3.3)
LYMPHOCYTES # BLD AUTO: 17.1 % — SIGNIFICANT CHANGE UP (ref 13–44)
MCHC RBC-ENTMCNC: 32 PG — SIGNIFICANT CHANGE UP (ref 27–34)
MCHC RBC-ENTMCNC: 34.6 G/DL — SIGNIFICANT CHANGE UP (ref 32–36)
MCV RBC AUTO: 92.3 FL — SIGNIFICANT CHANGE UP (ref 80–100)
MONOCYTES # BLD AUTO: 0.6 K/UL — SIGNIFICANT CHANGE UP (ref 0–0.9)
MONOCYTES NFR BLD AUTO: 7 % — SIGNIFICANT CHANGE UP (ref 2–14)
NEUTROPHILS # BLD AUTO: 6.27 K/UL — SIGNIFICANT CHANGE UP (ref 1.8–7.4)
NEUTROPHILS NFR BLD AUTO: 73.4 % — SIGNIFICANT CHANGE UP (ref 43–77)
NRBC # BLD: 0 /100 WBCS — SIGNIFICANT CHANGE UP (ref 0–0)
PLATELET # BLD AUTO: 190 K/UL — SIGNIFICANT CHANGE UP (ref 150–400)
POTASSIUM SERPL-MCNC: 4.5 MMOL/L — SIGNIFICANT CHANGE UP (ref 3.5–5.3)
POTASSIUM SERPL-SCNC: 4.5 MMOL/L — SIGNIFICANT CHANGE UP (ref 3.5–5.3)
PROT SERPL-MCNC: 7.1 G/DL — SIGNIFICANT CHANGE UP (ref 6–8.3)
RBC # BLD: 4.44 M/UL — SIGNIFICANT CHANGE UP (ref 4.2–5.8)
RBC # FLD: 12.5 % — SIGNIFICANT CHANGE UP (ref 10.3–14.5)
SODIUM SERPL-SCNC: 140 MMOL/L — SIGNIFICANT CHANGE UP (ref 135–145)
T4 FREE SERPL-MCNC: 1.2 NG/DL — SIGNIFICANT CHANGE UP (ref 0.9–1.8)
TSH SERPL-MCNC: 1.31 UIU/ML — SIGNIFICANT CHANGE UP (ref 0.27–4.2)
WBC # BLD: 8.54 K/UL — SIGNIFICANT CHANGE UP (ref 3.8–10.5)
WBC # FLD AUTO: 8.54 K/UL — SIGNIFICANT CHANGE UP (ref 3.8–10.5)

## 2023-10-11 PROCEDURE — 99214 OFFICE O/P EST MOD 30 MIN: CPT

## 2023-10-12 DIAGNOSIS — Z51.11 ENCOUNTER FOR ANTINEOPLASTIC CHEMOTHERAPY: ICD-10-CM

## 2023-10-31 NOTE — ED PROVIDER NOTE - CROS ED RESP ALL NEG
patient not-compliant with safety measures despite counselling. pt fell at home na verbalized he has hx of seizures. bed alarm in place. to monitor. negative...

## 2023-11-08 ENCOUNTER — APPOINTMENT (OUTPATIENT)
Dept: INFUSION THERAPY | Facility: HOSPITAL | Age: 73
End: 2023-11-08

## 2023-11-08 ENCOUNTER — RESULT REVIEW (OUTPATIENT)
Age: 73
End: 2023-11-08

## 2023-11-08 ENCOUNTER — APPOINTMENT (OUTPATIENT)
Dept: HEMATOLOGY ONCOLOGY | Facility: CLINIC | Age: 73
End: 2023-11-08
Payer: MEDICARE

## 2023-11-08 VITALS
RESPIRATION RATE: 16 BRPM | TEMPERATURE: 97.6 F | SYSTOLIC BLOOD PRESSURE: 113 MMHG | BODY MASS INDEX: 22.38 KG/M2 | WEIGHT: 152.12 LBS | OXYGEN SATURATION: 99 % | HEART RATE: 57 BPM | DIASTOLIC BLOOD PRESSURE: 71 MMHG

## 2023-11-08 DIAGNOSIS — K40.90 UNILATERAL INGUINAL HERNIA, W/OUT OBSTRUCTION OR GANGRENE, NOT SPECIFIED AS RECURRENT: ICD-10-CM

## 2023-11-08 LAB
ALBUMIN SERPL ELPH-MCNC: 4.3 G/DL — SIGNIFICANT CHANGE UP (ref 3.3–5)
ALBUMIN SERPL ELPH-MCNC: 4.3 G/DL — SIGNIFICANT CHANGE UP (ref 3.3–5)
ALP SERPL-CCNC: 64 U/L — SIGNIFICANT CHANGE UP (ref 40–120)
ALP SERPL-CCNC: 64 U/L — SIGNIFICANT CHANGE UP (ref 40–120)
ALT FLD-CCNC: 7 U/L — LOW (ref 10–45)
ALT FLD-CCNC: 7 U/L — LOW (ref 10–45)
ANION GAP SERPL CALC-SCNC: 10 MMOL/L — SIGNIFICANT CHANGE UP (ref 5–17)
ANION GAP SERPL CALC-SCNC: 10 MMOL/L — SIGNIFICANT CHANGE UP (ref 5–17)
AST SERPL-CCNC: 27 U/L — SIGNIFICANT CHANGE UP (ref 10–40)
AST SERPL-CCNC: 27 U/L — SIGNIFICANT CHANGE UP (ref 10–40)
BASOPHILS # BLD AUTO: 0.04 K/UL — SIGNIFICANT CHANGE UP (ref 0–0.2)
BASOPHILS # BLD AUTO: 0.04 K/UL — SIGNIFICANT CHANGE UP (ref 0–0.2)
BASOPHILS NFR BLD AUTO: 0.4 % — SIGNIFICANT CHANGE UP (ref 0–2)
BASOPHILS NFR BLD AUTO: 0.4 % — SIGNIFICANT CHANGE UP (ref 0–2)
BILIRUB SERPL-MCNC: 1 MG/DL — SIGNIFICANT CHANGE UP (ref 0.2–1.2)
BILIRUB SERPL-MCNC: 1 MG/DL — SIGNIFICANT CHANGE UP (ref 0.2–1.2)
BUN SERPL-MCNC: 15 MG/DL — SIGNIFICANT CHANGE UP (ref 7–23)
BUN SERPL-MCNC: 15 MG/DL — SIGNIFICANT CHANGE UP (ref 7–23)
CALCIUM SERPL-MCNC: 9.7 MG/DL — SIGNIFICANT CHANGE UP (ref 8.4–10.5)
CALCIUM SERPL-MCNC: 9.7 MG/DL — SIGNIFICANT CHANGE UP (ref 8.4–10.5)
CHLORIDE SERPL-SCNC: 104 MMOL/L — SIGNIFICANT CHANGE UP (ref 96–108)
CHLORIDE SERPL-SCNC: 104 MMOL/L — SIGNIFICANT CHANGE UP (ref 96–108)
CO2 SERPL-SCNC: 26 MMOL/L — SIGNIFICANT CHANGE UP (ref 22–31)
CO2 SERPL-SCNC: 26 MMOL/L — SIGNIFICANT CHANGE UP (ref 22–31)
CREAT SERPL-MCNC: 1.26 MG/DL — SIGNIFICANT CHANGE UP (ref 0.5–1.3)
CREAT SERPL-MCNC: 1.26 MG/DL — SIGNIFICANT CHANGE UP (ref 0.5–1.3)
EGFR: 61 ML/MIN/1.73M2 — SIGNIFICANT CHANGE UP
EGFR: 61 ML/MIN/1.73M2 — SIGNIFICANT CHANGE UP
EOSINOPHIL # BLD AUTO: 0.26 K/UL — SIGNIFICANT CHANGE UP (ref 0–0.5)
EOSINOPHIL # BLD AUTO: 0.26 K/UL — SIGNIFICANT CHANGE UP (ref 0–0.5)
EOSINOPHIL NFR BLD AUTO: 2.7 % — SIGNIFICANT CHANGE UP (ref 0–6)
EOSINOPHIL NFR BLD AUTO: 2.7 % — SIGNIFICANT CHANGE UP (ref 0–6)
GLUCOSE SERPL-MCNC: 105 MG/DL — HIGH (ref 70–99)
GLUCOSE SERPL-MCNC: 105 MG/DL — HIGH (ref 70–99)
HCT VFR BLD CALC: 37.9 % — LOW (ref 39–50)
HCT VFR BLD CALC: 37.9 % — LOW (ref 39–50)
HGB BLD-MCNC: 13.6 G/DL — SIGNIFICANT CHANGE UP (ref 13–17)
HGB BLD-MCNC: 13.6 G/DL — SIGNIFICANT CHANGE UP (ref 13–17)
IMM GRANULOCYTES NFR BLD AUTO: 0.3 % — SIGNIFICANT CHANGE UP (ref 0–0.9)
IMM GRANULOCYTES NFR BLD AUTO: 0.3 % — SIGNIFICANT CHANGE UP (ref 0–0.9)
LYMPHOCYTES # BLD AUTO: 1.37 K/UL — SIGNIFICANT CHANGE UP (ref 1–3.3)
LYMPHOCYTES # BLD AUTO: 1.37 K/UL — SIGNIFICANT CHANGE UP (ref 1–3.3)
LYMPHOCYTES # BLD AUTO: 14 % — SIGNIFICANT CHANGE UP (ref 13–44)
LYMPHOCYTES # BLD AUTO: 14 % — SIGNIFICANT CHANGE UP (ref 13–44)
MCHC RBC-ENTMCNC: 32.5 PG — SIGNIFICANT CHANGE UP (ref 27–34)
MCHC RBC-ENTMCNC: 32.5 PG — SIGNIFICANT CHANGE UP (ref 27–34)
MCHC RBC-ENTMCNC: 35.9 G/DL — SIGNIFICANT CHANGE UP (ref 32–36)
MCHC RBC-ENTMCNC: 35.9 G/DL — SIGNIFICANT CHANGE UP (ref 32–36)
MCV RBC AUTO: 90.5 FL — SIGNIFICANT CHANGE UP (ref 80–100)
MCV RBC AUTO: 90.5 FL — SIGNIFICANT CHANGE UP (ref 80–100)
MONOCYTES # BLD AUTO: 0.72 K/UL — SIGNIFICANT CHANGE UP (ref 0–0.9)
MONOCYTES # BLD AUTO: 0.72 K/UL — SIGNIFICANT CHANGE UP (ref 0–0.9)
MONOCYTES NFR BLD AUTO: 7.4 % — SIGNIFICANT CHANGE UP (ref 2–14)
MONOCYTES NFR BLD AUTO: 7.4 % — SIGNIFICANT CHANGE UP (ref 2–14)
NEUTROPHILS # BLD AUTO: 7.34 K/UL — SIGNIFICANT CHANGE UP (ref 1.8–7.4)
NEUTROPHILS # BLD AUTO: 7.34 K/UL — SIGNIFICANT CHANGE UP (ref 1.8–7.4)
NEUTROPHILS NFR BLD AUTO: 75.2 % — SIGNIFICANT CHANGE UP (ref 43–77)
NEUTROPHILS NFR BLD AUTO: 75.2 % — SIGNIFICANT CHANGE UP (ref 43–77)
NRBC # BLD: 0 /100 WBCS — SIGNIFICANT CHANGE UP (ref 0–0)
NRBC # BLD: 0 /100 WBCS — SIGNIFICANT CHANGE UP (ref 0–0)
PLATELET # BLD AUTO: 186 K/UL — SIGNIFICANT CHANGE UP (ref 150–400)
PLATELET # BLD AUTO: 186 K/UL — SIGNIFICANT CHANGE UP (ref 150–400)
POTASSIUM SERPL-MCNC: 4.6 MMOL/L — SIGNIFICANT CHANGE UP (ref 3.5–5.3)
POTASSIUM SERPL-MCNC: 4.6 MMOL/L — SIGNIFICANT CHANGE UP (ref 3.5–5.3)
POTASSIUM SERPL-SCNC: 4.6 MMOL/L — SIGNIFICANT CHANGE UP (ref 3.5–5.3)
POTASSIUM SERPL-SCNC: 4.6 MMOL/L — SIGNIFICANT CHANGE UP (ref 3.5–5.3)
PROT SERPL-MCNC: 7.5 G/DL — SIGNIFICANT CHANGE UP (ref 6–8.3)
PROT SERPL-MCNC: 7.5 G/DL — SIGNIFICANT CHANGE UP (ref 6–8.3)
RBC # BLD: 4.19 M/UL — LOW (ref 4.2–5.8)
RBC # BLD: 4.19 M/UL — LOW (ref 4.2–5.8)
RBC # FLD: 12.3 % — SIGNIFICANT CHANGE UP (ref 10.3–14.5)
RBC # FLD: 12.3 % — SIGNIFICANT CHANGE UP (ref 10.3–14.5)
SODIUM SERPL-SCNC: 141 MMOL/L — SIGNIFICANT CHANGE UP (ref 135–145)
SODIUM SERPL-SCNC: 141 MMOL/L — SIGNIFICANT CHANGE UP (ref 135–145)
T4 FREE SERPL-MCNC: 1.1 NG/DL — SIGNIFICANT CHANGE UP (ref 0.9–1.8)
T4 FREE SERPL-MCNC: 1.1 NG/DL — SIGNIFICANT CHANGE UP (ref 0.9–1.8)
TSH SERPL-MCNC: 1.39 UIU/ML — SIGNIFICANT CHANGE UP (ref 0.27–4.2)
TSH SERPL-MCNC: 1.39 UIU/ML — SIGNIFICANT CHANGE UP (ref 0.27–4.2)
WBC # BLD: 9.76 K/UL — SIGNIFICANT CHANGE UP (ref 3.8–10.5)
WBC # BLD: 9.76 K/UL — SIGNIFICANT CHANGE UP (ref 3.8–10.5)
WBC # FLD AUTO: 9.76 K/UL — SIGNIFICANT CHANGE UP (ref 3.8–10.5)
WBC # FLD AUTO: 9.76 K/UL — SIGNIFICANT CHANGE UP (ref 3.8–10.5)

## 2023-11-08 PROCEDURE — 99214 OFFICE O/P EST MOD 30 MIN: CPT

## 2023-12-05 ENCOUNTER — RESULT REVIEW (OUTPATIENT)
Age: 73
End: 2023-12-05

## 2023-12-06 ENCOUNTER — APPOINTMENT (OUTPATIENT)
Dept: INFUSION THERAPY | Facility: HOSPITAL | Age: 73
End: 2023-12-06

## 2023-12-06 ENCOUNTER — RESULT REVIEW (OUTPATIENT)
Age: 73
End: 2023-12-06

## 2023-12-06 ENCOUNTER — APPOINTMENT (OUTPATIENT)
Dept: HEMATOLOGY ONCOLOGY | Facility: CLINIC | Age: 73
End: 2023-12-06
Payer: MEDICARE

## 2023-12-06 VITALS
RESPIRATION RATE: 16 BRPM | SYSTOLIC BLOOD PRESSURE: 134 MMHG | OXYGEN SATURATION: 100 % | HEART RATE: 58 BPM | HEIGHT: 69.13 IN | BODY MASS INDEX: 22.4 KG/M2 | DIASTOLIC BLOOD PRESSURE: 80 MMHG | TEMPERATURE: 97.8 F | WEIGHT: 153 LBS

## 2023-12-06 LAB
ALBUMIN SERPL ELPH-MCNC: 3.7 G/DL — SIGNIFICANT CHANGE UP (ref 3.3–5)
ALBUMIN SERPL ELPH-MCNC: 3.7 G/DL — SIGNIFICANT CHANGE UP (ref 3.3–5)
ALP SERPL-CCNC: 62 U/L — SIGNIFICANT CHANGE UP (ref 40–120)
ALP SERPL-CCNC: 62 U/L — SIGNIFICANT CHANGE UP (ref 40–120)
ALT FLD-CCNC: 13 U/L — SIGNIFICANT CHANGE UP (ref 10–45)
ALT FLD-CCNC: 13 U/L — SIGNIFICANT CHANGE UP (ref 10–45)
ANION GAP SERPL CALC-SCNC: 10 MMOL/L — SIGNIFICANT CHANGE UP (ref 5–17)
ANION GAP SERPL CALC-SCNC: 10 MMOL/L — SIGNIFICANT CHANGE UP (ref 5–17)
AST SERPL-CCNC: 28 U/L — SIGNIFICANT CHANGE UP (ref 10–40)
AST SERPL-CCNC: 28 U/L — SIGNIFICANT CHANGE UP (ref 10–40)
BASOPHILS # BLD AUTO: 0.04 K/UL — SIGNIFICANT CHANGE UP (ref 0–0.2)
BASOPHILS # BLD AUTO: 0.04 K/UL — SIGNIFICANT CHANGE UP (ref 0–0.2)
BASOPHILS NFR BLD AUTO: 0.6 % — SIGNIFICANT CHANGE UP (ref 0–2)
BASOPHILS NFR BLD AUTO: 0.6 % — SIGNIFICANT CHANGE UP (ref 0–2)
BILIRUB SERPL-MCNC: 0.5 MG/DL — SIGNIFICANT CHANGE UP (ref 0.2–1.2)
BILIRUB SERPL-MCNC: 0.5 MG/DL — SIGNIFICANT CHANGE UP (ref 0.2–1.2)
BUN SERPL-MCNC: 20 MG/DL — SIGNIFICANT CHANGE UP (ref 7–23)
BUN SERPL-MCNC: 20 MG/DL — SIGNIFICANT CHANGE UP (ref 7–23)
CALCIUM SERPL-MCNC: 9.2 MG/DL — SIGNIFICANT CHANGE UP (ref 8.4–10.5)
CALCIUM SERPL-MCNC: 9.2 MG/DL — SIGNIFICANT CHANGE UP (ref 8.4–10.5)
CHLORIDE SERPL-SCNC: 106 MMOL/L — SIGNIFICANT CHANGE UP (ref 96–108)
CHLORIDE SERPL-SCNC: 106 MMOL/L — SIGNIFICANT CHANGE UP (ref 96–108)
CO2 SERPL-SCNC: 22 MMOL/L — SIGNIFICANT CHANGE UP (ref 22–31)
CO2 SERPL-SCNC: 22 MMOL/L — SIGNIFICANT CHANGE UP (ref 22–31)
CREAT SERPL-MCNC: 1.34 MG/DL — HIGH (ref 0.5–1.3)
CREAT SERPL-MCNC: 1.34 MG/DL — HIGH (ref 0.5–1.3)
EGFR: 57 ML/MIN/1.73M2 — LOW
EGFR: 57 ML/MIN/1.73M2 — LOW
EOSINOPHIL # BLD AUTO: 0.17 K/UL — SIGNIFICANT CHANGE UP (ref 0–0.5)
EOSINOPHIL # BLD AUTO: 0.17 K/UL — SIGNIFICANT CHANGE UP (ref 0–0.5)
EOSINOPHIL NFR BLD AUTO: 2.6 % — SIGNIFICANT CHANGE UP (ref 0–6)
EOSINOPHIL NFR BLD AUTO: 2.6 % — SIGNIFICANT CHANGE UP (ref 0–6)
GLUCOSE SERPL-MCNC: 104 MG/DL — HIGH (ref 70–99)
GLUCOSE SERPL-MCNC: 104 MG/DL — HIGH (ref 70–99)
HCT VFR BLD CALC: 35.5 % — LOW (ref 39–50)
HCT VFR BLD CALC: 35.5 % — LOW (ref 39–50)
HGB BLD-MCNC: 12.7 G/DL — LOW (ref 13–17)
HGB BLD-MCNC: 12.7 G/DL — LOW (ref 13–17)
IMM GRANULOCYTES NFR BLD AUTO: 0.5 % — SIGNIFICANT CHANGE UP (ref 0–0.9)
IMM GRANULOCYTES NFR BLD AUTO: 0.5 % — SIGNIFICANT CHANGE UP (ref 0–0.9)
LYMPHOCYTES # BLD AUTO: 1.37 K/UL — SIGNIFICANT CHANGE UP (ref 1–3.3)
LYMPHOCYTES # BLD AUTO: 1.37 K/UL — SIGNIFICANT CHANGE UP (ref 1–3.3)
LYMPHOCYTES # BLD AUTO: 21 % — SIGNIFICANT CHANGE UP (ref 13–44)
LYMPHOCYTES # BLD AUTO: 21 % — SIGNIFICANT CHANGE UP (ref 13–44)
MCHC RBC-ENTMCNC: 32.4 PG — SIGNIFICANT CHANGE UP (ref 27–34)
MCHC RBC-ENTMCNC: 32.4 PG — SIGNIFICANT CHANGE UP (ref 27–34)
MCHC RBC-ENTMCNC: 35.8 G/DL — SIGNIFICANT CHANGE UP (ref 32–36)
MCHC RBC-ENTMCNC: 35.8 G/DL — SIGNIFICANT CHANGE UP (ref 32–36)
MCV RBC AUTO: 90.6 FL — SIGNIFICANT CHANGE UP (ref 80–100)
MCV RBC AUTO: 90.6 FL — SIGNIFICANT CHANGE UP (ref 80–100)
MONOCYTES # BLD AUTO: 0.52 K/UL — SIGNIFICANT CHANGE UP (ref 0–0.9)
MONOCYTES # BLD AUTO: 0.52 K/UL — SIGNIFICANT CHANGE UP (ref 0–0.9)
MONOCYTES NFR BLD AUTO: 8 % — SIGNIFICANT CHANGE UP (ref 2–14)
MONOCYTES NFR BLD AUTO: 8 % — SIGNIFICANT CHANGE UP (ref 2–14)
NEUTROPHILS # BLD AUTO: 4.4 K/UL — SIGNIFICANT CHANGE UP (ref 1.8–7.4)
NEUTROPHILS # BLD AUTO: 4.4 K/UL — SIGNIFICANT CHANGE UP (ref 1.8–7.4)
NEUTROPHILS NFR BLD AUTO: 67.3 % — SIGNIFICANT CHANGE UP (ref 43–77)
NEUTROPHILS NFR BLD AUTO: 67.3 % — SIGNIFICANT CHANGE UP (ref 43–77)
NRBC # BLD: 0 /100 WBCS — SIGNIFICANT CHANGE UP (ref 0–0)
NRBC # BLD: 0 /100 WBCS — SIGNIFICANT CHANGE UP (ref 0–0)
PLATELET # BLD AUTO: 166 K/UL — SIGNIFICANT CHANGE UP (ref 150–400)
PLATELET # BLD AUTO: 166 K/UL — SIGNIFICANT CHANGE UP (ref 150–400)
POTASSIUM SERPL-MCNC: 4.9 MMOL/L — SIGNIFICANT CHANGE UP (ref 3.5–5.3)
POTASSIUM SERPL-MCNC: 4.9 MMOL/L — SIGNIFICANT CHANGE UP (ref 3.5–5.3)
POTASSIUM SERPL-SCNC: 4.9 MMOL/L — SIGNIFICANT CHANGE UP (ref 3.5–5.3)
POTASSIUM SERPL-SCNC: 4.9 MMOL/L — SIGNIFICANT CHANGE UP (ref 3.5–5.3)
PROT SERPL-MCNC: 6.8 G/DL — SIGNIFICANT CHANGE UP (ref 6–8.3)
PROT SERPL-MCNC: 6.8 G/DL — SIGNIFICANT CHANGE UP (ref 6–8.3)
RBC # BLD: 3.92 M/UL — LOW (ref 4.2–5.8)
RBC # BLD: 3.92 M/UL — LOW (ref 4.2–5.8)
RBC # FLD: 12.4 % — SIGNIFICANT CHANGE UP (ref 10.3–14.5)
RBC # FLD: 12.4 % — SIGNIFICANT CHANGE UP (ref 10.3–14.5)
SODIUM SERPL-SCNC: 138 MMOL/L — SIGNIFICANT CHANGE UP (ref 135–145)
SODIUM SERPL-SCNC: 138 MMOL/L — SIGNIFICANT CHANGE UP (ref 135–145)
T4 FREE SERPL-MCNC: 1.1 NG/DL — SIGNIFICANT CHANGE UP (ref 0.9–1.8)
T4 FREE SERPL-MCNC: 1.1 NG/DL — SIGNIFICANT CHANGE UP (ref 0.9–1.8)
TSH SERPL-MCNC: 1.28 UIU/ML — SIGNIFICANT CHANGE UP (ref 0.27–4.2)
TSH SERPL-MCNC: 1.28 UIU/ML — SIGNIFICANT CHANGE UP (ref 0.27–4.2)
WBC # BLD: 6.53 K/UL — SIGNIFICANT CHANGE UP (ref 3.8–10.5)
WBC # BLD: 6.53 K/UL — SIGNIFICANT CHANGE UP (ref 3.8–10.5)
WBC # FLD AUTO: 6.53 K/UL — SIGNIFICANT CHANGE UP (ref 3.8–10.5)
WBC # FLD AUTO: 6.53 K/UL — SIGNIFICANT CHANGE UP (ref 3.8–10.5)

## 2023-12-06 PROCEDURE — 99213 OFFICE O/P EST LOW 20 MIN: CPT

## 2023-12-07 LAB
FERRITIN SERPL-MCNC: 62 NG/ML
FOLATE SERPL-MCNC: 5 NG/ML
IRON SATN MFR SERPL: 18 %
IRON SERPL-MCNC: 60 UG/DL
TIBC SERPL-MCNC: 327 UG/DL
UIBC SERPL-MCNC: 267 UG/DL
VIT B12 SERPL-MCNC: 501 PG/ML

## 2023-12-15 NOTE — RESULTS/DATA
[FreeTextEntry1] : EXAM:  CT CHEST IC\par \par EXAM:  CT ABDOMEN AND PELVIS IC\par \par PROCEDURE DATE:  12/21/2021\par \par INTERPRETATION:  CLINICAL INFORMATION: Metastatic renal cell carcinoma for follow-up.\par \par COMPARISON: Prior CT dated 9/4/2021.\par \par CONTRAST/COMPLICATIONS:\par IV Contrast: Omnipaque 350   90 cc administered   10 cc discarded\par Oral Contrast:  Omnipaque 300\par Complications: None reported at time of study completion\par \par PROCEDURE:\par CT of the Chest, Abdomen and Pelvis was performed.\par Sagittal and coronal reformats were performed.\par \par FINDINGS:\par CHEST:\par LUNGS AND LARGE AIRWAYS: Patent central airways. Right apical linear opacity and left lower lobe linear opacity (2:77) are unchanged. A 0.3 cm left upper lobe nodule (2:21) is stable.\par PLEURA: No pleural effusion.\par VESSELS: Atherosclerotic changes of the aorta.\par HEART: Heart size is normal. No pericardial effusion.\par MEDIASTINUM AND JT: No lymphadenopathy.\par CHEST WALL AND LOWER NECK: Bilateral gynecomastia.\par \par ABDOMEN AND PELVIS:\par LIVER: Capsular retraction anteriorly in the left lobe is stable.\par BILE DUCTS: Normal caliber.\par GALLBLADDER: Within normal limits.\par SPLEEN: Within normal limits.\par PANCREAS: Within normal limits.\par ADRENALS: Within normal limits.\par KIDNEYS/URETERS: Indeterminate 1.4 cm right upper pole mass and subcentimeter lower pole hypodense focus in the right kidney are unchanged. Cortical atrophy is again noted in the upper pole of the left kidney with minimal infiltration of the adjacent fat, unchanged since 9/4/2021.  No hydronephrosis.\par \par BLADDER: Within normal limits.\par REPRODUCTIVE ORGANS: Prostate gland is markedly enlarged and again noted to extend into the base of the urinary bladder.\par \par BOWEL: No bowel obstruction.\par PERITONEUM: No ascites. Nonspecific infiltration of the fat in the right lower quadrant (2:120), similar in appearance to prior CT dated 9/4/2021.\par VESSELS: Within normal limits.\par RETROPERITONEUM/LYMPH NODES: No lymphadenopathy.\par ABDOMINAL WALL: Very large right inguinal hernia containing nonobstructed large and small bowel and small amount of fluid.\par BONES: Degenerative changes.\par \par IMPRESSION:\par Stable appearance of the chest compared with 9/4/2021.\par Indeterminate 1.4 cm hypodense lesion in the upper pole of the right kidney is unchanged.\par Atrophy is again noted involving the upper pole of the left kidney, unchanged.\par Very large right inguinal hernia containing nonobstructed large and small bowel and a small amount of fluid.\par Enlarged heterogeneous prostate gland.\par Nonspecific ill-defined infiltration of the fat in the right lower quadrant, not significantly changed. Attention to this area is suggested on follow-up CT.\par 
Estimated needs based on dosing wt as within % IBW 142lb/64.5kg (93%). Needs adjusted for age, cancer, wound healing, malnutrition, and clinical status.

## 2023-12-19 ENCOUNTER — OUTPATIENT (OUTPATIENT)
Dept: OUTPATIENT SERVICES | Facility: HOSPITAL | Age: 73
LOS: 1 days | End: 2023-12-19

## 2023-12-19 ENCOUNTER — APPOINTMENT (OUTPATIENT)
Dept: CT IMAGING | Facility: CLINIC | Age: 73
End: 2023-12-19
Payer: MEDICARE

## 2023-12-19 ENCOUNTER — OUTPATIENT (OUTPATIENT)
Dept: OUTPATIENT SERVICES | Facility: HOSPITAL | Age: 73
LOS: 1 days | Discharge: HOME | End: 2023-12-19

## 2023-12-19 DIAGNOSIS — S86.019A STRAIN OF UNSPECIFIED ACHILLES TENDON, INITIAL ENCOUNTER: Chronic | ICD-10-CM

## 2023-12-19 DIAGNOSIS — C64.9 MALIGNANT NEOPLASM OF UNSPECIFIED KIDNEY, EXCEPT RENAL PELVIS: ICD-10-CM

## 2023-12-19 DIAGNOSIS — Z98.890 OTHER SPECIFIED POSTPROCEDURAL STATES: Chronic | ICD-10-CM

## 2023-12-19 DIAGNOSIS — C64.2 MALIGNANT NEOPLASM OF LEFT KIDNEY, EXCEPT RENAL PELVIS: ICD-10-CM

## 2023-12-19 PROCEDURE — 71260 CT THORAX DX C+: CPT | Mod: 26

## 2023-12-19 PROCEDURE — 74178 CT ABD&PLV WO CNTR FLWD CNTR: CPT | Mod: 26

## 2024-01-03 ENCOUNTER — RESULT REVIEW (OUTPATIENT)
Age: 74
End: 2024-01-03

## 2024-01-03 ENCOUNTER — APPOINTMENT (OUTPATIENT)
Dept: HEMATOLOGY ONCOLOGY | Facility: CLINIC | Age: 74
End: 2024-01-03
Payer: MEDICARE

## 2024-01-03 ENCOUNTER — APPOINTMENT (OUTPATIENT)
Dept: INFUSION THERAPY | Facility: HOSPITAL | Age: 74
End: 2024-01-03

## 2024-01-03 VITALS
HEART RATE: 50 BPM | SYSTOLIC BLOOD PRESSURE: 105 MMHG | RESPIRATION RATE: 16 BRPM | OXYGEN SATURATION: 100 % | WEIGHT: 149.91 LBS | TEMPERATURE: 97.9 F | DIASTOLIC BLOOD PRESSURE: 67 MMHG | HEIGHT: 69.13 IN | BODY MASS INDEX: 21.95 KG/M2

## 2024-01-03 LAB
ALBUMIN SERPL ELPH-MCNC: 4.2 G/DL — SIGNIFICANT CHANGE UP (ref 3.3–5)
ALBUMIN SERPL ELPH-MCNC: 4.2 G/DL — SIGNIFICANT CHANGE UP (ref 3.3–5)
ALP SERPL-CCNC: 61 U/L — SIGNIFICANT CHANGE UP (ref 40–120)
ALP SERPL-CCNC: 61 U/L — SIGNIFICANT CHANGE UP (ref 40–120)
ALT FLD-CCNC: 9 U/L — LOW (ref 10–45)
ALT FLD-CCNC: 9 U/L — LOW (ref 10–45)
ANION GAP SERPL CALC-SCNC: 9 MMOL/L — SIGNIFICANT CHANGE UP (ref 5–17)
ANION GAP SERPL CALC-SCNC: 9 MMOL/L — SIGNIFICANT CHANGE UP (ref 5–17)
AST SERPL-CCNC: 25 U/L — SIGNIFICANT CHANGE UP (ref 10–40)
AST SERPL-CCNC: 25 U/L — SIGNIFICANT CHANGE UP (ref 10–40)
BASOPHILS # BLD AUTO: 0.04 K/UL — SIGNIFICANT CHANGE UP (ref 0–0.2)
BASOPHILS # BLD AUTO: 0.04 K/UL — SIGNIFICANT CHANGE UP (ref 0–0.2)
BASOPHILS NFR BLD AUTO: 0.5 % — SIGNIFICANT CHANGE UP (ref 0–2)
BASOPHILS NFR BLD AUTO: 0.5 % — SIGNIFICANT CHANGE UP (ref 0–2)
BILIRUB SERPL-MCNC: 0.9 MG/DL — SIGNIFICANT CHANGE UP (ref 0.2–1.2)
BILIRUB SERPL-MCNC: 0.9 MG/DL — SIGNIFICANT CHANGE UP (ref 0.2–1.2)
BUN SERPL-MCNC: 20 MG/DL — SIGNIFICANT CHANGE UP (ref 7–23)
BUN SERPL-MCNC: 20 MG/DL — SIGNIFICANT CHANGE UP (ref 7–23)
CALCIUM SERPL-MCNC: 9.6 MG/DL — SIGNIFICANT CHANGE UP (ref 8.4–10.5)
CALCIUM SERPL-MCNC: 9.6 MG/DL — SIGNIFICANT CHANGE UP (ref 8.4–10.5)
CHLORIDE SERPL-SCNC: 107 MMOL/L — SIGNIFICANT CHANGE UP (ref 96–108)
CHLORIDE SERPL-SCNC: 107 MMOL/L — SIGNIFICANT CHANGE UP (ref 96–108)
CO2 SERPL-SCNC: 26 MMOL/L — SIGNIFICANT CHANGE UP (ref 22–31)
CO2 SERPL-SCNC: 26 MMOL/L — SIGNIFICANT CHANGE UP (ref 22–31)
CREAT SERPL-MCNC: 1.27 MG/DL — SIGNIFICANT CHANGE UP (ref 0.5–1.3)
CREAT SERPL-MCNC: 1.27 MG/DL — SIGNIFICANT CHANGE UP (ref 0.5–1.3)
EGFR: 60 ML/MIN/1.73M2 — SIGNIFICANT CHANGE UP
EGFR: 60 ML/MIN/1.73M2 — SIGNIFICANT CHANGE UP
EOSINOPHIL # BLD AUTO: 0.2 K/UL — SIGNIFICANT CHANGE UP (ref 0–0.5)
EOSINOPHIL # BLD AUTO: 0.2 K/UL — SIGNIFICANT CHANGE UP (ref 0–0.5)
EOSINOPHIL NFR BLD AUTO: 2.4 % — SIGNIFICANT CHANGE UP (ref 0–6)
EOSINOPHIL NFR BLD AUTO: 2.4 % — SIGNIFICANT CHANGE UP (ref 0–6)
GLUCOSE SERPL-MCNC: 97 MG/DL — SIGNIFICANT CHANGE UP (ref 70–99)
GLUCOSE SERPL-MCNC: 97 MG/DL — SIGNIFICANT CHANGE UP (ref 70–99)
HCT VFR BLD CALC: 39.3 % — SIGNIFICANT CHANGE UP (ref 39–50)
HCT VFR BLD CALC: 39.3 % — SIGNIFICANT CHANGE UP (ref 39–50)
HGB BLD-MCNC: 13.9 G/DL — SIGNIFICANT CHANGE UP (ref 13–17)
HGB BLD-MCNC: 13.9 G/DL — SIGNIFICANT CHANGE UP (ref 13–17)
IMM GRANULOCYTES NFR BLD AUTO: 0.6 % — SIGNIFICANT CHANGE UP (ref 0–0.9)
IMM GRANULOCYTES NFR BLD AUTO: 0.6 % — SIGNIFICANT CHANGE UP (ref 0–0.9)
LYMPHOCYTES # BLD AUTO: 1.4 K/UL — SIGNIFICANT CHANGE UP (ref 1–3.3)
LYMPHOCYTES # BLD AUTO: 1.4 K/UL — SIGNIFICANT CHANGE UP (ref 1–3.3)
LYMPHOCYTES # BLD AUTO: 17 % — SIGNIFICANT CHANGE UP (ref 13–44)
LYMPHOCYTES # BLD AUTO: 17 % — SIGNIFICANT CHANGE UP (ref 13–44)
MCHC RBC-ENTMCNC: 32.1 PG — SIGNIFICANT CHANGE UP (ref 27–34)
MCHC RBC-ENTMCNC: 32.1 PG — SIGNIFICANT CHANGE UP (ref 27–34)
MCHC RBC-ENTMCNC: 35.4 G/DL — SIGNIFICANT CHANGE UP (ref 32–36)
MCHC RBC-ENTMCNC: 35.4 G/DL — SIGNIFICANT CHANGE UP (ref 32–36)
MCV RBC AUTO: 90.8 FL — SIGNIFICANT CHANGE UP (ref 80–100)
MCV RBC AUTO: 90.8 FL — SIGNIFICANT CHANGE UP (ref 80–100)
MONOCYTES # BLD AUTO: 0.71 K/UL — SIGNIFICANT CHANGE UP (ref 0–0.9)
MONOCYTES # BLD AUTO: 0.71 K/UL — SIGNIFICANT CHANGE UP (ref 0–0.9)
MONOCYTES NFR BLD AUTO: 8.6 % — SIGNIFICANT CHANGE UP (ref 2–14)
MONOCYTES NFR BLD AUTO: 8.6 % — SIGNIFICANT CHANGE UP (ref 2–14)
NEUTROPHILS # BLD AUTO: 5.85 K/UL — SIGNIFICANT CHANGE UP (ref 1.8–7.4)
NEUTROPHILS # BLD AUTO: 5.85 K/UL — SIGNIFICANT CHANGE UP (ref 1.8–7.4)
NEUTROPHILS NFR BLD AUTO: 70.9 % — SIGNIFICANT CHANGE UP (ref 43–77)
NEUTROPHILS NFR BLD AUTO: 70.9 % — SIGNIFICANT CHANGE UP (ref 43–77)
NRBC # BLD: 0 /100 WBCS — SIGNIFICANT CHANGE UP (ref 0–0)
NRBC # BLD: 0 /100 WBCS — SIGNIFICANT CHANGE UP (ref 0–0)
PLATELET # BLD AUTO: 184 K/UL — SIGNIFICANT CHANGE UP (ref 150–400)
PLATELET # BLD AUTO: 184 K/UL — SIGNIFICANT CHANGE UP (ref 150–400)
POTASSIUM SERPL-MCNC: 4.5 MMOL/L — SIGNIFICANT CHANGE UP (ref 3.5–5.3)
POTASSIUM SERPL-MCNC: 4.5 MMOL/L — SIGNIFICANT CHANGE UP (ref 3.5–5.3)
POTASSIUM SERPL-SCNC: 4.5 MMOL/L — SIGNIFICANT CHANGE UP (ref 3.5–5.3)
POTASSIUM SERPL-SCNC: 4.5 MMOL/L — SIGNIFICANT CHANGE UP (ref 3.5–5.3)
PROT SERPL-MCNC: 7.2 G/DL — SIGNIFICANT CHANGE UP (ref 6–8.3)
PROT SERPL-MCNC: 7.2 G/DL — SIGNIFICANT CHANGE UP (ref 6–8.3)
RBC # BLD: 4.33 M/UL — SIGNIFICANT CHANGE UP (ref 4.2–5.8)
RBC # BLD: 4.33 M/UL — SIGNIFICANT CHANGE UP (ref 4.2–5.8)
RBC # FLD: 12.2 % — SIGNIFICANT CHANGE UP (ref 10.3–14.5)
RBC # FLD: 12.2 % — SIGNIFICANT CHANGE UP (ref 10.3–14.5)
SODIUM SERPL-SCNC: 141 MMOL/L — SIGNIFICANT CHANGE UP (ref 135–145)
SODIUM SERPL-SCNC: 141 MMOL/L — SIGNIFICANT CHANGE UP (ref 135–145)
T4 FREE SERPL-MCNC: 1.3 NG/DL — SIGNIFICANT CHANGE UP (ref 0.9–1.8)
T4 FREE SERPL-MCNC: 1.3 NG/DL — SIGNIFICANT CHANGE UP (ref 0.9–1.8)
TSH SERPL-MCNC: 1.23 UIU/ML — SIGNIFICANT CHANGE UP (ref 0.27–4.2)
TSH SERPL-MCNC: 1.23 UIU/ML — SIGNIFICANT CHANGE UP (ref 0.27–4.2)
WBC # BLD: 8.25 K/UL — SIGNIFICANT CHANGE UP (ref 3.8–10.5)
WBC # BLD: 8.25 K/UL — SIGNIFICANT CHANGE UP (ref 3.8–10.5)
WBC # FLD AUTO: 8.25 K/UL — SIGNIFICANT CHANGE UP (ref 3.8–10.5)
WBC # FLD AUTO: 8.25 K/UL — SIGNIFICANT CHANGE UP (ref 3.8–10.5)

## 2024-01-03 PROCEDURE — 99214 OFFICE O/P EST MOD 30 MIN: CPT

## 2024-01-03 NOTE — REVIEW OF SYSTEMS
[Fever] : no fever [Chills] : no chills [Fatigue] : no fatigue [Chest Pain] : no chest pain [Palpitations] : no palpitations [Lower Ext Edema] : no lower extremity edema [Shortness Of Breath] : no shortness of breath [Cough] : no cough [Abdominal Pain] : no abdominal pain [Vomiting] : no vomiting [Constipation] : no constipation [Dysuria] : no dysuria [Joint Pain] : no joint pain [Muscle Pain] : no muscle pain [Skin Rash] : no skin rash [Dizziness] : no dizziness [Easy Bleeding] : no tendency for easy bleeding

## 2024-01-03 NOTE — ASSESSMENT
[FreeTextEntry1] : Mr. Mcknight is a 73 year old male with metastatic renal cell carcinoma. On initial presentation CT c/a/p and MRI abdomen showed multiple lung nodules, one liver lesion, retroperitoneal nodes. Bone scan showed one suspicious met in right superior pubic ramus. Liver lesion biopsy is consistent with renal cell carcinoma. It is not certain that the histology of RCC is clear cell vs non-clear cell. IMDC risk of this patient he has 5 out of 6 risks including leukocytosis, anemia, thrombocytosis, time from dx to initiate systemic treatment less than one year and hyperalcemia. He has poor risk of mRCC. Based on mccRCC the standard of care is combined immunotherapy nivo and ipi vs sutent, checkmate 214 in the intermediate and poor risk of mccRCC patients with significant overall survival benefit, keynote 426 keytruda plus axitinib vs sutent in good, intermediate and poor risk of mccRCC patients showed OS, PFS and MICHEL benefit, [place] in the intermediate and poor risk of mccRCC patients showed cabozantinib demonstrated a significant clinical benefit in PFS and MICHEL over standard-of-care sunitinib as first-line therapy in patients with intermediate- or poor-risk mRCC. Consider the longer duration of response and less side effects and recommended nivo and ipi. Completed 4 cycles of nivo and ipi, continued on maintenance nivolumab with interval scans to monitor disease. Of note, on diagnosis patient had L renal vein thrombus and was started on eliquis. He had an episode of gross hematuria leading to hospitalization in 9/2020 however etiology of hematuria is unclear and resolved. Follow up cystoscopy to r/o abnormality in the bladder showed no abnormality. Eliquis stopped during hospitalization with hematology consult. Remains off of eliquis. Scans have continued to show stability. Scans from 8/11/22 showed stable disease in L kidney and stable R renal cyst, noted non specific infiltration in omentum, cannot exclude carcinomatosis. CTs in November 2022 show stable disease. Patient also has large R inguinal hernia. March 2023, August 2023, Dec 2023 CT c/a/p showed stable disease in lungs, kidney and retroperitoneum.  Plan Metastatic RCC - continue maintenance nivolumab [id number], proceed with treatment today - 12/19/23 CT chest and AP: Multiple small bilateral renal neoplasms, at least 3 on the right and 2 on the left, not significantly changed from prior; similar omental nodularity. - continue to monitor blood work - monitor for iRAEs, reviewed with patient today; continues to tolerate treatment well  Instructed to contact our office with any new/worsening symptoms. Patient educated regarding plan of care, all questions/concerns addressed to the best of my abilities and patient's apparent satisfaction. RTC 4 weeks

## 2024-01-03 NOTE — GOALS
[Patient] : patient [Staff: _____] : staff - [unfilled] [FreeTextEntry7] : Today, I discussed advanced directives with the patient and family. I explained that advance directives are instructions given to individuals specifying what actions should be taken for their health in the event that they are no longer able to make decisions due to illness or incapacity. I explained that a healthcare proxy is an appointed person to make such decisions on their behalf, if the patient is unable due to illness or incapacity. I provided the patient with a MOLST form and a Healthcare Proxy form to fill out at their convenience. All questions were answered.

## 2024-01-03 NOTE — ASSESSMENT
[FreeTextEntry1] : Mr. Mcknight is a 73 year old male with metastatic renal cell carcinoma. On initial presentation CT c/a/p and MRI abdomen showed multiple lung nodules, one liver lesion, retroperitoneal nodes. Bone scan showed one suspicious met in right superior pubic ramus. Liver lesion biopsy is consistent with renal cell carcinoma. It is not certain that the histology of RCC is clear cell vs non-clear cell. IMDC risk of this patient he has 5 out of 6 risks including leukocytosis, anemia, thrombocytosis, time from dx to initiate systemic treatment less than one year and hyperalcemia. He has poor risk of mRCC. Based on mccRCC the standard of care is combined immunotherapy nivo and ipi vs sutent, checkmate 214 in the intermediate and poor risk of mccRCC patients with significant overall survival benefit, keynote 426 keytruda plus axitinib vs sutent in good, intermediate and poor risk of mccRCC patients showed OS, PFS and MICHEL benefit, [place] in the intermediate and poor risk of mccRCC patients showed cabozantinib demonstrated a significant clinical benefit in PFS and MICHEL over standard-of-care sunitinib as first-line therapy in patients with intermediate- or poor-risk mRCC. Consider the longer duration of response and less side effects and recommended nivo and ipi. Completed 4 cycles of nivo and ipi, continued on maintenance nivolumab with interval scans to monitor disease. Of note, on diagnosis patient had L renal vein thrombus and was started on eliquis. He had an episode of gross hematuria leading to hospitalization in 9/2020 however etiology of hematuria is unclear and resolved. Follow up cystoscopy to r/o abnormality in the bladder showed no abnormality. Eliquis stopped during hospitalization with hematology consult. Remains off of eliquis. Scans have continued to show stability. Scans from 8/11/22 showed stable disease in L kidney and stable R renal cyst, noted non specific infiltration in omentum, cannot exclude carcinomatosis. CTs in November 2022 show stable disease. Patient also has large R inguinal hernia. March 2023, August 2023, Dec 2023 CT c/a/p showed stable disease in lungs, kidney and retroperitoneum.  Plan Metastatic RCC - continue maintenance nivolumab [id number], proceed with treatment today - 12/19/23 CT chest and AP: Multiple small bilateral renal neoplasms, at least 3 on the right and 2 on the left, not significantly changed from prior; similar omental nodularity. - continue to monitor blood work - monitor for iRAEs, reviewed with patient today; continues to tolerate treatment well  Instructed to contact our office with any new/worsening symptoms. Patient educated regarding plan of care, all questions/concerns addressed to the best of my abilities and patient's apparent satisfaction. RTC 4 weeks

## 2024-01-03 NOTE — HISTORY OF PRESENT ILLNESS
[T: ___] : T[unfilled] [N: ___] : N[unfilled] [M: ___] : M[unfilled] [AJCC Stage: ____] : AJCC Stage: [unfilled] [N/A] : Currently not applicable [de-identified] : Finesse Zuniga is a 70 years old male who initially presented with intermittent midback pain, 10/10 radiating to stomach from both sides on June 1, 2019. He has poor appetite.  On june 12 he was diagnosed UTI and on cipro for 4 days, on June 16, was started cefpodoxime 100mg BID for 7 days.  CT chest/abdomen/pelvis revealed significant abnormal appearance of the left kidney with multiple heterogeneous areas of low attenuation involving the upper and midpole which may represent malignancy. There are necrotic lymph nodes in the left para-aortic location at the level of the kidney and below the level of the kidney. Adenopathy surrounds the left renal artery. The left renal vein demonstrates question of partially occlusive thrombus. Nonspecific scattered tiny pulmonary nodules. Lower lobe subpleural nodules measure up to 6 mm. There is a well-circumscribed hypodense lesion in the anterior aspect of the lateral segment of the left lobe measuring 3.2 x 2.2 cm. He underwent CT guided liver lesion biopsy which showed consistent with primary renal cell carcinoma, IHC CK7/CK19/PAX8 positive. MRI abdomen waw con showed multiple bilateral pulmonary nodules increased in size and number compared with recent CT. The largest is pleural-based in the left lower lobe and measures 1.5 x 1.1 cm. LIVER: Rim-enhancing subcapsular mass in segment 3 and measures 4.4 x 2.6 cm. A 4.2cm mass in mid to lower pole of the lft kidney suspicious for urothelial malignancy arising within the left renal collecting system. Regional lymphadenopathy and hepatic and pulmonary metastases. Nonocclusive thrombus within the left renal vein. He was started on eliquis. Bone scan showed  a focus of mildly increased uptake in the right superior  pubic ramus suspicious of bone mets. He has lost 17lbs in one month. He denies nausea, vomiting and constipation and diarrhea.   7/2019: ipilimumab + nivolumab initiated and to nivolumab maintenance post 4 cycles and continues on therapy.  9/18-22/2020 : Patient admitted for hematuria. Patient followed by urology and hematology during hospital course. Patient was started on CBI to help remove clots. Urine initially bloody, but became pink-tinged and then clear over time. CBI stopped and barber removed. Patient reportedly felt much better after barber removed, able to urinate by himself- reported some clots initially, but since resolved. States bladder spasms improved as well. Patient's eliquis was stopped, MRI done showing no evidence of renal vein thrombus, discussed  with Heme, no reason to continue eliquis for now. Patient's leukocytosis and JOSE MARIA resolved. Urine culture and blood cultures negative.  Patient improved clinically throughout hospital course. Patient seen and examined on day of discharge.  Cystoscopy was negative, performed by Dr. Javier.   5/26/2021 : Mr. Vera is here for nivolumab. Last scan 3/29/2021, will need another set of scan the end or beginning of July. He walks 2 miles a day with his dog and bikes 3-4 miles a day. He feels well overall. Mr. VERA denies fevers, chills, headaches, cough, SOB, chest pain, any swelling, nausea, vomiting, diarrhea, rash, or malaise.   6/23/21: Bothering Rt side inguinal hernia without pain or signs of incarceration. Doing well overall. Scheduled for nivo today. He denies fevers, chills, headaches, cough, SOB, chest pain, any swelling, nausea, vomiting, diarrhea, rash, or malaise.  7/7/2021 : CT showed stable disease  7/28/2021 : Here for nivolumab. 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 Mr. VERA denies fevers, chills, headaches, cough, SOB, chest pain, any swelling, nausea, vomiting, diarrhea, rash, or malaise. He has been tolerating therapy extremely well without sig issues. He is to see a general surgeon for hernia repair soon.   8/25/2021 : Here for nivolumab, scan due in September. Mr. VERA denies fevers, chills, headaches, cough, SOB, chest pain, any swelling, nausea, vomiting, diarrhea, rash, or malaise.   10/27/21: Pt presents for follow up. He is scheduled for Nivolumab which he has been tolerating well. Scheduled to receive cycle #30 today. Denies any diarrhea/ itchiness, cough, hematuria, rash. Appetite good. Energy good. No new complaints. Recieved both flu shot and COVID booster recently  11/24/21: Pt feeling well. Offers no complaints. Scheduled for C#31 nivolumab today. Due for imaging next month. Already scheduled  12/21/21 CT chest/abd/pelvis - Stable appearance of the chest compared with 9/4/2021. Indeterminate 1.4 cm hypodense lesion in the upper pole of the right kidney is unchanged. Atrophy is again noted involving the upper pole of the left kidney, unchanged. Very large right inguinal hernia containing non-obstructed large and small bowel and a small amount of fluid. Enlarged heterogeneous prostate gland. Nonspecific ill-defined infiltration of the fat in the right lower quadrant, not significantly changed. Attention to this area is suggested on follow-up CT.  12/29/21 - presents for ongoing Opdivo cycle #32 today. Overall feeling "good", energy is good. Appetite is good. No complaints.   3/2/22 feels quite well, denies any pain and gross hematuria. He has a schedule in April to take care of right inguinal hernia.   4/6/22 feels fine, denies pain and gross hematuria. His right inguinal hernia is getting worse.   4/27/22: Pt returns for follow up. He reports 24 period of severe diarrhea on monday. No fevers. Resolved spontaneously. Pt reports he had imaging done 2 weeks ago. Eager fto discuss findings. Feeling well now. Pt will be  meeting with surgeon recommended by PMD for Right inguinal hernia repair.  6/22/22 feels well. He will meet his surgeon for his right inguinal hernia.   7/20/22 feels overall fine. Denies any pain and weight loss.     8/17/22: Patient returns for follow up, continued on nivolumab. Had CT scans 1 week ago that show stable L renal mass and stable R renal cyst. Also remarkable for non specific mild increase in illdefined omental infiltration - omental carcinomatosis is not excluded, will continue short interval scanning. Patient reports having less appetite than normal. He feels less motivation to cook for himself. He does drink an ensure everyday. He feels he gets [place]ada sooner and has lost a few pounds. He denies nausea, vomiting and GERD. Patient is often tired, but is still able to complete every thing he needs to do, it may just take him a bit longer. He is working on setting up his inguinal hernia surgery.  9/14: energy is good. seeing the surgeon next week for the hernia. appetite is okay now. no longer has the feeling of getting full early. able to eat full meals  10/12: appetite back to normal. no rash, pain, CP, SOB. energy is okay  11/9/22: feels well today. no new changes   1/4/22: doing well today. no new issues. energy good. appetite good. no new joint pain, rash, SOB, diarreha.  2/1/23: Returns for follow up and treatment with nivolumab. Patient has been feeling well. Denies fatigue, walks 2-2.5 miles with his dog per day. He has not yet seen a surgeon for his inguinal hernia. His PCP gave him a surgeon referral, he says he has not gotten up the nerve to call. Denies pain.  Patient denies fever, chills, headache, vision changes, cough, shortness of breath, chest pain, palpitations, abdominal pain, nausea/vomiting, diarrhea, constipation, dysuria, hematuria, itching, rashes, joint pain, mucositis, changes in sensation.  3/1/23: continued on nivolumab. Patient feels well overall, eating well, has good energy, goes on daily walks with his dog. He notes his urine stream is not as strong in the morning, but improves throughout the day. Denies dysuria/hematuria. He went to see a surgeon last month and was described what his inguinal hernia repair would be, he is planning to have the surgery but not until the Spring.. he denies any change in color or size of hernia.   3/9/23 CT c/a/p showed Multiple bilateral small renal lesions overall unchanged when compared to the prior examinations and can be consistent with a neoplastic process. Diffuse areas of omental/peritoneal thickening and areas of nodularity and diffuse small lymph nodes within the mesentery and retroperitoneum but overall configuration unchanged  3/29/23 reports feeling overall fine, denies skin and joint pain  4/26/23: feels well, no new changes compared to last visit  5/24/23: continued on nivolumab for met RCC. Overall, patient is doing well. He is exercising more, walking with his dog, riding his bike. Appetite is good. He is not planning to pursue hernia surgery until after his next scans in July, he wants to feel comfortable. Patient denies fever, chills, headache, vision changes, cough, shortness of breath, chest pain, palpitations, abdominal pain, nausea/vomiting, diarrhea, constipation, dysuria, hematuria, itching, rashes, muscle and joint pain, paraesthesias.     6/21/23: continued on nivolumab. Energy level is good, doing his same routine. Appetite is good. Last week he pulled a muscle in back when doing yard work, pain lasted for about 3-4 days and has now resolved. Otherwise no joint or muscle pain. Pt notes that due to hernia he often feels as though he is not completely emptying his bladder. Notes nocturia x 2. Denies shortness of breath, coughing, constipation, diarrhea, itching, rashes.   7/19/23 reports feeling well, no fatigue and skin rash.   816/23 Patient reports he is doing well. Denies nausea, vomiting, diarrhea, constipation, fevers, chills. No issues with urination, denies blood in urine. No SOB, or chest pain. Reports appetite is good.   9/13/23: Patient has lost a few pounds since his last visit, he says his appetite is okay. Has good energy. Sometimes he feels like he is not completely emptying his bladder, notes nocturia x 2. Patient says his hernia is not bothering him, no pain, he is not ready to get surgery just now but continues to consider it...    10/11/23: Patient continues on nivolumab, continues to feel well. His appetite and energy are good; denies fatigue. He walks 1-2 miles per day. He is redoing a bathroom in his home, all on his own. He does report that he feels he is not completely emptying his bladder and nocturia x 2, these symptoms are stable. He denies fever, chills, sore throat, shortness of breath, cough, constipation, diarrhea, rashes, itching, rashes, joint and muscle pain.    11/8/23: Patient continues to do well on nivolumab. Energy and appetite are good. He has a routine that he sticks too, bed just before 10pm and wakes up at about 4:30am to take his dog out for a walk. He has been working on a home improvement project that he hopes to complete this weekend. He has not been sick recently. Is due for a physical with PCP next month. Has not yet gotten flu shot, will either get this at a pharmacy or at his upcoming physical.   12/6/23 feels overall fine, denies skin rash, joint pain and diarrhea.  [de-identified] : metastatic renal cell carcinoma [FreeTextEntry1] : Ipi + nivo (7/2019) > nivolumab 480 mg monthly - cont [de-identified] : 1/3/24: Overall, patient is doing well. Denies any new or changing symptoms. Energy and appetite are good. Denies chest pain, palpitations, cough, shortness of breath, rashes, itching, specific joint and muscle pain.

## 2024-01-03 NOTE — PHYSICAL EXAM
[Fully active, able to carry on all pre-disease performance without restriction] : Status 0 - Fully active, able to carry on all pre-disease performance without restriction [Normal] : affect appropriate [de-identified] : anicteric  [de-identified] : supple, FROM  [de-identified] : no edema  [de-identified] : large inguinal hernia unchanged from prior

## 2024-01-03 NOTE — PHYSICAL EXAM
[Fully active, able to carry on all pre-disease performance without restriction] : Status 0 - Fully active, able to carry on all pre-disease performance without restriction [Normal] : affect appropriate [de-identified] : anicteric  [de-identified] : supple, FROM  [de-identified] : no edema  [de-identified] : large inguinal hernia unchanged from prior

## 2024-01-03 NOTE — HISTORY OF PRESENT ILLNESS
[T: ___] : T[unfilled] [N: ___] : N[unfilled] [M: ___] : M[unfilled] [AJCC Stage: ____] : AJCC Stage: [unfilled] [N/A] : Currently not applicable [de-identified] : Finesse Zuniga is a 70 years old male who initially presented with intermittent midback pain, 10/10 radiating to stomach from both sides on June 1, 2019. He has poor appetite.  On june 12 he was diagnosed UTI and on cipro for 4 days, on June 16, was started cefpodoxime 100mg BID for 7 days.  CT chest/abdomen/pelvis revealed significant abnormal appearance of the left kidney with multiple heterogeneous areas of low attenuation involving the upper and midpole which may represent malignancy. There are necrotic lymph nodes in the left para-aortic location at the level of the kidney and below the level of the kidney. Adenopathy surrounds the left renal artery. The left renal vein demonstrates question of partially occlusive thrombus. Nonspecific scattered tiny pulmonary nodules. Lower lobe subpleural nodules measure up to 6 mm. There is a well-circumscribed hypodense lesion in the anterior aspect of the lateral segment of the left lobe measuring 3.2 x 2.2 cm. He underwent CT guided liver lesion biopsy which showed consistent with primary renal cell carcinoma, IHC CK7/CK19/PAX8 positive. MRI abdomen waw con showed multiple bilateral pulmonary nodules increased in size and number compared with recent CT. The largest is pleural-based in the left lower lobe and measures 1.5 x 1.1 cm. LIVER: Rim-enhancing subcapsular mass in segment 3 and measures 4.4 x 2.6 cm. A 4.2cm mass in mid to lower pole of the lft kidney suspicious for urothelial malignancy arising within the left renal collecting system. Regional lymphadenopathy and hepatic and pulmonary metastases. Nonocclusive thrombus within the left renal vein. He was started on eliquis. Bone scan showed  a focus of mildly increased uptake in the right superior  pubic ramus suspicious of bone mets. He has lost 17lbs in one month. He denies nausea, vomiting and constipation and diarrhea.   7/2019: ipilimumab + nivolumab initiated and to nivolumab maintenance post 4 cycles and continues on therapy.  9/18-22/2020 : Patient admitted for hematuria. Patient followed by urology and hematology during hospital course. Patient was started on CBI to help remove clots. Urine initially bloody, but became pink-tinged and then clear over time. CBI stopped and barber removed. Patient reportedly felt much better after barber removed, able to urinate by himself- reported some clots initially, but since resolved. States bladder spasms improved as well. Patient's eliquis was stopped, MRI done showing no evidence of renal vein thrombus, discussed  with Heme, no reason to continue eliquis for now. Patient's leukocytosis and JOSE MARIA resolved. Urine culture and blood cultures negative.  Patient improved clinically throughout hospital course. Patient seen and examined on day of discharge.  Cystoscopy was negative, performed by Dr. Javier.   5/26/2021 : Mr. Vera is here for nivolumab. Last scan 3/29/2021, will need another set of scan the end or beginning of July. He walks 2 miles a day with his dog and bikes 3-4 miles a day. He feels well overall. Mr. VERA denies fevers, chills, headaches, cough, SOB, chest pain, any swelling, nausea, vomiting, diarrhea, rash, or malaise.   6/23/21: Bothering Rt side inguinal hernia without pain or signs of incarceration. Doing well overall. Scheduled for nivo today. He denies fevers, chills, headaches, cough, SOB, chest pain, any swelling, nausea, vomiting, diarrhea, rash, or malaise.  7/7/2021 : CT showed stable disease  7/28/2021 : Here for nivolumab. 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 Mr. VERA denies fevers, chills, headaches, cough, SOB, chest pain, any swelling, nausea, vomiting, diarrhea, rash, or malaise. He has been tolerating therapy extremely well without sig issues. He is to see a general surgeon for hernia repair soon.   8/25/2021 : Here for nivolumab, scan due in September. Mr. VERA denies fevers, chills, headaches, cough, SOB, chest pain, any swelling, nausea, vomiting, diarrhea, rash, or malaise.   10/27/21: Pt presents for follow up. He is scheduled for Nivolumab which he has been tolerating well. Scheduled to receive cycle #30 today. Denies any diarrhea/ itchiness, cough, hematuria, rash. Appetite good. Energy good. No new complaints. Recieved both flu shot and COVID booster recently  11/24/21: Pt feeling well. Offers no complaints. Scheduled for C#31 nivolumab today. Due for imaging next month. Already scheduled  12/21/21 CT chest/abd/pelvis - Stable appearance of the chest compared with 9/4/2021. Indeterminate 1.4 cm hypodense lesion in the upper pole of the right kidney is unchanged. Atrophy is again noted involving the upper pole of the left kidney, unchanged. Very large right inguinal hernia containing non-obstructed large and small bowel and a small amount of fluid. Enlarged heterogeneous prostate gland. Nonspecific ill-defined infiltration of the fat in the right lower quadrant, not significantly changed. Attention to this area is suggested on follow-up CT.  12/29/21 - presents for ongoing Opdivo cycle #32 today. Overall feeling "good", energy is good. Appetite is good. No complaints.   3/2/22 feels quite well, denies any pain and gross hematuria. He has a schedule in April to take care of right inguinal hernia.   4/6/22 feels fine, denies pain and gross hematuria. His right inguinal hernia is getting worse.   4/27/22: Pt returns for follow up. He reports 24 period of severe diarrhea on monday. No fevers. Resolved spontaneously. Pt reports he had imaging done 2 weeks ago. Eager fto discuss findings. Feeling well now. Pt will be  meeting with surgeon recommended by PMD for Right inguinal hernia repair.  6/22/22 feels well. He will meet his surgeon for his right inguinal hernia.   7/20/22 feels overall fine. Denies any pain and weight loss.     8/17/22: Patient returns for follow up, continued on nivolumab. Had CT scans 1 week ago that show stable L renal mass and stable R renal cyst. Also remarkable for non specific mild increase in illdefined omental infiltration - omental carcinomatosis is not excluded, will continue short interval scanning. Patient reports having less appetite than normal. He feels less motivation to cook for himself. He does drink an ensure everyday. He feels he gets [place]ada sooner and has lost a few pounds. He denies nausea, vomiting and GERD. Patient is often tired, but is still able to complete every thing he needs to do, it may just take him a bit longer. He is working on setting up his inguinal hernia surgery.  9/14: energy is good. seeing the surgeon next week for the hernia. appetite is okay now. no longer has the feeling of getting full early. able to eat full meals  10/12: appetite back to normal. no rash, pain, CP, SOB. energy is okay  11/9/22: feels well today. no new changes   1/4/22: doing well today. no new issues. energy good. appetite good. no new joint pain, rash, SOB, diarreha.  2/1/23: Returns for follow up and treatment with nivolumab. Patient has been feeling well. Denies fatigue, walks 2-2.5 miles with his dog per day. He has not yet seen a surgeon for his inguinal hernia. His PCP gave him a surgeon referral, he says he has not gotten up the nerve to call. Denies pain.  Patient denies fever, chills, headache, vision changes, cough, shortness of breath, chest pain, palpitations, abdominal pain, nausea/vomiting, diarrhea, constipation, dysuria, hematuria, itching, rashes, joint pain, mucositis, changes in sensation.  3/1/23: continued on nivolumab. Patient feels well overall, eating well, has good energy, goes on daily walks with his dog. He notes his urine stream is not as strong in the morning, but improves throughout the day. Denies dysuria/hematuria. He went to see a surgeon last month and was described what his inguinal hernia repair would be, he is planning to have the surgery but not until the Spring.. he denies any change in color or size of hernia.   3/9/23 CT c/a/p showed Multiple bilateral small renal lesions overall unchanged when compared to the prior examinations and can be consistent with a neoplastic process. Diffuse areas of omental/peritoneal thickening and areas of nodularity and diffuse small lymph nodes within the mesentery and retroperitoneum but overall configuration unchanged  3/29/23 reports feeling overall fine, denies skin and joint pain  4/26/23: feels well, no new changes compared to last visit  5/24/23: continued on nivolumab for met RCC. Overall, patient is doing well. He is exercising more, walking with his dog, riding his bike. Appetite is good. He is not planning to pursue hernia surgery until after his next scans in July, he wants to feel comfortable. Patient denies fever, chills, headache, vision changes, cough, shortness of breath, chest pain, palpitations, abdominal pain, nausea/vomiting, diarrhea, constipation, dysuria, hematuria, itching, rashes, muscle and joint pain, paraesthesias.     6/21/23: continued on nivolumab. Energy level is good, doing his same routine. Appetite is good. Last week he pulled a muscle in back when doing yard work, pain lasted for about 3-4 days and has now resolved. Otherwise no joint or muscle pain. Pt notes that due to hernia he often feels as though he is not completely emptying his bladder. Notes nocturia x 2. Denies shortness of breath, coughing, constipation, diarrhea, itching, rashes.   7/19/23 reports feeling well, no fatigue and skin rash.   816/23 Patient reports he is doing well. Denies nausea, vomiting, diarrhea, constipation, fevers, chills. No issues with urination, denies blood in urine. No SOB, or chest pain. Reports appetite is good.   9/13/23: Patient has lost a few pounds since his last visit, he says his appetite is okay. Has good energy. Sometimes he feels like he is not completely emptying his bladder, notes nocturia x 2. Patient says his hernia is not bothering him, no pain, he is not ready to get surgery just now but continues to consider it...    10/11/23: Patient continues on nivolumab, continues to feel well. His appetite and energy are good; denies fatigue. He walks 1-2 miles per day. He is redoing a bathroom in his home, all on his own. He does report that he feels he is not completely emptying his bladder and nocturia x 2, these symptoms are stable. He denies fever, chills, sore throat, shortness of breath, cough, constipation, diarrhea, rashes, itching, rashes, joint and muscle pain.    11/8/23: Patient continues to do well on nivolumab. Energy and appetite are good. He has a routine that he sticks too, bed just before 10pm and wakes up at about 4:30am to take his dog out for a walk. He has been working on a home improvement project that he hopes to complete this weekend. He has not been sick recently. Is due for a physical with PCP next month. Has not yet gotten flu shot, will either get this at a pharmacy or at his upcoming physical.   12/6/23 feels overall fine, denies skin rash, joint pain and diarrhea.  [de-identified] : metastatic renal cell carcinoma [FreeTextEntry1] : Ipi + nivo (7/2019) > nivolumab 480 mg monthly - cont [de-identified] : 1/3/24: Overall, patient is doing well. Denies any new or changing symptoms. Energy and appetite are good. Denies chest pain, palpitations, cough, shortness of breath, rashes, itching, specific joint and muscle pain.

## 2024-01-04 DIAGNOSIS — Z51.11 ENCOUNTER FOR ANTINEOPLASTIC CHEMOTHERAPY: ICD-10-CM

## 2024-01-31 ENCOUNTER — RESULT REVIEW (OUTPATIENT)
Age: 74
End: 2024-01-31

## 2024-01-31 ENCOUNTER — APPOINTMENT (OUTPATIENT)
Dept: INFUSION THERAPY | Facility: HOSPITAL | Age: 74
End: 2024-01-31

## 2024-01-31 ENCOUNTER — APPOINTMENT (OUTPATIENT)
Dept: HEMATOLOGY ONCOLOGY | Facility: CLINIC | Age: 74
End: 2024-01-31
Payer: MEDICARE

## 2024-01-31 VITALS
RESPIRATION RATE: 16 BRPM | DIASTOLIC BLOOD PRESSURE: 84 MMHG | HEIGHT: 69.13 IN | OXYGEN SATURATION: 100 % | TEMPERATURE: 98.1 F | SYSTOLIC BLOOD PRESSURE: 141 MMHG | HEART RATE: 59 BPM | BODY MASS INDEX: 22.26 KG/M2 | WEIGHT: 152 LBS

## 2024-01-31 LAB
ALBUMIN SERPL ELPH-MCNC: 4.1 G/DL — SIGNIFICANT CHANGE UP (ref 3.3–5)
ALP SERPL-CCNC: 62 U/L — SIGNIFICANT CHANGE UP (ref 40–120)
ALT FLD-CCNC: 14 U/L — SIGNIFICANT CHANGE UP (ref 10–45)
ANION GAP SERPL CALC-SCNC: 8 MMOL/L — SIGNIFICANT CHANGE UP (ref 5–17)
AST SERPL-CCNC: 27 U/L — SIGNIFICANT CHANGE UP (ref 10–40)
BASOPHILS # BLD AUTO: 0.03 K/UL — SIGNIFICANT CHANGE UP (ref 0–0.2)
BASOPHILS NFR BLD AUTO: 0.4 % — SIGNIFICANT CHANGE UP (ref 0–2)
BILIRUB SERPL-MCNC: 0.5 MG/DL — SIGNIFICANT CHANGE UP (ref 0.2–1.2)
BUN SERPL-MCNC: 22 MG/DL — SIGNIFICANT CHANGE UP (ref 7–23)
CALCIUM SERPL-MCNC: 9.4 MG/DL — SIGNIFICANT CHANGE UP (ref 8.4–10.5)
CHLORIDE SERPL-SCNC: 104 MMOL/L — SIGNIFICANT CHANGE UP (ref 96–108)
CO2 SERPL-SCNC: 27 MMOL/L — SIGNIFICANT CHANGE UP (ref 22–31)
CREAT SERPL-MCNC: 1.26 MG/DL — SIGNIFICANT CHANGE UP (ref 0.5–1.3)
EGFR: 61 ML/MIN/1.73M2 — SIGNIFICANT CHANGE UP
EOSINOPHIL # BLD AUTO: 0.2 K/UL — SIGNIFICANT CHANGE UP (ref 0–0.5)
EOSINOPHIL NFR BLD AUTO: 2.8 % — SIGNIFICANT CHANGE UP (ref 0–6)
GLUCOSE SERPL-MCNC: 107 MG/DL — HIGH (ref 70–99)
HCT VFR BLD CALC: 39.6 % — SIGNIFICANT CHANGE UP (ref 39–50)
HGB BLD-MCNC: 13.6 G/DL — SIGNIFICANT CHANGE UP (ref 13–17)
IMM GRANULOCYTES NFR BLD AUTO: 0.3 % — SIGNIFICANT CHANGE UP (ref 0–0.9)
LYMPHOCYTES # BLD AUTO: 1.3 K/UL — SIGNIFICANT CHANGE UP (ref 1–3.3)
LYMPHOCYTES # BLD AUTO: 18.3 % — SIGNIFICANT CHANGE UP (ref 13–44)
MCHC RBC-ENTMCNC: 32 PG — SIGNIFICANT CHANGE UP (ref 27–34)
MCHC RBC-ENTMCNC: 34.3 G/DL — SIGNIFICANT CHANGE UP (ref 32–36)
MCV RBC AUTO: 93.2 FL — SIGNIFICANT CHANGE UP (ref 80–100)
MONOCYTES # BLD AUTO: 0.61 K/UL — SIGNIFICANT CHANGE UP (ref 0–0.9)
MONOCYTES NFR BLD AUTO: 8.6 % — SIGNIFICANT CHANGE UP (ref 2–14)
NEUTROPHILS # BLD AUTO: 4.95 K/UL — SIGNIFICANT CHANGE UP (ref 1.8–7.4)
NEUTROPHILS NFR BLD AUTO: 69.6 % — SIGNIFICANT CHANGE UP (ref 43–77)
NRBC # BLD: 0 /100 WBCS — SIGNIFICANT CHANGE UP (ref 0–0)
PLATELET # BLD AUTO: 177 K/UL — SIGNIFICANT CHANGE UP (ref 150–400)
POTASSIUM SERPL-MCNC: 4.5 MMOL/L — SIGNIFICANT CHANGE UP (ref 3.5–5.3)
POTASSIUM SERPL-SCNC: 4.5 MMOL/L — SIGNIFICANT CHANGE UP (ref 3.5–5.3)
PROT SERPL-MCNC: 7 G/DL — SIGNIFICANT CHANGE UP (ref 6–8.3)
RBC # BLD: 4.25 M/UL — SIGNIFICANT CHANGE UP (ref 4.2–5.8)
RBC # FLD: 12.8 % — SIGNIFICANT CHANGE UP (ref 10.3–14.5)
SODIUM SERPL-SCNC: 139 MMOL/L — SIGNIFICANT CHANGE UP (ref 135–145)
T4 FREE SERPL-MCNC: 1.1 NG/DL — SIGNIFICANT CHANGE UP (ref 0.9–1.8)
TSH SERPL-MCNC: 1.45 UIU/ML — SIGNIFICANT CHANGE UP (ref 0.27–4.2)
WBC # BLD: 7.11 K/UL — SIGNIFICANT CHANGE UP (ref 3.8–10.5)
WBC # FLD AUTO: 7.11 K/UL — SIGNIFICANT CHANGE UP (ref 3.8–10.5)

## 2024-01-31 PROCEDURE — 99213 OFFICE O/P EST LOW 20 MIN: CPT

## 2024-01-31 RX ORDER — FINASTERIDE 5 MG/1
5 TABLET, FILM COATED ORAL
Qty: 90 | Refills: 3 | Status: ACTIVE | COMMUNITY
Start: 2020-10-23 | End: 1900-01-01

## 2024-01-31 NOTE — HISTORY OF PRESENT ILLNESS
[T: ___] : T[unfilled] [N: ___] : N[unfilled] [M: ___] : M[unfilled] [AJCC Stage: ____] : AJCC Stage: [unfilled] [N/A] : Currently not applicable [de-identified] : Finesse Zuniga is a 70 years old male who initially presented with intermittent midback pain, 10/10 radiating to stomach from both sides on June 1, 2019. He has poor appetite.  On june 12 he was diagnosed UTI and on cipro for 4 days, on June 16, was started cefpodoxime 100mg BID for 7 days.  CT chest/abdomen/pelvis revealed significant abnormal appearance of the left kidney with multiple heterogeneous areas of low attenuation involving the upper and midpole which may represent malignancy. There are necrotic lymph nodes in the left para-aortic location at the level of the kidney and below the level of the kidney. Adenopathy surrounds the left renal artery. The left renal vein demonstrates question of partially occlusive thrombus. Nonspecific scattered tiny pulmonary nodules. Lower lobe subpleural nodules measure up to 6 mm. There is a well-circumscribed hypodense lesion in the anterior aspect of the lateral segment of the left lobe measuring 3.2 x 2.2 cm. He underwent CT guided liver lesion biopsy which showed consistent with primary renal cell carcinoma, IHC CK7/CK19/PAX8 positive. MRI abdomen waw con showed multiple bilateral pulmonary nodules increased in size and number compared with recent CT. The largest is pleural-based in the left lower lobe and measures 1.5 x 1.1 cm. LIVER: Rim-enhancing subcapsular mass in segment 3 and measures 4.4 x 2.6 cm. A 4.2cm mass in mid to lower pole of the lft kidney suspicious for urothelial malignancy arising within the left renal collecting system. Regional lymphadenopathy and hepatic and pulmonary metastases. Nonocclusive thrombus within the left renal vein. He was started on eliquis. Bone scan showed  a focus of mildly increased uptake in the right superior  pubic ramus suspicious of bone mets. He has lost 17lbs in one month. He denies nausea, vomiting and constipation and diarrhea.   7/2019: ipilimumab + nivolumab initiated and to nivolumab maintenance post 4 cycles and continues on therapy.  9/18-22/2020 : Patient admitted for hematuria. Patient followed by urology and hematology during hospital course. Patient was started on CBI to help remove clots. Urine initially bloody, but became pink-tinged and then clear over time. CBI stopped and barber removed. Patient reportedly felt much better after barber removed, able to urinate by himself- reported some clots initially, but since resolved. States bladder spasms improved as well. Patient's eliquis was stopped, MRI done showing no evidence of renal vein thrombus, discussed  with Heme, no reason to continue eliquis for now. Patient's leukocytosis and JOSE MARIA resolved. Urine culture and blood cultures negative.  Patient improved clinically throughout hospital course. Patient seen and examined on day of discharge.  Cystoscopy was negative, performed by Dr. Javier.   5/26/2021 : Mr. Vera is here for nivolumab. Last scan 3/29/2021, will need another set of scan the end or beginning of July. He walks 2 miles a day with his dog and bikes 3-4 miles a day. He feels well overall. Mr. VERA denies fevers, chills, headaches, cough, SOB, chest pain, any swelling, nausea, vomiting, diarrhea, rash, or malaise.   6/23/21: Bothering Rt side inguinal hernia without pain or signs of incarceration. Doing well overall. Scheduled for nivo today. He denies fevers, chills, headaches, cough, SOB, chest pain, any swelling, nausea, vomiting, diarrhea, rash, or malaise.  7/7/2021 : CT showed stable disease  7/28/2021 : Here for nivolumab. 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 Mr. VERA denies fevers, chills, headaches, cough, SOB, chest pain, any swelling, nausea, vomiting, diarrhea, rash, or malaise. He has been tolerating therapy extremely well without sig issues. He is to see a general surgeon for hernia repair soon.   8/25/2021 : Here for nivolumab, scan due in September. Mr. VERA denies fevers, chills, headaches, cough, SOB, chest pain, any swelling, nausea, vomiting, diarrhea, rash, or malaise.   10/27/21: Pt presents for follow up. He is scheduled for Nivolumab which he has been tolerating well. Scheduled to receive cycle #30 today. Denies any diarrhea/ itchiness, cough, hematuria, rash. Appetite good. Energy good. No new complaints. Recieved both flu shot and COVID booster recently  11/24/21: Pt feeling well. Offers no complaints. Scheduled for C#31 nivolumab today. Due for imaging next month. Already scheduled  12/21/21 CT chest/abd/pelvis - Stable appearance of the chest compared with 9/4/2021. Indeterminate 1.4 cm hypodense lesion in the upper pole of the right kidney is unchanged. Atrophy is again noted involving the upper pole of the left kidney, unchanged. Very large right inguinal hernia containing non-obstructed large and small bowel and a small amount of fluid. Enlarged heterogeneous prostate gland. Nonspecific ill-defined infiltration of the fat in the right lower quadrant, not significantly changed. Attention to this area is suggested on follow-up CT.  12/29/21 - presents for ongoing Opdivo cycle #32 today. Overall feeling "good", energy is good. Appetite is good. No complaints.   3/2/22 feels quite well, denies any pain and gross hematuria. He has a schedule in April to take care of right inguinal hernia.   4/6/22 feels fine, denies pain and gross hematuria. His right inguinal hernia is getting worse.   4/27/22: Pt returns for follow up. He reports 24 period of severe diarrhea on monday. No fevers. Resolved spontaneously. Pt reports he had imaging done 2 weeks ago. Eager fto discuss findings. Feeling well now. Pt will be  meeting with surgeon recommended by PMD for Right inguinal hernia repair.  6/22/22 feels well. He will meet his surgeon for his right inguinal hernia.   7/20/22 feels overall fine. Denies any pain and weight loss.     8/17/22: Patient returns for follow up, continued on nivolumab. Had CT scans 1 week ago that show stable L renal mass and stable R renal cyst. Also remarkable for non specific mild increase in illdefined omental infiltration - omental carcinomatosis is not excluded, will continue short interval scanning. Patient reports having less appetite than normal. He feels less motivation to cook for himself. He does drink an ensure everyday. He feels he gets moncada sooner and has lost a few pounds. He denies nausea, vomiting and GERD. Patient is often tired, but is still able to complete every thing he needs to do, it may just take him a bit longer. He is working on setting up his inguinal hernia surgery.  9/14: energy is good. seeing the surgeon next week for the hernia. appetite is okay now. no longer has the feeling of getting full early. able to eat full meals  10/12: appetite back to normal. no rash, pain, CP, SOB. energy is okay  11/9/22: feels well today. no new changes   1/4/22: doing well today. no new issues. energy good. appetite good. no new joint pain, rash, SOB, diarreha.  2/1/23: Returns for follow up and treatment with nivolumab. Patient has been feeling well. Denies fatigue, walks 2-2.5 miles with his dog per day. He has not yet seen a surgeon for his inguinal hernia. His PCP gave him a surgeon referral, he says he has not gotten up the nerve to call. Denies pain.  Patient denies fever, chills, headache, vision changes, cough, shortness of breath, chest pain, palpitations, abdominal pain, nausea/vomiting, diarrhea, constipation, dysuria, hematuria, itching, rashes, joint pain, mucositis, changes in sensation.  3/1/23: continued on nivolumab. Patient feels well overall, eating well, has good energy, goes on daily walks with his dog. He notes his urine stream is not as strong in the morning, but improves throughout the day. Denies dysuria/hematuria. He went to see a surgeon last month and was described what his inguinal hernia repair would be, he is planning to have the surgery but not until the Spring.. he denies any change in color or size of hernia.   3/9/23 CT c/a/p showed Multiple bilateral small renal lesions overall unchanged when compared to the prior examinations and can be consistent with a neoplastic process. Diffuse areas of omental/peritoneal thickening and areas of nodularity and diffuse small lymph nodes within the mesentery and retroperitoneum but overall configuration unchanged  3/29/23 reports feeling overall fine, denies skin and joint pain  4/26/23: feels well, no new changes compared to last visit  5/24/23: continued on nivolumab for met RCC. Overall, patient is doing well. He is exercising more, walking with his dog, riding his bike. Appetite is good. He is not planning to pursue hernia surgery until after his next scans in July, he wants to feel comfortable. Patient denies fever, chills, headache, vision changes, cough, shortness of breath, chest pain, palpitations, abdominal pain, nausea/vomiting, diarrhea, constipation, dysuria, hematuria, itching, rashes, muscle and joint pain, paraesthesias.     6/21/23: continued on nivolumab. Energy level is good, doing his same routine. Appetite is good. Last week he pulled a muscle in back when doing yard work, pain lasted for about 3-4 days and has now resolved. Otherwise no joint or muscle pain. Pt notes that due to hernia he often feels as though he is not completely emptying his bladder. Notes nocturia x 2. Denies shortness of breath, coughing, constipation, diarrhea, itching, rashes.   7/19/23 reports feeling well, no fatigue and skin rash.   816/23 Patient reports he is doing well. Denies nausea, vomiting, diarrhea, constipation, fevers, chills. No issues with urination, denies blood in urine. No SOB, or chest pain. Reports appetite is good.   9/13/23: Patient has lost a few pounds since his last visit, he says his appetite is okay. Has good energy. Sometimes he feels like he is not completely emptying his bladder, notes nocturia x 2. Patient says his hernia is not bothering him, no pain, he is not ready to get surgery just now but continues to consider it...    10/11/23: Patient continues on nivolumab, continues to feel well. His appetite and energy are good; denies fatigue. He walks 1-2 miles per day. He is redoing a bathroom in his home, all on his own. He does report that he feels he is not completely emptying his bladder and nocturia x 2, these symptoms are stable. He denies fever, chills, sore throat, shortness of breath, cough, constipation, diarrhea, rashes, itching, rashes, joint and muscle pain.    11/8/23: Patient continues to do well on nivolumab. Energy and appetite are good. He has a routine that he sticks too, bed just before 10pm and wakes up at about 4:30am to take his dog out for a walk. He has been working on a home improvement project that he hopes to complete this weekend. He has not been sick recently. Is due for a physical with PCP next month. Has not yet gotten flu shot, will either get this at a pharmacy or at his upcoming physical.   12/6/23 feels overall fine, denies skin rash, joint pain and diarrhea.  [de-identified] : metastatic renal cell carcinoma [FreeTextEntry1] : Ipi + nivo (7/2019) > nivolumab 480 mg monthly - cont [de-identified] : 1/3/24: Overall, patient is doing well. Denies any new or changing symptoms. Energy and appetite are good. Denies chest pain, palpitations, cough, shortness of breath, rashes, itching, specific joint and muscle pain.    1/30/24 reports feeling overall fine, has good energy, eats well.

## 2024-01-31 NOTE — ASSESSMENT
[FreeTextEntry1] : Mr. Mcknight is a 73 year old male with metastatic renal cell carcinoma. On initial presentation CT c/a/p and MRI abdomen showed multiple lung nodules, one liver lesion, retroperitoneal nodes. Bone scan showed one suspicious met in right superior pubic ramus. Liver lesion biopsy is consistent with renal cell carcinoma. It is not certain that the histology of RCC is clear cell vs non-clear cell. IMDC risk of this patient he has 5 out of 6 risks including leukocytosis, anemia, thrombocytosis, time from dx to initiate systemic treatment less than one year and hyperalcemia. He has poor risk of mRCC. Based on [facility] the standard of care is combined immunotherapy nivo and ipi vs sutent, checkmate 214 in the intermediate and poor risk of mccRCC patients with significant overall survival benefit, keynote 426 keytruda plus axitinib vs sutent in good, intermediate and poor risk of [facility]RCC patients showed OS, PFS and MICHEL benefit, [place] in the intermediate and poor risk of mccRCC patients showed cabozantinib demonstrated a significant clinical benefit in PFS and MICHEL over standard-of-care sunitinib as first-line therapy in patients with intermediate- or poor-risk mRCC. Consider the longer duration of response and less side effects and recommended nivo and ipi. Completed 4 cycles of nivo and ipi, continued on maintenance nivolumab with interval scans to monitor disease. Of note, on diagnosis patient had L renal vein thrombus and was started on eliquis. He had an episode of gross hematuria leading to hospitalization in 9/2020 however etiology of hematuria is unclear and resolved. Follow up cystoscopy to r/o abnormality in the bladder showed no abnormality. Eliquis stopped during hospitalization with hematology consult. Remains off of eliquis. Scans have continued to show stability. Scans from 8/11/22 showed stable disease in L kidney and stable R renal cyst, noted non specific infiltration in omentum, cannot exclude carcinomatosis. CTs in November 2022 show stable disease. Patient also has large R inguinal hernia. March 2023, August 2023, Dec 2023 CT c/a/p showed stable disease in lungs, kidney and retroperitoneum.  Plan Metastatic RCC - continue maintenance nivolumab [id number], proceed with treatment today - 12/19/23 CT chest and AP: Multiple small bilateral renal neoplasms, at least 3 on the right and 2 on the left, not significantly changed from prior; similar omental nodularity. - continue to monitor blood work - monitor for iRAEs, reviewed with patient today; continues to tolerate treatment well  Instructed to contact our office with any new/worsening symptoms. Patient educated regarding plan of care, all questions/concerns addressed to the best of my abilities and patient's apparent satisfaction. RTC 4 weeks

## 2024-01-31 NOTE — PHYSICAL EXAM
[Fully active, able to carry on all pre-disease performance without restriction] : Status 0 - Fully active, able to carry on all pre-disease performance without restriction [Normal] : affect appropriate [de-identified] : anicteric  [de-identified] : supple, FROM  [de-identified] : large inguinal hernia unchanged from prior  [de-identified] : no edema

## 2024-02-16 ENCOUNTER — OUTPATIENT (OUTPATIENT)
Dept: OUTPATIENT SERVICES | Facility: HOSPITAL | Age: 74
LOS: 1 days | Discharge: HOME | End: 2024-02-16

## 2024-02-16 DIAGNOSIS — Z98.890 OTHER SPECIFIED POSTPROCEDURAL STATES: Chronic | ICD-10-CM

## 2024-02-16 DIAGNOSIS — C64.9 MALIGNANT NEOPLASM OF UNSPECIFIED KIDNEY, EXCEPT RENAL PELVIS: ICD-10-CM

## 2024-02-16 DIAGNOSIS — S86.019A STRAIN OF UNSPECIFIED ACHILLES TENDON, INITIAL ENCOUNTER: Chronic | ICD-10-CM

## 2024-02-27 NOTE — REVIEW OF SYSTEMS
[Fever] : no fever [Fatigue] : no fatigue [Chills] : no chills [Chest Pain] : no chest pain [Palpitations] : no palpitations [Lower Ext Edema] : no lower extremity edema [Shortness Of Breath] : no shortness of breath [Cough] : no cough [Abdominal Pain] : no abdominal pain [Vomiting] : no vomiting [Constipation] : no constipation [Dysuria] : no dysuria [Joint Pain] : no joint pain [Muscle Pain] : no muscle pain [Skin Rash] : no skin rash [Dizziness] : no dizziness [Easy Bleeding] : no tendency for easy bleeding

## 2024-02-27 NOTE — PHYSICAL EXAM
[Fully active, able to carry on all pre-disease performance without restriction] : Status 0 - Fully active, able to carry on all pre-disease performance without restriction [Normal] : grossly intact [de-identified] : anicteric  [de-identified] : supple, FROM  [de-identified] : no edema  [de-identified] : large inguinal hernia unchanged from prior

## 2024-02-27 NOTE — HISTORY OF PRESENT ILLNESS
[T: ___] : T[unfilled] [N: ___] : N[unfilled] [M: ___] : M[unfilled] [AJCC Stage: ____] : AJCC Stage: [unfilled] [de-identified] : Finesse Zuniga is a 70 years old male who initially presented with intermittent midback pain, 10/10 radiating to stomach from both sides on June 1, 2019. He has poor appetite.  On june 12 he was diagnosed UTI and on cipro for 4 days, on June 16, was started cefpodoxime 100mg BID for 7 days.  CT chest/abdomen/pelvis revealed significant abnormal appearance of the left kidney with multiple heterogeneous areas of low attenuation involving the upper and midpole which may represent malignancy. There are necrotic lymph nodes in the left para-aortic location at the level of the kidney and below the level of the kidney. Adenopathy surrounds the left renal artery. The left renal vein demonstrates question of partially occlusive thrombus. Nonspecific scattered tiny pulmonary nodules. Lower lobe subpleural nodules measure up to 6 mm. There is a well-circumscribed hypodense lesion in the anterior aspect of the lateral segment of the left lobe measuring 3.2 x 2.2 cm. He underwent CT guided liver lesion biopsy which showed consistent with primary renal cell carcinoma, IHC CK7/CK19/PAX8 positive. MRI abdomen waw con showed multiple bilateral pulmonary nodules increased in size and number compared with recent CT. The largest is pleural-based in the left lower lobe and measures 1.5 x 1.1 cm. LIVER: Rim-enhancing subcapsular mass in segment 3 and measures 4.4 x 2.6 cm. A 4.2cm mass in mid to lower pole of the lft kidney suspicious for urothelial malignancy arising within the left renal collecting system. Regional lymphadenopathy and hepatic and pulmonary metastases. Nonocclusive thrombus within the left renal vein. He was started on eliquis. Bone scan showed  a focus of mildly increased uptake in the right superior  pubic ramus suspicious of bone mets. He has lost 17lbs in one month. He denies nausea, vomiting and constipation and diarrhea.   7/2019: ipilimumab + nivolumab initiated and to nivolumab maintenance post 4 cycles and continues on therapy.  9/18-22/2020 : Patient admitted for hematuria. Patient followed by urology and hematology during hospital course. Patient was started on CBI to help remove clots. Urine initially bloody, but became pink-tinged and then clear over time. CBI stopped and barber removed. Patient reportedly felt much better after barber removed, able to urinate by himself- reported some clots initially, but since resolved. States bladder spasms improved as well. Patient's eliquis was stopped, MRI done showing no evidence of renal vein thrombus, discussed  with Heme, no reason to continue eliquis for now. Patient's leukocytosis and JOS EMARIA resolved. Urine culture and blood cultures negative.  Patient improved clinically throughout hospital course. Patient seen and examined on day of discharge.  Cystoscopy was negative, performed by Dr. Javier.   5/26/2021 : Mr. Vera is here for nivolumab. Last scan 3/29/2021, will need another set of scan the end or beginning of July. He walks 2 miles a day with his dog and bikes 3-4 miles a day. He feels well overall. Mr. VERA denies fevers, chills, headaches, cough, SOB, chest pain, any swelling, nausea, vomiting, diarrhea, rash, or malaise.   6/23/21: Bothering Rt side inguinal hernia without pain or signs of incarceration. Doing well overall. Scheduled for nivo today. He denies fevers, chills, headaches, cough, SOB, chest pain, any swelling, nausea, vomiting, diarrhea, rash, or malaise.  7/7/2021 : CT showed stable disease  7/28/2021 : Here for nivolumab. 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 Mr. VERA denies fevers, chills, headaches, cough, SOB, chest pain, any swelling, nausea, vomiting, diarrhea, rash, or malaise. He has been tolerating therapy extremely well without sig issues. He is to see a general surgeon for hernia repair soon.   8/25/2021 : Here for nivolumab, scan due in September. Mr. VERA denies fevers, chills, headaches, cough, SOB, chest pain, any swelling, nausea, vomiting, diarrhea, rash, or malaise.   10/27/21: Pt presents for follow up. He is scheduled for Nivolumab which he has been tolerating well. Scheduled to receive cycle #30 today. Denies any diarrhea/ itchiness, cough, hematuria, rash. Appetite good. Energy good. No new complaints. Recieved both flu shot and COVID booster recently  11/24/21: Pt feeling well. Offers no complaints. Scheduled for C#31 nivolumab today. Due for imaging next month. Already scheduled  12/21/21 CT chest/abd/pelvis - Stable appearance of the chest compared with 9/4/2021. Indeterminate 1.4 cm hypodense lesion in the upper pole of the right kidney is unchanged. Atrophy is again noted involving the upper pole of the left kidney, unchanged. Very large right inguinal hernia containing non-obstructed large and small bowel and a small amount of fluid. Enlarged heterogeneous prostate gland. Nonspecific ill-defined infiltration of the fat in the right lower quadrant, not significantly changed. Attention to this area is suggested on follow-up CT.  12/29/21 - presents for ongoing Opdivo cycle #32 today. Overall feeling "good", energy is good. Appetite is good. No complaints.   3/2/22 feels quite well, denies any pain and gross hematuria. He has a schedule in April to take care of right inguinal hernia.   4/6/22 feels fine, denies pain and gross hematuria. His right inguinal hernia is getting worse.   4/27/22: Pt returns for follow up. He reports 24 period of severe diarrhea on monday. No fevers. Resolved spontaneously. Pt reports he had imaging done 2 weeks ago. Eager fto discuss findings. Feeling well now. Pt will be  meeting with surgeon recommended by PMD for Right inguinal hernia repair.  6/22/22 feels well. He will meet his surgeon for his right inguinal hernia.   7/20/22 feels overall fine. Denies any pain and weight loss.     8/17/22: Patient returns for follow up, continued on nivolumab. Had CT scans 1 week ago that show stable L renal mass and stable R renal cyst. Also remarkable for non specific mild increase in illdefined omental infiltration - omental carcinomatosis is not excluded, will continue short interval scanning. Patient reports having less appetite than normal. He feels less motivation to cook for himself. He does drink an ensure everyday. He feels he gets moncada sooner and has lost a few pounds. He denies nausea, vomiting and GERD. Patient is often tired, but is still able to complete every thing he needs to do, it may just take him a bit longer. He is working on setting up his inguinal hernia surgery.  9/14: energy is good. seeing the surgeon next week for the hernia. appetite is okay now. no longer has the feeling of getting full early. able to eat full meals  10/12: appetite back to normal. no rash, pain, CP, SOB. energy is okay  11/9/22: feels well today. no new changes   1/4/22: doing well today. no new issues. energy good. appetite good. no new joint pain, rash, SOB, diarreha.  2/1/23: Returns for follow up and treatment with nivolumab. Patient has been feeling well. Denies fatigue, walks 2-2.5 miles with his dog per day. He has not yet seen a surgeon for his inguinal hernia. His PCP gave him a surgeon referral, he says he has not gotten up the nerve to call. Denies pain.  Patient denies fever, chills, headache, vision changes, cough, shortness of breath, chest pain, palpitations, abdominal pain, nausea/vomiting, diarrhea, constipation, dysuria, hematuria, itching, rashes, joint pain, mucositis, changes in sensation.  3/1/23: continued on nivolumab. Patient feels well overall, eating well, has good energy, goes on daily walks with his dog. He notes his urine stream is not as strong in the morning, but improves throughout the day. Denies dysuria/hematuria. He went to see a surgeon last month and was described what his inguinal hernia repair would be, he is planning to have the surgery but not until the Spring.. he denies any change in color or size of hernia.   3/9/23 CT c/a/p showed Multiple bilateral small renal lesions overall unchanged when compared to the prior examinations and can be consistent with a neoplastic process. Diffuse areas of omental/peritoneal thickening and areas of nodularity and diffuse small lymph nodes within the mesentery and retroperitoneum but overall configuration unchanged  3/29/23 reports feeling overall fine, denies skin and joint pain  4/26/23: feels well, no new changes compared to last visit  5/24/23: continued on nivolumab for met RCC. Overall, patient is doing well. He is exercising more, walking with his dog, riding his bike. Appetite is good. He is not planning to pursue hernia surgery until after his next scans in July, he wants to feel comfortable. Patient denies fever, chills, headache, vision changes, cough, shortness of breath, chest pain, palpitations, abdominal pain, nausea/vomiting, diarrhea, constipation, dysuria, hematuria, itching, rashes, muscle and joint pain, paraesthesias.     6/21/23: continued on nivolumab. Energy level is good, doing his same routine. Appetite is good. Last week he pulled a muscle in back when doing yard work, pain lasted for about 3-4 days and has now resolved. Otherwise no joint or muscle pain. Pt notes that due to hernia he often feels as though he is not completely emptying his bladder. Notes nocturia x 2. Denies shortness of breath, coughing, constipation, diarrhea, itching, rashes.   7/19/23 reports feeling well, no fatigue and skin rash.   816/23 Patient reports he is doing well. Denies nausea, vomiting, diarrhea, constipation, fevers, chills. No issues with urination, denies blood in urine. No SOB, or chest pain. Reports appetite is good.   9/13/23: Patient has lost a few pounds since his last visit, he says his appetite is okay. Has good energy. Sometimes he feels like he is not completely emptying his bladder, notes nocturia x 2. Patient says his hernia is not bothering him, no pain, he is not ready to get surgery just now but continues to consider it...    10/11/23: Patient continues on nivolumab, continues to feel well. His appetite and energy are good; denies fatigue. He walks 1-2 miles per day. He is redoing a bathroom in his home, all on his own. He does report that he feels he is not completely emptying his bladder and nocturia x 2, these symptoms are stable. He denies fever, chills, sore throat, shortness of breath, cough, constipation, diarrhea, rashes, itching, rashes, joint and muscle pain.    11/8/23: Patient continues to do well on nivolumab. Energy and appetite are good. He has a routine that he sticks too, bed just before 10pm and wakes up at about 4:30am to take his dog out for a walk. He has been working on a home improvement project that he hopes to complete this weekend. He has not been sick recently. Is due for a physical with PCP next month. Has not yet gotten flu shot, will either get this at a pharmacy or at his upcoming physical.   12/6/23 feels overall fine, denies skin rash, joint pain and diarrhea.   1/3/24: Overall, patient is doing well. Denies any new or changing symptoms. Energy and appetite are good. Denies chest pain, palpitations, cough, shortness of breath, rashes, itching, specific joint and muscle pain.    1/30/24 reports feeling overall fine, has good energy, eats well.  [de-identified] : metastatic renal cell carcinoma [de-identified] : 2/28/24:  [FreeTextEntry1] : Ipi + nivo (7/2019) > nivolumab 480 mg monthly - cont [N/A] : Currently not applicable

## 2024-02-28 ENCOUNTER — APPOINTMENT (OUTPATIENT)
Dept: HEMATOLOGY ONCOLOGY | Facility: CLINIC | Age: 74
End: 2024-02-28
Payer: MEDICARE

## 2024-02-28 ENCOUNTER — APPOINTMENT (OUTPATIENT)
Dept: INFUSION THERAPY | Facility: HOSPITAL | Age: 74
End: 2024-02-28

## 2024-02-28 ENCOUNTER — RESULT REVIEW (OUTPATIENT)
Age: 74
End: 2024-02-28

## 2024-02-28 VITALS
RESPIRATION RATE: 16 BRPM | SYSTOLIC BLOOD PRESSURE: 133 MMHG | BODY MASS INDEX: 22.51 KG/M2 | WEIGHT: 153 LBS | DIASTOLIC BLOOD PRESSURE: 72 MMHG | OXYGEN SATURATION: 100 % | HEART RATE: 58 BPM | TEMPERATURE: 97.4 F

## 2024-02-28 LAB
ALBUMIN SERPL ELPH-MCNC: 4.1 G/DL — SIGNIFICANT CHANGE UP (ref 3.3–5)
ALP SERPL-CCNC: 68 U/L — SIGNIFICANT CHANGE UP (ref 40–120)
ALT FLD-CCNC: 9 U/L — LOW (ref 10–45)
ANION GAP SERPL CALC-SCNC: 10 MMOL/L — SIGNIFICANT CHANGE UP (ref 5–17)
AST SERPL-CCNC: 26 U/L — SIGNIFICANT CHANGE UP (ref 10–40)
BASOPHILS # BLD AUTO: 0.03 K/UL — SIGNIFICANT CHANGE UP (ref 0–0.2)
BASOPHILS NFR BLD AUTO: 0.4 % — SIGNIFICANT CHANGE UP (ref 0–2)
BILIRUB SERPL-MCNC: 0.5 MG/DL — SIGNIFICANT CHANGE UP (ref 0.2–1.2)
BUN SERPL-MCNC: 23 MG/DL — SIGNIFICANT CHANGE UP (ref 7–23)
CALCIUM SERPL-MCNC: 9.5 MG/DL — SIGNIFICANT CHANGE UP (ref 8.4–10.5)
CHLORIDE SERPL-SCNC: 105 MMOL/L — SIGNIFICANT CHANGE UP (ref 96–108)
CO2 SERPL-SCNC: 26 MMOL/L — SIGNIFICANT CHANGE UP (ref 22–31)
CREAT SERPL-MCNC: 1.28 MG/DL — SIGNIFICANT CHANGE UP (ref 0.5–1.3)
EGFR: 59 ML/MIN/1.73M2 — LOW
EOSINOPHIL # BLD AUTO: 0.18 K/UL — SIGNIFICANT CHANGE UP (ref 0–0.5)
EOSINOPHIL NFR BLD AUTO: 2.5 % — SIGNIFICANT CHANGE UP (ref 0–6)
GLUCOSE SERPL-MCNC: 113 MG/DL — HIGH (ref 70–99)
HCT VFR BLD CALC: 38.9 % — LOW (ref 39–50)
HGB BLD-MCNC: 13.7 G/DL — SIGNIFICANT CHANGE UP (ref 13–17)
IMM GRANULOCYTES NFR BLD AUTO: 0.3 % — SIGNIFICANT CHANGE UP (ref 0–0.9)
LYMPHOCYTES # BLD AUTO: 1.37 K/UL — SIGNIFICANT CHANGE UP (ref 1–3.3)
LYMPHOCYTES # BLD AUTO: 19.3 % — SIGNIFICANT CHANGE UP (ref 13–44)
MCHC RBC-ENTMCNC: 32.2 PG — SIGNIFICANT CHANGE UP (ref 27–34)
MCHC RBC-ENTMCNC: 35.2 G/DL — SIGNIFICANT CHANGE UP (ref 32–36)
MCV RBC AUTO: 91.5 FL — SIGNIFICANT CHANGE UP (ref 80–100)
MONOCYTES # BLD AUTO: 0.6 K/UL — SIGNIFICANT CHANGE UP (ref 0–0.9)
MONOCYTES NFR BLD AUTO: 8.5 % — SIGNIFICANT CHANGE UP (ref 2–14)
NEUTROPHILS # BLD AUTO: 4.9 K/UL — SIGNIFICANT CHANGE UP (ref 1.8–7.4)
NEUTROPHILS NFR BLD AUTO: 69 % — SIGNIFICANT CHANGE UP (ref 43–77)
NRBC # BLD: 0 /100 WBCS — SIGNIFICANT CHANGE UP (ref 0–0)
PLATELET # BLD AUTO: 190 K/UL — SIGNIFICANT CHANGE UP (ref 150–400)
POTASSIUM SERPL-MCNC: 4.4 MMOL/L — SIGNIFICANT CHANGE UP (ref 3.5–5.3)
POTASSIUM SERPL-SCNC: 4.4 MMOL/L — SIGNIFICANT CHANGE UP (ref 3.5–5.3)
PROT SERPL-MCNC: 7 G/DL — SIGNIFICANT CHANGE UP (ref 6–8.3)
RBC # BLD: 4.25 M/UL — SIGNIFICANT CHANGE UP (ref 4.2–5.8)
RBC # FLD: 12.6 % — SIGNIFICANT CHANGE UP (ref 10.3–14.5)
SODIUM SERPL-SCNC: 141 MMOL/L — SIGNIFICANT CHANGE UP (ref 135–145)
T4 FREE SERPL-MCNC: 1.2 NG/DL — SIGNIFICANT CHANGE UP (ref 0.9–1.8)
TSH SERPL-MCNC: 1.33 UIU/ML — SIGNIFICANT CHANGE UP (ref 0.27–4.2)
WBC # BLD: 7.1 K/UL — SIGNIFICANT CHANGE UP (ref 3.8–10.5)
WBC # FLD AUTO: 7.1 K/UL — SIGNIFICANT CHANGE UP (ref 3.8–10.5)

## 2024-02-28 PROCEDURE — G2211 COMPLEX E/M VISIT ADD ON: CPT

## 2024-02-28 PROCEDURE — 99214 OFFICE O/P EST MOD 30 MIN: CPT

## 2024-03-05 DIAGNOSIS — Z51.89 ENCOUNTER FOR OTHER SPECIFIED AFTERCARE: ICD-10-CM

## 2024-03-27 ENCOUNTER — RESULT REVIEW (OUTPATIENT)
Age: 74
End: 2024-03-27

## 2024-03-27 ENCOUNTER — APPOINTMENT (OUTPATIENT)
Dept: INFUSION THERAPY | Facility: HOSPITAL | Age: 74
End: 2024-03-27

## 2024-03-27 ENCOUNTER — APPOINTMENT (OUTPATIENT)
Dept: HEMATOLOGY ONCOLOGY | Facility: CLINIC | Age: 74
End: 2024-03-27
Payer: MEDICARE

## 2024-03-27 VITALS
DIASTOLIC BLOOD PRESSURE: 71 MMHG | WEIGHT: 153.22 LBS | BODY MASS INDEX: 22.54 KG/M2 | TEMPERATURE: 98 F | RESPIRATION RATE: 16 BRPM | HEART RATE: 53 BPM | SYSTOLIC BLOOD PRESSURE: 114 MMHG | OXYGEN SATURATION: 99 %

## 2024-03-27 LAB
ALBUMIN SERPL ELPH-MCNC: 4.3 G/DL — SIGNIFICANT CHANGE UP (ref 3.3–5)
ALP SERPL-CCNC: 65 U/L — SIGNIFICANT CHANGE UP (ref 40–120)
ALT FLD-CCNC: 10 U/L — SIGNIFICANT CHANGE UP (ref 10–45)
ANION GAP SERPL CALC-SCNC: 9 MMOL/L — SIGNIFICANT CHANGE UP (ref 5–17)
AST SERPL-CCNC: 34 U/L — SIGNIFICANT CHANGE UP (ref 10–40)
BASOPHILS # BLD AUTO: 0.03 K/UL — SIGNIFICANT CHANGE UP (ref 0–0.2)
BASOPHILS NFR BLD AUTO: 0.3 % — SIGNIFICANT CHANGE UP (ref 0–2)
BILIRUB SERPL-MCNC: 0.6 MG/DL — SIGNIFICANT CHANGE UP (ref 0.2–1.2)
BUN SERPL-MCNC: 21 MG/DL — SIGNIFICANT CHANGE UP (ref 7–23)
CALCIUM SERPL-MCNC: 9.4 MG/DL — SIGNIFICANT CHANGE UP (ref 8.4–10.5)
CHLORIDE SERPL-SCNC: 105 MMOL/L — SIGNIFICANT CHANGE UP (ref 96–108)
CO2 SERPL-SCNC: 24 MMOL/L — SIGNIFICANT CHANGE UP (ref 22–31)
CREAT SERPL-MCNC: 1.28 MG/DL — SIGNIFICANT CHANGE UP (ref 0.5–1.3)
EGFR: 59 ML/MIN/1.73M2 — LOW
EOSINOPHIL # BLD AUTO: 0.13 K/UL — SIGNIFICANT CHANGE UP (ref 0–0.5)
EOSINOPHIL NFR BLD AUTO: 1.4 % — SIGNIFICANT CHANGE UP (ref 0–6)
GLUCOSE SERPL-MCNC: 107 MG/DL — HIGH (ref 70–99)
HCT VFR BLD CALC: 38.8 % — LOW (ref 39–50)
HGB BLD-MCNC: 13.4 G/DL — SIGNIFICANT CHANGE UP (ref 13–17)
IMM GRANULOCYTES NFR BLD AUTO: 0.4 % — SIGNIFICANT CHANGE UP (ref 0–0.9)
LYMPHOCYTES # BLD AUTO: 1.29 K/UL — SIGNIFICANT CHANGE UP (ref 1–3.3)
LYMPHOCYTES # BLD AUTO: 14 % — SIGNIFICANT CHANGE UP (ref 13–44)
MCHC RBC-ENTMCNC: 31.5 PG — SIGNIFICANT CHANGE UP (ref 27–34)
MCHC RBC-ENTMCNC: 34.5 G/DL — SIGNIFICANT CHANGE UP (ref 32–36)
MCV RBC AUTO: 91.3 FL — SIGNIFICANT CHANGE UP (ref 80–100)
MONOCYTES # BLD AUTO: 0.54 K/UL — SIGNIFICANT CHANGE UP (ref 0–0.9)
MONOCYTES NFR BLD AUTO: 5.9 % — SIGNIFICANT CHANGE UP (ref 2–14)
NEUTROPHILS # BLD AUTO: 7.16 K/UL — SIGNIFICANT CHANGE UP (ref 1.8–7.4)
NEUTROPHILS NFR BLD AUTO: 78 % — HIGH (ref 43–77)
NRBC # BLD: 0 /100 WBCS — SIGNIFICANT CHANGE UP (ref 0–0)
PLATELET # BLD AUTO: 185 K/UL — SIGNIFICANT CHANGE UP (ref 150–400)
POTASSIUM SERPL-MCNC: 4.9 MMOL/L — SIGNIFICANT CHANGE UP (ref 3.5–5.3)
POTASSIUM SERPL-SCNC: 4.9 MMOL/L — SIGNIFICANT CHANGE UP (ref 3.5–5.3)
PROT SERPL-MCNC: 7.2 G/DL — SIGNIFICANT CHANGE UP (ref 6–8.3)
RBC # BLD: 4.25 M/UL — SIGNIFICANT CHANGE UP (ref 4.2–5.8)
RBC # FLD: 12.2 % — SIGNIFICANT CHANGE UP (ref 10.3–14.5)
SODIUM SERPL-SCNC: 138 MMOL/L — SIGNIFICANT CHANGE UP (ref 135–145)
T4 FREE SERPL-MCNC: 1.2 NG/DL — SIGNIFICANT CHANGE UP (ref 0.9–1.8)
TSH SERPL-MCNC: 1.43 UIU/ML — SIGNIFICANT CHANGE UP (ref 0.27–4.2)
WBC # BLD: 9.19 K/UL — SIGNIFICANT CHANGE UP (ref 3.8–10.5)
WBC # FLD AUTO: 9.19 K/UL — SIGNIFICANT CHANGE UP (ref 3.8–10.5)

## 2024-03-27 PROCEDURE — G2211 COMPLEX E/M VISIT ADD ON: CPT

## 2024-03-27 PROCEDURE — 99214 OFFICE O/P EST MOD 30 MIN: CPT

## 2024-03-27 NOTE — PHYSICAL EXAM
[Fully active, able to carry on all pre-disease performance without restriction] : Status 0 - Fully active, able to carry on all pre-disease performance without restriction [Normal] : affect appropriate [de-identified] : anicteric  [de-identified] : supple, FROM  [de-identified] : no edema  [de-identified] : large inguinal hernia unchanged from prior

## 2024-03-27 NOTE — REVIEW OF SYSTEMS
[Fever] : no fever [Chills] : no chills [Fatigue] : no fatigue [Chest Pain] : no chest pain [Palpitations] : no palpitations [Lower Ext Edema] : no lower extremity edema [Shortness Of Breath] : no shortness of breath [Cough] : no cough [Abdominal Pain] : no abdominal pain [Vomiting] : no vomiting [Constipation] : no constipation [Dysuria] : no dysuria [Joint Pain] : no joint pain [Muscle Pain] : no muscle pain [Skin Rash] : no skin rash [Dizziness] : no dizziness [Difficulty Walking] : no difficulty walking [Easy Bleeding] : no tendency for easy bleeding

## 2024-03-27 NOTE — HISTORY OF PRESENT ILLNESS
[T: ___] : T[unfilled] [N: ___] : N[unfilled] [M: ___] : M[unfilled] [AJCC Stage: ____] : AJCC Stage: [unfilled] [N/A] : Currently not applicable [de-identified] : Finesse Zuniga is a 70 years old male who initially presented with intermittent midback pain, 10/10 radiating to stomach from both sides on June 1, 2019. He has poor appetite.  On june 12 he was diagnosed UTI and on cipro for 4 days, on June 16, was started cefpodoxime 100mg BID for 7 days.  CT chest/abdomen/pelvis revealed significant abnormal appearance of the left kidney with multiple heterogeneous areas of low attenuation involving the upper and midpole which may represent malignancy. There are necrotic lymph nodes in the left para-aortic location at the level of the kidney and below the level of the kidney. Adenopathy surrounds the left renal artery. The left renal vein demonstrates question of partially occlusive thrombus. Nonspecific scattered tiny pulmonary nodules. Lower lobe subpleural nodules measure up to 6 mm. There is a well-circumscribed hypodense lesion in the anterior aspect of the lateral segment of the left lobe measuring 3.2 x 2.2 cm. He underwent CT guided liver lesion biopsy which showed consistent with primary renal cell carcinoma, IHC CK7/CK19/PAX8 positive. MRI abdomen waw con showed multiple bilateral pulmonary nodules increased in size and number compared with recent CT. The largest is pleural-based in the left lower lobe and measures 1.5 x 1.1 cm. LIVER: Rim-enhancing subcapsular mass in segment 3 and measures 4.4 x 2.6 cm. A 4.2cm mass in mid to lower pole of the lft kidney suspicious for urothelial malignancy arising within the left renal collecting system. Regional lymphadenopathy and hepatic and pulmonary metastases. Nonocclusive thrombus within the left renal vein. He was started on eliquis. Bone scan showed  a focus of mildly increased uptake in the right superior  pubic ramus suspicious of bone mets. He has lost 17lbs in one month. He denies nausea, vomiting and constipation and diarrhea.   7/2019: ipilimumab + nivolumab initiated and to nivolumab maintenance post 4 cycles and continues on therapy.  9/18-22/2020 : Patient admitted for hematuria. Patient followed by urology and hematology during hospital course. Patient was started on CBI to help remove clots. Urine initially bloody, but became pink-tinged and then clear over time. CBI stopped and barber removed. Patient reportedly felt much better after barber removed, able to urinate by himself- reported some clots initially, but since resolved. States bladder spasms improved as well. Patient's eliquis was stopped, MRI done showing no evidence of renal vein thrombus, discussed  with Heme, no reason to continue eliquis for now. Patient's leukocytosis and JOSE MARIA resolved. Urine culture and blood cultures negative.  Patient improved clinically throughout hospital course. Patient seen and examined on day of discharge.  Cystoscopy was negative, performed by Dr. Javier.   5/26/2021 : Mr. Vera is here for nivolumab. Last scan 3/29/2021, will need another set of scan the end or beginning of July. He walks 2 miles a day with his dog and bikes 3-4 miles a day. He feels well overall. Mr. VERA denies fevers, chills, headaches, cough, SOB, chest pain, any swelling, nausea, vomiting, diarrhea, rash, or malaise.   6/23/21: Bothering Rt side inguinal hernia without pain or signs of incarceration. Doing well overall. Scheduled for nivo today. He denies fevers, chills, headaches, cough, SOB, chest pain, any swelling, nausea, vomiting, diarrhea, rash, or malaise.  7/7/2021 : CT showed stable disease  7/28/2021 : Here for nivolumab. 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 Mr. VERA denies fevers, chills, headaches, cough, SOB, chest pain, any swelling, nausea, vomiting, diarrhea, rash, or malaise. He has been tolerating therapy extremely well without sig issues. He is to see a general surgeon for hernia repair soon.   8/25/2021 : Here for nivolumab, scan due in September. Mr. VERA denies fevers, chills, headaches, cough, SOB, chest pain, any swelling, nausea, vomiting, diarrhea, rash, or malaise.   10/27/21: Pt presents for follow up. He is scheduled for Nivolumab which he has been tolerating well. Scheduled to receive cycle #30 today. Denies any diarrhea/ itchiness, cough, hematuria, rash. Appetite good. Energy good. No new complaints. Recieved both flu shot and COVID booster recently  11/24/21: Pt feeling well. Offers no complaints. Scheduled for C#31 nivolumab today. Due for imaging next month. Already scheduled  12/21/21 CT chest/abd/pelvis - Stable appearance of the chest compared with 9/4/2021. Indeterminate 1.4 cm hypodense lesion in the upper pole of the right kidney is unchanged. Atrophy is again noted involving the upper pole of the left kidney, unchanged. Very large right inguinal hernia containing non-obstructed large and small bowel and a small amount of fluid. Enlarged heterogeneous prostate gland. Nonspecific ill-defined infiltration of the fat in the right lower quadrant, not significantly changed. Attention to this area is suggested on follow-up CT.  12/29/21 - presents for ongoing Opdivo cycle #32 today. Overall feeling "good", energy is good. Appetite is good. No complaints.   3/2/22 feels quite well, denies any pain and gross hematuria. He has a schedule in April to take care of right inguinal hernia.   4/6/22 feels fine, denies pain and gross hematuria. His right inguinal hernia is getting worse.   4/27/22: Pt returns for follow up. He reports 24 period of severe diarrhea on monday. No fevers. Resolved spontaneously. Pt reports he had imaging done 2 weeks ago. Eager fto discuss findings. Feeling well now. Pt will be  meeting with surgeon recommended by PMD for Right inguinal hernia repair.  6/22/22 feels well. He will meet his surgeon for his right inguinal hernia.   7/20/22 feels overall fine. Denies any pain and weight loss.     8/17/22: Patient returns for follow up, continued on nivolumab. Had CT scans 1 week ago that show stable L renal mass and stable R renal cyst. Also remarkable for non specific mild increase in illdefined omental infiltration - omental carcinomatosis is not excluded, will continue short interval scanning. Patient reports having less appetite than normal. He feels less motivation to cook for himself. He does drink an ensure everyday. He feels he gets moncada sooner and has lost a few pounds. He denies nausea, vomiting and GERD. Patient is often tired, but is still able to complete every thing he needs to do, it may just take him a bit longer. He is working on setting up his inguinal hernia surgery.  9/14: energy is good. seeing the surgeon next week for the hernia. appetite is okay now. no longer has the feeling of getting full early. able to eat full meals  10/12: appetite back to normal. no rash, pain, CP, SOB. energy is okay  11/9/22: feels well today. no new changes   1/4/22: doing well today. no new issues. energy good. appetite good. no new joint pain, rash, SOB, diarreha.  2/1/23: Returns for follow up and treatment with nivolumab. Patient has been feeling well. Denies fatigue, walks 2-2.5 miles with his dog per day. He has not yet seen a surgeon for his inguinal hernia. His PCP gave him a surgeon referral, he says he has not gotten up the nerve to call. Denies pain.  Patient denies fever, chills, headache, vision changes, cough, shortness of breath, chest pain, palpitations, abdominal pain, nausea/vomiting, diarrhea, constipation, dysuria, hematuria, itching, rashes, joint pain, mucositis, changes in sensation.  3/1/23: continued on nivolumab. Patient feels well overall, eating well, has good energy, goes on daily walks with his dog. He notes his urine stream is not as strong in the morning, but improves throughout the day. Denies dysuria/hematuria. He went to see a surgeon last month and was described what his inguinal hernia repair would be, he is planning to have the surgery but not until the Spring.. he denies any change in color or size of hernia.   3/9/23 CT c/a/p showed Multiple bilateral small renal lesions overall unchanged when compared to the prior examinations and can be consistent with a neoplastic process. Diffuse areas of omental/peritoneal thickening and areas of nodularity and diffuse small lymph nodes within the mesentery and retroperitoneum but overall configuration unchanged  3/29/23 reports feeling overall fine, denies skin and joint pain  4/26/23: feels well, no new changes compared to last visit  5/24/23: continued on nivolumab for met RCC. Overall, patient is doing well. He is exercising more, walking with his dog, riding his bike. Appetite is good. He is not planning to pursue hernia surgery until after his next scans in July, he wants to feel comfortable. Patient denies fever, chills, headache, vision changes, cough, shortness of breath, chest pain, palpitations, abdominal pain, nausea/vomiting, diarrhea, constipation, dysuria, hematuria, itching, rashes, muscle and joint pain, paraesthesias.     6/21/23: continued on nivolumab. Energy level is good, doing his same routine. Appetite is good. Last week he pulled a muscle in back when doing yard work, pain lasted for about 3-4 days and has now resolved. Otherwise no joint or muscle pain. Pt notes that due to hernia he often feels as though he is not completely emptying his bladder. Notes nocturia x 2. Denies shortness of breath, coughing, constipation, diarrhea, itching, rashes.   7/19/23 reports feeling well, no fatigue and skin rash.   816/23 Patient reports he is doing well. Denies nausea, vomiting, diarrhea, constipation, fevers, chills. No issues with urination, denies blood in urine. No SOB, or chest pain. Reports appetite is good.   9/13/23: Patient has lost a few pounds since his last visit, he says his appetite is okay. Has good energy. Sometimes he feels like he is not completely emptying his bladder, notes nocturia x 2. Patient says his hernia is not bothering him, no pain, he is not ready to get surgery just now but continues to consider it...    10/11/23: Patient continues on nivolumab, continues to feel well. His appetite and energy are good; denies fatigue. He walks 1-2 miles per day. He is redoing a bathroom in his home, all on his own. He does report that he feels he is not completely emptying his bladder and nocturia x 2, these symptoms are stable. He denies fever, chills, sore throat, shortness of breath, cough, constipation, diarrhea, rashes, itching, rashes, joint and muscle pain.    11/8/23: Patient continues to do well on nivolumab. Energy and appetite are good. He has a routine that he sticks too, bed just before 10pm and wakes up at about 4:30am to take his dog out for a walk. He has been working on a home improvement project that he hopes to complete this weekend. He has not been sick recently. Is due for a physical with PCP next month. Has not yet gotten flu shot, will either get this at a pharmacy or at his upcoming physical.   12/6/23 feels overall fine, denies skin rash, joint pain and diarrhea.   1/3/24: Overall, patient is doing well. Denies any new or changing symptoms. Energy and appetite are good. Denies chest pain, palpitations, cough, shortness of breath, rashes, itching, specific joint and muscle pain.    1/30/24 reports feeling overall fine, has good energy, eats well.   2/28/24: Patient has been feeling well, no new or changing symptoms, appetite and energy are good. Says he pulled a muscle in his neck when installing a microwave last week, pain now improved; he does not have any other joint or muscle pain. Denies changes in hernia, no pain.   [de-identified] : metastatic renal cell carcinoma [FreeTextEntry1] : Ipi + nivo (7/2019) > nivolumab 480 mg monthly - cont [de-identified] : 3/27/24: Patient is feeling well, he is staying busy, goes for walks most days. His appetite is good, he is not a big eater, weight is stable. He denies new or changing symptoms. He has not noted any changes to inguinal hernia.

## 2024-03-27 NOTE — ASSESSMENT
[FreeTextEntry1] : Mr. Mcknight is a 74 year old male with metastatic renal cell carcinoma. On initial presentation CT c/a/p and MRI abdomen showed multiple lung nodules, one liver lesion, retroperitoneal nodes. Bone scan showed one suspicious met in right superior pubic ramus. Liver lesion biopsy is consistent with renal cell carcinoma. It is not certain that the histology of RCC is clear cell vs non-clear cell. IMDC risk of this patient he has 5 out of 6 risks including leukocytosis, anemia, thrombocytosis, time from dx to initiate systemic treatment less than one year and hyperalcemia. He has poor risk of mRCC. Based on [facility] the standard of care is combined immunotherapy nivo and ipi vs sutent, checkmate 214 in the intermediate and poor risk of [facility]RCC patients with significant overall survival benefit, keynote 426 keytruda plus axitinib vs sutent in good, intermediate and poor risk of [facility] patients showed OS, PFS and MICHEL benefit, [place] in the intermediate and poor risk of [facility]RCC patients showed cabozantinib demonstrated a significant clinical benefit in PFS and MICHEL over standard-of-care sunitinib as first-line therapy in patients with intermediate- or poor-risk mRCC. Consider the longer duration of response and less side effects and recommended nivo and ipi. Completed 4 cycles of nivo and ipi, continued on maintenance nivolumab with interval scans to monitor disease. Of note, on diagnosis patient had L renal vein thrombus and was started on eliquis. He had an episode of gross hematuria leading to hospitalization in 9/2020 however etiology of hematuria is unclear and resolved. Follow up cystoscopy to r/o abnormality in the bladder showed no abnormality. Eliquis stopped during hospitalization with hematology consult. Remains off of eliquis. Scans have continued to show stability. Scans from 8/11/22 showed stable disease in L kidney and stable R renal cyst, noted non specific infiltration in omentum, cannot exclude carcinomatosis. CTs in November 2022 show stable disease. Patient also has large R inguinal hernia. March 2023, August 2023, Dec 2023 CT c/a/p showed stable disease in lungs, kidney and retroperitoneum.  Plan Metastatic RCC - continue maintenance nivolumab [id number], proceed with treatment today - 12/19/23 CT chest and AP: Multiple small bilateral renal neoplasms, at least 3 on the right and 2 on the left, not significantly changed from prior; similar omental nodularity - imaging [id number], ordered imaging for end of April today - continue to monitor blood work - monitor for iRAEs, reviewed with patient today; continues to tolerate treatment well  Instructed to contact our office with any new/worsening symptoms. Patient educated regarding plan of care, all questions/concerns addressed to the best of my abilities and patient's apparent satisfaction. RTC 4 weeks

## 2024-04-17 ENCOUNTER — OUTPATIENT (OUTPATIENT)
Dept: OUTPATIENT SERVICES | Facility: HOSPITAL | Age: 74
LOS: 1 days | Discharge: HOME | End: 2024-04-17

## 2024-04-17 DIAGNOSIS — S86.019A STRAIN OF UNSPECIFIED ACHILLES TENDON, INITIAL ENCOUNTER: Chronic | ICD-10-CM

## 2024-04-17 DIAGNOSIS — Z98.890 OTHER SPECIFIED POSTPROCEDURAL STATES: Chronic | ICD-10-CM

## 2024-04-17 DIAGNOSIS — C64.9 MALIGNANT NEOPLASM OF UNSPECIFIED KIDNEY, EXCEPT RENAL PELVIS: ICD-10-CM

## 2024-04-18 ENCOUNTER — OUTPATIENT (OUTPATIENT)
Dept: OUTPATIENT SERVICES | Facility: HOSPITAL | Age: 74
LOS: 1 days | End: 2024-04-18
Payer: MEDICARE

## 2024-04-18 ENCOUNTER — APPOINTMENT (OUTPATIENT)
Dept: CT IMAGING | Facility: CLINIC | Age: 74
End: 2024-04-18
Payer: MEDICARE

## 2024-04-18 DIAGNOSIS — S86.019A STRAIN OF UNSPECIFIED ACHILLES TENDON, INITIAL ENCOUNTER: Chronic | ICD-10-CM

## 2024-04-18 DIAGNOSIS — C64.2 MALIGNANT NEOPLASM OF LEFT KIDNEY, EXCEPT RENAL PELVIS: ICD-10-CM

## 2024-04-18 DIAGNOSIS — Z98.890 OTHER SPECIFIED POSTPROCEDURAL STATES: Chronic | ICD-10-CM

## 2024-04-18 DIAGNOSIS — Z00.8 ENCOUNTER FOR OTHER GENERAL EXAMINATION: ICD-10-CM

## 2024-04-18 PROCEDURE — 71260 CT THORAX DX C+: CPT | Mod: 26

## 2024-04-18 PROCEDURE — 74177 CT ABD & PELVIS W/CONTRAST: CPT | Mod: 26

## 2024-04-18 PROCEDURE — 74177 CT ABD & PELVIS W/CONTRAST: CPT

## 2024-04-18 PROCEDURE — 71260 CT THORAX DX C+: CPT

## 2024-04-23 ENCOUNTER — NON-APPOINTMENT (OUTPATIENT)
Age: 74
End: 2024-04-23

## 2024-04-24 ENCOUNTER — RESULT REVIEW (OUTPATIENT)
Age: 74
End: 2024-04-24

## 2024-04-24 ENCOUNTER — APPOINTMENT (OUTPATIENT)
Dept: HEMATOLOGY ONCOLOGY | Facility: CLINIC | Age: 74
End: 2024-04-24
Payer: MEDICARE

## 2024-04-24 ENCOUNTER — APPOINTMENT (OUTPATIENT)
Dept: INFUSION THERAPY | Facility: HOSPITAL | Age: 74
End: 2024-04-24

## 2024-04-24 VITALS
HEART RATE: 48 BPM | OXYGEN SATURATION: 100 % | HEIGHT: 69.13 IN | SYSTOLIC BLOOD PRESSURE: 134 MMHG | DIASTOLIC BLOOD PRESSURE: 79 MMHG | WEIGHT: 150 LBS | TEMPERATURE: 98 F | RESPIRATION RATE: 16 BRPM | BODY MASS INDEX: 21.97 KG/M2

## 2024-04-24 LAB
ALBUMIN SERPL ELPH-MCNC: 4.2 G/DL — SIGNIFICANT CHANGE UP (ref 3.3–5)
ALP SERPL-CCNC: 66 U/L — SIGNIFICANT CHANGE UP (ref 40–120)
ALT FLD-CCNC: 18 U/L — SIGNIFICANT CHANGE UP (ref 10–45)
ANION GAP SERPL CALC-SCNC: 9 MMOL/L — SIGNIFICANT CHANGE UP (ref 5–17)
AST SERPL-CCNC: 24 U/L — SIGNIFICANT CHANGE UP (ref 10–40)
BILIRUB SERPL-MCNC: 0.9 MG/DL — SIGNIFICANT CHANGE UP (ref 0.2–1.2)
BUN SERPL-MCNC: 18 MG/DL — SIGNIFICANT CHANGE UP (ref 7–23)
CALCIUM SERPL-MCNC: 9.7 MG/DL — SIGNIFICANT CHANGE UP (ref 8.4–10.5)
CHLORIDE SERPL-SCNC: 103 MMOL/L — SIGNIFICANT CHANGE UP (ref 96–108)
CO2 SERPL-SCNC: 26 MMOL/L — SIGNIFICANT CHANGE UP (ref 22–31)
CREAT SERPL-MCNC: 1.28 MG/DL — SIGNIFICANT CHANGE UP (ref 0.5–1.3)
EGFR: 59 ML/MIN/1.73M2 — LOW
GLUCOSE SERPL-MCNC: 77 MG/DL — SIGNIFICANT CHANGE UP (ref 70–99)
HCT VFR BLD CALC: 40.2 % — SIGNIFICANT CHANGE UP (ref 39–50)
HGB BLD-MCNC: 14.3 G/DL — SIGNIFICANT CHANGE UP (ref 13–17)
MCHC RBC-ENTMCNC: 32.3 PG — SIGNIFICANT CHANGE UP (ref 27–34)
MCHC RBC-ENTMCNC: 35.6 GM/DL — SIGNIFICANT CHANGE UP (ref 32–36)
MCV RBC AUTO: 90.7 FL — SIGNIFICANT CHANGE UP (ref 80–100)
PLATELET # BLD AUTO: 185 K/UL — SIGNIFICANT CHANGE UP (ref 150–400)
POTASSIUM SERPL-MCNC: 4.8 MMOL/L — SIGNIFICANT CHANGE UP (ref 3.5–5.3)
POTASSIUM SERPL-SCNC: 4.8 MMOL/L — SIGNIFICANT CHANGE UP (ref 3.5–5.3)
PROT SERPL-MCNC: 7.3 G/DL — SIGNIFICANT CHANGE UP (ref 6–8.3)
RBC # BLD: 4.43 M/UL — SIGNIFICANT CHANGE UP (ref 4.2–5.8)
RBC # FLD: 12.5 % — SIGNIFICANT CHANGE UP (ref 10.3–14.5)
SODIUM SERPL-SCNC: 139 MMOL/L — SIGNIFICANT CHANGE UP (ref 135–145)
WBC # BLD: 8.63 K/UL — SIGNIFICANT CHANGE UP (ref 3.8–10.5)
WBC # FLD AUTO: 8.63 K/UL — SIGNIFICANT CHANGE UP (ref 3.8–10.5)

## 2024-04-24 PROCEDURE — G2211 COMPLEX E/M VISIT ADD ON: CPT

## 2024-04-24 PROCEDURE — 99214 OFFICE O/P EST MOD 30 MIN: CPT

## 2024-04-24 NOTE — PHYSICAL EXAM
[Fully active, able to carry on all pre-disease performance without restriction] : Status 0 - Fully active, able to carry on all pre-disease performance without restriction [Normal] : affect appropriate [de-identified] : anicteric  [de-identified] : supple, FROM  [de-identified] : no edema  [de-identified] : large inguinal hernia unchanged from prior

## 2024-04-24 NOTE — HISTORY OF PRESENT ILLNESS
[T: ___] : T[unfilled] [N: ___] : N[unfilled] [M: ___] : M[unfilled] [AJCC Stage: ____] : AJCC Stage: [unfilled] [N/A] : Currently not applicable [de-identified] : Finesse Zuniga is a 70 years old male who initially presented with intermittent midback pain, 10/10 radiating to stomach from both sides on June 1, 2019. He has poor appetite.  On june 12 he was diagnosed UTI and on cipro for 4 days, on June 16, was started cefpodoxime 100mg BID for 7 days.  CT chest/abdomen/pelvis revealed significant abnormal appearance of the left kidney with multiple heterogeneous areas of low attenuation involving the upper and midpole which may represent malignancy. There are necrotic lymph nodes in the left para-aortic location at the level of the kidney and below the level of the kidney. Adenopathy surrounds the left renal artery. The left renal vein demonstrates question of partially occlusive thrombus. Nonspecific scattered tiny pulmonary nodules. Lower lobe subpleural nodules measure up to 6 mm. There is a well-circumscribed hypodense lesion in the anterior aspect of the lateral segment of the left lobe measuring 3.2 x 2.2 cm. He underwent CT guided liver lesion biopsy which showed consistent with primary renal cell carcinoma, IHC CK7/CK19/PAX8 positive. MRI abdomen waw con showed multiple bilateral pulmonary nodules increased in size and number compared with recent CT. The largest is pleural-based in the left lower lobe and measures 1.5 x 1.1 cm. LIVER: Rim-enhancing subcapsular mass in segment 3 and measures 4.4 x 2.6 cm. A 4.2cm mass in mid to lower pole of the lft kidney suspicious for urothelial malignancy arising within the left renal collecting system. Regional lymphadenopathy and hepatic and pulmonary metastases. Nonocclusive thrombus within the left renal vein. He was started on eliquis. Bone scan showed  a focus of mildly increased uptake in the right superior  pubic ramus suspicious of bone mets. He has lost 17lbs in one month. He denies nausea, vomiting and constipation and diarrhea.   7/2019: ipilimumab + nivolumab initiated and to nivolumab maintenance post 4 cycles and continues on therapy.  9/18-22/2020 : Patient admitted for hematuria. Patient followed by urology and hematology during hospital course. Patient was started on CBI to help remove clots. Urine initially bloody, but became pink-tinged and then clear over time. CBI stopped and barber removed. Patient reportedly felt much better after barber removed, able to urinate by himself- reported some clots initially, but since resolved. States bladder spasms improved as well. Patient's eliquis was stopped, MRI done showing no evidence of renal vein thrombus, discussed  with Heme, no reason to continue eliquis for now. Patient's leukocytosis and JOSE MARIA resolved. Urine culture and blood cultures negative.  Patient improved clinically throughout hospital course. Patient seen and examined on day of discharge.  Cystoscopy was negative, performed by Dr. Javier.   5/26/2021 : Mr. Vera is here for nivolumab. Last scan 3/29/2021, will need another set of scan the end or beginning of July. He walks 2 miles a day with his dog and bikes 3-4 miles a day. He feels well overall. Mr. VERA denies fevers, chills, headaches, cough, SOB, chest pain, any swelling, nausea, vomiting, diarrhea, rash, or malaise.   6/23/21: Bothering Rt side inguinal hernia without pain or signs of incarceration. Doing well overall. Scheduled for nivo today. He denies fevers, chills, headaches, cough, SOB, chest pain, any swelling, nausea, vomiting, diarrhea, rash, or malaise.  7/7/2021 : CT showed stable disease  7/28/2021 : Here for nivolumab. 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 Mr. VERA denies fevers, chills, headaches, cough, SOB, chest pain, any swelling, nausea, vomiting, diarrhea, rash, or malaise. He has been tolerating therapy extremely well without sig issues. He is to see a general surgeon for hernia repair soon.   8/25/2021 : Here for nivolumab, scan due in September. Mr. VERA denies fevers, chills, headaches, cough, SOB, chest pain, any swelling, nausea, vomiting, diarrhea, rash, or malaise.   10/27/21: Pt presents for follow up. He is scheduled for Nivolumab which he has been tolerating well. Scheduled to receive cycle #30 today. Denies any diarrhea/ itchiness, cough, hematuria, rash. Appetite good. Energy good. No new complaints. Recieved both flu shot and COVID booster recently  11/24/21: Pt feeling well. Offers no complaints. Scheduled for C#31 nivolumab today. Due for imaging next month. Already scheduled  12/21/21 CT chest/abd/pelvis - Stable appearance of the chest compared with 9/4/2021. Indeterminate 1.4 cm hypodense lesion in the upper pole of the right kidney is unchanged. Atrophy is again noted involving the upper pole of the left kidney, unchanged. Very large right inguinal hernia containing non-obstructed large and small bowel and a small amount of fluid. Enlarged heterogeneous prostate gland. Nonspecific ill-defined infiltration of the fat in the right lower quadrant, not significantly changed. Attention to this area is suggested on follow-up CT.  12/29/21 - presents for ongoing Opdivo cycle #32 today. Overall feeling "good", energy is good. Appetite is good. No complaints.   3/2/22 feels quite well, denies any pain and gross hematuria. He has a schedule in April to take care of right inguinal hernia.   4/6/22 feels fine, denies pain and gross hematuria. His right inguinal hernia is getting worse.   4/27/22: Pt returns for follow up. He reports 24 period of severe diarrhea on monday. No fevers. Resolved spontaneously. Pt reports he had imaging done 2 weeks ago. Eager fto discuss findings. Feeling well now. Pt will be  meeting with surgeon recommended by PMD for Right inguinal hernia repair.  6/22/22 feels well. He will meet his surgeon for his right inguinal hernia.   7/20/22 feels overall fine. Denies any pain and weight loss.     8/17/22: Patient returns for follow up, continued on nivolumab. Had CT scans 1 week ago that show stable L renal mass and stable R renal cyst. Also remarkable for non specific mild increase in illdefined omental infiltration - omental carcinomatosis is not excluded, will continue short interval scanning. Patient reports having less appetite than normal. He feels less motivation to cook for himself. He does drink an ensure everyday. He feels he gets moncada sooner and has lost a few pounds. He denies nausea, vomiting and GERD. Patient is often tired, but is still able to complete every thing he needs to do, it may just take him a bit longer. He is working on setting up his inguinal hernia surgery.  9/14: energy is good. seeing the surgeon next week for the hernia. appetite is okay now. no longer has the feeling of getting full early. able to eat full meals  10/12: appetite back to normal. no rash, pain, CP, SOB. energy is okay  11/9/22: feels well today. no new changes   1/4/22: doing well today. no new issues. energy good. appetite good. no new joint pain, rash, SOB, diarreha.  2/1/23: Returns for follow up and treatment with nivolumab. Patient has been feeling well. Denies fatigue, walks 2-2.5 miles with his dog per day. He has not yet seen a surgeon for his inguinal hernia. His PCP gave him a surgeon referral, he says he has not gotten up the nerve to call. Denies pain.  Patient denies fever, chills, headache, vision changes, cough, shortness of breath, chest pain, palpitations, abdominal pain, nausea/vomiting, diarrhea, constipation, dysuria, hematuria, itching, rashes, joint pain, mucositis, changes in sensation.  3/1/23: continued on nivolumab. Patient feels well overall, eating well, has good energy, goes on daily walks with his dog. He notes his urine stream is not as strong in the morning, but improves throughout the day. Denies dysuria/hematuria. He went to see a surgeon last month and was described what his inguinal hernia repair would be, he is planning to have the surgery but not until the Spring.. he denies any change in color or size of hernia.   3/9/23 CT c/a/p showed Multiple bilateral small renal lesions overall unchanged when compared to the prior examinations and can be consistent with a neoplastic process. Diffuse areas of omental/peritoneal thickening and areas of nodularity and diffuse small lymph nodes within the mesentery and retroperitoneum but overall configuration unchanged  3/29/23 reports feeling overall fine, denies skin and joint pain  4/26/23: feels well, no new changes compared to last visit  5/24/23: continued on nivolumab for met RCC. Overall, patient is doing well. He is exercising more, walking with his dog, riding his bike. Appetite is good. He is not planning to pursue hernia surgery until after his next scans in July, he wants to feel comfortable. Patient denies fever, chills, headache, vision changes, cough, shortness of breath, chest pain, palpitations, abdominal pain, nausea/vomiting, diarrhea, constipation, dysuria, hematuria, itching, rashes, muscle and joint pain, paraesthesias.     6/21/23: continued on nivolumab. Energy level is good, doing his same routine. Appetite is good. Last week he pulled a muscle in back when doing yard work, pain lasted for about 3-4 days and has now resolved. Otherwise no joint or muscle pain. Pt notes that due to hernia he often feels as though he is not completely emptying his bladder. Notes nocturia x 2. Denies shortness of breath, coughing, constipation, diarrhea, itching, rashes.   7/19/23 reports feeling well, no fatigue and skin rash.   816/23 Patient reports he is doing well. Denies nausea, vomiting, diarrhea, constipation, fevers, chills. No issues with urination, denies blood in urine. No SOB, or chest pain. Reports appetite is good.   9/13/23: Patient has lost a few pounds since his last visit, he says his appetite is okay. Has good energy. Sometimes he feels like he is not completely emptying his bladder, notes nocturia x 2. Patient says his hernia is not bothering him, no pain, he is not ready to get surgery just now but continues to consider it...    10/11/23: Patient continues on nivolumab, continues to feel well. His appetite and energy are good; denies fatigue. He walks 1-2 miles per day. He is redoing a bathroom in his home, all on his own. He does report that he feels he is not completely emptying his bladder and nocturia x 2, these symptoms are stable. He denies fever, chills, sore throat, shortness of breath, cough, constipation, diarrhea, rashes, itching, rashes, joint and muscle pain.    11/8/23: Patient continues to do well on nivolumab. Energy and appetite are good. He has a routine that he sticks too, bed just before 10pm and wakes up at about 4:30am to take his dog out for a walk. He has been working on a home improvement project that he hopes to complete this weekend. He has not been sick recently. Is due for a physical with PCP next month. Has not yet gotten flu shot, will either get this at a pharmacy or at his upcoming physical.   12/6/23 feels overall fine, denies skin rash, joint pain and diarrhea.   1/3/24: Overall, patient is doing well. Denies any new or changing symptoms. Energy and appetite are good. Denies chest pain, palpitations, cough, shortness of breath, rashes, itching, specific joint and muscle pain.    1/30/24 reports feeling overall fine, has good energy, eats well.   2/28/24: Patient has been feeling well, no new or changing symptoms, appetite and energy are good. Says he pulled a muscle in his neck when installing a microwave last week, pain now improved; he does not have any other joint or muscle pain. Denies changes in hernia, no pain.    3/27/24: Patient is feeling well, he is staying busy, goes for walks most days. His appetite is good, he is not a big eater, weight is stable. He denies new or changing symptoms. He has not noted any changes to inguinal hernia. [de-identified] : metastatic renal cell carcinoma [FreeTextEntry1] : Ipi + nivo (7/2019) > nivolumab 480 mg monthly - cont [de-identified] : 4/24/24: Patient is feeling well, appetite is okay, staying active - walking dog and riding bike. No new or changing symptoms.

## 2024-04-24 NOTE — ASSESSMENT
[FreeTextEntry1] : Mr. Mcknight is a 74 year old male with metastatic renal cell carcinoma. On initial presentation CT c/a/p and MRI abdomen showed multiple lung nodules, one liver lesion, retroperitoneal nodes. Bone scan showed one suspicious met in right superior pubic ramus. Liver lesion biopsy is consistent with renal cell carcinoma. It is not certain that the histology of RCC is clear cell vs non-clear cell. IMDC risk of this patient he has 5 out of 6 risks including leukocytosis, anemia, thrombocytosis, time from dx to initiate systemic treatment less than one year and hyperalcemia. He has poor risk of mRCC. Based on [facility]RCC the standard of care is combined immunotherapy nivo and ipi vs sutent, checkmate 214 in the intermediate and poor risk of mccRCC patients with significant overall survival benefit, keynote 426 keytruda plus axitinib vs sutent in good, intermediate and poor risk of mccRCC patients showed OS, PFS and MICHEL benefit, [place] in the intermediate and poor risk of mccRCC patients showed cabozantinib demonstrated a significant clinical benefit in PFS and MICHEL over standard-of-care sunitinib as first-line therapy in patients with intermediate- or poor-risk mRCC. Consider the longer duration of response and less side effects and recommended nivo and ipi. Completed 4 cycles of nivo and ipi, continued on maintenance nivolumab with interval scans to monitor disease. Of note, on diagnosis patient had L renal vein thrombus and was started on eliquis. He had an episode of gross hematuria leading to hospitalization in 9/2020 however etiology of hematuria is unclear and resolved. Follow up cystoscopy to r/o abnormality in the bladder showed no abnormality. Eliquis stopped during hospitalization with hematology consult. Remains off of eliquis. Scans have continued to show stability. Scans from 8/11/22 showed stable disease in L kidney and stable R renal cyst, noted non specific infiltration in omentum, cannot exclude carcinomatosis. CTs in November 2022 show stable disease. Patient also has large R inguinal hernia. March 2023, August 2023, Dec 2023 CT c/a/p showed stable disease in lungs, kidney and retroperitoneum.  Plan Metastatic RCC - continue maintenance nivolumab [id number], proceed with treatment today - CTs 4/18/24 No change in left apical 4 mm pulmonary nodule, nonspecific. No additional pulmonary nodules or masses. No significant change in 3 right renal neoplasms and 2 left renal neoplasms. Resolved/improved omental nodularity. No enlarged retroperitoneal or pelvic adenopathy. - continue to monitor blood work - monitor for iRAEs, reviewed with patient today; continues to tolerate treatment well  - CTs April 2024: Large right inguinal hernia, unchanged without evidence of bowel obstruction.  Instructed to contact our office with any new/worsening symptoms. Patient educated regarding plan of care, all questions/concerns addressed to the best of my abilities and patient's apparent satisfaction. RTC 4 weeks

## 2024-04-25 DIAGNOSIS — Z51.11 ENCOUNTER FOR ANTINEOPLASTIC CHEMOTHERAPY: ICD-10-CM

## 2024-04-25 LAB
T4 FREE SERPL-MCNC: 1.1 NG/DL — SIGNIFICANT CHANGE UP (ref 0.9–1.8)
TSH SERPL-MCNC: 3.3 UIU/ML — SIGNIFICANT CHANGE UP (ref 0.27–4.2)

## 2024-05-22 ENCOUNTER — RESULT REVIEW (OUTPATIENT)
Age: 74
End: 2024-05-22

## 2024-05-22 ENCOUNTER — APPOINTMENT (OUTPATIENT)
Dept: HEMATOLOGY ONCOLOGY | Facility: CLINIC | Age: 74
End: 2024-05-22
Payer: MEDICARE

## 2024-05-22 ENCOUNTER — APPOINTMENT (OUTPATIENT)
Dept: INFUSION THERAPY | Facility: HOSPITAL | Age: 74
End: 2024-05-22

## 2024-05-22 ENCOUNTER — NON-APPOINTMENT (OUTPATIENT)
Age: 74
End: 2024-05-22

## 2024-05-22 VITALS
BODY MASS INDEX: 22.26 KG/M2 | TEMPERATURE: 98.2 F | HEART RATE: 56 BPM | OXYGEN SATURATION: 99 % | SYSTOLIC BLOOD PRESSURE: 115 MMHG | DIASTOLIC BLOOD PRESSURE: 69 MMHG | HEIGHT: 69.13 IN | WEIGHT: 152 LBS | RESPIRATION RATE: 16 BRPM

## 2024-05-22 LAB
ALBUMIN SERPL ELPH-MCNC: 4.2 G/DL — SIGNIFICANT CHANGE UP (ref 3.3–5)
ALP SERPL-CCNC: 62 U/L — SIGNIFICANT CHANGE UP (ref 40–120)
ALT FLD-CCNC: 16 U/L — SIGNIFICANT CHANGE UP (ref 10–45)
ANION GAP SERPL CALC-SCNC: 10 MMOL/L — SIGNIFICANT CHANGE UP (ref 5–17)
AST SERPL-CCNC: 23 U/L — SIGNIFICANT CHANGE UP (ref 10–40)
BASOPHILS # BLD AUTO: 0.02 K/UL — SIGNIFICANT CHANGE UP (ref 0–0.2)
BASOPHILS NFR BLD AUTO: 0.2 % — SIGNIFICANT CHANGE UP (ref 0–2)
BILIRUB SERPL-MCNC: 0.9 MG/DL — SIGNIFICANT CHANGE UP (ref 0.2–1.2)
BUN SERPL-MCNC: 19 MG/DL — SIGNIFICANT CHANGE UP (ref 7–23)
CALCIUM SERPL-MCNC: 9.5 MG/DL — SIGNIFICANT CHANGE UP (ref 8.4–10.5)
CHLORIDE SERPL-SCNC: 104 MMOL/L — SIGNIFICANT CHANGE UP (ref 96–108)
CO2 SERPL-SCNC: 25 MMOL/L — SIGNIFICANT CHANGE UP (ref 22–31)
CREAT SERPL-MCNC: 1.28 MG/DL — SIGNIFICANT CHANGE UP (ref 0.5–1.3)
EGFR: 59 ML/MIN/1.73M2 — LOW
EOSINOPHIL # BLD AUTO: 0.21 K/UL — SIGNIFICANT CHANGE UP (ref 0–0.5)
EOSINOPHIL NFR BLD AUTO: 2.5 % — SIGNIFICANT CHANGE UP (ref 0–6)
GLUCOSE SERPL-MCNC: 110 MG/DL — HIGH (ref 70–99)
HCT VFR BLD CALC: 37.6 % — LOW (ref 39–50)
HGB BLD-MCNC: 13.1 G/DL — SIGNIFICANT CHANGE UP (ref 13–17)
IMM GRANULOCYTES NFR BLD AUTO: 0.5 % — SIGNIFICANT CHANGE UP (ref 0–0.9)
LYMPHOCYTES # BLD AUTO: 1.27 K/UL — SIGNIFICANT CHANGE UP (ref 1–3.3)
LYMPHOCYTES # BLD AUTO: 15 % — SIGNIFICANT CHANGE UP (ref 13–44)
MCHC RBC-ENTMCNC: 31.6 PG — SIGNIFICANT CHANGE UP (ref 27–34)
MCHC RBC-ENTMCNC: 34.8 G/DL — SIGNIFICANT CHANGE UP (ref 32–36)
MCV RBC AUTO: 90.6 FL — SIGNIFICANT CHANGE UP (ref 80–100)
MONOCYTES # BLD AUTO: 0.56 K/UL — SIGNIFICANT CHANGE UP (ref 0–0.9)
MONOCYTES NFR BLD AUTO: 6.6 % — SIGNIFICANT CHANGE UP (ref 2–14)
NEUTROPHILS # BLD AUTO: 6.35 K/UL — SIGNIFICANT CHANGE UP (ref 1.8–7.4)
NEUTROPHILS NFR BLD AUTO: 75.2 % — SIGNIFICANT CHANGE UP (ref 43–77)
NRBC # BLD: 0 /100 WBCS — SIGNIFICANT CHANGE UP (ref 0–0)
PLATELET # BLD AUTO: 177 K/UL — SIGNIFICANT CHANGE UP (ref 150–400)
POTASSIUM SERPL-MCNC: 4.4 MMOL/L — SIGNIFICANT CHANGE UP (ref 3.5–5.3)
POTASSIUM SERPL-SCNC: 4.4 MMOL/L — SIGNIFICANT CHANGE UP (ref 3.5–5.3)
PROT SERPL-MCNC: 7 G/DL — SIGNIFICANT CHANGE UP (ref 6–8.3)
RBC # BLD: 4.15 M/UL — LOW (ref 4.2–5.8)
RBC # FLD: 12.5 % — SIGNIFICANT CHANGE UP (ref 10.3–14.5)
SODIUM SERPL-SCNC: 139 MMOL/L — SIGNIFICANT CHANGE UP (ref 135–145)
T4 FREE SERPL-MCNC: 1.1 NG/DL — SIGNIFICANT CHANGE UP (ref 0.9–1.8)
TSH SERPL-MCNC: 1.25 UIU/ML — SIGNIFICANT CHANGE UP (ref 0.27–4.2)
WBC # BLD: 8.45 K/UL — SIGNIFICANT CHANGE UP (ref 3.8–10.5)
WBC # FLD AUTO: 8.45 K/UL — SIGNIFICANT CHANGE UP (ref 3.8–10.5)

## 2024-05-22 PROCEDURE — 99214 OFFICE O/P EST MOD 30 MIN: CPT

## 2024-05-22 PROCEDURE — G2211 COMPLEX E/M VISIT ADD ON: CPT

## 2024-05-22 NOTE — ASSESSMENT
[FreeTextEntry1] : Mr. Mcknight is a 74 year old male with metastatic renal cell carcinoma. On initial presentation CT c/a/p and MRI abdomen showed multiple lung nodules, one liver lesion, retroperitoneal nodes. Bone scan showed one suspicious met in right superior pubic ramus. Liver lesion biopsy is consistent with renal cell carcinoma. It is not certain that the histology of RCC is clear cell vs non-clear cell. IMDC risk of this patient he has 5 out of 6 risks including leukocytosis, anemia, thrombocytosis, time from dx to initiate systemic treatment less than one year and hyperalcemia. He has poor risk of mRCC. Based on [facility]RCC the standard of care is combined immunotherapy nivo and ipi vs sutent, checkmate 214 in the intermediate and poor risk of mccRCC patients with significant overall survival benefit, keynote 426 keytruda plus axitinib vs sutent in good, intermediate and poor risk of mccRCC patients showed OS, PFS and MICHEL benefit, [place] in the intermediate and poor risk of mccRCC patients showed cabozantinib demonstrated a significant clinical benefit in PFS and MICHEL over standard-of-care sunitinib as first-line therapy in patients with intermediate- or poor-risk mRCC. Consider the longer duration of response and less side effects and recommended nivo and ipi. Completed 4 cycles of nivo and ipi, continued on maintenance nivolumab with interval scans to monitor disease. Of note, on diagnosis patient had L renal vein thrombus and was started on eliquis. He had an episode of gross hematuria leading to hospitalization in 9/2020 however etiology of hematuria is unclear and resolved. Follow up cystoscopy to r/o abnormality in the bladder showed no abnormality. Eliquis stopped during hospitalization with hematology consult. Remains off of eliquis. Scans have continued to show stability. Scans from 8/11/22 showed stable disease in L kidney and stable R renal cyst, noted non specific infiltration in omentum, cannot exclude carcinomatosis. CTs in November 2022 show stable disease. Patient also has large R inguinal hernia. March 2023, August 2023, Dec 2023 CT c/a/p showed stable disease in lungs, kidney and retroperitoneum.  Plan Metastatic RCC - continue maintenance nivolumab [id number], proceed with treatment today - CTs 4/18/24 No change in left apical 4 mm pulmonary nodule, nonspecific. No additional pulmonary nodules or masses. No significant change in 3 right renal neoplasms and 2 left renal neoplasms. Resolved/improved omental nodularity. No enlarged retroperitoneal or pelvic adenopathy  - continue to monitor blood work - monitor for iRAEs, reviewed with patient today; continues to tolerate treatment well  - CTs April 2024: Large right inguinal hernia, unchanged without evidence of bowel obstruction.  Instructed to contact our office with any new/worsening symptoms. Patient educated regarding plan of care, all questions/concerns addressed to the best of my abilities and patient's apparent satisfaction. RTC 4 weeks

## 2024-05-22 NOTE — HISTORY OF PRESENT ILLNESS
[de-identified] : Finesse Zuniga is a 70 years old male who initially presented with intermittent midback pain, 10/10 radiating to stomach from both sides on June 1, 2019. He has poor appetite.  On june 12 he was diagnosed UTI and on cipro for 4 days, on June 16, was started cefpodoxime 100mg BID for 7 days.  CT chest/abdomen/pelvis revealed significant abnormal appearance of the left kidney with multiple heterogeneous areas of low attenuation involving the upper and midpole which may represent malignancy. There are necrotic lymph nodes in the left para-aortic location at the level of the kidney and below the level of the kidney. Adenopathy surrounds the left renal artery. The left renal vein demonstrates question of partially occlusive thrombus. Nonspecific scattered tiny pulmonary nodules. Lower lobe subpleural nodules measure up to 6 mm. There is a well-circumscribed hypodense lesion in the anterior aspect of the lateral segment of the left lobe measuring 3.2 x 2.2 cm. He underwent CT guided liver lesion biopsy which showed consistent with primary renal cell carcinoma, IHC CK7/CK19/PAX8 positive. MRI abdomen waw con showed multiple bilateral pulmonary nodules increased in size and number compared with recent CT. The largest is pleural-based in the left lower lobe and measures 1.5 x 1.1 cm. LIVER: Rim-enhancing subcapsular mass in segment 3 and measures 4.4 x 2.6 cm. A 4.2cm mass in mid to lower pole of the lft kidney suspicious for urothelial malignancy arising within the left renal collecting system. Regional lymphadenopathy and hepatic and pulmonary metastases. Nonocclusive thrombus within the left renal vein. He was started on eliquis. Bone scan showed  a focus of mildly increased uptake in the right superior  pubic ramus suspicious of bone mets. He has lost 17lbs in one month. He denies nausea, vomiting and constipation and diarrhea.   7/2019: ipilimumab + nivolumab initiated and to nivolumab maintenance post 4 cycles and continues on therapy.  9/18-22/2020 : Patient admitted for hematuria. Patient followed by urology and hematology during hospital course. Patient was started on CBI to help remove clots. Urine initially bloody, but became pink-tinged and then clear over time. CBI stopped and barber removed. Patient reportedly felt much better after barber removed, able to urinate by himself- reported some clots initially, but since resolved. States bladder spasms improved as well. Patient's eliquis was stopped, MRI done showing no evidence of renal vein thrombus, discussed  with Heme, no reason to continue eliquis for now. Patient's leukocytosis and JOSE MARIA resolved. Urine culture and blood cultures negative.  Patient improved clinically throughout hospital course. Patient seen and examined on day of discharge.  Cystoscopy was negative, performed by Dr. Javier.   5/26/2021 : Mr. Vera is here for nivolumab. Last scan 3/29/2021, will need another set of scan the end or beginning of July. He walks 2 miles a day with his dog and bikes 3-4 miles a day. He feels well overall. Mr. VERA denies fevers, chills, headaches, cough, SOB, chest pain, any swelling, nausea, vomiting, diarrhea, rash, or malaise.   6/23/21: Bothering Rt side inguinal hernia without pain or signs of incarceration. Doing well overall. Scheduled for nivo today. He denies fevers, chills, headaches, cough, SOB, chest pain, any swelling, nausea, vomiting, diarrhea, rash, or malaise.  7/7/2021 : CT showed stable disease  7/28/2021 : Here for nivolumab. 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 Mr. VERA denies fevers, chills, headaches, cough, SOB, chest pain, any swelling, nausea, vomiting, diarrhea, rash, or malaise. He has been tolerating therapy extremely well without sig issues. He is to see a general surgeon for hernia repair soon.   8/25/2021 : Here for nivolumab, scan due in September. Mr. VERA denies fevers, chills, headaches, cough, SOB, chest pain, any swelling, nausea, vomiting, diarrhea, rash, or malaise.   10/27/21: Pt presents for follow up. He is scheduled for Nivolumab which he has been tolerating well. Scheduled to receive cycle #30 today. Denies any diarrhea/ itchiness, cough, hematuria, rash. Appetite good. Energy good. No new complaints. Recieved both flu shot and COVID booster recently  11/24/21: Pt feeling well. Offers no complaints. Scheduled for C#31 nivolumab today. Due for imaging next month. Already scheduled  12/21/21 CT chest/abd/pelvis - Stable appearance of the chest compared with 9/4/2021. Indeterminate 1.4 cm hypodense lesion in the upper pole of the right kidney is unchanged. Atrophy is again noted involving the upper pole of the left kidney, unchanged. Very large right inguinal hernia containing non-obstructed large and small bowel and a small amount of fluid. Enlarged heterogeneous prostate gland. Nonspecific ill-defined infiltration of the fat in the right lower quadrant, not significantly changed. Attention to this area is suggested on follow-up CT.  12/29/21 - presents for ongoing Opdivo cycle #32 today. Overall feeling "good", energy is good. Appetite is good. No complaints.   3/2/22 feels quite well, denies any pain and gross hematuria. He has a schedule in April to take care of right inguinal hernia.   4/6/22 feels fine, denies pain and gross hematuria. His right inguinal hernia is getting worse.   4/27/22: Pt returns for follow up. He reports 24 period of severe diarrhea on monday. No fevers. Resolved spontaneously. Pt reports he had imaging done 2 weeks ago. Eager fto discuss findings. Feeling well now. Pt will be  meeting with surgeon recommended by PMD for Right inguinal hernia repair.  6/22/22 feels well. He will meet his surgeon for his right inguinal hernia.   7/20/22 feels overall fine. Denies any pain and weight loss.     8/17/22: Patient returns for follow up, continued on nivolumab. Had CT scans 1 week ago that show stable L renal mass and stable R renal cyst. Also remarkable for non specific mild increase in illdefined omental infiltration - omental carcinomatosis is not excluded, will continue short interval scanning. Patient reports having less appetite than normal. He feels less motivation to cook for himself. He does drink an ensure everyday. He feels he gets moncada sooner and has lost a few pounds. He denies nausea, vomiting and GERD. Patient is often tired, but is still able to complete every thing he needs to do, it may just take him a bit longer. He is working on setting up his inguinal hernia surgery.  9/14: energy is good. seeing the surgeon next week for the hernia. appetite is okay now. no longer has the feeling of getting full early. able to eat full meals  10/12: appetite back to normal. no rash, pain, CP, SOB. energy is okay  11/9/22: feels well today. no new changes   1/4/22: doing well today. no new issues. energy good. appetite good. no new joint pain, rash, SOB, diarreha.  2/1/23: Returns for follow up and treatment with nivolumab. Patient has been feeling well. Denies fatigue, walks 2-2.5 miles with his dog per day. He has not yet seen a surgeon for his inguinal hernia. His PCP gave him a surgeon referral, he says he has not gotten up the nerve to call. Denies pain.  Patient denies fever, chills, headache, vision changes, cough, shortness of breath, chest pain, palpitations, abdominal pain, nausea/vomiting, diarrhea, constipation, dysuria, hematuria, itching, rashes, joint pain, mucositis, changes in sensation.  3/1/23: continued on nivolumab. Patient feels well overall, eating well, has good energy, goes on daily walks with his dog. He notes his urine stream is not as strong in the morning, but improves throughout the day. Denies dysuria/hematuria. He went to see a surgeon last month and was described what his inguinal hernia repair would be, he is planning to have the surgery but not until the Spring.. he denies any change in color or size of hernia.   3/9/23 CT c/a/p showed Multiple bilateral small renal lesions overall unchanged when compared to the prior examinations and can be consistent with a neoplastic process. Diffuse areas of omental/peritoneal thickening and areas of nodularity and diffuse small lymph nodes within the mesentery and retroperitoneum but overall configuration unchanged  3/29/23 reports feeling overall fine, denies skin and joint pain  4/26/23: feels well, no new changes compared to last visit  5/24/23: continued on nivolumab for met RCC. Overall, patient is doing well. He is exercising more, walking with his dog, riding his bike. Appetite is good. He is not planning to pursue hernia surgery until after his next scans in July, he wants to feel comfortable. Patient denies fever, chills, headache, vision changes, cough, shortness of breath, chest pain, palpitations, abdominal pain, nausea/vomiting, diarrhea, constipation, dysuria, hematuria, itching, rashes, muscle and joint pain, paraesthesias.     6/21/23: continued on nivolumab. Energy level is good, doing his same routine. Appetite is good. Last week he pulled a muscle in back when doing yard work, pain lasted for about 3-4 days and has now resolved. Otherwise no joint or muscle pain. Pt notes that due to hernia he often feels as though he is not completely emptying his bladder. Notes nocturia x 2. Denies shortness of breath, coughing, constipation, diarrhea, itching, rashes.   7/19/23 reports feeling well, no fatigue and skin rash.   816/23 Patient reports he is doing well. Denies nausea, vomiting, diarrhea, constipation, fevers, chills. No issues with urination, denies blood in urine. No SOB, or chest pain. Reports appetite is good.   9/13/23: Patient has lost a few pounds since his last visit, he says his appetite is okay. Has good energy. Sometimes he feels like he is not completely emptying his bladder, notes nocturia x 2. Patient says his hernia is not bothering him, no pain, he is not ready to get surgery just now but continues to consider it...    10/11/23: Patient continues on nivolumab, continues to feel well. His appetite and energy are good; denies fatigue. He walks 1-2 miles per day. He is redoing a bathroom in his home, all on his own. He does report that he feels he is not completely emptying his bladder and nocturia x 2, these symptoms are stable. He denies fever, chills, sore throat, shortness of breath, cough, constipation, diarrhea, rashes, itching, rashes, joint and muscle pain.    11/8/23: Patient continues to do well on nivolumab. Energy and appetite are good. He has a routine that he sticks too, bed just before 10pm and wakes up at about 4:30am to take his dog out for a walk. He has been working on a home improvement project that he hopes to complete this weekend. He has not been sick recently. Is due for a physical with PCP next month. Has not yet gotten flu shot, will either get this at a pharmacy or at his upcoming physical.   12/6/23 feels overall fine, denies skin rash, joint pain and diarrhea.   1/3/24: Overall, patient is doing well. Denies any new or changing symptoms. Energy and appetite are good. Denies chest pain, palpitations, cough, shortness of breath, rashes, itching, specific joint and muscle pain.    1/30/24 reports feeling overall fine, has good energy, eats well.   2/28/24: Patient has been feeling well, no new or changing symptoms, appetite and energy are good. Says he pulled a muscle in his neck when installing a microwave last week, pain now improved; he does not have any other joint or muscle pain. Denies changes in hernia, no pain.    3/27/24: Patient is feeling well, he is staying busy, goes for walks most days. His appetite is good, he is not a big eater, weight is stable. He denies new or changing symptoms. He has not noted any changes to inguinal hernia.  4/24/24: Patient is feeling well, appetite is okay, staying active - walking dog and riding bike. No new or changing symptoms.  [de-identified] : metastatic renal cell carcinoma [FreeTextEntry1] : Ipi + nivo (7/2019) > nivolumab 480 mg monthly - cont [de-identified] : 5/20/24: Patient is feeling well, offers no complaints. Says he is enjoying life, taking it day by day.

## 2024-05-22 NOTE — PHYSICAL EXAM
[de-identified] : anicteric  [de-identified] : supple, FROM  [de-identified] : regular rhythm, mild bradycardia  [de-identified] : no edema  [de-identified] : large inguinal hernia unchanged from prior

## 2024-06-14 ENCOUNTER — OUTPATIENT (OUTPATIENT)
Dept: OUTPATIENT SERVICES | Facility: HOSPITAL | Age: 74
LOS: 1 days | Discharge: HOME | End: 2024-06-14

## 2024-06-14 DIAGNOSIS — Z98.890 OTHER SPECIFIED POSTPROCEDURAL STATES: Chronic | ICD-10-CM

## 2024-06-14 DIAGNOSIS — S86.019A STRAIN OF UNSPECIFIED ACHILLES TENDON, INITIAL ENCOUNTER: Chronic | ICD-10-CM

## 2024-06-14 DIAGNOSIS — C64.9 MALIGNANT NEOPLASM OF UNSPECIFIED KIDNEY, EXCEPT RENAL PELVIS: ICD-10-CM

## 2024-06-19 ENCOUNTER — APPOINTMENT (OUTPATIENT)
Dept: HEMATOLOGY ONCOLOGY | Facility: CLINIC | Age: 74
End: 2024-06-19
Payer: MEDICARE

## 2024-06-19 ENCOUNTER — RESULT REVIEW (OUTPATIENT)
Age: 74
End: 2024-06-19

## 2024-06-19 ENCOUNTER — APPOINTMENT (OUTPATIENT)
Dept: INFUSION THERAPY | Facility: HOSPITAL | Age: 74
End: 2024-06-19

## 2024-06-19 VITALS
DIASTOLIC BLOOD PRESSURE: 75 MMHG | TEMPERATURE: 97.8 F | SYSTOLIC BLOOD PRESSURE: 123 MMHG | HEART RATE: 53 BPM | OXYGEN SATURATION: 99 % | RESPIRATION RATE: 18 BRPM

## 2024-06-19 DIAGNOSIS — Z29.89 ENCOUNTER. FOR OTHER SPECIFIED PROPHYLACTIC MEASURES: ICD-10-CM

## 2024-06-19 DIAGNOSIS — C64.2 MALIGNANT NEOPLASM OF LEFT KIDNEY, EXCEPT RENAL PELVIS: ICD-10-CM

## 2024-06-19 LAB
ALBUMIN SERPL ELPH-MCNC: 4 G/DL — SIGNIFICANT CHANGE UP (ref 3.3–5)
ALP SERPL-CCNC: 56 U/L — SIGNIFICANT CHANGE UP (ref 40–120)
ALT FLD-CCNC: 12 U/L — SIGNIFICANT CHANGE UP (ref 10–45)
ANION GAP SERPL CALC-SCNC: 12 MMOL/L — SIGNIFICANT CHANGE UP (ref 5–17)
AST SERPL-CCNC: 37 U/L — SIGNIFICANT CHANGE UP (ref 10–40)
BASOPHILS # BLD AUTO: 0.05 K/UL — SIGNIFICANT CHANGE UP (ref 0–0.2)
BASOPHILS NFR BLD AUTO: 0.6 % — SIGNIFICANT CHANGE UP (ref 0–2)
BILIRUB SERPL-MCNC: 0.8 MG/DL — SIGNIFICANT CHANGE UP (ref 0.2–1.2)
BUN SERPL-MCNC: 16 MG/DL — SIGNIFICANT CHANGE UP (ref 7–23)
CALCIUM SERPL-MCNC: 9.1 MG/DL — SIGNIFICANT CHANGE UP (ref 8.4–10.5)
CHLORIDE SERPL-SCNC: 104 MMOL/L — SIGNIFICANT CHANGE UP (ref 96–108)
CO2 SERPL-SCNC: 22 MMOL/L — SIGNIFICANT CHANGE UP (ref 22–31)
CREAT SERPL-MCNC: 1.22 MG/DL — SIGNIFICANT CHANGE UP (ref 0.5–1.3)
EGFR: 63 ML/MIN/1.73M2 — SIGNIFICANT CHANGE UP
EOSINOPHIL # BLD AUTO: 0.21 K/UL — SIGNIFICANT CHANGE UP (ref 0–0.5)
EOSINOPHIL NFR BLD AUTO: 2.7 % — SIGNIFICANT CHANGE UP (ref 0–6)
GLUCOSE SERPL-MCNC: 101 MG/DL — HIGH (ref 70–99)
HCT VFR BLD CALC: 36.3 % — LOW (ref 39–50)
HGB BLD-MCNC: 12.9 G/DL — LOW (ref 13–17)
IMM GRANULOCYTES NFR BLD AUTO: 0.4 % — SIGNIFICANT CHANGE UP (ref 0–0.9)
LYMPHOCYTES # BLD AUTO: 1.34 K/UL — SIGNIFICANT CHANGE UP (ref 1–3.3)
LYMPHOCYTES # BLD AUTO: 17.2 % — SIGNIFICANT CHANGE UP (ref 13–44)
MCHC RBC-ENTMCNC: 31.8 PG — SIGNIFICANT CHANGE UP (ref 27–34)
MCHC RBC-ENTMCNC: 35.5 G/DL — SIGNIFICANT CHANGE UP (ref 32–36)
MCV RBC AUTO: 89.4 FL — SIGNIFICANT CHANGE UP (ref 80–100)
MONOCYTES # BLD AUTO: 0.6 K/UL — SIGNIFICANT CHANGE UP (ref 0–0.9)
MONOCYTES NFR BLD AUTO: 7.7 % — SIGNIFICANT CHANGE UP (ref 2–14)
NEUTROPHILS # BLD AUTO: 5.57 K/UL — SIGNIFICANT CHANGE UP (ref 1.8–7.4)
NEUTROPHILS NFR BLD AUTO: 71.4 % — SIGNIFICANT CHANGE UP (ref 43–77)
NRBC # BLD: 0 /100 WBCS — SIGNIFICANT CHANGE UP (ref 0–0)
PLATELET # BLD AUTO: 162 K/UL — SIGNIFICANT CHANGE UP (ref 150–400)
POTASSIUM SERPL-MCNC: 4.9 MMOL/L — SIGNIFICANT CHANGE UP (ref 3.5–5.3)
POTASSIUM SERPL-SCNC: 4.9 MMOL/L — SIGNIFICANT CHANGE UP (ref 3.5–5.3)
PROT SERPL-MCNC: 6.9 G/DL — SIGNIFICANT CHANGE UP (ref 6–8.3)
RBC # BLD: 4.06 M/UL — LOW (ref 4.2–5.8)
RBC # FLD: 12.5 % — SIGNIFICANT CHANGE UP (ref 10.3–14.5)
SODIUM SERPL-SCNC: 138 MMOL/L — SIGNIFICANT CHANGE UP (ref 135–145)
T4 FREE SERPL-MCNC: 1.2 NG/DL — SIGNIFICANT CHANGE UP (ref 0.9–1.8)
TSH SERPL-MCNC: 1.16 UIU/ML — SIGNIFICANT CHANGE UP (ref 0.27–4.2)
WBC # BLD: 7.8 K/UL — SIGNIFICANT CHANGE UP (ref 3.8–10.5)
WBC # FLD AUTO: 7.8 K/UL — SIGNIFICANT CHANGE UP (ref 3.8–10.5)

## 2024-06-19 PROCEDURE — G2211 COMPLEX E/M VISIT ADD ON: CPT

## 2024-06-19 PROCEDURE — 99214 OFFICE O/P EST MOD 30 MIN: CPT

## 2024-06-19 RX ORDER — TAMSULOSIN HYDROCHLORIDE 0.4 MG/1
0.4 CAPSULE ORAL
Qty: 90 | Refills: 1 | Status: ACTIVE | COMMUNITY
Start: 2020-10-02 | End: 1900-01-01

## 2024-06-19 NOTE — ASSESSMENT
[FreeTextEntry1] : Mr. Mcknight is a 74 year old male with metastatic renal cell carcinoma. On initial presentation CT c/a/p and MRI abdomen showed multiple lung nodules, one liver lesion, retroperitoneal nodes. Bone scan showed one suspicious met in right superior pubic ramus. Liver lesion biopsy is consistent with renal cell carcinoma. It is not certain that the histology of RCC is clear cell vs non-clear cell. IMDC risk of this patient he has 5 out of 6 risks including leukocytosis, anemia, thrombocytosis, time from dx to initiate systemic treatment less than one year and hyperalcemia. He has poor risk of mRCC. Based on [facility] the standard of care is combined immunotherapy nivo and ipi vs sutent, checkmate 214 in the intermediate and poor risk of [facility]RCC patients with significant overall survival benefit, keynote 426 keytruda plus axitinib vs sutent in good, intermediate and poor risk of [facility] patients showed OS, PFS and MICHEL benefit, [place] in the intermediate and poor risk of [facility]RCC patients showed cabozantinib demonstrated a significant clinical benefit in PFS and MICHEL over standard-of-care sunitinib as first-line therapy in patients with intermediate- or poor-risk mRCC. Consider the longer duration of response and less side effects and recommended nivo and ipi. Completed 4 cycles of nivo and ipi, continued on maintenance nivolumab with interval scans to monitor disease. Of note, on diagnosis patient had L renal vein thrombus and was started on eliquis. He had an episode of gross hematuria leading to hospitalization in 9/2020 however etiology of hematuria is unclear and resolved. Follow up cystoscopy to r/o abnormality in the bladder showed no abnormality. Eliquis stopped during hospitalization with hematology consult. Remains off of eliquis. Scans have continued to show stability. Scans from 8/11/22 showed stable disease in L kidney and stable R renal cyst, noted non specific infiltration in omentum, cannot exclude carcinomatosis. CTs in November 2022 show stable disease. Patient also has large R inguinal hernia. March 2023, August 2023, Dec 2023, April 2024 CT c/a/p showed stable disease in lungs, kidney and retroperitoneum.  Plan Metastatic RCC - continue maintenance nivolumab [id number], proceed with treatment today - CTs 4/18/24 No change in left apical 4 mm pulmonary nodule, nonspecific. No additional pulmonary nodules or masses. No significant change in 3 right renal neoplasms and 2 left renal neoplasms. Resolved/improved omental nodularity. No enlarged retroperitoneal or pelvic adenopathy -- repeat imaging in 4 months  - continue to monitor blood work - monitor for iRAEs, reviewed with patient today; continues to tolerate treatment well  - CTs April 2024: Large right inguinal hernia, unchanged without evidence of bowel obstruction.  Instructed to contact our office with any new/worsening symptoms. Patient educated regarding plan of care, all questions/concerns addressed to the best of my abilities and patient's apparent satisfaction. RTC 4 weeks

## 2024-06-19 NOTE — PHYSICAL EXAM
[Fully active, able to carry on all pre-disease performance without restriction] : Status 0 - Fully active, able to carry on all pre-disease performance without restriction [Normal] : affect appropriate [de-identified] : anicteric  [de-identified] : supple, FROM  [de-identified] : regular rhythm, mild bradycardia  [de-identified] : no edema  [de-identified] : large inguinal hernia unchanged from prior

## 2024-06-19 NOTE — HISTORY OF PRESENT ILLNESS
[T: ___] : T[unfilled] [N: ___] : N[unfilled] [M: ___] : M[unfilled] [AJCC Stage: ____] : AJCC Stage: [unfilled] [N/A] : Currently not applicable [de-identified] : Finesse Zuniga is a 70 years old male who initially presented with intermittent midback pain, 10/10 radiating to stomach from both sides on June 1, 2019. He has poor appetite.  On june 12 he was diagnosed UTI and on cipro for 4 days, on June 16, was started cefpodoxime 100mg BID for 7 days.  CT chest/abdomen/pelvis revealed significant abnormal appearance of the left kidney with multiple heterogeneous areas of low attenuation involving the upper and midpole which may represent malignancy. There are necrotic lymph nodes in the left para-aortic location at the level of the kidney and below the level of the kidney. Adenopathy surrounds the left renal artery. The left renal vein demonstrates question of partially occlusive thrombus. Nonspecific scattered tiny pulmonary nodules. Lower lobe subpleural nodules measure up to 6 mm. There is a well-circumscribed hypodense lesion in the anterior aspect of the lateral segment of the left lobe measuring 3.2 x 2.2 cm. He underwent CT guided liver lesion biopsy which showed consistent with primary renal cell carcinoma, IHC CK7/CK19/PAX8 positive. MRI abdomen waw con showed multiple bilateral pulmonary nodules increased in size and number compared with recent CT. The largest is pleural-based in the left lower lobe and measures 1.5 x 1.1 cm. LIVER: Rim-enhancing subcapsular mass in segment 3 and measures 4.4 x 2.6 cm. A 4.2cm mass in mid to lower pole of the lft kidney suspicious for urothelial malignancy arising within the left renal collecting system. Regional lymphadenopathy and hepatic and pulmonary metastases. Nonocclusive thrombus within the left renal vein. He was started on eliquis. Bone scan showed  a focus of mildly increased uptake in the right superior  pubic ramus suspicious of bone mets. He has lost 17lbs in one month. He denies nausea, vomiting and constipation and diarrhea.   7/2019: ipilimumab + nivolumab initiated and to nivolumab maintenance post 4 cycles and continues on therapy.  9/18-22/2020 : Patient admitted for hematuria. Patient followed by urology and hematology during hospital course. Patient was started on CBI to help remove clots. Urine initially bloody, but became pink-tinged and then clear over time. CBI stopped and barber removed. Patient reportedly felt much better after barber removed, able to urinate by himself- reported some clots initially, but since resolved. States bladder spasms improved as well. Patient's eliquis was stopped, MRI done showing no evidence of renal vein thrombus, discussed  with Heme, no reason to continue eliquis for now. Patient's leukocytosis and JOSE MARIA resolved. Urine culture and blood cultures negative.  Patient improved clinically throughout hospital course. Patient seen and examined on day of discharge.  Cystoscopy was negative, performed by Dr. Javier.   5/26/2021 : Mr. Vera is here for nivolumab. Last scan 3/29/2021, will need another set of scan the end or beginning of July. He walks 2 miles a day with his dog and bikes 3-4 miles a day. He feels well overall. Mr. VERA denies fevers, chills, headaches, cough, SOB, chest pain, any swelling, nausea, vomiting, diarrhea, rash, or malaise.   6/23/21: Bothering Rt side inguinal hernia without pain or signs of incarceration. Doing well overall. Scheduled for nivo today. He denies fevers, chills, headaches, cough, SOB, chest pain, any swelling, nausea, vomiting, diarrhea, rash, or malaise.  7/7/2021 : CT showed stable disease  7/28/2021 : Here for nivolumab. 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 Mr. VERA denies fevers, chills, headaches, cough, SOB, chest pain, any swelling, nausea, vomiting, diarrhea, rash, or malaise. He has been tolerating therapy extremely well without sig issues. He is to see a general surgeon for hernia repair soon.   8/25/2021 : Here for nivolumab, scan due in September. Mr. VERA denies fevers, chills, headaches, cough, SOB, chest pain, any swelling, nausea, vomiting, diarrhea, rash, or malaise.   10/27/21: Pt presents for follow up. He is scheduled for Nivolumab which he has been tolerating well. Scheduled to receive cycle #30 today. Denies any diarrhea/ itchiness, cough, hematuria, rash. Appetite good. Energy good. No new complaints. Recieved both flu shot and COVID booster recently  11/24/21: Pt feeling well. Offers no complaints. Scheduled for C#31 nivolumab today. Due for imaging next month. Already scheduled  12/21/21 CT chest/abd/pelvis - Stable appearance of the chest compared with 9/4/2021. Indeterminate 1.4 cm hypodense lesion in the upper pole of the right kidney is unchanged. Atrophy is again noted involving the upper pole of the left kidney, unchanged. Very large right inguinal hernia containing non-obstructed large and small bowel and a small amount of fluid. Enlarged heterogeneous prostate gland. Nonspecific ill-defined infiltration of the fat in the right lower quadrant, not significantly changed. Attention to this area is suggested on follow-up CT.  12/29/21 - presents for ongoing Opdivo cycle #32 today. Overall feeling "good", energy is good. Appetite is good. No complaints.   3/2/22 feels quite well, denies any pain and gross hematuria. He has a schedule in April to take care of right inguinal hernia.   4/6/22 feels fine, denies pain and gross hematuria. His right inguinal hernia is getting worse.   4/27/22: Pt returns for follow up. He reports 24 period of severe diarrhea on monday. No fevers. Resolved spontaneously. Pt reports he had imaging done 2 weeks ago. Eager fto discuss findings. Feeling well now. Pt will be  meeting with surgeon recommended by PMD for Right inguinal hernia repair.  6/22/22 feels well. He will meet his surgeon for his right inguinal hernia.   7/20/22 feels overall fine. Denies any pain and weight loss.     8/17/22: Patient returns for follow up, continued on nivolumab. Had CT scans 1 week ago that show stable L renal mass and stable R renal cyst. Also remarkable for non specific mild increase in illdefined omental infiltration - omental carcinomatosis is not excluded, will continue short interval scanning. Patient reports having less appetite than normal. He feels less motivation to cook for himself. He does drink an ensure everyday. He feels he gets moncada sooner and has lost a few pounds. He denies nausea, vomiting and GERD. Patient is often tired, but is still able to complete every thing he needs to do, it may just take him a bit longer. He is working on setting up his inguinal hernia surgery.  9/14: energy is good. seeing the surgeon next week for the hernia. appetite is okay now. no longer has the feeling of getting full early. able to eat full meals  10/12: appetite back to normal. no rash, pain, CP, SOB. energy is okay  11/9/22: feels well today. no new changes   1/4/22: doing well today. no new issues. energy good. appetite good. no new joint pain, rash, SOB, diarreha.  2/1/23: Returns for follow up and treatment with nivolumab. Patient has been feeling well. Denies fatigue, walks 2-2.5 miles with his dog per day. He has not yet seen a surgeon for his inguinal hernia. His PCP gave him a surgeon referral, he says he has not gotten up the nerve to call. Denies pain.  Patient denies fever, chills, headache, vision changes, cough, shortness of breath, chest pain, palpitations, abdominal pain, nausea/vomiting, diarrhea, constipation, dysuria, hematuria, itching, rashes, joint pain, mucositis, changes in sensation.  3/1/23: continued on nivolumab. Patient feels well overall, eating well, has good energy, goes on daily walks with his dog. He notes his urine stream is not as strong in the morning, but improves throughout the day. Denies dysuria/hematuria. He went to see a surgeon last month and was described what his inguinal hernia repair would be, he is planning to have the surgery but not until the Spring.. he denies any change in color or size of hernia.   3/9/23 CT c/a/p showed Multiple bilateral small renal lesions overall unchanged when compared to the prior examinations and can be consistent with a neoplastic process. Diffuse areas of omental/peritoneal thickening and areas of nodularity and diffuse small lymph nodes within the mesentery and retroperitoneum but overall configuration unchanged  3/29/23 reports feeling overall fine, denies skin and joint pain  4/26/23: feels well, no new changes compared to last visit  5/24/23: continued on nivolumab for met RCC. Overall, patient is doing well. He is exercising more, walking with his dog, riding his bike. Appetite is good. He is not planning to pursue hernia surgery until after his next scans in July, he wants to feel comfortable. Patient denies fever, chills, headache, vision changes, cough, shortness of breath, chest pain, palpitations, abdominal pain, nausea/vomiting, diarrhea, constipation, dysuria, hematuria, itching, rashes, muscle and joint pain, paraesthesias.     6/21/23: continued on nivolumab. Energy level is good, doing his same routine. Appetite is good. Last week he pulled a muscle in back when doing yard work, pain lasted for about 3-4 days and has now resolved. Otherwise no joint or muscle pain. Pt notes that due to hernia he often feels as though he is not completely emptying his bladder. Notes nocturia x 2. Denies shortness of breath, coughing, constipation, diarrhea, itching, rashes.   7/19/23 reports feeling well, no fatigue and skin rash.   816/23 Patient reports he is doing well. Denies nausea, vomiting, diarrhea, constipation, fevers, chills. No issues with urination, denies blood in urine. No SOB, or chest pain. Reports appetite is good.   9/13/23: Patient has lost a few pounds since his last visit, he says his appetite is okay. Has good energy. Sometimes he feels like he is not completely emptying his bladder, notes nocturia x 2. Patient says his hernia is not bothering him, no pain, he is not ready to get surgery just now but continues to consider it...    10/11/23: Patient continues on nivolumab, continues to feel well. His appetite and energy are good; denies fatigue. He walks 1-2 miles per day. He is redoing a bathroom in his home, all on his own. He does report that he feels he is not completely emptying his bladder and nocturia x 2, these symptoms are stable. He denies fever, chills, sore throat, shortness of breath, cough, constipation, diarrhea, rashes, itching, rashes, joint and muscle pain.    11/8/23: Patient continues to do well on nivolumab. Energy and appetite are good. He has a routine that he sticks too, bed just before 10pm and wakes up at about 4:30am to take his dog out for a walk. He has been working on a home improvement project that he hopes to complete this weekend. He has not been sick recently. Is due for a physical with PCP next month. Has not yet gotten flu shot, will either get this at a pharmacy or at his upcoming physical.   12/6/23 feels overall fine, denies skin rash, joint pain and diarrhea.   1/3/24: Overall, patient is doing well. Denies any new or changing symptoms. Energy and appetite are good. Denies chest pain, palpitations, cough, shortness of breath, rashes, itching, specific joint and muscle pain.    1/30/24 reports feeling overall fine, has good energy, eats well.   2/28/24: Patient has been feeling well, no new or changing symptoms, appetite and energy are good. Says he pulled a muscle in his neck when installing a microwave last week, pain now improved; he does not have any other joint or muscle pain. Denies changes in hernia, no pain.    3/27/24: Patient is feeling well, he is staying busy, goes for walks most days. His appetite is good, he is not a big eater, weight is stable. He denies new or changing symptoms. He has not noted any changes to inguinal hernia.  4/24/24: Patient is feeling well, appetite is okay, staying active - walking dog and riding bike. No new or changing symptoms.   5/22/24: Patient is feeling well, offers no complaints. Says he is enjoying life, taking it day by day.  [de-identified] : metastatic renal cell carcinoma [FreeTextEntry1] : Ipi + nivo (7/2019) > nivolumab 480 mg monthly - cont [de-identified] : 6/19/24: Overall patient feels well, says a few weeks ago he had depressed appetite/early satiety for 2-3 days, now back to baseline. Still going for walks and sticking to his routines. He offers no complaints and denies new symptoms.   DISPLAY PLAN FREE TEXT

## 2024-06-20 DIAGNOSIS — Z51.11 ENCOUNTER FOR ANTINEOPLASTIC CHEMOTHERAPY: ICD-10-CM

## 2024-07-16 NOTE — DISCHARGE NOTE PROVIDER - PROVIDER TOKENS
Is This A New Presentation, Or A Follow-Up?: Acne
PROVIDER:[TOKEN:[[id number]]],PROVIDER:[TOKEN:[[id number]]]

## 2024-07-24 ENCOUNTER — RESULT REVIEW (OUTPATIENT)
Age: 74
End: 2024-07-24

## 2024-07-24 ENCOUNTER — APPOINTMENT (OUTPATIENT)
Dept: INFUSION THERAPY | Facility: HOSPITAL | Age: 74
End: 2024-07-24

## 2024-07-24 ENCOUNTER — APPOINTMENT (OUTPATIENT)
Dept: HEMATOLOGY ONCOLOGY | Facility: CLINIC | Age: 74
End: 2024-07-24
Payer: MEDICARE

## 2024-07-24 DIAGNOSIS — Z29.89 ENCOUNTER. FOR OTHER SPECIFIED PROPHYLACTIC MEASURES: ICD-10-CM

## 2024-07-24 DIAGNOSIS — C64.2 MALIGNANT NEOPLASM OF LEFT KIDNEY, EXCEPT RENAL PELVIS: ICD-10-CM

## 2024-07-24 LAB
ALBUMIN SERPL ELPH-MCNC: 4.4 G/DL — SIGNIFICANT CHANGE UP (ref 3.3–5)
ALP SERPL-CCNC: 57 U/L — SIGNIFICANT CHANGE UP (ref 40–120)
ALT FLD-CCNC: 12 U/L — SIGNIFICANT CHANGE UP (ref 10–45)
ANION GAP SERPL CALC-SCNC: 10 MMOL/L — SIGNIFICANT CHANGE UP (ref 5–17)
AST SERPL-CCNC: 22 U/L — SIGNIFICANT CHANGE UP (ref 10–40)
BASOPHILS # BLD AUTO: 0.05 K/UL — SIGNIFICANT CHANGE UP (ref 0–0.2)
BASOPHILS NFR BLD AUTO: 0.6 % — SIGNIFICANT CHANGE UP (ref 0–2)
BILIRUB SERPL-MCNC: 1.2 MG/DL — SIGNIFICANT CHANGE UP (ref 0.2–1.2)
BUN SERPL-MCNC: 18 MG/DL — SIGNIFICANT CHANGE UP (ref 7–23)
CALCIUM SERPL-MCNC: 9.5 MG/DL — SIGNIFICANT CHANGE UP (ref 8.4–10.5)
CHLORIDE SERPL-SCNC: 104 MMOL/L — SIGNIFICANT CHANGE UP (ref 96–108)
CO2 SERPL-SCNC: 23 MMOL/L — SIGNIFICANT CHANGE UP (ref 22–31)
CREAT SERPL-MCNC: 1.25 MG/DL — SIGNIFICANT CHANGE UP (ref 0.5–1.3)
EGFR: 61 ML/MIN/1.73M2 — SIGNIFICANT CHANGE UP
EOSINOPHIL # BLD AUTO: 0.22 K/UL — SIGNIFICANT CHANGE UP (ref 0–0.5)
EOSINOPHIL NFR BLD AUTO: 2.7 % — SIGNIFICANT CHANGE UP (ref 0–6)
GLUCOSE SERPL-MCNC: 91 MG/DL — SIGNIFICANT CHANGE UP (ref 70–99)
HCT VFR BLD CALC: 37 % — LOW (ref 39–50)
HGB BLD-MCNC: 13.1 G/DL — SIGNIFICANT CHANGE UP (ref 13–17)
IMM GRANULOCYTES NFR BLD AUTO: 0.6 % — SIGNIFICANT CHANGE UP (ref 0–0.9)
LYMPHOCYTES # BLD AUTO: 1.49 K/UL — SIGNIFICANT CHANGE UP (ref 1–3.3)
LYMPHOCYTES # BLD AUTO: 18 % — SIGNIFICANT CHANGE UP (ref 13–44)
MCHC RBC-ENTMCNC: 32.1 PG — SIGNIFICANT CHANGE UP (ref 27–34)
MCHC RBC-ENTMCNC: 35.4 G/DL — SIGNIFICANT CHANGE UP (ref 32–36)
MCV RBC AUTO: 90.7 FL — SIGNIFICANT CHANGE UP (ref 80–100)
MONOCYTES # BLD AUTO: 0.66 K/UL — SIGNIFICANT CHANGE UP (ref 0–0.9)
MONOCYTES NFR BLD AUTO: 8 % — SIGNIFICANT CHANGE UP (ref 2–14)
NEUTROPHILS # BLD AUTO: 5.81 K/UL — SIGNIFICANT CHANGE UP (ref 1.8–7.4)
NEUTROPHILS NFR BLD AUTO: 70.1 % — SIGNIFICANT CHANGE UP (ref 43–77)
NRBC # BLD: 0 /100 WBCS — SIGNIFICANT CHANGE UP (ref 0–0)
PLATELET # BLD AUTO: 157 K/UL — SIGNIFICANT CHANGE UP (ref 150–400)
POTASSIUM SERPL-MCNC: 4.3 MMOL/L — SIGNIFICANT CHANGE UP (ref 3.5–5.3)
POTASSIUM SERPL-SCNC: 4.3 MMOL/L — SIGNIFICANT CHANGE UP (ref 3.5–5.3)
PROT SERPL-MCNC: 7.3 G/DL — SIGNIFICANT CHANGE UP (ref 6–8.3)
RBC # BLD: 4.08 M/UL — LOW (ref 4.2–5.8)
RBC # FLD: 12.4 % — SIGNIFICANT CHANGE UP (ref 10.3–14.5)
SODIUM SERPL-SCNC: 137 MMOL/L — SIGNIFICANT CHANGE UP (ref 135–145)
WBC # BLD: 8.28 K/UL — SIGNIFICANT CHANGE UP (ref 3.8–10.5)
WBC # FLD AUTO: 8.28 K/UL — SIGNIFICANT CHANGE UP (ref 3.8–10.5)

## 2024-07-24 PROCEDURE — 99214 OFFICE O/P EST MOD 30 MIN: CPT

## 2024-07-24 PROCEDURE — G2211 COMPLEX E/M VISIT ADD ON: CPT

## 2024-07-24 NOTE — HISTORY OF PRESENT ILLNESS
[T: ___] : T[unfilled] [N: ___] : N[unfilled] [M: ___] : M[unfilled] [AJCC Stage: ____] : AJCC Stage: [unfilled] [N/A] : Currently not applicable [de-identified] : Finesse Zuniga is a 70 years old male who initially presented with intermittent midback pain, 10/10 radiating to stomach from both sides on June 1, 2019. He has poor appetite.  On june 12 he was diagnosed UTI and on cipro for 4 days, on June 16, was started cefpodoxime 100mg BID for 7 days.  CT chest/abdomen/pelvis revealed significant abnormal appearance of the left kidney with multiple heterogeneous areas of low attenuation involving the upper and midpole which may represent malignancy. There are necrotic lymph nodes in the left para-aortic location at the level of the kidney and below the level of the kidney. Adenopathy surrounds the left renal artery. The left renal vein demonstrates question of partially occlusive thrombus. Nonspecific scattered tiny pulmonary nodules. Lower lobe subpleural nodules measure up to 6 mm. There is a well-circumscribed hypodense lesion in the anterior aspect of the lateral segment of the left lobe measuring 3.2 x 2.2 cm. He underwent CT guided liver lesion biopsy which showed consistent with primary renal cell carcinoma, IHC CK7/CK19/PAX8 positive. MRI abdomen waw con showed multiple bilateral pulmonary nodules increased in size and number compared with recent CT. The largest is pleural-based in the left lower lobe and measures 1.5 x 1.1 cm. LIVER: Rim-enhancing subcapsular mass in segment 3 and measures 4.4 x 2.6 cm. A 4.2cm mass in mid to lower pole of the lft kidney suspicious for urothelial malignancy arising within the left renal collecting system. Regional lymphadenopathy and hepatic and pulmonary metastases. Nonocclusive thrombus within the left renal vein. He was started on eliquis. Bone scan showed  a focus of mildly increased uptake in the right superior  pubic ramus suspicious of bone mets. He has lost 17lbs in one month. He denies nausea, vomiting and constipation and diarrhea.   7/2019: ipilimumab + nivolumab initiated and to nivolumab maintenance post 4 cycles and continues on therapy.  9/18-22/2020 : Patient admitted for hematuria. Patient followed by urology and hematology during hospital course. Patient was started on CBI to help remove clots. Urine initially bloody, but became pink-tinged and then clear over time. CBI stopped and barber removed. Patient reportedly felt much better after barber removed, able to urinate by himself- reported some clots initially, but since resolved. States bladder spasms improved as well. Patient's eliquis was stopped, MRI done showing no evidence of renal vein thrombus, discussed  with Heme, no reason to continue eliquis for now. Patient's leukocytosis and JOSE MARIA resolved. Urine culture and blood cultures negative.  Patient improved clinically throughout hospital course. Patient seen and examined on day of discharge.  Cystoscopy was negative, performed by Dr. Javier.   5/26/2021 : Mr. Vera is here for nivolumab. Last scan 3/29/2021, will need another set of scan the end or beginning of July. He walks 2 miles a day with his dog and bikes 3-4 miles a day. He feels well overall. Mr. VERA denies fevers, chills, headaches, cough, SOB, chest pain, any swelling, nausea, vomiting, diarrhea, rash, or malaise.   6/23/21: Bothering Rt side inguinal hernia without pain or signs of incarceration. Doing well overall. Scheduled for nivo today. He denies fevers, chills, headaches, cough, SOB, chest pain, any swelling, nausea, vomiting, diarrhea, rash, or malaise.  7/7/2021 : CT showed stable disease  7/28/2021 : Here for nivolumab. 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 Mr. VERA denies fevers, chills, headaches, cough, SOB, chest pain, any swelling, nausea, vomiting, diarrhea, rash, or malaise. He has been tolerating therapy extremely well without sig issues. He is to see a general surgeon for hernia repair soon.   8/25/2021 : Here for nivolumab, scan due in September. Mr. VERA denies fevers, chills, headaches, cough, SOB, chest pain, any swelling, nausea, vomiting, diarrhea, rash, or malaise.   10/27/21: Pt presents for follow up. He is scheduled for Nivolumab which he has been tolerating well. Scheduled to receive cycle #30 today. Denies any diarrhea/ itchiness, cough, hematuria, rash. Appetite good. Energy good. No new complaints. Recieved both flu shot and COVID booster recently  11/24/21: Pt feeling well. Offers no complaints. Scheduled for C#31 nivolumab today. Due for imaging next month. Already scheduled  12/21/21 CT chest/abd/pelvis - Stable appearance of the chest compared with 9/4/2021. Indeterminate 1.4 cm hypodense lesion in the upper pole of the right kidney is unchanged. Atrophy is again noted involving the upper pole of the left kidney, unchanged. Very large right inguinal hernia containing non-obstructed large and small bowel and a small amount of fluid. Enlarged heterogeneous prostate gland. Nonspecific ill-defined infiltration of the fat in the right lower quadrant, not significantly changed. Attention to this area is suggested on follow-up CT.  12/29/21 - presents for ongoing Opdivo cycle #32 today. Overall feeling "good", energy is good. Appetite is good. No complaints.   3/2/22 feels quite well, denies any pain and gross hematuria. He has a schedule in April to take care of right inguinal hernia.   4/6/22 feels fine, denies pain and gross hematuria. His right inguinal hernia is getting worse.   4/27/22: Pt returns for follow up. He reports 24 period of severe diarrhea on monday. No fevers. Resolved spontaneously. Pt reports he had imaging done 2 weeks ago. Eager fto discuss findings. Feeling well now. Pt will be  meeting with surgeon recommended by PMD for Right inguinal hernia repair.  6/22/22 feels well. He will meet his surgeon for his right inguinal hernia.   7/20/22 feels overall fine. Denies any pain and weight loss.     8/17/22: Patient returns for follow up, continued on nivolumab. Had CT scans 1 week ago that show stable L renal mass and stable R renal cyst. Also remarkable for non specific mild increase in illdefined omental infiltration - omental carcinomatosis is not excluded, will continue short interval scanning. Patient reports having less appetite than normal. He feels less motivation to cook for himself. He does drink an ensure everyday. He feels he gets moncada sooner and has lost a few pounds. He denies nausea, vomiting and GERD. Patient is often tired, but is still able to complete every thing he needs to do, it may just take him a bit longer. He is working on setting up his inguinal hernia surgery.  9/14: energy is good. seeing the surgeon next week for the hernia. appetite is okay now. no longer has the feeling of getting full early. able to eat full meals  10/12: appetite back to normal. no rash, pain, CP, SOB. energy is okay  11/9/22: feels well today. no new changes   1/4/22: doing well today. no new issues. energy good. appetite good. no new joint pain, rash, SOB, diarreha.  2/1/23: Returns for follow up and treatment with nivolumab. Patient has been feeling well. Denies fatigue, walks 2-2.5 miles with his dog per day. He has not yet seen a surgeon for his inguinal hernia. His PCP gave him a surgeon referral, he says he has not gotten up the nerve to call. Denies pain.  Patient denies fever, chills, headache, vision changes, cough, shortness of breath, chest pain, palpitations, abdominal pain, nausea/vomiting, diarrhea, constipation, dysuria, hematuria, itching, rashes, joint pain, mucositis, changes in sensation.  3/1/23: continued on nivolumab. Patient feels well overall, eating well, has good energy, goes on daily walks with his dog. He notes his urine stream is not as strong in the morning, but improves throughout the day. Denies dysuria/hematuria. He went to see a surgeon last month and was described what his inguinal hernia repair would be, he is planning to have the surgery but not until the Spring.. he denies any change in color or size of hernia.   3/9/23 CT c/a/p showed Multiple bilateral small renal lesions overall unchanged when compared to the prior examinations and can be consistent with a neoplastic process. Diffuse areas of omental/peritoneal thickening and areas of nodularity and diffuse small lymph nodes within the mesentery and retroperitoneum but overall configuration unchanged  3/29/23 reports feeling overall fine, denies skin and joint pain  4/26/23: feels well, no new changes compared to last visit  5/24/23: continued on nivolumab for met RCC. Overall, patient is doing well. He is exercising more, walking with his dog, riding his bike. Appetite is good. He is not planning to pursue hernia surgery until after his next scans in July, he wants to feel comfortable. Patient denies fever, chills, headache, vision changes, cough, shortness of breath, chest pain, palpitations, abdominal pain, nausea/vomiting, diarrhea, constipation, dysuria, hematuria, itching, rashes, muscle and joint pain, paraesthesias.     6/21/23: continued on nivolumab. Energy level is good, doing his same routine. Appetite is good. Last week he pulled a muscle in back when doing yard work, pain lasted for about 3-4 days and has now resolved. Otherwise no joint or muscle pain. Pt notes that due to hernia he often feels as though he is not completely emptying his bladder. Notes nocturia x 2. Denies shortness of breath, coughing, constipation, diarrhea, itching, rashes.   7/19/23 reports feeling well, no fatigue and skin rash.   816/23 Patient reports he is doing well. Denies nausea, vomiting, diarrhea, constipation, fevers, chills. No issues with urination, denies blood in urine. No SOB, or chest pain. Reports appetite is good.   9/13/23: Patient has lost a few pounds since his last visit, he says his appetite is okay. Has good energy. Sometimes he feels like he is not completely emptying his bladder, notes nocturia x 2. Patient says his hernia is not bothering him, no pain, he is not ready to get surgery just now but continues to consider it...    10/11/23: Patient continues on nivolumab, continues to feel well. His appetite and energy are good; denies fatigue. He walks 1-2 miles per day. He is redoing a bathroom in his home, all on his own. He does report that he feels he is not completely emptying his bladder and nocturia x 2, these symptoms are stable. He denies fever, chills, sore throat, shortness of breath, cough, constipation, diarrhea, rashes, itching, rashes, joint and muscle pain.    11/8/23: Patient continues to do well on nivolumab. Energy and appetite are good. He has a routine that he sticks too, bed just before 10pm and wakes up at about 4:30am to take his dog out for a walk. He has been working on a home improvement project that he hopes to complete this weekend. He has not been sick recently. Is due for a physical with PCP next month. Has not yet gotten flu shot, will either get this at a pharmacy or at his upcoming physical.   12/6/23 feels overall fine, denies skin rash, joint pain and diarrhea.   1/3/24: Overall, patient is doing well. Denies any new or changing symptoms. Energy and appetite are good. Denies chest pain, palpitations, cough, shortness of breath, rashes, itching, specific joint and muscle pain.    1/30/24 reports feeling overall fine, has good energy, eats well.   2/28/24: Patient has been feeling well, no new or changing symptoms, appetite and energy are good. Says he pulled a muscle in his neck when installing a microwave last week, pain now improved; he does not have any other joint or muscle pain. Denies changes in hernia, no pain.    3/27/24: Patient is feeling well, he is staying busy, goes for walks most days. His appetite is good, he is not a big eater, weight is stable. He denies new or changing symptoms. He has not noted any changes to inguinal hernia.  4/24/24: Patient is feeling well, appetite is okay, staying active - walking dog and riding bike. No new or changing symptoms.   5/22/24: Patient is feeling well, offers no complaints. Says he is enjoying life, taking it day by day.   6/19/24: Overall patient feels well, says a few weeks ago he had depressed appetite/early satiety for 2-3 days, now back to baseline. Still going for walks and sticking to his routines. He offers no complaints and denies new symptoms.   [de-identified] : metastatic renal cell carcinoma [FreeTextEntry1] : Ipi + nivo (7/2019) > nivolumab 480 mg monthly - cont [de-identified] : 7/24/24: Patient has been feeling well, enjoying his Summer and planting in his garden. Denies any new or changing symptoms. Patient denies  cough, shortness of breath, chest pain, palpitations, abdominal pain, nausea/vomiting, diarrhea, constipation, itching, rashes, joint and muscle pain.

## 2024-07-24 NOTE — PHYSICAL EXAM
chest discomfort with sob , weakness , no appetite x today. [Fully active, able to carry on all pre-disease performance without restriction] : Status 0 - Fully active, able to carry on all pre-disease performance without restriction [Normal] : affect appropriate [de-identified] : anicteric  [de-identified] : supple, FROM  [de-identified] : regular rhythm, mild bradycardia  [de-identified] : no edema  [de-identified] : large inguinal hernia unchanged from prior

## 2024-07-24 NOTE — PHYSICAL EXAM
[Fully active, able to carry on all pre-disease performance without restriction] : Status 0 - Fully active, able to carry on all pre-disease performance without restriction [Normal] : affect appropriate [de-identified] : anicteric  [de-identified] : supple, FROM  [de-identified] : regular rhythm, mild bradycardia  [de-identified] : large inguinal hernia unchanged from prior  [de-identified] : no edema

## 2024-07-24 NOTE — HISTORY OF PRESENT ILLNESS
[T: ___] : T[unfilled] [N: ___] : N[unfilled] [M: ___] : M[unfilled] [AJCC Stage: ____] : AJCC Stage: [unfilled] [N/A] : Currently not applicable [de-identified] : Finesse Zuniga is a 70 years old male who initially presented with intermittent midback pain, 10/10 radiating to stomach from both sides on June 1, 2019. He has poor appetite.  On june 12 he was diagnosed UTI and on cipro for 4 days, on June 16, was started cefpodoxime 100mg BID for 7 days.  CT chest/abdomen/pelvis revealed significant abnormal appearance of the left kidney with multiple heterogeneous areas of low attenuation involving the upper and midpole which may represent malignancy. There are necrotic lymph nodes in the left para-aortic location at the level of the kidney and below the level of the kidney. Adenopathy surrounds the left renal artery. The left renal vein demonstrates question of partially occlusive thrombus. Nonspecific scattered tiny pulmonary nodules. Lower lobe subpleural nodules measure up to 6 mm. There is a well-circumscribed hypodense lesion in the anterior aspect of the lateral segment of the left lobe measuring 3.2 x 2.2 cm. He underwent CT guided liver lesion biopsy which showed consistent with primary renal cell carcinoma, IHC CK7/CK19/PAX8 positive. MRI abdomen waw con showed multiple bilateral pulmonary nodules increased in size and number compared with recent CT. The largest is pleural-based in the left lower lobe and measures 1.5 x 1.1 cm. LIVER: Rim-enhancing subcapsular mass in segment 3 and measures 4.4 x 2.6 cm. A 4.2cm mass in mid to lower pole of the lft kidney suspicious for urothelial malignancy arising within the left renal collecting system. Regional lymphadenopathy and hepatic and pulmonary metastases. Nonocclusive thrombus within the left renal vein. He was started on eliquis. Bone scan showed  a focus of mildly increased uptake in the right superior  pubic ramus suspicious of bone mets. He has lost 17lbs in one month. He denies nausea, vomiting and constipation and diarrhea.   7/2019: ipilimumab + nivolumab initiated and to nivolumab maintenance post 4 cycles and continues on therapy.  9/18-22/2020 : Patient admitted for hematuria. Patient followed by urology and hematology during hospital course. Patient was started on CBI to help remove clots. Urine initially bloody, but became pink-tinged and then clear over time. CBI stopped and barber removed. Patient reportedly felt much better after barber removed, able to urinate by himself- reported some clots initially, but since resolved. States bladder spasms improved as well. Patient's eliquis was stopped, MRI done showing no evidence of renal vein thrombus, discussed  with Heme, no reason to continue eliquis for now. Patient's leukocytosis and JOSE MARIA resolved. Urine culture and blood cultures negative.  Patient improved clinically throughout hospital course. Patient seen and examined on day of discharge.  Cystoscopy was negative, performed by Dr. Javier.   5/26/2021 : Mr. Vera is here for nivolumab. Last scan 3/29/2021, will need another set of scan the end or beginning of July. He walks 2 miles a day with his dog and bikes 3-4 miles a day. He feels well overall. Mr. VERA denies fevers, chills, headaches, cough, SOB, chest pain, any swelling, nausea, vomiting, diarrhea, rash, or malaise.   6/23/21: Bothering Rt side inguinal hernia without pain or signs of incarceration. Doing well overall. Scheduled for nivo today. He denies fevers, chills, headaches, cough, SOB, chest pain, any swelling, nausea, vomiting, diarrhea, rash, or malaise.  7/7/2021 : CT showed stable disease  7/28/2021 : Here for nivolumab. 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 Mr. VERA denies fevers, chills, headaches, cough, SOB, chest pain, any swelling, nausea, vomiting, diarrhea, rash, or malaise. He has been tolerating therapy extremely well without sig issues. He is to see a general surgeon for hernia repair soon.   8/25/2021 : Here for nivolumab, scan due in September. Mr. VERA denies fevers, chills, headaches, cough, SOB, chest pain, any swelling, nausea, vomiting, diarrhea, rash, or malaise.   10/27/21: Pt presents for follow up. He is scheduled for Nivolumab which he has been tolerating well. Scheduled to receive cycle #30 today. Denies any diarrhea/ itchiness, cough, hematuria, rash. Appetite good. Energy good. No new complaints. Recieved both flu shot and COVID booster recently  11/24/21: Pt feeling well. Offers no complaints. Scheduled for C#31 nivolumab today. Due for imaging next month. Already scheduled  12/21/21 CT chest/abd/pelvis - Stable appearance of the chest compared with 9/4/2021. Indeterminate 1.4 cm hypodense lesion in the upper pole of the right kidney is unchanged. Atrophy is again noted involving the upper pole of the left kidney, unchanged. Very large right inguinal hernia containing non-obstructed large and small bowel and a small amount of fluid. Enlarged heterogeneous prostate gland. Nonspecific ill-defined infiltration of the fat in the right lower quadrant, not significantly changed. Attention to this area is suggested on follow-up CT.  12/29/21 - presents for ongoing Opdivo cycle #32 today. Overall feeling "good", energy is good. Appetite is good. No complaints.   3/2/22 feels quite well, denies any pain and gross hematuria. He has a schedule in April to take care of right inguinal hernia.   4/6/22 feels fine, denies pain and gross hematuria. His right inguinal hernia is getting worse.   4/27/22: Pt returns for follow up. He reports 24 period of severe diarrhea on monday. No fevers. Resolved spontaneously. Pt reports he had imaging done 2 weeks ago. Eager fto discuss findings. Feeling well now. Pt will be  meeting with surgeon recommended by PMD for Right inguinal hernia repair.  6/22/22 feels well. He will meet his surgeon for his right inguinal hernia.   7/20/22 feels overall fine. Denies any pain and weight loss.     8/17/22: Patient returns for follow up, continued on nivolumab. Had CT scans 1 week ago that show stable L renal mass and stable R renal cyst. Also remarkable for non specific mild increase in illdefined omental infiltration - omental carcinomatosis is not excluded, will continue short interval scanning. Patient reports having less appetite than normal. He feels less motivation to cook for himself. He does drink an ensure everyday. He feels he gets moncada sooner and has lost a few pounds. He denies nausea, vomiting and GERD. Patient is often tired, but is still able to complete every thing he needs to do, it may just take him a bit longer. He is working on setting up his inguinal hernia surgery.  9/14: energy is good. seeing the surgeon next week for the hernia. appetite is okay now. no longer has the feeling of getting full early. able to eat full meals  10/12: appetite back to normal. no rash, pain, CP, SOB. energy is okay  11/9/22: feels well today. no new changes   1/4/22: doing well today. no new issues. energy good. appetite good. no new joint pain, rash, SOB, diarreha.  2/1/23: Returns for follow up and treatment with nivolumab. Patient has been feeling well. Denies fatigue, walks 2-2.5 miles with his dog per day. He has not yet seen a surgeon for his inguinal hernia. His PCP gave him a surgeon referral, he says he has not gotten up the nerve to call. Denies pain.  Patient denies fever, chills, headache, vision changes, cough, shortness of breath, chest pain, palpitations, abdominal pain, nausea/vomiting, diarrhea, constipation, dysuria, hematuria, itching, rashes, joint pain, mucositis, changes in sensation.  3/1/23: continued on nivolumab. Patient feels well overall, eating well, has good energy, goes on daily walks with his dog. He notes his urine stream is not as strong in the morning, but improves throughout the day. Denies dysuria/hematuria. He went to see a surgeon last month and was described what his inguinal hernia repair would be, he is planning to have the surgery but not until the Spring.. he denies any change in color or size of hernia.   3/9/23 CT c/a/p showed Multiple bilateral small renal lesions overall unchanged when compared to the prior examinations and can be consistent with a neoplastic process. Diffuse areas of omental/peritoneal thickening and areas of nodularity and diffuse small lymph nodes within the mesentery and retroperitoneum but overall configuration unchanged  3/29/23 reports feeling overall fine, denies skin and joint pain  4/26/23: feels well, no new changes compared to last visit  5/24/23: continued on nivolumab for met RCC. Overall, patient is doing well. He is exercising more, walking with his dog, riding his bike. Appetite is good. He is not planning to pursue hernia surgery until after his next scans in July, he wants to feel comfortable. Patient denies fever, chills, headache, vision changes, cough, shortness of breath, chest pain, palpitations, abdominal pain, nausea/vomiting, diarrhea, constipation, dysuria, hematuria, itching, rashes, muscle and joint pain, paraesthesias.     6/21/23: continued on nivolumab. Energy level is good, doing his same routine. Appetite is good. Last week he pulled a muscle in back when doing yard work, pain lasted for about 3-4 days and has now resolved. Otherwise no joint or muscle pain. Pt notes that due to hernia he often feels as though he is not completely emptying his bladder. Notes nocturia x 2. Denies shortness of breath, coughing, constipation, diarrhea, itching, rashes.   7/19/23 reports feeling well, no fatigue and skin rash.   816/23 Patient reports he is doing well. Denies nausea, vomiting, diarrhea, constipation, fevers, chills. No issues with urination, denies blood in urine. No SOB, or chest pain. Reports appetite is good.   9/13/23: Patient has lost a few pounds since his last visit, he says his appetite is okay. Has good energy. Sometimes he feels like he is not completely emptying his bladder, notes nocturia x 2. Patient says his hernia is not bothering him, no pain, he is not ready to get surgery just now but continues to consider it...    10/11/23: Patient continues on nivolumab, continues to feel well. His appetite and energy are good; denies fatigue. He walks 1-2 miles per day. He is redoing a bathroom in his home, all on his own. He does report that he feels he is not completely emptying his bladder and nocturia x 2, these symptoms are stable. He denies fever, chills, sore throat, shortness of breath, cough, constipation, diarrhea, rashes, itching, rashes, joint and muscle pain.    11/8/23: Patient continues to do well on nivolumab. Energy and appetite are good. He has a routine that he sticks too, bed just before 10pm and wakes up at about 4:30am to take his dog out for a walk. He has been working on a home improvement project that he hopes to complete this weekend. He has not been sick recently. Is due for a physical with PCP next month. Has not yet gotten flu shot, will either get this at a pharmacy or at his upcoming physical.   12/6/23 feels overall fine, denies skin rash, joint pain and diarrhea.   1/3/24: Overall, patient is doing well. Denies any new or changing symptoms. Energy and appetite are good. Denies chest pain, palpitations, cough, shortness of breath, rashes, itching, specific joint and muscle pain.    1/30/24 reports feeling overall fine, has good energy, eats well.   2/28/24: Patient has been feeling well, no new or changing symptoms, appetite and energy are good. Says he pulled a muscle in his neck when installing a microwave last week, pain now improved; he does not have any other joint or muscle pain. Denies changes in hernia, no pain.    3/27/24: Patient is feeling well, he is staying busy, goes for walks most days. His appetite is good, he is not a big eater, weight is stable. He denies new or changing symptoms. He has not noted any changes to inguinal hernia.  4/24/24: Patient is feeling well, appetite is okay, staying active - walking dog and riding bike. No new or changing symptoms.   5/22/24: Patient is feeling well, offers no complaints. Says he is enjoying life, taking it day by day.   6/19/24: Overall patient feels well, says a few weeks ago he had depressed appetite/early satiety for 2-3 days, now back to baseline. Still going for walks and sticking to his routines. He offers no complaints and denies new symptoms.   [de-identified] : metastatic renal cell carcinoma [FreeTextEntry1] : Ipi + nivo (7/2019) > nivolumab 480 mg monthly - cont [de-identified] : 7/24/24: Patient has been feeling well, enjoying his Summer and planting in his garden. Denies any new or changing symptoms. Patient denies  cough, shortness of breath, chest pain, palpitations, abdominal pain, nausea/vomiting, diarrhea, constipation, itching, rashes, joint and muscle pain.

## 2024-07-24 NOTE — ASSESSMENT
[FreeTextEntry1] : Mr. Mcknight is a 74 year old male with metastatic renal cell carcinoma. On initial presentation CT c/a/p and MRI abdomen showed multiple lung nodules, one liver lesion, retroperitoneal nodes. Bone scan showed one suspicious met in right superior pubic ramus. Liver lesion biopsy is consistent with renal cell carcinoma. It is not certain that the histology of RCC is clear cell vs non-clear cell. IMDC risk of this patient he has 5 out of 6 risks including leukocytosis, anemia, thrombocytosis, time from dx to initiate systemic treatment less than one year and hyperalcemia. He has poor risk of mRCC. Based on mccRCC the standard of care is combined immunotherapy nivo and ipi vs sutent, checkmate 214 in the intermediate and poor risk of mccRCC patients with significant overall survival benefit, keynote 426 keytruda plus axitinib vs sutent in good, intermediate and poor risk of mccRCC patients showed OS, PFS and MICHEL benefit, [place] in the intermediate and poor risk of mccRCC patients showed cabozantinib demonstrated a significant clinical benefit in PFS and MICHEL over standard-of-care sunitinib as first-line therapy in patients with intermediate- or poor-risk mRCC. Consider the longer duration of response and less side effects and recommended nivo and ipi. Completed 4 cycles of nivo and ipi, continued on maintenance nivolumab with interval scans to monitor disease. Of note, on diagnosis patient had L renal vein thrombus and was started on eliquis. He had an episode of gross hematuria leading to hospitalization in 9/2020 however etiology of hematuria is unclear and resolved. Follow up cystoscopy to r/o abnormality in the bladder showed no abnormality. Eliquis stopped during hospitalization with hematology consult. Remains off of eliquis. Scans have continued to show stability. Scans from 8/11/22 showed stable disease in L kidney and stable R renal cyst, noted non specific infiltration in omentum, cannot exclude carcinomatosis. CTs in November 2022 show stable disease. Patient also has large R inguinal hernia. March 2023, August 2023, Dec 2023, April 2024 CT c/a/p showed stable disease in lungs, kidney and retroperitoneum.  Plan Metastatic RCC - continue maintenance nivolumab [id number], proceed with treatment today - CTs 4/18/24 No change in left apical 4 mm pulmonary nodule, nonspecific. No additional pulmonary nodules or masses. No significant change in 3 right renal neoplasms and 2 left renal neoplasms. Resolved/improved omental nodularity. No enlarged retroperitoneal or pelvic adenopathy -- repeat imaging in 4 months, ordered and authorized for August, reminded pt to schedule  - continue to monitor blood work - monitor for iRAEs, reviewed with patient today; continues to tolerate treatment well  - CTs April 2024: Large right inguinal hernia, unchanged without evidence of bowel obstruction.  Instructed to contact our office with any new/worsening symptoms. Patient educated regarding plan of care, all questions/concerns addressed to the best of my abilities and patient's apparent satisfaction. RTC 4 weeks

## 2024-07-24 NOTE — ASSESSMENT
[FreeTextEntry1] : Mr. Mcknight is a 74 year old male with metastatic renal cell carcinoma. On initial presentation CT c/a/p and MRI abdomen showed multiple lung nodules, one liver lesion, retroperitoneal nodes. Bone scan showed one suspicious met in right superior pubic ramus. Liver lesion biopsy is consistent with renal cell carcinoma. It is not certain that the histology of RCC is clear cell vs non-clear cell. IMDC risk of this patient he has 5 out of 6 risks including leukocytosis, anemia, thrombocytosis, time from dx to initiate systemic treatment less than one year and hyperalcemia. He has poor risk of mRCC. Based on mccRCC the standard of care is combined immunotherapy nivo and ipi vs sutent, checkmate 214 in the intermediate and poor risk of mccRCC patients with significant overall survival benefit, keynote 426 keytruda plus axitinib vs sutent in good, intermediate and poor risk of mccRCC patients showed OS, PFS and MICHEL benefit, [place] in the intermediate and poor risk of mccRCC patients showed cabozantinib demonstrated a significant clinical benefit in PFS and MICHEL over standard-of-care sunitinib as first-line therapy in patients with intermediate- or poor-risk mRCC. Consider the longer duration of response and less side effects and recommended nivo and ipi. Completed 4 cycles of nivo and ipi, continued on maintenance nivolumab with interval scans to monitor disease. Of note, on diagnosis patient had L renal vein thrombus and was started on eliquis. He had an episode of gross hematuria leading to hospitalization in 9/2020 however etiology of hematuria is unclear and resolved. Follow up cystoscopy to r/o abnormality in the bladder showed no abnormality. Eliquis stopped during hospitalization with hematology consult. Remains off of eliquis. Scans have continued to show stability. Scans from 8/11/22 showed stable disease in L kidney and stable R renal cyst, noted non specific infiltration in omentum, cannot exclude carcinomatosis. CTs in November 2022 show stable disease. Patient also has large R inguinal hernia. March 2023, August 2023, Dec 2023, April 2024 CT c/a/p showed stable disease in lungs, kidney and retroperitoneum.  Plan Metastatic RCC - continue maintenance nivolumab [id number], proceed with treatment today - CTs 4/18/24 No change in left apical 4 mm pulmonary nodule, nonspecific. No additional pulmonary nodules or masses. No significant change in 3 right renal neoplasms and 2 left renal neoplasms. Resolved/improved omental nodularity. No enlarged retroperitoneal or pelvic adenopathy -- repeat imaging in 4 months, ordered and authorized for August, reminded pt to schedule  - continue to monitor blood work - monitor for iRAEs, reviewed with patient today; continues to tolerate treatment well  - CTs April 2024: Large right inguinal hernia, unchanged without evidence of bowel obstruction.  Instructed to contact our office with any new/worsening symptoms. Patient educated regarding plan of care, all questions/concerns addressed to the best of my abilities and patient's apparent satisfaction. RTC 4 weeks

## 2024-07-25 LAB
T4 FREE SERPL-MCNC: 1.3 NG/DL — SIGNIFICANT CHANGE UP (ref 0.9–1.8)
TSH SERPL-MCNC: 1.77 UIU/ML — SIGNIFICANT CHANGE UP (ref 0.27–4.2)

## 2024-08-11 ENCOUNTER — OUTPATIENT (OUTPATIENT)
Dept: OUTPATIENT SERVICES | Facility: HOSPITAL | Age: 74
LOS: 1 days | Discharge: HOME | End: 2024-08-11

## 2024-08-11 DIAGNOSIS — C64.9 MALIGNANT NEOPLASM OF UNSPECIFIED KIDNEY, EXCEPT RENAL PELVIS: ICD-10-CM

## 2024-08-11 DIAGNOSIS — S86.019A STRAIN OF UNSPECIFIED ACHILLES TENDON, INITIAL ENCOUNTER: Chronic | ICD-10-CM

## 2024-08-11 DIAGNOSIS — Z98.890 OTHER SPECIFIED POSTPROCEDURAL STATES: Chronic | ICD-10-CM

## 2024-08-19 ENCOUNTER — EMERGENCY (EMERGENCY)
Facility: HOSPITAL | Age: 74
LOS: 1 days | Discharge: HOME | End: 2024-08-19
Attending: STUDENT IN AN ORGANIZED HEALTH CARE EDUCATION/TRAINING PROGRAM | Admitting: EMERGENCY MEDICINE
Payer: MEDICARE

## 2024-08-19 VITALS
DIASTOLIC BLOOD PRESSURE: 67 MMHG | HEART RATE: 60 BPM | SYSTOLIC BLOOD PRESSURE: 130 MMHG | RESPIRATION RATE: 17 BRPM | TEMPERATURE: 98 F | OXYGEN SATURATION: 99 %

## 2024-08-19 VITALS
OXYGEN SATURATION: 98 % | HEART RATE: 88 BPM | DIASTOLIC BLOOD PRESSURE: 57 MMHG | HEIGHT: 70 IN | RESPIRATION RATE: 16 BRPM | TEMPERATURE: 98 F | SYSTOLIC BLOOD PRESSURE: 116 MMHG | WEIGHT: 151.02 LBS

## 2024-08-19 DIAGNOSIS — S86.019A STRAIN OF UNSPECIFIED ACHILLES TENDON, INITIAL ENCOUNTER: Chronic | ICD-10-CM

## 2024-08-19 DIAGNOSIS — Z98.890 OTHER SPECIFIED POSTPROCEDURAL STATES: Chronic | ICD-10-CM

## 2024-08-19 LAB
ALBUMIN SERPL ELPH-MCNC: 3.9 G/DL — SIGNIFICANT CHANGE UP (ref 3.3–5)
ALP SERPL-CCNC: 60 U/L — SIGNIFICANT CHANGE UP (ref 40–120)
ALT FLD-CCNC: 22 U/L — SIGNIFICANT CHANGE UP (ref 12–78)
ANION GAP SERPL CALC-SCNC: 10 MMOL/L — SIGNIFICANT CHANGE UP (ref 5–17)
AST SERPL-CCNC: 24 U/L — SIGNIFICANT CHANGE UP (ref 15–37)
BASOPHILS # BLD AUTO: 0.03 K/UL — SIGNIFICANT CHANGE UP (ref 0–0.2)
BASOPHILS NFR BLD AUTO: 0.2 % — SIGNIFICANT CHANGE UP (ref 0–2)
BILIRUB SERPL-MCNC: 2.6 MG/DL — HIGH (ref 0.2–1.2)
BUN SERPL-MCNC: 28 MG/DL — HIGH (ref 7–23)
CALCIUM SERPL-MCNC: 10.5 MG/DL — HIGH (ref 8.5–10.1)
CHLORIDE SERPL-SCNC: 110 MMOL/L — HIGH (ref 96–108)
CO2 SERPL-SCNC: 21 MMOL/L — LOW (ref 22–31)
CREAT SERPL-MCNC: 1.6 MG/DL — HIGH (ref 0.5–1.3)
EGFR: 45 ML/MIN/1.73M2 — LOW
EOSINOPHIL # BLD AUTO: 0 K/UL — SIGNIFICANT CHANGE UP (ref 0–0.5)
EOSINOPHIL NFR BLD AUTO: 0 % — SIGNIFICANT CHANGE UP (ref 0–6)
GLUCOSE SERPL-MCNC: 169 MG/DL — HIGH (ref 70–99)
HCT VFR BLD CALC: 37.1 % — LOW (ref 39–50)
HGB BLD-MCNC: 13.5 G/DL — SIGNIFICANT CHANGE UP (ref 13–17)
IMM GRANULOCYTES NFR BLD AUTO: 0.3 % — SIGNIFICANT CHANGE UP (ref 0–0.9)
LIDOCAIN IGE QN: 18 U/L — SIGNIFICANT CHANGE UP (ref 13–75)
LYMPHOCYTES # BLD AUTO: 1.06 K/UL — SIGNIFICANT CHANGE UP (ref 1–3.3)
LYMPHOCYTES # BLD AUTO: 7.9 % — LOW (ref 13–44)
MCHC RBC-ENTMCNC: 32.1 PG — SIGNIFICANT CHANGE UP (ref 27–34)
MCHC RBC-ENTMCNC: 36.4 GM/DL — HIGH (ref 32–36)
MCV RBC AUTO: 88.3 FL — SIGNIFICANT CHANGE UP (ref 80–100)
MONOCYTES # BLD AUTO: 0.71 K/UL — SIGNIFICANT CHANGE UP (ref 0–0.9)
MONOCYTES NFR BLD AUTO: 5.3 % — SIGNIFICANT CHANGE UP (ref 2–14)
NEUTROPHILS # BLD AUTO: 11.53 K/UL — HIGH (ref 1.8–7.4)
NEUTROPHILS NFR BLD AUTO: 86.3 % — HIGH (ref 43–77)
NRBC # BLD: 0 /100 WBCS — SIGNIFICANT CHANGE UP (ref 0–0)
PLATELET # BLD AUTO: 185 K/UL — SIGNIFICANT CHANGE UP (ref 150–400)
POTASSIUM SERPL-MCNC: 4 MMOL/L — SIGNIFICANT CHANGE UP (ref 3.5–5.3)
POTASSIUM SERPL-SCNC: 4 MMOL/L — SIGNIFICANT CHANGE UP (ref 3.5–5.3)
PROT SERPL-MCNC: 7.4 G/DL — SIGNIFICANT CHANGE UP (ref 6–8.3)
RBC # BLD: 4.2 M/UL — SIGNIFICANT CHANGE UP (ref 4.2–5.8)
RBC # FLD: 12.2 % — SIGNIFICANT CHANGE UP (ref 10.3–14.5)
SODIUM SERPL-SCNC: 141 MMOL/L — SIGNIFICANT CHANGE UP (ref 135–145)
WBC # BLD: 13.37 K/UL — HIGH (ref 3.8–10.5)
WBC # FLD AUTO: 13.37 K/UL — HIGH (ref 3.8–10.5)

## 2024-08-19 PROCEDURE — 83690 ASSAY OF LIPASE: CPT

## 2024-08-19 PROCEDURE — 96374 THER/PROPH/DIAG INJ IV PUSH: CPT | Mod: XU

## 2024-08-19 PROCEDURE — 36415 COLL VENOUS BLD VENIPUNCTURE: CPT

## 2024-08-19 PROCEDURE — 74177 CT ABD & PELVIS W/CONTRAST: CPT | Mod: 26,MC

## 2024-08-19 PROCEDURE — 85025 COMPLETE CBC W/AUTO DIFF WBC: CPT

## 2024-08-19 PROCEDURE — 99284 EMERGENCY DEPT VISIT MOD MDM: CPT | Mod: 25

## 2024-08-19 PROCEDURE — 99285 EMERGENCY DEPT VISIT HI MDM: CPT

## 2024-08-19 PROCEDURE — 74177 CT ABD & PELVIS W/CONTRAST: CPT | Mod: MC

## 2024-08-19 PROCEDURE — 80053 COMPREHEN METABOLIC PANEL: CPT

## 2024-08-19 PROCEDURE — 74176 CT ABD & PELVIS W/O CONTRAST: CPT | Mod: 26,59,MC

## 2024-08-19 PROCEDURE — 74176 CT ABD & PELVIS W/O CONTRAST: CPT | Mod: MC

## 2024-08-19 RX ORDER — ONDANSETRON HCL/PF 4 MG/2 ML
4 VIAL (ML) INJECTION ONCE
Refills: 0 | Status: COMPLETED | OUTPATIENT
Start: 2024-08-19 | End: 2024-08-19

## 2024-08-19 RX ORDER — BACTERIOSTATIC SODIUM CHLORIDE 0.9 %
1000 VIAL (ML) INJECTION ONCE
Refills: 0 | Status: COMPLETED | OUTPATIENT
Start: 2024-08-19 | End: 2024-08-19

## 2024-08-19 RX ORDER — IOHEXOL 240 MG/ML
30 INJECTION, SOLUTION INTRATHECAL; INTRAVASCULAR; INTRAVENOUS; ORAL ONCE
Refills: 0 | Status: COMPLETED | OUTPATIENT
Start: 2024-08-19 | End: 2024-08-19

## 2024-08-19 RX ORDER — MAGNESIUM, ALUMINUM HYDROXIDE 200-225/5
30 SUSPENSION, ORAL (FINAL DOSE FORM) ORAL EVERY 4 HOURS
Refills: 0 | Status: ACTIVE | OUTPATIENT
Start: 2024-08-19 | End: 2025-07-18

## 2024-08-19 RX ADMIN — Medication 4 MILLIGRAM(S): at 11:41

## 2024-08-19 RX ADMIN — Medication 1000 MILLILITER(S): at 11:41

## 2024-08-19 RX ADMIN — Medication 1000 MILLILITER(S): at 10:59

## 2024-08-19 RX ADMIN — Medication 4 MILLIGRAM(S): at 10:59

## 2024-08-19 RX ADMIN — IOHEXOL 30 MILLILITER(S): 240 INJECTION, SOLUTION INTRATHECAL; INTRAVASCULAR; INTRAVENOUS; ORAL at 14:27

## 2024-08-19 NOTE — ED ADULT NURSE REASSESSMENT NOTE - NS ED NURSE REASSESS COMMENT FT1
Pt is AOX4, no signs of distress noted. Pt noted with IV fluids at this time. CT pending. Care ongoing. call bell placed within reach.

## 2024-08-19 NOTE — ED PROVIDER NOTE - PROVIDER TOKENS
FREE:[LAST:[Your PMD],PHONE:[(   )    -],FAX:[(   )    -]],FREE:[LAST:[Your surgeon],PHONE:[(   )    -],FAX:[(   )    -]]

## 2024-08-19 NOTE — ED PROVIDER NOTE - PROGRESS NOTE DETAILS
Hayley ATTG   Discussed with patient need for oral contrast given questionable obstruction will give oral contrast Recieved patient in sign out pending repeat CT with oral contrast.  Repeat CT very large right inguinal hernia containing nonobstructed colon and small bowel and small ascites unchanged no proximal bowel obstruction solid nodule noted.  Patient now reports resolution of pain and feels comfortable with discharge was aware of the hernia is tolerating p.o. has outpatient follow-up.

## 2024-08-19 NOTE — ED PROVIDER NOTE - CARE PROVIDER_API CALL
Your PMD,   Phone: (   )    -  Fax: (   )    -  Follow Up Time:     Your surgeon,   Phone: (   )    -  Fax: (   )    -  Follow Up Time:

## 2024-08-19 NOTE — ED PROVIDER NOTE - CLINICAL SUMMARY MEDICAL DECISION MAKING FREE TEXT BOX
Hayley ATTG   74-year-old male history of cancer unclear primary source but states it is in the lungs abdomen and pelvis.  Patient states he has been having nausea vomiting abdominal pain for the past couple days since Saturday.  today is endorsing n/v diarrhea non bloody,   GENERAL: Awake, alert, Appears uncomfortable  LUNGS: Nonlabored breathing  CARDIAC: RRR, no m/r/g  ABDOMEN: Soft, , non tender, non distended, no rebound, no guarding  EXT: No edema, no calf tenderness, no deformities.  NEURO: A&Ox3. Moving all extremities.  SKIN: Warm and dry. No rash.  PSYCH: Normal affect.  Given history and physical exam with cancer to the abdomen per his history will obtain CT scan blood work collected disturbances secondary due to volumes loss labs imaging likely discharge home if workup is negative

## 2024-08-19 NOTE — ED PROVIDER NOTE - NSFOLLOWUPINSTRUCTIONS_ED_ALL_ED_FT
Return to the ED for any new or worsening symptoms  Take your medication as prescribed  Advance activity as tolerated  Clear liquids advance as tolerated      Abdominal Pain, Adult  A health care provider talking to a person during a medical exam.  Pain in the abdomen (abdominal pain) can be caused by many things. In most cases, it gets better with no treatment or by being treated at home. But in some cases, it can be serious.    Your health care provider will ask questions about your medical history and do a physical exam to try to figure out what is causing your pain.    Follow these instructions at home:  Medicines    Take over-the-counter and prescription medicines only as told by your provider.  Do not take medicines that help you poop (laxatives) unless told by your provider.  General instructions    Watch your condition for any changes.  Drink enough fluid to keep your pee (urine) pale yellow.  Contact a health care provider if:  Your pain changes, gets worse, or lasts longer than expected.  You have severe cramping or bloating in your abdomen, or you vomit.  Your pain gets worse with meals, after eating, or with certain foods.  You are constipated or have diarrhea for more than 2–3 days.  You are not hungry, or you lose weight without trying.  You have signs of dehydration. These may include:  Dark pee, very little pee, or no pee.  Cracked lips or dry mouth.  Sleepiness or weakness.  You have pain when you pee (urinate) or poop.  Your abdominal pain wakes you up at night.  You have blood in your pee.  You have a fever.  Get help right away if:  You cannot stop vomiting.  Your pain is only in one part of the abdomen. Pain on the right side could be caused by appendicitis.  You have bloody or black poop (stool), or poop that looks like tar.  You have trouble breathing.  You have chest pain.  These symptoms may be an emergency. Get help right away. Call 911.  Do not wait to see if the symptoms will go away.  Do not drive yourself to the hospital.  This information is not intended to replace advice given to you by your health care provider. Make sure you discuss any questions you have with your health care provider.

## 2024-08-19 NOTE — ED PROVIDER NOTE - PATIENT PORTAL LINK FT
You can access the FollowMyHealth Patient Portal offered by [facility] by registering at the following website: [email or web address]. By joining [employer]’s FollowMyHealth portal, you will also be able to view your health information using other applications (apps) compatible with our system.

## 2024-08-20 ENCOUNTER — NON-APPOINTMENT (OUTPATIENT)
Age: 74
End: 2024-08-20

## 2024-08-21 ENCOUNTER — RESULT REVIEW (OUTPATIENT)
Age: 74
End: 2024-08-21

## 2024-08-21 ENCOUNTER — APPOINTMENT (OUTPATIENT)
Dept: HEMATOLOGY ONCOLOGY | Facility: CLINIC | Age: 74
End: 2024-08-21
Payer: MEDICARE

## 2024-08-21 ENCOUNTER — APPOINTMENT (OUTPATIENT)
Dept: INFUSION THERAPY | Facility: HOSPITAL | Age: 74
End: 2024-08-21

## 2024-08-21 DIAGNOSIS — Z79.899 OTHER LONG TERM (CURRENT) DRUG THERAPY: ICD-10-CM

## 2024-08-21 DIAGNOSIS — Z51.11 ENCOUNTER FOR ANTINEOPLASTIC CHEMOTHERAPY: ICD-10-CM

## 2024-08-21 DIAGNOSIS — Z29.89 ENCOUNTER. FOR OTHER SPECIFIED PROPHYLACTIC MEASURES: ICD-10-CM

## 2024-08-21 DIAGNOSIS — C64.2 MALIGNANT NEOPLASM OF LEFT KIDNEY, EXCEPT RENAL PELVIS: ICD-10-CM

## 2024-08-21 LAB
ALBUMIN SERPL ELPH-MCNC: 3.7 G/DL — SIGNIFICANT CHANGE UP (ref 3.3–5)
ALP SERPL-CCNC: 52 U/L — SIGNIFICANT CHANGE UP (ref 40–120)
ALT FLD-CCNC: 26 U/L — SIGNIFICANT CHANGE UP (ref 10–45)
ANION GAP SERPL CALC-SCNC: 11 MMOL/L — SIGNIFICANT CHANGE UP (ref 5–17)
AST SERPL-CCNC: 47 U/L — HIGH (ref 10–40)
BASOPHILS # BLD AUTO: 0.03 K/UL — SIGNIFICANT CHANGE UP (ref 0–0.2)
BASOPHILS NFR BLD AUTO: 0.3 % — SIGNIFICANT CHANGE UP (ref 0–2)
BILIRUB SERPL-MCNC: 0.8 MG/DL — SIGNIFICANT CHANGE UP (ref 0.2–1.2)
BUN SERPL-MCNC: 19 MG/DL — SIGNIFICANT CHANGE UP (ref 7–23)
CALCIUM SERPL-MCNC: 9.5 MG/DL — SIGNIFICANT CHANGE UP (ref 8.4–10.5)
CHLORIDE SERPL-SCNC: 107 MMOL/L — SIGNIFICANT CHANGE UP (ref 96–108)
CO2 SERPL-SCNC: 22 MMOL/L — SIGNIFICANT CHANGE UP (ref 22–31)
CREAT SERPL-MCNC: 1.25 MG/DL — SIGNIFICANT CHANGE UP (ref 0.5–1.3)
EGFR: 61 ML/MIN/1.73M2 — SIGNIFICANT CHANGE UP
EOSINOPHIL # BLD AUTO: 0.29 K/UL — SIGNIFICANT CHANGE UP (ref 0–0.5)
EOSINOPHIL NFR BLD AUTO: 3.2 % — SIGNIFICANT CHANGE UP (ref 0–6)
GLUCOSE SERPL-MCNC: 81 MG/DL — SIGNIFICANT CHANGE UP (ref 70–99)
HCT VFR BLD CALC: 34.9 % — LOW (ref 39–50)
HGB BLD-MCNC: 12.5 G/DL — LOW (ref 13–17)
IMM GRANULOCYTES NFR BLD AUTO: 0.3 % — SIGNIFICANT CHANGE UP (ref 0–0.9)
LYMPHOCYTES # BLD AUTO: 1.39 K/UL — SIGNIFICANT CHANGE UP (ref 1–3.3)
LYMPHOCYTES # BLD AUTO: 15.5 % — SIGNIFICANT CHANGE UP (ref 13–44)
MCHC RBC-ENTMCNC: 32.5 PG — SIGNIFICANT CHANGE UP (ref 27–34)
MCHC RBC-ENTMCNC: 35.8 G/DL — SIGNIFICANT CHANGE UP (ref 32–36)
MCV RBC AUTO: 90.6 FL — SIGNIFICANT CHANGE UP (ref 80–100)
MONOCYTES # BLD AUTO: 0.72 K/UL — SIGNIFICANT CHANGE UP (ref 0–0.9)
MONOCYTES NFR BLD AUTO: 8 % — SIGNIFICANT CHANGE UP (ref 2–14)
NEUTROPHILS # BLD AUTO: 6.51 K/UL — SIGNIFICANT CHANGE UP (ref 1.8–7.4)
NEUTROPHILS NFR BLD AUTO: 72.7 % — SIGNIFICANT CHANGE UP (ref 43–77)
NRBC # BLD: 0 /100 WBCS — SIGNIFICANT CHANGE UP (ref 0–0)
PLATELET # BLD AUTO: 156 K/UL — SIGNIFICANT CHANGE UP (ref 150–400)
POTASSIUM SERPL-MCNC: 4.4 MMOL/L — SIGNIFICANT CHANGE UP (ref 3.5–5.3)
POTASSIUM SERPL-SCNC: 4.4 MMOL/L — SIGNIFICANT CHANGE UP (ref 3.5–5.3)
PROT SERPL-MCNC: 6.7 G/DL — SIGNIFICANT CHANGE UP (ref 6–8.3)
RBC # BLD: 3.85 M/UL — LOW (ref 4.2–5.8)
RBC # FLD: 12.3 % — SIGNIFICANT CHANGE UP (ref 10.3–14.5)
SODIUM SERPL-SCNC: 140 MMOL/L — SIGNIFICANT CHANGE UP (ref 135–145)
WBC # BLD: 8.97 K/UL — SIGNIFICANT CHANGE UP (ref 3.8–10.5)
WBC # FLD AUTO: 8.97 K/UL — SIGNIFICANT CHANGE UP (ref 3.8–10.5)

## 2024-08-21 PROCEDURE — 99214 OFFICE O/P EST MOD 30 MIN: CPT

## 2024-08-21 PROCEDURE — G2211 COMPLEX E/M VISIT ADD ON: CPT

## 2024-08-21 NOTE — ASSESSMENT
[FreeTextEntry1] : Mr. Mcknight is a 74 year old male with metastatic renal cell carcinoma. On initial presentation CT c/a/p and MRI abdomen showed multiple lung nodules, one liver lesion, retroperitoneal nodes. Bone scan showed one suspicious met in right superior pubic ramus. Liver lesion biopsy is consistent with renal cell carcinoma. It is not certain that the histology of RCC is clear cell vs non-clear cell. IMDC risk of this patient he has 5 out of 6 risks including leukocytosis, anemia, thrombocytosis, time from dx to initiate systemic treatment less than one year and hyperalcemia. He has poor risk of mRCC. Based on [facility]ccRCC the standard of care is combined immunotherapy nivo and ipi vs sutent, checkmate 214 in the intermediate and poor risk of mccRCC patients with significant overall survival benefit, keynote 426 keytruda plus axitinib vs sutent in good, intermediate and poor risk of mccRCC patients showed OS, PFS and MICHEL benefit, [place] in the intermediate and poor risk of mccRCC patients showed cabozantinib demonstrated a significant clinical benefit in PFS and MICHEL over standard-of-care sunitinib as first-line therapy in patients with intermediate- or poor-risk mRCC. Consider the longer duration of response and less side effects and recommended nivo and ipi. Completed 4 cycles of nivo and ipi, continued on maintenance nivolumab with interval scans to monitor disease. Of note, on diagnosis patient had L renal vein thrombus and was started on eliquis. He had an episode of gross hematuria leading to hospitalization in 9/2020 however etiology of hematuria is unclear and resolved. Follow up cystoscopy to r/o abnormality in the bladder showed no abnormality. Eliquis stopped during hospitalization with hematology consult. Remains off of eliquis. Scans have continued to show stability. Scans from 8/11/22 showed stable disease in L kidney and stable R renal cyst, noted non specific infiltration in omentum, cannot exclude carcinomatosis. CTs in November 2022 show stable disease. Patient also has large R inguinal hernia. March 2023, August 2023, Dec 2023, April 2024 CT c/a/p showed stable disease in lungs, kidney and retroperitoneum.  Plan Metastatic RCC - continue maintenance nivolumab [id number] [next treatment is 9/18/24], proceed with treatment today - CTs 4/18/24 No change in left apical 4 mm pulmonary nodule, nonspecific. No additional pulmonary nodules or masses. No significant change in 3 right renal neoplasms and 2 left renal neoplasms. Resolved/improved omental nodularity. No enlarged retroperitoneal or pelvic adenopathy -- repeat imaging in 4 months, ordered and authorized for August, reminded pt to schedule  - continue to monitor blood work - monitor for iRAEs, reviewed with patient today; continues to tolerate treatment well  - CTs April 2024: Large right inguinal hernia, unchanged without evidence of bowel obstruction.  Instructed to contact our office with any new/worsening symptoms. Patient educated regarding plan of care, all questions/concerns addressed to the best of my abilities and patient's apparent satisfaction.  RTC 4 weeks

## 2024-08-21 NOTE — ASSESSMENT
[FreeTextEntry1] : Mr. Mcknight is a 74 year old male with metastatic renal cell carcinoma. On initial presentation CT c/a/p and MRI abdomen showed multiple lung nodules, one liver lesion, retroperitoneal nodes. Bone scan showed one suspicious met in right superior pubic ramus. Liver lesion biopsy is consistent with renal cell carcinoma. It is not certain that the histology of RCC is clear cell vs non-clear cell. IMDC risk of this patient he has 5 out of 6 risks including leukocytosis, anemia, thrombocytosis, time from dx to initiate systemic treatment less than one year and hyperalcemia. He has poor risk of mRCC. Based on [facility] the standard of care is combined immunotherapy nivo and ipi vs sutent, checkmate 214 in the intermediate and poor risk of mccRCC patients with significant overall survival benefit, keynote 426 keytruda plus axitinib vs sutent in good, intermediate and poor risk of mccRCC patients showed OS, PFS and MICHEL benefit, [place] in the intermediate and poor risk of mccRCC patients showed cabozantinib demonstrated a significant clinical benefit in PFS and MICHEL over standard-of-care sunitinib as first-line therapy in patients with intermediate- or poor-risk mRCC. Consider the longer duration of response and less side effects and recommended nivo and ipi. Completed 4 cycles of nivo and ipi, continued on maintenance nivolumab with interval scans to monitor disease. Of note, on diagnosis patient had L renal vein thrombus and was started on eliquis. He had an episode of gross hematuria leading to hospitalization in 9/2020 however etiology of hematuria is unclear and resolved. Follow up cystoscopy to r/o abnormality in the bladder showed no abnormality. Eliquis stopped during hospitalization with hematology consult. Remains off of eliquis. Scans have continued to show stability. Scans from 8/11/22 showed stable disease in L kidney and stable R renal cyst, noted non specific infiltration in omentum, cannot exclude carcinomatosis. CTs in November 2022 show stable disease. Patient also has large R inguinal hernia. March 2023, August 2023, Dec 2023, April 2024 CT c/a/p showed stable disease in lungs, kidney and retroperitoneum.  Plan Metastatic RCC - continue maintenance nivolumab [id number] [next treatment is 9/18/24], proceed with treatment today - CTs 4/18/24 No change in left apical 4 mm pulmonary nodule, nonspecific. No additional pulmonary nodules or masses. No significant change in 3 right renal neoplasms and 2 left renal neoplasms. Resolved/improved omental nodularity. No enlarged retroperitoneal or pelvic adenopathy -- repeat imaging in 4 months, ordered and authorized for August, reminded pt to schedule  - continue to monitor blood work - monitor for iRAEs, reviewed with patient today; continues to tolerate treatment well  - CTs April 2024: Large right inguinal hernia, unchanged without evidence of bowel obstruction.  Instructed to contact our office with any new/worsening symptoms. Patient educated regarding plan of care, all questions/concerns addressed to the best of my abilities and patient's apparent satisfaction.  RTC 4 weeks

## 2024-08-21 NOTE — ASSESSMENT
[FreeTextEntry1] : Mr. Mcknight is a 74 year old male with metastatic renal cell carcinoma. On initial presentation CT c/a/p and MRI abdomen showed multiple lung nodules, one liver lesion, retroperitoneal nodes. Bone scan showed one suspicious met in right superior pubic ramus. Liver lesion biopsy is consistent with renal cell carcinoma. It is not certain that the histology of RCC is clear cell vs non-clear cell. IMDC risk of this patient he has 5 out of 6 risks including leukocytosis, anemia, thrombocytosis, time from dx to initiate systemic treatment less than one year and hyperalcemia. He has poor risk of mRCC. Based on [facility]RCC the standard of care is combined immunotherapy nivo and ipi vs sutent, checkmate 214 in the intermediate and poor risk of mccRCC patients with significant overall survival benefit, keynote 426 keytruda plus axitinib vs sutent in good, intermediate and poor risk of mccRCC patients showed OS, PFS and MICHEL benefit, [place] in the intermediate and poor risk of mccRCC patients showed cabozantinib demonstrated a significant clinical benefit in PFS and MICHEL over standard-of-care sunitinib as first-line therapy in patients with intermediate- or poor-risk mRCC. Consider the longer duration of response and less side effects and recommended nivo and ipi. Completed 4 cycles of nivo and ipi, continued on maintenance nivolumab with interval scans to monitor disease. Of note, on diagnosis patient had L renal vein thrombus and was started on eliquis. He had an episode of gross hematuria leading to hospitalization in 9/2020 however etiology of hematuria is unclear and resolved. Follow up cystoscopy to r/o abnormality in the bladder showed no abnormality. Eliquis stopped during hospitalization with hematology consult. Remains off of eliquis. Scans have continued to show stability. Scans from 8/11/22 showed stable disease in L kidney and stable R renal cyst, noted non specific infiltration in omentum, cannot exclude carcinomatosis. CTs in November 2022 show stable disease. Patient also has large R inguinal hernia. March 2023, August 2023, Dec 2023, April 2024 CT c/a/p showed stable disease in lungs, kidney and retroperitoneum.  Plan Metastatic RCC - continue maintenance nivolumab [id number], proceed with treatment today - CTs 4/18/24 No change in left apical 4 mm pulmonary nodule, nonspecific. No additional pulmonary nodules or masses. No significant change in 3 right renal neoplasms and 2 left renal neoplasms. Resolved/improved omental nodularity. No enlarged retroperitoneal or pelvic adenopathy -- repeat imaging in 4 months, August imaging ordered today 7/17  - continue to monitor blood work - monitor for iRAEs, reviewed with patient today; continues to tolerate treatment well  - CTs April 2024: Large right inguinal hernia, unchanged without evidence of bowel obstruction.  Instructed to contact our office with any new/worsening symptoms. Patient educated regarding plan of care, all questions/concerns addressed to the best of my abilities and patient's apparent satisfaction. RTC 4 weeks

## 2024-08-21 NOTE — HISTORY OF PRESENT ILLNESS
[T: ___] : T[unfilled] [N: ___] : N[unfilled] [M: ___] : M[unfilled] [AJCC Stage: ____] : AJCC Stage: [unfilled] [N/A] : Currently not applicable [de-identified] : Finesse Zuniga is a 70 years old male who initially presented with intermittent midback pain, 10/10 radiating to stomach from both sides on June 1, 2019. He has poor appetite.  On june 12 he was diagnosed UTI and on cipro for 4 days, on June 16, was started cefpodoxime 100mg BID for 7 days.  CT chest/abdomen/pelvis revealed significant abnormal appearance of the left kidney with multiple heterogeneous areas of low attenuation involving the upper and midpole which may represent malignancy. There are necrotic lymph nodes in the left para-aortic location at the level of the kidney and below the level of the kidney. Adenopathy surrounds the left renal artery. The left renal vein demonstrates question of partially occlusive thrombus. Nonspecific scattered tiny pulmonary nodules. Lower lobe subpleural nodules measure up to 6 mm. There is a well-circumscribed hypodense lesion in the anterior aspect of the lateral segment of the left lobe measuring 3.2 x 2.2 cm. He underwent CT guided liver lesion biopsy which showed consistent with primary renal cell carcinoma, IHC CK7/CK19/PAX8 positive. MRI abdomen waw con showed multiple bilateral pulmonary nodules increased in size and number compared with recent CT. The largest is pleural-based in the left lower lobe and measures 1.5 x 1.1 cm. LIVER: Rim-enhancing subcapsular mass in segment 3 and measures 4.4 x 2.6 cm. A 4.2cm mass in mid to lower pole of the lft kidney suspicious for urothelial malignancy arising within the left renal collecting system. Regional lymphadenopathy and hepatic and pulmonary metastases. Nonocclusive thrombus within the left renal vein. He was started on eliquis. Bone scan showed  a focus of mildly increased uptake in the right superior  pubic ramus suspicious of bone mets. He has lost 17lbs in one month. He denies nausea, vomiting and constipation and diarrhea.   7/2019: ipilimumab + nivolumab initiated and to nivolumab maintenance post 4 cycles and continues on therapy.  9/18-22/2020 : Patient admitted for hematuria. Patient followed by urology and hematology during hospital course. Patient was started on CBI to help remove clots. Urine initially bloody, but became pink-tinged and then clear over time. CBI stopped and barber removed. Patient reportedly felt much better after barber removed, able to urinate by himself- reported some clots initially, but since resolved. States bladder spasms improved as well. Patient's eliquis was stopped, MRI done showing no evidence of renal vein thrombus, discussed  with Heme, no reason to continue eliquis for now. Patient's leukocytosis and JOSE MARIA resolved. Urine culture and blood cultures negative.  Patient improved clinically throughout hospital course. Patient seen and examined on day of discharge.  Cystoscopy was negative, performed by Dr. Javier.   5/26/2021 : Mr. Vera is here for nivolumab. Last scan 3/29/2021, will need another set of scan the end or beginning of July. He walks 2 miles a day with his dog and bikes 3-4 miles a day. He feels well overall. Mr. VERA denies fevers, chills, headaches, cough, SOB, chest pain, any swelling, nausea, vomiting, diarrhea, rash, or malaise.   6/23/21: Bothering Rt side inguinal hernia without pain or signs of incarceration. Doing well overall. Scheduled for nivo today. He denies fevers, chills, headaches, cough, SOB, chest pain, any swelling, nausea, vomiting, diarrhea, rash, or malaise.  7/7/2021 : CT showed stable disease  7/28/2021 : Here for nivolumab. 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 Mr. VERA denies fevers, chills, headaches, cough, SOB, chest pain, any swelling, nausea, vomiting, diarrhea, rash, or malaise. He has been tolerating therapy extremely well without sig issues. He is to see a general surgeon for hernia repair soon.   8/25/2021 : Here for nivolumab, scan due in September. Mr. VERA denies fevers, chills, headaches, cough, SOB, chest pain, any swelling, nausea, vomiting, diarrhea, rash, or malaise.   10/27/21: Pt presents for follow up. He is scheduled for Nivolumab which he has been tolerating well. Scheduled to receive cycle #30 today. Denies any diarrhea/ itchiness, cough, hematuria, rash. Appetite good. Energy good. No new complaints. Recieved both flu shot and COVID booster recently  11/24/21: Pt feeling well. Offers no complaints. Scheduled for C#31 nivolumab today. Due for imaging next month. Already scheduled  12/21/21 CT chest/abd/pelvis - Stable appearance of the chest compared with 9/4/2021. Indeterminate 1.4 cm hypodense lesion in the upper pole of the right kidney is unchanged. Atrophy is again noted involving the upper pole of the left kidney, unchanged. Very large right inguinal hernia containing non-obstructed large and small bowel and a small amount of fluid. Enlarged heterogeneous prostate gland. Nonspecific ill-defined infiltration of the fat in the right lower quadrant, not significantly changed. Attention to this area is suggested on follow-up CT.  12/29/21 - presents for ongoing Opdivo cycle #32 today. Overall feeling "good", energy is good. Appetite is good. No complaints.   3/2/22 feels quite well, denies any pain and gross hematuria. He has a schedule in April to take care of right inguinal hernia.   4/6/22 feels fine, denies pain and gross hematuria. His right inguinal hernia is getting worse.   4/27/22: Pt returns for follow up. He reports 24 period of severe diarrhea on monday. No fevers. Resolved spontaneously. Pt reports he had imaging done 2 weeks ago. Eager fto discuss findings. Feeling well now. Pt will be  meeting with surgeon recommended by PMD for Right inguinal hernia repair.  6/22/22 feels well. He will meet his surgeon for his right inguinal hernia.   7/20/22 feels overall fine. Denies any pain and weight loss.     8/17/22: Patient returns for follow up, continued on nivolumab. Had CT scans 1 week ago that show stable L renal mass and stable R renal cyst. Also remarkable for non specific mild increase in illdefined omental infiltration - omental carcinomatosis is not excluded, will continue short interval scanning. Patient reports having less appetite than normal. He feels less motivation to cook for himself. He does drink an ensure everyday. He feels he gets moncada sooner and has lost a few pounds. He denies nausea, vomiting and GERD. Patient is often tired, but is still able to complete every thing he needs to do, it may just take him a bit longer. He is working on setting up his inguinal hernia surgery.  9/14: energy is good. seeing the surgeon next week for the hernia. appetite is okay now. no longer has the feeling of getting full early. able to eat full meals  10/12: appetite back to normal. no rash, pain, CP, SOB. energy is okay  11/9/22: feels well today. no new changes   1/4/22: doing well today. no new issues. energy good. appetite good. no new joint pain, rash, SOB, diarreha.  2/1/23: Returns for follow up and treatment with nivolumab. Patient has been feeling well. Denies fatigue, walks 2-2.5 miles with his dog per day. He has not yet seen a surgeon for his inguinal hernia. His PCP gave him a surgeon referral, he says he has not gotten up the nerve to call. Denies pain.  Patient denies fever, chills, headache, vision changes, cough, shortness of breath, chest pain, palpitations, abdominal pain, nausea/vomiting, diarrhea, constipation, dysuria, hematuria, itching, rashes, joint pain, mucositis, changes in sensation.  3/1/23: continued on nivolumab. Patient feels well overall, eating well, has good energy, goes on daily walks with his dog. He notes his urine stream is not as strong in the morning, but improves throughout the day. Denies dysuria/hematuria. He went to see a surgeon last month and was described what his inguinal hernia repair would be, he is planning to have the surgery but not until the Spring.. he denies any change in color or size of hernia.   3/9/23 CT c/a/p showed Multiple bilateral small renal lesions overall unchanged when compared to the prior examinations and can be consistent with a neoplastic process. Diffuse areas of omental/peritoneal thickening and areas of nodularity and diffuse small lymph nodes within the mesentery and retroperitoneum but overall configuration unchanged  3/29/23 reports feeling overall fine, denies skin and joint pain  4/26/23: feels well, no new changes compared to last visit  5/24/23: continued on nivolumab for met RCC. Overall, patient is doing well. He is exercising more, walking with his dog, riding his bike. Appetite is good. He is not planning to pursue hernia surgery until after his next scans in July, he wants to feel comfortable. Patient denies fever, chills, headache, vision changes, cough, shortness of breath, chest pain, palpitations, abdominal pain, nausea/vomiting, diarrhea, constipation, dysuria, hematuria, itching, rashes, muscle and joint pain, paraesthesias.     6/21/23: continued on nivolumab. Energy level is good, doing his same routine. Appetite is good. Last week he pulled a muscle in back when doing yard work, pain lasted for about 3-4 days and has now resolved. Otherwise no joint or muscle pain. Pt notes that due to hernia he often feels as though he is not completely emptying his bladder. Notes nocturia x 2. Denies shortness of breath, coughing, constipation, diarrhea, itching, rashes.   7/19/23 reports feeling well, no fatigue and skin rash.   816/23 Patient reports he is doing well. Denies nausea, vomiting, diarrhea, constipation, fevers, chills. No issues with urination, denies blood in urine. No SOB, or chest pain. Reports appetite is good.   9/13/23: Patient has lost a few pounds since his last visit, he says his appetite is okay. Has good energy. Sometimes he feels like he is not completely emptying his bladder, notes nocturia x 2. Patient says his hernia is not bothering him, no pain, he is not ready to get surgery just now but continues to consider it...    10/11/23: Patient continues on nivolumab, continues to feel well. His appetite and energy are good; denies fatigue. He walks 1-2 miles per day. He is redoing a bathroom in his home, all on his own. He does report that he feels he is not completely emptying his bladder and nocturia x 2, these symptoms are stable. He denies fever, chills, sore throat, shortness of breath, cough, constipation, diarrhea, rashes, itching, rashes, joint and muscle pain.    11/8/23: Patient continues to do well on nivolumab. Energy and appetite are good. He has a routine that he sticks too, bed just before 10pm and wakes up at about 4:30am to take his dog out for a walk. He has been working on a home improvement project that he hopes to complete this weekend. He has not been sick recently. Is due for a physical with PCP next month. Has not yet gotten flu shot, will either get this at a pharmacy or at his upcoming physical.   12/6/23 feels overall fine, denies skin rash, joint pain and diarrhea.   1/3/24: Overall, patient is doing well. Denies any new or changing symptoms. Energy and appetite are good. Denies chest pain, palpitations, cough, shortness of breath, rashes, itching, specific joint and muscle pain.    1/30/24 reports feeling overall fine, has good energy, eats well.   2/28/24: Patient has been feeling well, no new or changing symptoms, appetite and energy are good. Says he pulled a muscle in his neck when installing a microwave last week, pain now improved; he does not have any other joint or muscle pain. Denies changes in hernia, no pain.    3/27/24: Patient is feeling well, he is staying busy, goes for walks most days. His appetite is good, he is not a big eater, weight is stable. He denies new or changing symptoms. He has not noted any changes to inguinal hernia.  4/24/24: Patient is feeling well, appetite is okay, staying active - walking dog and riding bike. No new or changing symptoms.   5/22/24: Patient is feeling well, offers no complaints. Says he is enjoying life, taking it day by day.   6/19/24: Overall patient feels well, says a few weeks ago he had depressed appetite/early satiety for 2-3 days, now back to baseline. Still going for walks and sticking to his routines. He offers no complaints and denies new symptoms.   [de-identified] : metastatic renal cell carcinoma [FreeTextEntry1] : Ipi + nivo (7/2019) > nivolumab 480 mg monthly - cont [de-identified] : 7/17/24:

## 2024-08-21 NOTE — PHYSICAL EXAM
[Fully active, able to carry on all pre-disease performance without restriction] : Status 0 - Fully active, able to carry on all pre-disease performance without restriction [Normal] : affect appropriate [de-identified] : anicteric  [de-identified] : supple, FROM  [de-identified] : regular rhythm, mild bradycardia  [de-identified] : no edema  [de-identified] : large inguinal hernia unchanged from prior

## 2024-08-21 NOTE — REASON FOR VISIT
[Follow-Up Visit] : a follow-up [Pre-Treatment Visit] : a pre-treatment [FreeTextEntry2] : metastatic renal cell carcinoma

## 2024-08-21 NOTE — PHYSICAL EXAM
[Fully active, able to carry on all pre-disease performance without restriction] : Status 0 - Fully active, able to carry on all pre-disease performance without restriction [Normal] : affect appropriate [de-identified] : anicteric  [de-identified] : supple, FROM  [de-identified] : regular rhythm, mild bradycardia  [de-identified] : no edema  [de-identified] : large inguinal hernia unchanged from prior

## 2024-08-21 NOTE — PHYSICAL EXAM
[Fully active, able to carry on all pre-disease performance without restriction] : Status 0 - Fully active, able to carry on all pre-disease performance without restriction [Normal] : affect appropriate [de-identified] : anicteric  [de-identified] : supple, FROM  [de-identified] : regular rhythm, mild bradycardia  [de-identified] : no edema  [de-identified] : large inguinal hernia unchanged from prior

## 2024-08-21 NOTE — ASSESSMENT
[FreeTextEntry1] : Mr. Mcknight is a 74 year old male with metastatic renal cell carcinoma. On initial presentation CT c/a/p and MRI abdomen showed multiple lung nodules, one liver lesion, retroperitoneal nodes. Bone scan showed one suspicious met in right superior pubic ramus. Liver lesion biopsy is consistent with renal cell carcinoma. It is not certain that the histology of RCC is clear cell vs non-clear cell. IMDC risk of this patient he has 5 out of 6 risks including leukocytosis, anemia, thrombocytosis, time from dx to initiate systemic treatment less than one year and hyperalcemia. He has poor risk of mRCC. Based on [facility] the standard of care is combined immunotherapy nivo and ipi vs sutent, checkmate 214 in the intermediate and poor risk of mccRCC patients with significant overall survival benefit, keynote 426 keytruda plus axitinib vs sutent in good, intermediate and poor risk of [facility]RCC patients showed OS, PFS and MICHEL benefit, [place] in the intermediate and poor risk of [facility]RCC patients showed cabozantinib demonstrated a significant clinical benefit in PFS and MICHEL over standard-of-care sunitinib as first-line therapy in patients with intermediate- or poor-risk mRCC. Consider the longer duration of response and less side effects and recommended nivo and ipi. Completed 4 cycles of nivo and ipi, continued on maintenance nivolumab with interval scans to monitor disease. Of note, on diagnosis patient had L renal vein thrombus and was started on eliquis. He had an episode of gross hematuria leading to hospitalization in 9/2020 however etiology of hematuria is unclear and resolved. Follow up cystoscopy to r/o abnormality in the bladder showed no abnormality. Eliquis stopped during hospitalization with hematology consult. Remains off of eliquis. Scans have continued to show stability. Scans from 8/11/22 showed stable disease in L kidney and stable R renal cyst, noted non specific infiltration in omentum, cannot exclude carcinomatosis. CTs in November 2022 show stable disease. Patient also has large R inguinal hernia. March 2023, August 2023, Dec 2023, April 2024 CT c/a/p showed stable disease in lungs, kidney and retroperitoneum.  Plan Metastatic RCC - continue maintenance nivolumab [id number] [next treatment is 9/18/24], proceed with treatment today - CTs 4/18/24 No change in left apical 4 mm pulmonary nodule, nonspecific. No additional pulmonary nodules or masses. No significant change in 3 right renal neoplasms and 2 left renal neoplasms. Resolved/improved omental nodularity. No enlarged retroperitoneal or pelvic adenopathy -- repeat imaging in 4 months, ordered and authorized for August, reminded pt to schedule  - continue to monitor blood work - monitor for iRAEs, reviewed with patient today; continues to tolerate treatment well  - CTs April 2024: Large right inguinal hernia, unchanged without evidence of bowel obstruction.  Instructed to contact our office with any new/worsening symptoms. Patient educated regarding plan of care, all questions/concerns addressed to the best of my abilities and patient's apparent satisfaction.  RTC 4 weeks

## 2024-08-21 NOTE — PHYSICAL EXAM
[Fully active, able to carry on all pre-disease performance without restriction] : Status 0 - Fully active, able to carry on all pre-disease performance without restriction [Normal] : affect appropriate [de-identified] : anicteric  [de-identified] : supple, FROM  [de-identified] : regular rhythm, mild bradycardia  [de-identified] : no edema  [de-identified] : large inguinal hernia unchanged from prior

## 2024-08-21 NOTE — HISTORY OF PRESENT ILLNESS
[T: ___] : T[unfilled] [N: ___] : N[unfilled] [M: ___] : M[unfilled] [AJCC Stage: ____] : AJCC Stage: [unfilled] [N/A] : Currently not applicable [de-identified] : Finesse Zuniga is a 70 years old male who initially presented with intermittent midback pain, 10/10 radiating to stomach from both sides on June 1, 2019. He has poor appetite.  On june 12 he was diagnosed UTI and on cipro for 4 days, on June 16, was started cefpodoxime 100mg BID for 7 days.  CT chest/abdomen/pelvis revealed significant abnormal appearance of the left kidney with multiple heterogeneous areas of low attenuation involving the upper and midpole which may represent malignancy. There are necrotic lymph nodes in the left para-aortic location at the level of the kidney and below the level of the kidney. Adenopathy surrounds the left renal artery. The left renal vein demonstrates question of partially occlusive thrombus. Nonspecific scattered tiny pulmonary nodules. Lower lobe subpleural nodules measure up to 6 mm. There is a well-circumscribed hypodense lesion in the anterior aspect of the lateral segment of the left lobe measuring 3.2 x 2.2 cm. He underwent CT guided liver lesion biopsy which showed consistent with primary renal cell carcinoma, IHC CK7/CK19/PAX8 positive. MRI abdomen waw con showed multiple bilateral pulmonary nodules increased in size and number compared with recent CT. The largest is pleural-based in the left lower lobe and measures 1.5 x 1.1 cm. LIVER: Rim-enhancing subcapsular mass in segment 3 and measures 4.4 x 2.6 cm. A 4.2cm mass in mid to lower pole of the lft kidney suspicious for urothelial malignancy arising within the left renal collecting system. Regional lymphadenopathy and hepatic and pulmonary metastases. Nonocclusive thrombus within the left renal vein. He was started on eliquis. Bone scan showed  a focus of mildly increased uptake in the right superior  pubic ramus suspicious of bone mets. He has lost 17lbs in one month. He denies nausea, vomiting and constipation and diarrhea.   7/2019: ipilimumab + nivolumab initiated and to nivolumab maintenance post 4 cycles and continues on therapy.  9/18-22/2020 : Patient admitted for hematuria. Patient followed by urology and hematology during hospital course. Patient was started on CBI to help remove clots. Urine initially bloody, but became pink-tinged and then clear over time. CBI stopped and barber removed. Patient reportedly felt much better after barber removed, able to urinate by himself- reported some clots initially, but since resolved. States bladder spasms improved as well. Patient's eliquis was stopped, MRI done showing no evidence of renal vein thrombus, discussed  with Heme, no reason to continue eliquis for now. Patient's leukocytosis and JOSE MARIA resolved. Urine culture and blood cultures negative.  Patient improved clinically throughout hospital course. Patient seen and examined on day of discharge.  Cystoscopy was negative, performed by Dr. Javier.   5/26/2021 : Mr. Vera is here for nivolumab. Last scan 3/29/2021, will need another set of scan the end or beginning of July. He walks 2 miles a day with his dog and bikes 3-4 miles a day. He feels well overall. Mr. VERA denies fevers, chills, headaches, cough, SOB, chest pain, any swelling, nausea, vomiting, diarrhea, rash, or malaise.   6/23/21: Bothering Rt side inguinal hernia without pain or signs of incarceration. Doing well overall. Scheduled for nivo today. He denies fevers, chills, headaches, cough, SOB, chest pain, any swelling, nausea, vomiting, diarrhea, rash, or malaise.  7/7/2021 : CT showed stable disease  7/28/2021 : Here for nivolumab. 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 Mr. EVRA denies fevers, chills, headaches, cough, SOB, chest pain, any swelling, nausea, vomiting, diarrhea, rash, or malaise. He has been tolerating therapy extremely well without sig issues. He is to see a general surgeon for hernia repair soon.   8/25/2021 : Here for nivolumab, scan due in September. Mr. VERA denies fevers, chills, headaches, cough, SOB, chest pain, any swelling, nausea, vomiting, diarrhea, rash, or malaise.   10/27/21: Pt presents for follow up. He is scheduled for Nivolumab which he has been tolerating well. Scheduled to receive cycle #30 today. Denies any diarrhea/ itchiness, cough, hematuria, rash. Appetite good. Energy good. No new complaints. Recieved both flu shot and COVID booster recently  11/24/21: Pt feeling well. Offers no complaints. Scheduled for C#31 nivolumab today. Due for imaging next month. Already scheduled  12/21/21 CT chest/abd/pelvis - Stable appearance of the chest compared with 9/4/2021. Indeterminate 1.4 cm hypodense lesion in the upper pole of the right kidney is unchanged. Atrophy is again noted involving the upper pole of the left kidney, unchanged. Very large right inguinal hernia containing non-obstructed large and small bowel and a small amount of fluid. Enlarged heterogeneous prostate gland. Nonspecific ill-defined infiltration of the fat in the right lower quadrant, not significantly changed. Attention to this area is suggested on follow-up CT.  12/29/21 - presents for ongoing Opdivo cycle #32 today. Overall feeling "good", energy is good. Appetite is good. No complaints.   3/2/22 feels quite well, denies any pain and gross hematuria. He has a schedule in April to take care of right inguinal hernia.   4/6/22 feels fine, denies pain and gross hematuria. His right inguinal hernia is getting worse.   4/27/22: Pt returns for follow up. He reports 24 period of severe diarrhea on monday. No fevers. Resolved spontaneously. Pt reports he had imaging done 2 weeks ago. Eager fto discuss findings. Feeling well now. Pt will be  meeting with surgeon recommended by PMD for Right inguinal hernia repair.  6/22/22 feels well. He will meet his surgeon for his right inguinal hernia.   7/20/22 feels overall fine. Denies any pain and weight loss.     8/17/22: Patient returns for follow up, continued on nivolumab. Had CT scans 1 week ago that show stable L renal mass and stable R renal cyst. Also remarkable for non specific mild increase in illdefined omental infiltration - omental carcinomatosis is not excluded, will continue short interval scanning. Patient reports having less appetite than normal. He feels less motivation to cook for himself. He does drink an ensure everyday. He feels he gets moncada sooner and has lost a few pounds. He denies nausea, vomiting and GERD. Patient is often tired, but is still able to complete every thing he needs to do, it may just take him a bit longer. He is working on setting up his inguinal hernia surgery.  9/14: energy is good. seeing the surgeon next week for the hernia. appetite is okay now. no longer has the feeling of getting full early. able to eat full meals  10/12: appetite back to normal. no rash, pain, CP, SOB. energy is okay  11/9/22: feels well today. no new changes   1/4/22: doing well today. no new issues. energy good. appetite good. no new joint pain, rash, SOB, diarreha.  2/1/23: Returns for follow up and treatment with nivolumab. Patient has been feeling well. Denies fatigue, walks 2-2.5 miles with his dog per day. He has not yet seen a surgeon for his inguinal hernia. His PCP gave him a surgeon referral, he says he has not gotten up the nerve to call. Denies pain.  Patient denies fever, chills, headache, vision changes, cough, shortness of breath, chest pain, palpitations, abdominal pain, nausea/vomiting, diarrhea, constipation, dysuria, hematuria, itching, rashes, joint pain, mucositis, changes in sensation.  3/1/23: continued on nivolumab. Patient feels well overall, eating well, has good energy, goes on daily walks with his dog. He notes his urine stream is not as strong in the morning, but improves throughout the day. Denies dysuria/hematuria. He went to see a surgeon last month and was described what his inguinal hernia repair would be, he is planning to have the surgery but not until the Spring.. he denies any change in color or size of hernia.   3/9/23 CT c/a/p showed Multiple bilateral small renal lesions overall unchanged when compared to the prior examinations and can be consistent with a neoplastic process. Diffuse areas of omental/peritoneal thickening and areas of nodularity and diffuse small lymph nodes within the mesentery and retroperitoneum but overall configuration unchanged  3/29/23 reports feeling overall fine, denies skin and joint pain  4/26/23: feels well, no new changes compared to last visit  5/24/23: continued on nivolumab for met RCC. Overall, patient is doing well. He is exercising more, walking with his dog, riding his bike. Appetite is good. He is not planning to pursue hernia surgery until after his next scans in July, he wants to feel comfortable. Patient denies fever, chills, headache, vision changes, cough, shortness of breath, chest pain, palpitations, abdominal pain, nausea/vomiting, diarrhea, constipation, dysuria, hematuria, itching, rashes, muscle and joint pain, paraesthesias.     6/21/23: continued on nivolumab. Energy level is good, doing his same routine. Appetite is good. Last week he pulled a muscle in back when doing yard work, pain lasted for about 3-4 days and has now resolved. Otherwise no joint or muscle pain. Pt notes that due to hernia he often feels as though he is not completely emptying his bladder. Notes nocturia x 2. Denies shortness of breath, coughing, constipation, diarrhea, itching, rashes.   7/19/23 reports feeling well, no fatigue and skin rash.   816/23 Patient reports he is doing well. Denies nausea, vomiting, diarrhea, constipation, fevers, chills. No issues with urination, denies blood in urine. No SOB, or chest pain. Reports appetite is good.   9/13/23: Patient has lost a few pounds since his last visit, he says his appetite is okay. Has good energy. Sometimes he feels like he is not completely emptying his bladder, notes nocturia x 2. Patient says his hernia is not bothering him, no pain, he is not ready to get surgery just now but continues to consider it...    10/11/23: Patient continues on nivolumab, continues to feel well. His appetite and energy are good; denies fatigue. He walks 1-2 miles per day. He is redoing a bathroom in his home, all on his own. He does report that he feels he is not completely emptying his bladder and nocturia x 2, these symptoms are stable. He denies fever, chills, sore throat, shortness of breath, cough, constipation, diarrhea, rashes, itching, rashes, joint and muscle pain.    11/8/23: Patient continues to do well on nivolumab. Energy and appetite are good. He has a routine that he sticks too, bed just before 10pm and wakes up at about 4:30am to take his dog out for a walk. He has been working on a home improvement project that he hopes to complete this weekend. He has not been sick recently. Is due for a physical with PCP next month. Has not yet gotten flu shot, will either get this at a pharmacy or at his upcoming physical.   12/6/23 feels overall fine, denies skin rash, joint pain and diarrhea.   1/3/24: Overall, patient is doing well. Denies any new or changing symptoms. Energy and appetite are good. Denies chest pain, palpitations, cough, shortness of breath, rashes, itching, specific joint and muscle pain.    1/30/24 reports feeling overall fine, has good energy, eats well.   2/28/24: Patient has been feeling well, no new or changing symptoms, appetite and energy are good. Says he pulled a muscle in his neck when installing a microwave last week, pain now improved; he does not have any other joint or muscle pain. Denies changes in hernia, no pain.    3/27/24: Patient is feeling well, he is staying busy, goes for walks most days. His appetite is good, he is not a big eater, weight is stable. He denies new or changing symptoms. He has not noted any changes to inguinal hernia.  4/24/24: Patient is feeling well, appetite is okay, staying active - walking dog and riding bike. No new or changing symptoms.   5/22/24: Patient is feeling well, offers no complaints. Says he is enjoying life, taking it day by day.   6/19/24: Overall patient feels well, says a few weeks ago he had depressed appetite/early satiety for 2-3 days, now back to baseline. Still going for walks and sticking to his routines. He offers no complaints and denies new symptoms.   [de-identified] : metastatic renal cell carcinoma [FreeTextEntry1] : Ipi + nivo (7/2019) > nivolumab 480 mg monthly - cont [de-identified] : 7/24/24: Patient has been feeling well, enjoying his Summer and planting in his garden. Denies any new or changing symptoms. Patient denies  cough, shortness of breath, chest pain, palpitations, abdominal pain, nausea/vomiting, diarrhea, constipation, itching, rashes, joint and muscle pain.   8/21/24: pt is being seen in the treatment room in [facility] prior to Nivolumab. He is tolerating it well. Denies any side effect since the initiation of therapy. Appetite is good and maintaining weight. 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 Since then he is doing well and back to his normal state.

## 2024-08-21 NOTE — PHYSICAL EXAM
[Fully active, able to carry on all pre-disease performance without restriction] : Status 0 - Fully active, able to carry on all pre-disease performance without restriction [Normal] : affect appropriate [de-identified] : anicteric  [de-identified] : supple, FROM  [de-identified] : regular rhythm, mild bradycardia  [de-identified] : no edema  [de-identified] : large inguinal hernia unchanged from prior

## 2024-08-21 NOTE — ASSESSMENT
[FreeTextEntry1] : Mr. Mcknight is a 74 year old male with metastatic renal cell carcinoma. On initial presentation CT c/a/p and MRI abdomen showed multiple lung nodules, one liver lesion, retroperitoneal nodes. Bone scan showed one suspicious met in right superior pubic ramus. Liver lesion biopsy is consistent with renal cell carcinoma. It is not certain that the histology of RCC is clear cell vs non-clear cell. IMDC risk of this patient he has 5 out of 6 risks including leukocytosis, anemia, thrombocytosis, time from dx to initiate systemic treatment less than one year and hyperalcemia. He has poor risk of mRCC. Based on [facility] the standard of care is combined immunotherapy nivo and ipi vs sutent, checkmate 214 in the intermediate and poor risk of [facility]RCC patients with significant overall survival benefit, keynote 426 keytruda plus axitinib vs sutent in good, intermediate and poor risk of [facility] patients showed OS, PFS and MICHEL benefit, [place] in the intermediate and poor risk of [facility]RCC patients showed cabozantinib demonstrated a significant clinical benefit in PFS and MICHEL over standard-of-care sunitinib as first-line therapy in patients with intermediate- or poor-risk mRCC. Consider the longer duration of response and less side effects and recommended nivo and ipi. Completed 4 cycles of nivo and ipi, continued on maintenance nivolumab with interval scans to monitor disease. Of note, on diagnosis patient had L renal vein thrombus and was started on eliquis. He had an episode of gross hematuria leading to hospitalization in 9/2020 however etiology of hematuria is unclear and resolved. Follow up cystoscopy to r/o abnormality in the bladder showed no abnormality. Eliquis stopped during hospitalization with hematology consult. Remains off of eliquis. Scans have continued to show stability. Scans from 8/11/22 showed stable disease in L kidney and stable R renal cyst, noted non specific infiltration in omentum, cannot exclude carcinomatosis. CTs in November 2022 show stable disease. Patient also has large R inguinal hernia. March 2023, August 2023, Dec 2023, April 2024 CT c/a/p showed stable disease in lungs, kidney and retroperitoneum.  Plan Metastatic RCC - continue maintenance nivolumab [id number], proceed with treatment today - CTs 4/18/24 No change in left apical 4 mm pulmonary nodule, nonspecific. No additional pulmonary nodules or masses. No significant change in 3 right renal neoplasms and 2 left renal neoplasms. Resolved/improved omental nodularity. No enlarged retroperitoneal or pelvic adenopathy -- repeat imaging in 4 months, August imaging ordered today 7/17  - continue to monitor blood work - monitor for iRAEs, reviewed with patient today; continues to tolerate treatment well  - CTs April 2024: Large right inguinal hernia, unchanged without evidence of bowel obstruction.  Instructed to contact our office with any new/worsening symptoms. Patient educated regarding plan of care, all questions/concerns addressed to the best of my abilities and patient's apparent satisfaction. RTC 4 weeks

## 2024-08-21 NOTE — HISTORY OF PRESENT ILLNESS
[T: ___] : T[unfilled] [N: ___] : N[unfilled] [M: ___] : M[unfilled] [AJCC Stage: ____] : AJCC Stage: [unfilled] [N/A] : Currently not applicable [de-identified] : Finesse Zuniga is a 70 years old male who initially presented with intermittent midback pain, 10/10 radiating to stomach from both sides on June 1, 2019. He has poor appetite.  On june 12 he was diagnosed UTI and on cipro for 4 days, on June 16, was started cefpodoxime 100mg BID for 7 days.  CT chest/abdomen/pelvis revealed significant abnormal appearance of the left kidney with multiple heterogeneous areas of low attenuation involving the upper and midpole which may represent malignancy. There are necrotic lymph nodes in the left para-aortic location at the level of the kidney and below the level of the kidney. Adenopathy surrounds the left renal artery. The left renal vein demonstrates question of partially occlusive thrombus. Nonspecific scattered tiny pulmonary nodules. Lower lobe subpleural nodules measure up to 6 mm. There is a well-circumscribed hypodense lesion in the anterior aspect of the lateral segment of the left lobe measuring 3.2 x 2.2 cm. He underwent CT guided liver lesion biopsy which showed consistent with primary renal cell carcinoma, IHC CK7/CK19/PAX8 positive. MRI abdomen waw con showed multiple bilateral pulmonary nodules increased in size and number compared with recent CT. The largest is pleural-based in the left lower lobe and measures 1.5 x 1.1 cm. LIVER: Rim-enhancing subcapsular mass in segment 3 and measures 4.4 x 2.6 cm. A 4.2cm mass in mid to lower pole of the lft kidney suspicious for urothelial malignancy arising within the left renal collecting system. Regional lymphadenopathy and hepatic and pulmonary metastases. Nonocclusive thrombus within the left renal vein. He was started on eliquis. Bone scan showed  a focus of mildly increased uptake in the right superior  pubic ramus suspicious of bone mets. He has lost 17lbs in one month. He denies nausea, vomiting and constipation and diarrhea.   7/2019: ipilimumab + nivolumab initiated and to nivolumab maintenance post 4 cycles and continues on therapy.  9/18-22/2020 : Patient admitted for hematuria. Patient followed by urology and hematology during hospital course. Patient was started on CBI to help remove clots. Urine initially bloody, but became pink-tinged and then clear over time. CBI stopped and barber removed. Patient reportedly felt much better after barber removed, able to urinate by himself- reported some clots initially, but since resolved. States bladder spasms improved as well. Patient's eliquis was stopped, MRI done showing no evidence of renal vein thrombus, discussed  with Heme, no reason to continue eliquis for now. Patient's leukocytosis and JOSE MARIA resolved. Urine culture and blood cultures negative.  Patient improved clinically throughout hospital course. Patient seen and examined on day of discharge.  Cystoscopy was negative, performed by Dr. Javier.   5/26/2021 : Mr. Vera is here for nivolumab. Last scan 3/29/2021, will need another set of scan the end or beginning of July. He walks 2 miles a day with his dog and bikes 3-4 miles a day. He feels well overall. Mr. VERA denies fevers, chills, headaches, cough, SOB, chest pain, any swelling, nausea, vomiting, diarrhea, rash, or malaise.   6/23/21: Bothering Rt side inguinal hernia without pain or signs of incarceration. Doing well overall. Scheduled for nivo today. He denies fevers, chills, headaches, cough, SOB, chest pain, any swelling, nausea, vomiting, diarrhea, rash, or malaise.  7/7/2021 : CT showed stable disease  7/28/2021 : Here for nivolumab. 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 Mr. VERA denies fevers, chills, headaches, cough, SOB, chest pain, any swelling, nausea, vomiting, diarrhea, rash, or malaise. He has been tolerating therapy extremely well without sig issues. He is to see a general surgeon for hernia repair soon.   8/25/2021 : Here for nivolumab, scan due in September. Mr. VERA denies fevers, chills, headaches, cough, SOB, chest pain, any swelling, nausea, vomiting, diarrhea, rash, or malaise.   10/27/21: Pt presents for follow up. He is scheduled for Nivolumab which he has been tolerating well. Scheduled to receive cycle #30 today. Denies any diarrhea/ itchiness, cough, hematuria, rash. Appetite good. Energy good. No new complaints. Recieved both flu shot and COVID booster recently  11/24/21: Pt feeling well. Offers no complaints. Scheduled for C#31 nivolumab today. Due for imaging next month. Already scheduled  12/21/21 CT chest/abd/pelvis - Stable appearance of the chest compared with 9/4/2021. Indeterminate 1.4 cm hypodense lesion in the upper pole of the right kidney is unchanged. Atrophy is again noted involving the upper pole of the left kidney, unchanged. Very large right inguinal hernia containing non-obstructed large and small bowel and a small amount of fluid. Enlarged heterogeneous prostate gland. Nonspecific ill-defined infiltration of the fat in the right lower quadrant, not significantly changed. Attention to this area is suggested on follow-up CT.  12/29/21 - presents for ongoing Opdivo cycle #32 today. Overall feeling "good", energy is good. Appetite is good. No complaints.   3/2/22 feels quite well, denies any pain and gross hematuria. He has a schedule in April to take care of right inguinal hernia.   4/6/22 feels fine, denies pain and gross hematuria. His right inguinal hernia is getting worse.   4/27/22: Pt returns for follow up. He reports 24 period of severe diarrhea on monday. No fevers. Resolved spontaneously. Pt reports he had imaging done 2 weeks ago. Eager fto discuss findings. Feeling well now. Pt will be  meeting with surgeon recommended by PMD for Right inguinal hernia repair.  6/22/22 feels well. He will meet his surgeon for his right inguinal hernia.   7/20/22 feels overall fine. Denies any pain and weight loss.     8/17/22: Patient returns for follow up, continued on nivolumab. Had CT scans 1 week ago that show stable L renal mass and stable R renal cyst. Also remarkable for non specific mild increase in illdefined omental infiltration - omental carcinomatosis is not excluded, will continue short interval scanning. Patient reports having less appetite than normal. He feels less motivation to cook for himself. He does drink an ensure everyday. He feels he gets moncada sooner and has lost a few pounds. He denies nausea, vomiting and GERD. Patient is often tired, but is still able to complete every thing he needs to do, it may just take him a bit longer. He is working on setting up his inguinal hernia surgery.  9/14: energy is good. seeing the surgeon next week for the hernia. appetite is okay now. no longer has the feeling of getting full early. able to eat full meals  10/12: appetite back to normal. no rash, pain, CP, SOB. energy is okay  11/9/22: feels well today. no new changes   1/4/22: doing well today. no new issues. energy good. appetite good. no new joint pain, rash, SOB, diarreha.  2/1/23: Returns for follow up and treatment with nivolumab. Patient has been feeling well. Denies fatigue, walks 2-2.5 miles with his dog per day. He has not yet seen a surgeon for his inguinal hernia. His PCP gave him a surgeon referral, he says he has not gotten up the nerve to call. Denies pain.  Patient denies fever, chills, headache, vision changes, cough, shortness of breath, chest pain, palpitations, abdominal pain, nausea/vomiting, diarrhea, constipation, dysuria, hematuria, itching, rashes, joint pain, mucositis, changes in sensation.  3/1/23: continued on nivolumab. Patient feels well overall, eating well, has good energy, goes on daily walks with his dog. He notes his urine stream is not as strong in the morning, but improves throughout the day. Denies dysuria/hematuria. He went to see a surgeon last month and was described what his inguinal hernia repair would be, he is planning to have the surgery but not until the Spring.. he denies any change in color or size of hernia.   3/9/23 CT c/a/p showed Multiple bilateral small renal lesions overall unchanged when compared to the prior examinations and can be consistent with a neoplastic process. Diffuse areas of omental/peritoneal thickening and areas of nodularity and diffuse small lymph nodes within the mesentery and retroperitoneum but overall configuration unchanged  3/29/23 reports feeling overall fine, denies skin and joint pain  4/26/23: feels well, no new changes compared to last visit  5/24/23: continued on nivolumab for met RCC. Overall, patient is doing well. He is exercising more, walking with his dog, riding his bike. Appetite is good. He is not planning to pursue hernia surgery until after his next scans in July, he wants to feel comfortable. Patient denies fever, chills, headache, vision changes, cough, shortness of breath, chest pain, palpitations, abdominal pain, nausea/vomiting, diarrhea, constipation, dysuria, hematuria, itching, rashes, muscle and joint pain, paraesthesias.     6/21/23: continued on nivolumab. Energy level is good, doing his same routine. Appetite is good. Last week he pulled a muscle in back when doing yard work, pain lasted for about 3-4 days and has now resolved. Otherwise no joint or muscle pain. Pt notes that due to hernia he often feels as though he is not completely emptying his bladder. Notes nocturia x 2. Denies shortness of breath, coughing, constipation, diarrhea, itching, rashes.   7/19/23 reports feeling well, no fatigue and skin rash.   816/23 Patient reports he is doing well. Denies nausea, vomiting, diarrhea, constipation, fevers, chills. No issues with urination, denies blood in urine. No SOB, or chest pain. Reports appetite is good.   9/13/23: Patient has lost a few pounds since his last visit, he says his appetite is okay. Has good energy. Sometimes he feels like he is not completely emptying his bladder, notes nocturia x 2. Patient says his hernia is not bothering him, no pain, he is not ready to get surgery just now but continues to consider it...    10/11/23: Patient continues on nivolumab, continues to feel well. His appetite and energy are good; denies fatigue. He walks 1-2 miles per day. He is redoing a bathroom in his home, all on his own. He does report that he feels he is not completely emptying his bladder and nocturia x 2, these symptoms are stable. He denies fever, chills, sore throat, shortness of breath, cough, constipation, diarrhea, rashes, itching, rashes, joint and muscle pain.    11/8/23: Patient continues to do well on nivolumab. Energy and appetite are good. He has a routine that he sticks too, bed just before 10pm and wakes up at about 4:30am to take his dog out for a walk. He has been working on a home improvement project that he hopes to complete this weekend. He has not been sick recently. Is due for a physical with PCP next month. Has not yet gotten flu shot, will either get this at a pharmacy or at his upcoming physical.   12/6/23 feels overall fine, denies skin rash, joint pain and diarrhea.   1/3/24: Overall, patient is doing well. Denies any new or changing symptoms. Energy and appetite are good. Denies chest pain, palpitations, cough, shortness of breath, rashes, itching, specific joint and muscle pain.    1/30/24 reports feeling overall fine, has good energy, eats well.   2/28/24: Patient has been feeling well, no new or changing symptoms, appetite and energy are good. Says he pulled a muscle in his neck when installing a microwave last week, pain now improved; he does not have any other joint or muscle pain. Denies changes in hernia, no pain.    3/27/24: Patient is feeling well, he is staying busy, goes for walks most days. His appetite is good, he is not a big eater, weight is stable. He denies new or changing symptoms. He has not noted any changes to inguinal hernia.  4/24/24: Patient is feeling well, appetite is okay, staying active - walking dog and riding bike. No new or changing symptoms.   5/22/24: Patient is feeling well, offers no complaints. Says he is enjoying life, taking it day by day.   6/19/24: Overall patient feels well, says a few weeks ago he had depressed appetite/early satiety for 2-3 days, now back to baseline. Still going for walks and sticking to his routines. He offers no complaints and denies new symptoms.   [de-identified] : metastatic renal cell carcinoma [FreeTextEntry1] : Ipi + nivo (7/2019) > nivolumab 480 mg monthly - cont [de-identified] : 7/17/24:

## 2024-08-21 NOTE — HISTORY OF PRESENT ILLNESS
[T: ___] : T[unfilled] [N: ___] : N[unfilled] [M: ___] : M[unfilled] [AJCC Stage: ____] : AJCC Stage: [unfilled] [N/A] : Currently not applicable [de-identified] : Finesse Zuniga is a 70 years old male who initially presented with intermittent midback pain, 10/10 radiating to stomach from both sides on June 1, 2019. He has poor appetite.  On june 12 he was diagnosed UTI and on cipro for 4 days, on June 16, was started cefpodoxime 100mg BID for 7 days.  CT chest/abdomen/pelvis revealed significant abnormal appearance of the left kidney with multiple heterogeneous areas of low attenuation involving the upper and midpole which may represent malignancy. There are necrotic lymph nodes in the left para-aortic location at the level of the kidney and below the level of the kidney. Adenopathy surrounds the left renal artery. The left renal vein demonstrates question of partially occlusive thrombus. Nonspecific scattered tiny pulmonary nodules. Lower lobe subpleural nodules measure up to 6 mm. There is a well-circumscribed hypodense lesion in the anterior aspect of the lateral segment of the left lobe measuring 3.2 x 2.2 cm. He underwent CT guided liver lesion biopsy which showed consistent with primary renal cell carcinoma, IHC CK7/CK19/PAX8 positive. MRI abdomen waw con showed multiple bilateral pulmonary nodules increased in size and number compared with recent CT. The largest is pleural-based in the left lower lobe and measures 1.5 x 1.1 cm. LIVER: Rim-enhancing subcapsular mass in segment 3 and measures 4.4 x 2.6 cm. A 4.2cm mass in mid to lower pole of the lft kidney suspicious for urothelial malignancy arising within the left renal collecting system. Regional lymphadenopathy and hepatic and pulmonary metastases. Nonocclusive thrombus within the left renal vein. He was started on eliquis. Bone scan showed  a focus of mildly increased uptake in the right superior  pubic ramus suspicious of bone mets. He has lost 17lbs in one month. He denies nausea, vomiting and constipation and diarrhea.   7/2019: ipilimumab + nivolumab initiated and to nivolumab maintenance post 4 cycles and continues on therapy.  9/18-22/2020 : Patient admitted for hematuria. Patient followed by urology and hematology during hospital course. Patient was started on CBI to help remove clots. Urine initially bloody, but became pink-tinged and then clear over time. CBI stopped and barber removed. Patient reportedly felt much better after barber removed, able to urinate by himself- reported some clots initially, but since resolved. States bladder spasms improved as well. Patient's eliquis was stopped, MRI done showing no evidence of renal vein thrombus, discussed  with Heme, no reason to continue eliquis for now. Patient's leukocytosis and JOSE MARIA resolved. Urine culture and blood cultures negative.  Patient improved clinically throughout hospital course. Patient seen and examined on day of discharge.  Cystoscopy was negative, performed by Dr. Javier.   5/26/2021 : Mr. Vera is here for nivolumab. Last scan 3/29/2021, will need another set of scan the end or beginning of July. He walks 2 miles a day with his dog and bikes 3-4 miles a day. He feels well overall. Mr. VERA denies fevers, chills, headaches, cough, SOB, chest pain, any swelling, nausea, vomiting, diarrhea, rash, or malaise.   6/23/21: Bothering Rt side inguinal hernia without pain or signs of incarceration. Doing well overall. Scheduled for nivo today. He denies fevers, chills, headaches, cough, SOB, chest pain, any swelling, nausea, vomiting, diarrhea, rash, or malaise.  7/7/2021 : CT showed stable disease  7/28/2021 : Here for nivolumab. 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 Mr. VERA denies fevers, chills, headaches, cough, SOB, chest pain, any swelling, nausea, vomiting, diarrhea, rash, or malaise. He has been tolerating therapy extremely well without sig issues. He is to see a general surgeon for hernia repair soon.   8/25/2021 : Here for nivolumab, scan due in September. Mr. VERA denies fevers, chills, headaches, cough, SOB, chest pain, any swelling, nausea, vomiting, diarrhea, rash, or malaise.   10/27/21: Pt presents for follow up. He is scheduled for Nivolumab which he has been tolerating well. Scheduled to receive cycle #30 today. Denies any diarrhea/ itchiness, cough, hematuria, rash. Appetite good. Energy good. No new complaints. Recieved both flu shot and COVID booster recently  11/24/21: Pt feeling well. Offers no complaints. Scheduled for C#31 nivolumab today. Due for imaging next month. Already scheduled  12/21/21 CT chest/abd/pelvis - Stable appearance of the chest compared with 9/4/2021. Indeterminate 1.4 cm hypodense lesion in the upper pole of the right kidney is unchanged. Atrophy is again noted involving the upper pole of the left kidney, unchanged. Very large right inguinal hernia containing non-obstructed large and small bowel and a small amount of fluid. Enlarged heterogeneous prostate gland. Nonspecific ill-defined infiltration of the fat in the right lower quadrant, not significantly changed. Attention to this area is suggested on follow-up CT.  12/29/21 - presents for ongoing Opdivo cycle #32 today. Overall feeling "good", energy is good. Appetite is good. No complaints.   3/2/22 feels quite well, denies any pain and gross hematuria. He has a schedule in April to take care of right inguinal hernia.   4/6/22 feels fine, denies pain and gross hematuria. His right inguinal hernia is getting worse.   4/27/22: Pt returns for follow up. He reports 24 period of severe diarrhea on monday. No fevers. Resolved spontaneously. Pt reports he had imaging done 2 weeks ago. Eager fto discuss findings. Feeling well now. Pt will be  meeting with surgeon recommended by PMD for Right inguinal hernia repair.  6/22/22 feels well. He will meet his surgeon for his right inguinal hernia.   7/20/22 feels overall fine. Denies any pain and weight loss.     8/17/22: Patient returns for follow up, continued on nivolumab. Had CT scans 1 week ago that show stable L renal mass and stable R renal cyst. Also remarkable for non specific mild increase in illdefined omental infiltration - omental carcinomatosis is not excluded, will continue short interval scanning. Patient reports having less appetite than normal. He feels less motivation to cook for himself. He does drink an ensure everyday. He feels he gets moncada sooner and has lost a few pounds. He denies nausea, vomiting and GERD. Patient is often tired, but is still able to complete every thing he needs to do, it may just take him a bit longer. He is working on setting up his inguinal hernia surgery.  9/14: energy is good. seeing the surgeon next week for the hernia. appetite is okay now. no longer has the feeling of getting full early. able to eat full meals  10/12: appetite back to normal. no rash, pain, CP, SOB. energy is okay  11/9/22: feels well today. no new changes   1/4/22: doing well today. no new issues. energy good. appetite good. no new joint pain, rash, SOB, diarreha.  2/1/23: Returns for follow up and treatment with nivolumab. Patient has been feeling well. Denies fatigue, walks 2-2.5 miles with his dog per day. He has not yet seen a surgeon for his inguinal hernia. His PCP gave him a surgeon referral, he says he has not gotten up the nerve to call. Denies pain.  Patient denies fever, chills, headache, vision changes, cough, shortness of breath, chest pain, palpitations, abdominal pain, nausea/vomiting, diarrhea, constipation, dysuria, hematuria, itching, rashes, joint pain, mucositis, changes in sensation.  3/1/23: continued on nivolumab. Patient feels well overall, eating well, has good energy, goes on daily walks with his dog. He notes his urine stream is not as strong in the morning, but improves throughout the day. Denies dysuria/hematuria. He went to see a surgeon last month and was described what his inguinal hernia repair would be, he is planning to have the surgery but not until the Spring.. he denies any change in color or size of hernia.   3/9/23 CT c/a/p showed Multiple bilateral small renal lesions overall unchanged when compared to the prior examinations and can be consistent with a neoplastic process. Diffuse areas of omental/peritoneal thickening and areas of nodularity and diffuse small lymph nodes within the mesentery and retroperitoneum but overall configuration unchanged  3/29/23 reports feeling overall fine, denies skin and joint pain  4/26/23: feels well, no new changes compared to last visit  5/24/23: continued on nivolumab for met RCC. Overall, patient is doing well. He is exercising more, walking with his dog, riding his bike. Appetite is good. He is not planning to pursue hernia surgery until after his next scans in July, he wants to feel comfortable. Patient denies fever, chills, headache, vision changes, cough, shortness of breath, chest pain, palpitations, abdominal pain, nausea/vomiting, diarrhea, constipation, dysuria, hematuria, itching, rashes, muscle and joint pain, paraesthesias.     6/21/23: continued on nivolumab. Energy level is good, doing his same routine. Appetite is good. Last week he pulled a muscle in back when doing yard work, pain lasted for about 3-4 days and has now resolved. Otherwise no joint or muscle pain. Pt notes that due to hernia he often feels as though he is not completely emptying his bladder. Notes nocturia x 2. Denies shortness of breath, coughing, constipation, diarrhea, itching, rashes.   7/19/23 reports feeling well, no fatigue and skin rash.   816/23 Patient reports he is doing well. Denies nausea, vomiting, diarrhea, constipation, fevers, chills. No issues with urination, denies blood in urine. No SOB, or chest pain. Reports appetite is good.   9/13/23: Patient has lost a few pounds since his last visit, he says his appetite is okay. Has good energy. Sometimes he feels like he is not completely emptying his bladder, notes nocturia x 2. Patient says his hernia is not bothering him, no pain, he is not ready to get surgery just now but continues to consider it...    10/11/23: Patient continues on nivolumab, continues to feel well. His appetite and energy are good; denies fatigue. He walks 1-2 miles per day. He is redoing a bathroom in his home, all on his own. He does report that he feels he is not completely emptying his bladder and nocturia x 2, these symptoms are stable. He denies fever, chills, sore throat, shortness of breath, cough, constipation, diarrhea, rashes, itching, rashes, joint and muscle pain.    11/8/23: Patient continues to do well on nivolumab. Energy and appetite are good. He has a routine that he sticks too, bed just before 10pm and wakes up at about 4:30am to take his dog out for a walk. He has been working on a home improvement project that he hopes to complete this weekend. He has not been sick recently. Is due for a physical with PCP next month. Has not yet gotten flu shot, will either get this at a pharmacy or at his upcoming physical.   12/6/23 feels overall fine, denies skin rash, joint pain and diarrhea.   1/3/24: Overall, patient is doing well. Denies any new or changing symptoms. Energy and appetite are good. Denies chest pain, palpitations, cough, shortness of breath, rashes, itching, specific joint and muscle pain.    1/30/24 reports feeling overall fine, has good energy, eats well.   2/28/24: Patient has been feeling well, no new or changing symptoms, appetite and energy are good. Says he pulled a muscle in his neck when installing a microwave last week, pain now improved; he does not have any other joint or muscle pain. Denies changes in hernia, no pain.    3/27/24: Patient is feeling well, he is staying busy, goes for walks most days. His appetite is good, he is not a big eater, weight is stable. He denies new or changing symptoms. He has not noted any changes to inguinal hernia.  4/24/24: Patient is feeling well, appetite is okay, staying active - walking dog and riding bike. No new or changing symptoms.   5/22/24: Patient is feeling well, offers no complaints. Says he is enjoying life, taking it day by day.   6/19/24: Overall patient feels well, says a few weeks ago he had depressed appetite/early satiety for 2-3 days, now back to baseline. Still going for walks and sticking to his routines. He offers no complaints and denies new symptoms.   [de-identified] : metastatic renal cell carcinoma [FreeTextEntry1] : Ipi + nivo (7/2019) > nivolumab 480 mg monthly - cont [de-identified] : 7/24/24: Patient has been feeling well, enjoying his Summer and planting in his garden. Denies any new or changing symptoms. Patient denies  cough, shortness of breath, chest pain, palpitations, abdominal pain, nausea/vomiting, diarrhea, constipation, itching, rashes, joint and muscle pain.   8/21/24: pt is being seen in the treatment room in [facility] prior to Nivolumab. He is tolerating it well. Denies any side effect since the initiation of therapy. Appetite is good and maintaining weight. 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 Since then he is doing well and back to his normal state.

## 2024-08-21 NOTE — HISTORY OF PRESENT ILLNESS
[T: ___] : T[unfilled] [N: ___] : N[unfilled] [M: ___] : M[unfilled] [AJCC Stage: ____] : AJCC Stage: [unfilled] [N/A] : Currently not applicable [de-identified] : Finesse Zuniga is a 70 years old male who initially presented with intermittent midback pain, 10/10 radiating to stomach from both sides on June 1, 2019. He has poor appetite.  On june 12 he was diagnosed UTI and on cipro for 4 days, on June 16, was started cefpodoxime 100mg BID for 7 days.  CT chest/abdomen/pelvis revealed significant abnormal appearance of the left kidney with multiple heterogeneous areas of low attenuation involving the upper and midpole which may represent malignancy. There are necrotic lymph nodes in the left para-aortic location at the level of the kidney and below the level of the kidney. Adenopathy surrounds the left renal artery. The left renal vein demonstrates question of partially occlusive thrombus. Nonspecific scattered tiny pulmonary nodules. Lower lobe subpleural nodules measure up to 6 mm. There is a well-circumscribed hypodense lesion in the anterior aspect of the lateral segment of the left lobe measuring 3.2 x 2.2 cm. He underwent CT guided liver lesion biopsy which showed consistent with primary renal cell carcinoma, IHC CK7/CK19/PAX8 positive. MRI abdomen waw con showed multiple bilateral pulmonary nodules increased in size and number compared with recent CT. The largest is pleural-based in the left lower lobe and measures 1.5 x 1.1 cm. LIVER: Rim-enhancing subcapsular mass in segment 3 and measures 4.4 x 2.6 cm. A 4.2cm mass in mid to lower pole of the lft kidney suspicious for urothelial malignancy arising within the left renal collecting system. Regional lymphadenopathy and hepatic and pulmonary metastases. Nonocclusive thrombus within the left renal vein. He was started on eliquis. Bone scan showed  a focus of mildly increased uptake in the right superior  pubic ramus suspicious of bone mets. He has lost 17lbs in one month. He denies nausea, vomiting and constipation and diarrhea.   7/2019: ipilimumab + nivolumab initiated and to nivolumab maintenance post 4 cycles and continues on therapy.  9/18-22/2020 : Patient admitted for hematuria. Patient followed by urology and hematology during hospital course. Patient was started on CBI to help remove clots. Urine initially bloody, but became pink-tinged and then clear over time. CBI stopped and barber removed. Patient reportedly felt much better after barber removed, able to urinate by himself- reported some clots initially, but since resolved. States bladder spasms improved as well. Patient's eliquis was stopped, MRI done showing no evidence of renal vein thrombus, discussed  with Heme, no reason to continue eliquis for now. Patient's leukocytosis and JOSE MARIA resolved. Urine culture and blood cultures negative.  Patient improved clinically throughout hospital course. Patient seen and examined on day of discharge.  Cystoscopy was negative, performed by Dr. Javier.   5/26/2021 : Mr. Vera is here for nivolumab. Last scan 3/29/2021, will need another set of scan the end or beginning of July. He walks 2 miles a day with his dog and bikes 3-4 miles a day. He feels well overall. Mr. VERA denies fevers, chills, headaches, cough, SOB, chest pain, any swelling, nausea, vomiting, diarrhea, rash, or malaise.   6/23/21: Bothering Rt side inguinal hernia without pain or signs of incarceration. Doing well overall. Scheduled for nivo today. He denies fevers, chills, headaches, cough, SOB, chest pain, any swelling, nausea, vomiting, diarrhea, rash, or malaise.  7/7/2021 : CT showed stable disease  7/28/2021 : Here for nivolumab. 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 Mr. VERA denies fevers, chills, headaches, cough, SOB, chest pain, any swelling, nausea, vomiting, diarrhea, rash, or malaise. He has been tolerating therapy extremely well without sig issues. He is to see a general surgeon for hernia repair soon.   8/25/2021 : Here for nivolumab, scan due in September. Mr. VERA denies fevers, chills, headaches, cough, SOB, chest pain, any swelling, nausea, vomiting, diarrhea, rash, or malaise.   10/27/21: Pt presents for follow up. He is scheduled for Nivolumab which he has been tolerating well. Scheduled to receive cycle #30 today. Denies any diarrhea/ itchiness, cough, hematuria, rash. Appetite good. Energy good. No new complaints. Recieved both flu shot and COVID booster recently  11/24/21: Pt feeling well. Offers no complaints. Scheduled for C#31 nivolumab today. Due for imaging next month. Already scheduled  12/21/21 CT chest/abd/pelvis - Stable appearance of the chest compared with 9/4/2021. Indeterminate 1.4 cm hypodense lesion in the upper pole of the right kidney is unchanged. Atrophy is again noted involving the upper pole of the left kidney, unchanged. Very large right inguinal hernia containing non-obstructed large and small bowel and a small amount of fluid. Enlarged heterogeneous prostate gland. Nonspecific ill-defined infiltration of the fat in the right lower quadrant, not significantly changed. Attention to this area is suggested on follow-up CT.  12/29/21 - presents for ongoing Opdivo cycle #32 today. Overall feeling "good", energy is good. Appetite is good. No complaints.   3/2/22 feels quite well, denies any pain and gross hematuria. He has a schedule in April to take care of right inguinal hernia.   4/6/22 feels fine, denies pain and gross hematuria. His right inguinal hernia is getting worse.   4/27/22: Pt returns for follow up. He reports 24 period of severe diarrhea on monday. No fevers. Resolved spontaneously. Pt reports he had imaging done 2 weeks ago. Eager fto discuss findings. Feeling well now. Pt will be  meeting with surgeon recommended by PMD for Right inguinal hernia repair.  6/22/22 feels well. He will meet his surgeon for his right inguinal hernia.   7/20/22 feels overall fine. Denies any pain and weight loss.     8/17/22: Patient returns for follow up, continued on nivolumab. Had CT scans 1 week ago that show stable L renal mass and stable R renal cyst. Also remarkable for non specific mild increase in illdefined omental infiltration - omental carcinomatosis is not excluded, will continue short interval scanning. Patient reports having less appetite than normal. He feels less motivation to cook for himself. He does drink an ensure everyday. He feels he gets moncada sooner and has lost a few pounds. He denies nausea, vomiting and GERD. Patient is often tired, but is still able to complete every thing he needs to do, it may just take him a bit longer. He is working on setting up his inguinal hernia surgery.  9/14: energy is good. seeing the surgeon next week for the hernia. appetite is okay now. no longer has the feeling of getting full early. able to eat full meals  10/12: appetite back to normal. no rash, pain, CP, SOB. energy is okay  11/9/22: feels well today. no new changes   1/4/22: doing well today. no new issues. energy good. appetite good. no new joint pain, rash, SOB, diarreha.  2/1/23: Returns for follow up and treatment with nivolumab. Patient has been feeling well. Denies fatigue, walks 2-2.5 miles with his dog per day. He has not yet seen a surgeon for his inguinal hernia. His PCP gave him a surgeon referral, he says he has not gotten up the nerve to call. Denies pain.  Patient denies fever, chills, headache, vision changes, cough, shortness of breath, chest pain, palpitations, abdominal pain, nausea/vomiting, diarrhea, constipation, dysuria, hematuria, itching, rashes, joint pain, mucositis, changes in sensation.  3/1/23: continued on nivolumab. Patient feels well overall, eating well, has good energy, goes on daily walks with his dog. He notes his urine stream is not as strong in the morning, but improves throughout the day. Denies dysuria/hematuria. He went to see a surgeon last month and was described what his inguinal hernia repair would be, he is planning to have the surgery but not until the Spring.. he denies any change in color or size of hernia.   3/9/23 CT c/a/p showed Multiple bilateral small renal lesions overall unchanged when compared to the prior examinations and can be consistent with a neoplastic process. Diffuse areas of omental/peritoneal thickening and areas of nodularity and diffuse small lymph nodes within the mesentery and retroperitoneum but overall configuration unchanged  3/29/23 reports feeling overall fine, denies skin and joint pain  4/26/23: feels well, no new changes compared to last visit  5/24/23: continued on nivolumab for met RCC. Overall, patient is doing well. He is exercising more, walking with his dog, riding his bike. Appetite is good. He is not planning to pursue hernia surgery until after his next scans in July, he wants to feel comfortable. Patient denies fever, chills, headache, vision changes, cough, shortness of breath, chest pain, palpitations, abdominal pain, nausea/vomiting, diarrhea, constipation, dysuria, hematuria, itching, rashes, muscle and joint pain, paraesthesias.     6/21/23: continued on nivolumab. Energy level is good, doing his same routine. Appetite is good. Last week he pulled a muscle in back when doing yard work, pain lasted for about 3-4 days and has now resolved. Otherwise no joint or muscle pain. Pt notes that due to hernia he often feels as though he is not completely emptying his bladder. Notes nocturia x 2. Denies shortness of breath, coughing, constipation, diarrhea, itching, rashes.   7/19/23 reports feeling well, no fatigue and skin rash.   816/23 Patient reports he is doing well. Denies nausea, vomiting, diarrhea, constipation, fevers, chills. No issues with urination, denies blood in urine. No SOB, or chest pain. Reports appetite is good.   9/13/23: Patient has lost a few pounds since his last visit, he says his appetite is okay. Has good energy. Sometimes he feels like he is not completely emptying his bladder, notes nocturia x 2. Patient says his hernia is not bothering him, no pain, he is not ready to get surgery just now but continues to consider it...    10/11/23: Patient continues on nivolumab, continues to feel well. His appetite and energy are good; denies fatigue. He walks 1-2 miles per day. He is redoing a bathroom in his home, all on his own. He does report that he feels he is not completely emptying his bladder and nocturia x 2, these symptoms are stable. He denies fever, chills, sore throat, shortness of breath, cough, constipation, diarrhea, rashes, itching, rashes, joint and muscle pain.    11/8/23: Patient continues to do well on nivolumab. Energy and appetite are good. He has a routine that he sticks too, bed just before 10pm and wakes up at about 4:30am to take his dog out for a walk. He has been working on a home improvement project that he hopes to complete this weekend. He has not been sick recently. Is due for a physical with PCP next month. Has not yet gotten flu shot, will either get this at a pharmacy or at his upcoming physical.   12/6/23 feels overall fine, denies skin rash, joint pain and diarrhea.   1/3/24: Overall, patient is doing well. Denies any new or changing symptoms. Energy and appetite are good. Denies chest pain, palpitations, cough, shortness of breath, rashes, itching, specific joint and muscle pain.    1/30/24 reports feeling overall fine, has good energy, eats well.   2/28/24: Patient has been feeling well, no new or changing symptoms, appetite and energy are good. Says he pulled a muscle in his neck when installing a microwave last week, pain now improved; he does not have any other joint or muscle pain. Denies changes in hernia, no pain.    3/27/24: Patient is feeling well, he is staying busy, goes for walks most days. His appetite is good, he is not a big eater, weight is stable. He denies new or changing symptoms. He has not noted any changes to inguinal hernia.  4/24/24: Patient is feeling well, appetite is okay, staying active - walking dog and riding bike. No new or changing symptoms.   5/22/24: Patient is feeling well, offers no complaints. Says he is enjoying life, taking it day by day.   6/19/24: Overall patient feels well, says a few weeks ago he had depressed appetite/early satiety for 2-3 days, now back to baseline. Still going for walks and sticking to his routines. He offers no complaints and denies new symptoms.   [de-identified] : metastatic renal cell carcinoma [FreeTextEntry1] : Ipi + nivo (7/2019) > nivolumab 480 mg monthly - cont [de-identified] : 7/24/24: Patient has been feeling well, enjoying his Summer and planting in his garden. Denies any new or changing symptoms. Patient denies  cough, shortness of breath, chest pain, palpitations, abdominal pain, nausea/vomiting, diarrhea, constipation, itching, rashes, joint and muscle pain.   8/21/24: pt is being seen in the treatment room in [facility] prior to Nivolumab. He is tolerating it well. Denies any side effect since the initiation of therapy. Appetite is good and maintaining weight. 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 Since then he is doing well and back to his normal state.

## 2024-08-22 LAB
T4 FREE SERPL-MCNC: 1.3 NG/DL — SIGNIFICANT CHANGE UP (ref 0.9–1.8)
TSH SERPL-MCNC: 1.8 UIU/ML — SIGNIFICANT CHANGE UP (ref 0.27–4.2)

## 2024-08-31 ENCOUNTER — APPOINTMENT (OUTPATIENT)
Dept: CT IMAGING | Facility: CLINIC | Age: 74
End: 2024-08-31

## 2024-08-31 ENCOUNTER — OUTPATIENT (OUTPATIENT)
Dept: OUTPATIENT SERVICES | Facility: HOSPITAL | Age: 74
LOS: 1 days | End: 2024-08-31

## 2024-08-31 DIAGNOSIS — Z98.890 OTHER SPECIFIED POSTPROCEDURAL STATES: Chronic | ICD-10-CM

## 2024-08-31 DIAGNOSIS — S86.019A STRAIN OF UNSPECIFIED ACHILLES TENDON, INITIAL ENCOUNTER: Chronic | ICD-10-CM

## 2024-08-31 DIAGNOSIS — C64.2 MALIGNANT NEOPLASM OF LEFT KIDNEY, EXCEPT RENAL PELVIS: ICD-10-CM

## 2024-08-31 PROCEDURE — 74177 CT ABD & PELVIS W/CONTRAST: CPT | Mod: 26

## 2024-08-31 PROCEDURE — 71260 CT THORAX DX C+: CPT | Mod: 26

## 2024-09-18 ENCOUNTER — RESULT REVIEW (OUTPATIENT)
Age: 74
End: 2024-09-18

## 2024-09-18 ENCOUNTER — APPOINTMENT (OUTPATIENT)
Dept: HEMATOLOGY ONCOLOGY | Facility: CLINIC | Age: 74
End: 2024-09-18
Payer: MEDICARE

## 2024-09-18 ENCOUNTER — APPOINTMENT (OUTPATIENT)
Dept: INFUSION THERAPY | Facility: HOSPITAL | Age: 74
End: 2024-09-18

## 2024-09-18 VITALS
WEIGHT: 147 LBS | BODY MASS INDEX: 21.53 KG/M2 | DIASTOLIC BLOOD PRESSURE: 71 MMHG | SYSTOLIC BLOOD PRESSURE: 116 MMHG | RESPIRATION RATE: 16 BRPM | TEMPERATURE: 97.9 F | HEIGHT: 69.13 IN | OXYGEN SATURATION: 99 % | HEART RATE: 58 BPM

## 2024-09-18 DIAGNOSIS — Z29.89 ENCOUNTER. FOR OTHER SPECIFIED PROPHYLACTIC MEASURES: ICD-10-CM

## 2024-09-18 DIAGNOSIS — C64.2 MALIGNANT NEOPLASM OF LEFT KIDNEY, EXCEPT RENAL PELVIS: ICD-10-CM

## 2024-09-18 LAB
ALBUMIN SERPL ELPH-MCNC: 3.9 G/DL — SIGNIFICANT CHANGE UP (ref 3.3–5)
ALP SERPL-CCNC: 57 U/L — SIGNIFICANT CHANGE UP (ref 40–120)
ALT FLD-CCNC: 15 U/L — SIGNIFICANT CHANGE UP (ref 10–45)
ANION GAP SERPL CALC-SCNC: 10 MMOL/L — SIGNIFICANT CHANGE UP (ref 5–17)
AST SERPL-CCNC: 23 U/L — SIGNIFICANT CHANGE UP (ref 10–40)
BASOPHILS # BLD AUTO: 0.05 K/UL — SIGNIFICANT CHANGE UP (ref 0–0.2)
BASOPHILS NFR BLD AUTO: 0.7 % — SIGNIFICANT CHANGE UP (ref 0–2)
BILIRUB SERPL-MCNC: 0.6 MG/DL — SIGNIFICANT CHANGE UP (ref 0.2–1.2)
BUN SERPL-MCNC: 22 MG/DL — SIGNIFICANT CHANGE UP (ref 7–23)
CALCIUM SERPL-MCNC: 9.6 MG/DL — SIGNIFICANT CHANGE UP (ref 8.4–10.5)
CHLORIDE SERPL-SCNC: 104 MMOL/L — SIGNIFICANT CHANGE UP (ref 96–108)
CO2 SERPL-SCNC: 25 MMOL/L — SIGNIFICANT CHANGE UP (ref 22–31)
CREAT SERPL-MCNC: 1.23 MG/DL — SIGNIFICANT CHANGE UP (ref 0.5–1.3)
EGFR: 62 ML/MIN/1.73M2 — SIGNIFICANT CHANGE UP
EOSINOPHIL # BLD AUTO: 0.33 K/UL — SIGNIFICANT CHANGE UP (ref 0–0.5)
EOSINOPHIL NFR BLD AUTO: 4.6 % — SIGNIFICANT CHANGE UP (ref 0–6)
GLUCOSE SERPL-MCNC: 83 MG/DL — SIGNIFICANT CHANGE UP (ref 70–99)
HCT VFR BLD CALC: 35.9 % — LOW (ref 39–50)
HGB BLD-MCNC: 12.4 G/DL — LOW (ref 13–17)
IMM GRANULOCYTES NFR BLD AUTO: 0.3 % — SIGNIFICANT CHANGE UP (ref 0–0.9)
LYMPHOCYTES # BLD AUTO: 1.3 K/UL — SIGNIFICANT CHANGE UP (ref 1–3.3)
LYMPHOCYTES # BLD AUTO: 18.3 % — SIGNIFICANT CHANGE UP (ref 13–44)
MCHC RBC-ENTMCNC: 32 PG — SIGNIFICANT CHANGE UP (ref 27–34)
MCHC RBC-ENTMCNC: 34.5 G/DL — SIGNIFICANT CHANGE UP (ref 32–36)
MCV RBC AUTO: 92.5 FL — SIGNIFICANT CHANGE UP (ref 80–100)
MONOCYTES # BLD AUTO: 0.64 K/UL — SIGNIFICANT CHANGE UP (ref 0–0.9)
MONOCYTES NFR BLD AUTO: 9 % — SIGNIFICANT CHANGE UP (ref 2–14)
NEUTROPHILS # BLD AUTO: 4.77 K/UL — SIGNIFICANT CHANGE UP (ref 1.8–7.4)
NEUTROPHILS NFR BLD AUTO: 67.1 % — SIGNIFICANT CHANGE UP (ref 43–77)
NRBC # BLD: 0 /100 WBCS — SIGNIFICANT CHANGE UP (ref 0–0)
PLATELET # BLD AUTO: 155 K/UL — SIGNIFICANT CHANGE UP (ref 150–400)
POTASSIUM SERPL-MCNC: 4.7 MMOL/L — SIGNIFICANT CHANGE UP (ref 3.5–5.3)
POTASSIUM SERPL-SCNC: 4.7 MMOL/L — SIGNIFICANT CHANGE UP (ref 3.5–5.3)
PROT SERPL-MCNC: 6.8 G/DL — SIGNIFICANT CHANGE UP (ref 6–8.3)
RBC # BLD: 3.88 M/UL — LOW (ref 4.2–5.8)
RBC # FLD: 12.8 % — SIGNIFICANT CHANGE UP (ref 10.3–14.5)
SODIUM SERPL-SCNC: 138 MMOL/L — SIGNIFICANT CHANGE UP (ref 135–145)
WBC # BLD: 7.11 K/UL — SIGNIFICANT CHANGE UP (ref 3.8–10.5)
WBC # FLD AUTO: 7.11 K/UL — SIGNIFICANT CHANGE UP (ref 3.8–10.5)

## 2024-09-18 PROCEDURE — 99214 OFFICE O/P EST MOD 30 MIN: CPT

## 2024-09-18 PROCEDURE — G2211 COMPLEX E/M VISIT ADD ON: CPT

## 2024-09-18 NOTE — HISTORY OF PRESENT ILLNESS
[de-identified] : Finesse Zuniga is a 70 years old male who initially presented with intermittent midback pain, 10/10 radiating to stomach from both sides on June 1, 2019. He has poor appetite.  On june 12 he was diagnosed UTI and on cipro for 4 days, on June 16, was started cefpodoxime 100mg BID for 7 days.  CT chest/abdomen/pelvis revealed significant abnormal appearance of the left kidney with multiple heterogeneous areas of low attenuation involving the upper and midpole which may represent malignancy. There are necrotic lymph nodes in the left para-aortic location at the level of the kidney and below the level of the kidney. Adenopathy surrounds the left renal artery. The left renal vein demonstrates question of partially occlusive thrombus. Nonspecific scattered tiny pulmonary nodules. Lower lobe subpleural nodules measure up to 6 mm. There is a well-circumscribed hypodense lesion in the anterior aspect of the lateral segment of the left lobe measuring 3.2 x 2.2 cm. He underwent CT guided liver lesion biopsy which showed consistent with primary renal cell carcinoma, IHC CK7/CK19/PAX8 positive. MRI abdomen waw con showed multiple bilateral pulmonary nodules increased in size and number compared with recent CT. The largest is pleural-based in the left lower lobe and measures 1.5 x 1.1 cm. LIVER: Rim-enhancing subcapsular mass in segment 3 and measures 4.4 x 2.6 cm. A 4.2cm mass in mid to lower pole of the lft kidney suspicious for urothelial malignancy arising within the left renal collecting system. Regional lymphadenopathy and hepatic and pulmonary metastases. Nonocclusive thrombus within the left renal vein. He was started on eliquis. Bone scan showed  a focus of mildly increased uptake in the right superior  pubic ramus suspicious of bone mets. He has lost 17lbs in one month. He denies nausea, vomiting and constipation and diarrhea.   7/2019: ipilimumab + nivolumab initiated and to nivolumab maintenance post 4 cycles and continues on therapy.  9/18-22/2020 : Patient admitted for hematuria. Patient followed by urology and hematology during hospital course. Patient was started on CBI to help remove clots. Urine initially bloody, but became pink-tinged and then clear over time. CBI stopped and barber removed. Patient reportedly felt much better after barber removed, able to urinate by himself- reported some clots initially, but since resolved. States bladder spasms improved as well. Patient's eliquis was stopped, MRI done showing no evidence of renal vein thrombus, discussed  with Heme, no reason to continue eliquis for now. Patient's leukocytosis and JOSE MARIA resolved. Urine culture and blood cultures negative.  Patient improved clinically throughout hospital course. Patient seen and examined on day of discharge.  Cystoscopy was negative, performed by Dr. Javier.   5/26/2021 : Mr. Vera is here for nivolumab. Last scan 3/29/2021, will need another set of scan the end or beginning of July. He walks 2 miles a day with his dog and bikes 3-4 miles a day. He feels well overall. Mr. VERA denies fevers, chills, headaches, cough, SOB, chest pain, any swelling, nausea, vomiting, diarrhea, rash, or malaise.   6/23/21: Bothering Rt side inguinal hernia without pain or signs of incarceration. Doing well overall. Scheduled for nivo today. He denies fevers, chills, headaches, cough, SOB, chest pain, any swelling, nausea, vomiting, diarrhea, rash, or malaise.  7/7/2021 : CT showed stable disease  7/28/2021 : Here for nivolumab. 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 Mr. VERA denies fevers, chills, headaches, cough, SOB, chest pain, any swelling, nausea, vomiting, diarrhea, rash, or malaise. He has been tolerating therapy extremely well without sig issues. He is to see a general surgeon for hernia repair soon.   8/25/2021 : Here for nivolumab, scan due in September. Mr. VERA denies fevers, chills, headaches, cough, SOB, chest pain, any swelling, nausea, vomiting, diarrhea, rash, or malaise.   10/27/21: Pt presents for follow up. He is scheduled for Nivolumab which he has been tolerating well. Scheduled to receive cycle #30 today. Denies any diarrhea/ itchiness, cough, hematuria, rash. Appetite good. Energy good. No new complaints. Recieved both flu shot and COVID booster recently  11/24/21: Pt feeling well. Offers no complaints. Scheduled for C#31 nivolumab today. Due for imaging next month. Already scheduled  12/21/21 CT chest/abd/pelvis - Stable appearance of the chest compared with 9/4/2021. Indeterminate 1.4 cm hypodense lesion in the upper pole of the right kidney is unchanged. Atrophy is again noted involving the upper pole of the left kidney, unchanged. Very large right inguinal hernia containing non-obstructed large and small bowel and a small amount of fluid. Enlarged heterogeneous prostate gland. Nonspecific ill-defined infiltration of the fat in the right lower quadrant, not significantly changed. Attention to this area is suggested on follow-up CT.  12/29/21 - presents for ongoing Opdivo cycle #32 today. Overall feeling "good", energy is good. Appetite is good. No complaints.   3/2/22 feels quite well, denies any pain and gross hematuria. He has a schedule in April to take care of right inguinal hernia.   4/6/22 feels fine, denies pain and gross hematuria. His right inguinal hernia is getting worse.   4/27/22: Pt returns for follow up. He reports 24 period of severe diarrhea on monday. No fevers. Resolved spontaneously. Pt reports he had imaging done 2 weeks ago. Eager fto discuss findings. Feeling well now. Pt will be  meeting with surgeon recommended by PMD for Right inguinal hernia repair.  6/22/22 feels well. He will meet his surgeon for his right inguinal hernia.   7/20/22 feels overall fine. Denies any pain and weight loss.     8/17/22: Patient returns for follow up, continued on nivolumab. Had CT scans 1 week ago that show stable L renal mass and stable R renal cyst. Also remarkable for non specific mild increase in illdefined omental infiltration - omental carcinomatosis is not excluded, will continue short interval scanning. Patient reports having less appetite than normal. He feels less motivation to cook for himself. He does drink an ensure everyday. He feels he gets moncada sooner and has lost a few pounds. He denies nausea, vomiting and GERD. Patient is often tired, but is still able to complete every thing he needs to do, it may just take him a bit longer. He is working on setting up his inguinal hernia surgery.  9/14: energy is good. seeing the surgeon next week for the hernia. appetite is okay now. no longer has the feeling of getting full early. able to eat full meals  10/12: appetite back to normal. no rash, pain, CP, SOB. energy is okay  11/9/22: feels well today. no new changes   1/4/22: doing well today. no new issues. energy good. appetite good. no new joint pain, rash, SOB, diarreha.  2/1/23: Returns for follow up and treatment with nivolumab. Patient has been feeling well. Denies fatigue, walks 2-2.5 miles with his dog per day. He has not yet seen a surgeon for his inguinal hernia. His PCP gave him a surgeon referral, he says he has not gotten up the nerve to call. Denies pain.  Patient denies fever, chills, headache, vision changes, cough, shortness of breath, chest pain, palpitations, abdominal pain, nausea/vomiting, diarrhea, constipation, dysuria, hematuria, itching, rashes, joint pain, mucositis, changes in sensation.  3/1/23: continued on nivolumab. Patient feels well overall, eating well, has good energy, goes on daily walks with his dog. He notes his urine stream is not as strong in the morning, but improves throughout the day. Denies dysuria/hematuria. He went to see a surgeon last month and was described what his inguinal hernia repair would be, he is planning to have the surgery but not until the Spring.. he denies any change in color or size of hernia.   3/9/23 CT c/a/p showed Multiple bilateral small renal lesions overall unchanged when compared to the prior examinations and can be consistent with a neoplastic process. Diffuse areas of omental/peritoneal thickening and areas of nodularity and diffuse small lymph nodes within the mesentery and retroperitoneum but overall configuration unchanged  3/29/23 reports feeling overall fine, denies skin and joint pain  4/26/23: feels well, no new changes compared to last visit  5/24/23: continued on nivolumab for met RCC. Overall, patient is doing well. He is exercising more, walking with his dog, riding his bike. Appetite is good. He is not planning to pursue hernia surgery until after his next scans in July, he wants to feel comfortable. Patient denies fever, chills, headache, vision changes, cough, shortness of breath, chest pain, palpitations, abdominal pain, nausea/vomiting, diarrhea, constipation, dysuria, hematuria, itching, rashes, muscle and joint pain, paraesthesias.     6/21/23: continued on nivolumab. Energy level is good, doing his same routine. Appetite is good. Last week he pulled a muscle in back when doing yard work, pain lasted for about 3-4 days and has now resolved. Otherwise no joint or muscle pain. Pt notes that due to hernia he often feels as though he is not completely emptying his bladder. Notes nocturia x 2. Denies shortness of breath, coughing, constipation, diarrhea, itching, rashes.   7/19/23 reports feeling well, no fatigue and skin rash.   816/23 Patient reports he is doing well. Denies nausea, vomiting, diarrhea, constipation, fevers, chills. No issues with urination, denies blood in urine. No SOB, or chest pain. Reports appetite is good.   9/13/23: Patient has lost a few pounds since his last visit, he says his appetite is okay. Has good energy. Sometimes he feels like he is not completely emptying his bladder, notes nocturia x 2. Patient says his hernia is not bothering him, no pain, he is not ready to get surgery just now but continues to consider it...    10/11/23: Patient continues on nivolumab, continues to feel well. His appetite and energy are good; denies fatigue. He walks 1-2 miles per day. He is redoing a bathroom in his home, all on his own. He does report that he feels he is not completely emptying his bladder and nocturia x 2, these symptoms are stable. He denies fever, chills, sore throat, shortness of breath, cough, constipation, diarrhea, rashes, itching, rashes, joint and muscle pain.    11/8/23: Patient continues to do well on nivolumab. Energy and appetite are good. He has a routine that he sticks too, bed just before 10pm and wakes up at about 4:30am to take his dog out for a walk. He has been working on a home improvement project that he hopes to complete this weekend. He has not been sick recently. Is due for a physical with PCP next month. Has not yet gotten flu shot, will either get this at a pharmacy or at his upcoming physical.   12/6/23 feels overall fine, denies skin rash, joint pain and diarrhea.   1/3/24: Overall, patient is doing well. Denies any new or changing symptoms. Energy and appetite are good. Denies chest pain, palpitations, cough, shortness of breath, rashes, itching, specific joint and muscle pain.    1/30/24 reports feeling overall fine, has good energy, eats well.   2/28/24: Patient has been feeling well, no new or changing symptoms, appetite and energy are good. Says he pulled a muscle in his neck when installing a microwave last week, pain now improved; he does not have any other joint or muscle pain. Denies changes in hernia, no pain.    3/27/24: Patient is feeling well, he is staying busy, goes for walks most days. His appetite is good, he is not a big eater, weight is stable. He denies new or changing symptoms. He has not noted any changes to inguinal hernia.  4/24/24: Patient is feeling well, appetite is okay, staying active - walking dog and riding bike. No new or changing symptoms.   5/22/24: Patient is feeling well, offers no complaints. Says he is enjoying life, taking it day by day.   6/19/24: Overall patient feels well, says a few weeks ago he had depressed appetite/early satiety for 2-3 days, now back to baseline. Still going for walks and sticking to his routines. He offers no complaints and denies new symptoms.   [de-identified] : metastatic renal cell carcinoma [FreeTextEntry1] : Ipi + nivo (7/2019) > nivolumab 480 mg monthly - cont [de-identified] : 7/24/24: Patient has been feeling well, enjoying his Summer and planting in his garden. Denies any new or changing symptoms. Patient denies  cough, shortness of breath, chest pain, palpitations, abdominal pain, nausea/vomiting, diarrhea, constipation, itching, rashes, joint and muscle pain.   8/21/24: pt is being seen in the treatment room in [facility] prior to Nivolumab. He is tolerating it well. Denies any side effect since the initiation of therapy. Appetite is good and maintaining weight. 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 Since then, he is doing well and back to his normal state.  9/18/24. Pt is doing well. Not in any acute distress. Tolerating Nivolumab with no AEs. Appetite is good and maintaining weight. Right shoulder discomfort as a result of exercise but improving.

## 2024-09-18 NOTE — HISTORY OF PRESENT ILLNESS
[de-identified] : Finesse Zuniga is a 70 years old male who initially presented with intermittent midback pain, 10/10 radiating to stomach from both sides on June 1, 2019. He has poor appetite.  On june 12 he was diagnosed UTI and on cipro for 4 days, on June 16, was started cefpodoxime 100mg BID for 7 days.  CT chest/abdomen/pelvis revealed significant abnormal appearance of the left kidney with multiple heterogeneous areas of low attenuation involving the upper and midpole which may represent malignancy. There are necrotic lymph nodes in the left para-aortic location at the level of the kidney and below the level of the kidney. Adenopathy surrounds the left renal artery. The left renal vein demonstrates question of partially occlusive thrombus. Nonspecific scattered tiny pulmonary nodules. Lower lobe subpleural nodules measure up to 6 mm. There is a well-circumscribed hypodense lesion in the anterior aspect of the lateral segment of the left lobe measuring 3.2 x 2.2 cm. He underwent CT guided liver lesion biopsy which showed consistent with primary renal cell carcinoma, IHC CK7/CK19/PAX8 positive. MRI abdomen waw con showed multiple bilateral pulmonary nodules increased in size and number compared with recent CT. The largest is pleural-based in the left lower lobe and measures 1.5 x 1.1 cm. LIVER: Rim-enhancing subcapsular mass in segment 3 and measures 4.4 x 2.6 cm. A 4.2cm mass in mid to lower pole of the lft kidney suspicious for urothelial malignancy arising within the left renal collecting system. Regional lymphadenopathy and hepatic and pulmonary metastases. Nonocclusive thrombus within the left renal vein. He was started on eliquis. Bone scan showed  a focus of mildly increased uptake in the right superior  pubic ramus suspicious of bone mets. He has lost 17lbs in one month. He denies nausea, vomiting and constipation and diarrhea.   7/2019: ipilimumab + nivolumab initiated and to nivolumab maintenance post 4 cycles and continues on therapy.  9/18-22/2020 : Patient admitted for hematuria. Patient followed by urology and hematology during hospital course. Patient was started on CBI to help remove clots. Urine initially bloody, but became pink-tinged and then clear over time. CBI stopped and barber removed. Patient reportedly felt much better after barber removed, able to urinate by himself- reported some clots initially, but since resolved. States bladder spasms improved as well. Patient's eliquis was stopped, MRI done showing no evidence of renal vein thrombus, discussed  with Heme, no reason to continue eliquis for now. Patient's leukocytosis and JOSE MARIA resolved. Urine culture and blood cultures negative.  Patient improved clinically throughout hospital course. Patient seen and examined on day of discharge.  Cystoscopy was negative, performed by Dr. Javier.   5/26/2021 : Mr. Vera is here for nivolumab. Last scan 3/29/2021, will need another set of scan the end or beginning of July. He walks 2 miles a day with his dog and bikes 3-4 miles a day. He feels well overall. Mr. VERA denies fevers, chills, headaches, cough, SOB, chest pain, any swelling, nausea, vomiting, diarrhea, rash, or malaise.   6/23/21: Bothering Rt side inguinal hernia without pain or signs of incarceration. Doing well overall. Scheduled for nivo today. He denies fevers, chills, headaches, cough, SOB, chest pain, any swelling, nausea, vomiting, diarrhea, rash, or malaise.  7/7/2021 : CT showed stable disease  7/28/2021 : Here for nivolumab. 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 Mr. VERA denies fevers, chills, headaches, cough, SOB, chest pain, any swelling, nausea, vomiting, diarrhea, rash, or malaise. He has been tolerating therapy extremely well without sig issues. He is to see a general surgeon for hernia repair soon.   8/25/2021 : Here for nivolumab, scan due in September. Mr. VERA denies fevers, chills, headaches, cough, SOB, chest pain, any swelling, nausea, vomiting, diarrhea, rash, or malaise.   10/27/21: Pt presents for follow up. He is scheduled for Nivolumab which he has been tolerating well. Scheduled to receive cycle #30 today. Denies any diarrhea/ itchiness, cough, hematuria, rash. Appetite good. Energy good. No new complaints. Recieved both flu shot and COVID booster recently  11/24/21: Pt feeling well. Offers no complaints. Scheduled for C#31 nivolumab today. Due for imaging next month. Already scheduled  12/21/21 CT chest/abd/pelvis - Stable appearance of the chest compared with 9/4/2021. Indeterminate 1.4 cm hypodense lesion in the upper pole of the right kidney is unchanged. Atrophy is again noted involving the upper pole of the left kidney, unchanged. Very large right inguinal hernia containing non-obstructed large and small bowel and a small amount of fluid. Enlarged heterogeneous prostate gland. Nonspecific ill-defined infiltration of the fat in the right lower quadrant, not significantly changed. Attention to this area is suggested on follow-up CT.  12/29/21 - presents for ongoing Opdivo cycle #32 today. Overall feeling "good", energy is good. Appetite is good. No complaints.   3/2/22 feels quite well, denies any pain and gross hematuria. He has a schedule in April to take care of right inguinal hernia.   4/6/22 feels fine, denies pain and gross hematuria. His right inguinal hernia is getting worse.   4/27/22: Pt returns for follow up. He reports 24 period of severe diarrhea on monday. No fevers. Resolved spontaneously. Pt reports he had imaging done 2 weeks ago. Eager fto discuss findings. Feeling well now. Pt will be  meeting with surgeon recommended by PMD for Right inguinal hernia repair.  6/22/22 feels well. He will meet his surgeon for his right inguinal hernia.   7/20/22 feels overall fine. Denies any pain and weight loss.     8/17/22: Patient returns for follow up, continued on nivolumab. Had CT scans 1 week ago that show stable L renal mass and stable R renal cyst. Also remarkable for non specific mild increase in illdefined omental infiltration - omental carcinomatosis is not excluded, will continue short interval scanning. Patient reports having less appetite than normal. He feels less motivation to cook for himself. He does drink an ensure everyday. He feels he gets moncada sooner and has lost a few pounds. He denies nausea, vomiting and GERD. Patient is often tired, but is still able to complete every thing he needs to do, it may just take him a bit longer. He is working on setting up his inguinal hernia surgery.  9/14: energy is good. seeing the surgeon next week for the hernia. appetite is okay now. no longer has the feeling of getting full early. able to eat full meals  10/12: appetite back to normal. no rash, pain, CP, SOB. energy is okay  11/9/22: feels well today. no new changes   1/4/22: doing well today. no new issues. energy good. appetite good. no new joint pain, rash, SOB, diarreha.  2/1/23: Returns for follow up and treatment with nivolumab. Patient has been feeling well. Denies fatigue, walks 2-2.5 miles with his dog per day. He has not yet seen a surgeon for his inguinal hernia. His PCP gave him a surgeon referral, he says he has not gotten up the nerve to call. Denies pain.  Patient denies fever, chills, headache, vision changes, cough, shortness of breath, chest pain, palpitations, abdominal pain, nausea/vomiting, diarrhea, constipation, dysuria, hematuria, itching, rashes, joint pain, mucositis, changes in sensation.  3/1/23: continued on nivolumab. Patient feels well overall, eating well, has good energy, goes on daily walks with his dog. He notes his urine stream is not as strong in the morning, but improves throughout the day. Denies dysuria/hematuria. He went to see a surgeon last month and was described what his inguinal hernia repair would be, he is planning to have the surgery but not until the Spring.. he denies any change in color or size of hernia.   3/9/23 CT c/a/p showed Multiple bilateral small renal lesions overall unchanged when compared to the prior examinations and can be consistent with a neoplastic process. Diffuse areas of omental/peritoneal thickening and areas of nodularity and diffuse small lymph nodes within the mesentery and retroperitoneum but overall configuration unchanged  3/29/23 reports feeling overall fine, denies skin and joint pain  4/26/23: feels well, no new changes compared to last visit  5/24/23: continued on nivolumab for met RCC. Overall, patient is doing well. He is exercising more, walking with his dog, riding his bike. Appetite is good. He is not planning to pursue hernia surgery until after his next scans in July, he wants to feel comfortable. Patient denies fever, chills, headache, vision changes, cough, shortness of breath, chest pain, palpitations, abdominal pain, nausea/vomiting, diarrhea, constipation, dysuria, hematuria, itching, rashes, muscle and joint pain, paraesthesias.     6/21/23: continued on nivolumab. Energy level is good, doing his same routine. Appetite is good. Last week he pulled a muscle in back when doing yard work, pain lasted for about 3-4 days and has now resolved. Otherwise no joint or muscle pain. Pt notes that due to hernia he often feels as though he is not completely emptying his bladder. Notes nocturia x 2. Denies shortness of breath, coughing, constipation, diarrhea, itching, rashes.   7/19/23 reports feeling well, no fatigue and skin rash.   816/23 Patient reports he is doing well. Denies nausea, vomiting, diarrhea, constipation, fevers, chills. No issues with urination, denies blood in urine. No SOB, or chest pain. Reports appetite is good.   9/13/23: Patient has lost a few pounds since his last visit, he says his appetite is okay. Has good energy. Sometimes he feels like he is not completely emptying his bladder, notes nocturia x 2. Patient says his hernia is not bothering him, no pain, he is not ready to get surgery just now but continues to consider it...    10/11/23: Patient continues on nivolumab, continues to feel well. His appetite and energy are good; denies fatigue. He walks 1-2 miles per day. He is redoing a bathroom in his home, all on his own. He does report that he feels he is not completely emptying his bladder and nocturia x 2, these symptoms are stable. He denies fever, chills, sore throat, shortness of breath, cough, constipation, diarrhea, rashes, itching, rashes, joint and muscle pain.    11/8/23: Patient continues to do well on nivolumab. Energy and appetite are good. He has a routine that he sticks too, bed just before 10pm and wakes up at about 4:30am to take his dog out for a walk. He has been working on a home improvement project that he hopes to complete this weekend. He has not been sick recently. Is due for a physical with PCP next month. Has not yet gotten flu shot, will either get this at a pharmacy or at his upcoming physical.   12/6/23 feels overall fine, denies skin rash, joint pain and diarrhea.   1/3/24: Overall, patient is doing well. Denies any new or changing symptoms. Energy and appetite are good. Denies chest pain, palpitations, cough, shortness of breath, rashes, itching, specific joint and muscle pain.    1/30/24 reports feeling overall fine, has good energy, eats well.   2/28/24: Patient has been feeling well, no new or changing symptoms, appetite and energy are good. Says he pulled a muscle in his neck when installing a microwave last week, pain now improved; he does not have any other joint or muscle pain. Denies changes in hernia, no pain.    3/27/24: Patient is feeling well, he is staying busy, goes for walks most days. His appetite is good, he is not a big eater, weight is stable. He denies new or changing symptoms. He has not noted any changes to inguinal hernia.  4/24/24: Patient is feeling well, appetite is okay, staying active - walking dog and riding bike. No new or changing symptoms.   5/22/24: Patient is feeling well, offers no complaints. Says he is enjoying life, taking it day by day.   6/19/24: Overall patient feels well, says a few weeks ago he had depressed appetite/early satiety for 2-3 days, now back to baseline. Still going for walks and sticking to his routines. He offers no complaints and denies new symptoms.   [de-identified] : metastatic renal cell carcinoma [FreeTextEntry1] : Ipi + nivo (7/2019) > nivolumab 480 mg monthly - cont [de-identified] : 7/24/24: Patient has been feeling well, enjoying his Summer and planting in his garden. Denies any new or changing symptoms. Patient denies  cough, shortness of breath, chest pain, palpitations, abdominal pain, nausea/vomiting, diarrhea, constipation, itching, rashes, joint and muscle pain.   8/21/24: pt is being seen in the treatment room in [facility] prior to Nivolumab. He is tolerating it well. Denies any side effect since the initiation of therapy. Appetite is good and maintaining weight. 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 Since then, he is doing well and back to his normal state.  9/18/24. Pt is doing well. Not in any acute distress. Tolerating Nivolumab with no AEs. Appetite is good and maintaining weight. Right shoulder discomfort as a result of exercise but improving.

## 2024-09-18 NOTE — ASSESSMENT
[FreeTextEntry1] : Mr. Mcknight is a 74 year old male with metastatic renal cell carcinoma. On initial presentation CT c/a/p and MRI abdomen showed multiple lung nodules, one liver lesion, retroperitoneal nodes. Bone scan showed one suspicious met in right superior pubic ramus. Liver lesion biopsy is consistent with renal cell carcinoma. It is not certain that the histology of RCC is clear cell vs non-clear cell. IMDC risk of this patient he has 5 out of 6 risks including leukocytosis, anemia, thrombocytosis, time from dx to initiate systemic treatment less than one year and hyperalcemia. He has poor risk of mRCC. Based on mccRCC the standard of care is combined immunotherapy nivo and ipi vs sutent, checkmate 214 in the intermediate and poor risk of mccRCC patients with significant overall survival benefit, keynote 426 keytruda plus axitinib vs sutent in good, intermediate and poor risk of mccRCC patients showed OS, PFS and MICHEL benefit, [place] in the intermediate and poor risk of mccRCC patients showed cabozantinib demonstrated a significant clinical benefit in PFS and MICHEL over standard-of-care sunitinib as first-line therapy in patients with intermediate- or poor-risk mRCC. Consider the longer duration of response and less side effects and recommended nivo and ipi. Completed 4 cycles of nivo and ipi, continued on maintenance nivolumab with interval scans to monitor disease. Of note, on diagnosis patient had L renal vein thrombus and was started on eliquis. He had an episode of gross hematuria leading to hospitalization in 9/2020 however etiology of hematuria is unclear and resolved. Follow up cystoscopy to r/o abnormality in the bladder showed no abnormality. Eliquis stopped during hospitalization with hematology consult. Remains off of eliquis. Scans have continued to show stability. Scans from 8/11/22 showed stable disease in L kidney and stable R renal cyst, noted non specific infiltration in omentum, cannot exclude carcinomatosis. CTs in November 2022 show stable disease. Patient also has large R inguinal hernia. March 2023, August 2023, Dec 2023, April 2024 CT c/a/p showed stable disease in lungs, kidney and retroperitoneum.  - 8/31/24 CT CAP IMPRESSION: Stable enhancing bilateral renal lesions. Mesenteric nodule, slightly increased in size.  - CTs 4/18/24 No change in left apical 4 mm pulmonary nodule, nonspecific. No additional pulmonary nodules or masses. No significant change in 3 right renal neoplasms and 2 left renal neoplasms. Resolved/improved omental nodularity. No enlarged retroperitoneal or pelvic adenopathy  Plan [1] Metastatic RCC - Continue Nivolumab [id number] [next treatment is 9/18/24], proceed with cycle 48 of treatment today. - Reviewed August 31, 2024 CT CAP with pt.   Instructed to contact our office with any new/worsening symptoms. Patient educated regarding plan of care, all questions/concerns addressed to the best of my abilities and patient's apparent satisfaction.  RTC 4 weeks

## 2024-09-18 NOTE — ASSESSMENT
[FreeTextEntry1] : Mr. Mcknight is a 74 year old male with metastatic renal cell carcinoma. On initial presentation CT c/a/p and MRI abdomen showed multiple lung nodules, one liver lesion, retroperitoneal nodes. Bone scan showed one suspicious met in right superior pubic ramus. Liver lesion biopsy is consistent with renal cell carcinoma. It is not certain that the histology of RCC is clear cell vs non-clear cell. IMDC risk of this patient he has 5 out of 6 risks including leukocytosis, anemia, thrombocytosis, time from dx to initiate systemic treatment less than one year and hyperalcemia. He has poor risk of mRCC. Based on mccRCC the standard of care is combined immunotherapy nivo and ipi vs sutent, checkmate 214 in the intermediate and poor risk of mccRCC patients with significant overall survival benefit, keynote 426 keytruda plus axitinib vs sutent in good, intermediate and poor risk of mccRCC patients showed OS, PFS and MICHEL benefit, [place] in the intermediate and poor risk of mccRCC patients showed cabozantinib demonstrated a significant clinical benefit in PFS and MICHEL over standard-of-care sunitinib as first-line therapy in patients with intermediate- or poor-risk mRCC. Consider the longer duration of response and less side effects and recommended nivo and ipi. Completed 4 cycles of nivo and ipi, continued on maintenance nivolumab with interval scans to monitor disease. Of note, on diagnosis patient had L renal vein thrombus and was started on eliquis. He had an episode of gross hematuria leading to hospitalization in 9/2020 however etiology of hematuria is unclear and resolved. Follow up cystoscopy to r/o abnormality in the bladder showed no abnormality. Eliquis stopped during hospitalization with hematology consult. Remains off of eliquis. Scans have continued to show stability. Scans from 8/11/22 showed stable disease in L kidney and stable R renal cyst, noted non specific infiltration in omentum, cannot exclude carcinomatosis. CTs in November 2022 show stable disease. Patient also has large R inguinal hernia. March 2023, August 2023, Dec 2023, April 2024 CT c/a/p showed stable disease in lungs, kidney and retroperitoneum.  - 8/31/24 CT CAP IMPRESSION: Stable enhancing bilateral renal lesions. Mesenteric nodule, slightly increased in size.  - CTs 4/18/24 No change in left apical 4 mm pulmonary nodule, nonspecific. No additional pulmonary nodules or masses. No significant change in 3 right renal neoplasms and 2 left renal neoplasms. Resolved/improved omental nodularity. No enlarged retroperitoneal or pelvic adenopathy  Plan [1] Metastatic RCC - Continue Nivolumab [id number] [next treatment is 9/18/24], proceed with cycle 48 of treatment today. - Reviewed August 31, 2024 CT CAP with pt.   Instructed to contact our office with any new/worsening symptoms. Patient educated regarding plan of care, all questions/concerns addressed to the best of my abilities and patient's apparent satisfaction.  RTC 4 weeks

## 2024-09-18 NOTE — PHYSICAL EXAM
[de-identified] : anicteric  [de-identified] : supple, FROM  [de-identified] : regular rhythm, mild bradycardia  [de-identified] : no edema  [de-identified] : large inguinal hernia unchanged from prior

## 2024-09-18 NOTE — PHYSICAL EXAM
[de-identified] : anicteric  [de-identified] : supple, FROM  [de-identified] : regular rhythm, mild bradycardia  [de-identified] : no edema  [de-identified] : large inguinal hernia unchanged from prior

## 2024-09-18 NOTE — PHYSICAL EXAM
[de-identified] : anicteric  [de-identified] : supple, FROM  [de-identified] : regular rhythm, mild bradycardia  [de-identified] : no edema  [de-identified] : large inguinal hernia unchanged from prior

## 2024-09-18 NOTE — ASSESSMENT
[FreeTextEntry1] : Mr. Mcknight is a 74 year old male with metastatic renal cell carcinoma. On initial presentation CT c/a/p and MRI abdomen showed multiple lung nodules, one liver lesion, retroperitoneal nodes. Bone scan showed one suspicious met in right superior pubic ramus. Liver lesion biopsy is consistent with renal cell carcinoma. It is not certain that the histology of RCC is clear cell vs non-clear cell. IMDC risk of this patient he has 5 out of 6 risks including leukocytosis, anemia, thrombocytosis, time from dx to initiate systemic treatment less than one year and hyperalcemia. He has poor risk of mRCC. Based on mccRCC the standard of care is combined immunotherapy nivo and ipi vs sutent, checkmate 214 in the intermediate and poor risk of mccRCC patients with significant overall survival benefit, keynote 426 keytruda plus axitinib vs sutent in good, intermediate and poor risk of mccRCC patients showed OS, PFS and MICHEL benefit, [place] in the intermediate and poor risk of mccRCC patients showed cabozantinib demonstrated a significant clinical benefit in PFS and MICHEL over standard-of-care sunitinib as first-line therapy in patients with intermediate- or poor-risk mRCC. Consider the longer duration of response and less side effects and recommended nivo and ipi. Completed 4 cycles of nivo and ipi, continued on maintenance nivolumab with interval scans to monitor disease. Of note, on diagnosis patient had L renal vein thrombus and was started on eliquis. He had an episode of gross hematuria leading to hospitalization in 9/2020 however etiology of hematuria is unclear and resolved. Follow up cystoscopy to r/o abnormality in the bladder showed no abnormality. Eliquis stopped during hospitalization with hematology consult. Remains off of eliquis. Scans have continued to show stability. Scans from 8/11/22 showed stable disease in L kidney and stable R renal cyst, noted non specific infiltration in omentum, cannot exclude carcinomatosis. CTs in November 2022 show stable disease. Patient also has large R inguinal hernia. March 2023, August 2023, Dec 2023, April 2024 CT c/a/p showed stable disease in lungs, kidney and retroperitoneum.  - 8/31/24 CT CAP IMPRESSION: Stable enhancing bilateral renal lesions. Mesenteric nodule, slightly increased in size.  - CTs 4/18/24 No change in left apical 4 mm pulmonary nodule, nonspecific. No additional pulmonary nodules or masses. No significant change in 3 right renal neoplasms and 2 left renal neoplasms. Resolved/improved omental nodularity. No enlarged retroperitoneal or pelvic adenopathy  Plan [1] Metastatic RCC - Continue Nivolumab [id number] [next treatment is 9/18/24], proceed with cycle 48 of treatment today. - Reviewed August 31, 2024 CT CAP with pt.   Instructed to contact our office with any new/worsening symptoms. Patient educated regarding plan of care, all questions/concerns addressed to the best of my abilities and patient's apparent satisfaction.  RTC 4 weeks

## 2024-09-18 NOTE — HISTORY OF PRESENT ILLNESS
[de-identified] : Finesse Zuniga is a 70 years old male who initially presented with intermittent midback pain, 10/10 radiating to stomach from both sides on June 1, 2019. He has poor appetite.  On june 12 he was diagnosed UTI and on cipro for 4 days, on June 16, was started cefpodoxime 100mg BID for 7 days.  CT chest/abdomen/pelvis revealed significant abnormal appearance of the left kidney with multiple heterogeneous areas of low attenuation involving the upper and midpole which may represent malignancy. There are necrotic lymph nodes in the left para-aortic location at the level of the kidney and below the level of the kidney. Adenopathy surrounds the left renal artery. The left renal vein demonstrates question of partially occlusive thrombus. Nonspecific scattered tiny pulmonary nodules. Lower lobe subpleural nodules measure up to 6 mm. There is a well-circumscribed hypodense lesion in the anterior aspect of the lateral segment of the left lobe measuring 3.2 x 2.2 cm. He underwent CT guided liver lesion biopsy which showed consistent with primary renal cell carcinoma, IHC CK7/CK19/PAX8 positive. MRI abdomen waw con showed multiple bilateral pulmonary nodules increased in size and number compared with recent CT. The largest is pleural-based in the left lower lobe and measures 1.5 x 1.1 cm. LIVER: Rim-enhancing subcapsular mass in segment 3 and measures 4.4 x 2.6 cm. A 4.2cm mass in mid to lower pole of the lft kidney suspicious for urothelial malignancy arising within the left renal collecting system. Regional lymphadenopathy and hepatic and pulmonary metastases. Nonocclusive thrombus within the left renal vein. He was started on eliquis. Bone scan showed  a focus of mildly increased uptake in the right superior  pubic ramus suspicious of bone mets. He has lost 17lbs in one month. He denies nausea, vomiting and constipation and diarrhea.   7/2019: ipilimumab + nivolumab initiated and to nivolumab maintenance post 4 cycles and continues on therapy.  9/18-22/2020 : Patient admitted for hematuria. Patient followed by urology and hematology during hospital course. Patient was started on CBI to help remove clots. Urine initially bloody, but became pink-tinged and then clear over time. CBI stopped and abrber removed. Patient reportedly felt much better after barber removed, able to urinate by himself- reported some clots initially, but since resolved. States bladder spasms improved as well. Patient's eliquis was stopped, MRI done showing no evidence of renal vein thrombus, discussed  with Heme, no reason to continue eliquis for now. Patient's leukocytosis and JOSE MARIA resolved. Urine culture and blood cultures negative.  Patient improved clinically throughout hospital course. Patient seen and examined on day of discharge.  Cystoscopy was negative, performed by Dr. Javier.   5/26/2021 : Mr. Vera is here for nivolumab. Last scan 3/29/2021, will need another set of scan the end or beginning of July. He walks 2 miles a day with his dog and bikes 3-4 miles a day. He feels well overall. Mr. VERA denies fevers, chills, headaches, cough, SOB, chest pain, any swelling, nausea, vomiting, diarrhea, rash, or malaise.   6/23/21: Bothering Rt side inguinal hernia without pain or signs of incarceration. Doing well overall. Scheduled for nivo today. He denies fevers, chills, headaches, cough, SOB, chest pain, any swelling, nausea, vomiting, diarrhea, rash, or malaise.  7/7/2021 : CT showed stable disease  7/28/2021 : Here for nivolumab. 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 Mr. VERA denies fevers, chills, headaches, cough, SOB, chest pain, any swelling, nausea, vomiting, diarrhea, rash, or malaise. He has been tolerating therapy extremely well without sig issues. He is to see a general surgeon for hernia repair soon.   8/25/2021 : Here for nivolumab, scan due in September. Mr. VERA denies fevers, chills, headaches, cough, SOB, chest pain, any swelling, nausea, vomiting, diarrhea, rash, or malaise.   10/27/21: Pt presents for follow up. He is scheduled for Nivolumab which he has been tolerating well. Scheduled to receive cycle #30 today. Denies any diarrhea/ itchiness, cough, hematuria, rash. Appetite good. Energy good. No new complaints. Recieved both flu shot and COVID booster recently  11/24/21: Pt feeling well. Offers no complaints. Scheduled for C#31 nivolumab today. Due for imaging next month. Already scheduled  12/21/21 CT chest/abd/pelvis - Stable appearance of the chest compared with 9/4/2021. Indeterminate 1.4 cm hypodense lesion in the upper pole of the right kidney is unchanged. Atrophy is again noted involving the upper pole of the left kidney, unchanged. Very large right inguinal hernia containing non-obstructed large and small bowel and a small amount of fluid. Enlarged heterogeneous prostate gland. Nonspecific ill-defined infiltration of the fat in the right lower quadrant, not significantly changed. Attention to this area is suggested on follow-up CT.  12/29/21 - presents for ongoing Opdivo cycle #32 today. Overall feeling "good", energy is good. Appetite is good. No complaints.   3/2/22 feels quite well, denies any pain and gross hematuria. He has a schedule in April to take care of right inguinal hernia.   4/6/22 feels fine, denies pain and gross hematuria. His right inguinal hernia is getting worse.   4/27/22: Pt returns for follow up. He reports 24 period of severe diarrhea on monday. No fevers. Resolved spontaneously. Pt reports he had imaging done 2 weeks ago. Eager fto discuss findings. Feeling well now. Pt will be  meeting with surgeon recommended by PMD for Right inguinal hernia repair.  6/22/22 feels well. He will meet his surgeon for his right inguinal hernia.   7/20/22 feels overall fine. Denies any pain and weight loss.     8/17/22: Patient returns for follow up, continued on nivolumab. Had CT scans 1 week ago that show stable L renal mass and stable R renal cyst. Also remarkable for non specific mild increase in illdefined omental infiltration - omental carcinomatosis is not excluded, will continue short interval scanning. Patient reports having less appetite than normal. He feels less motivation to cook for himself. He does drink an ensure everyday. He feels he gets moncada sooner and has lost a few pounds. He denies nausea, vomiting and GERD. Patient is often tired, but is still able to complete every thing he needs to do, it may just take him a bit longer. He is working on setting up his inguinal hernia surgery.  9/14: energy is good. seeing the surgeon next week for the hernia. appetite is okay now. no longer has the feeling of getting full early. able to eat full meals  10/12: appetite back to normal. no rash, pain, CP, SOB. energy is okay  11/9/22: feels well today. no new changes   1/4/22: doing well today. no new issues. energy good. appetite good. no new joint pain, rash, SOB, diarreha.  2/1/23: Returns for follow up and treatment with nivolumab. Patient has been feeling well. Denies fatigue, walks 2-2.5 miles with his dog per day. He has not yet seen a surgeon for his inguinal hernia. His PCP gave him a surgeon referral, he says he has not gotten up the nerve to call. Denies pain.  Patient denies fever, chills, headache, vision changes, cough, shortness of breath, chest pain, palpitations, abdominal pain, nausea/vomiting, diarrhea, constipation, dysuria, hematuria, itching, rashes, joint pain, mucositis, changes in sensation.  3/1/23: continued on nivolumab. Patient feels well overall, eating well, has good energy, goes on daily walks with his dog. He notes his urine stream is not as strong in the morning, but improves throughout the day. Denies dysuria/hematuria. He went to see a surgeon last month and was described what his inguinal hernia repair would be, he is planning to have the surgery but not until the Spring.. he denies any change in color or size of hernia.   3/9/23 CT c/a/p showed Multiple bilateral small renal lesions overall unchanged when compared to the prior examinations and can be consistent with a neoplastic process. Diffuse areas of omental/peritoneal thickening and areas of nodularity and diffuse small lymph nodes within the mesentery and retroperitoneum but overall configuration unchanged  3/29/23 reports feeling overall fine, denies skin and joint pain  4/26/23: feels well, no new changes compared to last visit  5/24/23: continued on nivolumab for met RCC. Overall, patient is doing well. He is exercising more, walking with his dog, riding his bike. Appetite is good. He is not planning to pursue hernia surgery until after his next scans in July, he wants to feel comfortable. Patient denies fever, chills, headache, vision changes, cough, shortness of breath, chest pain, palpitations, abdominal pain, nausea/vomiting, diarrhea, constipation, dysuria, hematuria, itching, rashes, muscle and joint pain, paraesthesias.     6/21/23: continued on nivolumab. Energy level is good, doing his same routine. Appetite is good. Last week he pulled a muscle in back when doing yard work, pain lasted for about 3-4 days and has now resolved. Otherwise no joint or muscle pain. Pt notes that due to hernia he often feels as though he is not completely emptying his bladder. Notes nocturia x 2. Denies shortness of breath, coughing, constipation, diarrhea, itching, rashes.   7/19/23 reports feeling well, no fatigue and skin rash.   816/23 Patient reports he is doing well. Denies nausea, vomiting, diarrhea, constipation, fevers, chills. No issues with urination, denies blood in urine. No SOB, or chest pain. Reports appetite is good.   9/13/23: Patient has lost a few pounds since his last visit, he says his appetite is okay. Has good energy. Sometimes he feels like he is not completely emptying his bladder, notes nocturia x 2. Patient says his hernia is not bothering him, no pain, he is not ready to get surgery just now but continues to consider it...    10/11/23: Patient continues on nivolumab, continues to feel well. His appetite and energy are good; denies fatigue. He walks 1-2 miles per day. He is redoing a bathroom in his home, all on his own. He does report that he feels he is not completely emptying his bladder and nocturia x 2, these symptoms are stable. He denies fever, chills, sore throat, shortness of breath, cough, constipation, diarrhea, rashes, itching, rashes, joint and muscle pain.    11/8/23: Patient continues to do well on nivolumab. Energy and appetite are good. He has a routine that he sticks too, bed just before 10pm and wakes up at about 4:30am to take his dog out for a walk. He has been working on a home improvement project that he hopes to complete this weekend. He has not been sick recently. Is due for a physical with PCP next month. Has not yet gotten flu shot, will either get this at a pharmacy or at his upcoming physical.   12/6/23 feels overall fine, denies skin rash, joint pain and diarrhea.   1/3/24: Overall, patient is doing well. Denies any new or changing symptoms. Energy and appetite are good. Denies chest pain, palpitations, cough, shortness of breath, rashes, itching, specific joint and muscle pain.    1/30/24 reports feeling overall fine, has good energy, eats well.   2/28/24: Patient has been feeling well, no new or changing symptoms, appetite and energy are good. Says he pulled a muscle in his neck when installing a microwave last week, pain now improved; he does not have any other joint or muscle pain. Denies changes in hernia, no pain.    3/27/24: Patient is feeling well, he is staying busy, goes for walks most days. His appetite is good, he is not a big eater, weight is stable. He denies new or changing symptoms. He has not noted any changes to inguinal hernia.  4/24/24: Patient is feeling well, appetite is okay, staying active - walking dog and riding bike. No new or changing symptoms.   5/22/24: Patient is feeling well, offers no complaints. Says he is enjoying life, taking it day by day.   6/19/24: Overall patient feels well, says a few weeks ago he had depressed appetite/early satiety for 2-3 days, now back to baseline. Still going for walks and sticking to his routines. He offers no complaints and denies new symptoms.   [de-identified] : metastatic renal cell carcinoma [FreeTextEntry1] : Ipi + nivo (7/2019) > nivolumab 480 mg monthly - cont [de-identified] : 7/24/24: Patient has been feeling well, enjoying his Summer and planting in his garden. Denies any new or changing symptoms. Patient denies  cough, shortness of breath, chest pain, palpitations, abdominal pain, nausea/vomiting, diarrhea, constipation, itching, rashes, joint and muscle pain.   8/21/24: pt is being seen in the treatment room in [facility] prior to Nivolumab. He is tolerating it well. Denies any side effect since the initiation of therapy. Appetite is good and maintaining weight. 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 Since then, he is doing well and back to his normal state.  9/18/24. Pt is doing well. Not in any acute distress. Tolerating Nivolumab with no AEs. Appetite is good and maintaining weight. Right shoulder discomfort as a result of exercise but improving.

## 2024-09-19 LAB
T4 FREE SERPL-MCNC: 1.3 NG/DL — SIGNIFICANT CHANGE UP (ref 0.9–1.8)
TSH SERPL-MCNC: 0.99 UIU/ML — SIGNIFICANT CHANGE UP (ref 0.27–4.2)

## 2024-10-04 ENCOUNTER — OUTPATIENT (OUTPATIENT)
Dept: OUTPATIENT SERVICES | Facility: HOSPITAL | Age: 74
LOS: 1 days | Discharge: HOME | End: 2024-10-04

## 2024-10-04 DIAGNOSIS — S86.019A STRAIN OF UNSPECIFIED ACHILLES TENDON, INITIAL ENCOUNTER: Chronic | ICD-10-CM

## 2024-10-04 DIAGNOSIS — Z98.890 OTHER SPECIFIED POSTPROCEDURAL STATES: Chronic | ICD-10-CM

## 2024-10-04 DIAGNOSIS — C64.9 MALIGNANT NEOPLASM OF UNSPECIFIED KIDNEY, EXCEPT RENAL PELVIS: ICD-10-CM

## 2024-10-16 ENCOUNTER — RESULT REVIEW (OUTPATIENT)
Age: 74
End: 2024-10-16

## 2024-10-16 ENCOUNTER — APPOINTMENT (OUTPATIENT)
Dept: HEMATOLOGY ONCOLOGY | Facility: CLINIC | Age: 74
End: 2024-10-16
Payer: MEDICARE

## 2024-10-16 ENCOUNTER — APPOINTMENT (OUTPATIENT)
Dept: INFUSION THERAPY | Facility: HOSPITAL | Age: 74
End: 2024-10-16

## 2024-10-16 VITALS
TEMPERATURE: 98.3 F | SYSTOLIC BLOOD PRESSURE: 153 MMHG | RESPIRATION RATE: 16 BRPM | WEIGHT: 146 LBS | DIASTOLIC BLOOD PRESSURE: 91 MMHG | BODY MASS INDEX: 21.38 KG/M2 | HEART RATE: 59 BPM | HEIGHT: 69.13 IN | OXYGEN SATURATION: 100 %

## 2024-10-16 DIAGNOSIS — C78.00 SECONDARY MALIGNANT NEOPLASM OF UNSPECIFIED LUNG: ICD-10-CM

## 2024-10-16 DIAGNOSIS — C64.2 MALIGNANT NEOPLASM OF LEFT KIDNEY, EXCEPT RENAL PELVIS: ICD-10-CM

## 2024-10-16 LAB
ALBUMIN SERPL ELPH-MCNC: 4 G/DL — SIGNIFICANT CHANGE UP (ref 3.3–5)
ALP SERPL-CCNC: 60 U/L — SIGNIFICANT CHANGE UP (ref 40–120)
ALT FLD-CCNC: 14 U/L — SIGNIFICANT CHANGE UP (ref 10–45)
ANION GAP SERPL CALC-SCNC: 12 MMOL/L — SIGNIFICANT CHANGE UP (ref 5–17)
AST SERPL-CCNC: 24 U/L — SIGNIFICANT CHANGE UP (ref 10–40)
BASOPHILS # BLD AUTO: 0.04 K/UL — SIGNIFICANT CHANGE UP (ref 0–0.2)
BASOPHILS NFR BLD AUTO: 0.4 % — SIGNIFICANT CHANGE UP (ref 0–2)
BILIRUB SERPL-MCNC: 1 MG/DL — SIGNIFICANT CHANGE UP (ref 0.2–1.2)
BUN SERPL-MCNC: 20 MG/DL — SIGNIFICANT CHANGE UP (ref 7–23)
CALCIUM SERPL-MCNC: 9.6 MG/DL — SIGNIFICANT CHANGE UP (ref 8.4–10.5)
CHLORIDE SERPL-SCNC: 105 MMOL/L — SIGNIFICANT CHANGE UP (ref 96–108)
CO2 SERPL-SCNC: 23 MMOL/L — SIGNIFICANT CHANGE UP (ref 22–31)
CREAT SERPL-MCNC: 1.28 MG/DL — SIGNIFICANT CHANGE UP (ref 0.5–1.3)
EGFR: 59 ML/MIN/1.73M2 — LOW
EOSINOPHIL # BLD AUTO: 0.24 K/UL — SIGNIFICANT CHANGE UP (ref 0–0.5)
EOSINOPHIL NFR BLD AUTO: 2.3 % — SIGNIFICANT CHANGE UP (ref 0–6)
GLUCOSE SERPL-MCNC: 94 MG/DL — SIGNIFICANT CHANGE UP (ref 70–99)
HCT VFR BLD CALC: 38.4 % — LOW (ref 39–50)
HGB BLD-MCNC: 13.1 G/DL — SIGNIFICANT CHANGE UP (ref 13–17)
IMM GRANULOCYTES NFR BLD AUTO: 0.3 % — SIGNIFICANT CHANGE UP (ref 0–0.9)
LYMPHOCYTES # BLD AUTO: 1.46 K/UL — SIGNIFICANT CHANGE UP (ref 1–3.3)
LYMPHOCYTES # BLD AUTO: 14 % — SIGNIFICANT CHANGE UP (ref 13–44)
MCHC RBC-ENTMCNC: 31.9 PG — SIGNIFICANT CHANGE UP (ref 27–34)
MCHC RBC-ENTMCNC: 34.1 G/DL — SIGNIFICANT CHANGE UP (ref 32–36)
MCV RBC AUTO: 93.4 FL — SIGNIFICANT CHANGE UP (ref 80–100)
MONOCYTES # BLD AUTO: 0.64 K/UL — SIGNIFICANT CHANGE UP (ref 0–0.9)
MONOCYTES NFR BLD AUTO: 6.2 % — SIGNIFICANT CHANGE UP (ref 2–14)
NEUTROPHILS # BLD AUTO: 7.99 K/UL — HIGH (ref 1.8–7.4)
NEUTROPHILS NFR BLD AUTO: 76.8 % — SIGNIFICANT CHANGE UP (ref 43–77)
NRBC # BLD: 0 /100 WBCS — SIGNIFICANT CHANGE UP (ref 0–0)
PLATELET # BLD AUTO: 185 K/UL — SIGNIFICANT CHANGE UP (ref 150–400)
POTASSIUM SERPL-MCNC: 5.2 MMOL/L — SIGNIFICANT CHANGE UP (ref 3.5–5.3)
POTASSIUM SERPL-SCNC: 5.2 MMOL/L — SIGNIFICANT CHANGE UP (ref 3.5–5.3)
PROT SERPL-MCNC: 7.1 G/DL — SIGNIFICANT CHANGE UP (ref 6–8.3)
RBC # BLD: 4.11 M/UL — LOW (ref 4.2–5.8)
RBC # FLD: 12.7 % — SIGNIFICANT CHANGE UP (ref 10.3–14.5)
SODIUM SERPL-SCNC: 140 MMOL/L — SIGNIFICANT CHANGE UP (ref 135–145)
T4 FREE SERPL-MCNC: 1.2 NG/DL — SIGNIFICANT CHANGE UP (ref 0.9–1.8)
TSH SERPL-MCNC: 1.54 UIU/ML — SIGNIFICANT CHANGE UP (ref 0.27–4.2)
WBC # BLD: 10.4 K/UL — SIGNIFICANT CHANGE UP (ref 3.8–10.5)
WBC # FLD AUTO: 10.4 K/UL — SIGNIFICANT CHANGE UP (ref 3.8–10.5)

## 2024-10-16 PROCEDURE — 99213 OFFICE O/P EST LOW 20 MIN: CPT

## 2024-10-16 PROCEDURE — G2211 COMPLEX E/M VISIT ADD ON: CPT

## 2024-10-17 DIAGNOSIS — Z51.11 ENCOUNTER FOR ANTINEOPLASTIC CHEMOTHERAPY: ICD-10-CM

## 2024-11-13 ENCOUNTER — APPOINTMENT (OUTPATIENT)
Dept: INFUSION THERAPY | Facility: HOSPITAL | Age: 74
End: 2024-11-13

## 2024-11-13 ENCOUNTER — RESULT REVIEW (OUTPATIENT)
Age: 74
End: 2024-11-13

## 2024-11-13 ENCOUNTER — APPOINTMENT (OUTPATIENT)
Dept: HEMATOLOGY ONCOLOGY | Facility: CLINIC | Age: 74
End: 2024-11-13
Payer: MEDICARE

## 2024-11-13 VITALS
BODY MASS INDEX: 21.86 KG/M2 | HEART RATE: 58 BPM | TEMPERATURE: 97.8 F | RESPIRATION RATE: 16 BRPM | DIASTOLIC BLOOD PRESSURE: 78 MMHG | SYSTOLIC BLOOD PRESSURE: 119 MMHG | OXYGEN SATURATION: 99 % | WEIGHT: 148.57 LBS

## 2024-11-13 DIAGNOSIS — N40.0 BENIGN PROSTATIC HYPERPLASIA WITHOUT LOWER URINARY TRACT SYMPMS: ICD-10-CM

## 2024-11-13 DIAGNOSIS — C78.00 SECONDARY MALIGNANT NEOPLASM OF UNSPECIFIED LUNG: ICD-10-CM

## 2024-11-13 DIAGNOSIS — C64.2 MALIGNANT NEOPLASM OF LEFT KIDNEY, EXCEPT RENAL PELVIS: ICD-10-CM

## 2024-11-13 LAB
ALBUMIN SERPL ELPH-MCNC: 3.7 G/DL — SIGNIFICANT CHANGE UP (ref 3.3–5)
ALP SERPL-CCNC: 57 U/L — SIGNIFICANT CHANGE UP (ref 40–120)
ALT FLD-CCNC: 12 U/L — SIGNIFICANT CHANGE UP (ref 10–45)
ANION GAP SERPL CALC-SCNC: 11 MMOL/L — SIGNIFICANT CHANGE UP (ref 5–17)
AST SERPL-CCNC: 29 U/L — SIGNIFICANT CHANGE UP (ref 10–40)
BASOPHILS # BLD AUTO: 0.02 K/UL — SIGNIFICANT CHANGE UP (ref 0–0.2)
BASOPHILS NFR BLD AUTO: 0.3 % — SIGNIFICANT CHANGE UP (ref 0–2)
BILIRUB SERPL-MCNC: 0.7 MG/DL — SIGNIFICANT CHANGE UP (ref 0.2–1.2)
BUN SERPL-MCNC: 21 MG/DL — SIGNIFICANT CHANGE UP (ref 7–23)
CALCIUM SERPL-MCNC: 9.5 MG/DL — SIGNIFICANT CHANGE UP (ref 8.4–10.5)
CHLORIDE SERPL-SCNC: 103 MMOL/L — SIGNIFICANT CHANGE UP (ref 96–108)
CO2 SERPL-SCNC: 24 MMOL/L — SIGNIFICANT CHANGE UP (ref 22–31)
CREAT SERPL-MCNC: 1.25 MG/DL — SIGNIFICANT CHANGE UP (ref 0.5–1.3)
EGFR: 61 ML/MIN/1.73M2 — SIGNIFICANT CHANGE UP
EOSINOPHIL # BLD AUTO: 0.23 K/UL — SIGNIFICANT CHANGE UP (ref 0–0.5)
EOSINOPHIL NFR BLD AUTO: 3.6 % — SIGNIFICANT CHANGE UP (ref 0–6)
GLUCOSE SERPL-MCNC: 146 MG/DL — HIGH (ref 70–99)
HCT VFR BLD CALC: 36.7 % — LOW (ref 39–50)
HGB BLD-MCNC: 12.7 G/DL — LOW (ref 13–17)
IMM GRANULOCYTES NFR BLD AUTO: 0.2 % — SIGNIFICANT CHANGE UP (ref 0–0.9)
LYMPHOCYTES # BLD AUTO: 1.1 K/UL — SIGNIFICANT CHANGE UP (ref 1–3.3)
LYMPHOCYTES # BLD AUTO: 17 % — SIGNIFICANT CHANGE UP (ref 13–44)
MCHC RBC-ENTMCNC: 31.9 PG — SIGNIFICANT CHANGE UP (ref 27–34)
MCHC RBC-ENTMCNC: 34.6 G/DL — SIGNIFICANT CHANGE UP (ref 32–36)
MCV RBC AUTO: 92.2 FL — SIGNIFICANT CHANGE UP (ref 80–100)
MONOCYTES # BLD AUTO: 0.47 K/UL — SIGNIFICANT CHANGE UP (ref 0–0.9)
MONOCYTES NFR BLD AUTO: 7.3 % — SIGNIFICANT CHANGE UP (ref 2–14)
NEUTROPHILS # BLD AUTO: 4.63 K/UL — SIGNIFICANT CHANGE UP (ref 1.8–7.4)
NEUTROPHILS NFR BLD AUTO: 71.6 % — SIGNIFICANT CHANGE UP (ref 43–77)
NRBC # BLD: 0 /100 WBCS — SIGNIFICANT CHANGE UP (ref 0–0)
PLATELET # BLD AUTO: 154 K/UL — SIGNIFICANT CHANGE UP (ref 150–400)
POTASSIUM SERPL-MCNC: 4.9 MMOL/L — SIGNIFICANT CHANGE UP (ref 3.5–5.3)
POTASSIUM SERPL-SCNC: 4.9 MMOL/L — SIGNIFICANT CHANGE UP (ref 3.5–5.3)
PROT SERPL-MCNC: 6.8 G/DL — SIGNIFICANT CHANGE UP (ref 6–8.3)
RBC # BLD: 3.98 M/UL — LOW (ref 4.2–5.8)
RBC # FLD: 12.7 % — SIGNIFICANT CHANGE UP (ref 10.3–14.5)
SODIUM SERPL-SCNC: 137 MMOL/L — SIGNIFICANT CHANGE UP (ref 135–145)
T4 FREE SERPL-MCNC: 1.1 NG/DL — SIGNIFICANT CHANGE UP (ref 0.9–1.8)
TSH SERPL-MCNC: 0.95 UIU/ML — SIGNIFICANT CHANGE UP (ref 0.27–4.2)
WBC # BLD: 6.46 K/UL — SIGNIFICANT CHANGE UP (ref 3.8–10.5)
WBC # FLD AUTO: 6.46 K/UL — SIGNIFICANT CHANGE UP (ref 3.8–10.5)

## 2024-11-13 PROCEDURE — 99214 OFFICE O/P EST MOD 30 MIN: CPT

## 2024-12-03 ENCOUNTER — OUTPATIENT (OUTPATIENT)
Dept: OUTPATIENT SERVICES | Facility: HOSPITAL | Age: 74
LOS: 1 days | Discharge: HOME | End: 2024-12-03

## 2024-12-03 DIAGNOSIS — Z98.890 OTHER SPECIFIED POSTPROCEDURAL STATES: Chronic | ICD-10-CM

## 2024-12-03 DIAGNOSIS — S86.019A STRAIN OF UNSPECIFIED ACHILLES TENDON, INITIAL ENCOUNTER: Chronic | ICD-10-CM

## 2024-12-03 DIAGNOSIS — C64.9 MALIGNANT NEOPLASM OF UNSPECIFIED KIDNEY, EXCEPT RENAL PELVIS: ICD-10-CM

## 2024-12-11 ENCOUNTER — RESULT REVIEW (OUTPATIENT)
Age: 74
End: 2024-12-11

## 2024-12-11 ENCOUNTER — APPOINTMENT (OUTPATIENT)
Dept: INFUSION THERAPY | Facility: HOSPITAL | Age: 74
End: 2024-12-11

## 2024-12-11 ENCOUNTER — APPOINTMENT (OUTPATIENT)
Dept: HEMATOLOGY ONCOLOGY | Facility: CLINIC | Age: 74
End: 2024-12-11
Payer: MEDICARE

## 2024-12-11 VITALS
DIASTOLIC BLOOD PRESSURE: 84 MMHG | SYSTOLIC BLOOD PRESSURE: 146 MMHG | TEMPERATURE: 97.2 F | BODY MASS INDEX: 21.99 KG/M2 | WEIGHT: 149.47 LBS | OXYGEN SATURATION: 99 % | RESPIRATION RATE: 16 BRPM | HEART RATE: 55 BPM

## 2024-12-11 DIAGNOSIS — M25.512 PAIN IN LEFT SHOULDER: ICD-10-CM

## 2024-12-11 DIAGNOSIS — C78.00 SECONDARY MALIGNANT NEOPLASM OF UNSPECIFIED LUNG: ICD-10-CM

## 2024-12-11 DIAGNOSIS — C64.2 MALIGNANT NEOPLASM OF LEFT KIDNEY, EXCEPT RENAL PELVIS: ICD-10-CM

## 2024-12-11 DIAGNOSIS — N40.0 BENIGN PROSTATIC HYPERPLASIA WITHOUT LOWER URINARY TRACT SYMPMS: ICD-10-CM

## 2024-12-11 LAB
ALBUMIN SERPL ELPH-MCNC: 4 G/DL — SIGNIFICANT CHANGE UP (ref 3.3–5)
ALP SERPL-CCNC: 58 U/L — SIGNIFICANT CHANGE UP (ref 40–120)
ALT FLD-CCNC: 16 U/L — SIGNIFICANT CHANGE UP (ref 10–45)
ANION GAP SERPL CALC-SCNC: 13 MMOL/L — SIGNIFICANT CHANGE UP (ref 5–17)
AST SERPL-CCNC: 23 U/L — SIGNIFICANT CHANGE UP (ref 10–40)
BASOPHILS # BLD AUTO: 0.03 K/UL — SIGNIFICANT CHANGE UP (ref 0–0.2)
BASOPHILS NFR BLD AUTO: 0.3 % — SIGNIFICANT CHANGE UP (ref 0–2)
BILIRUB SERPL-MCNC: 0.6 MG/DL — SIGNIFICANT CHANGE UP (ref 0.2–1.2)
BUN SERPL-MCNC: 23 MG/DL — SIGNIFICANT CHANGE UP (ref 7–23)
CALCIUM SERPL-MCNC: 9.8 MG/DL — SIGNIFICANT CHANGE UP (ref 8.4–10.5)
CHLORIDE SERPL-SCNC: 101 MMOL/L — SIGNIFICANT CHANGE UP (ref 96–108)
CO2 SERPL-SCNC: 24 MMOL/L — SIGNIFICANT CHANGE UP (ref 22–31)
CREAT SERPL-MCNC: 1.31 MG/DL — HIGH (ref 0.5–1.3)
EGFR: 58 ML/MIN/1.73M2 — LOW
EOSINOPHIL # BLD AUTO: 0.24 K/UL — SIGNIFICANT CHANGE UP (ref 0–0.5)
EOSINOPHIL NFR BLD AUTO: 2.4 % — SIGNIFICANT CHANGE UP (ref 0–6)
GLUCOSE SERPL-MCNC: 155 MG/DL — HIGH (ref 70–99)
HCT VFR BLD CALC: 38.7 % — LOW (ref 39–50)
HGB BLD-MCNC: 13.1 G/DL — SIGNIFICANT CHANGE UP (ref 13–17)
IMM GRANULOCYTES NFR BLD AUTO: 0.3 % — SIGNIFICANT CHANGE UP (ref 0–0.9)
LYMPHOCYTES # BLD AUTO: 1.24 K/UL — SIGNIFICANT CHANGE UP (ref 1–3.3)
LYMPHOCYTES # BLD AUTO: 12.6 % — LOW (ref 13–44)
MCHC RBC-ENTMCNC: 31.7 PG — SIGNIFICANT CHANGE UP (ref 27–34)
MCHC RBC-ENTMCNC: 33.9 G/DL — SIGNIFICANT CHANGE UP (ref 32–36)
MCV RBC AUTO: 93.7 FL — SIGNIFICANT CHANGE UP (ref 80–100)
MONOCYTES # BLD AUTO: 0.57 K/UL — SIGNIFICANT CHANGE UP (ref 0–0.9)
MONOCYTES NFR BLD AUTO: 5.8 % — SIGNIFICANT CHANGE UP (ref 2–14)
NEUTROPHILS # BLD AUTO: 7.76 K/UL — HIGH (ref 1.8–7.4)
NEUTROPHILS NFR BLD AUTO: 78.6 % — HIGH (ref 43–77)
NRBC # BLD: 0 /100 WBCS — SIGNIFICANT CHANGE UP (ref 0–0)
NRBC BLD-RTO: 0 /100 WBCS — SIGNIFICANT CHANGE UP (ref 0–0)
PLATELET # BLD AUTO: 170 K/UL — SIGNIFICANT CHANGE UP (ref 150–400)
POTASSIUM SERPL-MCNC: 4.5 MMOL/L — SIGNIFICANT CHANGE UP (ref 3.5–5.3)
POTASSIUM SERPL-SCNC: 4.5 MMOL/L — SIGNIFICANT CHANGE UP (ref 3.5–5.3)
PROT SERPL-MCNC: 6.9 G/DL — SIGNIFICANT CHANGE UP (ref 6–8.3)
RBC # BLD: 4.13 M/UL — LOW (ref 4.2–5.8)
RBC # FLD: 12.6 % — SIGNIFICANT CHANGE UP (ref 10.3–14.5)
SODIUM SERPL-SCNC: 138 MMOL/L — SIGNIFICANT CHANGE UP (ref 135–145)
T4 FREE SERPL-MCNC: 1.2 NG/DL — SIGNIFICANT CHANGE UP (ref 0.9–1.7)
TSH SERPL-MCNC: 1.77 UIU/ML — SIGNIFICANT CHANGE UP (ref 0.27–4.2)
WBC # BLD: 9.87 K/UL — SIGNIFICANT CHANGE UP (ref 3.8–10.5)
WBC # FLD AUTO: 9.87 K/UL — SIGNIFICANT CHANGE UP (ref 3.8–10.5)

## 2024-12-11 PROCEDURE — 99214 OFFICE O/P EST MOD 30 MIN: CPT

## 2024-12-12 DIAGNOSIS — Z51.11 ENCOUNTER FOR ANTINEOPLASTIC CHEMOTHERAPY: ICD-10-CM

## 2025-01-08 ENCOUNTER — RESULT REVIEW (OUTPATIENT)
Age: 75
End: 2025-01-08

## 2025-01-08 ENCOUNTER — APPOINTMENT (OUTPATIENT)
Dept: HEMATOLOGY ONCOLOGY | Facility: CLINIC | Age: 75
End: 2025-01-08
Payer: MEDICARE

## 2025-01-08 ENCOUNTER — APPOINTMENT (OUTPATIENT)
Dept: INFUSION THERAPY | Facility: HOSPITAL | Age: 75
End: 2025-01-08

## 2025-01-08 VITALS
WEIGHT: 145.06 LBS | OXYGEN SATURATION: 99 % | DIASTOLIC BLOOD PRESSURE: 73 MMHG | TEMPERATURE: 96.8 F | HEART RATE: 56 BPM | RESPIRATION RATE: 16 BRPM | BODY MASS INDEX: 21.34 KG/M2 | SYSTOLIC BLOOD PRESSURE: 117 MMHG

## 2025-01-08 DIAGNOSIS — C64.2 MALIGNANT NEOPLASM OF LEFT KIDNEY, EXCEPT RENAL PELVIS: ICD-10-CM

## 2025-01-08 DIAGNOSIS — C78.00 SECONDARY MALIGNANT NEOPLASM OF UNSPECIFIED LUNG: ICD-10-CM

## 2025-01-08 DIAGNOSIS — Z85.89 PERSONAL HISTORY OF MALIGNANT NEOPLASM OF OTHER ORGANS AND SYSTEMS: ICD-10-CM

## 2025-01-08 DIAGNOSIS — Z29.89 ENCOUNTER. FOR OTHER SPECIFIED PROPHYLACTIC MEASURES: ICD-10-CM

## 2025-01-08 LAB
ALBUMIN SERPL ELPH-MCNC: 4 G/DL — SIGNIFICANT CHANGE UP (ref 3.3–5)
ALP SERPL-CCNC: 63 U/L — SIGNIFICANT CHANGE UP (ref 40–120)
ALT FLD-CCNC: 16 U/L — SIGNIFICANT CHANGE UP (ref 10–45)
ANION GAP SERPL CALC-SCNC: 12 MMOL/L — SIGNIFICANT CHANGE UP (ref 5–17)
AST SERPL-CCNC: 26 U/L — SIGNIFICANT CHANGE UP (ref 10–40)
BASOPHILS # BLD AUTO: 0.04 K/UL — SIGNIFICANT CHANGE UP (ref 0–0.2)
BASOPHILS NFR BLD AUTO: 0.4 % — SIGNIFICANT CHANGE UP (ref 0–2)
BILIRUB SERPL-MCNC: 0.8 MG/DL — SIGNIFICANT CHANGE UP (ref 0.2–1.2)
BUN SERPL-MCNC: 22 MG/DL — SIGNIFICANT CHANGE UP (ref 7–23)
CALCIUM SERPL-MCNC: 9.7 MG/DL — SIGNIFICANT CHANGE UP (ref 8.4–10.5)
CHLORIDE SERPL-SCNC: 104 MMOL/L — SIGNIFICANT CHANGE UP (ref 96–108)
CO2 SERPL-SCNC: 22 MMOL/L — SIGNIFICANT CHANGE UP (ref 22–31)
CREAT SERPL-MCNC: 1.33 MG/DL — HIGH (ref 0.5–1.3)
EGFR: 56 ML/MIN/1.73M2 — LOW
EOSINOPHIL # BLD AUTO: 0.29 K/UL — SIGNIFICANT CHANGE UP (ref 0–0.5)
EOSINOPHIL NFR BLD AUTO: 2.6 % — SIGNIFICANT CHANGE UP (ref 0–6)
GLUCOSE SERPL-MCNC: 104 MG/DL — HIGH (ref 70–99)
HCT VFR BLD CALC: 37.8 % — LOW (ref 39–50)
HGB BLD-MCNC: 13.2 G/DL — SIGNIFICANT CHANGE UP (ref 13–17)
IMM GRANULOCYTES NFR BLD AUTO: 0.4 % — SIGNIFICANT CHANGE UP (ref 0–0.9)
LYMPHOCYTES # BLD AUTO: 1.58 K/UL — SIGNIFICANT CHANGE UP (ref 1–3.3)
LYMPHOCYTES # BLD AUTO: 14.2 % — SIGNIFICANT CHANGE UP (ref 13–44)
MCHC RBC-ENTMCNC: 31.8 PG — SIGNIFICANT CHANGE UP (ref 27–34)
MCHC RBC-ENTMCNC: 34.9 G/DL — SIGNIFICANT CHANGE UP (ref 32–36)
MCV RBC AUTO: 91.1 FL — SIGNIFICANT CHANGE UP (ref 80–100)
MONOCYTES # BLD AUTO: 0.64 K/UL — SIGNIFICANT CHANGE UP (ref 0–0.9)
MONOCYTES NFR BLD AUTO: 5.8 % — SIGNIFICANT CHANGE UP (ref 2–14)
NEUTROPHILS # BLD AUTO: 8.53 K/UL — HIGH (ref 1.8–7.4)
NEUTROPHILS NFR BLD AUTO: 76.6 % — SIGNIFICANT CHANGE UP (ref 43–77)
NRBC # BLD: 0 /100 WBCS — SIGNIFICANT CHANGE UP (ref 0–0)
NRBC BLD-RTO: 0 /100 WBCS — SIGNIFICANT CHANGE UP (ref 0–0)
PLATELET # BLD AUTO: 165 K/UL — SIGNIFICANT CHANGE UP (ref 150–400)
POTASSIUM SERPL-MCNC: 4.2 MMOL/L — SIGNIFICANT CHANGE UP (ref 3.5–5.3)
POTASSIUM SERPL-SCNC: 4.2 MMOL/L — SIGNIFICANT CHANGE UP (ref 3.5–5.3)
PROT SERPL-MCNC: 7.2 G/DL — SIGNIFICANT CHANGE UP (ref 6–8.3)
RBC # BLD: 4.15 M/UL — LOW (ref 4.2–5.8)
RBC # FLD: 12.5 % — SIGNIFICANT CHANGE UP (ref 10.3–14.5)
SODIUM SERPL-SCNC: 139 MMOL/L — SIGNIFICANT CHANGE UP (ref 135–145)
WBC # BLD: 11.12 K/UL — HIGH (ref 3.8–10.5)
WBC # FLD AUTO: 11.12 K/UL — HIGH (ref 3.8–10.5)

## 2025-01-08 PROCEDURE — 99214 OFFICE O/P EST MOD 30 MIN: CPT

## 2025-01-09 LAB
T4 FREE SERPL-MCNC: 1.2 NG/DL — SIGNIFICANT CHANGE UP (ref 0.9–1.7)
TSH SERPL-MCNC: 1.51 UIU/ML — SIGNIFICANT CHANGE UP (ref 0.27–4.2)

## 2025-02-01 ENCOUNTER — APPOINTMENT (OUTPATIENT)
Dept: CT IMAGING | Facility: CLINIC | Age: 75
End: 2025-02-01

## 2025-02-01 ENCOUNTER — OUTPATIENT (OUTPATIENT)
Dept: OUTPATIENT SERVICES | Facility: HOSPITAL | Age: 75
LOS: 1 days | End: 2025-02-01
Payer: MEDICARE

## 2025-02-01 DIAGNOSIS — C78.00 SECONDARY MALIGNANT NEOPLASM OF UNSPECIFIED LUNG: ICD-10-CM

## 2025-02-01 DIAGNOSIS — C64.2 MALIGNANT NEOPLASM OF LEFT KIDNEY, EXCEPT RENAL PELVIS: ICD-10-CM

## 2025-02-01 DIAGNOSIS — Z98.890 OTHER SPECIFIED POSTPROCEDURAL STATES: Chronic | ICD-10-CM

## 2025-02-01 DIAGNOSIS — S86.019A STRAIN OF UNSPECIFIED ACHILLES TENDON, INITIAL ENCOUNTER: Chronic | ICD-10-CM

## 2025-02-01 PROCEDURE — 71260 CT THORAX DX C+: CPT | Mod: 26

## 2025-02-01 PROCEDURE — 74178 CT ABD&PLV WO CNTR FLWD CNTR: CPT | Mod: 26

## 2025-02-03 ENCOUNTER — OUTPATIENT (OUTPATIENT)
Dept: OUTPATIENT SERVICES | Facility: HOSPITAL | Age: 75
LOS: 1 days | Discharge: HOME | End: 2025-02-03

## 2025-02-03 DIAGNOSIS — C64.9 MALIGNANT NEOPLASM OF UNSPECIFIED KIDNEY, EXCEPT RENAL PELVIS: ICD-10-CM

## 2025-02-03 DIAGNOSIS — S86.019A STRAIN OF UNSPECIFIED ACHILLES TENDON, INITIAL ENCOUNTER: Chronic | ICD-10-CM

## 2025-02-03 DIAGNOSIS — Z98.890 OTHER SPECIFIED POSTPROCEDURAL STATES: Chronic | ICD-10-CM

## 2025-02-05 ENCOUNTER — RESULT REVIEW (OUTPATIENT)
Age: 75
End: 2025-02-05

## 2025-02-05 ENCOUNTER — APPOINTMENT (OUTPATIENT)
Dept: INFUSION THERAPY | Facility: HOSPITAL | Age: 75
End: 2025-02-05

## 2025-02-05 ENCOUNTER — APPOINTMENT (OUTPATIENT)
Dept: HEMATOLOGY ONCOLOGY | Facility: CLINIC | Age: 75
End: 2025-02-05
Payer: MEDICARE

## 2025-02-05 ENCOUNTER — NON-APPOINTMENT (OUTPATIENT)
Age: 75
End: 2025-02-05

## 2025-02-05 VITALS
HEART RATE: 51 BPM | HEIGHT: 70 IN | OXYGEN SATURATION: 99 % | SYSTOLIC BLOOD PRESSURE: 113 MMHG | TEMPERATURE: 97.2 F | DIASTOLIC BLOOD PRESSURE: 72 MMHG | RESPIRATION RATE: 16 BRPM | WEIGHT: 148.81 LBS | BODY MASS INDEX: 21.3 KG/M2

## 2025-02-05 DIAGNOSIS — Z29.89 ENCOUNTER. FOR OTHER SPECIFIED PROPHYLACTIC MEASURES: ICD-10-CM

## 2025-02-05 DIAGNOSIS — C78.00 SECONDARY MALIGNANT NEOPLASM OF UNSPECIFIED LUNG: ICD-10-CM

## 2025-02-05 DIAGNOSIS — C64.2 MALIGNANT NEOPLASM OF LEFT KIDNEY, EXCEPT RENAL PELVIS: ICD-10-CM

## 2025-02-05 LAB
ALBUMIN SERPL ELPH-MCNC: 4 G/DL — SIGNIFICANT CHANGE UP (ref 3.3–5)
ALP SERPL-CCNC: 61 U/L — SIGNIFICANT CHANGE UP (ref 40–120)
ALT FLD-CCNC: 16 U/L — SIGNIFICANT CHANGE UP (ref 10–45)
ANION GAP SERPL CALC-SCNC: 12 MMOL/L — SIGNIFICANT CHANGE UP (ref 5–17)
AST SERPL-CCNC: 24 U/L — SIGNIFICANT CHANGE UP (ref 10–40)
BASOPHILS # BLD AUTO: 0.04 K/UL — SIGNIFICANT CHANGE UP (ref 0–0.2)
BASOPHILS NFR BLD AUTO: 0.5 % — SIGNIFICANT CHANGE UP (ref 0–2)
BILIRUB SERPL-MCNC: 0.7 MG/DL — SIGNIFICANT CHANGE UP (ref 0.2–1.2)
BUN SERPL-MCNC: 18 MG/DL — SIGNIFICANT CHANGE UP (ref 7–23)
CALCIUM SERPL-MCNC: 9.6 MG/DL — SIGNIFICANT CHANGE UP (ref 8.4–10.5)
CHLORIDE SERPL-SCNC: 104 MMOL/L — SIGNIFICANT CHANGE UP (ref 96–108)
CO2 SERPL-SCNC: 24 MMOL/L — SIGNIFICANT CHANGE UP (ref 22–31)
CREAT SERPL-MCNC: 1.38 MG/DL — HIGH (ref 0.5–1.3)
EGFR: 54 ML/MIN/1.73M2 — LOW
EGFR: 54 ML/MIN/1.73M2 — LOW
EOSINOPHIL # BLD AUTO: 0.27 K/UL — SIGNIFICANT CHANGE UP (ref 0–0.5)
EOSINOPHIL NFR BLD AUTO: 3.2 % — SIGNIFICANT CHANGE UP (ref 0–6)
GLUCOSE SERPL-MCNC: 84 MG/DL — SIGNIFICANT CHANGE UP (ref 70–99)
HCT VFR BLD CALC: 37.1 % — LOW (ref 39–50)
HGB BLD-MCNC: 13.2 G/DL — SIGNIFICANT CHANGE UP (ref 13–17)
IMM GRANULOCYTES NFR BLD AUTO: 0.4 % — SIGNIFICANT CHANGE UP (ref 0–0.9)
LYMPHOCYTES # BLD AUTO: 1.37 K/UL — SIGNIFICANT CHANGE UP (ref 1–3.3)
LYMPHOCYTES # BLD AUTO: 16.1 % — SIGNIFICANT CHANGE UP (ref 13–44)
MCHC RBC-ENTMCNC: 32.7 PG — SIGNIFICANT CHANGE UP (ref 27–34)
MCHC RBC-ENTMCNC: 35.6 G/DL — SIGNIFICANT CHANGE UP (ref 32–36)
MCV RBC AUTO: 91.8 FL — SIGNIFICANT CHANGE UP (ref 80–100)
MONOCYTES # BLD AUTO: 0.61 K/UL — SIGNIFICANT CHANGE UP (ref 0–0.9)
MONOCYTES NFR BLD AUTO: 7.2 % — SIGNIFICANT CHANGE UP (ref 2–14)
NEUTROPHILS # BLD AUTO: 6.2 K/UL — SIGNIFICANT CHANGE UP (ref 1.8–7.4)
NEUTROPHILS NFR BLD AUTO: 72.6 % — SIGNIFICANT CHANGE UP (ref 43–77)
NRBC # BLD: 0 /100 WBCS — SIGNIFICANT CHANGE UP (ref 0–0)
NRBC BLD-RTO: 0 /100 WBCS — SIGNIFICANT CHANGE UP (ref 0–0)
PLATELET # BLD AUTO: 182 K/UL — SIGNIFICANT CHANGE UP (ref 150–400)
POTASSIUM SERPL-MCNC: 4.6 MMOL/L — SIGNIFICANT CHANGE UP (ref 3.5–5.3)
POTASSIUM SERPL-SCNC: 4.6 MMOL/L — SIGNIFICANT CHANGE UP (ref 3.5–5.3)
PROT SERPL-MCNC: 7.2 G/DL — SIGNIFICANT CHANGE UP (ref 6–8.3)
RBC # BLD: 4.04 M/UL — LOW (ref 4.2–5.8)
RBC # FLD: 12.7 % — SIGNIFICANT CHANGE UP (ref 10.3–14.5)
SODIUM SERPL-SCNC: 139 MMOL/L — SIGNIFICANT CHANGE UP (ref 135–145)
WBC # BLD: 8.52 K/UL — SIGNIFICANT CHANGE UP (ref 3.8–10.5)
WBC # FLD AUTO: 8.52 K/UL — SIGNIFICANT CHANGE UP (ref 3.8–10.5)

## 2025-02-05 PROCEDURE — G2211 COMPLEX E/M VISIT ADD ON: CPT

## 2025-02-05 PROCEDURE — 99214 OFFICE O/P EST MOD 30 MIN: CPT

## 2025-02-06 DIAGNOSIS — Z51.11 ENCOUNTER FOR ANTINEOPLASTIC CHEMOTHERAPY: ICD-10-CM

## 2025-02-06 LAB
T4 FREE SERPL-MCNC: 1.1 NG/DL — SIGNIFICANT CHANGE UP (ref 0.9–1.7)
TSH SERPL-MCNC: 2.62 UIU/ML — SIGNIFICANT CHANGE UP (ref 0.27–4.2)

## 2025-03-05 ENCOUNTER — APPOINTMENT (OUTPATIENT)
Dept: HEMATOLOGY ONCOLOGY | Facility: CLINIC | Age: 75
End: 2025-03-05
Payer: MEDICARE

## 2025-03-05 ENCOUNTER — RESULT REVIEW (OUTPATIENT)
Age: 75
End: 2025-03-05

## 2025-03-05 ENCOUNTER — APPOINTMENT (OUTPATIENT)
Dept: INFUSION THERAPY | Facility: HOSPITAL | Age: 75
End: 2025-03-05

## 2025-03-05 VITALS
HEART RATE: 57 BPM | BODY MASS INDEX: 21.19 KG/M2 | RESPIRATION RATE: 17 BRPM | OXYGEN SATURATION: 99 % | SYSTOLIC BLOOD PRESSURE: 124 MMHG | WEIGHT: 147.71 LBS | TEMPERATURE: 97.3 F | DIASTOLIC BLOOD PRESSURE: 76 MMHG

## 2025-03-05 DIAGNOSIS — Z29.89 ENCOUNTER. FOR OTHER SPECIFIED PROPHYLACTIC MEASURES: ICD-10-CM

## 2025-03-05 DIAGNOSIS — C64.2 MALIGNANT NEOPLASM OF LEFT KIDNEY, EXCEPT RENAL PELVIS: ICD-10-CM

## 2025-03-05 DIAGNOSIS — C78.00 SECONDARY MALIGNANT NEOPLASM OF UNSPECIFIED LUNG: ICD-10-CM

## 2025-03-05 LAB
ALBUMIN SERPL ELPH-MCNC: 3.8 G/DL — SIGNIFICANT CHANGE UP (ref 3.3–5)
ALP SERPL-CCNC: 56 U/L — SIGNIFICANT CHANGE UP (ref 40–120)
ALT FLD-CCNC: 14 U/L — SIGNIFICANT CHANGE UP (ref 10–45)
ANION GAP SERPL CALC-SCNC: 7 MMOL/L — SIGNIFICANT CHANGE UP (ref 5–17)
AST SERPL-CCNC: 22 U/L — SIGNIFICANT CHANGE UP (ref 10–40)
BASOPHILS # BLD AUTO: 0.03 K/UL — SIGNIFICANT CHANGE UP (ref 0–0.2)
BASOPHILS NFR BLD AUTO: 0.3 % — SIGNIFICANT CHANGE UP (ref 0–2)
BILIRUB SERPL-MCNC: 0.5 MG/DL — SIGNIFICANT CHANGE UP (ref 0.2–1.2)
BUN SERPL-MCNC: 18 MG/DL — SIGNIFICANT CHANGE UP (ref 7–23)
CALCIUM SERPL-MCNC: 9.2 MG/DL — SIGNIFICANT CHANGE UP (ref 8.4–10.5)
CHLORIDE SERPL-SCNC: 105 MMOL/L — SIGNIFICANT CHANGE UP (ref 96–108)
CO2 SERPL-SCNC: 28 MMOL/L — SIGNIFICANT CHANGE UP (ref 22–31)
CREAT SERPL-MCNC: 1.27 MG/DL — SIGNIFICANT CHANGE UP (ref 0.5–1.3)
EGFR: 60 ML/MIN/1.73M2 — SIGNIFICANT CHANGE UP
EGFR: 60 ML/MIN/1.73M2 — SIGNIFICANT CHANGE UP
EOSINOPHIL # BLD AUTO: 0.29 K/UL — SIGNIFICANT CHANGE UP (ref 0–0.5)
EOSINOPHIL NFR BLD AUTO: 3.2 % — SIGNIFICANT CHANGE UP (ref 0–6)
GLUCOSE SERPL-MCNC: 132 MG/DL — HIGH (ref 70–99)
HCT VFR BLD CALC: 36.8 % — LOW (ref 39–50)
HGB BLD-MCNC: 13 G/DL — SIGNIFICANT CHANGE UP (ref 13–17)
IMM GRANULOCYTES NFR BLD AUTO: 0.5 % — SIGNIFICANT CHANGE UP (ref 0–0.9)
LYMPHOCYTES # BLD AUTO: 1.38 K/UL — SIGNIFICANT CHANGE UP (ref 1–3.3)
LYMPHOCYTES # BLD AUTO: 15.1 % — SIGNIFICANT CHANGE UP (ref 13–44)
MCHC RBC-ENTMCNC: 32 PG — SIGNIFICANT CHANGE UP (ref 27–34)
MCHC RBC-ENTMCNC: 35.3 G/DL — SIGNIFICANT CHANGE UP (ref 32–36)
MCV RBC AUTO: 90.6 FL — SIGNIFICANT CHANGE UP (ref 80–100)
MONOCYTES # BLD AUTO: 0.63 K/UL — SIGNIFICANT CHANGE UP (ref 0–0.9)
MONOCYTES NFR BLD AUTO: 6.9 % — SIGNIFICANT CHANGE UP (ref 2–14)
NEUTROPHILS # BLD AUTO: 6.77 K/UL — SIGNIFICANT CHANGE UP (ref 1.8–7.4)
NEUTROPHILS NFR BLD AUTO: 74 % — SIGNIFICANT CHANGE UP (ref 43–77)
NRBC BLD AUTO-RTO: 0 /100 WBCS — SIGNIFICANT CHANGE UP (ref 0–0)
PLATELET # BLD AUTO: 176 K/UL — SIGNIFICANT CHANGE UP (ref 150–400)
POTASSIUM SERPL-MCNC: 4.6 MMOL/L — SIGNIFICANT CHANGE UP (ref 3.5–5.3)
POTASSIUM SERPL-SCNC: 4.6 MMOL/L — SIGNIFICANT CHANGE UP (ref 3.5–5.3)
PROT SERPL-MCNC: 6.9 G/DL — SIGNIFICANT CHANGE UP (ref 6–8.3)
RBC # BLD: 4.06 M/UL — LOW (ref 4.2–5.8)
RBC # FLD: 12.5 % — SIGNIFICANT CHANGE UP (ref 10.3–14.5)
SODIUM SERPL-SCNC: 140 MMOL/L — SIGNIFICANT CHANGE UP (ref 135–145)
T4 FREE SERPL-MCNC: 1.2 NG/DL — SIGNIFICANT CHANGE UP (ref 0.9–1.7)
TSH SERPL-MCNC: 1.91 UIU/ML — SIGNIFICANT CHANGE UP (ref 0.27–4.2)
WBC # BLD: 9.15 K/UL — SIGNIFICANT CHANGE UP (ref 3.8–10.5)
WBC # FLD AUTO: 9.15 K/UL — SIGNIFICANT CHANGE UP (ref 3.8–10.5)

## 2025-03-05 PROCEDURE — G2211 COMPLEX E/M VISIT ADD ON: CPT

## 2025-03-05 PROCEDURE — 99214 OFFICE O/P EST MOD 30 MIN: CPT

## 2025-04-02 ENCOUNTER — RESULT REVIEW (OUTPATIENT)
Age: 75
End: 2025-04-02

## 2025-04-02 ENCOUNTER — APPOINTMENT (OUTPATIENT)
Dept: HEMATOLOGY ONCOLOGY | Facility: CLINIC | Age: 75
End: 2025-04-02
Payer: MEDICARE

## 2025-04-02 ENCOUNTER — APPOINTMENT (OUTPATIENT)
Dept: INFUSION THERAPY | Facility: HOSPITAL | Age: 75
End: 2025-04-02

## 2025-04-02 VITALS
RESPIRATION RATE: 17 BRPM | OXYGEN SATURATION: 98 % | BODY MASS INDEX: 21.08 KG/M2 | HEART RATE: 52 BPM | DIASTOLIC BLOOD PRESSURE: 62 MMHG | HEIGHT: 70 IN | SYSTOLIC BLOOD PRESSURE: 117 MMHG | TEMPERATURE: 97 F | WEIGHT: 147.27 LBS

## 2025-04-02 DIAGNOSIS — C78.00 SECONDARY MALIGNANT NEOPLASM OF UNSPECIFIED LUNG: ICD-10-CM

## 2025-04-02 DIAGNOSIS — C64.2 MALIGNANT NEOPLASM OF LEFT KIDNEY, EXCEPT RENAL PELVIS: ICD-10-CM

## 2025-04-02 DIAGNOSIS — Z29.89 ENCOUNTER. FOR OTHER SPECIFIED PROPHYLACTIC MEASURES: ICD-10-CM

## 2025-04-02 LAB
ALBUMIN SERPL ELPH-MCNC: 4 G/DL — SIGNIFICANT CHANGE UP (ref 3.3–5)
ALP SERPL-CCNC: 62 U/L — SIGNIFICANT CHANGE UP (ref 40–120)
ALT FLD-CCNC: 15 U/L — SIGNIFICANT CHANGE UP (ref 10–45)
ANION GAP SERPL CALC-SCNC: 8 MMOL/L — SIGNIFICANT CHANGE UP (ref 5–17)
AST SERPL-CCNC: 24 U/L — SIGNIFICANT CHANGE UP (ref 10–40)
BASOPHILS # BLD AUTO: 0.04 K/UL — SIGNIFICANT CHANGE UP (ref 0–0.2)
BASOPHILS NFR BLD AUTO: 0.6 % — SIGNIFICANT CHANGE UP (ref 0–2)
BILIRUB SERPL-MCNC: 0.6 MG/DL — SIGNIFICANT CHANGE UP (ref 0.2–1.2)
BUN SERPL-MCNC: 18 MG/DL — SIGNIFICANT CHANGE UP (ref 7–23)
CALCIUM SERPL-MCNC: 9.4 MG/DL — SIGNIFICANT CHANGE UP (ref 8.4–10.5)
CHLORIDE SERPL-SCNC: 105 MMOL/L — SIGNIFICANT CHANGE UP (ref 96–108)
CO2 SERPL-SCNC: 27 MMOL/L — SIGNIFICANT CHANGE UP (ref 22–31)
CREAT SERPL-MCNC: 1.17 MG/DL — SIGNIFICANT CHANGE UP (ref 0.5–1.3)
EGFR: 66 ML/MIN/1.73M2 — SIGNIFICANT CHANGE UP
EGFR: 66 ML/MIN/1.73M2 — SIGNIFICANT CHANGE UP
EOSINOPHIL # BLD AUTO: 0.23 K/UL — SIGNIFICANT CHANGE UP (ref 0–0.5)
EOSINOPHIL NFR BLD AUTO: 3.2 % — SIGNIFICANT CHANGE UP (ref 0–6)
GLUCOSE SERPL-MCNC: 91 MG/DL — SIGNIFICANT CHANGE UP (ref 70–99)
HCT VFR BLD CALC: 35.7 % — LOW (ref 39–50)
HGB BLD-MCNC: 12.6 G/DL — LOW (ref 13–17)
IMM GRANULOCYTES NFR BLD AUTO: 0.4 % — SIGNIFICANT CHANGE UP (ref 0–0.9)
LYMPHOCYTES # BLD AUTO: 1.54 K/UL — SIGNIFICANT CHANGE UP (ref 1–3.3)
LYMPHOCYTES # BLD AUTO: 21.3 % — SIGNIFICANT CHANGE UP (ref 13–44)
MCHC RBC-ENTMCNC: 32.2 PG — SIGNIFICANT CHANGE UP (ref 27–34)
MCHC RBC-ENTMCNC: 35.3 G/DL — SIGNIFICANT CHANGE UP (ref 32–36)
MCV RBC AUTO: 91.3 FL — SIGNIFICANT CHANGE UP (ref 80–100)
MONOCYTES # BLD AUTO: 0.5 K/UL — SIGNIFICANT CHANGE UP (ref 0–0.9)
MONOCYTES NFR BLD AUTO: 6.9 % — SIGNIFICANT CHANGE UP (ref 2–14)
NEUTROPHILS # BLD AUTO: 4.89 K/UL — SIGNIFICANT CHANGE UP (ref 1.8–7.4)
NEUTROPHILS NFR BLD AUTO: 67.6 % — SIGNIFICANT CHANGE UP (ref 43–77)
NRBC BLD AUTO-RTO: 0 /100 WBCS — SIGNIFICANT CHANGE UP (ref 0–0)
PLATELET # BLD AUTO: 197 K/UL — SIGNIFICANT CHANGE UP (ref 150–400)
POTASSIUM SERPL-MCNC: 4.4 MMOL/L — SIGNIFICANT CHANGE UP (ref 3.5–5.3)
POTASSIUM SERPL-SCNC: 4.4 MMOL/L — SIGNIFICANT CHANGE UP (ref 3.5–5.3)
PROT SERPL-MCNC: 7.3 G/DL — SIGNIFICANT CHANGE UP (ref 6–8.3)
RBC # BLD: 3.91 M/UL — LOW (ref 4.2–5.8)
RBC # FLD: 12.6 % — SIGNIFICANT CHANGE UP (ref 10.3–14.5)
SODIUM SERPL-SCNC: 140 MMOL/L — SIGNIFICANT CHANGE UP (ref 135–145)
WBC # BLD: 7.23 K/UL — SIGNIFICANT CHANGE UP (ref 3.8–10.5)
WBC # FLD AUTO: 7.23 K/UL — SIGNIFICANT CHANGE UP (ref 3.8–10.5)

## 2025-04-02 PROCEDURE — G2211 COMPLEX E/M VISIT ADD ON: CPT

## 2025-04-02 PROCEDURE — 99214 OFFICE O/P EST MOD 30 MIN: CPT

## 2025-04-03 LAB
T4 FREE SERPL-MCNC: 1.1 NG/DL — SIGNIFICANT CHANGE UP (ref 0.9–1.7)
TSH SERPL-MCNC: 1.37 UIU/ML — SIGNIFICANT CHANGE UP (ref 0.27–4.2)

## 2025-04-26 ENCOUNTER — OUTPATIENT (OUTPATIENT)
Dept: OUTPATIENT SERVICES | Facility: HOSPITAL | Age: 75
LOS: 1 days | Discharge: HOME | End: 2025-04-26

## 2025-04-26 DIAGNOSIS — Z98.890 OTHER SPECIFIED POSTPROCEDURAL STATES: Chronic | ICD-10-CM

## 2025-04-26 DIAGNOSIS — C64.9 MALIGNANT NEOPLASM OF UNSPECIFIED KIDNEY, EXCEPT RENAL PELVIS: ICD-10-CM

## 2025-04-26 DIAGNOSIS — S86.019A STRAIN OF UNSPECIFIED ACHILLES TENDON, INITIAL ENCOUNTER: Chronic | ICD-10-CM

## 2025-04-30 ENCOUNTER — APPOINTMENT (OUTPATIENT)
Dept: INFUSION THERAPY | Facility: HOSPITAL | Age: 75
End: 2025-04-30

## 2025-04-30 ENCOUNTER — RESULT REVIEW (OUTPATIENT)
Age: 75
End: 2025-04-30

## 2025-04-30 ENCOUNTER — APPOINTMENT (OUTPATIENT)
Dept: HEMATOLOGY ONCOLOGY | Facility: CLINIC | Age: 75
End: 2025-04-30
Payer: MEDICARE

## 2025-04-30 VITALS
OXYGEN SATURATION: 99 % | DIASTOLIC BLOOD PRESSURE: 77 MMHG | RESPIRATION RATE: 16 BRPM | BODY MASS INDEX: 21.26 KG/M2 | TEMPERATURE: 97.1 F | WEIGHT: 148.15 LBS | SYSTOLIC BLOOD PRESSURE: 130 MMHG | HEART RATE: 57 BPM

## 2025-04-30 DIAGNOSIS — C64.2 MALIGNANT NEOPLASM OF LEFT KIDNEY, EXCEPT RENAL PELVIS: ICD-10-CM

## 2025-04-30 DIAGNOSIS — Z51.11 ENCOUNTER FOR ANTINEOPLASTIC CHEMOTHERAPY: ICD-10-CM

## 2025-04-30 DIAGNOSIS — Z29.89 ENCOUNTER. FOR OTHER SPECIFIED PROPHYLACTIC MEASURES: ICD-10-CM

## 2025-04-30 LAB
ALBUMIN SERPL ELPH-MCNC: 4.2 G/DL — SIGNIFICANT CHANGE UP (ref 3.3–5)
ALP SERPL-CCNC: 62 U/L — SIGNIFICANT CHANGE UP (ref 40–120)
ALT FLD-CCNC: 17 U/L — SIGNIFICANT CHANGE UP (ref 10–45)
ANION GAP SERPL CALC-SCNC: 11 MMOL/L — SIGNIFICANT CHANGE UP (ref 5–17)
AST SERPL-CCNC: 25 U/L — SIGNIFICANT CHANGE UP (ref 10–40)
BASOPHILS # BLD AUTO: 0.03 K/UL — SIGNIFICANT CHANGE UP (ref 0–0.2)
BASOPHILS NFR BLD AUTO: 0.4 % — SIGNIFICANT CHANGE UP (ref 0–2)
BILIRUB SERPL-MCNC: 0.7 MG/DL — SIGNIFICANT CHANGE UP (ref 0.2–1.2)
BUN SERPL-MCNC: 24 MG/DL — HIGH (ref 7–23)
CALCIUM SERPL-MCNC: 9.4 MG/DL — SIGNIFICANT CHANGE UP (ref 8.4–10.5)
CHLORIDE SERPL-SCNC: 106 MMOL/L — SIGNIFICANT CHANGE UP (ref 96–108)
CO2 SERPL-SCNC: 24 MMOL/L — SIGNIFICANT CHANGE UP (ref 22–31)
CREAT SERPL-MCNC: 1.27 MG/DL — SIGNIFICANT CHANGE UP (ref 0.5–1.3)
EGFR: 60 ML/MIN/1.73M2 — SIGNIFICANT CHANGE UP
EGFR: 60 ML/MIN/1.73M2 — SIGNIFICANT CHANGE UP
EOSINOPHIL # BLD AUTO: 0.4 K/UL — SIGNIFICANT CHANGE UP (ref 0–0.5)
EOSINOPHIL NFR BLD AUTO: 5.3 % — SIGNIFICANT CHANGE UP (ref 0–6)
GLUCOSE SERPL-MCNC: 92 MG/DL — SIGNIFICANT CHANGE UP (ref 70–99)
HCT VFR BLD CALC: 35.8 % — LOW (ref 39–50)
HGB BLD-MCNC: 12.5 G/DL — LOW (ref 13–17)
IMM GRANULOCYTES NFR BLD AUTO: 0.4 % — SIGNIFICANT CHANGE UP (ref 0–0.9)
LYMPHOCYTES # BLD AUTO: 1.2 K/UL — SIGNIFICANT CHANGE UP (ref 1–3.3)
LYMPHOCYTES # BLD AUTO: 15.9 % — SIGNIFICANT CHANGE UP (ref 13–44)
MCHC RBC-ENTMCNC: 32.2 PG — SIGNIFICANT CHANGE UP (ref 27–34)
MCHC RBC-ENTMCNC: 34.9 G/DL — SIGNIFICANT CHANGE UP (ref 32–36)
MCV RBC AUTO: 92.3 FL — SIGNIFICANT CHANGE UP (ref 80–100)
MONOCYTES # BLD AUTO: 0.63 K/UL — SIGNIFICANT CHANGE UP (ref 0–0.9)
MONOCYTES NFR BLD AUTO: 8.3 % — SIGNIFICANT CHANGE UP (ref 2–14)
NEUTROPHILS # BLD AUTO: 5.26 K/UL — SIGNIFICANT CHANGE UP (ref 1.8–7.4)
NEUTROPHILS NFR BLD AUTO: 69.7 % — SIGNIFICANT CHANGE UP (ref 43–77)
NRBC BLD AUTO-RTO: 0 /100 WBCS — SIGNIFICANT CHANGE UP (ref 0–0)
PLATELET # BLD AUTO: 182 K/UL — SIGNIFICANT CHANGE UP (ref 150–400)
POTASSIUM SERPL-MCNC: 4.5 MMOL/L — SIGNIFICANT CHANGE UP (ref 3.5–5.3)
POTASSIUM SERPL-SCNC: 4.5 MMOL/L — SIGNIFICANT CHANGE UP (ref 3.5–5.3)
PROT SERPL-MCNC: 7 G/DL — SIGNIFICANT CHANGE UP (ref 6–8.3)
RBC # BLD: 3.88 M/UL — LOW (ref 4.2–5.8)
RBC # FLD: 12.9 % — SIGNIFICANT CHANGE UP (ref 10.3–14.5)
SODIUM SERPL-SCNC: 140 MMOL/L — SIGNIFICANT CHANGE UP (ref 135–145)
T4 FREE SERPL-MCNC: 1.1 NG/DL — SIGNIFICANT CHANGE UP (ref 0.9–1.8)
TSH SERPL-MCNC: 1.36 UIU/ML — SIGNIFICANT CHANGE UP (ref 0.27–4.2)
WBC # BLD: 7.55 K/UL — SIGNIFICANT CHANGE UP (ref 3.8–10.5)
WBC # FLD AUTO: 7.55 K/UL — SIGNIFICANT CHANGE UP (ref 3.8–10.5)

## 2025-04-30 PROCEDURE — 99213 OFFICE O/P EST LOW 20 MIN: CPT

## 2025-04-30 PROCEDURE — G2211 COMPLEX E/M VISIT ADD ON: CPT

## 2025-05-16 ENCOUNTER — EMERGENCY (EMERGENCY)
Facility: HOSPITAL | Age: 75
LOS: 1 days | End: 2025-05-16
Attending: STUDENT IN AN ORGANIZED HEALTH CARE EDUCATION/TRAINING PROGRAM | Admitting: EMERGENCY MEDICINE
Payer: MEDICARE

## 2025-05-16 VITALS
SYSTOLIC BLOOD PRESSURE: 125 MMHG | HEART RATE: 70 BPM | OXYGEN SATURATION: 100 % | DIASTOLIC BLOOD PRESSURE: 75 MMHG | WEIGHT: 147.93 LBS | RESPIRATION RATE: 18 BRPM | HEIGHT: 67.72 IN

## 2025-05-16 VITALS
HEART RATE: 72 BPM | SYSTOLIC BLOOD PRESSURE: 117 MMHG | DIASTOLIC BLOOD PRESSURE: 74 MMHG | RESPIRATION RATE: 18 BRPM | TEMPERATURE: 98 F | OXYGEN SATURATION: 98 %

## 2025-05-16 DIAGNOSIS — S86.019A STRAIN OF UNSPECIFIED ACHILLES TENDON, INITIAL ENCOUNTER: Chronic | ICD-10-CM

## 2025-05-16 DIAGNOSIS — Z98.890 OTHER SPECIFIED POSTPROCEDURAL STATES: Chronic | ICD-10-CM

## 2025-05-16 LAB
ALBUMIN SERPL ELPH-MCNC: 3.7 G/DL — SIGNIFICANT CHANGE UP (ref 3.3–5)
ALP SERPL-CCNC: 69 U/L — SIGNIFICANT CHANGE UP (ref 40–120)
ALT FLD-CCNC: 30 U/L — SIGNIFICANT CHANGE UP (ref 12–78)
ANION GAP SERPL CALC-SCNC: 8 MMOL/L — SIGNIFICANT CHANGE UP (ref 5–17)
AST SERPL-CCNC: 20 U/L — SIGNIFICANT CHANGE UP (ref 15–37)
BASOPHILS # BLD AUTO: 0.04 K/UL — SIGNIFICANT CHANGE UP (ref 0–0.2)
BASOPHILS NFR BLD AUTO: 0.3 % — SIGNIFICANT CHANGE UP (ref 0–2)
BILIRUB SERPL-MCNC: 1.3 MG/DL — HIGH (ref 0.2–1.2)
BUN SERPL-MCNC: 23 MG/DL — SIGNIFICANT CHANGE UP (ref 7–23)
CALCIUM SERPL-MCNC: 9.5 MG/DL — SIGNIFICANT CHANGE UP (ref 8.5–10.1)
CHLORIDE SERPL-SCNC: 110 MMOL/L — HIGH (ref 96–108)
CO2 SERPL-SCNC: 21 MMOL/L — LOW (ref 22–31)
CREAT SERPL-MCNC: 1.6 MG/DL — HIGH (ref 0.5–1.3)
EGFR: 45 ML/MIN/1.73M2 — LOW
EGFR: 45 ML/MIN/1.73M2 — LOW
EOSINOPHIL # BLD AUTO: 0.03 K/UL — SIGNIFICANT CHANGE UP (ref 0–0.5)
EOSINOPHIL NFR BLD AUTO: 0.2 % — SIGNIFICANT CHANGE UP (ref 0–6)
GLUCOSE SERPL-MCNC: 202 MG/DL — HIGH (ref 70–99)
HCT VFR BLD CALC: 36.9 % — LOW (ref 39–50)
HGB BLD-MCNC: 13.6 G/DL — SIGNIFICANT CHANGE UP (ref 13–17)
IMM GRANULOCYTES NFR BLD AUTO: 0.5 % — SIGNIFICANT CHANGE UP (ref 0–0.9)
LACTATE SERPL-SCNC: 1.3 MMOL/L — SIGNIFICANT CHANGE UP (ref 0.7–2)
LACTATE SERPL-SCNC: 3.8 MMOL/L — HIGH (ref 0.7–2)
LIDOCAIN IGE QN: 20 U/L — SIGNIFICANT CHANGE UP (ref 13–75)
LYMPHOCYTES # BLD AUTO: 1.06 K/UL — SIGNIFICANT CHANGE UP (ref 1–3.3)
LYMPHOCYTES # BLD AUTO: 7.1 % — LOW (ref 13–44)
MCHC RBC-ENTMCNC: 32.5 PG — SIGNIFICANT CHANGE UP (ref 27–34)
MCHC RBC-ENTMCNC: 36.9 G/DL — HIGH (ref 32–36)
MCV RBC AUTO: 88.1 FL — SIGNIFICANT CHANGE UP (ref 80–100)
MONOCYTES # BLD AUTO: 0.72 K/UL — SIGNIFICANT CHANGE UP (ref 0–0.9)
MONOCYTES NFR BLD AUTO: 4.8 % — SIGNIFICANT CHANGE UP (ref 2–14)
NEUTROPHILS # BLD AUTO: 12.94 K/UL — HIGH (ref 1.8–7.4)
NEUTROPHILS NFR BLD AUTO: 87.1 % — HIGH (ref 43–77)
NRBC BLD AUTO-RTO: 0 /100 WBCS — SIGNIFICANT CHANGE UP (ref 0–0)
PLATELET # BLD AUTO: 187 K/UL — SIGNIFICANT CHANGE UP (ref 150–400)
POTASSIUM SERPL-MCNC: 4 MMOL/L — SIGNIFICANT CHANGE UP (ref 3.5–5.3)
POTASSIUM SERPL-SCNC: 4 MMOL/L — SIGNIFICANT CHANGE UP (ref 3.5–5.3)
PROT SERPL-MCNC: 7.3 G/DL — SIGNIFICANT CHANGE UP (ref 6–8.3)
RBC # BLD: 4.19 M/UL — LOW (ref 4.2–5.8)
RBC # FLD: 12 % — SIGNIFICANT CHANGE UP (ref 10.3–14.5)
SODIUM SERPL-SCNC: 139 MMOL/L — SIGNIFICANT CHANGE UP (ref 135–145)
WBC # BLD: 14.86 K/UL — HIGH (ref 3.8–10.5)
WBC # FLD AUTO: 14.86 K/UL — HIGH (ref 3.8–10.5)

## 2025-05-16 PROCEDURE — 74177 CT ABD & PELVIS W/CONTRAST: CPT | Mod: 26

## 2025-05-16 PROCEDURE — 74177 CT ABD & PELVIS W/CONTRAST: CPT

## 2025-05-16 PROCEDURE — 96375 TX/PRO/DX INJ NEW DRUG ADDON: CPT

## 2025-05-16 PROCEDURE — 85025 COMPLETE CBC W/AUTO DIFF WBC: CPT

## 2025-05-16 PROCEDURE — 99284 EMERGENCY DEPT VISIT MOD MDM: CPT | Mod: 25

## 2025-05-16 PROCEDURE — 83690 ASSAY OF LIPASE: CPT

## 2025-05-16 PROCEDURE — 96374 THER/PROPH/DIAG INJ IV PUSH: CPT | Mod: XU

## 2025-05-16 PROCEDURE — 80053 COMPREHEN METABOLIC PANEL: CPT

## 2025-05-16 PROCEDURE — 99285 EMERGENCY DEPT VISIT HI MDM: CPT

## 2025-05-16 PROCEDURE — 83605 ASSAY OF LACTIC ACID: CPT

## 2025-05-16 PROCEDURE — 36415 COLL VENOUS BLD VENIPUNCTURE: CPT

## 2025-05-16 RX ORDER — ONDANSETRON HCL/PF 4 MG/2 ML
4 VIAL (ML) INJECTION ONCE
Refills: 0 | Status: COMPLETED | OUTPATIENT
Start: 2025-05-16 | End: 2025-05-16

## 2025-05-16 RX ORDER — ONDANSETRON HCL/PF 4 MG/2 ML
1 VIAL (ML) INJECTION
Qty: 30 | Refills: 0
Start: 2025-05-16

## 2025-05-16 RX ADMIN — Medication 4 MILLIGRAM(S): at 13:07

## 2025-05-16 RX ADMIN — Medication 1000 MILLILITER(S): at 13:06

## 2025-05-16 RX ADMIN — Medication 4 MILLIGRAM(S): at 13:13

## 2025-05-16 RX ADMIN — Medication 4 MILLIGRAM(S): at 13:35

## 2025-05-16 NOTE — ED PROVIDER NOTE - CLINICAL SUMMARY MEDICAL DECISION MAKING FREE TEXT BOX
74yo M ho metatstatic prostate ca pw diffuse abd pain, nausea, vomiting, diarrhea today. was here for simialr a few weeks ago. last chemo 2 weeks ago. no fevers or chills. has not taken anyhting for pain   will check labs, ct pain control

## 2025-05-16 NOTE — ED PROVIDER NOTE - OBJECTIVE STATEMENT
74yo M ho metatstatic prostate ca pw diffuse abd pain, nausea, vomiting, diarrhea today. was here for simialr a few weeks ago. last chemo 2 weeks ago. no fevers or chills. has not taken anyhting for pain  (pt reports prostate ca, notes say renal cell)

## 2025-05-16 NOTE — ED ADULT NURSE REASSESSMENT NOTE - NS ED NURSE REASSESS COMMENT FT1
1750:  patient is discharged.  iv removed, site benign.  the patient was upset that the discharge was taking too long, he needs to get home to watch the basketball game.  refused vitals outright.  paperwork given.  he ambulated out of the er in stable condition, no distress and with all of his belongings.  marcia hope.

## 2025-05-16 NOTE — ED PROVIDER NOTE - PATIENT PORTAL LINK FT
You can access the FollowMyHealth Patient Portal offered by [facility] by registering at the following website: [email or web address]. By joining [employer]’s FollowMyHealth portal, you will also be able to view your health information using other applications (apps) compatible with our system.

## 2025-05-16 NOTE — ED PROVIDER NOTE - PROGRESS NOTE DETAILS
Patient reevaluated, he tolerated 4 little bottles of water.  Is feeling well.  CAT scans and laboratory studies were reviewed at the bedside including repeat lactate, abnormal findings on the CAT scan were reviewed.  Patient follows with urology, GI, primary care.  He was counseled on clear liquid diet to follow-up with his primary care and GI neurologist for the abnormal findings.  There is no current evidence of infectious inflammatory process other than mild gastritis likely secondary to the chemotherapy the patient takes.  His last chemo was 2 and half weeks ago.  He is sitting up appears quite well and in no distress nontoxic-appearing.  Patient was agreeable to the discharge plan.  As well as his wife who is at the bedside to receive the instructions and copies of the test results.

## 2025-05-16 NOTE — ED ADULT NURSE NOTE - OBJECTIVE STATEMENT
Pt received in 3B, wife present at bedside. Pt is a&ox3, presents with abdominal pain, nausea, vomiting, diarrhea since this morning. PT has had this happen before and wife states that he was diagnosed with metastatic cancer. Pt has had multiple episodes of vomiting during interview, appears very uncomfortable due to pain. IV placed, labs drawn and sent per orders. Medicated per orders, iv fluids administered. PT awaiting more testing and results at this time. Care ongoing.

## 2025-05-16 NOTE — ED PROVIDER NOTE - PHYSICAL EXAMINATION
Gen: uncomfortable  Head: NC/AT  Neck: trachea midline  Resp:  No distress  abd soft nontender   Ext: no deformities  Neuro:  A&O appears non focal  Skin:  Warm and dry as visualized  Psych:  Normal affect and mood

## 2025-05-16 NOTE — ED PROVIDER NOTE - CARE PROVIDER_API CALL
Chencho Wong  Internal Medicine  [address], [address]  [place] [address]  Phone: [phone]  Fax: [phone]  Follow Up Time:

## 2025-05-28 ENCOUNTER — NON-APPOINTMENT (OUTPATIENT)
Age: 75
End: 2025-05-28

## 2025-05-28 ENCOUNTER — RESULT REVIEW (OUTPATIENT)
Age: 75
End: 2025-05-28

## 2025-05-28 ENCOUNTER — APPOINTMENT (OUTPATIENT)
Dept: INFUSION THERAPY | Facility: HOSPITAL | Age: 75
End: 2025-05-28

## 2025-05-28 ENCOUNTER — APPOINTMENT (OUTPATIENT)
Dept: HEMATOLOGY ONCOLOGY | Facility: CLINIC | Age: 75
End: 2025-05-28
Payer: MEDICARE

## 2025-05-28 VITALS
TEMPERATURE: 97.9 F | DIASTOLIC BLOOD PRESSURE: 75 MMHG | RESPIRATION RATE: 16 BRPM | OXYGEN SATURATION: 99 % | SYSTOLIC BLOOD PRESSURE: 125 MMHG | WEIGHT: 149.25 LBS | BODY MASS INDEX: 21.42 KG/M2 | HEART RATE: 53 BPM

## 2025-05-28 DIAGNOSIS — C64.2 MALIGNANT NEOPLASM OF LEFT KIDNEY, EXCEPT RENAL PELVIS: ICD-10-CM

## 2025-05-28 LAB
ALBUMIN SERPL ELPH-MCNC: 3.9 G/DL — SIGNIFICANT CHANGE UP (ref 3.3–5)
ALP SERPL-CCNC: 60 U/L — SIGNIFICANT CHANGE UP (ref 40–120)
ALT FLD-CCNC: 18 U/L — SIGNIFICANT CHANGE UP (ref 10–45)
ANION GAP SERPL CALC-SCNC: 10 MMOL/L — SIGNIFICANT CHANGE UP (ref 5–17)
AST SERPL-CCNC: 27 U/L — SIGNIFICANT CHANGE UP (ref 10–40)
BASOPHILS # BLD AUTO: 0.03 K/UL — SIGNIFICANT CHANGE UP (ref 0–0.2)
BASOPHILS NFR BLD AUTO: 0.3 % — SIGNIFICANT CHANGE UP (ref 0–2)
BILIRUB SERPL-MCNC: 0.8 MG/DL — SIGNIFICANT CHANGE UP (ref 0.2–1.2)
BUN SERPL-MCNC: 17 MG/DL — SIGNIFICANT CHANGE UP (ref 7–23)
CALCIUM SERPL-MCNC: 9.1 MG/DL — SIGNIFICANT CHANGE UP (ref 8.4–10.5)
CHLORIDE SERPL-SCNC: 104 MMOL/L — SIGNIFICANT CHANGE UP (ref 96–108)
CO2 SERPL-SCNC: 23 MMOL/L — SIGNIFICANT CHANGE UP (ref 22–31)
CREAT SERPL-MCNC: 1.24 MG/DL — SIGNIFICANT CHANGE UP (ref 0.5–1.3)
EGFR: 61 ML/MIN/1.73M2 — SIGNIFICANT CHANGE UP
EGFR: 61 ML/MIN/1.73M2 — SIGNIFICANT CHANGE UP
EOSINOPHIL # BLD AUTO: 0.39 K/UL — SIGNIFICANT CHANGE UP (ref 0–0.5)
EOSINOPHIL NFR BLD AUTO: 4.3 % — SIGNIFICANT CHANGE UP (ref 0–6)
GLUCOSE SERPL-MCNC: 106 MG/DL — HIGH (ref 70–99)
HCT VFR BLD CALC: 35.8 % — LOW (ref 39–50)
HGB BLD-MCNC: 12.6 G/DL — LOW (ref 13–17)
IMM GRANULOCYTES NFR BLD AUTO: 0.3 % — SIGNIFICANT CHANGE UP (ref 0–0.9)
LYMPHOCYTES # BLD AUTO: 1.2 K/UL — SIGNIFICANT CHANGE UP (ref 1–3.3)
LYMPHOCYTES # BLD AUTO: 13.3 % — SIGNIFICANT CHANGE UP (ref 13–44)
MCHC RBC-ENTMCNC: 32.4 PG — SIGNIFICANT CHANGE UP (ref 27–34)
MCHC RBC-ENTMCNC: 35.2 G/DL — SIGNIFICANT CHANGE UP (ref 32–36)
MCV RBC AUTO: 92 FL — SIGNIFICANT CHANGE UP (ref 80–100)
MONOCYTES # BLD AUTO: 0.62 K/UL — SIGNIFICANT CHANGE UP (ref 0–0.9)
MONOCYTES NFR BLD AUTO: 6.9 % — SIGNIFICANT CHANGE UP (ref 2–14)
NEUTROPHILS # BLD AUTO: 6.72 K/UL — SIGNIFICANT CHANGE UP (ref 1.8–7.4)
NEUTROPHILS NFR BLD AUTO: 74.9 % — SIGNIFICANT CHANGE UP (ref 43–77)
NRBC BLD AUTO-RTO: 0 /100 WBCS — SIGNIFICANT CHANGE UP (ref 0–0)
PLATELET # BLD AUTO: 167 K/UL — SIGNIFICANT CHANGE UP (ref 150–400)
POTASSIUM SERPL-MCNC: 4.3 MMOL/L — SIGNIFICANT CHANGE UP (ref 3.5–5.3)
POTASSIUM SERPL-SCNC: 4.3 MMOL/L — SIGNIFICANT CHANGE UP (ref 3.5–5.3)
PROT SERPL-MCNC: 6.7 G/DL — SIGNIFICANT CHANGE UP (ref 6–8.3)
RBC # BLD: 3.89 M/UL — LOW (ref 4.2–5.8)
RBC # FLD: 12.8 % — SIGNIFICANT CHANGE UP (ref 10.3–14.5)
SODIUM SERPL-SCNC: 138 MMOL/L — SIGNIFICANT CHANGE UP (ref 135–145)
T4 FREE SERPL-MCNC: 1.1 NG/DL — SIGNIFICANT CHANGE UP (ref 0.9–1.8)
TSH SERPL-MCNC: 1.41 UIU/ML — SIGNIFICANT CHANGE UP (ref 0.27–4.2)
WBC # BLD: 8.99 K/UL — SIGNIFICANT CHANGE UP (ref 3.8–10.5)
WBC # FLD AUTO: 8.99 K/UL — SIGNIFICANT CHANGE UP (ref 3.8–10.5)

## 2025-05-28 PROCEDURE — 99214 OFFICE O/P EST MOD 30 MIN: CPT

## 2025-06-24 ENCOUNTER — APPOINTMENT (OUTPATIENT)
Dept: HEMATOLOGY ONCOLOGY | Facility: CLINIC | Age: 75
End: 2025-06-24

## 2025-06-25 ENCOUNTER — OUTPATIENT (OUTPATIENT)
Dept: OUTPATIENT SERVICES | Facility: HOSPITAL | Age: 75
LOS: 1 days | Discharge: HOME | End: 2025-06-25

## 2025-06-25 DIAGNOSIS — Z98.890 OTHER SPECIFIED POSTPROCEDURAL STATES: Chronic | ICD-10-CM

## 2025-06-25 DIAGNOSIS — C64.9 MALIGNANT NEOPLASM OF UNSPECIFIED KIDNEY, EXCEPT RENAL PELVIS: ICD-10-CM

## 2025-06-25 DIAGNOSIS — S86.019A STRAIN OF UNSPECIFIED ACHILLES TENDON, INITIAL ENCOUNTER: Chronic | ICD-10-CM

## 2025-06-26 ENCOUNTER — RESULT REVIEW (OUTPATIENT)
Age: 75
End: 2025-06-26

## 2025-06-26 ENCOUNTER — APPOINTMENT (OUTPATIENT)
Dept: HEMATOLOGY ONCOLOGY | Facility: CLINIC | Age: 75
End: 2025-06-26
Payer: MEDICARE

## 2025-06-26 ENCOUNTER — APPOINTMENT (OUTPATIENT)
Dept: INFUSION THERAPY | Facility: HOSPITAL | Age: 75
End: 2025-06-26

## 2025-06-26 VITALS
BODY MASS INDEX: 20.88 KG/M2 | HEART RATE: 58 BPM | SYSTOLIC BLOOD PRESSURE: 123 MMHG | DIASTOLIC BLOOD PRESSURE: 78 MMHG | TEMPERATURE: 97.3 F | RESPIRATION RATE: 16 BRPM | WEIGHT: 145.48 LBS | OXYGEN SATURATION: 98 %

## 2025-06-26 LAB
ALBUMIN SERPL ELPH-MCNC: 4.1 G/DL — SIGNIFICANT CHANGE UP (ref 3.3–5)
ALP SERPL-CCNC: 61 U/L — SIGNIFICANT CHANGE UP (ref 40–120)
ALT FLD-CCNC: 14 U/L — SIGNIFICANT CHANGE UP (ref 10–45)
ANION GAP SERPL CALC-SCNC: 11 MMOL/L — SIGNIFICANT CHANGE UP (ref 5–17)
AST SERPL-CCNC: 38 U/L — SIGNIFICANT CHANGE UP (ref 10–40)
BASOPHILS # BLD AUTO: 0.04 K/UL — SIGNIFICANT CHANGE UP (ref 0–0.2)
BASOPHILS NFR BLD AUTO: 0.4 % — SIGNIFICANT CHANGE UP (ref 0–2)
BILIRUB SERPL-MCNC: 1 MG/DL — SIGNIFICANT CHANGE UP (ref 0.2–1.2)
BUN SERPL-MCNC: 21 MG/DL — SIGNIFICANT CHANGE UP (ref 7–23)
CALCIUM SERPL-MCNC: 9.5 MG/DL — SIGNIFICANT CHANGE UP (ref 8.4–10.5)
CHLORIDE SERPL-SCNC: 103 MMOL/L — SIGNIFICANT CHANGE UP (ref 96–108)
CO2 SERPL-SCNC: 23 MMOL/L — SIGNIFICANT CHANGE UP (ref 22–31)
CREAT SERPL-MCNC: 1.27 MG/DL — SIGNIFICANT CHANGE UP (ref 0.5–1.3)
EGFR: 60 ML/MIN/1.73M2 — SIGNIFICANT CHANGE UP
EGFR: 60 ML/MIN/1.73M2 — SIGNIFICANT CHANGE UP
EOSINOPHIL # BLD AUTO: 0.29 K/UL — SIGNIFICANT CHANGE UP (ref 0–0.5)
EOSINOPHIL NFR BLD AUTO: 2.6 % — SIGNIFICANT CHANGE UP (ref 0–6)
GLUCOSE SERPL-MCNC: 97 MG/DL — SIGNIFICANT CHANGE UP (ref 70–99)
HCT VFR BLD CALC: 37.2 % — LOW (ref 39–50)
HGB BLD-MCNC: 12.9 G/DL — LOW (ref 13–17)
IMM GRANULOCYTES NFR BLD AUTO: 0.3 % — SIGNIFICANT CHANGE UP (ref 0–0.9)
LYMPHOCYTES # BLD AUTO: 1.3 K/UL — SIGNIFICANT CHANGE UP (ref 1–3.3)
LYMPHOCYTES # BLD AUTO: 11.6 % — LOW (ref 13–44)
MCHC RBC-ENTMCNC: 31.8 PG — SIGNIFICANT CHANGE UP (ref 27–34)
MCHC RBC-ENTMCNC: 34.7 G/DL — SIGNIFICANT CHANGE UP (ref 32–36)
MCV RBC AUTO: 91.6 FL — SIGNIFICANT CHANGE UP (ref 80–100)
MONOCYTES # BLD AUTO: 0.75 K/UL — SIGNIFICANT CHANGE UP (ref 0–0.9)
MONOCYTES NFR BLD AUTO: 6.7 % — SIGNIFICANT CHANGE UP (ref 2–14)
NEUTROPHILS # BLD AUTO: 8.75 K/UL — HIGH (ref 1.8–7.4)
NEUTROPHILS NFR BLD AUTO: 78.4 % — HIGH (ref 43–77)
NRBC BLD AUTO-RTO: 0 /100 WBCS — SIGNIFICANT CHANGE UP (ref 0–0)
PLATELET # BLD AUTO: 179 K/UL — SIGNIFICANT CHANGE UP (ref 150–400)
POTASSIUM SERPL-MCNC: 4.8 MMOL/L — SIGNIFICANT CHANGE UP (ref 3.5–5.3)
POTASSIUM SERPL-SCNC: 4.8 MMOL/L — SIGNIFICANT CHANGE UP (ref 3.5–5.3)
PROT SERPL-MCNC: 7.5 G/DL — SIGNIFICANT CHANGE UP (ref 6–8.3)
RBC # BLD: 4.06 M/UL — LOW (ref 4.2–5.8)
RBC # FLD: 12.1 % — SIGNIFICANT CHANGE UP (ref 10.3–14.5)
SODIUM SERPL-SCNC: 137 MMOL/L — SIGNIFICANT CHANGE UP (ref 135–145)
WBC # BLD: 11.16 K/UL — HIGH (ref 3.8–10.5)
WBC # FLD AUTO: 11.16 K/UL — HIGH (ref 3.8–10.5)

## 2025-06-26 PROCEDURE — 99215 OFFICE O/P EST HI 40 MIN: CPT

## 2025-06-27 DIAGNOSIS — Z51.11 ENCOUNTER FOR ANTINEOPLASTIC CHEMOTHERAPY: ICD-10-CM

## 2025-06-27 LAB
T4 FREE SERPL-MCNC: 1.3 NG/DL — SIGNIFICANT CHANGE UP (ref 0.9–1.8)
TSH SERPL-MCNC: 2 UIU/ML — SIGNIFICANT CHANGE UP (ref 0.27–4.2)

## 2025-07-23 ENCOUNTER — RESULT REVIEW (OUTPATIENT)
Age: 75
End: 2025-07-23

## 2025-07-23 ENCOUNTER — APPOINTMENT (OUTPATIENT)
Dept: INFUSION THERAPY | Facility: HOSPITAL | Age: 75
End: 2025-07-23

## 2025-07-23 ENCOUNTER — APPOINTMENT (OUTPATIENT)
Dept: HEMATOLOGY ONCOLOGY | Facility: CLINIC | Age: 75
End: 2025-07-23
Payer: MEDICARE

## 2025-07-23 VITALS
DIASTOLIC BLOOD PRESSURE: 72 MMHG | WEIGHT: 142.62 LBS | BODY MASS INDEX: 20.42 KG/M2 | TEMPERATURE: 98 F | HEIGHT: 70 IN | RESPIRATION RATE: 16 BRPM | SYSTOLIC BLOOD PRESSURE: 112 MMHG | HEART RATE: 55 BPM | OXYGEN SATURATION: 99 %

## 2025-07-23 DIAGNOSIS — C78.00 SECONDARY MALIGNANT NEOPLASM OF UNSPECIFIED LUNG: ICD-10-CM

## 2025-07-23 DIAGNOSIS — C64.2 MALIGNANT NEOPLASM OF LEFT KIDNEY, EXCEPT RENAL PELVIS: ICD-10-CM

## 2025-07-23 LAB
ALBUMIN SERPL ELPH-MCNC: 4.1 G/DL — SIGNIFICANT CHANGE UP (ref 3.3–5)
ALP SERPL-CCNC: 63 U/L — SIGNIFICANT CHANGE UP (ref 40–120)
ALT FLD-CCNC: 17 U/L — SIGNIFICANT CHANGE UP (ref 10–45)
ANION GAP SERPL CALC-SCNC: 12 MMOL/L — SIGNIFICANT CHANGE UP (ref 5–17)
AST SERPL-CCNC: 26 U/L — SIGNIFICANT CHANGE UP (ref 10–40)
BASOPHILS # BLD AUTO: 0.03 K/UL — SIGNIFICANT CHANGE UP (ref 0–0.2)
BASOPHILS NFR BLD AUTO: 0.4 % — SIGNIFICANT CHANGE UP (ref 0–2)
BILIRUB SERPL-MCNC: 0.8 MG/DL — SIGNIFICANT CHANGE UP (ref 0.2–1.2)
BUN SERPL-MCNC: 23 MG/DL — SIGNIFICANT CHANGE UP (ref 7–23)
CALCIUM SERPL-MCNC: 9.3 MG/DL — SIGNIFICANT CHANGE UP (ref 8.4–10.5)
CHLORIDE SERPL-SCNC: 103 MMOL/L — SIGNIFICANT CHANGE UP (ref 96–108)
CO2 SERPL-SCNC: 24 MMOL/L — SIGNIFICANT CHANGE UP (ref 22–31)
CREAT SERPL-MCNC: 1.27 MG/DL — SIGNIFICANT CHANGE UP (ref 0.5–1.3)
EGFR: 60 ML/MIN/1.73M2 — SIGNIFICANT CHANGE UP
EGFR: 60 ML/MIN/1.73M2 — SIGNIFICANT CHANGE UP
EOSINOPHIL # BLD AUTO: 0.36 K/UL — SIGNIFICANT CHANGE UP (ref 0–0.5)
EOSINOPHIL NFR BLD AUTO: 4.6 % — SIGNIFICANT CHANGE UP (ref 0–6)
GLUCOSE SERPL-MCNC: 120 MG/DL — HIGH (ref 70–99)
HCT VFR BLD CALC: 37.5 % — LOW (ref 39–50)
HGB BLD-MCNC: 13 G/DL — SIGNIFICANT CHANGE UP (ref 13–17)
IMM GRANULOCYTES NFR BLD AUTO: 0.3 % — SIGNIFICANT CHANGE UP (ref 0–0.9)
LYMPHOCYTES # BLD AUTO: 1.41 K/UL — SIGNIFICANT CHANGE UP (ref 1–3.3)
LYMPHOCYTES # BLD AUTO: 18.1 % — SIGNIFICANT CHANGE UP (ref 13–44)
MCHC RBC-ENTMCNC: 31.6 PG — SIGNIFICANT CHANGE UP (ref 27–34)
MCHC RBC-ENTMCNC: 34.7 G/DL — SIGNIFICANT CHANGE UP (ref 32–36)
MCV RBC AUTO: 91 FL — SIGNIFICANT CHANGE UP (ref 80–100)
MONOCYTES # BLD AUTO: 0.54 K/UL — SIGNIFICANT CHANGE UP (ref 0–0.9)
MONOCYTES NFR BLD AUTO: 6.9 % — SIGNIFICANT CHANGE UP (ref 2–14)
NEUTROPHILS # BLD AUTO: 5.42 K/UL — SIGNIFICANT CHANGE UP (ref 1.8–7.4)
NEUTROPHILS NFR BLD AUTO: 69.7 % — SIGNIFICANT CHANGE UP (ref 43–77)
NRBC BLD AUTO-RTO: 0 /100 WBCS — SIGNIFICANT CHANGE UP (ref 0–0)
PLATELET # BLD AUTO: 156 K/UL — SIGNIFICANT CHANGE UP (ref 150–400)
POTASSIUM SERPL-MCNC: 4.4 MMOL/L — SIGNIFICANT CHANGE UP (ref 3.5–5.3)
POTASSIUM SERPL-SCNC: 4.4 MMOL/L — SIGNIFICANT CHANGE UP (ref 3.5–5.3)
PROT SERPL-MCNC: 7 G/DL — SIGNIFICANT CHANGE UP (ref 6–8.3)
RBC # BLD: 4.12 M/UL — LOW (ref 4.2–5.8)
RBC # FLD: 12.3 % — SIGNIFICANT CHANGE UP (ref 10.3–14.5)
SODIUM SERPL-SCNC: 139 MMOL/L — SIGNIFICANT CHANGE UP (ref 135–145)
T4 FREE SERPL-MCNC: 1.4 NG/DL — SIGNIFICANT CHANGE UP (ref 0.9–1.8)
TSH SERPL-MCNC: 1.77 UIU/ML — SIGNIFICANT CHANGE UP (ref 0.27–4.2)
WBC # BLD: 7.78 K/UL — SIGNIFICANT CHANGE UP (ref 3.8–10.5)
WBC # FLD AUTO: 7.78 K/UL — SIGNIFICANT CHANGE UP (ref 3.8–10.5)

## 2025-07-23 PROCEDURE — 99213 OFFICE O/P EST LOW 20 MIN: CPT

## 2025-08-02 ENCOUNTER — NON-APPOINTMENT (OUTPATIENT)
Age: 75
End: 2025-08-02

## 2025-08-02 ENCOUNTER — OUTPATIENT (OUTPATIENT)
Dept: OUTPATIENT SERVICES | Facility: HOSPITAL | Age: 75
LOS: 1 days | End: 2025-08-02
Payer: MEDICARE

## 2025-08-02 ENCOUNTER — APPOINTMENT (OUTPATIENT)
Dept: CT IMAGING | Facility: CLINIC | Age: 75
End: 2025-08-02

## 2025-08-02 DIAGNOSIS — Z98.890 OTHER SPECIFIED POSTPROCEDURAL STATES: Chronic | ICD-10-CM

## 2025-08-02 DIAGNOSIS — C64.2 MALIGNANT NEOPLASM OF LEFT KIDNEY, EXCEPT RENAL PELVIS: ICD-10-CM

## 2025-08-02 DIAGNOSIS — S86.019A STRAIN OF UNSPECIFIED ACHILLES TENDON, INITIAL ENCOUNTER: Chronic | ICD-10-CM

## 2025-08-02 PROCEDURE — 71260 CT THORAX DX C+: CPT | Mod: 26

## 2025-08-20 ENCOUNTER — RESULT REVIEW (OUTPATIENT)
Age: 75
End: 2025-08-20

## 2025-08-20 ENCOUNTER — APPOINTMENT (OUTPATIENT)
Dept: INFUSION THERAPY | Facility: HOSPITAL | Age: 75
End: 2025-08-20

## 2025-08-20 ENCOUNTER — APPOINTMENT (OUTPATIENT)
Dept: HEMATOLOGY ONCOLOGY | Facility: CLINIC | Age: 75
End: 2025-08-20
Payer: MEDICARE

## 2025-08-20 VITALS
BODY MASS INDEX: 21.09 KG/M2 | OXYGEN SATURATION: 98 % | SYSTOLIC BLOOD PRESSURE: 114 MMHG | WEIGHT: 147 LBS | RESPIRATION RATE: 16 BRPM | TEMPERATURE: 97.9 F | DIASTOLIC BLOOD PRESSURE: 71 MMHG | HEART RATE: 48 BPM

## 2025-08-20 DIAGNOSIS — C64.2 MALIGNANT NEOPLASM OF LEFT KIDNEY, EXCEPT RENAL PELVIS: ICD-10-CM

## 2025-08-20 DIAGNOSIS — Z79.69 LONG TERM (CURRENT) USE OF OTHER IMMUNOMODULATORS AND IMMUNOSUPPRESSANTS: ICD-10-CM

## 2025-08-20 DIAGNOSIS — C78.00 SECONDARY MALIGNANT NEOPLASM OF UNSPECIFIED LUNG: ICD-10-CM

## 2025-08-20 LAB
ALBUMIN SERPL ELPH-MCNC: 4.2 G/DL — SIGNIFICANT CHANGE UP (ref 3.3–5)
ALP SERPL-CCNC: 64 U/L — SIGNIFICANT CHANGE UP (ref 40–120)
ALT FLD-CCNC: 18 U/L — SIGNIFICANT CHANGE UP (ref 10–45)
ANION GAP SERPL CALC-SCNC: 10 MMOL/L — SIGNIFICANT CHANGE UP (ref 5–17)
AST SERPL-CCNC: 26 U/L — SIGNIFICANT CHANGE UP (ref 10–40)
BASOPHILS # BLD AUTO: 0.04 K/UL — SIGNIFICANT CHANGE UP (ref 0–0.2)
BASOPHILS NFR BLD AUTO: 0.5 % — SIGNIFICANT CHANGE UP (ref 0–2)
BILIRUB SERPL-MCNC: 0.9 MG/DL — SIGNIFICANT CHANGE UP (ref 0.2–1.2)
BUN SERPL-MCNC: 19 MG/DL — SIGNIFICANT CHANGE UP (ref 7–23)
CALCIUM SERPL-MCNC: 9.5 MG/DL — SIGNIFICANT CHANGE UP (ref 8.4–10.5)
CHLORIDE SERPL-SCNC: 105 MMOL/L — SIGNIFICANT CHANGE UP (ref 96–108)
CO2 SERPL-SCNC: 24 MMOL/L — SIGNIFICANT CHANGE UP (ref 22–31)
CREAT SERPL-MCNC: 1.3 MG/DL — SIGNIFICANT CHANGE UP (ref 0.5–1.3)
EGFR: 58 ML/MIN/1.73M2 — LOW
EGFR: 58 ML/MIN/1.73M2 — LOW
EOSINOPHIL # BLD AUTO: 0.36 K/UL — SIGNIFICANT CHANGE UP (ref 0–0.5)
EOSINOPHIL NFR BLD AUTO: 4.2 % — SIGNIFICANT CHANGE UP (ref 0–6)
GLUCOSE SERPL-MCNC: 90 MG/DL — SIGNIFICANT CHANGE UP (ref 70–99)
HCT VFR BLD CALC: 36.2 % — LOW (ref 39–50)
HGB BLD-MCNC: 12.8 G/DL — LOW (ref 13–17)
IMM GRANULOCYTES NFR BLD AUTO: 0.2 % — SIGNIFICANT CHANGE UP (ref 0–0.9)
LYMPHOCYTES # BLD AUTO: 1.26 K/UL — SIGNIFICANT CHANGE UP (ref 1–3.3)
LYMPHOCYTES # BLD AUTO: 14.8 % — SIGNIFICANT CHANGE UP (ref 13–44)
MCHC RBC-ENTMCNC: 32 PG — SIGNIFICANT CHANGE UP (ref 27–34)
MCHC RBC-ENTMCNC: 35.4 G/DL — SIGNIFICANT CHANGE UP (ref 32–36)
MCV RBC AUTO: 90.5 FL — SIGNIFICANT CHANGE UP (ref 80–100)
MONOCYTES # BLD AUTO: 0.65 K/UL — SIGNIFICANT CHANGE UP (ref 0–0.9)
MONOCYTES NFR BLD AUTO: 7.6 % — SIGNIFICANT CHANGE UP (ref 2–14)
NEUTROPHILS # BLD AUTO: 6.19 K/UL — SIGNIFICANT CHANGE UP (ref 1.8–7.4)
NEUTROPHILS NFR BLD AUTO: 72.7 % — SIGNIFICANT CHANGE UP (ref 43–77)
NRBC BLD AUTO-RTO: 0 /100 WBCS — SIGNIFICANT CHANGE UP (ref 0–0)
PLATELET # BLD AUTO: 174 K/UL — SIGNIFICANT CHANGE UP (ref 150–400)
POTASSIUM SERPL-MCNC: 4.3 MMOL/L — SIGNIFICANT CHANGE UP (ref 3.5–5.3)
POTASSIUM SERPL-SCNC: 4.3 MMOL/L — SIGNIFICANT CHANGE UP (ref 3.5–5.3)
PROT SERPL-MCNC: 7.4 G/DL — SIGNIFICANT CHANGE UP (ref 6–8.3)
RBC # BLD: 4 M/UL — LOW (ref 4.2–5.8)
RBC # FLD: 12.5 % — SIGNIFICANT CHANGE UP (ref 10.3–14.5)
SODIUM SERPL-SCNC: 140 MMOL/L — SIGNIFICANT CHANGE UP (ref 135–145)
T4 FREE SERPL-MCNC: 1.3 NG/DL — SIGNIFICANT CHANGE UP (ref 0.9–1.8)
TSH SERPL-MCNC: 1.29 UIU/ML — SIGNIFICANT CHANGE UP (ref 0.27–4.2)
WBC # BLD: 8.52 K/UL — SIGNIFICANT CHANGE UP (ref 3.8–10.5)
WBC # FLD AUTO: 8.52 K/UL — SIGNIFICANT CHANGE UP (ref 3.8–10.5)

## 2025-08-20 PROCEDURE — 99214 OFFICE O/P EST MOD 30 MIN: CPT

## 2025-09-13 ENCOUNTER — EMERGENCY (EMERGENCY)
Facility: HOSPITAL | Age: 75
LOS: 1 days | End: 2025-09-13
Attending: STUDENT IN AN ORGANIZED HEALTH CARE EDUCATION/TRAINING PROGRAM | Admitting: STUDENT IN AN ORGANIZED HEALTH CARE EDUCATION/TRAINING PROGRAM
Payer: MEDICARE

## 2025-09-13 VITALS
DIASTOLIC BLOOD PRESSURE: 52 MMHG | HEART RATE: 98 BPM | OXYGEN SATURATION: 97 % | RESPIRATION RATE: 18 BRPM | WEIGHT: 145.06 LBS | SYSTOLIC BLOOD PRESSURE: 90 MMHG | HEIGHT: 69 IN | TEMPERATURE: 97 F

## 2025-09-13 VITALS
HEART RATE: 90 BPM | OXYGEN SATURATION: 97 % | DIASTOLIC BLOOD PRESSURE: 60 MMHG | RESPIRATION RATE: 18 BRPM | SYSTOLIC BLOOD PRESSURE: 107 MMHG | TEMPERATURE: 98 F

## 2025-09-13 DIAGNOSIS — Z98.890 OTHER SPECIFIED POSTPROCEDURAL STATES: Chronic | ICD-10-CM

## 2025-09-13 DIAGNOSIS — S86.019A STRAIN OF UNSPECIFIED ACHILLES TENDON, INITIAL ENCOUNTER: Chronic | ICD-10-CM

## 2025-09-13 LAB
ALBUMIN SERPL ELPH-MCNC: 3.6 G/DL — SIGNIFICANT CHANGE UP (ref 3.3–5)
ALP SERPL-CCNC: 60 U/L — SIGNIFICANT CHANGE UP (ref 40–120)
ALT FLD-CCNC: 24 U/L — SIGNIFICANT CHANGE UP (ref 12–78)
ANION GAP SERPL CALC-SCNC: 8 MMOL/L — SIGNIFICANT CHANGE UP (ref 5–17)
APPEARANCE UR: CLEAR — SIGNIFICANT CHANGE UP
AST SERPL-CCNC: 20 U/L — SIGNIFICANT CHANGE UP (ref 15–37)
BASOPHILS # BLD AUTO: 0.05 K/UL — SIGNIFICANT CHANGE UP (ref 0–0.2)
BASOPHILS NFR BLD AUTO: 0.4 % — SIGNIFICANT CHANGE UP (ref 0–2)
BILIRUB SERPL-MCNC: 0.9 MG/DL — SIGNIFICANT CHANGE UP (ref 0.2–1.2)
BILIRUB UR-MCNC: NEGATIVE — SIGNIFICANT CHANGE UP
BUN SERPL-MCNC: 18 MG/DL — SIGNIFICANT CHANGE UP (ref 7–23)
CALCIUM SERPL-MCNC: 9.7 MG/DL — SIGNIFICANT CHANGE UP (ref 8.5–10.1)
CHLORIDE SERPL-SCNC: 113 MMOL/L — HIGH (ref 96–108)
CO2 SERPL-SCNC: 19 MMOL/L — LOW (ref 22–31)
COLOR SPEC: YELLOW — SIGNIFICANT CHANGE UP
CREAT SERPL-MCNC: 1.5 MG/DL — HIGH (ref 0.5–1.3)
DIFF PNL FLD: NEGATIVE — SIGNIFICANT CHANGE UP
EGFR: 49 ML/MIN/1.73M2 — LOW
EGFR: 49 ML/MIN/1.73M2 — LOW
EOSINOPHIL # BLD AUTO: 0.25 K/UL — SIGNIFICANT CHANGE UP (ref 0–0.5)
EOSINOPHIL NFR BLD AUTO: 1.9 % — SIGNIFICANT CHANGE UP (ref 0–6)
GLUCOSE SERPL-MCNC: 184 MG/DL — HIGH (ref 70–99)
GLUCOSE UR QL: NEGATIVE MG/DL — SIGNIFICANT CHANGE UP
HCT VFR BLD CALC: 36.5 % — LOW (ref 39–50)
HGB BLD-MCNC: 13.3 G/DL — SIGNIFICANT CHANGE UP (ref 13–17)
IMM GRANULOCYTES # BLD AUTO: 0.05 K/UL — SIGNIFICANT CHANGE UP (ref 0–0.07)
IMM GRANULOCYTES NFR BLD AUTO: 0.4 % — SIGNIFICANT CHANGE UP (ref 0–0.9)
KETONES UR QL: ABNORMAL MG/DL
LACTATE SERPL-SCNC: 0.9 MMOL/L — SIGNIFICANT CHANGE UP (ref 0.7–2)
LACTATE SERPL-SCNC: 4.6 MMOL/L — CRITICAL HIGH (ref 0.7–2)
LEUKOCYTE ESTERASE UR-ACNC: NEGATIVE — SIGNIFICANT CHANGE UP
LIDOCAIN IGE QN: 21 U/L — SIGNIFICANT CHANGE UP (ref 13–75)
LYMPHOCYTES # BLD AUTO: 1.62 K/UL — SIGNIFICANT CHANGE UP (ref 1–3.3)
LYMPHOCYTES NFR BLD AUTO: 12.4 % — LOW (ref 13–44)
MAGNESIUM SERPL-MCNC: 2.2 MG/DL — SIGNIFICANT CHANGE UP (ref 1.6–2.6)
MCHC RBC-ENTMCNC: 32.3 PG — SIGNIFICANT CHANGE UP (ref 27–34)
MCHC RBC-ENTMCNC: 36.4 G/DL — HIGH (ref 32–36)
MCV RBC AUTO: 88.6 FL — SIGNIFICANT CHANGE UP (ref 80–100)
MONOCYTES # BLD AUTO: 0.81 K/UL — SIGNIFICANT CHANGE UP (ref 0–0.9)
MONOCYTES NFR BLD AUTO: 6.2 % — SIGNIFICANT CHANGE UP (ref 2–14)
NEUTROPHILS # BLD AUTO: 10.29 K/UL — HIGH (ref 1.8–7.4)
NEUTROPHILS NFR BLD AUTO: 78.7 % — HIGH (ref 43–77)
NITRITE UR-MCNC: NEGATIVE — SIGNIFICANT CHANGE UP
NRBC # BLD AUTO: 0 K/UL — SIGNIFICANT CHANGE UP (ref 0–0)
NRBC # FLD: 0 K/UL — SIGNIFICANT CHANGE UP (ref 0–0)
NRBC BLD AUTO-RTO: 0 /100 WBCS — SIGNIFICANT CHANGE UP (ref 0–0)
PH UR: 8 — SIGNIFICANT CHANGE UP (ref 5–8)
PLATELET # BLD AUTO: 201 K/UL — SIGNIFICANT CHANGE UP (ref 150–400)
PMV BLD: 9.8 FL — SIGNIFICANT CHANGE UP (ref 7–13)
POTASSIUM SERPL-MCNC: 4.5 MMOL/L — SIGNIFICANT CHANGE UP (ref 3.5–5.3)
POTASSIUM SERPL-SCNC: 4.5 MMOL/L — SIGNIFICANT CHANGE UP (ref 3.5–5.3)
PROT SERPL-MCNC: 7 G/DL — SIGNIFICANT CHANGE UP (ref 6–8.3)
PROT UR-MCNC: NEGATIVE MG/DL — SIGNIFICANT CHANGE UP
RBC # BLD: 4.12 M/UL — LOW (ref 4.2–5.8)
RBC # FLD: 12.3 % — SIGNIFICANT CHANGE UP (ref 10.3–14.5)
SODIUM SERPL-SCNC: 140 MMOL/L — SIGNIFICANT CHANGE UP (ref 135–145)
SP GR SPEC: 1.03 — SIGNIFICANT CHANGE UP (ref 1–1.03)
UROBILINOGEN FLD QL: 0.2 MG/DL — SIGNIFICANT CHANGE UP (ref 0.2–1)
WBC # BLD: 13.07 K/UL — HIGH (ref 3.8–10.5)
WBC # FLD AUTO: 13.07 K/UL — HIGH (ref 3.8–10.5)

## 2025-09-13 PROCEDURE — 96374 THER/PROPH/DIAG INJ IV PUSH: CPT | Mod: XU

## 2025-09-13 PROCEDURE — 87040 BLOOD CULTURE FOR BACTERIA: CPT

## 2025-09-13 PROCEDURE — 93010 ELECTROCARDIOGRAM REPORT: CPT

## 2025-09-13 PROCEDURE — 83690 ASSAY OF LIPASE: CPT

## 2025-09-13 PROCEDURE — 87150 DNA/RNA AMPLIFIED PROBE: CPT

## 2025-09-13 PROCEDURE — 99285 EMERGENCY DEPT VISIT HI MDM: CPT

## 2025-09-13 PROCEDURE — 83605 ASSAY OF LACTIC ACID: CPT

## 2025-09-13 PROCEDURE — 87077 CULTURE AEROBIC IDENTIFY: CPT

## 2025-09-13 PROCEDURE — 80053 COMPREHEN METABOLIC PANEL: CPT

## 2025-09-13 PROCEDURE — 74177 CT ABD & PELVIS W/CONTRAST: CPT

## 2025-09-13 PROCEDURE — 96375 TX/PRO/DX INJ NEW DRUG ADDON: CPT

## 2025-09-13 PROCEDURE — 81003 URINALYSIS AUTO W/O SCOPE: CPT

## 2025-09-13 PROCEDURE — 74177 CT ABD & PELVIS W/CONTRAST: CPT | Mod: 26

## 2025-09-13 PROCEDURE — 99284 EMERGENCY DEPT VISIT MOD MDM: CPT | Mod: 25

## 2025-09-13 PROCEDURE — 99283 EMERGENCY DEPT VISIT LOW MDM: CPT

## 2025-09-13 PROCEDURE — 36415 COLL VENOUS BLD VENIPUNCTURE: CPT

## 2025-09-13 PROCEDURE — 93005 ELECTROCARDIOGRAM TRACING: CPT

## 2025-09-13 PROCEDURE — 85025 COMPLETE CBC W/AUTO DIFF WBC: CPT

## 2025-09-13 PROCEDURE — 83735 ASSAY OF MAGNESIUM: CPT

## 2025-09-13 RX ORDER — SODIUM CHLORIDE 9 G/1000ML
1000 INJECTION, SOLUTION INTRAVENOUS ONCE
Refills: 0 | Status: COMPLETED | OUTPATIENT
Start: 2025-09-13 | End: 2025-09-13

## 2025-09-13 RX ORDER — ACETAMINOPHEN 500 MG/5ML
1000 LIQUID (ML) ORAL ONCE
Refills: 0 | Status: COMPLETED | OUTPATIENT
Start: 2025-09-13 | End: 2025-09-13

## 2025-09-13 RX ORDER — PIPERACILLIN-TAZO-DEXTROSE,ISO 3.375G/5
3.38 IV SOLUTION, PIGGYBACK PREMIX FROZEN(ML) INTRAVENOUS ONCE
Refills: 0 | Status: COMPLETED | OUTPATIENT
Start: 2025-09-13 | End: 2025-09-13

## 2025-09-13 RX ORDER — ONDANSETRON HCL/PF 4 MG/2 ML
4 VIAL (ML) INJECTION ONCE
Refills: 0 | Status: COMPLETED | OUTPATIENT
Start: 2025-09-13 | End: 2025-09-13

## 2025-09-13 RX ADMIN — Medication 4 MILLIGRAM(S): at 08:57

## 2025-09-13 RX ADMIN — Medication 4 MILLIGRAM(S): at 08:52

## 2025-09-13 RX ADMIN — Medication 400 MILLIGRAM(S): at 08:51

## 2025-09-13 RX ADMIN — SODIUM CHLORIDE 1000 MILLILITER(S): 9 INJECTION, SOLUTION INTRAVENOUS at 10:20

## 2025-09-13 RX ADMIN — Medication 200 GRAM(S): at 10:20

## 2025-09-13 RX ADMIN — SODIUM CHLORIDE 1000 MILLILITER(S): 9 INJECTION, SOLUTION INTRAVENOUS at 08:57

## 2025-09-14 LAB — GRAM STN FLD: ABNORMAL

## 2025-09-15 LAB
-  BLOOD PCR PANEL: SIGNIFICANT CHANGE UP
CULTURE RESULTS: ABNORMAL
METHOD TYPE: SIGNIFICANT CHANGE UP
ORGANISM # SPEC MICROSCOPIC CNT: ABNORMAL
ORGANISM # SPEC MICROSCOPIC CNT: SIGNIFICANT CHANGE UP
SPECIMEN SOURCE: SIGNIFICANT CHANGE UP

## 2025-09-17 ENCOUNTER — RESULT REVIEW (OUTPATIENT)
Age: 75
End: 2025-09-17

## 2025-09-17 ENCOUNTER — APPOINTMENT (OUTPATIENT)
Dept: INFUSION THERAPY | Facility: HOSPITAL | Age: 75
End: 2025-09-17

## 2025-09-17 ENCOUNTER — NON-APPOINTMENT (OUTPATIENT)
Age: 75
End: 2025-09-17

## 2025-09-17 ENCOUNTER — APPOINTMENT (OUTPATIENT)
Dept: HEMATOLOGY ONCOLOGY | Facility: CLINIC | Age: 75
End: 2025-09-17
Payer: MEDICARE

## 2025-09-17 VITALS
DIASTOLIC BLOOD PRESSURE: 75 MMHG | RESPIRATION RATE: 16 BRPM | SYSTOLIC BLOOD PRESSURE: 125 MMHG | HEART RATE: 53 BPM | TEMPERATURE: 97.8 F | WEIGHT: 143.3 LBS | OXYGEN SATURATION: 99 % | BODY MASS INDEX: 20.56 KG/M2

## 2025-09-17 DIAGNOSIS — C78.00 SECONDARY MALIGNANT NEOPLASM OF UNSPECIFIED LUNG: ICD-10-CM

## 2025-09-17 DIAGNOSIS — Z29.89 ENCOUNTER. FOR OTHER SPECIFIED PROPHYLACTIC MEASURES: ICD-10-CM

## 2025-09-17 DIAGNOSIS — C64.2 MALIGNANT NEOPLASM OF LEFT KIDNEY, EXCEPT RENAL PELVIS: ICD-10-CM

## 2025-09-17 PROCEDURE — 99215 OFFICE O/P EST HI 40 MIN: CPT

## 2025-09-18 LAB
CULTURE RESULTS: SIGNIFICANT CHANGE UP
SPECIMEN SOURCE: SIGNIFICANT CHANGE UP